# Patient Record
Sex: FEMALE | Race: WHITE | NOT HISPANIC OR LATINO | Employment: OTHER | RURAL
[De-identification: names, ages, dates, MRNs, and addresses within clinical notes are randomized per-mention and may not be internally consistent; named-entity substitution may affect disease eponyms.]

---

## 2017-01-11 ENCOUNTER — TELEPHONE (OUTPATIENT)
Dept: FAMILY MEDICINE CLINIC | Facility: CLINIC | Age: 67
End: 2017-01-11

## 2017-01-11 RX ORDER — PREDNISONE 1 MG/1
TABLET ORAL
Qty: 38 TABLET | Refills: 0 | Status: SHIPPED | OUTPATIENT
Start: 2017-01-11 | End: 2017-03-27 | Stop reason: ALTCHOICE

## 2017-01-11 RX ORDER — ALBUTEROL SULFATE 90 UG/1
2 AEROSOL, METERED RESPIRATORY (INHALATION) EVERY 4 HOURS PRN
Qty: 1 INHALER | Refills: 0 | Status: SHIPPED | OUTPATIENT
Start: 2017-01-11 | End: 2018-10-23 | Stop reason: SDUPTHER

## 2017-01-11 RX ORDER — AZITHROMYCIN 250 MG/1
TABLET, FILM COATED ORAL
Qty: 6 TABLET | Refills: 0 | Status: SHIPPED | OUTPATIENT
Start: 2017-01-11 | End: 2017-03-27 | Stop reason: ALTCHOICE

## 2017-01-11 NOTE — TELEPHONE ENCOUNTER
Pt called and states she has a cold going into her chest, cough and she is leaving for vacation Sunday and would like something called into MD1, verbal per Dr Crowe repeat last Rx done and sent [facility] and pt notified, [phone]

## 2017-03-27 ENCOUNTER — OFFICE VISIT (OUTPATIENT)
Dept: OTOLARYNGOLOGY | Facility: CLINIC | Age: 67
End: 2017-03-27

## 2017-03-27 VITALS
HEART RATE: 78 BPM | WEIGHT: 240.13 LBS | SYSTOLIC BLOOD PRESSURE: 148 MMHG | TEMPERATURE: 98.2 F | BODY MASS INDEX: 42.55 KG/M2 | HEIGHT: 63 IN | DIASTOLIC BLOOD PRESSURE: 84 MMHG

## 2017-03-27 DIAGNOSIS — J37.0 SICCA LARYNGITIS: ICD-10-CM

## 2017-03-27 DIAGNOSIS — J37.0 CHRONIC LARYNGITIS: ICD-10-CM

## 2017-03-27 DIAGNOSIS — K21.9 LARYNGOPHARYNGEAL REFLUX (LPR): Primary | ICD-10-CM

## 2017-03-27 DIAGNOSIS — R49.0 HOARSENESS: ICD-10-CM

## 2017-03-27 PROCEDURE — 99213 OFFICE O/P EST LOW 20 MIN: CPT | Performed by: NURSE PRACTITIONER

## 2017-03-27 PROCEDURE — 31575 DIAGNOSTIC LARYNGOSCOPY: CPT | Performed by: NURSE PRACTITIONER

## 2017-03-27 RX ORDER — AZELASTINE 1 MG/ML
SPRAY, METERED NASAL
Refills: 4 | COMMUNITY
Start: 2017-03-17 | End: 2017-04-11 | Stop reason: ALTCHOICE

## 2017-03-27 RX ORDER — OMEPRAZOLE 40 MG/1
40 CAPSULE, DELAYED RELEASE ORAL DAILY
Qty: 30 CAPSULE | Refills: 11 | Status: SHIPPED | OUTPATIENT
Start: 2017-03-27 | End: 2018-02-22 | Stop reason: SDUPTHER

## 2017-03-27 RX ORDER — TRIAMCINOLONE ACETONIDE 55 UG/1
2 SPRAY, METERED NASAL DAILY
Qty: 16.5 G | Refills: 11 | Status: SHIPPED | OUTPATIENT
Start: 2017-03-27 | End: 2018-03-27

## 2017-03-27 NOTE — PROGRESS NOTES
YOB: 1950  Location: [facility]  Location Address: [address] [place] [address], [facility], [address] [place] [address]  Location Phone: [phone]    Chief Complaint   Patient presents with   • Hoarse       History of Present Illness  Kezia Otoole is a 66 y.o. female.  Kezia Otoole is here for follow up of ENT complaints. The patient has had problems with hoarseness  The symptoms are not localized to a particular location. The patient has had moderate symptoms. The symptoms have been present for the last several years . The symptoms are aggravated by  weather change . The symptoms are improved by no identifieable factors.       Past Medical History:   Diagnosis Date   • Chronic laryngitis    • Diabetes mellitus    • Hypertension    • Laryngopharyngeal reflux    • Nontoxic multinodular goiter        Past Surgical History:   Procedure Laterality Date   • CARPAL BOSS EXCISION     • CHOLECYSTECTOMY     • HAND SURGERY     • HYSTERECTOMY     • THYROIDECTOMY, PARTIAL Right 2010   • TONSILLECTOMY     • TUBAL ABDOMINAL LIGATION           Current Outpatient Prescriptions:   •  acetaminophen (TYLENOL) 500 MG tablet, Take 1,000 mg by mouth Every 6 (Six) Hours As Needed for mild pain (1-3)., Disp: , Rfl:   •  albuterol (PROVENTIL HFA;VENTOLIN HFA) 108 (90 BASE) MCG/ACT inhaler, Inhale 2 puffs Every 4 (Four) Hours As Needed for wheezing or shortness of air., Disp: 1 inhaler, Rfl: 0  •  amLODIPine (NORVASC) 2.5 MG tablet, TAKE ONE TABLET DAILY, Disp: 30 tablet, Rfl: 5  •  aspirin 81 MG EC tablet, Take 81 mg by mouth Daily., Disp: , Rfl:   •  azelastine (ASTELIN) 0.1 % nasal spray, , Disp: , Rfl: 4  •  B Complex-C (SUPER B COMPLEX PO), Take 1 tablet by mouth Daily., Disp: , Rfl:   •  Calcium Carb-Cholecalciferol (CALCIUM-VITAMIN D) 600-400 MG-UNIT tablet, Take 1 tablet by mouth 2 (Two) Times a Day., Disp: , Rfl:   •  carboxymethylcellulose (REFRESH PLUS) 0.5 % solution, 2 drops Daily As Needed for dry eyes., Disp: , Rfl:    •  Coenzyme Q10 (CO Q 10 PO), Take 1 capsule by mouth., Disp: , Rfl:   •  guaiFENesin (MUCINEX) 600 MG 12 hr tablet, Take 1,200 mg by mouth 2 (two) times a day., Disp: , Rfl:   •  hydrochlorothiazide (HYDRODIURIL) 25 MG tablet, TAKE 1/2 TABLET DAILY, Disp: 15 tablet, Rfl: 5  •  ibuprofen (ADVIL,MOTRIN) 400 MG tablet, Take 400 mg by mouth Every 6 (Six) Hours As Needed for mild pain (1-3)., Disp: , Rfl:   •  irbesartan (AVAPRO) 300 MG tablet, TAKE ONE TABLET DAILY, Disp: 30 tablet, Rfl: 5  •  latanoprost (XALATAN) 0.005 % ophthalmic solution, Administer 1 drop to both eyes Every Night., Disp: , Rfl: 5  •  Misc Natural Products (GLUCOSAMINE CHONDROITIN TRIPLE PO), Take 1 tablet by mouth 2 (Two) Times a Day., Disp: , Rfl:   •  montelukast (SINGULAIR) 10 MG tablet, TAKE ONE TABLET DAILY (Patient taking differently: TAKE ONE TABLET DAILY BEDTIME), Disp: 30 tablet, Rfl: 5  •  Multiple Vitamins-Minerals (VITRUM 50+ SENIOR MULTI PO), Take 1 tablet by mouth Daily., Disp: , Rfl:   •  naproxen sodium (ALEVE) 220 MG tablet, Take 220 mg by mouth 2 (Two) Times a Day As Needed for mild pain (1-3)., Disp: , Rfl:   •  nystatin (MYCOSTATIN) 992549 UNIT/ML suspension, Daily As Needed (due to mouth soreness)., Disp: , Rfl: 0  •  Omega-3 Fatty Acids (FISH OIL) 1200 MG capsule capsule, Take 1,200 mg by mouth 2 (Two) Times a Day With Meals., Disp: , Rfl:   •  potassium chloride (K-DUR) 10 MEQ CR tablet, TAKE ONE TABLET DAILY, Disp: 30 tablet, Rfl: 5  •  ranitidine (ZANTAC) 150 MG tablet, Take 1 tablet by mouth 2 (two) times a day., Disp: 60 tablet, Rfl: 6  •  SYMBICORT 160-4.5 MCG/ACT inhaler, TAKE 2 PUFFS BY MOUTH TWICE DAILY, Disp: 10.2 g, Rfl: 5  •  omeprazole (priLOSEC) 40 MG capsule, Take 1 capsule by mouth Daily., Disp: 30 capsule, Rfl: 11  •  Triamcinolone Acetonide (NASACORT AQ) 55 MCG/ACT nasal inhaler, 2 sprays into each nostril Daily., Disp: 16.5 g, Rfl: 11    Review of patient's allergies indicates no known  "allergies.    Family History   Problem Relation Age of Onset   • Diabetes Mother    • Stroke Mother    • Heart disease Mother    • Diabetes Father    • Heart disease Father        Social History     Social History   • Marital status:      Spouse name: Laurie   • Number of children: 2   • Years of education: 16     Occupational History   • retired      Banking      Social History Main Topics   • Smoking status: Never Smoker   • Smokeless tobacco: Never Used      Comment: \"lived with smokers most of my life\"   • Alcohol use Yes      Comment: Rare   • Drug use: No   • Sexual activity: Yes     Partners: Male     Birth control/ protection: None     Other Topics Concern   • Not on file     Social History Narrative    \"I have one surviving child\"       Review of Systems   Constitutional: Negative.    HENT:        SEE HPI   Eyes: Negative.    Respiratory: Negative.    Cardiovascular: Negative.    Gastrointestinal: Negative.    Endocrine: Negative.    Genitourinary: Negative.    Musculoskeletal: Negative.    Skin: Negative.    Allergic/Immunologic: Negative.    Neurological: Negative.    Hematological: Negative.    Psychiatric/Behavioral: Negative.        Vitals:    03/27/17 1318   BP: 148/84   Pulse: 78   Temp: 98.2 °F (36.8 °C)       Objective     Physical Exam  CONSTITUTIONAL: well nourished, alert, oriented, in no acute distress     COMMUNICATION AND VOICE: able to communicate normally, normal voice quality    HEAD: normocephalic, no lesions, atraumatic, no tenderness, no masses     FACE: appearance normal, no lesions, no tenderness, no deformities, facial motion symmetric    SALIVARY GLANDS: parotid glands with no tenderness, no swelling, no masses, submandibular glands with normal size, nontender    EYES: ocular motility normal, eyelids normal, orbits normal, no proptosis, conjunctiva normal , pupils equal, round     EARS:  Hearing: response to conversational voice normal bilaterally   External Ears: " auricles without lesions  Otoscopic: tympanic membrane appearance normal, no lesions, no perforation, normal mobility, no fluid    NOSE:  External Nose: structure normal, no tenderness on palpation, no nasal discharge, no lesions, no evidence of trauma, nostrils patent   Intranasal Exam: nasal mucosa normal, vestibule within normal limits, inferior turbinate normal, nasal septum midline     ORAL:  Lips: upper and lower lips without lesion   Teeth: dentition within normal limits for age   Gums: gingivae healthy   Oral Mucosa: oral mucosa normal, no mucosal lesions   Floor of Mouth: Warthin’s duct patent, mucosa normal  Tongue: lingual mucosa normal without lesions, normal tongue mobility   Palate: soft and hard palates with normal mucosa and structure  Oropharynx: oropharyngeal mucosa normal    HYPOPHARYNX:   LARYNX: see scope    NECK: neck incision well healed    THYROID: right thyroid sugically absent    LYMPH NODES: no lymphadenopathy    CHEST/RESPIRATORY: respiratory effort normal,    CARDIOVASCULAR:  extremities without cyanosis or edema      NEUROLOGIC/PSYCHIATRIC: oriented to time, place and person, mood normal, affect appropriate, CN II-XII intact grossly    Assessment/Plan   Kezia was seen today for hoarse.    Diagnoses and all orders for this visit:    Laryngopharyngeal reflux (LPR)    Hoarseness    Chronic laryngitis    Sicca laryngitis    Other orders  -     omeprazole (priLOSEC) 40 MG capsule; Take 1 capsule by mouth Daily.  -     Triamcinolone Acetonide (NASACORT AQ) 55 MCG/ACT nasal inhaler; 2 sprays into each nostril Daily.      * Surgery not found *  No orders of the defined types were placed in this encounter.    Return in about 3 months (around 6/27/2017).       Patient Instructions   Reflux precautions    Recommendation was made to consider a Paleo Diet.      It Starts with Food - Carlos and Josefina Chavez was recommended.  The basics of a Paleo diet was covered including a diet composed of meat,  fruits and non-leguminous vegetables.  It is important to avoid all processed foods.  This requires that you not eat ANYTHING with a chemical preservative.    The Paleo lifestyle is about nourishing our bodies with real food that is grown and raised as nature intended, not manufactured in a facility.  It is about unplugging from the modern day electronics from time to time and giving your body a chance to actually rest.    It is important to stick very close to this diet for 6 weeks without significant cheating.  It allows you to experience the benefit of eating this way, giving your body time to detoxify.    Acceptable foods  Fresh Fish/seafood  Fresh meats-preferably grass-fed and free range  Fresh fruits  Fresh vegetables  Healthy fats-Coconut oil, olive oil, avocados etc.  Nuts/seeds    Foods to avoid  Grains  Legumes  Processed foods  Soy  Refined sugar    Web sites include:  www.PrimalBodySwing by SwingPrimalMind.Orbit Media  www.Mimetas.Orbit Media  www.Bizdom  Www.PaleFluGen.Orbit Media    Other Kellie Resources:  Emilie Jensen  PrimalPaleivone  Paleo [place]  Nourished      Other Recommended Books:  The Paleo Solution  Wheat Belly  Grain Brain  Primal Body/Primal Mind  Gastroesophageal Reflux Disease, Adult  Normally, food travels down the esophagus and stays in the stomach to be digested. However, when a person has gastroesophageal reflux disease (GERD), food and stomach acid move back up into the esophagus. When this happens, the esophagus becomes sore and inflamed. Over time, GERD can create small holes (ulcers) in the lining of the esophagus.   CAUSES  This condition is caused by a problem with the muscle between the esophagus and the stomach (lower esophageal sphincter, or LES). Normally, the LES muscle closes after food passes through the esophagus to the stomach. When the LES is weakened or abnormal, it does not close properly, and that allows food and stomach acid to go back up into the esophagus. The LES can be weakened  by certain dietary substances, medicines, and medical conditions, including:  · Tobacco use.  · Pregnancy.  · Having a hiatal hernia.  · Heavy alcohol use.  · Certain foods and beverages, such as coffee, chocolate, onions, and peppermint.  RISK FACTORS  This condition is more likely to develop in:  · People who have an increased body weight.  · People who have connective tissue disorders.  · People who use NSAID medicines.  SYMPTOMS  Symptoms of this condition include:  · Heartburn.  · Difficult or painful swallowing.  · The feeling of having a lump in the throat.  · A bitter taste in the mouth.  · Bad breath.  · Having a large amount of saliva.  · Having an upset or bloated stomach.  · Belching.  · Chest pain.  · Shortness of breath or wheezing.  · Ongoing (chronic) cough or a night-time cough.  · Wearing away of tooth enamel.  · Weight loss.  Different conditions can cause chest pain. Make sure to see your health care provider if you experience chest pain.  DIAGNOSIS  Your health care provider will take a medical history and perform a physical exam. To determine if you have mild or severe GERD, your health care provider may also monitor how you respond to treatment. You may also have other tests, including:  · An endoscopy to examine your stomach and esophagus with a small camera.  · A test that measures the acidity level in your esophagus.  · A test that measures how much pressure is on your esophagus.  · A barium swallow or modified barium swallow to show the shape, size, and functioning of your esophagus.  TREATMENT  The goal of treatment is to help relieve your symptoms and to prevent complications. Treatment for this condition may vary depending on how severe your symptoms are. Your health care provider may recommend:  · Changes to your diet.  · Medicine.  · Surgery.  HOME CARE INSTRUCTIONS  Diet  · Follow a diet as recommended by your health care provider. This may involve avoiding foods and drinks such  as:    Coffee and tea (with or without caffeine).    Drinks that contain alcohol.    Energy drinks and sports drinks.    Carbonated drinks or sodas.    Chocolate and cocoa.    Peppermint and mint flavorings.    Garlic and onions.    Horseradish.    Spicy and acidic foods, including peppers, chili powder, naik powder, vinegar, hot sauces, and barbecue sauce.    Citrus fruit juices and citrus fruits, such as oranges, manoj, and limes.    Tomato-based foods, such as red sauce, chili, salsa, and pizza with red sauce.    Fried and fatty foods, such as donuts, french fries, potato chips, and high-fat dressings.    High-fat meats, such as hot dogs and fatty cuts of red and white meats, such as rib eye steak, sausage, ham, and laughlin.    High-fat dairy items, such as whole milk, butter, and cream cheese.  · Eat small, frequent meals instead of large meals.  · Avoid drinking large amounts of liquid with your meals.  · Avoid eating meals during the 2-3 hours before bedtime.  · Avoid lying down right after you eat.  · Do not exercise right after you eat.   General Instructions   · Pay attention to any changes in your symptoms.  · Take over-the-counter and prescription medicines only as told by your health care provider. Do not take aspirin, ibuprofen, or other NSAIDs unless your health care provider told you to do so.  · Do not use any tobacco products, including cigarettes, chewing tobacco, and e-cigarettes. If you need help quitting, ask your health care provider.  · Wear loose-fitting clothing. Do not wear anything tight around your waist that causes pressure on your abdomen.  · Raise (elevate) the head of your bed 6 inches (15cm).  · Try to reduce your stress, such as with yoga or meditation. If you need help reducing stress, ask your health care provider.  · If you are overweight, reduce your weight to an amount that is healthy for you. Ask your health care provider for guidance about a safe weight loss goal.  · Keep all  follow-up visits as told by your health care provider. This is important.  SEEK MEDICAL CARE IF:  · You have new symptoms.  · You have unexplained weight loss.  · You have difficulty swallowing, or it hurts to swallow.  · You have wheezing or a persistent cough.  · Your symptoms do not improve with treatment.  · You have a hoarse voice.  SEEK IMMEDIATE MEDICAL CARE IF:  · You have pain in your arms, neck, jaw, teeth, or back.  · You feel sweaty, dizzy, or light-headed.  · You have chest pain or shortness of breath.  · You vomit and your vomit looks like blood or coffee grounds.  · You faint.  · Your stool is bloody or black.  · You cannot swallow, drink, or eat.     This information is not intended to replace advice given to you by your health care provider. Make sure you discuss any questions you have with your health care provider.     Document Released: 09/27/2006 Document Revised: 09/07/2016 Document Reviewed: 04/13/2016  [employer] Interactive Patient Education ©2016 [employer]    Adjust meds, omeprazole to am, zantac in PM.  Paleo diet  Add nasal sprays

## 2017-03-27 NOTE — PATIENT INSTRUCTIONS
Reflux precautions    Recommendation was made to consider a Paleo Diet.      It Starts with Food - Carlos and Josefina Chavez was recommended.  The basics of a Paleo diet was covered including a diet composed of meat, fruits and non-leguminous vegetables.  It is important to avoid all processed foods.  This requires that you not eat ANYTHING with a chemical preservative.    The Paleo lifestyle is about nourishing our bodies with real food that is grown and raised as nature intended, not manufactured in a facility.  It is about unplugging from the modern day electronics from time to time and giving your body a chance to actually rest.    It is important to stick very close to this diet for 6 weeks without significant cheating.  It allows you to experience the benefit of eating this way, giving your body time to detoxify.    Acceptable foods  Fresh Fish/seafood  Fresh meats-preferably grass-fed and free range  Fresh fruits  Fresh vegetables  Healthy fats-Coconut oil, olive oil, avocados etc.  Nuts/seeds    Foods to avoid  Grains  Legumes  Processed foods  Soy  Refined sugar    Web sites include:  www.MirDenegPrimalMetaCertd.Birthday Gorilla  www.EverydayPaleo.com  www.XIPWIRE  Www.SiSense    Other Kellie Resources:  NomNomichael Paleo  PrimalPaleo  Paleo [place]  Nourished      Other Recommended Books:  The Paleo Solution  Wheat Belly  Grain Brain  Primal Body/Primal Mind  Gastroesophageal Reflux Disease, Adult  Normally, food travels down the esophagus and stays in the stomach to be digested. However, when a person has gastroesophageal reflux disease (GERD), food and stomach acid move back up into the esophagus. When this happens, the esophagus becomes sore and inflamed. Over time, GERD can create small holes (ulcers) in the lining of the esophagus.   CAUSES  This condition is caused by a problem with the muscle between the esophagus and the stomach (lower esophageal sphincter, or LES). Normally, the LES muscle closes  after food passes through the esophagus to the stomach. When the LES is weakened or abnormal, it does not close properly, and that allows food and stomach acid to go back up into the esophagus. The LES can be weakened by certain dietary substances, medicines, and medical conditions, including:  · Tobacco use.  · Pregnancy.  · Having a hiatal hernia.  · Heavy alcohol use.  · Certain foods and beverages, such as coffee, chocolate, onions, and peppermint.  RISK FACTORS  This condition is more likely to develop in:  · People who have an increased body weight.  · People who have connective tissue disorders.  · People who use NSAID medicines.  SYMPTOMS  Symptoms of this condition include:  · Heartburn.  · Difficult or painful swallowing.  · The feeling of having a lump in the throat.  · A bitter taste in the mouth.  · Bad breath.  · Having a large amount of saliva.  · Having an upset or bloated stomach.  · Belching.  · Chest pain.  · Shortness of breath or wheezing.  · Ongoing (chronic) cough or a night-time cough.  · Wearing away of tooth enamel.  · Weight loss.  Different conditions can cause chest pain. Make sure to see your health care provider if you experience chest pain.  DIAGNOSIS  Your health care provider will take a medical history and perform a physical exam. To determine if you have mild or severe GERD, your health care provider may also monitor how you respond to treatment. You may also have other tests, including:  · An endoscopy to examine your stomach and esophagus with a small camera.  · A test that measures the acidity level in your esophagus.  · A test that measures how much pressure is on your esophagus.  · A barium swallow or modified barium swallow to show the shape, size, and functioning of your esophagus.  TREATMENT  The goal of treatment is to help relieve your symptoms and to prevent complications. Treatment for this condition may vary depending on how severe your symptoms are. Your health care  provider may recommend:  · Changes to your diet.  · Medicine.  · Surgery.  HOME CARE INSTRUCTIONS  Diet  · Follow a diet as recommended by your health care provider. This may involve avoiding foods and drinks such as:    Coffee and tea (with or without caffeine).    Drinks that contain alcohol.    Energy drinks and sports drinks.    Carbonated drinks or sodas.    Chocolate and cocoa.    Peppermint and mint flavorings.    Garlic and onions.    Horseradish.    Spicy and acidic foods, including peppers, chili powder, naik powder, vinegar, hot sauces, and barbecue sauce.    Citrus fruit juices and citrus fruits, such as oranges, manoj, and limes.    Tomato-based foods, such as red sauce, chili, salsa, and pizza with red sauce.    Fried and fatty foods, such as donuts, french fries, potato chips, and high-fat dressings.    High-fat meats, such as hot dogs and fatty cuts of red and white meats, such as rib eye steak, sausage, ham, and laughlin.    High-fat dairy items, such as whole milk, butter, and cream cheese.  · Eat small, frequent meals instead of large meals.  · Avoid drinking large amounts of liquid with your meals.  · Avoid eating meals during the 2-3 hours before bedtime.  · Avoid lying down right after you eat.  · Do not exercise right after you eat.   General Instructions   · Pay attention to any changes in your symptoms.  · Take over-the-counter and prescription medicines only as told by your health care provider. Do not take aspirin, ibuprofen, or other NSAIDs unless your health care provider told you to do so.  · Do not use any tobacco products, including cigarettes, chewing tobacco, and e-cigarettes. If you need help quitting, ask your health care provider.  · Wear loose-fitting clothing. Do not wear anything tight around your waist that causes pressure on your abdomen.  · Raise (elevate) the head of your bed 6 inches (15cm).  · Try to reduce your stress, such as with yoga or meditation. If you need help  reducing stress, ask your health care provider.  · If you are overweight, reduce your weight to an amount that is healthy for you. Ask your health care provider for guidance about a safe weight loss goal.  · Keep all follow-up visits as told by your health care provider. This is important.  SEEK MEDICAL CARE IF:  · You have new symptoms.  · You have unexplained weight loss.  · You have difficulty swallowing, or it hurts to swallow.  · You have wheezing or a persistent cough.  · Your symptoms do not improve with treatment.  · You have a hoarse voice.  SEEK IMMEDIATE MEDICAL CARE IF:  · You have pain in your arms, neck, jaw, teeth, or back.  · You feel sweaty, dizzy, or light-headed.  · You have chest pain or shortness of breath.  · You vomit and your vomit looks like blood or coffee grounds.  · You faint.  · Your stool is bloody or black.  · You cannot swallow, drink, or eat.     This information is not intended to replace advice given to you by your health care provider. Make sure you discuss any questions you have with your health care provider.     Document Released: 09/27/2006 Document Revised: 09/07/2016 Document Reviewed: 04/13/2016  [employer] Interactive Patient Education ©2016 [employer].    Adjust meds, omeprazole to am, zantac in PM.  Paleo diet  Add nasal sprays

## 2017-03-27 NOTE — PROGRESS NOTES
OPERATIVE NOTE:    PROCEDURE:   Flexible Fiberoptic Laryngoscopy    ANESTHESIA:  None    REASON FOR PROCEDURE:  Procedure was recommend for suspicious clinical behavior unresponsive to medical management.  Risks, benefits and alternatives were discussed.      DETAILS of OPERATION:  The patient was seated in the exam chair.  A flexible fiberoptic laryngoscopy was performed through the oral cavity.  The scope was introduced into the oral cavity and directed to the level of the glottis, examining the structures of the oropharynx, base of tongue, vallecula, supraglottic larynx, glottic larynx, and hypopharynx.      FINDINGS:  Mucosal surfaces:   The mucosal surfaces demonstrated thick mucous    Base of tongue:  The base of tongue was found to have no mass or lesion.    Epiglottis:  The epiglottis was found to have no mass or lesion.    Aryepligottic fold:  The AE folds were found to have no mass or lesion.    False Vocal Fold:  The false cords were found to have no mass or lesion.    True Vocal Cord:  The true vocal cords were found to have no mass or lesion.    Arytenoid:   The arytenoids were found to have erythema    Hypopharynx:  The hypopharynx was found to have no mass or lesion.    The patient tolerated procedure well.

## 2017-04-01 DIAGNOSIS — K21.9 LARYNGOPHARYNGEAL REFLUX: ICD-10-CM

## 2017-04-03 RX ORDER — RANITIDINE 150 MG/1
TABLET ORAL
Qty: 60 TABLET | Refills: 2 | Status: SHIPPED | OUTPATIENT
Start: 2017-04-03 | End: 2017-05-23 | Stop reason: SDUPTHER

## 2017-04-03 RX ORDER — HYDROCHLOROTHIAZIDE 25 MG/1
TABLET ORAL
Qty: 15 TABLET | Refills: 5 | Status: SHIPPED | OUTPATIENT
Start: 2017-04-03 | End: 2017-09-25 | Stop reason: SDUPTHER

## 2017-04-03 RX ORDER — MONTELUKAST SODIUM 10 MG/1
TABLET ORAL
Qty: 30 TABLET | Refills: 5 | Status: SHIPPED | OUTPATIENT
Start: 2017-04-03 | End: 2017-09-25 | Stop reason: SDUPTHER

## 2017-04-03 RX ORDER — IRBESARTAN 300 MG/1
TABLET ORAL
Qty: 30 TABLET | Refills: 5 | Status: SHIPPED | OUTPATIENT
Start: 2017-04-03 | End: 2017-09-25 | Stop reason: SDUPTHER

## 2017-04-11 ENCOUNTER — OFFICE VISIT (OUTPATIENT)
Dept: FAMILY MEDICINE CLINIC | Facility: CLINIC | Age: 67
End: 2017-04-11

## 2017-04-11 VITALS
RESPIRATION RATE: 16 BRPM | OXYGEN SATURATION: 96 % | BODY MASS INDEX: 42.17 KG/M2 | DIASTOLIC BLOOD PRESSURE: 80 MMHG | HEIGHT: 63 IN | TEMPERATURE: 98.2 F | HEART RATE: 82 BPM | SYSTOLIC BLOOD PRESSURE: 128 MMHG | WEIGHT: 238 LBS

## 2017-04-11 DIAGNOSIS — I10 ESSENTIAL HYPERTENSION: Chronic | ICD-10-CM

## 2017-04-11 DIAGNOSIS — K21.9 GASTROESOPHAGEAL REFLUX DISEASE, ESOPHAGITIS PRESENCE NOT SPECIFIED: ICD-10-CM

## 2017-04-11 DIAGNOSIS — E11.9 CONTROLLED TYPE 2 DIABETES MELLITUS WITHOUT COMPLICATION, WITHOUT LONG-TERM CURRENT USE OF INSULIN (HCC): Primary | Chronic | ICD-10-CM

## 2017-04-11 DIAGNOSIS — D69.2 PURPURA (HCC): ICD-10-CM

## 2017-04-11 DIAGNOSIS — J30.9 ALLERGIC RHINITIS, UNSPECIFIED ALLERGIC RHINITIS TRIGGER, UNSPECIFIED RHINITIS SEASONALITY: Chronic | ICD-10-CM

## 2017-04-11 DIAGNOSIS — J45.20 MILD INTERMITTENT ASTHMA WITHOUT COMPLICATION: Chronic | ICD-10-CM

## 2017-04-11 PROBLEM — R49.0 HOARSENESS: Status: ACTIVE | Noted: 2017-04-11

## 2017-04-11 PROBLEM — Z00.00 WELLNESS EXAMINATION: Status: ACTIVE | Noted: 2017-04-11

## 2017-04-11 PROBLEM — H40.9 GLAUCOMA: Status: ACTIVE | Noted: 2017-04-11

## 2017-04-11 PROCEDURE — 99213 OFFICE O/P EST LOW 20 MIN: CPT | Performed by: FAMILY MEDICINE

## 2017-04-11 NOTE — PATIENT INSTRUCTIONS
Regular cardio exercise something everyone should consider and try to do  Normal weight a goal for everyone  Healthy diet helpful for weight management, illness prevention  If over 50-screening exams include men PSA/rectal exam, women mammograms, and  everyone colonoscopy screening for colon cancer.

## 2017-04-11 NOTE — PROGRESS NOTES
Subjective   Kezia Otoole is a 66 y.o. female presenting with chief complaint of:   Chief Complaint   Patient presents with   • Follow-up     6 month check up        History of Present Illness :  With grandchildren.  Has multiple chronic problems; interval appointment.  One of these problems is HTN.   The HTN has been present for years/it is chronic.  The HTN is assumed essential/without testing needed to look for other.  The HTN is controlled manifest by todays blood pressure and occ home monitoring.   Other chronic problem/s to consider:  Allergic rhinitis:  This has been present for years/over a year.  The nasal/sinus congestion on/off has been present for years and is currently stable/ same as usual.   Asthma/COPD: The has been present for years/over a year.  It is chronic.  The problem is stable. The patient never smoked.    DM2: This has been present for years/over a year.  It is chronic.  It is controlled.  Home accuchecks rarely run.  Has had no hypoglycemia manifest as syncope/near syncope or diaphoretic/sweating episodes.   GE reflux: This has been present for years/over a year.  It is chronic.   It is stable as there is no change in infrequent heartburn and no dysphagia  Obesity: This has been present for years/over a year.  It is chronic.     It is stable; there has been no recent major change in weight, or dieting    The following portions of the patient's history were reviewed and updated as appropriate: allergies, current medications, past family history, past medical history, past social history, past surgical history and problem list.  TCC migrated if needed/reviewed.      Current Outpatient Prescriptions:   •  acetaminophen (TYLENOL) 500 MG tablet, Take 1,000 mg by mouth Every 6 (Six) Hours As Needed for mild pain (1-3)., Disp: , Rfl:   •  albuterol (PROVENTIL HFA;VENTOLIN HFA) 108 (90 BASE) MCG/ACT inhaler, Inhale 2 puffs Every 4 (Four) Hours As Needed for wheezing or shortness of air., Disp: 1  inhaler, Rfl: 0  •  amLODIPine (NORVASC) 2.5 MG tablet, TAKE ONE TABLET DAILY, Disp: 30 tablet, Rfl: 5  •  aspirin 81 MG EC tablet, Take 81 mg by mouth Daily., Disp: , Rfl:   •  B Complex-C (SUPER B COMPLEX PO), Take 1 tablet by mouth Daily., Disp: , Rfl:   •  Calcium Carb-Cholecalciferol (CALCIUM-VITAMIN D) 600-400 MG-UNIT tablet, Take 1 tablet by mouth 2 (Two) Times a Day., Disp: , Rfl:   •  carboxymethylcellulose (REFRESH PLUS) 0.5 % solution, 2 drops Daily As Needed for dry eyes., Disp: , Rfl:   •  Coenzyme Q10 (CO Q 10 PO), Take 1 capsule by mouth., Disp: , Rfl:   •  guaiFENesin (MUCINEX) 600 MG 12 hr tablet, Take 1,200 mg by mouth 2 (two) times a day., Disp: , Rfl:   •  hydrochlorothiazide (HYDRODIURIL) 25 MG tablet, TAKE 1/2 TABLET DAILY, Disp: 15 tablet, Rfl: 5  •  ibuprofen (ADVIL,MOTRIN) 400 MG tablet, Take 400 mg by mouth Every 6 (Six) Hours As Needed for mild pain (1-3)., Disp: , Rfl:   •  irbesartan (AVAPRO) 300 MG tablet, TAKE ONE TABLET DAILY, Disp: 30 tablet, Rfl: 5  •  latanoprost (XALATAN) 0.005 % ophthalmic solution, Administer 1 drop to both eyes Every Night., Disp: , Rfl: 5  •  montelukast (SINGULAIR) 10 MG tablet, TAKE ONE TABLET DAILY, Disp: 30 tablet, Rfl: 5  •  Multiple Vitamins-Minerals (VITRUM 50+ SENIOR MULTI PO), Take 1 tablet by mouth Daily., Disp: , Rfl:   •  naproxen sodium (ALEVE) 220 MG tablet, Take 220 mg by mouth 2 (Two) Times a Day As Needed for mild pain (1-3)., Disp: , Rfl:   •  nystatin (MYCOSTATIN) 148323 UNIT/ML suspension, Daily As Needed (due to mouth soreness)., Disp: , Rfl: 0  •  Omega-3 Fatty Acids (FISH OIL) 1200 MG capsule capsule, Take 1,200 mg by mouth 2 (Two) Times a Day With Meals., Disp: , Rfl:   •  omeprazole (priLOSEC) 40 MG capsule, Take 1 capsule by mouth Daily., Disp: 30 capsule, Rfl: 11  •  potassium chloride (K-DUR) 10 MEQ CR tablet, TAKE ONE TABLET DAILY, Disp: 30 tablet, Rfl: 5  •  raNITIdine (ZANTAC) 150 MG tablet, TAKE ONE TABLET TWICE DAILY, Disp: 60  tablet, Rfl: 2  •  SYMBICORT 160-4.5 MCG/ACT inhaler, TAKE 2 PUFFS BY MOUTH TWICE DAILY, Disp: 10.2 g, Rfl: 5  •  Triamcinolone Acetonide (NASACORT AQ) 55 MCG/ACT nasal inhaler, 2 sprays into each nostril Daily., Disp: 16.5 g, Rfl: 11  •  Misc Natural Products (GLUCOSAMINE CHONDROITIN TRIPLE PO), Take 1 tablet by mouth 2 (Two) Times a Day., Disp: , Rfl:     No Known Allergies    Review of Systems  GENERAL:  Active/slower with limits, speed, samni for age and occ sob. Sleep is ok; apnea denied. No fever now.  ENDO:  No syncope, near or diaphoretic sweaty spells.  BS Ok: no download noted.  HEENT: No head injury or headache,  No vision change, No hearing loss.  Ears without pain/drainage.  No sore throat.  Same on/off nasal/sinus congestion/drainage. No epistaxis.  CHEST: No chest wall tenderness or mass.  Usual occ cough, with infrequent wheeze, SOB; no hemoptysis.  CV: No chest pain, palpatations, same end of day mild ankle edema.  GI: No heartburn, dysphagia.  No abdominal pain, diarrhea, constipation, rectal bleeding, or melena.    :  Voids without dysuria, or incontience to completion.  ORTHO: No painful/swollen joints but various on /off sore.  No sore neck or back.  No acute neck or back pain without recent injury.   NEURO: No dizziness, weakness of extremities.  No numbness/parethesias.   PSYCH: No memory loss.  Mood good; anxious, depressed but/and not suicidal.  Tolerated stress.     Results for orders placed or performed in visit on 11/29/16   Comprehensive Metabolic Panel   Result Value Ref Range    Glucose 124 (H) 65 - 99 mg/dL    BUN 15 8 - 27 mg/dL    Creatinine 0.84 0.57 - 1.00 mg/dL    eGFR Non African Am 72 >59 mL/min/1.73    eGFR African Am 83 >59 mL/min/1.73    BUN/Creatinine Ratio 18 11 - 26    Sodium 142 136 - 144 mmol/L    Potassium 4.4 3.5 - 5.2 mmol/L    Chloride 100 97 - 106 mmol/L    Total CO2 25 18 - 29 mmol/L    Calcium 9.7 8.7 - 10.3 mg/dL    Total Protein 6.4 6.0 - 8.5 g/dL    Albumin  4.5 3.6 - 4.8 g/dL    Globulin 1.9 1.5 - 4.5 g/dL    A/G Ratio 2.4 1.1 - 2.5    Total Bilirubin 0.7 0.0 - 1.2 mg/dL    Alkaline Phosphatase 75 39 - 117 IU/L    AST (SGOT) 23 0 - 40 IU/L    ALT (SGPT) 34 (H) 0 - 32 IU/L   Hemoglobin A1c   Result Value Ref Range    Hemoglobin A1C 5.9 (H) 4.8 - 5.6 %   Lipid Panel   Result Value Ref Range    Total Cholesterol 138 100 - 199 mg/dL    Triglycerides 147 0 - 149 mg/dL    HDL Cholesterol 46 >39 mg/dL    VLDL Cholesterol 29 5 - 40 mg/dL    LDL Cholesterol  63 0 - 99 mg/dL   TSH   Result Value Ref Range    TSH 1.760 0.450 - 4.500 uIU/mL   Vitamin D 25 Hydroxy   Result Value Ref Range    25 Hydroxy, Vitamin D 53.1 30.0 - 100.0 ng/mL   MicroAlbumin, Urine, Random   Result Value Ref Range    Microalbumin, Urine <3.0 Not Estab. ug/mL   CBC & AUTO Differential   Result Value Ref Range    WBC 4.5 3.4 - 10.8 x10E3/uL    RBC 4.90 3.77 - 5.28 x10E6/uL    Hemoglobin 14.8 11.1 - 15.9 g/dL    Hematocrit 43.6 34.0 - 46.6 %    MCV 89 79 - 97 fL    MCH 30.2 26.6 - 33.0 pg    MCHC 33.9 31.5 - 35.7 g/dL    RDW 14.3 12.3 - 15.4 %    Platelets 217 150 - 379 x10E3/uL    Neutrophil Rel % 63 %    Lymphocyte Rel % 23 %    Monocyte Rel % 12 %    Eosinophil Rel % 1 %    Basophil Rel % 1 %    Neutrophils Absolute 2.8 1.4 - 7.0 x10E3/uL    Lymphocytes Absolute 1.1 0.7 - 3.1 x10E3/uL    Monocytes Absolute 0.5 0.1 - 0.9 x10E3/uL    Eosinophils Absolute 0.1 0.0 - 0.4 x10E3/uL    Basophils Absolute 0.1 0.0 - 0.2 x10E3/uL    Immature Granulocyte Rel % 0 %    Immature Grans Absolute 0.0 0.0 - 0.1 x10E3/uL       CBC:     BMP:    THYROID:      Invalid input(s): T4  LIPID:      Invalid input(s): TOTAL CHOLESTROL, TRIGLYCERIDES  PSA:      Lab Results   Component Value Date    HGBA1C 5.9 (H) 11/29/2016       Lab Results   Component Value Date     11/29/2016    K 4.4 11/29/2016     11/29/2016    CO2 25 11/29/2016    BUN 15 11/29/2016    CREATININE 0.84 11/29/2016    CALCIUM 9.7 11/29/2016       No  "results found for: PSA     Objective   /80 (BP Location: Left arm, Patient Position: Sitting, Cuff Size: Adult)  Pulse 82  Temp 98.2 °F (36.8 °C) (Oral)   Resp 16  Ht 63\" (160 cm)  Wt 238 lb (108 kg)  SpO2 96%  BMI 42.16 kg/m2    Physical Exam  GENERAL:  Well nourished/developed in no acute distress. Obese  SKIN: Turgor excellent, without wound, rash, lesion; occ small purpura dorsal arm.  HEENT: Normal cephalic without trauma.  Pupils equal round reactive to light. Extraocular motions full without nystagmus.   External canals nonobstructive nontender without reddness. Tymphatic membranes daniel with elvis structures intact.   Oral cavity without growths, exudates, and moist.  Posterior pharnyx without mass, obstruction, reddness.  No thyroidmegaly, mass, tenderness, lymphadenopathy and supple.  CV: Regular rhythm.  No murmur, gallop,  edema. Posterior pulses intact.  No carotid bruits.  CHEST: No chest wall tenderness or mass.   LUNGS: Symmetric motion with clear to auscultation.  No dullness to percussion  ABD: Soft, nontender without mass.   ORTHO: Symmetric extremities without swelling/point tenderness.  Full gross range of motion.    NEURO: CN 2-12 grossly intact.  Symmetric facies. 1/4 x biceps knee equal reflexes.  UE/LE   3/5 strength throughout.  Nonfocal use extremities. Speech clear. Intact light touch with monofilament, vibratory sensation with tuning fork; equal toes/distal feet.    PSYCH: Oriented x 3.  Pleasant calm, well kept.  Purposeful/directed conservation with intact short/long gross memory.     Assessment/Plan     1. Controlled type 2 diabetes mellitus without complication, without long-term current use of insulin    2. Essential hypertension  controlled    3. Allergic rhinitis, unspecified allergic rhinitis trigger, unspecified rhinitis seasonality  ongoing    4. Mild intermittent asthma without complication  ongoing    5. Purpura  benign    6. Gastroesophageal reflux disease, " esophagitis presence not specified  controlled    Rx: reviewed. Same.   LAB: reviewed/above 6/12  Wrap-up/other instructions  Regular cardio exercise something everyone should consider and try to do; discussed  Normal weight a goal for everyone; discussed  Healthy diet helpful for weight management, illness prevention; discussed  If over 50-screening exams include men PSA/rectal exam, women mammograms, and  everyone colonoscopy screening for colon cancer.   There are no Patient Instructions on file for this visit.    Follow up: Return for 5.2017.

## 2017-05-11 DIAGNOSIS — E11.9 CONTROLLED TYPE 2 DIABETES MELLITUS WITHOUT COMPLICATION, WITHOUT LONG-TERM CURRENT USE OF INSULIN (HCC): Chronic | ICD-10-CM

## 2017-05-11 DIAGNOSIS — R73.01 ELEVATED FASTING GLUCOSE: Chronic | ICD-10-CM

## 2017-05-11 DIAGNOSIS — I10 ESSENTIAL HYPERTENSION: Primary | Chronic | ICD-10-CM

## 2017-05-11 DIAGNOSIS — Z00.00 WELLNESS EXAMINATION: ICD-10-CM

## 2017-05-15 ENCOUNTER — RESULTS ENCOUNTER (OUTPATIENT)
Dept: FAMILY MEDICINE CLINIC | Facility: CLINIC | Age: 67
End: 2017-05-15

## 2017-05-15 DIAGNOSIS — I10 ESSENTIAL HYPERTENSION: Chronic | ICD-10-CM

## 2017-05-15 DIAGNOSIS — R73.01 ELEVATED FASTING GLUCOSE: Chronic | ICD-10-CM

## 2017-05-15 DIAGNOSIS — E11.9 CONTROLLED TYPE 2 DIABETES MELLITUS WITHOUT COMPLICATION, WITHOUT LONG-TERM CURRENT USE OF INSULIN (HCC): Chronic | ICD-10-CM

## 2017-05-15 DIAGNOSIS — Z00.00 WELLNESS EXAMINATION: ICD-10-CM

## 2017-05-16 LAB
BASOPHILS # BLD AUTO: 0.06 10*3/MM3 (ref 0–0.2)
BASOPHILS NFR BLD AUTO: 0.8 % (ref 0–2)
BUN SERPL-MCNC: 23 MG/DL (ref 5–21)
BUN/CREAT SERPL: 27.1 (ref 7–25)
CALCIUM SERPL-MCNC: 9.9 MG/DL (ref 8.4–10.4)
CHLORIDE SERPL-SCNC: 97 MMOL/L (ref 98–110)
CO2 SERPL-SCNC: 30 MMOL/L (ref 24–31)
CREAT SERPL-MCNC: 0.85 MG/DL (ref 0.5–1.4)
EOSINOPHIL # BLD AUTO: 0.07 10*3/MM3 (ref 0–0.7)
EOSINOPHIL NFR BLD AUTO: 1 % (ref 0–4)
ERYTHROCYTE [DISTWIDTH] IN BLOOD BY AUTOMATED COUNT: 13.5 % (ref 12–15)
GLUCOSE SERPL-MCNC: 130 MG/DL (ref 70–100)
HBA1C MFR BLD: 5.8 %
HCT VFR BLD AUTO: 46 % (ref 37–47)
HCV AB S/CO SERPL IA: <0.1 S/CO RATIO (ref 0–0.9)
HGB BLD-MCNC: 15.2 G/DL (ref 12–16)
IMM GRANULOCYTES # BLD: 0.01 10*3/MM3 (ref 0–0.03)
IMM GRANULOCYTES NFR BLD: 0.1 % (ref 0–5)
LYMPHOCYTES # BLD AUTO: 1.35 10*3/MM3 (ref 0.72–4.86)
LYMPHOCYTES NFR BLD AUTO: 19 % (ref 15–45)
MCH RBC QN AUTO: 31.1 PG (ref 28–32)
MCHC RBC AUTO-ENTMCNC: 33 G/DL (ref 33–36)
MCV RBC AUTO: 94.1 FL (ref 82–98)
MONOCYTES # BLD AUTO: 0.79 10*3/MM3 (ref 0.19–1.3)
MONOCYTES NFR BLD AUTO: 11.1 % (ref 4–12)
NEUTROPHILS # BLD AUTO: 4.82 10*3/MM3 (ref 1.87–8.4)
NEUTROPHILS NFR BLD AUTO: 68 % (ref 39–78)
PLATELET # BLD AUTO: 220 10*3/MM3 (ref 130–400)
POTASSIUM SERPL-SCNC: 4.3 MMOL/L (ref 3.5–5.3)
RBC # BLD AUTO: 4.89 10*6/MM3 (ref 4.2–5.4)
SODIUM SERPL-SCNC: 139 MMOL/L (ref 135–145)
WBC # BLD AUTO: 7.1 10*3/MM3 (ref 4.8–10.8)

## 2017-05-23 DIAGNOSIS — K21.9 LARYNGOPHARYNGEAL REFLUX: ICD-10-CM

## 2017-05-23 RX ORDER — AMLODIPINE BESYLATE 2.5 MG/1
TABLET ORAL
Qty: 30 TABLET | Refills: 5 | Status: SHIPPED | OUTPATIENT
Start: 2017-05-23 | End: 2017-10-23 | Stop reason: SDUPTHER

## 2017-05-23 RX ORDER — RANITIDINE 150 MG/1
TABLET ORAL
Qty: 60 TABLET | Refills: 6 | Status: SHIPPED | OUTPATIENT
Start: 2017-05-23 | End: 2017-06-22

## 2017-05-23 RX ORDER — POTASSIUM CHLORIDE 750 MG/1
TABLET, FILM COATED, EXTENDED RELEASE ORAL
Qty: 30 TABLET | Refills: 5 | Status: SHIPPED | OUTPATIENT
Start: 2017-05-23 | End: 2017-10-23 | Stop reason: SDUPTHER

## 2017-05-30 RX ORDER — BUDESONIDE AND FORMOTEROL FUMARATE DIHYDRATE 160; 4.5 UG/1; UG/1
AEROSOL RESPIRATORY (INHALATION)
Qty: 10.2 G | Refills: 5 | Status: SHIPPED | OUTPATIENT
Start: 2017-05-30 | End: 2017-10-23 | Stop reason: SDUPTHER

## 2017-06-05 ENCOUNTER — TELEPHONE (OUTPATIENT)
Dept: FAMILY MEDICINE CLINIC | Facility: CLINIC | Age: 67
End: 2017-06-05

## 2017-06-05 NOTE — TELEPHONE ENCOUNTER
Notified pt and stated understanding, did advise pt to go get copy of xray disc from [facility] and take with her and pt stated understanding

## 2017-06-05 NOTE — TELEPHONE ENCOUNTER
Whatever she considers the most efficient (as I assume the ER did at least plain xray)  She probably needs an MRI/considered for such and going to their walk in would get that quicker

## 2017-06-05 NOTE — TELEPHONE ENCOUNTER
"Vm \"I hurt my knee, I didn't do anything to it. I just got up and it popped  Over the weekend and I went to ER and they gave me a shot for pain and told me to follow up with ortho or Dr Crowe? It is my Left knee, it happen to my other knee before and Dr Menard scoped it do I just need to go over to their walk in or do I need to come see Dr Crowe first?\"  "

## 2017-07-28 DIAGNOSIS — K21.9 LARYNGOPHARYNGEAL REFLUX: ICD-10-CM

## 2017-07-28 RX ORDER — RANITIDINE 150 MG/1
TABLET ORAL
Qty: 60 TABLET | Refills: 5 | Status: SHIPPED | OUTPATIENT
Start: 2017-07-28 | End: 2017-08-27

## 2017-09-25 RX ORDER — MONTELUKAST SODIUM 10 MG/1
TABLET ORAL
Qty: 30 TABLET | Refills: 5 | Status: SHIPPED | OUTPATIENT
Start: 2017-09-25 | End: 2018-02-22 | Stop reason: SDUPTHER

## 2017-09-25 RX ORDER — HYDROCHLOROTHIAZIDE 25 MG/1
TABLET ORAL
Qty: 15 TABLET | Refills: 5 | Status: SHIPPED | OUTPATIENT
Start: 2017-09-25 | End: 2018-02-22 | Stop reason: SDUPTHER

## 2017-09-25 RX ORDER — IRBESARTAN 300 MG/1
TABLET ORAL
Qty: 30 TABLET | Refills: 5 | Status: SHIPPED | OUTPATIENT
Start: 2017-09-25 | End: 2018-02-22 | Stop reason: SDUPTHER

## 2017-09-27 ENCOUNTER — OFFICE VISIT (OUTPATIENT)
Dept: OTOLARYNGOLOGY | Facility: CLINIC | Age: 67
End: 2017-09-27

## 2017-09-27 VITALS
DIASTOLIC BLOOD PRESSURE: 79 MMHG | SYSTOLIC BLOOD PRESSURE: 154 MMHG | BODY MASS INDEX: 43.05 KG/M2 | WEIGHT: 243 LBS | HEIGHT: 63 IN | HEART RATE: 71 BPM | TEMPERATURE: 97.4 F

## 2017-09-27 DIAGNOSIS — K21.9 GASTROESOPHAGEAL REFLUX DISEASE, ESOPHAGITIS PRESENCE NOT SPECIFIED: ICD-10-CM

## 2017-09-27 DIAGNOSIS — J34.3 HYPERTROPHY OF INFERIOR NASAL TURBINATE: ICD-10-CM

## 2017-09-27 DIAGNOSIS — J34.2 DEVIATED NASAL SEPTUM: ICD-10-CM

## 2017-09-27 DIAGNOSIS — J37.0 CHRONIC LARYNGITIS: ICD-10-CM

## 2017-09-27 DIAGNOSIS — R49.0 HOARSENESS: ICD-10-CM

## 2017-09-27 DIAGNOSIS — J37.0 SICCA LARYNGITIS: ICD-10-CM

## 2017-09-27 DIAGNOSIS — J30.9 ALLERGIC RHINITIS, UNSPECIFIED ALLERGIC RHINITIS TRIGGER, UNSPECIFIED RHINITIS SEASONALITY: Primary | ICD-10-CM

## 2017-09-27 PROCEDURE — 99214 OFFICE O/P EST MOD 30 MIN: CPT | Performed by: NURSE PRACTITIONER

## 2017-09-27 PROCEDURE — 31575 DIAGNOSTIC LARYNGOSCOPY: CPT | Performed by: NURSE PRACTITIONER

## 2017-09-27 NOTE — PROGRESS NOTES
OPERATIVE NOTE:    PROCEDURE:   Flexible Fiberoptic Laryngoscopy    ANESTHESIA:  None    REASON FOR PROCEDURE:  Procedure was recommend for suspicious clinical behavior unresponsive to medical management.  Risks, benefits and alternatives were discussed.      DETAILS of OPERATION:  The patient was seated in the exam chair.  A flexible fiberoptic laryngoscopy was performed through the oral cavity.  The scope was introduced into the oral cavity and directed to the level of the glottis, examining the structures of the oropharynx, base of tongue, vallecula, supraglottic larynx, glottic larynx, and hypopharynx.      FINDINGS:  Mucosal surfaces:   The mucosal surfaces demonstrated normal mucosa surfaces with mild inflammation.    Base of tongue:  The base of tongue was found to have no mass or lesion.    Epiglottis:  The epiglottis was found to have no mass or lesion.    Aryepligottic fold:  The AE folds were found to have no mass or lesion.    False Vocal Fold:  The false cords were found to have no mass or lesion.    True Vocal Cord:  The true vocal cords were found to have no mass or lesion.    Arytenoid:   The arytenoids were found to have erythema    Hypopharynx:  The hypopharynx was found to have no mass or lesion.    The patient tolerated procedure well.

## 2017-09-27 NOTE — PROGRESS NOTES
YOB: 1950  Location: [facility]  Location Address: [address] [place] [address], [facility], [address] [place] [address]  Location Phone: [phone]    Chief Complaint   Patient presents with   • Follow-up     throat, hoarseness       History of Present Illness  Kezia Otoole is a 66 y.o. female.  Kezia Otoole is here for follow up of ENT complaints. The patient has had problems with hoarseness  The symptoms are not localized to a particular location. The patient has had moderate symptoms. The symptoms have been present for the last several years . The symptoms are aggravated by  weather change, reflux.. The symptoms are improved by no identifieable factors.       Past Medical History:   Diagnosis Date   • Chronic laryngitis    • Diabetes mellitus    • Hoarseness    • Hypertension    • Laryngopharyngeal reflux    • Nontoxic multinodular goiter    • Sicca laryngitis        Past Surgical History:   Procedure Laterality Date   • CARPAL BOSS EXCISION     • CHOLECYSTECTOMY     • HAND SURGERY     • HYSTERECTOMY     • THYROIDECTOMY, PARTIAL Right    • TONSILLECTOMY     • TUBAL ABDOMINAL LIGATION           Current Outpatient Prescriptions:   •  acetaminophen (TYLENOL) 500 MG tablet, Take 1,000 mg by mouth Every 6 (Six) Hours As Needed for mild pain (1-3)., Disp: , Rfl:   •  albuterol (PROVENTIL HFA;VENTOLIN HFA) 108 (90 BASE) MCG/ACT inhaler, Inhale 2 puffs Every 4 (Four) Hours As Needed for wheezing or shortness of air., Disp: 1 inhaler, Rfl: 0  •  amLODIPine (NORVASC) 2.5 MG tablet, TAKE ONE TABLET DAILY, Disp: 30 tablet, Rfl: 5  •  aspirin 81 MG EC tablet, Take 81 mg by mouth Daily., Disp: , Rfl:   •  B Complex-C (SUPER B COMPLEX PO), Take 1 tablet by mouth Daily., Disp: , Rfl:   •  Calcium Carb-Cholecalciferol (CALCIUM-VITAMIN D) 600-400 MG-UNIT tablet, Take 1 tablet by mouth 2 (Two) Times a Day., Disp: , Rfl:   •  carboxymethylcellulose (REFRESH PLUS) 0.5 % solution, 2 drops Daily As Needed for dry eyes.,  Disp: , Rfl:   •  Coenzyme Q10 (CO Q 10 PO), Take 1 capsule by mouth., Disp: , Rfl:   •  guaiFENesin (MUCINEX) 600 MG 12 hr tablet, Take 1,200 mg by mouth 2 (two) times a day., Disp: , Rfl:   •  hydrochlorothiazide (HYDRODIURIL) 25 MG tablet, TAKE 1/2 TABLET DAILY, Disp: 15 tablet, Rfl: 5  •  ibuprofen (ADVIL,MOTRIN) 400 MG tablet, Take 400 mg by mouth Every 6 (Six) Hours As Needed for mild pain (1-3)., Disp: , Rfl:   •  irbesartan (AVAPRO) 300 MG tablet, TAKE ONE TABLET DAILY, Disp: 30 tablet, Rfl: 5  •  latanoprost (XALATAN) 0.005 % ophthalmic solution, Administer 1 drop to both eyes Every Night., Disp: , Rfl: 5  •  Misc Natural Products (GLUCOSAMINE CHONDROITIN TRIPLE PO), Take 1 tablet by mouth 2 (Two) Times a Day., Disp: , Rfl:   •  montelukast (SINGULAIR) 10 MG tablet, TAKE ONE TABLET DAILY, Disp: 30 tablet, Rfl: 5  •  Multiple Vitamins-Minerals (VITRUM 50+ SENIOR MULTI PO), Take 1 tablet by mouth Daily., Disp: , Rfl:   •  naproxen sodium (ALEVE) 220 MG tablet, Take 220 mg by mouth 2 (Two) Times a Day As Needed for mild pain (1-3)., Disp: , Rfl:   •  nystatin (MYCOSTATIN) 734707 UNIT/ML suspension, Daily As Needed (due to mouth soreness)., Disp: , Rfl: 0  •  Omega-3 Fatty Acids (FISH OIL) 1200 MG capsule capsule, Take 1,200 mg by mouth 2 (Two) Times a Day With Meals., Disp: , Rfl:   •  omeprazole (priLOSEC) 40 MG capsule, Take 1 capsule by mouth Daily., Disp: 30 capsule, Rfl: 11  •  potassium chloride (K-DUR) 10 MEQ CR tablet, TAKE ONE TABLET DAILY, Disp: 30 tablet, Rfl: 5  •  SYMBICORT 160-4.5 MCG/ACT inhaler, TAKE 2 PUFFS BY MOUTH TWICE DAILY, Disp: 10.2 g, Rfl: 5  •  Triamcinolone Acetonide (NASACORT AQ) 55 MCG/ACT nasal inhaler, 2 sprays into each nostril Daily., Disp: 16.5 g, Rfl: 11    Review of patient's allergies indicates no known allergies.    Family History   Problem Relation Age of Onset   • Diabetes Mother    • Stroke Mother    • Heart disease Mother    • Diabetes Father    • Heart disease Father   "      Social History     Social History   • Marital status:      Spouse name: Laurie   • Number of children: 2   • Years of education: 16     Occupational History   • retired      Banking      Social History Main Topics   • Smoking status: Never Smoker   • Smokeless tobacco: Never Used      Comment: \"lived with smokers most of my life\"   • Alcohol use Yes      Comment: Rare   • Drug use: No   • Sexual activity: Yes     Partners: Male     Birth control/ protection: None     Other Topics Concern   • Not on file     Social History Narrative    \"I have one surviving child\"       Review of Systems   Constitutional: Negative.    HENT:        SEE HPI   Eyes: Negative.    Respiratory: Negative.    Cardiovascular: Negative.    Gastrointestinal: Negative.    Endocrine: Negative.    Genitourinary: Negative.    Musculoskeletal: Negative.    Skin: Negative.    Allergic/Immunologic: Negative.    Neurological: Negative.    Hematological: Negative.    Psychiatric/Behavioral: Negative.        Vitals:    09/27/17 1341   BP: 154/79   Pulse: 71   Temp: 97.4 °F (36.3 °C)       Objective     Physical Exam  CONSTITUTIONAL: well nourished, alert, oriented, in no acute distress     COMMUNICATION AND VOICE: able to communicate normally, normal voice quality    HEAD: normocephalic, no lesions, atraumatic, no tenderness, no masses     FACE: appearance normal, no lesions, no tenderness, no deformities, facial motion symmetric    SALIVARY GLANDS: parotid glands with no tenderness, no swelling, no masses, submandibular glands with normal size, nontender    EYES: ocular motility normal, eyelids normal, orbits normal, no proptosis, conjunctiva normal , pupils equal, round     EARS:  Hearing: response to conversational voice normal bilaterally   External Ears: auricles without lesions  Otoscopic: tympanic membrane appearance normal, no lesions, no perforation, normal mobility, no fluid    NOSE:  External Nose: structure normal, no " tenderness on palpation, no nasal discharge, no lesions, no evidence of trauma, nostrils patent   Intranasal Exam: nasal mucosa edema, vestibule within normal limits, inferior turbinate normal, severe left septal deviation, right middle turbinate hypertrophy    ORAL:  Lips: upper and lower lips without lesion   Teeth: dentition within normal limits for age   Gums: gingivae healthy   Oral Mucosa: oral mucosa normal, no mucosal lesions   Floor of Mouth: Warthin’s duct patent, mucosa normal  Tongue: lingual mucosa normal without lesions, normal tongue mobility   Palate: soft and hard palates with normal mucosa and structure  Oropharynx: oropharyngeal mucosa normal    HYPOPHARYNX:   LARYNX: see scope    NECK: neck incision well healed    THYROID: right thyroid sugically absent    LYMPH NODES: no lymphadenopathy    CHEST/RESPIRATORY: respiratory effort normal,    CARDIOVASCULAR:  extremities without cyanosis or edema      NEUROLOGIC/PSYCHIATRIC: oriented to time, place and person, mood normal, affect appropriate, CN II-XII intact grossly    Assessment/Plan   Kezia was seen today for follow-up.    Diagnoses and all orders for this visit:    Allergic rhinitis, unspecified allergic rhinitis trigger, unspecified rhinitis seasonality    Gastroesophageal reflux disease, esophagitis presence not specified    Hypertrophy of inferior nasal turbinate    Deviated nasal septum    Chronic laryngitis    Sicca laryngitis    Hoarseness      * Surgery not found *  No orders of the defined types were placed in this encounter.    Return in about 1 year (around 9/27/2018).       Patient Instructions   Avoidance of allergies discussed.  Use masks when performing yardwork or cleaning activities if indicated.  Continue allergy medications as discussed.    Air purifier as needed.  If on nasal sprays, recommend to use daily as indicated.   Medical vs surgical options discussed.      Gastroesophageal Reflux Disease, Adult  Normally, food travels  down the esophagus and stays in the stomach to be digested. However, when a person has gastroesophageal reflux disease (GERD), food and stomach acid move back up into the esophagus. When this happens, the esophagus becomes sore and inflamed. Over time, GERD can create small holes (ulcers) in the lining of the esophagus.   CAUSES  This condition is caused by a problem with the muscle between the esophagus and the stomach (lower esophageal sphincter, or LES). Normally, the LES muscle closes after food passes through the esophagus to the stomach. When the LES is weakened or abnormal, it does not close properly, and that allows food and stomach acid to go back up into the esophagus. The LES can be weakened by certain dietary substances, medicines, and medical conditions, including:  · Tobacco use.  · Pregnancy.  · Having a hiatal hernia.  · Heavy alcohol use.  · Certain foods and beverages, such as coffee, chocolate, onions, and peppermint.  RISK FACTORS  This condition is more likely to develop in:  · People who have an increased body weight.  · People who have connective tissue disorders.  · People who use NSAID medicines.  SYMPTOMS  Symptoms of this condition include:  · Heartburn.  · Difficult or painful swallowing.  · The feeling of having a lump in the throat.  · A bitter taste in the mouth.  · Bad breath.  · Having a large amount of saliva.  · Having an upset or bloated stomach.  · Belching.  · Chest pain.  · Shortness of breath or wheezing.  · Ongoing (chronic) cough or a night-time cough.  · Wearing away of tooth enamel.  · Weight loss.  Different conditions can cause chest pain. Make sure to see your health care provider if you experience chest pain.  DIAGNOSIS  Your health care provider will take a medical history and perform a physical exam. To determine if you have mild or severe GERD, your health care provider may also monitor how you respond to treatment. You may also have other tests, including:  · An  endoscopy to examine your stomach and esophagus with a small camera.  · A test that measures the acidity level in your esophagus.  · A test that measures how much pressure is on your esophagus.  · A barium swallow or modified barium swallow to show the shape, size, and functioning of your esophagus.  TREATMENT  The goal of treatment is to help relieve your symptoms and to prevent complications. Treatment for this condition may vary depending on how severe your symptoms are. Your health care provider may recommend:  · Changes to your diet.  · Medicine.  · Surgery.  HOME CARE INSTRUCTIONS  Diet  · Follow a diet as recommended by your health care provider. This may involve avoiding foods and drinks such as:    Coffee and tea (with or without caffeine).    Drinks that contain alcohol.    Energy drinks and sports drinks.    Carbonated drinks or sodas.    Chocolate and cocoa.    Peppermint and mint flavorings.    Garlic and onions.    Horseradish.    Spicy and acidic foods, including peppers, chili powder, naik powder, vinegar, hot sauces, and barbecue sauce.    Citrus fruit juices and citrus fruits, such as oranges, manoj, and limes.    Tomato-based foods, such as red sauce, chili, salsa, and pizza with red sauce.    Fried and fatty foods, such as donuts, french fries, potato chips, and high-fat dressings.    High-fat meats, such as hot dogs and fatty cuts of red and white meats, such as rib eye steak, sausage, ham, and laughlin.    High-fat dairy items, such as whole milk, butter, and cream cheese.  · Eat small, frequent meals instead of large meals.  · Avoid drinking large amounts of liquid with your meals.  · Avoid eating meals during the 2-3 hours before bedtime.  · Avoid lying down right after you eat.  · Do not exercise right after you eat.   General Instructions   · Pay attention to any changes in your symptoms.  · Take over-the-counter and prescription medicines only as told by your health care provider. Do not  take aspirin, ibuprofen, or other NSAIDs unless your health care provider told you to do so.  · Do not use any tobacco products, including cigarettes, chewing tobacco, and e-cigarettes. If you need help quitting, ask your health care provider.  · Wear loose-fitting clothing. Do not wear anything tight around your waist that causes pressure on your abdomen.  · Raise (elevate) the head of your bed 6 inches (15cm).  · Try to reduce your stress, such as with yoga or meditation. If you need help reducing stress, ask your health care provider.  · If you are overweight, reduce your weight to an amount that is healthy for you. Ask your health care provider for guidance about a safe weight loss goal.  · Keep all follow-up visits as told by your health care provider. This is important.  SEEK MEDICAL CARE IF:  · You have new symptoms.  · You have unexplained weight loss.  · You have difficulty swallowing, or it hurts to swallow.  · You have wheezing or a persistent cough.  · Your symptoms do not improve with treatment.  · You have a hoarse voice.  SEEK IMMEDIATE MEDICAL CARE IF:  · You have pain in your arms, neck, jaw, teeth, or back.  · You feel sweaty, dizzy, or light-headed.  · You have chest pain or shortness of breath.  · You vomit and your vomit looks like blood or coffee grounds.  · You faint.  · Your stool is bloody or black.  · You cannot swallow, drink, or eat.     This information is not intended to replace advice given to you by your health care provider. Make sure you discuss any questions you have with your health care provider.     Document Released: 09/27/2006 Document Revised: 09/07/2016 Document Reviewed: 04/13/2016  [employer] Interactive Patient Education ©2017 [employer]

## 2017-09-27 NOTE — PATIENT INSTRUCTIONS
Avoidance of allergies discussed.  Use masks when performing yardwork or cleaning activities if indicated.  Continue allergy medications as discussed.    Air purifier as needed.  If on nasal sprays, recommend to use daily as indicated.   Medical vs surgical options discussed.      Gastroesophageal Reflux Disease, Adult  Normally, food travels down the esophagus and stays in the stomach to be digested. However, when a person has gastroesophageal reflux disease (GERD), food and stomach acid move back up into the esophagus. When this happens, the esophagus becomes sore and inflamed. Over time, GERD can create small holes (ulcers) in the lining of the esophagus.   CAUSES  This condition is caused by a problem with the muscle between the esophagus and the stomach (lower esophageal sphincter, or LES). Normally, the LES muscle closes after food passes through the esophagus to the stomach. When the LES is weakened or abnormal, it does not close properly, and that allows food and stomach acid to go back up into the esophagus. The LES can be weakened by certain dietary substances, medicines, and medical conditions, including:  · Tobacco use.  · Pregnancy.  · Having a hiatal hernia.  · Heavy alcohol use.  · Certain foods and beverages, such as coffee, chocolate, onions, and peppermint.  RISK FACTORS  This condition is more likely to develop in:  · People who have an increased body weight.  · People who have connective tissue disorders.  · People who use NSAID medicines.  SYMPTOMS  Symptoms of this condition include:  · Heartburn.  · Difficult or painful swallowing.  · The feeling of having a lump in the throat.  · A bitter taste in the mouth.  · Bad breath.  · Having a large amount of saliva.  · Having an upset or bloated stomach.  · Belching.  · Chest pain.  · Shortness of breath or wheezing.  · Ongoing (chronic) cough or a night-time cough.  · Wearing away of tooth enamel.  · Weight loss.  Different conditions can cause  chest pain. Make sure to see your health care provider if you experience chest pain.  DIAGNOSIS  Your health care provider will take a medical history and perform a physical exam. To determine if you have mild or severe GERD, your health care provider may also monitor how you respond to treatment. You may also have other tests, including:  · An endoscopy to examine your stomach and esophagus with a small camera.  · A test that measures the acidity level in your esophagus.  · A test that measures how much pressure is on your esophagus.  · A barium swallow or modified barium swallow to show the shape, size, and functioning of your esophagus.  TREATMENT  The goal of treatment is to help relieve your symptoms and to prevent complications. Treatment for this condition may vary depending on how severe your symptoms are. Your health care provider may recommend:  · Changes to your diet.  · Medicine.  · Surgery.  HOME CARE INSTRUCTIONS  Diet  · Follow a diet as recommended by your health care provider. This may involve avoiding foods and drinks such as:    Coffee and tea (with or without caffeine).    Drinks that contain alcohol.    Energy drinks and sports drinks.    Carbonated drinks or sodas.    Chocolate and cocoa.    Peppermint and mint flavorings.    Garlic and onions.    Horseradish.    Spicy and acidic foods, including peppers, chili powder, naik powder, vinegar, hot sauces, and barbecue sauce.    Citrus fruit juices and citrus fruits, such as oranges, manoj, and limes.    Tomato-based foods, such as red sauce, chili, salsa, and pizza with red sauce.    Fried and fatty foods, such as donuts, french fries, potato chips, and high-fat dressings.    High-fat meats, such as hot dogs and fatty cuts of red and white meats, such as rib eye steak, sausage, ham, and laughlin.    High-fat dairy items, such as whole milk, butter, and cream cheese.  · Eat small, frequent meals instead of large meals.  · Avoid drinking large  amounts of liquid with your meals.  · Avoid eating meals during the 2-3 hours before bedtime.  · Avoid lying down right after you eat.  · Do not exercise right after you eat.   General Instructions   · Pay attention to any changes in your symptoms.  · Take over-the-counter and prescription medicines only as told by your health care provider. Do not take aspirin, ibuprofen, or other NSAIDs unless your health care provider told you to do so.  · Do not use any tobacco products, including cigarettes, chewing tobacco, and e-cigarettes. If you need help quitting, ask your health care provider.  · Wear loose-fitting clothing. Do not wear anything tight around your waist that causes pressure on your abdomen.  · Raise (elevate) the head of your bed 6 inches (15cm).  · Try to reduce your stress, such as with yoga or meditation. If you need help reducing stress, ask your health care provider.  · If you are overweight, reduce your weight to an amount that is healthy for you. Ask your health care provider for guidance about a safe weight loss goal.  · Keep all follow-up visits as told by your health care provider. This is important.  SEEK MEDICAL CARE IF:  · You have new symptoms.  · You have unexplained weight loss.  · You have difficulty swallowing, or it hurts to swallow.  · You have wheezing or a persistent cough.  · Your symptoms do not improve with treatment.  · You have a hoarse voice.  SEEK IMMEDIATE MEDICAL CARE IF:  · You have pain in your arms, neck, jaw, teeth, or back.  · You feel sweaty, dizzy, or light-headed.  · You have chest pain or shortness of breath.  · You vomit and your vomit looks like blood or coffee grounds.  · You faint.  · Your stool is bloody or black.  · You cannot swallow, drink, or eat.     This information is not intended to replace advice given to you by your health care provider. Make sure you discuss any questions you have with your health care provider.     Document Released: 09/27/2006  Document Revised: 09/07/2016 Document Reviewed: 04/13/2016  [employer] Interactive Patient Education ©2017 [employer]

## 2017-10-23 RX ORDER — POTASSIUM CHLORIDE 750 MG/1
TABLET, FILM COATED, EXTENDED RELEASE ORAL
Qty: 30 TABLET | Refills: 5 | Status: SHIPPED | OUTPATIENT
Start: 2017-10-23 | End: 2018-05-16 | Stop reason: SDUPTHER

## 2017-10-23 RX ORDER — BUDESONIDE AND FORMOTEROL FUMARATE DIHYDRATE 160; 4.5 UG/1; UG/1
AEROSOL RESPIRATORY (INHALATION)
Qty: 10.2 G | Refills: 5 | Status: SHIPPED | OUTPATIENT
Start: 2017-10-23 | End: 2018-05-16 | Stop reason: SDUPTHER

## 2017-10-23 RX ORDER — AMLODIPINE BESYLATE 2.5 MG/1
TABLET ORAL
Qty: 30 TABLET | Refills: 5 | Status: SHIPPED | OUTPATIENT
Start: 2017-10-23 | End: 2018-05-16 | Stop reason: SDUPTHER

## 2017-10-30 ENCOUNTER — FLU SHOT (OUTPATIENT)
Dept: FAMILY MEDICINE CLINIC | Facility: CLINIC | Age: 67
End: 2017-10-30

## 2017-10-30 DIAGNOSIS — Z23 NEED FOR INFLUENZA VACCINE: ICD-10-CM

## 2017-10-30 PROCEDURE — 90686 IIV4 VACC NO PRSV 0.5 ML IM: CPT | Performed by: FAMILY MEDICINE

## 2017-10-30 PROCEDURE — G0008 ADMIN INFLUENZA VIRUS VAC: HCPCS | Performed by: FAMILY MEDICINE

## 2017-11-09 ENCOUNTER — OFFICE VISIT (OUTPATIENT)
Dept: FAMILY MEDICINE CLINIC | Facility: CLINIC | Age: 67
End: 2017-11-09

## 2017-11-09 VITALS
TEMPERATURE: 98.1 F | HEART RATE: 74 BPM | HEIGHT: 63 IN | WEIGHT: 245 LBS | RESPIRATION RATE: 18 BRPM | SYSTOLIC BLOOD PRESSURE: 142 MMHG | DIASTOLIC BLOOD PRESSURE: 92 MMHG | BODY MASS INDEX: 43.41 KG/M2 | OXYGEN SATURATION: 98 %

## 2017-11-09 DIAGNOSIS — M19.90 OSTEOARTHRITIS, UNSPECIFIED OSTEOARTHRITIS TYPE, UNSPECIFIED SITE: Chronic | ICD-10-CM

## 2017-11-09 DIAGNOSIS — D69.2 PURPURA (HCC): ICD-10-CM

## 2017-11-09 DIAGNOSIS — F32.A DEPRESSION, UNSPECIFIED DEPRESSION TYPE: Chronic | ICD-10-CM

## 2017-11-09 DIAGNOSIS — I10 ESSENTIAL HYPERTENSION: Primary | Chronic | ICD-10-CM

## 2017-11-09 DIAGNOSIS — Z79.01 ANTICOAGULATED: ICD-10-CM

## 2017-11-09 DIAGNOSIS — K21.9 GASTROESOPHAGEAL REFLUX DISEASE, ESOPHAGITIS PRESENCE NOT SPECIFIED: ICD-10-CM

## 2017-11-09 DIAGNOSIS — E11.9 CONTROLLED TYPE 2 DIABETES MELLITUS WITHOUT COMPLICATION, WITHOUT LONG-TERM CURRENT USE OF INSULIN (HCC): Chronic | ICD-10-CM

## 2017-11-09 DIAGNOSIS — J45.20 MILD INTERMITTENT ASTHMA, UNSPECIFIED WHETHER COMPLICATED: Chronic | ICD-10-CM

## 2017-11-09 PROBLEM — Z01.89 LABORATORY TEST: Status: ACTIVE | Noted: 2017-11-09

## 2017-11-09 PROCEDURE — 99214 OFFICE O/P EST MOD 30 MIN: CPT | Performed by: FAMILY MEDICINE

## 2017-11-09 RX ORDER — RISEDRONATE SODIUM 5 MG/1
1 TABLET, FILM COATED ORAL DAILY
Refills: 0 | COMMUNITY
Start: 2017-11-08 | End: 2019-02-25 | Stop reason: ALTCHOICE

## 2017-11-09 RX ORDER — MELATONIN
1000 DAILY
COMMUNITY
End: 2018-02-16 | Stop reason: ALTCHOICE

## 2017-11-09 RX ORDER — HYDROCODONE BITARTRATE AND ACETAMINOPHEN 5; 325 MG/1; MG/1
1 TABLET ORAL NIGHTLY PRN
Refills: 0 | COMMUNITY
Start: 2017-10-06 | End: 2018-08-21

## 2017-11-09 NOTE — PROGRESS NOTES
"Subjective   Kezia Otoole is a 67 y.o. female presenting with chief complaint of:   Chief Complaint   Patient presents with   • Fatigue   • Diabetes   • Post-op Follow-up     \"Im doing therapy now\"       History of Present Illness :  Alone.   Has multiple problems to consider;  interval appointment.  One of these problems is HTN.       The HTN has been present for years/it is chronic.  The HTN is assumed essential/without testing needed to look for other.  The HTN is controlled manifest by todays blood pressure and no home monitoring.  Associated illness below.   Other chronic problem/s to consider: none  Anxiety: This has been present for years/over a year.  It is chronic.  It is worse.  It is associated with stressors: her knees; missed doing a lot this summer as arthroscopic repair did not help; she is in PT and not doing well. Medications/Rx change not requested.  Depression: This has been present for years/over a year.  It is chronic.  It is worse.  It is associated with stressors: same.  Medications/Rx change not requested. No suicide ideation/intent.   Asthma/COPD/chronic lung disease: The has been present for years/over a year.  It is chronic.  The problem is stable. The patient never smoked; sees [facility].   Degenerative Joint Disease/arthritis:  This has been present for years/over a year.  It is a chronic condition.  It causes on/off pain and joint stiffness.  There is no joint swelling;  mostly knees.   DM2: This has been present for years/over a year.  It is chronic.  It is generally controlled.  Home accuchecks infrequently run.  No hypoglycemia manifest as syncope/near syncope or diaphoretic/sweating episodes.  A1c below if available.  Diet loose.   GE reflux/heartburn: This has been present for years/over a year.  It is a chronic condition.   It is stable as there is no change in infrequent heartburn and no dysphagia.  Medication required to control symptoms.prilosec controlled  Anticoagulation: " "Requires anticoagulation with asa 81 for DM2 (and arms bruise).   This needs to be continually monitored for risk/benefit.   Osteopenia to osteoporosis:  [facility] recent bone density with \"osteoporosis\".   Labs were done and was encouraged to use Actonel.      Has an/another acute issue today: none.    The following portions of the patient's history were reviewed and updated as appropriate: allergies, current medications, past family history, past medical history, past social history, past surgical history and problem list.  Records acquired and reviewed; TCC migrated.      Current Outpatient Prescriptions:   •  acetaminophen (TYLENOL) 500 MG tablet, Take 1,000 mg by mouth Every 6 (Six) Hours As Needed for mild pain (1-3)., Disp: , Rfl:   •  amLODIPine (NORVASC) 2.5 MG tablet, TAKE ONE TABLET DAILY, Disp: 30 tablet, Rfl: 5  •  aspirin 81 MG EC tablet, Take 81 mg by mouth Daily., Disp: , Rfl:   •  B Complex-C (SUPER B COMPLEX PO), Take 1 tablet by mouth Daily., Disp: , Rfl:   •  Calcium Carb-Cholecalciferol (CALCIUM-VITAMIN D) 600-400 MG-UNIT tablet, Take 1 tablet by mouth 2 (Two) Times a Day., Disp: , Rfl:   •  carboxymethylcellulose (REFRESH PLUS) 0.5 % solution, 2 drops Daily As Needed for dry eyes., Disp: , Rfl:   •  cholecalciferol (VITAMIN D3) 1000 units tablet, Take 1,000 Units by mouth Daily., Disp: , Rfl:   •  Coenzyme Q10 (CO Q 10 PO), Take 1 capsule by mouth., Disp: , Rfl:   •  guaiFENesin (MUCINEX) 600 MG 12 hr tablet, Take 1,200 mg by mouth 2 (two) times a day., Disp: , Rfl:   •  hydrochlorothiazide (HYDRODIURIL) 25 MG tablet, TAKE 1/2 TABLET DAILY, Disp: 15 tablet, Rfl: 5  •  HYDROcodone-acetaminophen (NORCO) 5-325 MG per tablet, Take 1 tablet by mouth At Night As Needed for Pain., Disp: , Rfl: 0  •  irbesartan (AVAPRO) 300 MG tablet, TAKE ONE TABLET DAILY, Disp: 30 tablet, Rfl: 5  •  latanoprost (XALATAN) 0.005 % ophthalmic solution, Administer 1 drop to both eyes Every Night., Disp: , Rfl: 5  • "  Misc Natural Products (GLUCOSAMINE CHONDROITIN TRIPLE PO), Take 1 tablet by mouth 2 (Two) Times a Day., Disp: , Rfl:   •  Multiple Vitamins-Minerals (VITRUM 50+ SENIOR MULTI PO), Take 1 tablet by mouth Daily., Disp: , Rfl:   •  omeprazole (priLOSEC) 40 MG capsule, Take 1 capsule by mouth Daily., Disp: 30 capsule, Rfl: 11  •  potassium chloride (K-DUR) 10 MEQ CR tablet, TAKE ONE TABLET DAILY, Disp: 30 tablet, Rfl: 5  •  SYMBICORT 160-4.5 MCG/ACT inhaler, TAKE 2 PUFFS BY MOUTH TWICE DAILY, Disp: 10.2 g, Rfl: 5  •  Triamcinolone Acetonide (NASACORT AQ) 55 MCG/ACT nasal inhaler, 2 sprays into each nostril Daily., Disp: 16.5 g, Rfl: 11  •  albuterol (PROVENTIL HFA;VENTOLIN HFA) 108 (90 BASE) MCG/ACT inhaler, Inhale 2 puffs Every 4 (Four) Hours As Needed for wheezing or shortness of air., Disp: 1 inhaler, Rfl: 0  •  ibuprofen (ADVIL,MOTRIN) 400 MG tablet, Take 400 mg by mouth Every 6 (Six) Hours As Needed for mild pain (1-3)., Disp: , Rfl:   •  montelukast (SINGULAIR) 10 MG tablet, TAKE ONE TABLET DAILY, Disp: 30 tablet, Rfl: 5  •  naproxen sodium (ALEVE) 220 MG tablet, Take 220 mg by mouth 2 (Two) Times a Day As Needed for mild pain (1-3)., Disp: , Rfl:   •  nystatin (MYCOSTATIN) 336866 UNIT/ML suspension, Daily As Needed (due to mouth soreness)., Disp: , Rfl: 0  •  risedronate (ACTONEL) 5 MG tablet, Take 1 tablet by mouth Daily., Disp: , Rfl: 0    No Known Allergies    Review of Systems  GENERAL:  Inactive/slower with limits, speed, stamina for age and knee pains. Sleep is ok. No fever now.  ENDO:  No syncope, near or diaphoretic sweaty spells. No BS download. .  HEENT: No head injury  headache,  No vision change, No significant hearing loss.  Ears without pain/drainage.  No sore throat.  No significant nasal/sinus congestion/drainage. No epistaxis.  CHEST: No chest wall tenderness or mass. No significant cough, without wheeze.  No SOB; no hemoptysis.  CV: No chest pain, palpitations, ankle edema.  GI: No heartburn,  dysphagia.  No abdominal pain, diarrhea, constipation.  No rectal bleeding, or melena.    :  Voids without dysuria, or incontinence to completion.  ORTHO: No painful/swollen joints but various on /off sore.  No change rare sore neck or back.  No acute neck or back pain without recent injury.   NEURO: No dizziness, weakness of extremities.  No numbness/paresthesias.   PSYCH: No memory loss.  Mood good; more anxious, depressed but/and not suicidal.  Tries to tolerate stress .     Results for orders placed or performed in visit on 05/15/17   Hemoglobin A1c   Result Value Ref Range    Hemoglobin A1C 5.80 %   Basic Metabolic Panel   Result Value Ref Range    Glucose 130 (H) 70 - 100 mg/dL    BUN 23 (H) 5 - 21 mg/dL    Creatinine 0.85 0.50 - 1.40 mg/dL    eGFR Non African Am 67 >60 mL/min/1.73    eGFR African Am 81 >60 mL/min/1.73    BUN/Creatinine Ratio 27.1 (H) 7.0 - 25.0    Sodium 139 135 - 145 mmol/L    Potassium 4.3 3.5 - 5.3 mmol/L    Chloride 97 (L) 98 - 110 mmol/L    Total CO2 30.0 24.0 - 31.0 mmol/L    Calcium 9.9 8.4 - 10.4 mg/dL   Hepatitis C antibody   Result Value Ref Range    Hep C Virus Ab <0.1 0.0 - 0.9 s/co ratio   CBC & Differential   Result Value Ref Range    WBC 7.10 4.80 - 10.80 10*3/mm3    RBC 4.89 4.20 - 5.40 10*6/mm3    Hemoglobin 15.2 12.0 - 16.0 g/dL    Hematocrit 46.0 37.0 - 47.0 %    MCV 94.1 82.0 - 98.0 fL    MCH 31.1 28.0 - 32.0 pg    MCHC 33.0 33.0 - 36.0 g/dL    RDW 13.5 12.0 - 15.0 %    Platelets 220 130 - 400 10*3/mm3    Neutrophil Rel % 68.0 39.0 - 78.0 %    Lymphocyte Rel % 19.0 15.0 - 45.0 %    Monocyte Rel % 11.1 4.0 - 12.0 %    Eosinophil Rel % 1.0 0.0 - 4.0 %    Basophil Rel % 0.8 0.0 - 2.0 %    Neutrophils Absolute 4.82 1.87 - 8.40 10*3/mm3    Lymphocytes Absolute 1.35 0.72 - 4.86 10*3/mm3    Monocytes Absolute 0.79 0.19 - 1.30 10*3/mm3    Eosinophils Absolute 0.07 0.00 - 0.70 10*3/mm3    Basophils Absolute 0.06 0.00 - 0.20 10*3/mm3    Immature Granulocyte Rel % 0.1 0.0 - 5.0 %  "   Immature Grans Absolute 0.01 0.00 - 0.03 10*3/mm3       Lab Results   Component Value Date    HGBA1C 5.80 05/15/2017    HGBA1C 5.9 (H) 11/29/2016       Lab Results   Component Value Date     05/15/2017     11/29/2016    K 4.3 05/15/2017    K 4.4 11/29/2016    CL 97 (L) 05/15/2017     11/29/2016    CO2 30.0 05/15/2017    CO2 25 11/29/2016    BUN 23 (H) 05/15/2017    BUN 15 11/29/2016    CREATININE 0.85 05/15/2017    CREATININE 0.84 11/29/2016    CALCIUM 9.9 05/15/2017    CALCIUM 9.7 11/29/2016       No results found for: PSA     Lab Results:  CBC:    Lab Results - Last 18 Months  Lab Units 05/15/17  0852 11/29/16  0939   WBC 10*3/mm3 7.10 4.5   HEMATOCRIT % 46.0 43.6     CMP:    Lab Results - Last 18 Months  Lab Units 05/15/17  0852 11/29/16  0939   SODIUM mmol/L 139 142   CHLORIDE mmol/L 97* 100   TOTAL CO2, ARTERIAL mmol/L 30.0 25   BUN mg/dL 23* 15   CREATININE mg/dL 0.85 0.84   EGFR IF NONAFRICN AM mL/min/1.73 67 72   EGFR IF AFRICN AM mL/min/1.73 81 83   CALCIUM mg/dL 9.9 9.7     THYROID:    Lab Results - Last 18 Months  Lab Units 11/29/16  0939   TSH uIU/mL 1.760     A1C:    Lab Results - Last 18 Months  Lab Units 05/15/17  0852 11/29/16  0939   HEMOGLOBIN A1C % 5.80 5.9*       Objective   /92 (BP Location: Left arm, Patient Position: Sitting, Cuff Size: Large Adult)  Pulse 74  Temp 98.1 °F (36.7 °C) (Oral)   Resp 18  Ht 63\" (160 cm)  Wt 245 lb (111 kg)  SpO2 98%  BMI 43.4 kg/m2    Physical Exam  GENERAL:  Well nourished/developed in no acute distress. Obese   SKIN: Turgor excellent, without wound, rash, lesion. PHOTO L arm; similar on R  HEENT: Normal cephalic without trauma.  Pupils equal round reactive to light. Extraocular motions full without nystagmus.   External canals nonobstructive nontender without reddness. Tymphatic membranes daniel with elvis structures intact.   Oral cavity without growths, exudates, and moist.  Posterior pharynx without mass, obstruction, redness. "  No thyromegaly, mass, tenderness, lymphadenopathy and supple.  CV: Regular rhythm.  No murmur, gallop,  edema. Posterior pulses intact.  No carotid bruits.   CHEST: No chest wall tenderness or mass.   LUNGS: Symmetric motion with clear to auscultation.  No dullness to percussion  ABD: Soft, nontender without mass.   ORTHO: Symmetric extremities without swelling/point tenderness.  Full gross range of motion.    NEURO: CN 2-12 grossly intact.  Symmetric facies. 1/4 x bicep knee equal reflexes.  UE/LE   3/5 strength throughout.  Nonfocal use extremities. Speech clear.  ReducedIntact light touch with monofilament, vibratory sensation with tuning fork; equal toes/distal feet.    PSYCH: Oriented x 3.  Pleasant calm, well kept.  Purposeful/directed conservation with intact short/long gross memory.       Assessment/Plan     1. Essential hypertension  Controlled    2. Osteoarthritis, unspecified osteoarthritis type, unspecified site  DJD-partically knees    3. Controlled type 2 diabetes mellitus without complication, without long-term current use of insulin    4. Mild intermittent asthma, unspecified whether complicated    5. Gastroesophageal reflux disease, esophagitis presence not specified    6. Depression, unspecified depression type  Mild; related to knee    7. Anticoagulated DM2/ASA 81 (11.9.17 pt chose qod)    8. Purpura  From age/senile purpura and ASA 81      Rx: reviewed.  Any other changes above and: same  LAB: reviewed/above.  Orders above and: 6/12  Wrap-up/other instructions: discussed as applicable  Regular cardio exercise something everyone should consider and try to do; even if health limitations (ie find that exercise UE/LE/cardio that they can tolerate).  Normal weight a goal for everyone.  Healthy diet helpful for weight management, illness prevention.  If over 50-screening exams include men PSA/rectal exam, women mammograms, and  everyone colonoscopy screening for colon cancer.     Discussed; good  candidate for total knee and often best time of year just after [place]/New Year; to consider    There are no Patient Instructions on file for this visit.    Follow up: Return for lab soon/fasting;   , Dr Crowe-.  Future Appointments  Date Time Provider Department Center   11/14/2017 8:20 AM LAB [facility] None   2/16/2018 9:15 AM Len Crowe MD [facility] None   9/27/2018 9:00 AM [facility] None

## 2017-11-13 DIAGNOSIS — F32.A DEPRESSION, UNSPECIFIED DEPRESSION TYPE: Chronic | ICD-10-CM

## 2017-11-13 DIAGNOSIS — E11.9 CONTROLLED TYPE 2 DIABETES MELLITUS WITHOUT COMPLICATION, WITHOUT LONG-TERM CURRENT USE OF INSULIN (HCC): Chronic | ICD-10-CM

## 2017-11-13 DIAGNOSIS — M81.0 OSTEOPOROSIS, UNSPECIFIED OSTEOPOROSIS TYPE, UNSPECIFIED PATHOLOGICAL FRACTURE PRESENCE: ICD-10-CM

## 2017-11-13 DIAGNOSIS — Z00.00 WELLNESS EXAMINATION: ICD-10-CM

## 2017-11-13 DIAGNOSIS — D69.2 PURPURA (HCC): ICD-10-CM

## 2017-11-13 DIAGNOSIS — I10 ESSENTIAL HYPERTENSION: Primary | Chronic | ICD-10-CM

## 2017-11-14 ENCOUNTER — RESULTS ENCOUNTER (OUTPATIENT)
Dept: FAMILY MEDICINE CLINIC | Facility: CLINIC | Age: 67
End: 2017-11-14

## 2017-11-14 DIAGNOSIS — Z00.00 WELLNESS EXAMINATION: ICD-10-CM

## 2017-11-14 DIAGNOSIS — D69.2 PURPURA (HCC): ICD-10-CM

## 2017-11-14 DIAGNOSIS — E11.9 CONTROLLED TYPE 2 DIABETES MELLITUS WITHOUT COMPLICATION, WITHOUT LONG-TERM CURRENT USE OF INSULIN (HCC): Chronic | ICD-10-CM

## 2017-11-14 DIAGNOSIS — F32.A DEPRESSION, UNSPECIFIED DEPRESSION TYPE: Chronic | ICD-10-CM

## 2017-11-14 DIAGNOSIS — M81.0 OSTEOPOROSIS, UNSPECIFIED OSTEOPOROSIS TYPE, UNSPECIFIED PATHOLOGICAL FRACTURE PRESENCE: ICD-10-CM

## 2017-11-14 DIAGNOSIS — I10 ESSENTIAL HYPERTENSION: Chronic | ICD-10-CM

## 2017-11-15 LAB
25(OH)D3+25(OH)D2 SERPL-MCNC: 57.8 NG/ML (ref 30–100)
ALBUMIN SERPL-MCNC: 4.3 G/DL (ref 3.5–5)
ALBUMIN/GLOB SERPL: 2 G/DL (ref 1.1–2.5)
ALP SERPL-CCNC: 84 U/L (ref 24–120)
ALT SERPL-CCNC: 49 U/L (ref 0–54)
AST SERPL-CCNC: 29 U/L (ref 7–45)
BASOPHILS # BLD AUTO: 0.04 10*3/MM3 (ref 0–0.2)
BASOPHILS NFR BLD AUTO: 0.6 % (ref 0–2)
BILIRUB SERPL-MCNC: 0.6 MG/DL (ref 0.1–1)
BUN SERPL-MCNC: 13 MG/DL (ref 5–21)
BUN/CREAT SERPL: 16.9 (ref 7–25)
CALCIUM SERPL-MCNC: 9.2 MG/DL (ref 8.4–10.4)
CHLORIDE SERPL-SCNC: 99 MMOL/L (ref 98–110)
CHOLEST SERPL-MCNC: 148 MG/DL (ref 130–200)
CO2 SERPL-SCNC: 30 MMOL/L (ref 24–31)
CREAT SERPL-MCNC: 0.77 MG/DL (ref 0.5–1.4)
EOSINOPHIL # BLD AUTO: 0.07 10*3/MM3 (ref 0–0.7)
EOSINOPHIL NFR BLD AUTO: 1.1 % (ref 0–4)
ERYTHROCYTE [DISTWIDTH] IN BLOOD BY AUTOMATED COUNT: 13.2 % (ref 12–15)
GFR SERPLBLD CREATININE-BSD FMLA CKD-EPI: 74 ML/MIN/1.73
GFR SERPLBLD CREATININE-BSD FMLA CKD-EPI: 90 ML/MIN/1.73
GLOBULIN SER CALC-MCNC: 2.2 GM/DL
GLUCOSE SERPL-MCNC: 130 MG/DL (ref 70–100)
HBA1C MFR BLD: 6.1 %
HCT VFR BLD AUTO: 44.5 % (ref 37–47)
HDLC SERPL-MCNC: 43 MG/DL
HGB BLD-MCNC: 14.6 G/DL (ref 12–16)
IMM GRANULOCYTES # BLD: 0.02 10*3/MM3 (ref 0–0.03)
IMM GRANULOCYTES NFR BLD: 0.3 % (ref 0–5)
LDLC SERPL CALC-MCNC: 74 MG/DL (ref 0–99)
LYMPHOCYTES # BLD AUTO: 1.15 10*3/MM3 (ref 0.72–4.86)
LYMPHOCYTES NFR BLD AUTO: 18.5 % (ref 15–45)
MCH RBC QN AUTO: 30.5 PG (ref 28–32)
MCHC RBC AUTO-ENTMCNC: 32.8 G/DL (ref 33–36)
MCV RBC AUTO: 93.1 FL (ref 82–98)
MICROALBUMIN UR-MCNC: 19.6 UG/ML
MONOCYTES # BLD AUTO: 0.8 10*3/MM3 (ref 0.19–1.3)
MONOCYTES NFR BLD AUTO: 12.9 % (ref 4–12)
NEUTROPHILS # BLD AUTO: 4.13 10*3/MM3 (ref 1.87–8.4)
NEUTROPHILS NFR BLD AUTO: 66.6 % (ref 39–78)
PLATELET # BLD AUTO: 215 10*3/MM3 (ref 130–400)
POTASSIUM SERPL-SCNC: 4.1 MMOL/L (ref 3.5–5.3)
PROT SERPL-MCNC: 6.5 G/DL (ref 6.3–8.7)
RBC # BLD AUTO: 4.78 10*6/MM3 (ref 4.2–5.4)
SODIUM SERPL-SCNC: 140 MMOL/L (ref 135–145)
TRIGL SERPL-MCNC: 156 MG/DL (ref 0–149)
TSH SERPL DL<=0.005 MIU/L-ACNC: 1.86 MIU/ML (ref 0.47–4.68)
VLDLC SERPL CALC-MCNC: 31.2 MG/DL
WBC # BLD AUTO: 6.21 10*3/MM3 (ref 4.8–10.8)
WRITTEN AUTHORIZATION: NORMAL

## 2017-11-20 PROBLEM — H40.009 GLAUCOMA SUSPECT: Status: ACTIVE | Noted: 2017-11-20

## 2017-11-30 DIAGNOSIS — Z12.31 ENCOUNTER FOR SCREENING MAMMOGRAM FOR MALIGNANT NEOPLASM OF BREAST: Primary | ICD-10-CM

## 2017-12-13 ENCOUNTER — TELEPHONE (OUTPATIENT)
Dept: FAMILY MEDICINE CLINIC | Facility: CLINIC | Age: 67
End: 2017-12-13

## 2017-12-13 DIAGNOSIS — R30.0 DYSURIA: Primary | ICD-10-CM

## 2017-12-13 NOTE — TELEPHONE ENCOUNTER
"Vm \"I think I have a bladder infection or UTI? I have frequency, pressure down low, and I cant hold it\"   Called pt and has appt out of town tomorrow and willing to come in for clean catch urine today and appt made  "

## 2017-12-15 DIAGNOSIS — Z12.31 ENCOUNTER FOR SCREENING MAMMOGRAM FOR MALIGNANT NEOPLASM OF BREAST: ICD-10-CM

## 2017-12-17 LAB
APPEARANCE UR: ABNORMAL
BACTERIA #/AREA URNS HPF: ABNORMAL /HPF
BACTERIA UR CULT: ABNORMAL
BACTERIA UR CULT: ABNORMAL
BILIRUB UR QL STRIP: NEGATIVE
COLOR UR: YELLOW
EPI CELLS #/AREA URNS HPF: ABNORMAL /HPF
GLUCOSE UR QL: NEGATIVE
HGB UR QL STRIP: ABNORMAL
KETONES UR QL STRIP: NEGATIVE
LEUKOCYTE ESTERASE UR QL STRIP: ABNORMAL
MICRO URNS: ABNORMAL
MUCOUS THREADS URNS QL MICRO: PRESENT /HPF
NITRITE UR QL STRIP: NEGATIVE
OTHER ANTIBIOTIC SUSC ISLT: ABNORMAL
PH UR STRIP: 5.5 [PH] (ref 5–7.5)
PROT UR QL STRIP: NEGATIVE
RBC #/AREA URNS HPF: ABNORMAL /HPF
SP GR UR: 1.02 (ref 1–1.03)
URINALYSIS REFLEX: ABNORMAL
UROBILINOGEN UR STRIP-MCNC: 0.2 MG/DL (ref 0.2–1)
WBC #/AREA URNS HPF: >30 /HPF

## 2017-12-19 RX ORDER — CIPROFLOXACIN 500 MG/1
500 TABLET, FILM COATED ORAL EVERY 12 HOURS SCHEDULED
Qty: 20 TABLET | Refills: 0 | Status: SHIPPED | OUTPATIENT
Start: 2017-12-19 | End: 2018-02-16

## 2018-01-04 ENCOUNTER — LAB (OUTPATIENT)
Dept: FAMILY MEDICINE CLINIC | Facility: CLINIC | Age: 68
End: 2018-01-04

## 2018-01-04 DIAGNOSIS — N39.0 URINARY TRACT INFECTION WITHOUT HEMATURIA, SITE UNSPECIFIED: Primary | ICD-10-CM

## 2018-01-09 LAB
APPEARANCE UR: CLEAR
BACTERIA #/AREA URNS HPF: ABNORMAL /HPF
BACTERIA UR CULT: ABNORMAL
BACTERIA UR CULT: ABNORMAL
BILIRUB UR QL STRIP: NEGATIVE
CASTS URNS MICRO: ABNORMAL
CASTS URNS QL MICRO: PRESENT /LPF
COLOR UR: YELLOW
EPI CELLS #/AREA URNS HPF: ABNORMAL /HPF
GLUCOSE UR QL: NEGATIVE
HGB UR QL STRIP: NEGATIVE
KETONES UR QL STRIP: ABNORMAL
LEUKOCYTE ESTERASE UR QL STRIP: ABNORMAL
MICRO URNS: ABNORMAL
MUCOUS THREADS URNS QL MICRO: PRESENT /HPF
NITRITE UR QL STRIP: NEGATIVE
OTHER ANTIBIOTIC SUSC ISLT: ABNORMAL
PH UR STRIP: 5 [PH] (ref 5–7.5)
PROT UR QL STRIP: NEGATIVE
RBC #/AREA URNS HPF: ABNORMAL /HPF
SP GR UR: >=1.03 (ref 1–1.03)
URINALYSIS REFLEX: ABNORMAL
UROBILINOGEN UR STRIP-MCNC: 0.2 MG/DL (ref 0.2–1)
WBC #/AREA URNS HPF: ABNORMAL /HPF

## 2018-02-08 ENCOUNTER — TELEPHONE (OUTPATIENT)
Dept: FAMILY MEDICINE CLINIC | Facility: CLINIC | Age: 68
End: 2018-02-08

## 2018-02-08 DIAGNOSIS — R63.4 WEIGHT LOSS: ICD-10-CM

## 2018-02-08 DIAGNOSIS — S06.0X1A CONCUSSION WITH LOSS OF CONSCIOUSNESS OF 30 MINUTES OR LESS, INITIAL ENCOUNTER: Primary | ICD-10-CM

## 2018-02-08 NOTE — TELEPHONE ENCOUNTER
She says she would like to get a referral to a dietitian for Wt loss. She is wanting to get a diet plan to try and see if it helps .

## 2018-02-16 ENCOUNTER — OFFICE VISIT (OUTPATIENT)
Dept: FAMILY MEDICINE CLINIC | Facility: CLINIC | Age: 68
End: 2018-02-16

## 2018-02-16 VITALS
BODY MASS INDEX: 43.23 KG/M2 | OXYGEN SATURATION: 98 % | TEMPERATURE: 98.4 F | DIASTOLIC BLOOD PRESSURE: 72 MMHG | HEART RATE: 74 BPM | RESPIRATION RATE: 16 BRPM | SYSTOLIC BLOOD PRESSURE: 142 MMHG | WEIGHT: 244 LBS | HEIGHT: 63 IN

## 2018-02-16 DIAGNOSIS — R49.0 HOARSENESS: ICD-10-CM

## 2018-02-16 DIAGNOSIS — Z79.01 ANTICOAGULATED: ICD-10-CM

## 2018-02-16 DIAGNOSIS — I10 ESSENTIAL HYPERTENSION: Chronic | ICD-10-CM

## 2018-02-16 DIAGNOSIS — E11.9 CONTROLLED TYPE 2 DIABETES MELLITUS WITHOUT COMPLICATION, WITHOUT LONG-TERM CURRENT USE OF INSULIN (HCC): Primary | Chronic | ICD-10-CM

## 2018-02-16 DIAGNOSIS — M81.0 OSTEOPOROSIS, UNSPECIFIED OSTEOPOROSIS TYPE, UNSPECIFIED PATHOLOGICAL FRACTURE PRESENCE: ICD-10-CM

## 2018-02-16 DIAGNOSIS — K21.9 GASTROESOPHAGEAL REFLUX DISEASE, ESOPHAGITIS PRESENCE NOT SPECIFIED: ICD-10-CM

## 2018-02-16 DIAGNOSIS — D69.2 PURPURA (HCC): ICD-10-CM

## 2018-02-16 DIAGNOSIS — J45.20 MILD INTERMITTENT ASTHMA, UNSPECIFIED WHETHER COMPLICATED: Chronic | ICD-10-CM

## 2018-02-16 PROCEDURE — 99213 OFFICE O/P EST LOW 20 MIN: CPT | Performed by: FAMILY MEDICINE

## 2018-02-16 RX ORDER — RANITIDINE 150 MG/1
1 TABLET ORAL DAILY
Refills: 4 | COMMUNITY
Start: 2018-01-20 | End: 2019-01-03

## 2018-02-16 NOTE — PROGRESS NOTES
Subjective   Kezia Otoole is a 67 y.o. female presenting with chief complaint of:   Chief Complaint   Patient presents with   • Diabetes   • Cough   • Hypertension       History of Present Illness :  Alone.  Here for primarily an acute issue today;.   Has multiple chronic problems to consider that might have a bearing on today's issues;  an interval appointment.       1. Controlled type 2 diabetes mellitus without complication, without long-term current use of insulin    2. Essential hypertension    3. Gastroesophageal reflux disease, esophagitis presence not specified    4. Hoarseness-sees Ressor    5. Mild intermittent asthma, unspecified whether complicated    6. Anticoagulated DM2/ASA 81 (11.9.17 pt chose qod)    7. Purpura    8. Osteoporosis, unspecified osteoporosis type, unspecified pathological fracture presence        Other chronic problem/s to consider:   HTN.  The HTN has been present for years/it is chronic.  The HTN is assumed essential/without testing needed to look for other.  The HTN is likely controlled manifest by todays blood pressure and better home monitoring.  Associated illness below.   Edema:  This problem has been greater than a year/it is a chronic problem.   It is variable in extent; on/off.  It is worse end of day/it is better in morning.  It is tolerated.   Likely causes: other chronic illness/Rx..  Allergic rhinitis:  This has been present for years/over a year.  The nasal/sinus congestion on/off has been present for years and is currently stable/ same as usual.  Medications used on/off.  Degenerative Joint Disease/arthritis:  This has been present for years/over a year.  It is a chronic condition.  It causes on/off pain and joint stiffness.  There is no joint swelling;  Mostly LE.   DM2: This has been present for years/over a year.  It is chronic.  It is generally controlled.  Home accuchecks rarely run.  Has had no hypoglycemia manifest as syncope/near syncope or  diaphoretic/sweating episodes. A1c below if available.  Diet loose; but not high sweets.   GE reflux/heartburn: This has been present for years/over a year.  It is a chronic condition.   It is stable as there is no change in infrequent heartburn and no dysphagia.  Medication required to control symptoms.  But stays hoarse.   Osteoporosis/Osteopenia:  This has been present for years/over a year.  It is chronic; currently Actonel without obvious problems.   Anticoagulation: Requires anticoagulation with ASA 81 for DM2.   This needs to be continually monitored for risk/benefit.   Purpura:  A chronic upper arm problem; qod ASA better than daily.      Has an/another acute issue today: ? Increased cough.    The following portions of the patient's history were reviewed and updated as appropriate: allergies, current medications, past family history, past medical history, past social history, past surgical history and problem list.  Records acquired and reviewed; TCC migrated.      Current Outpatient Prescriptions:   •  amLODIPine (NORVASC) 2.5 MG tablet, TAKE ONE TABLET DAILY, Disp: 30 tablet, Rfl: 5  •  aspirin 81 MG EC tablet, Take 81 mg by mouth Every Other Day., Disp: , Rfl:   •  B Complex-C (SUPER B COMPLEX PO), Take 1 tablet by mouth Daily., Disp: , Rfl:   •  Calcium Carb-Cholecalciferol (CALCIUM-VITAMIN D) 600-400 MG-UNIT tablet, Take 1 tablet by mouth 2 (Two) Times a Day., Disp: , Rfl:   •  carboxymethylcellulose (REFRESH PLUS) 0.5 % solution, 2 drops Daily As Needed for dry eyes., Disp: , Rfl:   •  Coenzyme Q10 (CO Q 10 PO), Take 1 capsule by mouth., Disp: , Rfl:   •  guaiFENesin (MUCINEX) 600 MG 12 hr tablet, Take 1,200 mg by mouth 2 (two) times a day., Disp: , Rfl:   •  hydrochlorothiazide (HYDRODIURIL) 25 MG tablet, TAKE 1/2 TABLET DAILY, Disp: 15 tablet, Rfl: 5  •  irbesartan (AVAPRO) 300 MG tablet, TAKE ONE TABLET DAILY, Disp: 30 tablet, Rfl: 5  •  latanoprost (XALATAN) 0.005 % ophthalmic solution, Administer  1 drop to both eyes Every Night., Disp: , Rfl: 5  •  Misc Natural Products (GLUCOSAMINE CHONDROITIN TRIPLE PO), Take 1 tablet by mouth 2 (Two) Times a Day., Disp: , Rfl:   •  montelukast (SINGULAIR) 10 MG tablet, TAKE ONE TABLET DAILY, Disp: 30 tablet, Rfl: 5  •  Multiple Vitamins-Minerals (VITRUM 50+ SENIOR MULTI PO), Take 1 tablet by mouth Daily., Disp: , Rfl:   •  omeprazole (priLOSEC) 40 MG capsule, Take 1 capsule by mouth Daily. (Patient taking differently: Take 40 mg by mouth Every Morning Before Breakfast.), Disp: 30 capsule, Rfl: 11  •  potassium chloride (K-DUR) 10 MEQ CR tablet, TAKE ONE TABLET DAILY, Disp: 30 tablet, Rfl: 5  •  raNITIdine (ZANTAC) 150 MG tablet, Take 1 tablet by mouth Daily. nightly, Disp: , Rfl: 4  •  risedronate (ACTONEL) 5 MG tablet, Take 1 tablet by mouth Daily., Disp: , Rfl: 0  •  SYMBICORT 160-4.5 MCG/ACT inhaler, TAKE 2 PUFFS BY MOUTH TWICE DAILY, Disp: 10.2 g, Rfl: 5  •  acetaminophen (TYLENOL) 500 MG tablet, Take 1,000 mg by mouth Every 6 (Six) Hours As Needed for mild pain (1-3)., Disp: , Rfl:   •  albuterol (PROVENTIL HFA;VENTOLIN HFA) 108 (90 BASE) MCG/ACT inhaler, Inhale 2 puffs Every 4 (Four) Hours As Needed for wheezing or shortness of air., Disp: 1 inhaler, Rfl: 0  •  HYDROcodone-acetaminophen (NORCO) 5-325 MG per tablet, Take 1 tablet by mouth At Night As Needed for Pain., Disp: , Rfl: 0  •  ibuprofen (ADVIL,MOTRIN) 400 MG tablet, Take 400 mg by mouth Every 6 (Six) Hours As Needed for mild pain (1-3)., Disp: , Rfl:   •  nystatin (MYCOSTATIN) 641426 UNIT/ML suspension, Daily As Needed (due to mouth soreness)., Disp: , Rfl: 0  •  Triamcinolone Acetonide (NASACORT AQ) 55 MCG/ACT nasal inhaler, 2 sprays into each nostril Daily., Disp: 16.5 g, Rfl: 11    No problems with medications.  Refills if needed done    No Known Allergies    Review of Systems  GENERAL:  Active/slower with limits, speed, stamina for age; taking college like coarses and enjoying it. Sleep is ok; apnea  denied. No fever now.  ENDO:  No syncope, near or diaphoretic sweaty spells.  BS without testing/download.  HEENT: No head injury or headache,  No vision change, No significant hearing loss.  Ears without pain/drainage.  No sore throat.   Usual on/off significant nasal/sinus congestion/drainage. No epistaxis.  CHEST: No chest wall tenderness or mass.  Usual occ cough,  without wheeze.  No SOB; no hemoptysis.  CV: No chest pain, palpitations, usual  On/off ankle edema.  GI: No heartburn, dysphagia.  No abdominal pain, diarrhea, constipation.  No rectal bleeding, or melena.    Colonoscopy+div/[facility]/9.18.13/5y    :  Voids without dysuria, or  incontinence to completion.  ORTHO: No painful/swollen joints but various on /off sore.  No change occ sore neck or back.  No acute neck or back pain without recent injury.  NEURO: No dizziness, weakness of extremities.  No numbness/paresthesias.   PSYCH: No memory loss.  Mood good; occ mild anxious, depressed but/and not suicidal.  Tries to tolerate stress .     Results for orders placed or performed in visit on 01/04/18   Urine culture, Comprehensive   Result Value Ref Range    Urine Culture Final report (A)     Result 1 Escherichia coli (A)     Susceptibility Testing Comment    Urinalysis With / Culture If Indicated - Urine, Clean Catch   Result Value Ref Range    Specific Gravity, UA      >=1.030 (A) 1.005 - 1.030    pH, UA 5.0 5.0 - 7.5    Color, UA Yellow Yellow    Appearance, UA Clear Clear    Leukocytes, UA 2+ (A) Negative    Protein Negative Negative/Trace    Glucose, UA Negative Negative    Ketones Trace (A) Negative    Blood, UA Negative Negative    Bilirubin, UA Negative Negative    Urobilinogen, UA 0.2 0.2 - 1.0 mg/dL    Nitrite, UA Negative Negative    Microscopic Examination See below:     Urinalysis Reflex Comment    Microscopic Examination   Result Value Ref Range    WBC, UA 11-30 (A) 0 - 5 /hpf    RBC, UA 0-2 0 - 2 /hpf    Epithelial Cells (non renal) 0-10 0 -  "10 /hpf    Casts Present (A) None seen /lpf    Cast Type Hyaline casts N/A    Mucus, UA Present Not Estab. /hpf    Bacteria, UA Few None seen/Few /hpf       Lab Results   Component Value Date    HGBA1C 6.10 11/14/2017    HGBA1C 5.80 05/15/2017    HGBA1C 5.9 (H) 11/29/2016       Lab Results   Component Value Date     11/14/2017     05/15/2017     11/29/2016    K 4.1 11/14/2017    K 4.3 05/15/2017    K 4.4 11/29/2016    CL 99 11/14/2017    CL 97 (L) 05/15/2017     11/29/2016    CO2 30.0 11/14/2017    CO2 30.0 05/15/2017    CO2 25 11/29/2016    BUN 13 11/14/2017    BUN 23 (H) 05/15/2017    BUN 15 11/29/2016    CREATININE 0.77 11/14/2017    CREATININE 0.85 05/15/2017    CREATININE 0.84 11/29/2016    CALCIUM 9.2 11/14/2017    CALCIUM 9.9 05/15/2017    CALCIUM 9.7 11/29/2016       No results found for: PSA     Lab Results:  CBC:    Lab Results - Last 18 Months  Lab Units 11/14/17  0749 05/15/17  0852 11/29/16  0939   WBC 10*3/mm3 6.21 7.10 4.5   HEMATOCRIT % 44.5 46.0 43.6     CMP:    Lab Results - Last 18 Months  Lab Units 11/14/17  0749 05/15/17  0852 11/29/16  0939   SODIUM mmol/L 140 139 142   CHLORIDE mmol/L 99 97* 100   TOTAL CO2, ARTERIAL mmol/L 30.0 30.0 25   BUN mg/dL 13 23* 15   CREATININE mg/dL 0.77 0.85 0.84   EGFR IF NONAFRICN AM mL/min/1.73 74 67 72   EGFR IF AFRICN AM mL/min/1.73 90 81 83   CALCIUM mg/dL 9.2 9.9 9.7     THYROID:    Lab Results - Last 18 Months  Lab Units 11/14/17  0749 11/29/16  0939   TSH mIU/mL 1.860 1.760     A1C:    Lab Results - Last 18 Months  Lab Units 11/14/17  0749 05/15/17  0852 11/29/16  0939   HEMOGLOBIN A1C % 6.10 5.80 5.9*       Objective   /72 (BP Location: Left arm, Patient Position: Sitting, Cuff Size: Large Adult)  Pulse 74  Temp 98.4 °F (36.9 °C) (Oral)   Resp 16  Ht 160 cm (63\")  Wt 111 kg (244 lb)  SpO2 98%  BMI 43.22 kg/m2    Physical Exam  GENERAL:  Well nourished/developed in no acute distress. Obese  SKIN: Turgor excellent, " without wound, rash, lesion; besides macular/purple < 10 c rew areas dorsal arms.  Thinning of dermis.   HEENT: Normal cephalic without trauma.  Pupils equal round reactive to light. Extraocular motions full without nystagmus.   External canals nonobstructive nontender without reddness. Tymphatic membranes daniel with elvis structures intact.   Oral cavity without growths, exudates, and moist.  Posterior pharynx without mass, obstruction, redness.  No thyromegaly, mass, tenderness, lymphadenopathy and supple.  CV: Regular rhythm.  No murmur, gallop, trace to 1 ankle pitting edema. Posterior pulses intact.  No carotid bruits.  CHEST: No chest wall tenderness or mass.   LUNGS: Symmetric motion with clear to auscultation. Obeses.   ABD: Soft, nontender without mass.   ORTHO: Symmetric extremities without swelling/point tenderness.  Full gross range of motion.  NEURO: CN 2-12 grossly intact.  Symmetric facies. 1/4 x bicep equal reflexes.  UE/LE   3/5 strength throughout.  Nonfocal use extremities. Speech hoarse .  Intact light touch with monofilament, vibratory sensation with tuning fork; equal toes/distal feet.    PSYCH: Oriented x 3.  Pleasant calm, well kept.  Purposeful/directed conservation with intact short/long gross memory.     Assessment/Plan     1. Controlled type 2 diabetes mellitus without complication, without long-term current use of insulin    2. Essential hypertension    3. Gastroesophageal reflux disease, esophagitis presence not specified    4. Hoarseness-sees Ressor    5. Mild intermittent asthma, unspecified whether complicated    6. Anticoagulated DM2/ASA 81 (11.9.17 pt chose qod)    7. Purpura    8. Osteoporosis, unspecified osteoporosis type, unspecified pathological fracture presence        Rx: reviewed/changes:  same    LAB: reviewed/orders:   6m CBC, CMP, A1c  12m CBC, CMP, A1c, LIPID, TSH, Vit D, uric acid         Discussions:   Keep up with GI/resser for hoarseness.     There are no Patient  Instructions on file for this visit.    Follow up: Return for lab today;, lab during/or just before next apt;, Dr Crowe-, 6 m;.  Future Appointments  Date Time Provider Department Center   9/27/2018 9:00 AM F[facility] None

## 2018-02-17 LAB
25(OH)D3+25(OH)D2 SERPL-MCNC: 46.3 NG/ML (ref 30–100)
ALBUMIN SERPL-MCNC: 4.2 G/DL (ref 3.5–5)
ALBUMIN/GLOB SERPL: 1.8 G/DL (ref 1.1–2.5)
ALP SERPL-CCNC: 93 U/L (ref 24–120)
ALT SERPL-CCNC: 55 U/L (ref 0–54)
APTT PPP: 28.3 SECONDS (ref 24.1–34.8)
AST SERPL-CCNC: 32 U/L (ref 7–45)
BASOPHILS # BLD AUTO: 0.05 10*3/MM3 (ref 0–0.2)
BASOPHILS NFR BLD AUTO: 0.9 % (ref 0–2)
BILIRUB SERPL-MCNC: 0.4 MG/DL (ref 0.1–1)
BUN SERPL-MCNC: 15 MG/DL (ref 5–21)
BUN/CREAT SERPL: 22.4 (ref 7–25)
CALCIUM SERPL-MCNC: 9.3 MG/DL (ref 8.4–10.4)
CHLORIDE SERPL-SCNC: 98 MMOL/L (ref 98–110)
CHOLEST SERPL-MCNC: 143 MG/DL (ref 130–200)
CO2 SERPL-SCNC: 30 MMOL/L (ref 24–31)
CREAT SERPL-MCNC: 0.67 MG/DL (ref 0.5–1.4)
CRP SERPL-MCNC: <0.5 MG/DL (ref 0–0.99)
EOSINOPHIL # BLD AUTO: 0.06 10*3/MM3 (ref 0–0.7)
EOSINOPHIL NFR BLD AUTO: 1.1 % (ref 0–4)
ERYTHROCYTE [DISTWIDTH] IN BLOOD BY AUTOMATED COUNT: 12.8 % (ref 12–15)
ERYTHROCYTE [SEDIMENTATION RATE] IN BLOOD BY WESTERGREN METHOD: 3 MM/HR (ref 0–20)
GFR SERPLBLD CREATININE-BSD FMLA CKD-EPI: 106 ML/MIN/1.73
GFR SERPLBLD CREATININE-BSD FMLA CKD-EPI: 87 ML/MIN/1.73
GLOBULIN SER CALC-MCNC: 2.3 GM/DL
GLUCOSE SERPL-MCNC: 137 MG/DL (ref 70–100)
HBA1C MFR BLD: 6 %
HCT VFR BLD AUTO: 43.2 % (ref 37–47)
HDLC SERPL-MCNC: 45 MG/DL
HGB BLD-MCNC: 14.2 G/DL (ref 12–16)
IMM GRANULOCYTES # BLD: 0.02 10*3/MM3 (ref 0–0.03)
IMM GRANULOCYTES NFR BLD: 0.4 % (ref 0–5)
INR PPP: 0.88 (ref 0.91–1.09)
IRON SATN MFR SERPL: 17 % (ref 20–45)
IRON SERPL-MCNC: 57 MCG/DL (ref 42–180)
LDLC SERPL CALC-MCNC: 74 MG/DL (ref 0–99)
LYMPHOCYTES # BLD AUTO: 1.25 10*3/MM3 (ref 0.72–4.86)
LYMPHOCYTES NFR BLD AUTO: 22 % (ref 15–45)
MCH RBC QN AUTO: 30 PG (ref 28–32)
MCHC RBC AUTO-ENTMCNC: 32.9 G/DL (ref 33–36)
MCV RBC AUTO: 91.1 FL (ref 82–98)
MONOCYTES # BLD AUTO: 0.63 10*3/MM3 (ref 0.19–1.3)
MONOCYTES NFR BLD AUTO: 11.1 % (ref 4–12)
NEUTROPHILS # BLD AUTO: 3.68 10*3/MM3 (ref 1.87–8.4)
NEUTROPHILS NFR BLD AUTO: 64.5 % (ref 39–78)
NRBC BLD AUTO-RTO: 0 /100 WBC (ref 0–0)
PLATELET # BLD AUTO: 257 10*3/MM3 (ref 130–400)
POTASSIUM SERPL-SCNC: 4.1 MMOL/L (ref 3.5–5.3)
PROT SERPL-MCNC: 6.5 G/DL (ref 6.3–8.7)
PROTHROMBIN TIME: 12.2 SECONDS (ref 11.9–14.6)
RBC # BLD AUTO: 4.74 10*6/MM3 (ref 4.2–5.4)
SODIUM SERPL-SCNC: 138 MMOL/L (ref 135–145)
TIBC SERPL-MCNC: 345 MCG/DL (ref 225–420)
TRIGL SERPL-MCNC: 119 MG/DL (ref 0–149)
UIBC SERPL-MCNC: 288 MCG/DL
VLDLC SERPL CALC-MCNC: 23.8 MG/DL
WBC # BLD AUTO: 5.69 10*3/MM3 (ref 4.8–10.8)

## 2018-02-22 RX ORDER — OMEPRAZOLE 40 MG/1
CAPSULE, DELAYED RELEASE ORAL
Qty: 30 CAPSULE | Refills: 8 | Status: SHIPPED | OUTPATIENT
Start: 2018-02-22 | End: 2018-09-24 | Stop reason: SDUPTHER

## 2018-02-22 RX ORDER — IRBESARTAN 300 MG/1
TABLET ORAL
Qty: 30 TABLET | Refills: 5 | Status: SHIPPED | OUTPATIENT
Start: 2018-02-22 | End: 2018-08-23 | Stop reason: SDUPTHER

## 2018-02-22 RX ORDER — HYDROCHLOROTHIAZIDE 25 MG/1
TABLET ORAL
Qty: 15 TABLET | Refills: 5 | Status: SHIPPED | OUTPATIENT
Start: 2018-02-22 | End: 2018-08-23 | Stop reason: SDUPTHER

## 2018-02-22 RX ORDER — MONTELUKAST SODIUM 10 MG/1
TABLET ORAL
Qty: 30 TABLET | Refills: 5 | Status: SHIPPED | OUTPATIENT
Start: 2018-02-22 | End: 2018-08-23 | Stop reason: SDUPTHER

## 2018-05-16 RX ORDER — BUDESONIDE AND FORMOTEROL FUMARATE DIHYDRATE 160; 4.5 UG/1; UG/1
AEROSOL RESPIRATORY (INHALATION)
Qty: 10.2 G | Refills: 5 | Status: SHIPPED | OUTPATIENT
Start: 2018-05-16 | End: 2018-10-20 | Stop reason: SDUPTHER

## 2018-05-16 RX ORDER — POTASSIUM CHLORIDE 750 MG/1
TABLET, FILM COATED, EXTENDED RELEASE ORAL
Qty: 30 TABLET | Refills: 5 | Status: SHIPPED | OUTPATIENT
Start: 2018-05-16 | End: 2018-10-20 | Stop reason: SDUPTHER

## 2018-05-16 RX ORDER — AMLODIPINE BESYLATE 2.5 MG/1
TABLET ORAL
Qty: 30 TABLET | Refills: 5 | Status: SHIPPED | OUTPATIENT
Start: 2018-05-16 | End: 2018-10-20 | Stop reason: SDUPTHER

## 2018-08-10 DIAGNOSIS — R73.01 ELEVATED FASTING GLUCOSE: Chronic | ICD-10-CM

## 2018-08-10 DIAGNOSIS — I10 ESSENTIAL HYPERTENSION: Chronic | ICD-10-CM

## 2018-08-10 DIAGNOSIS — E11.9 CONTROLLED TYPE 2 DIABETES MELLITUS WITHOUT COMPLICATION, WITHOUT LONG-TERM CURRENT USE OF INSULIN (HCC): Primary | Chronic | ICD-10-CM

## 2018-08-13 ENCOUNTER — RESULTS ENCOUNTER (OUTPATIENT)
Dept: FAMILY MEDICINE CLINIC | Facility: CLINIC | Age: 68
End: 2018-08-13

## 2018-08-13 DIAGNOSIS — E11.9 CONTROLLED TYPE 2 DIABETES MELLITUS WITHOUT COMPLICATION, WITHOUT LONG-TERM CURRENT USE OF INSULIN (HCC): Chronic | ICD-10-CM

## 2018-08-13 DIAGNOSIS — I10 ESSENTIAL HYPERTENSION: Chronic | ICD-10-CM

## 2018-08-13 DIAGNOSIS — R73.01 ELEVATED FASTING GLUCOSE: Chronic | ICD-10-CM

## 2018-08-14 LAB
ALBUMIN SERPL-MCNC: 4.1 G/DL (ref 3.5–5)
ALBUMIN/GLOB SERPL: 1.7 G/DL (ref 1.1–2.5)
ALP SERPL-CCNC: 73 U/L (ref 24–120)
ALT SERPL-CCNC: 46 U/L (ref 0–54)
AST SERPL-CCNC: 36 U/L (ref 7–45)
BASOPHILS # BLD AUTO: 0.05 10*3/MM3 (ref 0–0.2)
BASOPHILS NFR BLD AUTO: 0.9 % (ref 0–2)
BILIRUB SERPL-MCNC: 0.5 MG/DL (ref 0.1–1)
BUN SERPL-MCNC: 21 MG/DL (ref 5–21)
BUN/CREAT SERPL: 25.6 (ref 7–25)
CALCIUM SERPL-MCNC: 9.4 MG/DL (ref 8.4–10.4)
CHLORIDE SERPL-SCNC: 101 MMOL/L (ref 98–110)
CO2 SERPL-SCNC: 29 MMOL/L (ref 24–31)
CREAT SERPL-MCNC: 0.82 MG/DL (ref 0.5–1.4)
EOSINOPHIL # BLD AUTO: 0.07 10*3/MM3 (ref 0–0.7)
EOSINOPHIL NFR BLD AUTO: 1.2 % (ref 0–4)
ERYTHROCYTE [DISTWIDTH] IN BLOOD BY AUTOMATED COUNT: 13.2 % (ref 12–15)
GLOBULIN SER CALC-MCNC: 2.4 GM/DL
GLUCOSE SERPL-MCNC: 138 MG/DL (ref 70–100)
HBA1C MFR BLD: 6.7 %
HCT VFR BLD AUTO: 39.6 % (ref 37–47)
HGB BLD-MCNC: 12.6 G/DL (ref 12–16)
IMM GRANULOCYTES # BLD: 0.02 10*3/MM3 (ref 0–0.03)
IMM GRANULOCYTES NFR BLD: 0.4 % (ref 0–5)
LYMPHOCYTES # BLD AUTO: 1.06 10*3/MM3 (ref 0.72–4.86)
LYMPHOCYTES NFR BLD AUTO: 18.8 % (ref 15–45)
MCH RBC QN AUTO: 27.8 PG (ref 28–32)
MCHC RBC AUTO-ENTMCNC: 31.8 G/DL (ref 33–36)
MCV RBC AUTO: 87.4 FL (ref 82–98)
MONOCYTES # BLD AUTO: 0.75 10*3/MM3 (ref 0.19–1.3)
MONOCYTES NFR BLD AUTO: 13.3 % (ref 4–12)
NEUTROPHILS # BLD AUTO: 3.69 10*3/MM3 (ref 1.87–8.4)
NEUTROPHILS NFR BLD AUTO: 65.4 % (ref 39–78)
NRBC BLD AUTO-RTO: 0 /100 WBC (ref 0–0)
PLATELET # BLD AUTO: 251 10*3/MM3 (ref 130–400)
POTASSIUM SERPL-SCNC: 4.5 MMOL/L (ref 3.5–5.3)
PROT SERPL-MCNC: 6.5 G/DL (ref 6.3–8.7)
RBC # BLD AUTO: 4.53 10*6/MM3 (ref 4.2–5.4)
SODIUM SERPL-SCNC: 139 MMOL/L (ref 135–145)
WBC # BLD AUTO: 5.64 10*3/MM3 (ref 4.8–10.8)

## 2018-08-21 ENCOUNTER — OFFICE VISIT (OUTPATIENT)
Dept: FAMILY MEDICINE CLINIC | Facility: CLINIC | Age: 68
End: 2018-08-21

## 2018-08-21 VITALS
HEIGHT: 63 IN | WEIGHT: 239 LBS | RESPIRATION RATE: 18 BRPM | HEART RATE: 78 BPM | TEMPERATURE: 98.5 F | OXYGEN SATURATION: 98 % | DIASTOLIC BLOOD PRESSURE: 80 MMHG | BODY MASS INDEX: 42.35 KG/M2 | SYSTOLIC BLOOD PRESSURE: 116 MMHG

## 2018-08-21 DIAGNOSIS — M81.0 OSTEOPOROSIS, UNSPECIFIED OSTEOPOROSIS TYPE, UNSPECIFIED PATHOLOGICAL FRACTURE PRESENCE: ICD-10-CM

## 2018-08-21 DIAGNOSIS — J45.20 MILD INTERMITTENT ASTHMA, UNSPECIFIED WHETHER COMPLICATED: Chronic | ICD-10-CM

## 2018-08-21 DIAGNOSIS — R05.9 COUGH: Primary | ICD-10-CM

## 2018-08-21 DIAGNOSIS — Z12.31 ENCOUNTER FOR SCREENING MAMMOGRAM FOR BREAST CANCER: ICD-10-CM

## 2018-08-21 DIAGNOSIS — I10 HYPERTENSION, UNSPECIFIED TYPE: Chronic | ICD-10-CM

## 2018-08-21 DIAGNOSIS — E11.9 CONTROLLED TYPE 2 DIABETES MELLITUS WITHOUT COMPLICATION, WITHOUT LONG-TERM CURRENT USE OF INSULIN (HCC): Chronic | ICD-10-CM

## 2018-08-21 DIAGNOSIS — F32.A DEPRESSION, UNSPECIFIED DEPRESSION TYPE: Chronic | ICD-10-CM

## 2018-08-21 DIAGNOSIS — M19.90 OSTEOARTHRITIS, UNSPECIFIED OSTEOARTHRITIS TYPE, UNSPECIFIED SITE: Chronic | ICD-10-CM

## 2018-08-21 PROCEDURE — 99214 OFFICE O/P EST MOD 30 MIN: CPT | Performed by: FAMILY MEDICINE

## 2018-08-21 RX ORDER — METHYLPREDNISOLONE 4 MG/1
TABLET ORAL
Qty: 1 EACH | Refills: 0 | Status: SHIPPED | OUTPATIENT
Start: 2018-08-21 | End: 2018-09-24

## 2018-08-21 RX ORDER — METFORMIN HYDROCHLORIDE 500 MG/1
1000 TABLET, EXTENDED RELEASE ORAL NIGHTLY
Qty: 60 TABLET | Refills: 5 | Status: SHIPPED | OUTPATIENT
Start: 2018-08-21 | End: 2019-02-07 | Stop reason: SDUPTHER

## 2018-08-21 NOTE — PATIENT INSTRUCTIONS
When using steroids  A. Do not use anti-inflammatories such as Motrin/ibuprofen, Alleve/naprosyn, Mobic and like medications  B. If you are diabetic; it will raise your blood sugar.  For most people this will be a minimal and very tolerated elevation.  You should consider for the first days of the steroid Rx to check your blood sugar even three times a day.  Call if you see blood sugars over 350.  Being more strict on your diabetic diet while using the steroid will help. If using insulin: add sliding scale to your program:

## 2018-08-21 NOTE — PROGRESS NOTES
Subjective   Kezia Otoole is a 67 y.o. female presenting with chief complaint of:   Chief Complaint   Patient presents with   • Asthma   • Hypertension   • Diabetes   • Osteoporosis   • Depression       History of Present Illness :  Alone.   Has multiple chronic problems to consider that might have a bearing on today's issues;  an interval appointment.       Chronic/acute problems to review today:   1. Cough: acute/chronic.  Has asthma and several days/weeks of chest fullness, occ cough.  Reflects ENT has on PPI; she does not think it has helped anything and has f/u to discuss with them.    2. Depression, unspecified depression type: chronic/variable up/down mood but without Rx doing ok now.  Nonsuicidal.     3. Osteoarthritis, unspecified osteoarthritis type, unspecified site: chronic/variable sore joints.  No joint swelling.     4. Hypertension, unspecified type: chronic/stable manifest by several visits here/stable bp.    5. Controlled type 2 diabetes mellitus without complication, without long-term current use of insulin (CMS/[facility]): chronic/stable home pattern BS, without hypoglycemia/hyperglycemia pattern; no syncope/near.  Variable diet.    6. Mild intermittent asthma, unspecified whether complicated: chronic/variable manifest occ cough, chest fullness.      7. Osteoporosis, unspecified osteoporosis type, unspecified pathological fracture presence: chronic/stable using actonel from [facility]; scan end of 2018 planned.    8. Encounter for screening mammogram for breast cancer: chronic/recurrent need.      Has an/another acute issue today: none.    The following portions of the patient's history were reviewed and updated as appropriate: allergies, current medications, past family history, past medical history, past social history, past surgical history and problem list.  Records acquired and reviewed; [facility] migrated.      Current Outpatient Prescriptions:   •  acetaminophen (TYLENOL) 500 MG tablet, Take 1,000 mg by  mouth Every 6 (Six) Hours As Needed for mild pain (1-3)., Disp: , Rfl:   •  albuterol (PROVENTIL HFA;VENTOLIN HFA) 108 (90 BASE) MCG/ACT inhaler, Inhale 2 puffs Every 4 (Four) Hours As Needed for wheezing or shortness of air., Disp: 1 inhaler, Rfl: 0  •  amLODIPine (NORVASC) 2.5 MG tablet, TAKE ONE TABLET DAILY, Disp: 30 tablet, Rfl: 5  •  aspirin 81 MG EC tablet, Take 81 mg by mouth Every Other Day., Disp: , Rfl:   •  B Complex-C (SUPER B COMPLEX PO), Take 1 tablet by mouth Daily., Disp: , Rfl:   •  Calcium Carb-Cholecalciferol (CALCIUM-VITAMIN D) 600-400 MG-UNIT tablet, Take 1 tablet by mouth 2 (Two) Times a Day., Disp: , Rfl:   •  carboxymethylcellulose (REFRESH PLUS) 0.5 % solution, 2 drops Daily As Needed for dry eyes., Disp: , Rfl:   •  Coenzyme Q10 (CO Q 10 PO), Take 1 capsule by mouth., Disp: , Rfl:   •  guaiFENesin (MUCINEX) 600 MG 12 hr tablet, Take 1,200 mg by mouth 2 (two) times a day., Disp: , Rfl:   •  hydrochlorothiazide (HYDRODIURIL) 25 MG tablet, TAKE 1/2 TABLET DAILY, Disp: 15 tablet, Rfl: 5  •  HYDROcodone-acetaminophen (NORCO) 5-325 MG per tablet, Take 1 tablet by mouth At Night As Needed for Pain., Disp: , Rfl: 0  •  ibuprofen (ADVIL,MOTRIN) 400 MG tablet, Take 400 mg by mouth Every 6 (Six) Hours As Needed for mild pain (1-3)., Disp: , Rfl:   •  irbesartan (AVAPRO) 300 MG tablet, TAKE ONE TABLET DAILY, Disp: 30 tablet, Rfl: 5  •  latanoprost (XALATAN) 0.005 % ophthalmic solution, Administer 1 drop to both eyes Every Night., Disp: , Rfl: 5  •  Misc Natural Products (GLUCOSAMINE CHONDROITIN TRIPLE PO), Take 1 tablet by mouth 2 (Two) Times a Day., Disp: , Rfl:   •  montelukast (SINGULAIR) 10 MG tablet, TAKE ONE TABLET DAILY, Disp: 30 tablet, Rfl: 5  •  Multiple Vitamins-Minerals (VITRUM 50+ SENIOR MULTI PO), Take 1 tablet by mouth Daily., Disp: , Rfl:   •  nystatin (MYCOSTATIN) 883362 UNIT/ML suspension, Daily As Needed (due to mouth soreness)., Disp: , Rfl: 0  •  omeprazole (priLOSEC) 40 MG  capsule, TAKE ONE CAPSULE DAILY, Disp: 30 capsule, Rfl: 8  •  potassium chloride (K-DUR) 10 MEQ CR tablet, TAKE ONE TABLET DAILY, Disp: 30 tablet, Rfl: 5  •  raNITIdine (ZANTAC) 150 MG tablet, Take 1 tablet by mouth Daily. nightly, Disp: , Rfl: 4  •  risedronate (ACTONEL) 5 MG tablet, Take 1 tablet by mouth Daily., Disp: , Rfl: 0  •  SYMBICORT 160-4.5 MCG/ACT inhaler, TAKE 2 PUFFS BY MOUTH TWICE DAILY, Disp: 10.2 g, Rfl: 5    No problems with medications.  Refills if needed done    No Known Allergies    Review of Systems  GENERAL:  Active/slower with limits, speed, stamina for age. Sleep is ok; apnea denied. No fever now.  ENDO:  No syncope, near or diaphoretic sweaty spells.  BS without testing/download.  HEENT: No head injury or headache,  No vision change, No significant hearing loss.  Ears without pain/drainage.  No sore throat.   Usual on/off significant nasal/sinus congestion/drainage. No epistaxis.  CHEST: No chest wall tenderness or mass.  Usual occ cough,  without wheeze.  No SOB; no hemoptysis.  CV: No chest pain, palpitations, usual  On/off ankle edema.  GI: No heartburn, dysphagia.  No abdominal pain, diarrhea, constipation.  No rectal bleeding, or melena.    :  Voids without dysuria, or  incontinence to completion.  ORTHO: No painful/swollen joints but various on /off sore.  No change occ sore neck or back.  No acute neck or back pain without recent injury.  NEURO: No dizziness, weakness of extremities.  No numbness/paresthesias.   PSYCH: No memory loss.  Mood good; occ mild anxious, depressed but/and not suicidal.  Tries to tolerate stress .   Screening:  Mammogram: 11.30.17  Bone density: 2017 [facility]  Low dose CT chest: NA  GI:   Colonoscopy+div/[facility]/9.18.13/5y-in process  Prostate: NA  Usual lab order  6m CBC, CMP, A1c  12m CBC, CMP, A1c, LIPID, TSH, Vit D, uric acid    Results for orders placed or performed in visit on 08/13/18   Comprehensive Metabolic Panel   Result Value Ref Range    Glucose  138 (H) 70 - 100 mg/dL    BUN 21 5 - 21 mg/dL    Creatinine 0.82 0.50 - 1.40 mg/dL    eGFR Non African Am 69 >60 mL/min/1.73    eGFR African Am 84 >60 mL/min/1.73    BUN/Creatinine Ratio 25.6 (H) 7.0 - 25.0    Sodium 139 135 - 145 mmol/L    Potassium 4.5 3.5 - 5.3 mmol/L    Chloride 101 98 - 110 mmol/L    Total CO2 29.0 24.0 - 31.0 mmol/L    Calcium 9.4 8.4 - 10.4 mg/dL    Total Protein 6.5 6.3 - 8.7 g/dL    Albumin 4.10 3.50 - 5.00 g/dL    Globulin 2.4 gm/dL    A/G Ratio 1.7 1.1 - 2.5 g/dL    Total Bilirubin 0.5 0.1 - 1.0 mg/dL    Alkaline Phosphatase 73 24 - 120 U/L    AST (SGOT) 36 7 - 45 U/L    ALT (SGPT) 46 0 - 54 U/L   Hemoglobin A1c   Result Value Ref Range    Hemoglobin A1C 6.70 %   CBC & Differential   Result Value Ref Range    WBC 5.64 4.80 - 10.80 10*3/mm3    RBC 4.53 4.20 - 5.40 10*6/mm3    Hemoglobin 12.6 12.0 - 16.0 g/dL    Hematocrit 39.6 37.0 - 47.0 %    MCV 87.4 82.0 - 98.0 fL    MCH 27.8 (L) 28.0 - 32.0 pg    MCHC 31.8 (L) 33.0 - 36.0 g/dL    RDW 13.2 12.0 - 15.0 %    Platelets 251 130 - 400 10*3/mm3    Neutrophil Rel % 65.4 39.0 - 78.0 %    Lymphocyte Rel % 18.8 15.0 - 45.0 %    Monocyte Rel % 13.3 (H) 4.0 - 12.0 %    Eosinophil Rel % 1.2 0.0 - 4.0 %    Basophil Rel % 0.9 0.0 - 2.0 %    Neutrophils Absolute 3.69 1.87 - 8.40 10*3/mm3    Lymphocytes Absolute 1.06 0.72 - 4.86 10*3/mm3    Monocytes Absolute 0.75 0.19 - 1.30 10*3/mm3    Eosinophils Absolute 0.07 0.00 - 0.70 10*3/mm3    Basophils Absolute 0.05 0.00 - 0.20 10*3/mm3    Immature Granulocyte Rel % 0.4 0.0 - 5.0 %    Immature Grans Absolute 0.02 0.00 - 0.03 10*3/mm3    nRBC 0.0 0.0 - 0.0 /100 WBC       No results found for: PSA     Lab Results:  CBC:    Lab Results - Last 18 Months  Lab Units 08/14/18  0726 02/16/18  0920 11/14/17  0749 05/15/17  0852   WBC 10*3/mm3  --  5.69 6.21 7.10   HEMOGLOBIN g/dL 12.6 14.2 14.6 15.2   HEMATOCRIT % 39.6 43.2 44.5 46.0   PLATELETS 10*3/mm3 251 257 215 220   IRON mcg/dL  --  57  --   --      "  BMP/CMP:    Lab Results - Last 18 Months  Lab Units 08/14/18  0726 02/16/18  0920 11/14/17  0749 05/15/17  0852   SODIUM mmol/L 139 138 140 139   POTASSIUM mmol/L 4.5 4.1 4.1 4.3   CHLORIDE mmol/L 101 98 99 97*   TOTAL CO2, ARTERIAL mmol/L 29.0 30.0 30.0 30.0   GLUCOSE mg/dL 138* 137* 130* 130*   BUN mg/dL 21 15 13 23*   CREATININE mg/dL 0.82 0.67 0.77 0.85   EGFR IF NONAFRICN AM mL/min/1.73 69 87 74 67   EGFR IF AFRICN AM mL/min/1.73 84 106 90 81   CALCIUM mg/dL 9.4 9.3 9.2 9.9     HEPATIC:    Lab Results - Last 18 Months  Lab Units 08/14/18  0726 02/16/18  0920 11/14/17  0749   ALT (SGPT) U/L 46 55* 49   AST (SGOT) U/L 36 32 29   ALK PHOS U/L 73 93 84     THYROID:    Lab Results - Last 18 Months  Lab Units 11/14/17  0749   TSH mIU/mL 1.860     A1C:    Lab Results - Last 18 Months  Lab Units 08/14/18  0726 02/16/18  0920 11/14/17  0749 05/15/17  0852   HEMOGLOBIN A1C % 6.70 6.00 6.10 5.80     PSA:No results for input(s): PSA in the last 04467 hours.    Objective   /80   Pulse 78   Temp 98.5 °F (36.9 °C) (Oral)   Resp 18   Ht 160 cm (63\")   Wt 108 kg (239 lb)   SpO2 98%   Breastfeeding? No   BMI 42.34 kg/m²   Body mass index is 42.34 kg/m².    Physical Exam  GENERAL:  Well nourished/developed in no acute distress. Obese  SKIN: Turgor excellent, without wound, rash, lesion; besides macular/purple < 10 c rew areas dorsal arms.  Thinning of dermis.   HEENT: Normal cephalic without trauma.  Pupils equal round reactive to light. Extraocular motions full without nystagmus.   External canals nonobstructive nontender without reddness. Tymphatic membranes daniel with elvis structures intact.   Oral cavity without growths, exudates, and moist.  Posterior pharynx without mass, obstruction, redness.  No thyromegaly, mass, tenderness, lymphadenopathy and supple.  CV: Regular rhythm.  No murmur, gallop, trace ankle pitting edema. Posterior pulses intact.  No carotid bruits.  CHEST: No chest wall tenderness or mass. "   LUNGS: Symmetric motion with clear to auscultation. Obeses.   ABD: Soft, nontender without mass.   ORTHO: Symmetric extremities without swelling/point tenderness.  Full gross range of motion.  NEURO: CN 2-12 grossly intact.  Symmetric facies. 1/4 x bicep equal reflexes.  UE/LE   3/5 strength throughout.  Nonfocal use extremities. Speech hoarse .  Intact light touch with monofilament, vibratory sensation with tuning fork; equal toes/distal feet.    PSYCH: Oriented x 3.  Pleasant calm, well kept.  Purposeful/directed conservation with intact short/long gross memory.     Assessment/Plan     1. Cough    2. Depression, unspecified depression type    3. Osteoarthritis, unspecified osteoarthritis type, unspecified site    4. Hypertension, unspecified type    5. Controlled type 2 diabetes mellitus without complication, without long-term current use of insulin (CMS/[facility])    6. Mild intermittent asthma, unspecified whether complicated    7. Osteoporosis, unspecified osteoporosis type, unspecified pathological fracture presence    8. Encounter for screening mammogram for breast cancer        Rx: reviewed/changes:  Add metformin  Medrol dose pack    LAB/Testing/Referrals: reviewed/orders:   Today:   Orders Placed This Encounter   Procedures   • Mammo Screening Bilateral With CAD       Discussions:   Above/below  Body mass index is 42.34 kg/m².   Patient's Body mass index is 42.34 kg/m². BMI is above normal parameters. Recommendations include: exercise counseling and nutrition counseling.  Non-smoker      Patient Instructions   When using steroids  A. Do not use anti-inflammatories such as Motrin/ibuprofen, Alleve/naprosyn, Mobic and like medications  B. If you are diabetic; it will raise your blood sugar.  For most people this will be a minimal and very tolerated elevation.  You should consider for the first days of the steroid Rx to check your blood sugar even three times a day.  Call if you see blood sugars over 350.  Being  more strict on your diabetic diet while using the steroid will help. If using insulin: add sliding scale to your program:               Follow up: Return for lab during/or just before next apt;, Dr Crowe-, 6 m;.  Future Appointments  Date Time Provider Department Center   9/25/2018 2:15 PM Serena Li APRN [facility] None   9/27/2018 9:00 AM Tayler Burleson APRN [facility] None   2/19/2019 8:30 AM LAB [facility] None   2/22/2019 11:30 AM Len Crowe MD [facility] None

## 2018-08-23 RX ORDER — IRBESARTAN 300 MG/1
TABLET ORAL
Qty: 30 TABLET | Refills: 5 | Status: SHIPPED | OUTPATIENT
Start: 2018-08-23 | End: 2019-02-16 | Stop reason: SDUPTHER

## 2018-08-23 RX ORDER — MONTELUKAST SODIUM 10 MG/1
TABLET ORAL
Qty: 30 TABLET | Refills: 5 | Status: SHIPPED | OUTPATIENT
Start: 2018-08-23 | End: 2019-02-16 | Stop reason: SDUPTHER

## 2018-08-23 RX ORDER — HYDROCHLOROTHIAZIDE 25 MG/1
TABLET ORAL
Qty: 15 TABLET | Refills: 5 | Status: SHIPPED | OUTPATIENT
Start: 2018-08-23 | End: 2019-02-16 | Stop reason: SDUPTHER

## 2018-09-24 ENCOUNTER — OFFICE VISIT (OUTPATIENT)
Dept: OTOLARYNGOLOGY | Facility: CLINIC | Age: 68
End: 2018-09-24

## 2018-09-24 VITALS
WEIGHT: 243.3 LBS | HEIGHT: 63 IN | SYSTOLIC BLOOD PRESSURE: 128 MMHG | TEMPERATURE: 97.4 F | BODY MASS INDEX: 43.11 KG/M2 | DIASTOLIC BLOOD PRESSURE: 84 MMHG

## 2018-09-24 DIAGNOSIS — J37.0 LARYNGITIS, CHRONIC: Primary | ICD-10-CM

## 2018-09-24 DIAGNOSIS — J30.9 CHRONIC ALLERGIC RHINITIS, UNSPECIFIED SEASONALITY, UNSPECIFIED TRIGGER: ICD-10-CM

## 2018-09-24 DIAGNOSIS — K21.9 LARYNGOPHARYNGEAL REFLUX (LPR): ICD-10-CM

## 2018-09-24 DIAGNOSIS — J37.0 LARYNGITIS SICCA: ICD-10-CM

## 2018-09-24 PROCEDURE — 99214 OFFICE O/P EST MOD 30 MIN: CPT | Performed by: NURSE PRACTITIONER

## 2018-09-24 PROCEDURE — 31575 DIAGNOSTIC LARYNGOSCOPY: CPT | Performed by: NURSE PRACTITIONER

## 2018-09-24 RX ORDER — OMEPRAZOLE 40 MG/1
40 CAPSULE, DELAYED RELEASE ORAL DAILY
Qty: 30 CAPSULE | Refills: 11 | Status: SHIPPED | OUTPATIENT
Start: 2018-09-24 | End: 2019-09-30 | Stop reason: ALTCHOICE

## 2018-09-24 NOTE — PROGRESS NOTES
YOB: 1950  Location: [facility]  Location Address: [address] [place] [address], [facility], [address] [place] [address]  Location Phone: [phone]    Chief Complaint   Patient presents with   • Follow-up       History of Present Illness  Kezia Otoole is a 67 y.o. female.  Kezia Otoole is here for follow up of ENT complaints. The patient has had problems with hoarseness and throat clearing  The symptoms are not localized to a particular location. The patient has had moderate symptoms. The symptoms have been present for the last year The symptoms are aggravated by  allergy. The symptoms are improved by no identifiable factors.  She is doing well except for intermittent hoarseness.  She is using Mucinex, Omeprazole, Zantac.  She is having problems with mucous hanging in her throat that clears easily       Past Medical History:   Diagnosis Date   • Allergic rhinitis    • Chronic laryngitis    • Diabetes mellitus (CMS/HCC)    • GERD (gastroesophageal reflux disease)    • Hypertension    • Hypertrophy of both inferior nasal turbinates    • Laryngopharyngeal reflux    • Nontoxic multinodular goiter    • Sicca laryngitis        Past Surgical History:   Procedure Laterality Date   • CARPAL BOSS EXCISION     • CHOLECYSTECTOMY     • COLONOSCOPY  10/01/2008    Multiple polyps removed   • COLONOSCOPY  2013    Diverticulosis in the sigmoid colon. Dr. Dave Mcnally Recall 5 years   • HAND SURGERY     • HYSTERECTOMY     • THYROIDECTOMY, PARTIAL Right    • TONSILLECTOMY     • TUBAL ABDOMINAL LIGATION         Outpatient Prescriptions Marked as Taking for the 18 encounter (Office Visit) with Tayler Burleson APRN   Medication Sig Dispense Refill   • acetaminophen (TYLENOL) 500 MG tablet Take 1,000 mg by mouth Every 6 (Six) Hours As Needed for mild pain (1-3).     • albuterol (PROVENTIL HFA;VENTOLIN HFA) 108 (90 BASE) MCG/ACT inhaler Inhale 2 puffs Every 4 (Four) Hours As Needed for wheezing or shortness of air. 1  inhaler 0   • amLODIPine (NORVASC) 2.5 MG tablet TAKE ONE TABLET DAILY 30 tablet 5   • aspirin 81 MG EC tablet Take 81 mg by mouth Every Other Day.     • B Complex-C (SUPER B COMPLEX PO) Take 1 tablet by mouth Daily.     • Calcium Carb-Cholecalciferol (CALCIUM-VITAMIN D) 600-400 MG-UNIT tablet Take 1 tablet by mouth 2 (Two) Times a Day.     • carboxymethylcellulose (REFRESH PLUS) 0.5 % solution 2 drops Daily As Needed for dry eyes.     • Coenzyme Q10 (CO Q 10 PO) Take 1 capsule by mouth.     • guaiFENesin (MUCINEX) 600 MG 12 hr tablet Take 600 mg by mouth Daily.     • hydrochlorothiazide (HYDRODIURIL) 25 MG tablet TAKE 1/2 TABLET DAILY 15 tablet 5   • ibuprofen (ADVIL,MOTRIN) 400 MG tablet Take 400 mg by mouth Every 6 (Six) Hours As Needed for mild pain (1-3).     • irbesartan (AVAPRO) 300 MG tablet TAKE ONE TABLET DAILY 30 tablet 5   • latanoprost (XALATAN) 0.005 % ophthalmic solution Administer 1 drop to both eyes Every Night.  5   • metFORMIN ER (GLUCOPHAGE XR) 500 MG 24 hr tablet Take 2 tablets by mouth Every Night. 60 tablet 5   • Misc Natural Products (GLUCOSAMINE CHONDROITIN TRIPLE PO) Take 1 tablet by mouth 2 (Two) Times a Day.     • montelukast (SINGULAIR) 10 MG tablet TAKE ONE TABLET DAILY 30 tablet 5   • Multiple Vitamins-Minerals (VITRUM 50+ SENIOR MULTI PO) Take 1 tablet by mouth Daily.     • omeprazole (priLOSEC) 40 MG capsule Take 1 capsule by mouth Daily. 30 capsule 11   • potassium chloride (K-DUR) 10 MEQ CR tablet TAKE ONE TABLET DAILY 30 tablet 5   • raNITIdine (ZANTAC) 150 MG tablet Take 1 tablet by mouth Daily. nightly  4   • risedronate (ACTONEL) 5 MG tablet Take 1 tablet by mouth Daily.  0   • SYMBICORT 160-4.5 MCG/ACT inhaler TAKE 2 PUFFS BY MOUTH TWICE DAILY 10.2 g 5   • [DISCONTINUED] omeprazole (priLOSEC) 40 MG capsule TAKE ONE CAPSULE DAILY 30 capsule 8       Patient has no known allergies.    Family History   Problem Relation Age of Onset   • Diabetes Mother    • Stroke Mother    • Heart  "disease Mother    • Diabetes Father    • Heart disease Father        Social History     Social History   • Marital status:      Spouse name: Laurie   • Number of children: 2   • Years of education: 16     Occupational History   • retired      Banking      Social History Main Topics   • Smoking status: Never Smoker   • Smokeless tobacco: Never Used      Comment: \"lived with smokers most of my life\"   • Alcohol use Yes      Comment: Rare   • Drug use: No   • Sexual activity: Yes     Partners: Male     Birth control/ protection: None     Other Topics Concern   • Not on file     Social History Narrative    \"I have one surviving child\"       Review of Systems   Constitutional: Negative.    HENT:        SEE HPI   Eyes: Negative.    Respiratory: Negative.    Cardiovascular: Negative.    Gastrointestinal: Negative.    Endocrine: Negative.    Genitourinary: Negative.    Musculoskeletal: Negative.    Skin: Negative.    Allergic/Immunologic: Negative.    Neurological: Negative.    Hematological: Negative.    Psychiatric/Behavioral: Negative.        Vitals:    09/24/18 0917   BP: 128/84   Temp: 97.4 °F (36.3 °C)       Body mass index is 43.1 kg/m².    Objective     Physical Exam  CONSTITUTIONAL: well nourished, alert, oriented, in no acute distress     COMMUNICATION AND VOICE: able to communicate normally, normal voice quality    HEAD: normocephalic, no lesions, atraumatic, no tenderness, no masses     FACE: appearance normal, no lesions, no tenderness, no deformities, facial motion symmetric    SALIVARY GLANDS: parotid glands with no tenderness, no swelling, no masses, submandibular glands with normal size, nontender    EYES: ocular motility normal, eyelids normal, orbits normal, no proptosis, conjunctiva normal , pupils equal, round     EARS:  Hearing: response to conversational voice normal bilaterally   External Ears: auricles without lesions  Otoscopic: tympanic membrane appearance normal, no lesions, no " perforation, normal mobility, no fluid  Left cerumen impaction removed with curette  NOSE:  External Nose: structure normal, no tenderness on palpation, no nasal discharge, no lesions, no evidence of trauma, nostrils patent   Intranasal Exam: nasal mucosa normal, vestibule within normal limits, inferior turbinate normal, nasal septum midline     ORAL:  Lips: upper and lower lips without lesion   Teeth: dentition within normal limits for age   Gums: gingivae healthy   Oral Mucosa: oral mucosa normal, no mucosal lesions   Floor of Mouth: Warthin’s duct patent, mucosa normal  Tongue: lingual mucosa normal without lesions, normal tongue mobility   Palate: soft and hard palates with normal mucosa and structure  Oropharynx: oropharyngeal mucosa normal    HYPOPHARYNX:   LARYNX: see scope    NECK: neck appearance normal, no mass,  noted without erythema or tenderness    THYROID: no overt thyromegaly, no tenderness, nodules or mass present on palpation, position midline     LYMPH NODES: no lymphadenopathy    CHEST/RESPIRATORY: respiratory effort normal     CARDIOVASCULAR:  extremities without cyanosis or edema      NEUROLOGIC/PSYCHIATRIC: oriented to time, place and person, mood normal, affect appropriate, CN II-XII intact grossly    Assessment/Plan   Kezia was seen today for follow-up.    Diagnoses and all orders for this visit:    Laryngitis, chronic    Laryngitis sicca    Laryngopharyngeal reflux (LPR)    Chronic allergic rhinitis, unspecified seasonality, unspecified trigger    Other orders  -     omeprazole (priLOSEC) 40 MG capsule; Take 1 capsule by mouth Daily.      * Surgery not found *  No orders of the defined types were placed in this encounter.    No Follow-up on file.       Patient Instructions   Gastroesophageal Reflux Disease, Adult  Normally, food travels down the esophagus and stays in the stomach to be digested. However, when a person has gastroesophageal reflux disease (GERD), food and stomach acid move  back up into the esophagus. When this happens, the esophagus becomes sore and inflamed. Over time, GERD can create small holes (ulcers) in the lining of the esophagus.  What are the causes?  This condition is caused by a problem with the muscle between the esophagus and the stomach (lower esophageal sphincter, or LES). Normally, the LES muscle closes after food passes through the esophagus to the stomach. When the LES is weakened or abnormal, it does not close properly, and that allows food and stomach acid to go back up into the esophagus. The LES can be weakened by certain dietary substances, medicines, and medical conditions, including:  · Tobacco use.  · Pregnancy.  · Having a hiatal hernia.  · Heavy alcohol use.  · Certain foods and beverages, such as coffee, chocolate, onions, and peppermint.    What increases the risk?  This condition is more likely to develop in:  · People who have an increased body weight.  · People who have connective tissue disorders.  · People who use NSAID medicines.    What are the signs or symptoms?  Symptoms of this condition include:  · Heartburn.  · Difficult or painful swallowing.  · The feeling of having a lump in the throat.  · A bitter taste in the mouth.  · Bad breath.  · Having a large amount of saliva.  · Having an upset or bloated stomach.  · Belching.  · Chest pain.  · Shortness of breath or wheezing.  · Ongoing (chronic) cough or a night-time cough.  · Wearing away of tooth enamel.  · Weight loss.    Different conditions can cause chest pain. Make sure to see your health care provider if you experience chest pain.  How is this diagnosed?  Your health care provider will take a medical history and perform a physical exam. To determine if you have mild or severe GERD, your health care provider may also monitor how you respond to treatment. You may also have other tests, including:  · An endoscopy to examine your stomach and esophagus with a small camera.  · A test  that measures the acidity level in your esophagus.  · A test that measures how much pressure is on your esophagus.  · A barium swallow or modified barium swallow to show the shape, size, and functioning of your esophagus.    How is this treated?  The goal of treatment is to help relieve your symptoms and to prevent complications. Treatment for this condition may vary depending on how severe your symptoms are. Your health care provider may recommend:  · Changes to your diet.  · Medicine.  · Surgery.    Follow these instructions at home:  Diet  · Follow a diet as recommended by your health care provider. This may involve avoiding foods and drinks such as:  ? Coffee and tea (with or without caffeine).  ? Drinks that contain alcohol.  ? Energy drinks and sports drinks.  ? Carbonated drinks or sodas.  ? Chocolate and cocoa.  ? Peppermint and mint flavorings.  ? Garlic and onions.  ? Horseradish.  ? Spicy and acidic foods, including peppers, chili powder, naik powder, vinegar, hot sauces, and barbecue sauce.  ? Citrus fruit juices and citrus fruits, such as oranges, manoj, and limes.  ? Tomato-based foods, such as red sauce, chili, salsa, and pizza with red sauce.  ? Fried and fatty foods, such as donuts, french fries, potato chips, and high-fat dressings.  ? High-fat meats, such as hot dogs and fatty cuts of red and white meats, such as rib eye steak, sausage, ham, and laughlin.  ? High-fat dairy items, such as whole milk, butter, and cream cheese.  · Eat small, frequent meals instead of large meals.  · Avoid drinking large amounts of liquid with your meals.  · Avoid eating meals during the 2-3 hours before bedtime.  · Avoid lying down right after you eat.  · Do not exercise right after you eat.  General instructions  · Pay attention to any changes in your symptoms.  · Take over-the-counter and prescription medicines only as told by your health care provider. Do not take aspirin, ibuprofen, or other NSAIDs unless your  health care provider told you to do so.  · Do not use any tobacco products, including cigarettes, chewing tobacco, and e-cigarettes. If you need help quitting, ask your health care provider.  · Wear loose-fitting clothing. Do not wear anything tight around your waist that causes pressure on your abdomen.  · Raise (elevate) the head of your bed 6 inches (15cm).  · Try to reduce your stress, such as with yoga or meditation. If you need help reducing stress, ask your health care provider.  · If you are overweight, reduce your weight to an amount that is healthy for you. Ask your health care provider for guidance about a safe weight loss goal.  · Keep all follow-up visits as told by your health care provider. This is important.  Contact a health care provider if:  · You have new symptoms.  · You have unexplained weight loss.  · You have difficulty swallowing, or it hurts to swallow.  · You have wheezing or a persistent cough.  · Your symptoms do not improve with treatment.  · You have a hoarse voice.  Get help right away if:  · You have pain in your arms, neck, jaw, teeth, or back.  · You feel sweaty, dizzy, or light-headed.  · You have chest pain or shortness of breath.  · You vomit and your vomit looks like blood or coffee grounds.  · You faint.  · Your stool is bloody or black.  · You cannot swallow, drink, or eat.  This information is not intended to replace advice given to you by your health care provider. Make sure you discuss any questions you have with your health care provider.  Document Released: 09/27/2006 Document Revised: 05/17/2017 Document Reviewed: 04/13/2016  [employer] Interactive Patient Education © 2018 [employer]    STOP Zantac

## 2018-09-24 NOTE — PATIENT INSTRUCTIONS
Gastroesophageal Reflux Disease, Adult  Normally, food travels down the esophagus and stays in the stomach to be digested. However, when a person has gastroesophageal reflux disease (GERD), food and stomach acid move back up into the esophagus. When this happens, the esophagus becomes sore and inflamed. Over time, GERD can create small holes (ulcers) in the lining of the esophagus.  What are the causes?  This condition is caused by a problem with the muscle between the esophagus and the stomach (lower esophageal sphincter, or LES). Normally, the LES muscle closes after food passes through the esophagus to the stomach. When the LES is weakened or abnormal, it does not close properly, and that allows food and stomach acid to go back up into the esophagus. The LES can be weakened by certain dietary substances, medicines, and medical conditions, including:  · Tobacco use.  · Pregnancy.  · Having a hiatal hernia.  · Heavy alcohol use.  · Certain foods and beverages, such as coffee, chocolate, onions, and peppermint.    What increases the risk?  This condition is more likely to develop in:  · People who have an increased body weight.  · People who have connective tissue disorders.  · People who use NSAID medicines.    What are the signs or symptoms?  Symptoms of this condition include:  · Heartburn.  · Difficult or painful swallowing.  · The feeling of having a lump in the throat.  · A bitter taste in the mouth.  · Bad breath.  · Having a large amount of saliva.  · Having an upset or bloated stomach.  · Belching.  · Chest pain.  · Shortness of breath or wheezing.  · Ongoing (chronic) cough or a night-time cough.  · Wearing away of tooth enamel.  · Weight loss.    Different conditions can cause chest pain. Make sure to see your health care provider if you experience chest pain.  How is this diagnosed?  Your health care provider will take a medical history and perform a physical exam. To determine if you have mild or severe  GERD, your health care provider may also monitor how you respond to treatment. You may also have other tests, including:  · An endoscopy to examine your stomach and esophagus with a small camera.  · A test that measures the acidity level in your esophagus.  · A test that measures how much pressure is on your esophagus.  · A barium swallow or modified barium swallow to show the shape, size, and functioning of your esophagus.    How is this treated?  The goal of treatment is to help relieve your symptoms and to prevent complications. Treatment for this condition may vary depending on how severe your symptoms are. Your health care provider may recommend:  · Changes to your diet.  · Medicine.  · Surgery.    Follow these instructions at home:  Diet  · Follow a diet as recommended by your health care provider. This may involve avoiding foods and drinks such as:  ? Coffee and tea (with or without caffeine).  ? Drinks that contain alcohol.  ? Energy drinks and sports drinks.  ? Carbonated drinks or sodas.  ? Chocolate and cocoa.  ? Peppermint and mint flavorings.  ? Garlic and onions.  ? Horseradish.  ? Spicy and acidic foods, including peppers, chili powder, naik powder, vinegar, hot sauces, and barbecue sauce.  ? Citrus fruit juices and citrus fruits, such as oranges, manoj, and limes.  ? Tomato-based foods, such as red sauce, chili, salsa, and pizza with red sauce.  ? Fried and fatty foods, such as donuts, french fries, potato chips, and high-fat dressings.  ? High-fat meats, such as hot dogs and fatty cuts of red and white meats, such as rib eye steak, sausage, ham, and laughlin.  ? High-fat dairy items, such as whole milk, butter, and cream cheese.  · Eat small, frequent meals instead of large meals.  · Avoid drinking large amounts of liquid with your meals.  · Avoid eating meals during the 2-3 hours before bedtime.  · Avoid lying down right after you eat.  · Do not exercise right after you eat.  General  instructions  · Pay attention to any changes in your symptoms.  · Take over-the-counter and prescription medicines only as told by your health care provider. Do not take aspirin, ibuprofen, or other NSAIDs unless your health care provider told you to do so.  · Do not use any tobacco products, including cigarettes, chewing tobacco, and e-cigarettes. If you need help quitting, ask your health care provider.  · Wear loose-fitting clothing. Do not wear anything tight around your waist that causes pressure on your abdomen.  · Raise (elevate) the head of your bed 6 inches (15cm).  · Try to reduce your stress, such as with yoga or meditation. If you need help reducing stress, ask your health care provider.  · If you are overweight, reduce your weight to an amount that is healthy for you. Ask your health care provider for guidance about a safe weight loss goal.  · Keep all follow-up visits as told by your health care provider. This is important.  Contact a health care provider if:  · You have new symptoms.  · You have unexplained weight loss.  · You have difficulty swallowing, or it hurts to swallow.  · You have wheezing or a persistent cough.  · Your symptoms do not improve with treatment.  · You have a hoarse voice.  Get help right away if:  · You have pain in your arms, neck, jaw, teeth, or back.  · You feel sweaty, dizzy, or light-headed.  · You have chest pain or shortness of breath.  · You vomit and your vomit looks like blood or coffee grounds.  · You faint.  · Your stool is bloody or black.  · You cannot swallow, drink, or eat.  This information is not intended to replace advice given to you by your health care provider. Make sure you discuss any questions you have with your health care provider.  Document Released: 09/27/2006 Document Revised: 05/17/2017 Document Reviewed: 04/13/2016  [employer] Interactive Patient Education © 2018 [employer]    STOP Zantac  Discussed avoidance of milk products  Continue  Mucinex  Call for problems or worsening symptoms      Calorie Counting for Weight Loss  Calories are units of energy. Your body needs a certain amount of calories from food to keep you going throughout the day. When you eat more calories than your body needs, your body stores the extra calories as fat. When you eat fewer calories than your body needs, your body burns fat to get the energy it needs.  Calorie counting means keeping track of how many calories you eat and drink each day. Calorie counting can be helpful if you need to lose weight. If you make sure to eat fewer calories than your body needs, you should lose weight. Ask your health care provider what a healthy weight is for you.  For calorie counting to work, you will need to eat the right number of calories in a day in order to lose a healthy amount of weight per week. A dietitian can help you determine how many calories you need in a day and will give you suggestions on how to reach your calorie goal.  · A healthy amount of weight to lose per week is usually 1-2 lb (0.5-0.9 kg). This usually means that your daily calorie intake should be reduced by 500-750 calories.  · Eating 1,200 - 1,500 calories per day can help most women lose weight.  · Eating 1,500 - 1,800 calories per day can help most men lose weight.    What do I need to know about calorie counting?  In order to meet your daily calorie goal, you will need to:  · Find out how many calories are in each food you would like to eat. Try to do this before you eat.  · Decide how much of the food you plan to eat.  · Write down what you ate and how many calories it had. Doing this is called keeping a food log.    To successfully lose weight, it is important to balance calorie counting with a healthy lifestyle that includes regular activity. Aim for 150 minutes of moderate exercise (such as walking) or 75 minutes of vigorous exercise (such as running) each week.  Where do I find calorie  information?    The number of calories in a food can be found on a Nutrition Facts label. If a food does not have a Nutrition Facts label, try to look up the calories online or ask your dietitian for help.  Remember that calories are listed per serving. If you choose to have more than one serving of a food, you will have to multiply the calories per serving by the amount of servings you plan to eat. For example, the label on a package of bread might say that a serving size is 1 slice and that there are 90 calories in a serving. If you eat 1 slice, you will have eaten 90 calories. If you eat 2 slices, you will have eaten 180 calories.  How do I keep a food log?  Immediately after each meal, record the following information in your food log:  · What you ate. Don't forget to include toppings, sauces, and other extras on the food.  · How much you ate. This can be measured in cups, ounces, or number of items.  · How many calories each food and drink had.  · The total number of calories in the meal.    Keep your food log near you, such as in a small notebook in your pocket, or use a mobile michaela or website. Some programs will calculate calories for you and show you how many calories you have left for the day to meet your goal.  What are some calorie counting tips?  · Use your calories on foods and drinks that will fill you up and not leave you hungry:  ? Some examples of foods that fill you up are nuts and nut butters, vegetables, lean proteins, and high-fiber foods like whole grains. High-fiber foods are foods with more than 5 g fiber per serving.  ? Drinks such as sodas, specialty coffee drinks, alcohol, and juices have a lot of calories, yet do not fill you up.  · Eat nutritious foods and avoid empty calories. Empty calories are calories you get from foods or beverages that do not have many vitamins or protein, such as candy, sweets, and soda. It is better to have a nutritious high-calorie food (such as an avocado) than  "a food with few nutrients (such as a bag of chips).  · Know how many calories are in the foods you eat most often. This will help you calculate calorie counts faster.  · Pay attention to calories in drinks. Low-calorie drinks include water and unsweetened drinks.  · Pay attention to nutrition labels for \"low fat\" or \"fat free\" foods. These foods sometimes have the same amount of calories or more calories than the full fat versions. They also often have added sugar, starch, or salt, to make up for flavor that was removed with the fat.  · Find a way of tracking calories that works for you. Get creative. Try different apps or programs if writing down calories does not work for you.  What are some portion control tips?  · Know how many calories are in a serving. This will help you know how many servings of a certain food you can have.  · Use a measuring cup to measure serving sizes. You could also try weighing out portions on a kitchen scale. With time, you will be able to estimate serving sizes for some foods.  · Take some time to put servings of different foods on your favorite plates, bowls, and cups so you know what a serving looks like.  · Try not to eat straight from a bag or box. Doing this can lead to overeating. Put the amount you would like to eat in a cup or on a plate to make sure you are eating the right portion.  · Use smaller plates, glasses, and bowls to prevent overeating.  · Try not to multitask (for example, watch TV or use your computer) while eating. If it is time to eat, sit down at a table and enjoy your food. This will help you to know when you are full. It will also help you to be aware of what you are eating and how much you are eating.  What are tips for following this plan?  Reading food labels  · Check the calorie count compared to the serving size. The serving size may be smaller than what you are used to eating.  · Check the source of the calories. Make sure the food you are eating is high " in vitamins and protein and low in saturated and trans fats.  Shopping  · Read nutrition labels while you shop. This will help you make healthy decisions before you decide to purchase your food.  · Make a grocery list and stick to it.  Cooking  · Try to cook your favorite foods in a healthier way. For example, try baking instead of frying.  · Use low-fat dairy products.  Meal planning  · Use more fruits and vegetables. Half of your plate should be fruits and vegetables.  · Include lean proteins like poultry and fish.  How do I count calories when eating out?  · Ask for smaller portion sizes.  · Consider sharing an entree and sides instead of getting your own entree.  · If you get your own entree, eat only half. Ask for a box at the beginning of your meal and put the rest of your entree in it so you are not tempted to eat it.  · If calories are listed on the menu, choose the lower calorie options.  · Choose dishes that include vegetables, fruits, whole grains, low-fat dairy products, and lean protein.  · Choose items that are boiled, broiled, grilled, or steamed. Stay away from items that are buttered, battered, fried, or served with cream sauce. Items labeled “crispy” are usually fried, unless stated otherwise.  · Choose water, low-fat milk, unsweetened iced tea, or other drinks without added sugar. If you want an alcoholic beverage, choose a lower calorie option such as a glass of wine or light beer.  · Ask for dressings, sauces, and syrups on the side. These are usually high in calories, so you should limit the amount you eat.  · If you want a salad, choose a garden salad and ask for grilled meats. Avoid extra toppings like laughlin, cheese, or fried items. Ask for the dressing on the side, or ask for olive oil and vinegar or lemon to use as dressing.  · Estimate how many servings of a food you are given. For example, a serving of cooked rice is ½ cup or about the size of half a baseball. Knowing serving sizes will  help you be aware of how much food you are eating at restaurants. The list below tells you how big or small some common portion sizes are based on everyday objects:  ? 1 oz--4 stacked dice.  ? 3 oz--1 deck of cards.  ? 1 tsp--1 die.  ? 1 Tbsp--½ a ping-pong ball.  ? 2 Tbsp--1 ping-pong ball.  ? ½ cup--½ baseball.  ? 1 cup--1 baseball.  Summary  · Calorie counting means keeping track of how many calories you eat and drink each day. If you eat fewer calories than your body needs, you should lose weight.  · A healthy amount of weight to lose per week is usually 1-2 lb (0.5-0.9 kg). This usually means reducing your daily calorie intake by 500-750 calories.  · The number of calories in a food can be found on a Nutrition Facts label. If a food does not have a Nutrition Facts label, try to look up the calories online or ask your dietitian for help.  · Use your calories on foods and drinks that will fill you up, and not on foods and drinks that will leave you hungry.  · Use smaller plates, glasses, and bowls to prevent overeating.  This information is not intended to replace advice given to you by your health care provider. Make sure you discuss any questions you have with your health care provider.  Document Released: 12/18/2006 Document Revised: 11/17/2017 Document Reviewed: 11/17/2017  [employer] Interactive Patient Education © 2018 [employer]

## 2018-09-24 NOTE — PROGRESS NOTES
OPERATIVE NOTE:    PROCEDURE:   Flexible Fiberoptic Laryngoscopy    ANESTHESIA:  None    REASON FOR PROCEDURE:  Procedure was recommend for suspicious clinical behavior unresponsive to medical management.  Risks, benefits and alternatives were discussed.      DETAILS of OPERATION:  The patient was seated in the exam chair.  A flexible fiberoptic laryngoscopy was performed through the oral cavity.  The scope was introduced into the oral cavity and directed to the level of the glottis, examining the structures of the oropharynx, base of tongue, vallecula, supraglottic larynx, glottic larynx, and hypopharynx.      FINDINGS:  Mucosal surfaces:   The mucosal surfaces demonstrated thickened mucous and erythema    Base of tongue:  The base of tongue was found to have no mass or lesion.    Epiglottis:  The epiglottis was found to have no mass or lesion.    Aryepligottic fold:  The AE folds were found to have no mass or lesion.    False Vocal Fold:  The false cords were found to have no mass or lesion.    True Vocal Cord:  The true vocal cords were found to have no mass or lesion.    Arytenoid:   The arytenoids were found to have erythema    Hypopharynx:  The hypopharynx was found to have no mass or lesion.    The patient tolerated procedure well.

## 2018-09-25 ENCOUNTER — OFFICE VISIT (OUTPATIENT)
Dept: GASTROENTEROLOGY | Facility: CLINIC | Age: 68
End: 2018-09-25

## 2018-09-25 VITALS
BODY MASS INDEX: 42.7 KG/M2 | HEIGHT: 63 IN | SYSTOLIC BLOOD PRESSURE: 122 MMHG | OXYGEN SATURATION: 98 % | HEART RATE: 80 BPM | DIASTOLIC BLOOD PRESSURE: 82 MMHG | WEIGHT: 241 LBS

## 2018-09-25 DIAGNOSIS — E66.9 OBESITY, UNSPECIFIED OBESITY SEVERITY, UNSPECIFIED OBESITY TYPE: ICD-10-CM

## 2018-09-25 DIAGNOSIS — I10 HTN (HYPERTENSION), BENIGN: ICD-10-CM

## 2018-09-25 DIAGNOSIS — Z86.010 HX OF COLONIC POLYPS: Primary | ICD-10-CM

## 2018-09-25 DIAGNOSIS — E11.9 CONTROLLED TYPE 2 DIABETES MELLITUS WITHOUT COMPLICATION, WITHOUT LONG-TERM CURRENT USE OF INSULIN (HCC): ICD-10-CM

## 2018-09-25 PROBLEM — Z86.0100 HX OF COLONIC POLYPS: Status: ACTIVE | Noted: 2018-09-25

## 2018-09-25 PROCEDURE — S0260 H&P FOR SURGERY: HCPCS | Performed by: CLINICAL NURSE SPECIALIST

## 2018-09-25 RX ORDER — SODIUM, POTASSIUM,MAG SULFATES 17.5-3.13G
SOLUTION, RECONSTITUTED, ORAL ORAL
Qty: 2 BOTTLE | Refills: 0 | Status: SHIPPED | OUTPATIENT
Start: 2018-09-25 | End: 2019-01-03

## 2018-09-25 NOTE — PROGRESS NOTES
Kezia Otoole  1950      9/25/2018  Chief Complaint   Patient presents with   • Colonoscopy     Subjective   HPI  Kezia Otoole is a 67 y.o. female who presents as a referral for preventative maintenance. She has no complaints of nausea or vomiting. No change in bowels. No wt loss. No BRBPR. No melena. There is NO family hx for colon cancer. No abdominal pain.  Past Medical History:   Diagnosis Date   • Allergic rhinitis    • Chronic laryngitis    • Diabetes mellitus (CMS/HCC)    • GERD (gastroesophageal reflux disease)    • Hypertension    • Hypertrophy of both inferior nasal turbinates    • Laryngopharyngeal reflux    • Nontoxic multinodular goiter    • Sicca laryngitis      Past Surgical History:   Procedure Laterality Date   • CARPAL BOSS EXCISION     • CHOLECYSTECTOMY     • COLONOSCOPY  10/01/2008    Multiple polyps removed   • COLONOSCOPY  09/18/2013    Diverticulosis in the sigmoid colon. Dr. Dave Mcnally Recall 5 years   • HAND SURGERY     • HYSTERECTOMY     • THYROIDECTOMY, PARTIAL Right 2010   • TONSILLECTOMY     • TUBAL ABDOMINAL LIGATION       Outpatient Prescriptions Marked as Taking for the 9/25/18 encounter (Office Visit) with Serena Li APRN   Medication Sig Dispense Refill   • acetaminophen (TYLENOL) 500 MG tablet Take 1,000 mg by mouth Every 6 (Six) Hours As Needed for mild pain (1-3).     • albuterol (PROVENTIL HFA;VENTOLIN HFA) 108 (90 BASE) MCG/ACT inhaler Inhale 2 puffs Every 4 (Four) Hours As Needed for wheezing or shortness of air. 1 inhaler 0   • amLODIPine (NORVASC) 2.5 MG tablet TAKE ONE TABLET DAILY 30 tablet 5   • aspirin 81 MG EC tablet Take 81 mg by mouth Every Other Day.     • B Complex-C (SUPER B COMPLEX PO) Take 1 tablet by mouth Daily.     • Calcium Carb-Cholecalciferol (CALCIUM-VITAMIN D) 600-400 MG-UNIT tablet Take 1 tablet by mouth 2 (Two) Times a Day.     • carboxymethylcellulose (REFRESH PLUS) 0.5 % solution 2 drops Daily As Needed for dry eyes.     •  "Coenzyme Q10 (CO Q 10 PO) Take 1 capsule by mouth.     • guaiFENesin (MUCINEX) 600 MG 12 hr tablet Take 600 mg by mouth Daily.     • hydrochlorothiazide (HYDRODIURIL) 25 MG tablet TAKE 1/2 TABLET DAILY 15 tablet 5   • ibuprofen (ADVIL,MOTRIN) 400 MG tablet Take 400 mg by mouth Every 6 (Six) Hours As Needed for mild pain (1-3).     • irbesartan (AVAPRO) 300 MG tablet TAKE ONE TABLET DAILY 30 tablet 5   • latanoprost (XALATAN) 0.005 % ophthalmic solution Administer 1 drop to both eyes Every Night.  5   • metFORMIN ER (GLUCOPHAGE XR) 500 MG 24 hr tablet Take 2 tablets by mouth Every Night. 60 tablet 5   • Misc Natural Products (GLUCOSAMINE CHONDROITIN TRIPLE PO) Take 1 tablet by mouth 2 (Two) Times a Day.     • montelukast (SINGULAIR) 10 MG tablet TAKE ONE TABLET DAILY 30 tablet 5   • Multiple Vitamins-Minerals (VITRUM 50+ SENIOR MULTI PO) Take 1 tablet by mouth Daily.     • omeprazole (priLOSEC) 40 MG capsule Take 1 capsule by mouth Daily. 30 capsule 11   • potassium chloride (K-DUR) 10 MEQ CR tablet TAKE ONE TABLET DAILY 30 tablet 5   • raNITIdine (ZANTAC) 150 MG tablet Take 1 tablet by mouth Daily. nightly  4   • risedronate (ACTONEL) 5 MG tablet Take 1 tablet by mouth Daily.  0   • SYMBICORT 160-4.5 MCG/ACT inhaler TAKE 2 PUFFS BY MOUTH TWICE DAILY 10.2 g 5     No Known Allergies  Social History     Social History   • Marital status:      Spouse name: Laurie   • Number of children: 2   • Years of education: 16     Occupational History   • retired      Banking      Social History Main Topics   • Smoking status: Never Smoker   • Smokeless tobacco: Never Used      Comment: \"lived with smokers most of my life\"   • Alcohol use Yes      Comment: Rare   • Drug use: No   • Sexual activity: Yes     Partners: Male     Birth control/ protection: None     Other Topics Concern   • Not on file     Social History Narrative    \"I have one surviving child\"     Family History   Problem Relation Age of Onset   • " "Diabetes Mother    • Stroke Mother    • Heart disease Mother    • Diabetes Father    • Heart disease Father    • Colon cancer Neg Hx    • Colon polyps Neg Hx      Health Maintenance   Topic Date Due   • TDAP/TD VACCINES (1 - Tdap) 10/21/1967   • ZOSTER VACCINE (1 of 2) 10/21/1998   • MEDICARE ANNUAL WELLNESS  10/21/2016   • DIABETIC FOOT EXAM  10/21/2016   • DXA SCAN  10/21/2016   • DIABETIC EYE EXAM  10/31/2017   • INFLUENZA VACCINE  08/01/2018   • URINE MICROALBUMIN  11/14/2018   • HEMOGLOBIN A1C  02/14/2019   • MAMMOGRAM  12/15/2019   • COLONOSCOPY  09/18/2023   • HEPATITIS C SCREENING  Completed   • PNEUMOCOCCAL VACCINES (65+ LOW/MEDIUM RISK)  Completed       REVIEW OF SYSTEMS  General: well appearing, no fever chills or sweats, no unexplained wt loss  HEENT: no acute visual or hearing disturbances  Cardiovascular: No chest pain or palpitations  Pulmonary: No shortness of breath, coughing, wheezing or hemoptysis  : No burning, urgency, hematuria, or dysuria  Musculoskeletal: No joint pain or stiffness  Peripheral: no edema  Skin: No lesions or rashes  Neuro: No dizziness, headaches, stroke, syncope  Endocrine: No hot or cold intolerances  Hematological: No blood dyscrasias    Objective   Vitals:    09/25/18 1427   BP: 122/82   Pulse: 80   SpO2: 98%   Weight: 109 kg (241 lb)   Height: 160 cm (63\")     Body mass index is 42.69 kg/m².  Patient's Body mass index is 42.69 kg/m². BMI is above normal parameters. Recommendations include: nutrition counseling.      PHYSICAL EXAM  General: age appropriate well nourished well appearing, no acute distress  Head: normocephalic and atraumatic  Global assessment-supple  Neck-No JVD noted, no lymphadenopathy  Pulmonary-clear to auscultation bilaterally, normal respiratory effort  Cardiovascular-normal rate and rhythm, normal heart sounds, S1 and S2 noted  Abdomen-soft, non tender, non distended, normal bowel sounds all 4 quadrants, no hepatosplenomegaly noted  Extremities-No " clubbing cyanosis or edema  Neuro-Non focal, converses appropriately, awake, alert, oriented    Assessment/Plan     Kezia was seen today for colonoscopy.    Diagnoses and all orders for this visit:    Hx of colonic polyps  -     SUPREP BOWEL PREP KIT 17.5-3.13-1.6 GM/180ML solution oral solution; Take as directed by office instructions provided  -     Case Request; Standing  -     Follow Anesthesia Guidelines / Standing Orders; Future  -     Implement Anesthesia Orders Day of Procedure; Standing  -     Obtain Informed Consent; Standing  -     Verify bowel prep was successful; Standing  -     Case Request    HTN (hypertension), benign  Comments:  cont BP medication the day of procedure    Obesity, unspecified obesity severity, unspecified obesity type    Controlled type 2 diabetes mellitus without complication, without long-term current use of insulin (CMS/[facility])  Comments:  Hold DM pills the am of procedure        COLONOSCOPY WITH ANESTHESIA (N/A)  Body mass index is 42.69 kg/m².    Patient instructions on prep prior to procedure provided to the patient.    All risks, benefits, alternatives, and indications of colonoscopy procedure have been discussed with the patient. Risks to include perforation of the colon requiring possible surgery or colostomy, risk of bleeding from biopsies or removal of colon tissue, possibility of missing a colon polyp or cancer, or adverse drug reaction.  Benefits to include the diagnosis and management of disease of the colon and rectum. Alternatives to include barium enema, radiographic evaluation, lab testing or no intervention. Pt verbalizes understanding and agrees.     Serena Li, APRN  2018  2:48 PM      IF YOU SMOKE OR USE TOBACCO PLEASE READ THE FOLLOWIN minutes reading provided    Why is smoking bad for me?  Smoking increases the risk of heart disease, lung disease, vascular disease, stroke, and cancer.     If you smoke, STOP!    If you would like more  information on quitting smoking, please visit the [employer] website: [email or web address]   This link will provide additional resources including the QUIT line and the Beat the Pack support groups.     For more information:    Quit Now [place]  1-800-QUIT-NOW  https://[email or web address].quitlogix.org/en-US/    Obesity, Adult  Obesity is the condition of having too much total body fat. Being overweight or obese means that your weight is greater than what is considered healthy for your body size. Obesity is determined by a measurement called BMI. BMI is an estimate of body fat and is calculated from height and weight. For adults, a BMI of 30 or higher is considered obese.  Obesity can eventually lead to other health concerns and major illnesses, including:  · Stroke.  · Coronary artery disease (CAD).  · Type 2 diabetes.  · Some types of cancer, including cancers of the colon, breast, uterus, and gallbladder.  · Osteoarthritis.  · High blood pressure (hypertension).  · High cholesterol.  · Sleep apnea.  · Gallbladder stones.  · Infertility problems.  What are the causes?  The main cause of obesity is taking in (consuming) more calories than your body uses for energy. Other factors that contribute to this condition may include:  · Being born with genes that make you more likely to become obese.  · Having a medical condition that causes obesity. These conditions include:  ¨ Hypothyroidism.  ¨ Polycystic ovarian syndrome (PCOS).  ¨ Binge-eating disorder.  ¨ Cushing syndrome.  · Taking certain medicines, such as steroids, antidepressants, and seizure medicines.  · Not being physically active (sedentary lifestyle).  · Living where there are limited places to exercise safely or buy healthy foods.  · Not getting enough sleep.  What increases the risk?  The following factors may increase your risk of this condition:  · Having a family history of obesity.  · Being a woman of  -American descent.  · Being a man of  descent.  What are the signs or symptoms?  Having excessive body fat is the main symptom of this condition.  How is this diagnosed?  This condition may be diagnosed based on:  · Your symptoms.  · Your medical history.  · A physical exam. Your health care provider may measure:  ¨ Your BMI. If you are an adult with a BMI between 25 and less than 30, you are considered overweight. If you are an adult with a BMI of 30 or higher, you are considered obese.  ¨ The distances around your hips and your waist (circumferences). These may be compared to each other to help diagnose your condition.  ¨ Your skinfold thickness. Your health care provider may gently pinch a fold of your skin and measure it.  How is this treated?  Treatment for this condition often includes changing your lifestyle. Treatment may include some or all of the following:  · Dietary changes. Work with your health care provider and a dietitian to set a weight-loss goal that is healthy and reasonable for you. Dietary changes may include eating:  ¨ Smaller portions. A portion size is the amount of a particular food that is healthy for you to eat at one time. This varies from person to person.  ¨ Low-calorie or low-fat options.  ¨ More whole grains, fruits, and vegetables.  · Regular physical activity. This may include aerobic activity (cardio) and strength training.  · Medicine to help you lose weight. Your health care provider may prescribe medicine if you are unable to lose 1 pound a week after 6 weeks of eating more healthily and doing more physical activity.  · Surgery. Surgical options may include gastric banding and gastric bypass. Surgery may be done if:  ¨ Other treatments have not helped to improve your condition.  ¨ You have a BMI of 40 or higher.  ¨ You have life-threatening health problems related to obesity.  Follow these instructions at home:     Eating and drinking     · Follow recommendations  from your health care provider about what you eat and drink. Your health care provider may advise you to:  ¨ Limit fast foods, sweets, and processed snack foods.  ¨ Choose low-fat options, such as low-fat milk instead of whole milk.  ¨ Eat 5 or more servings of fruits or vegetables every day.  ¨ Eat at home more often. This gives you more control over what you eat.  ¨ Choose healthy foods when you eat out.  ¨ Learn what a healthy portion size is.  ¨ Keep low-fat snacks on hand.  ¨ Avoid sugary drinks, such as soda, fruit juice, iced tea sweetened with sugar, and flavored milk.  ¨ Eat a healthy breakfast.  · Drink enough water to keep your urine clear or pale yellow.  · Do not go without eating for long periods of time (do not fast) or follow a fad diet. Fasting and fad diets can be unhealthy and even dangerous.  Physical Activity   · Exercise regularly, as told by your health care provider. Ask your health care provider what types of exercise are safe for you and how often you should exercise.  · Warm up and stretch before being active.  · Cool down and stretch after being active.  · Rest between periods of activity.  Lifestyle   · Limit the time that you spend in front of your TV, computer, or video game system.  · Find ways to reward yourself that do not involve food.  · Limit alcohol intake to no more than 1 drink a day for nonpregnant women and 2 drinks a day for men. One drink equals 12 oz of beer, 5 oz of wine, or 1½ oz of hard liquor.  General instructions   · Keep a weight loss journal to keep track of the food you eat and how much you exercise you get.  · Take over-the-counter and prescription medicines only as told by your health care provider.  · Take vitamins and supplements only as told by your health care provider.  · Consider joining a support group. Your health care provider may be able to recommend a support group.  · Keep all follow-up visits as told by your health care provider. This is  important.  Contact a health care provider if:  · You are unable to meet your weight loss goal after 6 weeks of dietary and lifestyle changes.  This information is not intended to replace advice given to you by your health care provider. Make sure you discuss any questions you have with your health care provider.  Document Released: 01/25/2006 Document Revised: 05/22/2017 Document Reviewed: 10/05/2016  [employer]vier Interactive Patient Education © 2017 [employer]

## 2018-10-01 ENCOUNTER — FLU SHOT (OUTPATIENT)
Dept: FAMILY MEDICINE CLINIC | Facility: CLINIC | Age: 68
End: 2018-10-01

## 2018-10-01 PROCEDURE — G0008 ADMIN INFLUENZA VIRUS VAC: HCPCS | Performed by: FAMILY MEDICINE

## 2018-10-01 PROCEDURE — 90662 IIV NO PRSV INCREASED AG IM: CPT | Performed by: FAMILY MEDICINE

## 2018-10-23 RX ORDER — POTASSIUM CHLORIDE 750 MG/1
TABLET, FILM COATED, EXTENDED RELEASE ORAL
Qty: 30 TABLET | Refills: 5 | Status: SHIPPED | OUTPATIENT
Start: 2018-10-23 | End: 2019-04-25 | Stop reason: SDUPTHER

## 2018-10-23 RX ORDER — BUDESONIDE AND FORMOTEROL FUMARATE DIHYDRATE 160; 4.5 UG/1; UG/1
AEROSOL RESPIRATORY (INHALATION)
Qty: 10.2 G | Refills: 5 | Status: SHIPPED | OUTPATIENT
Start: 2018-10-23 | End: 2019-06-05 | Stop reason: SDUPTHER

## 2018-10-23 RX ORDER — ALBUTEROL SULFATE 90 UG/1
2 AEROSOL, METERED RESPIRATORY (INHALATION) EVERY 4 HOURS PRN
Qty: 1 INHALER | Refills: 1 | Status: SHIPPED | OUTPATIENT
Start: 2018-10-23 | End: 2021-11-02 | Stop reason: SDUPTHER

## 2018-10-23 RX ORDER — AMLODIPINE BESYLATE 2.5 MG/1
TABLET ORAL
Qty: 30 TABLET | Refills: 5 | Status: SHIPPED | OUTPATIENT
Start: 2018-10-23 | End: 2019-04-25 | Stop reason: SDUPTHER

## 2018-11-19 ENCOUNTER — OUTSIDE FACILITY SERVICE (OUTPATIENT)
Dept: GASTROENTEROLOGY | Facility: CLINIC | Age: 68
End: 2018-11-19

## 2018-11-19 PROCEDURE — 88305 TISSUE EXAM BY PATHOLOGIST: CPT | Performed by: INTERNAL MEDICINE

## 2018-11-19 PROCEDURE — 45385 COLONOSCOPY W/LESION REMOVAL: CPT | Performed by: INTERNAL MEDICINE

## 2018-11-20 ENCOUNTER — LAB REQUISITION (OUTPATIENT)
Dept: LAB | Facility: HOSPITAL | Age: 68
End: 2018-11-20

## 2018-11-20 DIAGNOSIS — Z00.00 ENCOUNTER FOR GENERAL ADULT MEDICAL EXAMINATION WITHOUT ABNORMAL FINDINGS: ICD-10-CM

## 2018-11-24 LAB
CYTO UR: NORMAL
LAB AP CASE REPORT: NORMAL
LAB AP CLINICAL INFORMATION: NORMAL
PATH REPORT.FINAL DX SPEC: NORMAL
PATH REPORT.GROSS SPEC: NORMAL

## 2018-12-17 DIAGNOSIS — Z12.31 ENCOUNTER FOR SCREENING MAMMOGRAM FOR BREAST CANCER: ICD-10-CM

## 2018-12-31 ENCOUNTER — TELEPHONE (OUTPATIENT)
Dept: FAMILY MEDICINE CLINIC | Facility: CLINIC | Age: 68
End: 2018-12-31

## 2018-12-31 RX ORDER — AZITHROMYCIN 250 MG/1
TABLET, FILM COATED ORAL
Qty: 6 TABLET | Refills: 0 | Status: SHIPPED | OUTPATIENT
Start: 2018-12-31 | End: 2019-02-25

## 2018-12-31 RX ORDER — PREDNISONE 1 MG/1
TABLET ORAL
Qty: 38 TABLET | Refills: 0 | Status: SHIPPED | OUTPATIENT
Start: 2018-12-31 | End: 2019-02-25

## 2018-12-31 NOTE — TELEPHONE ENCOUNTER
"Vm I have had a cough and cold and it is getting worse and going into bronchitis. I need some steriods and antibiotics ? I am leaving Friday for [place] to go on a cruise and I would like to see some improvement by then?\"  "

## 2019-01-02 ENCOUNTER — APPOINTMENT (OUTPATIENT)
Dept: ONCOLOGY | Facility: HOSPITAL | Age: 69
End: 2019-01-02

## 2019-01-03 ENCOUNTER — OFFICE VISIT (OUTPATIENT)
Dept: FAMILY MEDICINE CLINIC | Facility: CLINIC | Age: 69
End: 2019-01-03

## 2019-01-03 VITALS
BODY MASS INDEX: 42.88 KG/M2 | HEART RATE: 82 BPM | WEIGHT: 242 LBS | DIASTOLIC BLOOD PRESSURE: 78 MMHG | HEIGHT: 63 IN | TEMPERATURE: 98.5 F | SYSTOLIC BLOOD PRESSURE: 126 MMHG | RESPIRATION RATE: 20 BRPM | OXYGEN SATURATION: 96 %

## 2019-01-03 DIAGNOSIS — Z86.19 HISTORY OF CANDIDIASIS: ICD-10-CM

## 2019-01-03 DIAGNOSIS — J32.9 SINUSITIS, UNSPECIFIED CHRONICITY, UNSPECIFIED LOCATION: ICD-10-CM

## 2019-01-03 DIAGNOSIS — J45.20 MILD INTERMITTENT ASTHMA, UNSPECIFIED WHETHER COMPLICATED: Chronic | ICD-10-CM

## 2019-01-03 DIAGNOSIS — J45.901 EXACERBATION OF ASTHMA, UNSPECIFIED ASTHMA SEVERITY, UNSPECIFIED WHETHER PERSISTENT: ICD-10-CM

## 2019-01-03 DIAGNOSIS — E11.9 CONTROLLED TYPE 2 DIABETES MELLITUS WITHOUT COMPLICATION, WITHOUT LONG-TERM CURRENT USE OF INSULIN (HCC): Chronic | ICD-10-CM

## 2019-01-03 DIAGNOSIS — J45.901 EXACERBATION OF ASTHMA, UNSPECIFIED ASTHMA SEVERITY, UNSPECIFIED WHETHER PERSISTENT: Primary | ICD-10-CM

## 2019-01-03 DIAGNOSIS — K21.9 GASTROESOPHAGEAL REFLUX DISEASE, ESOPHAGITIS PRESENCE NOT SPECIFIED: ICD-10-CM

## 2019-01-03 DIAGNOSIS — J30.9 ALLERGIC RHINITIS, UNSPECIFIED SEASONALITY, UNSPECIFIED TRIGGER: Chronic | ICD-10-CM

## 2019-01-03 DIAGNOSIS — I10 HTN (HYPERTENSION), BENIGN: Chronic | ICD-10-CM

## 2019-01-03 PROCEDURE — 96372 THER/PROPH/DIAG INJ SC/IM: CPT | Performed by: FAMILY MEDICINE

## 2019-01-03 PROCEDURE — 99213 OFFICE O/P EST LOW 20 MIN: CPT | Performed by: FAMILY MEDICINE

## 2019-01-03 RX ORDER — FLUCONAZOLE 100 MG/1
100 TABLET ORAL DAILY
Qty: 3 TABLET | Refills: 0 | Status: SHIPPED | OUTPATIENT
Start: 2019-01-03 | End: 2019-02-25

## 2019-01-03 RX ORDER — CEFTRIAXONE 1 G/1
1 INJECTION, POWDER, FOR SOLUTION INTRAMUSCULAR; INTRAVENOUS ONCE
Status: COMPLETED | OUTPATIENT
Start: 2019-01-03 | End: 2019-01-03

## 2019-01-03 RX ORDER — IPRATROPIUM BROMIDE AND ALBUTEROL SULFATE 2.5; .5 MG/3ML; MG/3ML
3 SOLUTION RESPIRATORY (INHALATION) 4 TIMES DAILY
Qty: 40 VIAL | Refills: 1 | Status: SHIPPED | OUTPATIENT
Start: 2019-01-03 | End: 2022-11-18

## 2019-01-03 RX ORDER — CEFTRIAXONE 1 G/1
1 INJECTION, POWDER, FOR SOLUTION INTRAMUSCULAR; INTRAVENOUS EVERY 24 HOURS
Status: DISCONTINUED | OUTPATIENT
Start: 2019-01-03 | End: 2019-01-03

## 2019-01-03 RX ADMIN — CEFTRIAXONE 1 G: 1 INJECTION, POWDER, FOR SOLUTION INTRAMUSCULAR; INTRAVENOUS at 11:03

## 2019-01-03 NOTE — PROGRESS NOTES
"Subjective   Kezia Otoole is a 68 y.o. female presenting with chief complaint of:   Chief Complaint   Patient presents with   • URI     Cough, congestion and drainage.       History of Present Illness :  Alone.  Here for primarily an acute issue today; sinus/cough.   Has multiple chronic problems to consider that might have a bearing on today's issues; somewhat an interval appointment.   Onset 12.28.18; called 12.31.18:    Vm I have had a cough and cold and it is getting worse and going into bronchitis. I need some steriods and antibiotics ? I am leaving Friday for [place] to go on a cruise and I would like to see some improvement by then?\"  Given Z pack, medrol.     Chronic/acute problems reviewed today:   1. Exacerbation of asthma, unspecified asthma severity, unspecified whether persistent    2. HTN (hypertension), benign    3. Mild intermittent asthma, unspecified whether complicated    4. Controlled type 2 diabetes mellitus without complication, without long-term current use of insulin (CMS/[facility])    5. Allergic rhinitis, unspecified seasonality, unspecified trigger    6. Gastroesophageal reflux disease, esophagitis presence not specified    7. History of candidiasis    8. Sinusitis, unspecified chronicity, unspecified location      Has an/another acute issue today: none.    The following portions of the patient's history were reviewed and updated as appropriate: allergies, current medications, past family history, past medical history, past social history, past surgical history and problem list.      Current Outpatient Medications:   •  acetaminophen (TYLENOL) 500 MG tablet, Take 1,000 mg by mouth Every 6 (Six) Hours As Needed for mild pain (1-3)., Disp: , Rfl:   •  albuterol (PROVENTIL HFA;VENTOLIN HFA) 108 (90 Base) MCG/ACT inhaler, Inhale 2 puffs Every 4 (Four) Hours As Needed for Wheezing or Shortness of Air., Disp: 1 inhaler, Rfl: 1  •  amLODIPine (NORVASC) 2.5 MG tablet, TAKE ONE TABLET DAILY, Disp: " 30 tablet, Rfl: 5  •  aspirin 81 MG EC tablet, Take 81 mg by mouth Every Other Day., Disp: , Rfl:   •  azithromycin (ZITHROMAX Z-SAM) 250 MG tablet, Take 2 tablets the first day, then 1 tablet daily for 4 days., Disp: 6 tablet, Rfl: 0  •  B Complex-C (SUPER B COMPLEX PO), Take 1 tablet by mouth Daily., Disp: , Rfl:   •  Calcium Carb-Cholecalciferol (CALCIUM-VITAMIN D) 600-400 MG-UNIT tablet, Take 1 tablet by mouth 2 (Two) Times a Day., Disp: , Rfl:   •  carboxymethylcellulose (REFRESH PLUS) 0.5 % solution, 2 drops Daily As Needed for dry eyes., Disp: , Rfl:   •  Coenzyme Q10 (CO Q 10 PO), Take 1 capsule by mouth., Disp: , Rfl:   •  guaiFENesin (MUCINEX) 600 MG 12 hr tablet, Take 600 mg by mouth Daily., Disp: , Rfl:   •  hydrochlorothiazide (HYDRODIURIL) 25 MG tablet, TAKE 1/2 TABLET DAILY, Disp: 15 tablet, Rfl: 5  •  ibuprofen (ADVIL,MOTRIN) 400 MG tablet, Take 400 mg by mouth Every 6 (Six) Hours As Needed for mild pain (1-3)., Disp: , Rfl:   •  irbesartan (AVAPRO) 300 MG tablet, TAKE ONE TABLET DAILY, Disp: 30 tablet, Rfl: 5  •  latanoprost (XALATAN) 0.005 % ophthalmic solution, Administer 1 drop to both eyes Every Night., Disp: , Rfl: 5  •  metFORMIN ER (GLUCOPHAGE XR) 500 MG 24 hr tablet, Take 2 tablets by mouth Every Night., Disp: 60 tablet, Rfl: 5  •  Misc Natural Products (GLUCOSAMINE CHONDROITIN TRIPLE PO), Take 1 tablet by mouth 2 (Two) Times a Day., Disp: , Rfl:   •  montelukast (SINGULAIR) 10 MG tablet, TAKE ONE TABLET DAILY, Disp: 30 tablet, Rfl: 5  •  Multiple Vitamins-Minerals (VITRUM 50+ SENIOR MULTI PO), Take 1 tablet by mouth Daily., Disp: , Rfl:   •  omeprazole (priLOSEC) 40 MG capsule, Take 1 capsule by mouth Daily., Disp: 30 capsule, Rfl: 11  •  potassium chloride (K-DUR) 10 MEQ CR tablet, TAKE ONE TABLET DAILY, Disp: 30 tablet, Rfl: 5  •  predniSONE (DELTASONE) 5 MG tablet, AM or PM dosing, Disp: 38 tablet, Rfl: 0  •  risedronate (ACTONEL) 5 MG tablet, Take 1 tablet by mouth Daily., Disp: , Rfl:  0  •  SYMBICORT 160-4.5 MCG/ACT inhaler, USE  2 PUFFS TWICE DAILY, Disp: 10.2 g, Rfl: 5    No problems with medications.  Refills if needed done    No Known Allergies    Review of Systems  GENERAL:  Active/slower with limits, speed, stamina for age; fatigue with this. Sleep is ok; apnea denied. No fever now.  ENDO:  No syncope, near or diaphoretic sweaty spells.  BS without testing/download.  HEENT: No head injury or headache.   No vision change,. No significant hearing loss.  Ears without pain/drainage.  Mild sore throat.   Usual on/off before this; worse with this-nasal/sinus congestion/drainage. No epistaxis.  CHEST: No chest wall tenderness or mass.  Usual occ cough,  with occ wheeze; all worse witht his.  No SOB; no hemoptysis.  CV: No chest pain, palpitations, usual  On/off ankle edema.  GI: No heartburn, dysphagia.  No abdominal pain, diarrhea, constipation.  No rectal bleeding, or melena.    :  Voids without dysuria, or  incontinence to completion.  ORTHO: No painful/swollen joints but various on /off sore.  No change occ sore neck or back.  No acute neck or back pain without recent injury.  NEURO: No dizziness, weakness of extremities.  No numbness/paresthesias.   PSYCH: No memory loss.  Mood good; occ mild anxious, depressed but/and not suicidal.  Tries to tolerate stress .   Screening:  Mammogram: 12.12.18  Bone density: 2017 [facility]  Low dose CT chest: Tobacco-smoker/never: NA  GI: Colon-p+div///11.19.18/5y  Prostate: NA  Usual lab order  6m CBC, CMP, A1c  12m CBC, CMP, A1c, LIPID, TSH, Vit D, uric acid    Results for orders placed or performed in visit on 11/19/18   Tissue Pathology Exam   Result Value Ref Range    Case Report       Surgical Pathology Report                         Case: [id number]                                  Authorizing Provider:  Dave Mcnally MD        Collected:           11/19/2018 10:12 AM          Pathologist:           Minnie Sellers MD      Received:             "11/20/2018 10:13 AM          Specimens:   1) - Colon, Bx hepatic flexure                                                                      2) - Colon, Colon polyp @ 40 cm                                                            Clinical Information       Pre-Op:      Hx polyps.    Post-Op:    Same.        Final Diagnosis       1.  \"Biopsy hepatic flexure \": Adenomatous polyp.    2.  \"Colon polyp at 40 cm\": Fragment of benign colonic mucosa demonstrating extensive cautery artifact.    Comment: Definite changes of an adenomatous polyp or hyperplastic polyp are not identified.      Gross Description       Specimen #1 is received in a formalin filled container, labeled with the patient's name, date of birth, and \"biopsy at hepatic flexure\".  The specimen consists of one yellow-pink soft tissue fragment measuring 0.2 x 0.2 x 0.1 cm, totally submitted in block 1A.    Specimen #2 is received in a formalin filled container, labeled with the patient's name, date of birth, and \"colon polyp at 40 cm\".  The specimen consists of one yellow-pink soft tissue fragment measuring 0.2 x 0.2 x 0.1 cm, totally submitted in block 2A.      Microscopic Description       1.  Sections of the hepatic flexure biopsy reveal an adenomatous polyp of the tubular type.  High-grade dysplasia is not seen.  There is no evidence of malignancy.    2.  Sections of the submitted colon polyp at 40 cm reveal a fragment of colonic mucosa demonstrating extensive cautery artifact.  Definite changes of an adenomatous polyp or hyperplastic polyp are not seen.  There is no evidence of malignancy.         No results found for: PSA     Lab Results:  CBC:  Lab Results - Last 18 Months   Lab Units  08/14/18   0726  02/16/18   0920  11/14/17   0749   WBC 10*3/mm3  5.64  5.69  6.21   HEMOGLOBIN g/dL  12.6  14.2  14.6   HEMATOCRIT %  39.6  43.2  44.5   PLATELETS 10*3/mm3  251  257  215   IRON mcg/dL   --   57   --       BMP/CMP:  Lab Results - Last 18 Months   Lab " "Units  08/14/18   0726 02/16/18   0920 11/14/17   0749   SODIUM mmol/L  139  138  140   POTASSIUM mmol/L  4.5  4.1  4.1   CHLORIDE mmol/L  101  98  99   TOTAL CO2 mmol/L  29.0  30.0  30.0   GLUCOSE mg/dL  138*  137*  130*   BUN mg/dL  21  15  13   CREATININE mg/dL  0.82  0.67  0.77   EGFR IF NONAFRICN AM mL/min/1.73  69  87  74   EGFR IF AFRICN AM mL/min/1.73  84  106  90   CALCIUM mg/dL  9.4  9.3  9.2     HEPATIC:  Lab Results - Last 18 Months   Lab Units  08/14/18   0726  02/16/18   0920  11/14/17   0749   ALT (SGPT) U/L  46  55*  49   AST (SGOT) U/L  36  32  29   ALK PHOS U/L  73  93  84     THYROID:  Lab Results - Last 18 Months   Lab Units  11/14/17   0749   TSH mIU/mL  1.860     A1C:  Lab Results - Last 18 Months   Lab Units  08/14/18   0726  02/16/18   0920  11/14/17   0749   HEMOGLOBIN A1C %  6.70  6.00  6.10     PSA:No results for input(s): PSA in the last 25516 hours.    Objective   /78 (BP Location: Left arm, Patient Position: Sitting, Cuff Size: Large Adult)   Pulse 82   Temp 98.5 °F (36.9 °C) (Oral)   Resp 20   Ht 160 cm (63\")   Wt 110 kg (242 lb)   SpO2 96%   BMI 42.87 kg/m²   Body mass index is 42.87 kg/m².    Physical Exam  GENERAL:  Well nourished/developed in no acute distress. Obese; frequent dry cough.   SKIN: Turgor excellent, without wound, rash, lesion.   HEENT: Normal cephalic without trauma.  Pupils equal round reactive to light. Extraocular motions full without nystagmus.   External canals nonobstructive nontender without reddness. Tymphatic membranes daniel with elvis structures intact.   Oral cavity without growths, exudates, and moist.  Posterior pharynx without mass, obstruction, redness.  No thyromegaly, mass, tenderness, lymphadenopathy and supple.  Mucoid nasal discharge x 2.  CV: Regular rhythm.  No murmur, gallop, trace edema. Posterior pulses intact.  No carotid bruits.  CHEST: No chest wall tenderness or mass.   LUNGS: Symmetric motion with rhonchi occ/wheeze occ to " auscultation.  No dullness to percussion  ABD: Soft, nontender without mass.   ORTHO: Symmetric extremities without swelling/point tenderness.  Full gross range of motion.  Walking without assistance.   NEURO: CN 2-12 grossly intact.  Symmetric facies and UE/LE. 3/5 strength throughout. 1/4 x bicep equal reflexes.  Nonfocal use extremities. Speech clear.    PSYCH: Oriented x 3.  Pleasant calm, well kept.  Purposeful/directed conservation with intact short/long gross memory.     Assessment/Plan     1. Exacerbation of asthma, unspecified asthma severity, unspecified whether persistent    2. HTN (hypertension), benign    3. Mild intermittent asthma, unspecified whether complicated    4. Controlled type 2 diabetes mellitus without complication, without long-term current use of insulin (CMS/[facility])    5. Allergic rhinitis, unspecified seasonality, unspecified trigger    6. Gastroesophageal reflux disease, esophagitis presence not specified    7. History of candidiasis    8. Sinusitis, unspecified chronicity, unspecified location        Rx: reviewed/changes:  New Medications Ordered This Visit   Medications   • ipratropium-albuterol (DUO-NEB) 0.5-2.5 mg/3 ml nebulizer     Sig: Take 3 mL by nebulization 4 (Four) Times a Day.     Dispense:  40 vial     Refill:  1   • fluconazole (DIFLUCAN) 100 MG tablet     Sig: Take 1 tablet by mouth Daily.     Dispense:  3 tablet     Refill:  0   • cefTRIAXone (ROCEPHIN) injection 1 g       LAB/Testing/Referrals: reviewed/orders:   Today:   Orders Placed This Encounter   Procedures   • Home Nebulizer   • Home Nebulizer Accessories   • XR Chest PA & Lateral       Discussions:   Decision to go on the cruise; at least will be frequently in [place]  Body mass index is 42.87 kg/m².   Patient's Body mass index is 42.87 kg/m². BMI is above normal parameters. Recommendations include: exercise counseling, nutrition counseling and as before discussed.  Non-smoker    Patient Instructions   If white  patches develop in your mouth: start the diflucan.     OTC dylsen cough Rx useful.       Follow up: Return for lab;, Dr Crowe-, as planned;.  Future Appointments   Date Time Provider Department Center   2/19/2019  8:30 AM LAB [facility] None   2/22/2019 11:30 AM Len Crowe MD [facility] None   9/23/2019  9:30 AM Tayler Burleson APRN [facility] None

## 2019-01-03 NOTE — PROGRESS NOTES
Pt here for Rocephin 1 GM injection as ordered. Given in RVG site . Pt tolerated well and no adverse reactions noted and pt left office.

## 2019-02-01 DIAGNOSIS — M81.0 OSTEOPOROSIS, UNSPECIFIED OSTEOPOROSIS TYPE, UNSPECIFIED PATHOLOGICAL FRACTURE PRESENCE: Primary | ICD-10-CM

## 2019-02-01 LAB
25(OH)D3+25(OH)D2 SERPL-MCNC: 52.2 NG/ML (ref 30–100)
CALCIUM SERPL-MCNC: 9.1 MG/DL (ref 8.4–10.4)
MAGNESIUM SERPL-MCNC: 1.8 MG/DL (ref 1.4–2.2)
PHOSPHATE SERPL-MCNC: 3.4 MG/DL (ref 2.5–4.5)

## 2019-02-07 DIAGNOSIS — E11.9 CONTROLLED TYPE 2 DIABETES MELLITUS WITHOUT COMPLICATION, WITHOUT LONG-TERM CURRENT USE OF INSULIN (HCC): Chronic | ICD-10-CM

## 2019-02-07 RX ORDER — METFORMIN HYDROCHLORIDE 500 MG/1
TABLET, EXTENDED RELEASE ORAL
Qty: 60 TABLET | Refills: 5 | Status: SHIPPED | OUTPATIENT
Start: 2019-02-07 | End: 2019-07-22 | Stop reason: SDUPTHER

## 2019-02-11 ENCOUNTER — TELEPHONE (OUTPATIENT)
Dept: FAMILY MEDICINE CLINIC | Facility: CLINIC | Age: 69
End: 2019-02-11

## 2019-02-13 ENCOUNTER — INFUSION (OUTPATIENT)
Dept: ONCOLOGY | Facility: HOSPITAL | Age: 69
End: 2019-02-13

## 2019-02-13 VITALS
DIASTOLIC BLOOD PRESSURE: 71 MMHG | HEIGHT: 63 IN | OXYGEN SATURATION: 100 % | WEIGHT: 236 LBS | BODY MASS INDEX: 41.82 KG/M2 | TEMPERATURE: 98 F | RESPIRATION RATE: 16 BRPM | SYSTOLIC BLOOD PRESSURE: 142 MMHG | HEART RATE: 92 BPM

## 2019-02-13 DIAGNOSIS — M81.0 OSTEOPOROSIS, UNSPECIFIED OSTEOPOROSIS TYPE, UNSPECIFIED PATHOLOGICAL FRACTURE PRESENCE: Primary | ICD-10-CM

## 2019-02-13 PROCEDURE — 25010000002 DENOSUMAB 60 MG/ML SOLUTION: Performed by: NURSE PRACTITIONER

## 2019-02-13 PROCEDURE — 96372 THER/PROPH/DIAG INJ SC/IM: CPT

## 2019-02-13 RX ADMIN — DENOSUMAB 60 MG: 60 INJECTION SUBCUTANEOUS at 11:39

## 2019-02-13 NOTE — PATIENT INSTRUCTIONS
Denosumab injection  What is this medicine?  DENOSUMAB (den oh duglas mab) slows bone breakdown. Prolia is used to treat osteoporosis in women after menopause and in men. Xgeva is used to treat a high calcium level due to cancer and to prevent bone fractures and other bone problems caused by multiple myeloma or cancer bone metastases. Xgeva is also used to treat giant cell tumor of the bone.  This medicine may be used for other purposes; ask your health care provider or pharmacist if you have questions.  COMMON BRAND NAME(S): Prolia, XGEVA  What should I tell my health care provider before I take this medicine?  They need to know if you have any of these conditions:  -dental disease  -having surgery or tooth extraction  -infection  -kidney disease  -low levels of calcium or Vitamin D in the blood  -malnutrition  -on hemodialysis  -skin conditions or sensitivity  -thyroid or parathyroid disease  -an unusual reaction to denosumab, other medicines, foods, dyes, or preservatives  -pregnant or trying to get pregnant  -breast-feeding  How should I use this medicine?  This medicine is for injection under the skin. It is given by a health care professional in a hospital or clinic setting.  If you are getting Prolia, a special MedGuide will be given to you by the pharmacist with each prescription and refill. Be sure to read this information carefully each time.  For Prolia, talk to your pediatrician regarding the use of this medicine in children. Special care may be needed. For Xgeva, talk to your pediatrician regarding the use of this medicine in children. While this drug may be prescribed for children as young as 13 years for selected conditions, precautions do apply.  Overdosage: If you think you have taken too much of this medicine contact a poison control center or emergency room at once.  NOTE: This medicine is only for you. Do not share this medicine with others.  What if I miss a dose?  It is important not to miss your  dose. Call your doctor or health care professional if you are unable to keep an appointment.  What may interact with this medicine?  Do not take this medicine with any of the following medications:  -other medicines containing denosumab  This medicine may also interact with the following medications:  -medicines that lower your chance of fighting infection  -steroid medicines like prednisone or cortisone  This list may not describe all possible interactions. Give your health care provider a list of all the medicines, herbs, non-prescription drugs, or dietary supplements you use. Also tell them if you smoke, drink alcohol, or use illegal drugs. Some items may interact with your medicine.  What should I watch for while using this medicine?  Visit your doctor or health care professional for regular checks on your progress. Your doctor or health care professional may order blood tests and other tests to see how you are doing.  Call your doctor or health care professional for advice if you get a fever, chills or sore throat, or other symptoms of a cold or flu. Do not treat yourself. This drug may decrease your body's ability to fight infection. Try to avoid being around people who are sick.  You should make sure you get enough calcium and vitamin D while you are taking this medicine, unless your doctor tells you not to. Discuss the foods you eat and the vitamins you take with your health care professional.  See your dentist regularly. Brush and floss your teeth as directed. Before you have any dental work done, tell your dentist you are receiving this medicine.  Do not become pregnant while taking this medicine or for 5 months after stopping it. Talk with your doctor or health care professional about your birth control options while taking this medicine. Women should inform their doctor if they wish to become pregnant or think they might be pregnant. There is a potential for serious side effects to an unborn child. Talk  to your health care professional or pharmacist for more information.  What side effects may I notice from receiving this medicine?  Side effects that you should report to your doctor or health care professional as soon as possible:  -allergic reactions like skin rash, itching or hives, swelling of the face, lips, or tongue  -bone pain  -breathing problems  -dizziness  -jaw pain, especially after dental work  -redness, blistering, peeling of the skin  -signs and symptoms of infection like fever or chills; cough; sore throat; pain or trouble passing urine  -signs of low calcium like fast heartbeat, muscle cramps or muscle pain; pain, tingling, numbness in the hands or feet; seizures  -unusual bleeding or bruising  -unusually weak or tired  Side effects that usually do not require medical attention (report to your doctor or health care professional if they continue or are bothersome):  -constipation  -diarrhea  -headache  -joint pain  -loss of appetite  -muscle pain  -runny nose  -tiredness  -upset stomach  This list may not describe all possible side effects. Call your doctor for medical advice about side effects. You may report side effects to FDA at 9-761-FDA-1942.  Where should I keep my medicine?  This medicine is only given in a clinic, doctor's office, or other health care setting and will not be stored at home.  NOTE: This sheet is a summary. It may not cover all possible information. If you have questions about this medicine, talk to your doctor, pharmacist, or health care provider.  © 2018 [employer] (2018-01-09 19:17:21)

## 2019-02-13 NOTE — PROGRESS NOTES
Patient arrives for Prolia injection.  Side effects reviewed w/patient including importance of continuing Calcium/Vitamin D, avoiding invasive dental procedures, and following up w/labs prior to patient return in 6 months.  Patient monitored following injection per protocol.  Patient discharged in stable condition.  BECKY Gage RN

## 2019-02-15 DIAGNOSIS — E55.9 VITAMIN D DEFICIENCY: ICD-10-CM

## 2019-02-15 DIAGNOSIS — K21.9 GASTROESOPHAGEAL REFLUX DISEASE, ESOPHAGITIS PRESENCE NOT SPECIFIED: ICD-10-CM

## 2019-02-15 DIAGNOSIS — I10 HTN (HYPERTENSION), BENIGN: Primary | Chronic | ICD-10-CM

## 2019-02-15 DIAGNOSIS — E11.9 CONTROLLED TYPE 2 DIABETES MELLITUS WITHOUT COMPLICATION, WITHOUT LONG-TERM CURRENT USE OF INSULIN (HCC): Chronic | ICD-10-CM

## 2019-02-18 ENCOUNTER — RESULTS ENCOUNTER (OUTPATIENT)
Dept: FAMILY MEDICINE CLINIC | Facility: CLINIC | Age: 69
End: 2019-02-18

## 2019-02-18 DIAGNOSIS — I10 HTN (HYPERTENSION), BENIGN: Chronic | ICD-10-CM

## 2019-02-18 DIAGNOSIS — E11.9 CONTROLLED TYPE 2 DIABETES MELLITUS WITHOUT COMPLICATION, WITHOUT LONG-TERM CURRENT USE OF INSULIN (HCC): Chronic | ICD-10-CM

## 2019-02-18 DIAGNOSIS — E55.9 VITAMIN D DEFICIENCY: ICD-10-CM

## 2019-02-18 DIAGNOSIS — K21.9 GASTROESOPHAGEAL REFLUX DISEASE, ESOPHAGITIS PRESENCE NOT SPECIFIED: ICD-10-CM

## 2019-02-18 RX ORDER — IRBESARTAN 300 MG/1
TABLET ORAL
Qty: 30 TABLET | Refills: 5 | Status: SHIPPED | OUTPATIENT
Start: 2019-02-18 | End: 2019-04-25 | Stop reason: RX

## 2019-02-18 RX ORDER — HYDROCHLOROTHIAZIDE 25 MG/1
TABLET ORAL
Qty: 15 TABLET | Refills: 5 | Status: SHIPPED | OUTPATIENT
Start: 2019-02-18 | End: 2019-08-19 | Stop reason: SDUPTHER

## 2019-02-18 RX ORDER — MONTELUKAST SODIUM 10 MG/1
TABLET ORAL
Qty: 30 TABLET | Refills: 5 | Status: SHIPPED | OUTPATIENT
Start: 2019-02-18 | End: 2019-08-19 | Stop reason: SDUPTHER

## 2019-02-22 LAB
25(OH)D3+25(OH)D2 SERPL-MCNC: 58.3 NG/ML (ref 30–100)
ALBUMIN SERPL-MCNC: 4.2 G/DL (ref 3.5–5)
ALBUMIN/GLOB SERPL: 1.7 G/DL (ref 1.1–2.5)
ALP SERPL-CCNC: 120 U/L (ref 24–120)
ALT SERPL-CCNC: 50 U/L (ref 0–54)
AST SERPL-CCNC: 37 U/L (ref 7–45)
BASOPHILS # BLD AUTO: 0.06 10*3/MM3 (ref 0–0.2)
BASOPHILS NFR BLD AUTO: 1 % (ref 0–2)
BILIRUB SERPL-MCNC: 0.5 MG/DL (ref 0.1–1)
BUN SERPL-MCNC: 19 MG/DL (ref 5–21)
BUN/CREAT SERPL: 27.9 (ref 7–25)
CALCIUM SERPL-MCNC: 9.3 MG/DL (ref 8.4–10.4)
CHLORIDE SERPL-SCNC: 100 MMOL/L (ref 98–110)
CHOLEST SERPL-MCNC: 128 MG/DL (ref 130–200)
CHOLEST/HDLC SERPL: 3.05 {RATIO}
CO2 SERPL-SCNC: 27 MMOL/L (ref 24–31)
CREAT SERPL-MCNC: 0.68 MG/DL (ref 0.5–1.4)
EOSINOPHIL # BLD AUTO: 0.15 10*3/MM3 (ref 0–0.7)
EOSINOPHIL NFR BLD AUTO: 2.5 % (ref 0–4)
ERYTHROCYTE [DISTWIDTH] IN BLOOD BY AUTOMATED COUNT: 16.3 % (ref 12–15)
GLOBULIN SER CALC-MCNC: 2.5 GM/DL
GLUCOSE SERPL-MCNC: 148 MG/DL (ref 70–100)
HBA1C MFR BLD: 7.2 %
HCT VFR BLD AUTO: 35.2 % (ref 37–47)
HDLC SERPL-MCNC: 42 MG/DL
HGB BLD-MCNC: 10.3 G/DL (ref 12–16)
IMM GRANULOCYTES # BLD AUTO: 0.04 10*3/MM3 (ref 0–0.05)
IMM GRANULOCYTES NFR BLD AUTO: 0.7 % (ref 0–5)
LDLC SERPL CALC-MCNC: 63 MG/DL (ref 0–99)
LYMPHOCYTES # BLD AUTO: 1.2 10*3/MM3 (ref 0.72–4.86)
LYMPHOCYTES NFR BLD AUTO: 19.9 % (ref 15–45)
MCH RBC QN AUTO: 23.7 PG (ref 28–32)
MCHC RBC AUTO-ENTMCNC: 29.3 G/DL (ref 33–36)
MCV RBC AUTO: 80.9 FL (ref 82–98)
MICROALBUMIN UR-MCNC: 17.4 UG/ML
MONOCYTES # BLD AUTO: 0.65 10*3/MM3 (ref 0.19–1.3)
MONOCYTES NFR BLD AUTO: 10.8 % (ref 4–12)
NEUTROPHILS # BLD AUTO: 3.93 10*3/MM3 (ref 1.87–8.4)
NEUTROPHILS NFR BLD AUTO: 65.1 % (ref 39–78)
NRBC BLD AUTO-RTO: 0 /100 WBC (ref 0–0)
PLATELET # BLD AUTO: 321 10*3/MM3 (ref 130–400)
POTASSIUM SERPL-SCNC: 4.5 MMOL/L (ref 3.5–5.3)
PROT SERPL-MCNC: 6.7 G/DL (ref 6.3–8.7)
RBC # BLD AUTO: 4.35 10*6/MM3 (ref 4.2–5.4)
SODIUM SERPL-SCNC: 136 MMOL/L (ref 135–145)
TRIGL SERPL-MCNC: 116 MG/DL (ref 0–149)
TSH SERPL DL<=0.005 MIU/L-ACNC: 1.57 MIU/ML (ref 0.47–4.68)
URATE SERPL-MCNC: 4.7 MG/DL (ref 2.7–7.5)
VLDLC SERPL CALC-MCNC: 23.2 MG/DL
WBC # BLD AUTO: 6.03 10*3/MM3 (ref 4.8–10.8)

## 2019-02-25 ENCOUNTER — OFFICE VISIT (OUTPATIENT)
Dept: FAMILY MEDICINE CLINIC | Facility: CLINIC | Age: 69
End: 2019-02-25

## 2019-02-25 VITALS
OXYGEN SATURATION: 98 % | HEIGHT: 63 IN | HEART RATE: 84 BPM | TEMPERATURE: 98.4 F | RESPIRATION RATE: 18 BRPM | BODY MASS INDEX: 42.88 KG/M2 | WEIGHT: 242 LBS | DIASTOLIC BLOOD PRESSURE: 74 MMHG | SYSTOLIC BLOOD PRESSURE: 136 MMHG

## 2019-02-25 DIAGNOSIS — M81.0 OSTEOPOROSIS, UNSPECIFIED OSTEOPOROSIS TYPE, UNSPECIFIED PATHOLOGICAL FRACTURE PRESENCE: ICD-10-CM

## 2019-02-25 DIAGNOSIS — D64.9 ANEMIA, UNSPECIFIED TYPE: ICD-10-CM

## 2019-02-25 DIAGNOSIS — Z79.01 ANTICOAGULATED: ICD-10-CM

## 2019-02-25 DIAGNOSIS — Z00.00 WELLNESS EXAMINATION: ICD-10-CM

## 2019-02-25 DIAGNOSIS — J45.20 MILD INTERMITTENT ASTHMA, UNSPECIFIED WHETHER COMPLICATED: Primary | Chronic | ICD-10-CM

## 2019-02-25 DIAGNOSIS — I10 HTN (HYPERTENSION), BENIGN: Chronic | ICD-10-CM

## 2019-02-25 DIAGNOSIS — K21.9 GASTROESOPHAGEAL REFLUX DISEASE, ESOPHAGITIS PRESENCE NOT SPECIFIED: ICD-10-CM

## 2019-02-25 DIAGNOSIS — M19.90 OSTEOARTHRITIS, UNSPECIFIED OSTEOARTHRITIS TYPE, UNSPECIFIED SITE: Chronic | ICD-10-CM

## 2019-02-25 PROCEDURE — 99214 OFFICE O/P EST MOD 30 MIN: CPT | Performed by: FAMILY MEDICINE

## 2019-02-25 RX ORDER — ASPIRIN 81 MG/1
81 TABLET ORAL EVERY OTHER DAY
Qty: 32 TABLET | Refills: 0 | Status: SHIPPED | OUTPATIENT
Start: 2019-02-25 | End: 2022-12-09 | Stop reason: HOSPADM

## 2019-02-25 NOTE — PATIENT INSTRUCTIONS
"Medicare offers certain visits called \"wellness\" that allows for time to deal with and  review the many aspects of \"being well\" that just might not get mentioned during other visits with your doctor through the year.  This includes things like reviews of health screenings (mammograms, various labs),  weight, exercise, vaccines for just a few examples.      In order to help you with this we wish to make you aware of a few things for you to consider:    1. Advanced directives.  These are documents used to help direct your care if your health/situation should reach a point that you cannot make your own decisions.  While it is likely you do not currently have a need for these documents now; it is something that we all might face at any time.   The hand outs you are being given today are simply for you to review and use to learn more about these documents and consider them as you wish.      2. Vaccines: Certain vaccines are important after age 50, 60, and 65 and some health situations (for example COPD), require even boosters beyond age 65.  We are happy to review with you your vaccine status and vaccines that might be needed for you at this point:      a. Tetanus.   Like anyone this needs to given every 10 years; sooner for/with lacerations/wounds.   Likely when getting this booster it needs to be a tetanus called Tdap (tetanus mixed with diptheria and pertussis).   Years ago you had this vaccine.  We now know we can lose our immunity to pertussis (a part of this vaccine) and run a risk of catching this.  Now only would this make us ill; but more importantly we can spread this to very young children (and for them it can be a much more dangerous illness).   We call this the grandparent vaccine for this reason.     b. Pneumonia (strept).   This comes now with two brands.   It is recommended to take pneumovax first; and a year later take the cousin prevnar.  Even if you have had these before; we need to review when and " your current health situation/s as you may need boosters.     c. Shingles.  You do not want to catch shingles.  Though you will recover from this; the pain associated with shingles can be severe.  Even if you have had the now older zostavax, or have had shingles; it is recommended you still get the Shingrix (the new vaccine just available early 2018 shingles vaccine).  A new shingles vaccine (a shot to lower your chance of catching shingles) is now available (shingrix).  This vaccine is the second vaccine created for this purpose; (we have had zostavax for years).  Shingrix provides a much better and longer immunity for shingles than zostavax.  For this and other reasons Shingrix can be started at age 50.  If you have had zostavax in the past; you can still take Shingrix.      This vaccine is not paid for in a doctor's office by medicare, medicaid and probably most insurances.  Like zostavax; this is covered in drug stores.  This is a vaccine that if you chose to get you need to get at a drug store that gives vaccines (like [facility], [facility] and [facility].      d. Yearly flu vaccine given from September through April each year (there is a special vaccine for those over 65).     e. Travel vaccines:  If you are one to do international travel; be sure and ask us for any particular unusual vaccines you may need.     f.  Miscellaneous:  If you have certain health situations/disease you may need specific/particular vaccines not give to the general public.     3. Exercise: regular cardio exercise something everyone should consider and try to do; even if health limitations (ie find that exercise UE/LE/cardio that they can tolerate).   Normal weight a goal for everyone (as we discussed)    4. Healthy diet helpful for weight management, illness prevention.     5. If over 50-screening exams include men PSA/rectal exam, women mammograms, and everyone colonoscopy screening for colon cancer.    6. If you use  tobacco you should stop. We are providing you some information to consider that could make this process easier.      ########################

## 2019-02-25 NOTE — PROGRESS NOTES
Subjective   Kezia Otoole is a 68 y.o. female presenting with chief complaint of:   Chief Complaint   Patient presents with   • Asthma   • Diabetes   • Medicare Wellness-subsequent       History of Present Illness :  Alone.   Has multiple chronic problems to consider that might have a bearing on today's issues;  an interval appointment.       Chronic/acute problems reviewed today:   1. Mild intermittent asthma, unspecified whether complicated: chronic/variable.  Lately finally better after winter of increased cough, occ wheeze.  Agrees would like to see allergy (never has).     2. HTN (hypertension), benign Chronic/stable. Stable here/if home blood pressures.  No significant chest pain, SOB, LE edema, orthopnea, near syncope, dizziness/light headness.      3. Gastroesophageal reflux disease, esophagitis presence not specified: Chronic/stable.  Controlled heartburn, reflux; no dysphagia, melena; added with cough.  Rx prilosec used and not sure helps cough, not.      4. Osteoarthritis, unspecified osteoarthritis type, unspecified site: Chronic/stable.  Various on/off joint pains/soreness/stiffness.  Particular joint problems with knees.  No joint swelling.  Treats mainly with NSAID, tylenol, rest.  Wants to surgery.     5. Anticoagulated DM2/ASA 81 (11.9.17 pt chose qod): Chronic/stable reason and use of.  Denies bleeding issues; especially epistaxis, melena, hematochezia.  Upper arms bruise easily.      6. Osteoporosis, unspecified osteoporosis type, unspecified pathological fracture presence: Chronic/stable.  Last bone density below.   Has through [facility]/prolia Rx.  Has plan for bone density repeat.      7. Wellness examination-done: [facility] medicare review.    8. Anemia, unspecified type: Chronic/variable: This has been present for chronic pattern.  It is chronic.  There has been GI evaulation in the past. There is no current melena, hematochezia. It has been benign to date and stable/watching.       Has an/another  acute issue today: none.    The following portions of the patient's history were reviewed and updated as appropriate: allergies, current medications, past family history, past medical history, past social history, past surgical history and problem list.      Current Outpatient Medications:   •  albuterol (PROVENTIL HFA;VENTOLIN HFA) 108 (90 Base) MCG/ACT inhaler, Inhale 2 puffs Every 4 (Four) Hours As Needed for Wheezing or Shortness of Air., Disp: 1 inhaler, Rfl: 1  •  amLODIPine (NORVASC) 2.5 MG tablet, TAKE ONE TABLET DAILY, Disp: 30 tablet, Rfl: 5  •  aspirin 81 MG EC tablet, Take 1 tablet by mouth Every Other Day., Disp: 32 tablet, Rfl: 0  •  B Complex-C (SUPER B COMPLEX PO), Take 1 tablet by mouth Daily., Disp: , Rfl:   •  Calcium Carb-Cholecalciferol (CALCIUM-VITAMIN D) 600-400 MG-UNIT tablet, Take 1 tablet by mouth 2 (Two) Times a Day., Disp: , Rfl:   •  carboxymethylcellulose (REFRESH PLUS) 0.5 % solution, 2 drops Daily As Needed for dry eyes., Disp: , Rfl:   •  Coenzyme Q10 (CO Q 10 PO), Take 1 capsule by mouth., Disp: , Rfl:   •  denosumab (PROLIA) 60 MG/ML solution syringe, Inject 60 mg under the skin into the appropriate area as directed Every 6 (Six) Months., Disp: , Rfl:   •  guaiFENesin (MUCINEX) 600 MG 12 hr tablet, Take 600 mg by mouth Daily., Disp: , Rfl:   •  hydrochlorothiazide (HYDRODIURIL) 25 MG tablet, TAKE 1/2 TABLET DAILY, Disp: 15 tablet, Rfl: 5  •  irbesartan (AVAPRO) 300 MG tablet, TAKE ONE TABLET DAILY, Disp: 30 tablet, Rfl: 5  •  latanoprost (XALATAN) 0.005 % ophthalmic solution, Administer 1 drop to both eyes Every Night., Disp: , Rfl: 5  •  metFORMIN ER (GLUCOPHAGE-XR) 500 MG 24 hr tablet, TAKE TWO TABLETS EVERY NIGHT, Disp: 60 tablet, Rfl: 5  •  Misc Natural Products (GLUCOSAMINE CHONDROITIN TRIPLE PO), Take 1 tablet by mouth 2 (Two) Times a Day., Disp: , Rfl:   •  montelukast (SINGULAIR) 10 MG tablet, TAKE ONE TABLET DAILY, Disp: 30 tablet, Rfl: 5  •  Multiple Vitamins-Minerals  (VITRUM 50+ SENIOR MULTI PO), Take 1 tablet by mouth Daily., Disp: , Rfl:   •  omeprazole (priLOSEC) 40 MG capsule, Take 1 capsule by mouth Daily., Disp: 30 capsule, Rfl: 11  •  potassium chloride (K-DUR) 10 MEQ CR tablet, TAKE ONE TABLET DAILY, Disp: 30 tablet, Rfl: 5  •  SYMBICORT 160-4.5 MCG/ACT inhaler, USE  2 PUFFS TWICE DAILY, Disp: 10.2 g, Rfl: 5  •  acetaminophen (TYLENOL) 500 MG tablet, Take 1,000 mg by mouth Every 6 (Six) Hours As Needed for mild pain (1-3)., Disp: , Rfl:   •  ibuprofen (ADVIL,MOTRIN) 400 MG tablet, Take 400 mg by mouth Every 6 (Six) Hours As Needed for mild pain (1-3)., Disp: , Rfl:   •  ipratropium-albuterol (DUO-NEB) 0.5-2.5 mg/3 ml nebulizer, Take 3 mL by nebulization 4 (Four) Times a Day., Disp: 40 vial, Rfl: 1    No problems with medications.  Refills if needed done    No Known Allergies    Review of Systems  GENERAL:  Active/slower with limits, speed, stamina for age; fatigue with this. Sleep is ok; apnea denied. No fever now.  ENDO:  No syncope, near or diaphoretic sweaty spells.  BS without testing/download.  HEENT: No head injury or headache.   No vision change.   No significant hearing loss.  Ears without pain/drainage.  Mild sore throat.   Usual on/off before this; worse with this-nasal/sinus congestion/drainage. No epistaxis.  CHEST: No chest wall tenderness or mass.  Usual occ cough,  with occ wheeze; all worse witht his.  No SOB; no hemoptysis.  CV: No chest pain, palpitations, usual  On/off ankle edema.  GI: No heartburn, dysphagia.  No abdominal pain, diarrhea, constipation.  No rectal bleeding, or melena.    :  Voids without dysuria, or  incontinence to completion.  ORTHO: No painful/swollen joints but various on /off sore.  No change occ sore neck or back.  No acute neck or back pain without recent injury.  NEURO: No dizziness, weakness of extremities.  No numbness/paresthesias.   PSYCH: No memory loss.  Mood good; occ mild anxious, depressed but/and not suicidal.   Tries to tolerate stress .   Screening:  Mammogram: 12.12.18  Bone density: 12.11.18 [facility] hip -2.4; start prolia  Low dose CT chest: Tobacco-smoker/never: NA  GI: Colon-p+div///11.19.18/5y  Prostate: NA  Usual lab order  6m CBC, CMP, A1c  12m CBC, CMP, A1c, LIPID, TSH, Vit D, uric acid    Lab Results:  Results for orders placed or performed in visit on 02/18/19   Comprehensive metabolic panel   Result Value Ref Range    Glucose 148 (H) 70 - 100 mg/dL    BUN 19 5 - 21 mg/dL    Creatinine 0.68 0.50 - 1.40 mg/dL    eGFR Non African Am 86 >60 mL/min/1.73    eGFR African Am 104 >60 mL/min/1.73    BUN/Creatinine Ratio 27.9 (H) 7.0 - 25.0    Sodium 136 135 - 145 mmol/L    Potassium 4.5 3.5 - 5.3 mmol/L    Chloride 100 98 - 110 mmol/L    Total CO2 27.0 24.0 - 31.0 mmol/L    Calcium 9.3 8.4 - 10.4 mg/dL    Total Protein 6.7 6.3 - 8.7 g/dL    Albumin 4.20 3.50 - 5.00 g/dL    Globulin 2.5 gm/dL    A/G Ratio 1.7 1.1 - 2.5 g/dL    Total Bilirubin 0.5 0.1 - 1.0 mg/dL    Alkaline Phosphatase 120 24 - 120 U/L    AST (SGOT) 37 7 - 45 U/L    ALT (SGPT) 50 0 - 54 U/L   Lipid Panel With / Chol / HDL Ratio   Result Value Ref Range    Total Cholesterol 128 (L) 130 - 200 mg/dL    Triglycerides 116 0 - 149 mg/dL    HDL Cholesterol 42 (L) >=50 mg/dL    VLDL Cholesterol 23.2 mg/dL    LDL Cholesterol  63 0 - 99 mg/dL    Chol/HDL Ratio 3.05    Hemoglobin A1c   Result Value Ref Range    Hemoglobin A1C 7.20 %   Uric acid   Result Value Ref Range    Uric Acid 4.7 2.7 - 7.5 mg/dL   TSH   Result Value Ref Range    TSH 1.570 0.470 - 4.680 mIU/mL   Vitamin D 25 hydroxy   Result Value Ref Range    25 Hydroxy, Vitamin D 58.3 30.0 - 100.0 ng/ml   MicroAlbumin, Urine, Random - Urine, Clean Catch   Result Value Ref Range    Microalbumin, Urine 17.4 Not Estab. ug/mL   CBC & Differential   Result Value Ref Range    WBC 6.03 4.80 - 10.80 10*3/mm3    RBC 4.35 4.20 - 5.40 10*6/mm3    Hemoglobin 10.3 (L)  Donates:  12.0 - 16.0 g/dL    Hematocrit 35.2 (L)  37.0 - 47.0 %    MCV 80.9 (L) 82.0 - 98.0 fL    MCH 23.7 (L) 28.0 - 32.0 pg    MCHC 29.3 (L) 33.0 - 36.0 g/dL    RDW 16.3 (H) 12.0 - 15.0 %    Platelets 321 130 - 400 10*3/mm3    Neutrophil Rel % 65.1 39.0 - 78.0 %    Lymphocyte Rel % 19.9 15.0 - 45.0 %    Monocyte Rel % 10.8 4.0 - 12.0 %    Eosinophil Rel % 2.5 0.0 - 4.0 %    Basophil Rel % 1.0 0.0 - 2.0 %    Neutrophils Absolute 3.93 1.87 - 8.40 10*3/mm3    Lymphocytes Absolute 1.20 0.72 - 4.86 10*3/mm3    Monocytes Absolute 0.65 0.19 - 1.30 10*3/mm3    Eosinophils Absolute 0.15 0.00 - 0.70 10*3/mm3    Basophils Absolute 0.06 0.00 - 0.20 10*3/mm3    Immature Granulocyte Rel % 0.7 0.0 - 5.0 %    Immature Grans Absolute 0.04 0.00 - 0.05 10*3/mm3    nRBC 0.0 0.0 - 0.0 /100 WBC       A1C:  Lab Results - Last 18 Months   Lab Units 02/21/19 0743 08/14/18  0726 02/16/18  0920 11/14/17  0749   HEMOGLOBIN A1C % 7.20 6.70 6.00 6.10     PSA:No results for input(s): PSA in the last 07105 hours.  CBC:  Lab Results - Last 18 Months   Lab Units 02/21/19  0743 08/14/18  0726 02/16/18  0920 11/14/17  0749   WBC 10*3/mm3 6.03 5.64 5.69 6.21   HEMOGLOBIN g/dL 10.3* 12.6 14.2 14.6   HEMATOCRIT % 35.2* 39.6 43.2 44.5   PLATELETS 10*3/mm3 321 251 257 215   IRON mcg/dL  --   --  57  --       BMP/CMP:  Lab Results - Last 18 Months   Lab Units 02/21/19  0743 02/01/19  0810 08/14/18  0726 02/16/18  0920 11/14/17  0749   SODIUM mmol/L 136  --  139 138 140   POTASSIUM mmol/L 4.5  --  4.5 4.1 4.1   CHLORIDE mmol/L 100  --  101 98 99   TOTAL CO2 mmol/L 27.0  --  29.0 30.0 30.0   GLUCOSE mg/dL 148*  --  138* 137* 130*   BUN mg/dL 19  --  21 15 13   CREATININE mg/dL 0.68  --  0.82 0.67 0.77   EGFR IF NONAFRICN AM mL/min/1.73 86  --  69 87 74   EGFR IF AFRICN AM mL/min/1.73 104  --  84 106 90   CALCIUM mg/dL 9.3 9.1 9.4 9.3 9.2     HEPATIC:  Lab Results - Last 18 Months   Lab Units 02/21/19  0743 08/14/18  0726 02/16/18  0920 11/14/17  0749   ALT (SGPT) U/L 50 46 55* 49   AST (SGOT) U/L 37 36 32  "29   ALK PHOS U/L 120 73 93 84     THYROID:  Lab Results - Last 18 Months   Lab Units 02/21/19  0743 11/14/17  0749   TSH mIU/mL 1.570 1.860       Objective   /74 (BP Location: Left arm, Patient Position: Sitting, Cuff Size: Large Adult)   Pulse 84   Temp 98.4 °F (36.9 °C) (Oral)   Resp 18   Ht 160 cm (63\")   Wt 110 kg (242 lb)   SpO2 98%   BMI 42.87 kg/m²   Body mass index is 42.87 kg/m².    Physical Exam  GENERAL:  Well nourished/developed in no acute distress. Obese; frequent dry cough.   SKIN: Turgor excellent, without wound, rash, lesion.   HEENT: Normal cephalic without trauma.  Pupils equal round reactive to light. Extraocular motions full without nystagmus.   External canals nonobstructive nontender without reddness. Tymphatic membranes daniel with elvis structures intact.   Oral cavity without growths, exudates, and moist.  Posterior pharynx without mass, obstruction, redness.  No thyromegaly, mass, tenderness, lymphadenopathy and supple.  Mucoid nasal discharge x 2.  CV: Regular rhythm.  No murmur, gallop, trace edema. Posterior pulses intact.  No carotid bruits.  CHEST: No chest wall tenderness or mass.   LUNGS: Symmetric motion with rhonchi occ/wheeze occ to auscultation.  No dullness to percussion  ABD: Soft, nontender without mass.   ORTHO: Symmetric extremities without swelling/point tenderness.  Full gross range of motion.  Walking without assistance.   NEURO: CN 2-12 grossly intact.  Symmetric facies and UE/LE. 3/5 strength throughout. 1/4 x bicep equal reflexes.  Nonfocal use extremities. Speech clear.    PSYCH: Oriented x 3.  Pleasant calm, well kept.  Purposeful/directed conservation with intact short/long gross memory.     Assessment/Plan     1. Mild intermittent asthma, unspecified whether complicated    2. HTN (hypertension), benign    3. Gastroesophageal reflux disease, esophagitis presence not specified    4. Osteoarthritis, unspecified osteoarthritis type, unspecified site    5. " "Anticoagulated DM2/ASA 81 (11.9.17 pt chose qod)    6. Osteoporosis, unspecified osteoporosis type, unspecified pathological fracture presence    7. Wellness examination-done    8. Anemia, unspecified type      Question iron status and whether there is any blood stools.  This would fit the stage IV considering going back to GI for EGD.  Will reasonable to have her see allergy and see if there is any  Allergy tendencies that could be treated.    Rx: reviewed/changes:  New Medications Ordered This Visit   Medications   • aspirin 81 MG EC tablet     Sig: Take 1 tablet by mouth Every Other Day.     Dispense:  32 tablet     Refill:  0       LAB/Testing/Referrals: reviewed/orders:   Today: add iron  No orders of the defined types were placed in this encounter.    Chronic/recurrent labs above or change to:   6m CBC, CMP, A1c, iron  12m CBC, CMP, A1c, LIPID, TSH, Vit D, uric acid, iron, B12, folate    Discussions:   above  Body mass index is 42.87 kg/m².  Patient's Body mass index is 42.87 kg/m². BMI is above normal parameters. Recommendations include: exercise counseling, nutrition counseling and as usual.    Tobacco use reviewed:  Non-smoker    Patient Instructions   Medicare offers certain visits called \"wellness\" that allows for time to deal with and  review the many aspects of \"being well\" that just might not get mentioned during other visits with your doctor through the year.  This includes things like reviews of health screenings (mammograms, various labs),  weight, exercise, vaccines for just a few examples.      In order to help you with this we wish to make you aware of a few things for you to consider:    1. Advanced directives.  These are documents used to help direct your care if your health/situation should reach a point that you cannot make your own decisions.  While it is likely you do not currently have a need for these documents now; it is something that we all might face at any time.   The hand outs you " are being given today are simply for you to review and use to learn more about these documents and consider them as you wish.      2. Vaccines: Certain vaccines are important after age 50, 60, and 65 and some health situations (for example COPD), require even boosters beyond age 65.  We are happy to review with you your vaccine status and vaccines that might be needed for you at this point:      a. Tetanus.   Like anyone this needs to given every 10 years; sooner for/with lacerations/wounds.   Likely when getting this booster it needs to be a tetanus called Tdap (tetanus mixed with diptheria and pertussis).   Years ago you had this vaccine.  We now know we can lose our immunity to pertussis (a part of this vaccine) and run a risk of catching this.  Now only would this make us ill; but more importantly we can spread this to very young children (and for them it can be a much more dangerous illness).   We call this the grandparent vaccine for this reason.     b. Pneumonia (strept).   This comes now with two brands.   It is recommended to take pneumovax first; and a year later take the cousin prevnar.  Even if you have had these before; we need to review when and your current health situation/s as you may need boosters.     c. Shingles.  You do not want to catch shingles.  Though you will recover from this; the pain associated with shingles can be severe.  Even if you have had the now older zostavax, or have had shingles; it is recommended you still get the Shingrix (the new vaccine just available early 2018 shingles vaccine).  A new shingles vaccine (a shot to lower your chance of catching shingles) is now available (shingrix).  This vaccine is the second vaccine created for this purpose; (we have had zostavax for years).  Shingrix provides a much better and longer immunity for shingles than zostavax.  For this and other reasons Shingrix can be started at age 50.  If you have had zostavax in the past; you can still  take Shingrix.      This vaccine is not paid for in a doctor's office by medicare, medicaid and probably most insurances.  Like zostavax; this is covered in drug stores.  This is a vaccine that if you chose to get you need to get at a drug store that gives vaccines (like [facility], [facility] and [email or web address].      d. Yearly flu vaccine given from September through April each year (there is a special vaccine for those over 65).     e. Travel vaccines:  If you are one to do international travel; be sure and ask us for any particular unusual vaccines you may need.     f.  Miscellaneous:  If you have certain health situations/disease you may need specific/particular vaccines not give to the general public.     3. Exercise: regular cardio exercise something everyone should consider and try to do; even if health limitations (ie find that exercise UE/LE/cardio that they can tolerate).   Normal weight a goal for everyone (as we discussed)    4. Healthy diet helpful for weight management, illness prevention.     5. If over 50-screening exams include men PSA/rectal exam, women mammograms, and everyone colonoscopy screening for colon cancer.    6. If you use tobacco you should stop. We are providing you some information to consider that could make this process easier.      ########################              Follow up: Return for no stick/add iron to last lab.   hemocult x 3  lab/Dr Crowe 6m.  Future Appointments   Date Time Provider Department Center   9/23/2019  9:30 AM Tayler Burleson APRN [facility] None

## 2019-02-25 NOTE — PROGRESS NOTES
"QUICK REFERENCE INFORMATION:  The ABCs of the Annual Wellness Visit    Subsequent Medicare Wellness Visit    HEALTH RISK ASSESSMENT    1950    Recent Hospitalizations:  No hospitalization(s) within the last year..        Current Medical Providers:  Patient Care Team:  Len Crowe MD as PCP - Ngoc Mcgee APRN as PCP - Jessenia Emery, Huang Jimenez MD as Consulting Physician (Otolaryngology)  Tayler Burleson APRN as Nurse Practitioner (Family Medicine)  Dave Mcnally MD as Consulting Physician (Gastroenterology)        Smoking Status:  Social History     Tobacco Use   Smoking Status Never Smoker   Smokeless Tobacco Never Used   Tobacco Comment    \"lived with smokers most of my life\"       Alcohol Consumption:  Social History     Substance and Sexual Activity   Alcohol Use Yes    Comment: Rare       Depression Screen:   PHQ-2/PHQ-9 Depression Screening 2/25/2019   Little interest or pleasure in doing things 0   Feeling down, depressed, or hopeless 0   Total Score 0       Health Habits and Functional and Cognitive Screening:  Functional & Cognitive Status 2/25/2019   Do you have difficulty preparing food and eating? No   Do you have difficulty bathing yourself, getting dressed or grooming yourself? No   Do you have difficulty using the toilet? No   Do you have difficulty moving around from place to place? No   Do you have trouble with steps or getting out of a bed or a chair? No   In the past year have you fallen or experienced a near fall? No   Current Diet Well Balanced Diet   Dental Exam Up to date   Eye Exam Not up to date   Exercise (times per week) 0 times per week   Current Exercise Activities Include Housecleaning   Do you need help using the phone?  No   Are you deaf or do you have serious difficulty hearing?  No   Do you need help with transportation? No   Do you need help shopping? No   Do you need help preparing meals?  No   Do you need help with housework?  No   Do " you need help with laundry? No   Do you need help taking your medications? No   Do you need help managing money? No   Do you ever drive or ride in a car without wearing a seat belt? No   Have you felt unusual stress, anger or loneliness in the last month? No   Who do you live with? Spouse   If you need help, do you have trouble finding someone available to you? No   Have you been bothered in the last four weeks by sexual problems? No   Do you have difficulty concentrating, remembering or making decisions? No           Does the patient have evidence of cognitive impairment? Yes    Aspirin use counseling: Taking ASA appropriately as indicated      Recent Lab Results:  CMP:  Lab Results   Component Value Date     (H) 02/21/2019    BUN 19 02/21/2019    CREATININE 0.68 02/21/2019    EGFRIFNONA 86 02/21/2019    EGFRIFAFRI 104 02/21/2019    BCR 27.9 (H) 02/21/2019     02/21/2019    K 4.5 02/21/2019    CO2 27.0 02/21/2019    CALCIUM 9.3 02/21/2019    PROTENTOTREF 6.7 02/21/2019    ALBUMIN 4.20 02/21/2019    LABGLOBREF 2.5 02/21/2019    LABIL2 1.7 02/21/2019    BILITOT 0.5 02/21/2019    ALKPHOS 120 02/21/2019    AST 37 02/21/2019    ALT 50 02/21/2019     Lipid Panel:  Lab Results   Component Value Date    TRIG 116 02/21/2019    HDL 42 (L) 02/21/2019    VLDL 23.2 02/21/2019     HbA1c:  Lab Results   Component Value Date    HGBA1C 7.20 02/21/2019       Visual Acuity:   Visual Acuity Screening    Right eye Left eye Both eyes   Without correction:      With correction: 20/15 20/20 20/15       Age-appropriate Screening Schedule:  Refer to the list below for future screening recommendations based on patient's age, sex and/or medical conditions. Orders for these recommended tests are listed in the plan section. The patient has been provided with a written plan.    Health Maintenance   Topic Date Due   • TDAP/TD VACCINES (1 - Tdap) 10/21/1967   • ZOSTER VACCINE (1 of 2) 10/21/1998   • DIABETIC FOOT EXAM  10/21/2016   •  DIABETIC EYE EXAM  10/31/2017   • HEMOGLOBIN A1C  08/21/2019   • URINE MICROALBUMIN  02/21/2020   • DXA SCAN  12/11/2020   • MAMMOGRAM  12/12/2020   • COLONOSCOPY  11/19/2023   • INFLUENZA VACCINE  Completed   • PNEUMOCOCCAL VACCINES (65+ LOW/MEDIUM RISK)  Completed        Subjective   History of Present Illness    Kezia Otoole is a 68 y.o. female who presents for an Subsequent Wellness Visit.    The following portions of the patient's history were reviewed and updated as appropriate: allergies, current medications, past family history, past medical history, past social history, past surgical history and problem list.    Outpatient Medications Prior to Visit   Medication Sig Dispense Refill   • albuterol (PROVENTIL HFA;VENTOLIN HFA) 108 (90 Base) MCG/ACT inhaler Inhale 2 puffs Every 4 (Four) Hours As Needed for Wheezing or Shortness of Air. 1 inhaler 1   • amLODIPine (NORVASC) 2.5 MG tablet TAKE ONE TABLET DAILY 30 tablet 5   • B Complex-C (SUPER B COMPLEX PO) Take 1 tablet by mouth Daily.     • Calcium Carb-Cholecalciferol (CALCIUM-VITAMIN D) 600-400 MG-UNIT tablet Take 1 tablet by mouth 2 (Two) Times a Day.     • carboxymethylcellulose (REFRESH PLUS) 0.5 % solution 2 drops Daily As Needed for dry eyes.     • Coenzyme Q10 (CO Q 10 PO) Take 1 capsule by mouth.     • denosumab (PROLIA) 60 MG/ML solution syringe Inject 60 mg under the skin into the appropriate area as directed Every 6 (Six) Months.     • guaiFENesin (MUCINEX) 600 MG 12 hr tablet Take 600 mg by mouth Daily.     • hydrochlorothiazide (HYDRODIURIL) 25 MG tablet TAKE 1/2 TABLET DAILY 15 tablet 5   • irbesartan (AVAPRO) 300 MG tablet TAKE ONE TABLET DAILY 30 tablet 5   • latanoprost (XALATAN) 0.005 % ophthalmic solution Administer 1 drop to both eyes Every Night.  5   • metFORMIN ER (GLUCOPHAGE-XR) 500 MG 24 hr tablet TAKE TWO TABLETS EVERY NIGHT 60 tablet 5   • Misc Natural Products (GLUCOSAMINE CHONDROITIN TRIPLE PO) Take 1 tablet by mouth 2 (Two)  Times a Day.     • montelukast (SINGULAIR) 10 MG tablet TAKE ONE TABLET DAILY 30 tablet 5   • Multiple Vitamins-Minerals (VITRUM 50+ SENIOR MULTI PO) Take 1 tablet by mouth Daily.     • omeprazole (priLOSEC) 40 MG capsule Take 1 capsule by mouth Daily. 30 capsule 11   • potassium chloride (K-DUR) 10 MEQ CR tablet TAKE ONE TABLET DAILY 30 tablet 5   • SYMBICORT 160-4.5 MCG/ACT inhaler USE  2 PUFFS TWICE DAILY 10.2 g 5   • acetaminophen (TYLENOL) 500 MG tablet Take 1,000 mg by mouth Every 6 (Six) Hours As Needed for mild pain (1-3).     • ibuprofen (ADVIL,MOTRIN) 400 MG tablet Take 400 mg by mouth Every 6 (Six) Hours As Needed for mild pain (1-3).     • ipratropium-albuterol (DUO-NEB) 0.5-2.5 mg/3 ml nebulizer Take 3 mL by nebulization 4 (Four) Times a Day. 40 vial 1   • aspirin 81 MG EC tablet Take 81 mg by mouth Every Other Day.     • azithromycin (ZITHROMAX Z-SAM) 250 MG tablet Take 2 tablets the first day, then 1 tablet daily for 4 days. 6 tablet 0   • fluconazole (DIFLUCAN) 100 MG tablet Take 1 tablet by mouth Daily. 3 tablet 0   • predniSONE (DELTASONE) 5 MG tablet AM or PM dosing 38 tablet 0   • risedronate (ACTONEL) 5 MG tablet Take 1 tablet by mouth Daily.  0     No facility-administered medications prior to visit.        Patient Active Problem List   Diagnosis   • Obesity, unspecified obesity severity, unspecified obesity type   • Depression   • Elevated fasting glucose-see DM   • Allergic rhinitis   • Osteoporosis: 2018/[facility]   • Degenerative joint disease   • HTN (hypertension), benign   • Sleep apnea   • Menopause   • Diabetes type 2, controlled (CMS/HCC)   • Asthma   • Hoarseness-sees [facility]   • Gastroesophageal reflux disease-ent   • Wellness examination-done   • Purpura (CMS/HCC)   • Glaucoma   • Laboratory test`   • Anticoagulated DM2/ASA 81 (11.9.17 pt chose qod)   • Glaucoma suspect   • Hx of colonic polyps   • Anemia#       Advance Care Planning:  has an advance directive - a copy HAS NOT been  "provided    Identification of Risk Factors:  Risk factors include: vaccines in general.    Review of Systems    Compared to one year ago, the patient feels her physical health is the same.  Compared to one year ago, the patient feels her mental health is the same.  GENERAL:  Active/slower with limits, speed, stamina for age; fatigue with this. Sleep is ok; apnea denied. No fever now.  ENDO:  No syncope, near or diaphoretic sweaty spells.  BS without testing/download.  HEENT: No head injury or headache.   No vision change.   No significant hearing loss.  Ears without pain/drainage.  Mild sore throat.   Usual on/off before this; worse with this-nasal/sinus congestion/drainage. No epistaxis.  CHEST: No chest wall tenderness or mass.  Usual occ cough,  with occ wheeze; all worse witht his.  No SOB; no hemoptysis.  CV: No chest pain, palpitations, usual  On/off ankle edema.  GI: No heartburn, dysphagia.  No abdominal pain, diarrhea, constipation.  No rectal bleeding, or melena.    :  Voids without dysuria, or  incontinence to completion.  ORTHO: No painful/swollen joints but various on /off sore.  No change occ sore neck or back.  No acute neck or back pain without recent injury.  NEURO: No dizziness, weakness of extremities.  No numbness/paresthesias.   PSYCH: No memory loss.  Mood good; occ mild anxious, depressed but/and not suicidal.  Tries to tolerate stress .   Screening:  Mammogram: 12.12.18  Bone density: 12.11.18 [facility] hip -2.4; start prolia  Low dose CT chest: Tobacco-smoker/never: NA  GI: Colon-p+div/[facility]/11.19.18/5y  Prostate: NA  Usual lab order  6m CBC, CMP, A1c  12m CBC, CMP, A1c, LIPID, TSH, Vit D, uric acid      Objective     Physical Exam    Vitals:    02/25/19 1315   BP: 136/74   BP Location: Left arm   Patient Position: Sitting   Cuff Size: Large Adult   Pulse: 84   Resp: 18   Temp: 98.4 °F (36.9 °C)   TempSrc: Oral   SpO2: 98%   Weight: 110 kg (242 lb)   Height: 160 cm (63\")   PainSc: 0-No pain "       Patient's Body mass index is 42.87 kg/m². BMI is above normal parameters. Recommendations include: exercise counseling, nutrition counseling and as before.  GENERAL:  Well nourished/developed in no acute distress. Obese; frequent dry cough.   SKIN: Turgor excellent, without wound, rash, lesion.   HEENT: Normal cephalic without trauma.  Pupils equal round reactive to light. Extraocular motions full without nystagmus.   External canals nonobstructive nontender without reddness. Tymphatic membranes daniel with elvis structures intact.   Oral cavity without growths, exudates, and moist.  Posterior pharynx without mass, obstruction, redness.  No thyromegaly, mass, tenderness, lymphadenopathy and supple.  Mucoid nasal discharge x 2.  CV: Regular rhythm.  No murmur, gallop, trace edema. Posterior pulses intact.  No carotid bruits.  CHEST: No chest wall tenderness or mass.   LUNGS: Symmetric motion with rhonchi occ/wheeze occ to auscultation.  No dullness to percussion  ABD: Soft, nontender without mass.   ORTHO: Symmetric extremities without swelling/point tenderness.  Full gross range of motion.  Walking without assistance.   NEURO: CN 2-12 grossly intact.  Symmetric facies and UE/LE. 3/5 strength throughout. 1/4 x bicep equal reflexes.  Nonfocal use extremities. Speech clear.    PSYCH: Oriented x 3.  Pleasant calm, well kept.  Purposeful/directed conservation with intact short/long gross memory.     Assessment/Plan   Patient Self-Management and Personalized Health Advice  The patient has been provided with information about: diet, exercise, weight management and vaccines for age and preventive services including:   · Nutrition counseling provided, vaccines for age.    Visit Diagnoses:    ICD-10-CM ICD-9-CM   1. Mild intermittent asthma, unspecified whether complicated J45.20 493.90   2. HTN (hypertension), benign I10 401.1   3. Gastroesophageal reflux disease, esophagitis presence not specified K21.9 530.81   4.  Osteoarthritis, unspecified osteoarthritis type, unspecified site M19.90 715.90   5. Anticoagulated DM2/ASA 81 (11.9.17 pt chose qod) Z79.01 V58.61   6. Osteoporosis, unspecified osteoporosis type, unspecified pathological fracture presence M81.0 733.00   7. Wellness examination-done Z00.00 V70.0   8. Anemia, unspecified type D64.9 285.9       Orders Placed This Encounter   Procedures   • Iron Profile   • Ambulatory Referral to Allergy     Referral Priority:   Routine     Referral Type:   Consultation     Referral Reason:   Specialty Services Required     Requested Specialty:   Allergy     Number of Visits Requested:   1       Outpatient Encounter Medications as of 2/25/2019   Medication Sig Dispense Refill   • albuterol (PROVENTIL HFA;VENTOLIN HFA) 108 (90 Base) MCG/ACT inhaler Inhale 2 puffs Every 4 (Four) Hours As Needed for Wheezing or Shortness of Air. 1 inhaler 1   • amLODIPine (NORVASC) 2.5 MG tablet TAKE ONE TABLET DAILY 30 tablet 5   • aspirin 81 MG EC tablet Take 1 tablet by mouth Every Other Day. 32 tablet 0   • B Complex-C (SUPER B COMPLEX PO) Take 1 tablet by mouth Daily.     • Calcium Carb-Cholecalciferol (CALCIUM-VITAMIN D) 600-400 MG-UNIT tablet Take 1 tablet by mouth 2 (Two) Times a Day.     • carboxymethylcellulose (REFRESH PLUS) 0.5 % solution 2 drops Daily As Needed for dry eyes.     • Coenzyme Q10 (CO Q 10 PO) Take 1 capsule by mouth.     • denosumab (PROLIA) 60 MG/ML solution syringe Inject 60 mg under the skin into the appropriate area as directed Every 6 (Six) Months.     • guaiFENesin (MUCINEX) 600 MG 12 hr tablet Take 600 mg by mouth Daily.     • hydrochlorothiazide (HYDRODIURIL) 25 MG tablet TAKE 1/2 TABLET DAILY 15 tablet 5   • irbesartan (AVAPRO) 300 MG tablet TAKE ONE TABLET DAILY 30 tablet 5   • latanoprost (XALATAN) 0.005 % ophthalmic solution Administer 1 drop to both eyes Every Night.  5   • metFORMIN ER (GLUCOPHAGE-XR) 500 MG 24 hr tablet TAKE TWO TABLETS EVERY NIGHT 60 tablet 5    • Misc Natural Products (GLUCOSAMINE CHONDROITIN TRIPLE PO) Take 1 tablet by mouth 2 (Two) Times a Day.     • montelukast (SINGULAIR) 10 MG tablet TAKE ONE TABLET DAILY 30 tablet 5   • Multiple Vitamins-Minerals (VITRUM 50+ SENIOR MULTI PO) Take 1 tablet by mouth Daily.     • omeprazole (priLOSEC) 40 MG capsule Take 1 capsule by mouth Daily. 30 capsule 11   • potassium chloride (K-DUR) 10 MEQ CR tablet TAKE ONE TABLET DAILY 30 tablet 5   • SYMBICORT 160-4.5 MCG/ACT inhaler USE  2 PUFFS TWICE DAILY 10.2 g 5   • acetaminophen (TYLENOL) 500 MG tablet Take 1,000 mg by mouth Every 6 (Six) Hours As Needed for mild pain (1-3).     • ibuprofen (ADVIL,MOTRIN) 400 MG tablet Take 400 mg by mouth Every 6 (Six) Hours As Needed for mild pain (1-3).     • ipratropium-albuterol (DUO-NEB) 0.5-2.5 mg/3 ml nebulizer Take 3 mL by nebulization 4 (Four) Times a Day. 40 vial 1   • [DISCONTINUED] aspirin 81 MG EC tablet Take 81 mg by mouth Every Other Day.     • [DISCONTINUED] azithromycin (ZITHROMAX Z-SAM) 250 MG tablet Take 2 tablets the first day, then 1 tablet daily for 4 days. 6 tablet 0   • [DISCONTINUED] fluconazole (DIFLUCAN) 100 MG tablet Take 1 tablet by mouth Daily. 3 tablet 0   • [DISCONTINUED] predniSONE (DELTASONE) 5 MG tablet AM or PM dosing 38 tablet 0   • [DISCONTINUED] risedronate (ACTONEL) 5 MG tablet Take 1 tablet by mouth Daily.  0     No facility-administered encounter medications on file as of 2/25/2019.        Reviewed use of high risk medication in the elderly: yes  Reviewed for potential of harmful drug interactions in the elderly: yes    Follow Up:  Return for no stick/add iron to last lab.   hemocult x 3  lab/Dr Crowe 6m.     An After Visit Summary and PPPS with all of these plans were given to the patient.

## 2019-02-26 LAB
IRON SATN MFR SERPL: 6 % (ref 20–45)
IRON SERPL-MCNC: 27 MCG/DL (ref 42–180)
TIBC SERPL-MCNC: 443 MCG/DL (ref 225–420)
UIBC SERPL-MCNC: 416 MCG/DL
WRITTEN AUTHORIZATION: NORMAL

## 2019-02-28 ENCOUNTER — CLINICAL SUPPORT (OUTPATIENT)
Dept: FAMILY MEDICINE CLINIC | Facility: CLINIC | Age: 69
End: 2019-02-28

## 2019-02-28 DIAGNOSIS — D64.9 ANEMIA, UNSPECIFIED TYPE: Primary | ICD-10-CM

## 2019-02-28 PROBLEM — E61.1 IRON DEFICIENCY: Status: ACTIVE | Noted: 2019-02-28

## 2019-02-28 LAB
HEMOCCULT STL QL IA: NEGATIVE

## 2019-02-28 PROCEDURE — 82274 ASSAY TEST FOR BLOOD FECAL: CPT | Performed by: FAMILY MEDICINE

## 2019-02-28 PROCEDURE — 99211 OFF/OP EST MAY X REQ PHY/QHP: CPT | Performed by: FAMILY MEDICINE

## 2019-03-04 DIAGNOSIS — E61.1 IRON DEFICIENCY: Primary | ICD-10-CM

## 2019-03-04 RX ORDER — FERROUS SULFATE 325(65) MG
325 TABLET ORAL
Qty: 30 TABLET | Refills: 1 | Status: SHIPPED | OUTPATIENT
Start: 2019-03-04 | End: 2019-04-25 | Stop reason: SDUPTHER

## 2019-03-26 ENCOUNTER — RESULTS ENCOUNTER (OUTPATIENT)
Dept: FAMILY MEDICINE CLINIC | Facility: CLINIC | Age: 69
End: 2019-03-26

## 2019-03-26 DIAGNOSIS — E61.1 IRON DEFICIENCY: ICD-10-CM

## 2019-03-27 LAB
BASOPHILS # BLD AUTO: 0.07 10*3/MM3 (ref 0–0.2)
BASOPHILS NFR BLD AUTO: 1.5 % (ref 0–1.5)
EOSINOPHIL # BLD AUTO: 0.07 10*3/MM3 (ref 0–0.4)
EOSINOPHIL NFR BLD AUTO: 1.5 % (ref 0.3–6.2)
ERYTHROCYTE [DISTWIDTH] IN BLOOD BY AUTOMATED COUNT: 21.8 % (ref 12.3–15.4)
HCT VFR BLD AUTO: 40.6 % (ref 34–46.6)
HGB BLD-MCNC: 11.6 G/DL (ref 12–15.9)
IMM GRANULOCYTES # BLD AUTO: 0.01 10*3/MM3 (ref 0–0.05)
IMM GRANULOCYTES NFR BLD AUTO: 0.2 % (ref 0–0.5)
IRON SERPL-MCNC: 97 MCG/DL (ref 37–145)
LYMPHOCYTES # BLD AUTO: 1.1 10*3/MM3 (ref 0.7–3.1)
LYMPHOCYTES NFR BLD AUTO: 23.7 % (ref 19.6–45.3)
MCH RBC QN AUTO: 24.7 PG (ref 26.6–33)
MCHC RBC AUTO-ENTMCNC: 28.6 G/DL (ref 31.5–35.7)
MCV RBC AUTO: 86.4 FL (ref 79–97)
MONOCYTES # BLD AUTO: 0.53 10*3/MM3 (ref 0.1–0.9)
MONOCYTES NFR BLD AUTO: 11.4 % (ref 5–12)
NEUTROPHILS # BLD AUTO: 2.86 10*3/MM3 (ref 1.4–7)
NEUTROPHILS NFR BLD AUTO: 61.7 % (ref 42.7–76)
NRBC BLD AUTO-RTO: 0 /100 WBC (ref 0–0)
PLATELET # BLD AUTO: 273 10*3/MM3 (ref 140–450)
RBC # BLD AUTO: 4.7 10*6/MM3 (ref 3.77–5.28)
WBC # BLD AUTO: 4.64 10*3/MM3 (ref 3.4–10.8)

## 2019-04-25 DIAGNOSIS — E61.1 IRON DEFICIENCY: ICD-10-CM

## 2019-04-25 DIAGNOSIS — I10 HTN (HYPERTENSION), BENIGN: Primary | Chronic | ICD-10-CM

## 2019-04-25 RX ORDER — AMLODIPINE BESYLATE 2.5 MG/1
TABLET ORAL
Qty: 30 TABLET | Refills: 5 | Status: SHIPPED | OUTPATIENT
Start: 2019-04-25 | End: 2019-10-18 | Stop reason: SDUPTHER

## 2019-04-25 RX ORDER — IRBESARTAN 150 MG/1
300 TABLET ORAL DAILY
Qty: 60 TABLET | Refills: 0 | Status: SHIPPED | OUTPATIENT
Start: 2019-04-25 | End: 2019-05-22 | Stop reason: SDUPTHER

## 2019-04-25 RX ORDER — FERROUS SULFATE 325(65) MG
TABLET ORAL
Qty: 30 TABLET | Refills: 1 | Status: SHIPPED | OUTPATIENT
Start: 2019-04-25 | End: 2020-02-18

## 2019-04-25 RX ORDER — POTASSIUM CHLORIDE 750 MG/1
TABLET, FILM COATED, EXTENDED RELEASE ORAL
Qty: 30 TABLET | Refills: 5 | Status: SHIPPED | OUTPATIENT
Start: 2019-04-25 | End: 2019-10-18 | Stop reason: SDUPTHER

## 2019-05-22 DIAGNOSIS — I10 HTN (HYPERTENSION), BENIGN: Chronic | ICD-10-CM

## 2019-05-23 RX ORDER — IRBESARTAN 150 MG/1
TABLET ORAL
Qty: 60 TABLET | Refills: 6 | Status: SHIPPED | OUTPATIENT
Start: 2019-05-23 | End: 2019-09-18 | Stop reason: SDUPTHER

## 2019-06-05 RX ORDER — BUDESONIDE AND FORMOTEROL FUMARATE DIHYDRATE 160; 4.5 UG/1; UG/1
AEROSOL RESPIRATORY (INHALATION)
Qty: 10.2 G | Refills: 6 | Status: SHIPPED | OUTPATIENT
Start: 2019-06-05 | End: 2019-12-16 | Stop reason: SDUPTHER

## 2019-07-22 DIAGNOSIS — E11.9 CONTROLLED TYPE 2 DIABETES MELLITUS WITHOUT COMPLICATION, WITHOUT LONG-TERM CURRENT USE OF INSULIN (HCC): Chronic | ICD-10-CM

## 2019-07-22 RX ORDER — METFORMIN HYDROCHLORIDE 500 MG/1
TABLET, EXTENDED RELEASE ORAL
Qty: 60 TABLET | Refills: 5 | Status: SHIPPED | OUTPATIENT
Start: 2019-07-22 | End: 2020-02-05

## 2019-08-14 ENCOUNTER — INFUSION (OUTPATIENT)
Dept: ONCOLOGY | Facility: HOSPITAL | Age: 69
End: 2019-08-14

## 2019-08-14 VITALS
HEIGHT: 63 IN | HEART RATE: 70 BPM | OXYGEN SATURATION: 99 % | WEIGHT: 245 LBS | RESPIRATION RATE: 20 BRPM | BODY MASS INDEX: 43.41 KG/M2 | SYSTOLIC BLOOD PRESSURE: 126 MMHG | TEMPERATURE: 98.3 F | DIASTOLIC BLOOD PRESSURE: 50 MMHG

## 2019-08-14 DIAGNOSIS — M81.0 OSTEOPOROSIS, UNSPECIFIED OSTEOPOROSIS TYPE, UNSPECIFIED PATHOLOGICAL FRACTURE PRESENCE: Primary | ICD-10-CM

## 2019-08-14 PROCEDURE — 96372 THER/PROPH/DIAG INJ SC/IM: CPT

## 2019-08-14 PROCEDURE — 25010000002 DENOSUMAB 60 MG/ML SOLUTION PREFILLED SYRINGE: Performed by: NURSE PRACTITIONER

## 2019-08-14 RX ADMIN — DENOSUMAB 60 MG: 60 INJECTION SUBCUTANEOUS at 12:53

## 2019-08-19 RX ORDER — HYDROCHLOROTHIAZIDE 12.5 MG/1
12.5 CAPSULE, GELATIN COATED ORAL DAILY
Qty: 30 CAPSULE | Refills: 3 | Status: SHIPPED | OUTPATIENT
Start: 2019-08-19 | End: 2019-12-10 | Stop reason: SDUPTHER

## 2019-08-19 RX ORDER — HYDROCHLOROTHIAZIDE 25 MG/1
TABLET ORAL
Qty: 15 TABLET | Refills: 5 | Status: SHIPPED | OUTPATIENT
Start: 2019-08-19 | End: 2019-08-19 | Stop reason: DRUGHIGH

## 2019-08-19 RX ORDER — MONTELUKAST SODIUM 10 MG/1
TABLET ORAL
Qty: 30 TABLET | Refills: 5 | Status: SHIPPED | OUTPATIENT
Start: 2019-08-19 | End: 2020-02-10

## 2019-08-28 DIAGNOSIS — E53.8 VITAMIN B12 DEFICIENCY: ICD-10-CM

## 2019-08-28 DIAGNOSIS — E11.9 CONTROLLED TYPE 2 DIABETES MELLITUS WITHOUT COMPLICATION, WITHOUT LONG-TERM CURRENT USE OF INSULIN (HCC): Primary | ICD-10-CM

## 2019-08-28 DIAGNOSIS — R73.01 ELEVATED FASTING GLUCOSE: Chronic | ICD-10-CM

## 2019-08-28 DIAGNOSIS — E61.1 IRON DEFICIENCY: ICD-10-CM

## 2019-08-28 DIAGNOSIS — I10 ESSENTIAL HYPERTENSION: ICD-10-CM

## 2019-08-28 DIAGNOSIS — E55.9 VITAMIN D DEFICIENCY: ICD-10-CM

## 2019-08-30 ENCOUNTER — RESULTS ENCOUNTER (OUTPATIENT)
Dept: FAMILY MEDICINE CLINIC | Facility: CLINIC | Age: 69
End: 2019-08-30

## 2019-08-30 DIAGNOSIS — E55.9 VITAMIN D DEFICIENCY: ICD-10-CM

## 2019-08-30 DIAGNOSIS — E11.9 CONTROLLED TYPE 2 DIABETES MELLITUS WITHOUT COMPLICATION, WITHOUT LONG-TERM CURRENT USE OF INSULIN (HCC): ICD-10-CM

## 2019-08-30 DIAGNOSIS — E61.1 IRON DEFICIENCY: ICD-10-CM

## 2019-08-30 DIAGNOSIS — R73.01 ELEVATED FASTING GLUCOSE: Chronic | ICD-10-CM

## 2019-08-30 DIAGNOSIS — E53.8 VITAMIN B12 DEFICIENCY: ICD-10-CM

## 2019-08-30 DIAGNOSIS — I10 ESSENTIAL HYPERTENSION: ICD-10-CM

## 2019-08-31 LAB
25(OH)D3+25(OH)D2 SERPL-MCNC: 58.1 NG/ML (ref 30–100)
ALBUMIN SERPL-MCNC: 4.6 G/DL (ref 3.5–5.2)
ALBUMIN/GLOB SERPL: 1.9 G/DL
ALP SERPL-CCNC: 74 U/L (ref 39–117)
ALT SERPL-CCNC: 76 U/L (ref 1–33)
AST SERPL-CCNC: 58 U/L (ref 1–32)
BASOPHILS # BLD AUTO: 0.06 10*3/MM3 (ref 0–0.2)
BASOPHILS NFR BLD AUTO: 1 % (ref 0–1.5)
BILIRUB SERPL-MCNC: 0.5 MG/DL (ref 0.2–1.2)
BUN SERPL-MCNC: 19 MG/DL (ref 8–23)
BUN/CREAT SERPL: 24.4 (ref 7–25)
CALCIUM SERPL-MCNC: 9.6 MG/DL (ref 8.6–10.5)
CHLORIDE SERPL-SCNC: 97 MMOL/L (ref 98–107)
CHOLEST SERPL-MCNC: 154 MG/DL (ref 0–200)
CO2 SERPL-SCNC: 25.6 MMOL/L (ref 22–29)
CREAT SERPL-MCNC: 0.78 MG/DL (ref 0.57–1)
EOSINOPHIL # BLD AUTO: 0.07 10*3/MM3 (ref 0–0.4)
EOSINOPHIL NFR BLD AUTO: 1.2 % (ref 0.3–6.2)
ERYTHROCYTE [DISTWIDTH] IN BLOOD BY AUTOMATED COUNT: 13.1 % (ref 12.3–15.4)
FOLATE SERPL-MCNC: >20 NG/ML (ref 4.78–24.2)
GLOBULIN SER CALC-MCNC: 2.4 GM/DL
GLUCOSE SERPL-MCNC: 172 MG/DL (ref 65–99)
HBA1C MFR BLD: 6.9 % (ref 4.8–5.6)
HCT VFR BLD AUTO: 48.3 % (ref 34–46.6)
HDLC SERPL-MCNC: 44 MG/DL (ref 40–60)
HGB BLD-MCNC: 15.4 G/DL (ref 12–15.9)
IMM GRANULOCYTES # BLD AUTO: 0.02 10*3/MM3 (ref 0–0.05)
IMM GRANULOCYTES NFR BLD AUTO: 0.3 % (ref 0–0.5)
IRON SERPL-MCNC: 79 MCG/DL (ref 37–145)
LDLC SERPL CALC-MCNC: 67 MG/DL (ref 0–100)
LYMPHOCYTES # BLD AUTO: 1.23 10*3/MM3 (ref 0.7–3.1)
LYMPHOCYTES NFR BLD AUTO: 20.4 % (ref 19.6–45.3)
MCH RBC QN AUTO: 30.3 PG (ref 26.6–33)
MCHC RBC AUTO-ENTMCNC: 31.9 G/DL (ref 31.5–35.7)
MCV RBC AUTO: 94.9 FL (ref 79–97)
MONOCYTES # BLD AUTO: 0.65 10*3/MM3 (ref 0.1–0.9)
MONOCYTES NFR BLD AUTO: 10.8 % (ref 5–12)
NEUTROPHILS # BLD AUTO: 3.99 10*3/MM3 (ref 1.7–7)
NEUTROPHILS NFR BLD AUTO: 66.3 % (ref 42.7–76)
NRBC BLD AUTO-RTO: 0 /100 WBC (ref 0–0.2)
PLATELET # BLD AUTO: 241 10*3/MM3 (ref 140–450)
POTASSIUM SERPL-SCNC: 4.3 MMOL/L (ref 3.5–5.2)
PROT SERPL-MCNC: 7 G/DL (ref 6–8.5)
RBC # BLD AUTO: 5.09 10*6/MM3 (ref 3.77–5.28)
SODIUM SERPL-SCNC: 137 MMOL/L (ref 136–145)
TRIGL SERPL-MCNC: 215 MG/DL (ref 0–150)
TSH SERPL DL<=0.005 MIU/L-ACNC: 1.88 UIU/ML (ref 0.27–4.2)
URATE SERPL-MCNC: 5.3 MG/DL (ref 2.4–5.7)
VIT B12 SERPL-MCNC: 401 PG/ML (ref 211–946)
VLDLC SERPL CALC-MCNC: 43 MG/DL
WBC # BLD AUTO: 6.02 10*3/MM3 (ref 3.4–10.8)

## 2019-09-03 ENCOUNTER — OFFICE VISIT (OUTPATIENT)
Dept: FAMILY MEDICINE CLINIC | Facility: CLINIC | Age: 69
End: 2019-09-03

## 2019-09-03 VITALS
WEIGHT: 249 LBS | HEART RATE: 101 BPM | OXYGEN SATURATION: 98 % | TEMPERATURE: 97.6 F | BODY MASS INDEX: 44.12 KG/M2 | RESPIRATION RATE: 18 BRPM | DIASTOLIC BLOOD PRESSURE: 86 MMHG | HEIGHT: 63 IN | SYSTOLIC BLOOD PRESSURE: 138 MMHG

## 2019-09-03 DIAGNOSIS — M19.90 OSTEOARTHRITIS, UNSPECIFIED OSTEOARTHRITIS TYPE, UNSPECIFIED SITE: Chronic | ICD-10-CM

## 2019-09-03 DIAGNOSIS — J45.20 MILD INTERMITTENT ASTHMA, UNSPECIFIED WHETHER COMPLICATED: Chronic | ICD-10-CM

## 2019-09-03 DIAGNOSIS — Z12.31 ENCOUNTER FOR SCREENING MAMMOGRAM FOR BREAST CANCER: ICD-10-CM

## 2019-09-03 DIAGNOSIS — E11.9 CONTROLLED TYPE 2 DIABETES MELLITUS WITHOUT COMPLICATION, WITHOUT LONG-TERM CURRENT USE OF INSULIN (HCC): Chronic | ICD-10-CM

## 2019-09-03 DIAGNOSIS — K21.9 GASTROESOPHAGEAL REFLUX DISEASE, ESOPHAGITIS PRESENCE NOT SPECIFIED: ICD-10-CM

## 2019-09-03 DIAGNOSIS — E61.1 IRON DEFICIENCY: ICD-10-CM

## 2019-09-03 DIAGNOSIS — J30.9 ALLERGIC RHINITIS, UNSPECIFIED SEASONALITY, UNSPECIFIED TRIGGER: Chronic | ICD-10-CM

## 2019-09-03 DIAGNOSIS — Z79.01 ANTICOAGULATED: ICD-10-CM

## 2019-09-03 DIAGNOSIS — D64.9 ANEMIA, UNSPECIFIED TYPE: ICD-10-CM

## 2019-09-03 DIAGNOSIS — F32.A DEPRESSION, UNSPECIFIED DEPRESSION TYPE: Chronic | ICD-10-CM

## 2019-09-03 DIAGNOSIS — I10 HTN (HYPERTENSION), BENIGN: Chronic | ICD-10-CM

## 2019-09-03 PROCEDURE — 99214 OFFICE O/P EST MOD 30 MIN: CPT | Performed by: FAMILY MEDICINE

## 2019-09-03 NOTE — PATIENT INSTRUCTIONS
Your A1c pattern/today is     Lab Results - Last 18 Months   Lab Units 08/30/19  0747 02/21/19  0743 08/14/18  0726   HEMOGLOBIN A1C % 6.90* 7.20 6.70       A1c values:   <5.5 what we see on a normal/non-diabetic person  6.5 what it takes to be diagnosed with diabetes AND what most endocrinologist recommend if you are a diabetic  7.5 what the American diabetic association says you should be.      ########################    Try to get your pharmacist to price for you medications to treat diabetes:     Januvia, Onglyza, Tradjenta, Nesina (DPP-4 inhibitors) or   Invokana, Farxiga, Jardiance (SGLT2 inhibitors) or   Byetta, Bydureon, Victoza, Ozempic, Trulicity (GLP-1 agonist)    AND let us know if it is possible to get one of these for reasonable price    ########################

## 2019-09-03 NOTE — PROGRESS NOTES
Subjective   Kezia Otoole is a 68 y.o. female presenting with chief complaint of:   Chief Complaint   Patient presents with   • Asthma       History of Present Illness :  Alone.       Has multiple chronic problems to consider that might have a bearing on today's issues;  an interval appointment.       Chronic/acute problems reviewed today:   1. HTN (hypertension), benign: Chronic/stable. Stable here/if home blood pressures.  No significant chest pain, SOB, LE edema, orthopnea, near syncope, dizziness/light headness.      2. Mild intermittent asthma, unspecified whether complicated: chronic/variable occ cough/wheeze and need for rescue medications. On/off cough/wheeze and use of rescue meds > 2/week.    Maintance Rx compliance and helps.     3. Allergic rhinitis, unspecified seasonality, unspecified trigger: Chronic/worse this time of year.   On/off eye, sinus, nasal congestion and drainage.  Rx helps; shots too.  Aware of options additional medications, testing and not interested.     4. Iron deficiency: chronic/variable associated with anemia.  No melena hematochezia and up-to-date on all GI testing.   5. Gastroesophageal reflux disease, esophagitis presence not specified: Chronic/stable.  Controlled heartburn, reflux; no dysphagia, melena.  Rx helps.     6. Controlled type 2 diabetes mellitus without complication, without long-term current use of insulin (CMS/[facility]): Chronic/stable.   No problem/pattern hypoglycemia/hyperglycemia manifest by poly- dypsia, phagia, uria, or sweats, diaphoretic episodes, syncope/near.     7. Osteoarthritis, unspecified osteoarthritis type, unspecified site: Chronic/stable.  Various on/off joint pains/soreness/stiffness.  Particular joint problems with knees; considering surgery.  No joint swelling.  Treats mainly with reduced activity, rare NSAID,Rx listed,Tylenol and injections,.     8. Anemia, unspecified type:  Chronic/variable: This has been present for years/over a year.  It is  chronic.  There has been GI evaulation in the past. There is no current melena, hematochezia. It has been benign to date and stable/treated in the past with iron/watching.     9. Anticoagulated DM2/ASA 81 (11.9.17 pt chose qod): Chronic/stable reason and use of.  Denies bleeding issues; especially epistaxis, melena, hematochezia.  Upper arms/other areas bruise easily.      10. Depression, unspecified depression type:  No significant  mood swings, down moods, nervousness, difficulty with concentration to function home/work.  Others close have not been concerned.  No suicide ideation/intent.  Rx from past helped.      11. Encounter for screening mammogram for breast cancer      Has an/another acute issue today: none.    The following portions of the patient's history were reviewed and updated as appropriate: allergies, current medications, past family history, past medical history, past social history, past surgical history and problem list.      Current Outpatient Medications:   •  acetaminophen (TYLENOL) 500 MG tablet, Take 1,000 mg by mouth Every 6 (Six) Hours As Needed for mild pain (1-3)., Disp: , Rfl:   •  albuterol (PROVENTIL HFA;VENTOLIN HFA) 108 (90 Base) MCG/ACT inhaler, Inhale 2 puffs Every 4 (Four) Hours As Needed for Wheezing or Shortness of Air., Disp: 1 inhaler, Rfl: 1  •  amLODIPine (NORVASC) 2.5 MG tablet, TAKE ONE TABLET DAILY, Disp: 30 tablet, Rfl: 5  •  aspirin 81 MG EC tablet, Take 1 tablet by mouth Every Other Day., Disp: 32 tablet, Rfl: 0  •  B Complex-C (SUPER B COMPLEX PO), Take 1 tablet by mouth Daily., Disp: , Rfl:   •  Calcium Carb-Cholecalciferol (CALCIUM-VITAMIN D) 600-400 MG-UNIT tablet, Take 1 tablet by mouth 2 (Two) Times a Day., Disp: , Rfl:   •  carboxymethylcellulose (REFRESH PLUS) 0.5 % solution, 2 drops Daily As Needed for dry eyes., Disp: , Rfl:   •  Coenzyme Q10 (CO Q 10 PO), Take 1 capsule by mouth., Disp: , Rfl:   •  denosumab (PROLIA) 60 MG/ML solution syringe, Inject 60 mg  under the skin into the appropriate area as directed Every 6 (Six) Months., Disp: , Rfl:   •  FEROSUL 325 (65 Fe) MG tablet, TAKE ONE TABLET DAILY WITH BREAKFAST, Disp: 30 tablet, Rfl: 1  •  guaiFENesin (MUCINEX) 600 MG 12 hr tablet, Take 600 mg by mouth Daily., Disp: , Rfl:   •  hydrochlorothiazide (MICROZIDE) 12.5 MG capsule, Take 1 capsule by mouth Daily., Disp: 30 capsule, Rfl: 3  •  ibuprofen (ADVIL,MOTRIN) 400 MG tablet, Take 400 mg by mouth Every 6 (Six) Hours As Needed for mild pain (1-3)., Disp: , Rfl:   •  ipratropium-albuterol (DUO-NEB) 0.5-2.5 mg/3 ml nebulizer, Take 3 mL by nebulization 4 (Four) Times a Day., Disp: 40 vial, Rfl: 1  •  irbesartan (AVAPRO) 150 MG tablet, TAKE 2 TABLETS DAILY, Disp: 60 tablet, Rfl: 6  •  latanoprost (XALATAN) 0.005 % ophthalmic solution, Administer 1 drop to both eyes Every Night., Disp: , Rfl: 5  •  metFORMIN ER (GLUCOPHAGE-XR) 500 MG 24 hr tablet, TAKE TWO TABLETS EVERY NIGHT, Disp: 60 tablet, Rfl: 5  •  Misc Natural Products (GLUCOSAMINE CHONDROITIN TRIPLE PO), Take 1 tablet by mouth 2 (Two) Times a Day., Disp: , Rfl:   •  montelukast (SINGULAIR) 10 MG tablet, TAKE ONE TABLET DAILY, Disp: 30 tablet, Rfl: 5  •  Multiple Vitamins-Minerals (VITRUM 50+ SENIOR MULTI PO), Take 1 tablet by mouth Daily., Disp: , Rfl:   •  omeprazole (priLOSEC) 40 MG capsule, Take 1 capsule by mouth Daily., Disp: 30 capsule, Rfl: 11  •  potassium chloride (K-DUR) 10 MEQ CR tablet, TAKE ONE TABLET DAILY, Disp: 30 tablet, Rfl: 5  •  SYMBICORT 160-4.5 MCG/ACT inhaler, USE  2 PUFFS TWICE DAILY, Disp: 10.2 g, Rfl: 6    No problems with medications.  Refills if needed done    Allergies   Allergen Reactions   • Other Other (See Comments)     Bandaids make arm Red/slightly swollen       Review of Systems  GENERAL:  Active/slower with limits, speed, stamina for age; fatigue with this. Sleep is ok; apnea denied. No fever now.  ENDO:  No syncope, near or diaphoretic sweaty spells.  BS without  testing/download.  HEENT: No head injury or headache.   No vision change.   No significant hearing loss.  Ears without pain/drainage.  Mild sore throat.   Usual on/off before this; worse with this-nasal/sinus congestion/drainage. No epistaxis.  CHEST: No chest wall tenderness or mass.  Usual occ cough,  with occ wheeze; all worse witht his.  No SOB; no hemoptysis.  CV: No chest pain, palpitations, usual  On/off ankle edema.  GI: No heartburn, dysphagia.  No abdominal pain, diarrhea, constipation.  No rectal bleeding, or melena.    :  Voids without dysuria, or  incontinence to completion.  ORTHO: No painful/swollen joints but various on /off sore.  No change occ sore neck or back.  No acute neck or back pain without recent injury.  NEURO: No dizziness, weakness of extremities.  No numbness/paresthesias.   PSYCH: No memory loss.  Mood good; occ mild anxious, depressed but/and not suicidal.  Tries to tolerate stress .   Screening:  Mammogram: 12.12.18  Bone density: 12.11.18 [facility] hip -2.4; start prolia  Low dose CT chest: Tobacco-smoker/never: NA  GI: Colon-p+div/[facility]/11.19.18/5y  Prostate: NA  Usual lab order  6m CBC, CMP, A1c  12m CBC, CMP, A1c, LIPID, TSH, Vit D, uric acid    Lab Results:  Results for orders placed or performed in visit on 08/30/19   Comprehensive metabolic panel   Result Value Ref Range    Glucose 172 (H) 65 - 99 mg/dL    BUN 19 8 - 23 mg/dL    Creatinine 0.78 0.57 - 1.00 mg/dL    eGFR Non African Am 73 >60 mL/min/1.73    eGFR African Am 88 >60 mL/min/1.73    BUN/Creatinine Ratio 24.4 7.0 - 25.0    Sodium 137 136 - 145 mmol/L    Potassium 4.3 3.5 - 5.2 mmol/L    Chloride 97 (L) 98 - 107 mmol/L    Total CO2 25.6 22.0 - 29.0 mmol/L    Calcium 9.6 8.6 - 10.5 mg/dL    Total Protein 7.0 6.0 - 8.5 g/dL    Albumin 4.60 3.50 - 5.20 g/dL    Globulin 2.4 gm/dL    A/G Ratio 1.9 g/dL    Total Bilirubin 0.5 0.2 - 1.2 mg/dL    Alkaline Phosphatase 74 39 - 117 U/L    AST (SGOT) 58 (H) 1 - 32 U/L    ALT (SGPT)  76 (H) 1 - 33 U/L   Hemoglobin A1c   Result Value Ref Range    Hemoglobin A1C 6.90 (H) 4.80 - 5.60 %   Lipid panel   Result Value Ref Range    Total Cholesterol 154 0 - 200 mg/dL    Triglycerides 215 (H) 0 - 150 mg/dL    HDL Cholesterol 44 40 - 60 mg/dL    VLDL Cholesterol 43 mg/dL    LDL Cholesterol  67 0 - 100 mg/dL   TSH   Result Value Ref Range    TSH 1.880 0.270 - 4.200 uIU/mL   Vitamin D 25 Hydroxy   Result Value Ref Range    25 Hydroxy, Vitamin D 58.1 30.0 - 100.0 ng/ml   Uric acid   Result Value Ref Range    Uric Acid 5.3 2.4 - 5.7 mg/dL   Iron   Result Value Ref Range    Iron 79 37 - 145 mcg/dL   Vitamin B12   Result Value Ref Range    Vitamin B-12 401 211 - 946 pg/mL   Folate   Result Value Ref Range    Folate >20.00 4.78 - 24.20 ng/mL   CBC & Differential   Result Value Ref Range    WBC 6.02 3.40 - 10.80 10*3/mm3    RBC 5.09 3.77 - 5.28 10*6/mm3    Hemoglobin 15.4 12.0 - 15.9 g/dL    Hematocrit 48.3 (H) 34.0 - 46.6 %    MCV 94.9 79.0 - 97.0 fL    MCH 30.3 26.6 - 33.0 pg    MCHC 31.9 31.5 - 35.7 g/dL    RDW 13.1 12.3 - 15.4 %    Platelets 241 140 - 450 10*3/mm3    Neutrophil Rel % 66.3 42.7 - 76.0 %    Lymphocyte Rel % 20.4 19.6 - 45.3 %    Monocyte Rel % 10.8 5.0 - 12.0 %    Eosinophil Rel % 1.2 0.3 - 6.2 %    Basophil Rel % 1.0 0.0 - 1.5 %    Neutrophils Absolute 3.99 1.70 - 7.00 10*3/mm3    Lymphocytes Absolute 1.23 0.70 - 3.10 10*3/mm3    Monocytes Absolute 0.65 0.10 - 0.90 10*3/mm3    Eosinophils Absolute 0.07 0.00 - 0.40 10*3/mm3    Basophils Absolute 0.06 0.00 - 0.20 10*3/mm3    Immature Granulocyte Rel % 0.3 0.0 - 0.5 %    Immature Grans Absolute 0.02 0.00 - 0.05 10*3/mm3    nRBC 0.0 0.0 - 0.2 /100 WBC       A1C:  Lab Results - Last 18 Months   Lab Units 08/30/19  0747 02/21/19  0743 08/14/18  0726   HEMOGLOBIN A1C % 6.90* 7.20 6.70     PSA:No results for input(s): PSA in the last 00857 hours.  CBC:  Lab Results - Last 18 Months   Lab Units 08/30/19  0747 03/26/19  0723 02/21/19  0743  "08/14/18  0726   WBC 10*3/mm3 6.02 4.64 6.03 5.64   HEMOGLOBIN g/dL 15.4 11.6* 10.3* 12.6   HEMATOCRIT % 48.3* 40.6 35.2* 39.6   PLATELETS 10*3/mm3 241 273 321 251   IRON mcg/dL 79 97 27*  --       BMP/CMP:  Lab Results - Last 18 Months   Lab Units 08/30/19  0747 02/21/19  0743 02/01/19  0810 08/14/18  0726   SODIUM mmol/L 137 136  --  139   POTASSIUM mmol/L 4.3 4.5  --  4.5   CHLORIDE mmol/L 97* 100  --  101   TOTAL CO2 mmol/L 25.6 27.0  --  29.0   GLUCOSE mg/dL 172* 148*  --  138*   BUN mg/dL 19 19  --  21   CREATININE mg/dL 0.78 0.68  --  0.82   EGFR IF NONAFRICN AM mL/min/1.73 73 86  --  69   EGFR IF AFRICN AM mL/min/1.73 88 104  --  84   CALCIUM mg/dL 9.6 9.3 9.1 9.4     HEPATIC:  Lab Results - Last 18 Months   Lab Units 08/30/19  0747 02/21/19  0743 08/14/18  0726   ALT (SGPT) U/L 76* 50 46   AST (SGOT) U/L 58* 37 36   ALK PHOS U/L 74 120 73     THYROID:  Lab Results - Last 18 Months   Lab Units 08/30/19  0747 02/21/19  0743   TSH uIU/mL 1.880 1.570       Objective   /86   Pulse 101   Temp 97.6 °F (36.4 °C)   Resp 18   Ht 160 cm (63\")   Wt 113 kg (249 lb)   SpO2 98%   Breastfeeding? No   BMI 44.11 kg/m²   Body mass index is 44.11 kg/m².    Physical Exam  GENERAL:  Well nourished/developed in no acute distress. Obese; occ dry cough.   SKIN: Turgor excellent, without wound, rash, lesion.   HEENT: Normal cephalic without trauma.  Pupils equal round reactive to light. Extraocular motions full without nystagmus.   External canals nonobstructive nontender without reddness. Tymphatic membranes daniel with elvis structures intact.   Oral cavity without growths, exudates, and moist.  Posterior pharynx without mass, obstruction, redness.  No thyromegaly, mass, tenderness, lymphadenopathy and supple.  Mucoid nasal discharge x 2.  CV: Regular rhythm.  No murmur, gallop, trace edema. Posterior pulses intact.  No carotid bruits.  CHEST: No chest wall tenderness or mass.   LUNGS: Symmetric motion with rhonchi " occ/wheeze occ to auscultation.  No dullness to percussion  ABD: Soft, nontender without mass.   ORTHO: Symmetric extremities without swelling/point tenderness.  Full gross range of motion.  Walking without assistance.   NEURO: CN 2-12 grossly intact.  Symmetric facies and UE/LE. 3/5 strength throughout. 1/4 x bicep equal reflexes.  Nonfocal use extremities. Speech clear.    PSYCH: Oriented x 3.  Pleasant calm, well kept.  Purposeful/directed conservation with intact short/long gross memory.    Assessment/Plan     1. HTN (hypertension), benign    2. Mild intermittent asthma, unspecified whether complicated    3. Allergic rhinitis, unspecified seasonality, unspecified trigger    4. Iron deficiency    5. Gastroesophageal reflux disease, esophagitis presence not specified    6. Controlled type 2 diabetes mellitus without complication, without long-term current use of insulin (CMS/[facility])    7. Osteoarthritis, unspecified osteoarthritis type, unspecified site    8. Anemia, unspecified type    9. Anticoagulated DM2/ASA 81 (11.9.17 pt chose qod)    10. Depression, unspecified depression type    11. Encounter for screening mammogram for breast cancer      Overall stable issues that are chronic    Rx: reviewed/changes:  No orders of the defined types were placed in this encounter.    LAB/Testing/Referrals: reviewed/orders:   Today:   Orders Placed This Encounter   Procedures   • Mammo Screening Digital Tomosynthesis Bilateral With CAD     Chronic/recurrent labs above or change to:   same    Discussions:   Weight still important.   Body mass index is 44.11 kg/m².  Patient's Body mass index is 44.11 kg/m². BMI is above normal parameters. Recommendations include: exercise counseling, nutrition counseling and as before.    Tobacco use reviewed:    Non-smoker    Patient Instructions     Your A1c pattern/today is     Lab Results - Last 18 Months   Lab Units 08/30/19  0747 02/21/19  0743 08/14/18  0726   HEMOGLOBIN A1C % 6.90* 7.20  6.70       A1c values:   <5.5 what we see on a normal/non-diabetic person  6.5 what it takes to be diagnosed with diabetes AND what most endocrinologist recommend if you are a diabetic  7.5 what the American diabetic association says you should be.      ########################    Try to get your pharmacist to price for you medications to treat diabetes:     Januvia, Onglyza, Tradjenta, Nesina (DPP-4 inhibitors) or   Invokana, Farxiga, Jardiance (SGLT2 inhibitors) or   Byetta, Bydureon, Victoza, Ozempic, Trulicity (GLP-1 agonist)    AND let us know if it is possible to get one of these for reasonable price    ########################            Follow up: Return for lab;, Dr Crowe-, 6 m;.  Future Appointments   Date Time Provider Department Center   9/25/2019  9:45 AM Huang Emery MD [facility] None   3/5/2020  8:10 AM [facility] None   3/6/2020  9:45 AM Len Crowe MD [facility] None

## 2019-09-18 RX ORDER — IRBESARTAN 300 MG/1
TABLET ORAL
Qty: 30 TABLET | Refills: 5 | Status: SHIPPED | OUTPATIENT
Start: 2019-09-18 | End: 2020-03-09

## 2019-09-30 ENCOUNTER — OFFICE VISIT (OUTPATIENT)
Dept: OTOLARYNGOLOGY | Facility: CLINIC | Age: 69
End: 2019-09-30

## 2019-09-30 VITALS
WEIGHT: 248.2 LBS | SYSTOLIC BLOOD PRESSURE: 169 MMHG | DIASTOLIC BLOOD PRESSURE: 85 MMHG | HEIGHT: 63 IN | RESPIRATION RATE: 16 BRPM | TEMPERATURE: 98.6 F | BODY MASS INDEX: 43.98 KG/M2 | HEART RATE: 82 BPM

## 2019-09-30 DIAGNOSIS — J37.0 LARYNGITIS, CHRONIC: Primary | ICD-10-CM

## 2019-09-30 DIAGNOSIS — J37.0 LARYNGITIS SICCA: ICD-10-CM

## 2019-09-30 DIAGNOSIS — K21.9 LARYNGOPHARYNGEAL REFLUX (LPR): ICD-10-CM

## 2019-09-30 PROCEDURE — 31505 DIAGNOSTIC LARYNGOSCOPY: CPT | Performed by: OTOLARYNGOLOGY

## 2019-09-30 PROCEDURE — 99213 OFFICE O/P EST LOW 20 MIN: CPT | Performed by: OTOLARYNGOLOGY

## 2019-09-30 NOTE — PROGRESS NOTES
" Tayler Boyle RN   Patient Intake Note    Review of Systems  Review of Systems   Constitutional: Negative.    HENT:        See HPI   Eyes: Negative.    Respiratory: Positive for cough.    Cardiovascular: Negative.    Gastrointestinal: Negative.    Endocrine: Negative.    Genitourinary: Negative.    Musculoskeletal: Negative.    Skin: Negative.    Allergic/Immunologic: Positive for environmental allergies.   Neurological: Negative.    Hematological: Negative.    Psychiatric/Behavioral: Negative.        QUALITY MEASURES    Tobacco Use: Screening and Cessation Intervention  Social History    Tobacco Use      Smoking status: Never Smoker      Smokeless tobacco: Never Used      Tobacco comment: \"lived with smokers most of my life\"        Tayler Boyle RN  9/30/2019  1:54 PM    "

## 2019-09-30 NOTE — PROGRESS NOTES
Huang Emery MD     Chief Complaint   Patient presents with   • Heartburn   • Hoarse        History of Present Illness  Kezia Otoole is a  68 y.o. female who is here for follow up.  She has been previously seen for chronic laryngitis complaints.  Overall, she is improved but still has issues with intermittent hoarseness.  She has a history of allergy.  She has had been treated for gastroesophageal reflux without overt symptoms of heartburn.  She also is on diuretic pills and drying medication.        Review of Systems  Reviewed per patient intake note    Past History:  Past medical and surgical history, family history and social history reviewed and updated when appropriate.  Current medications and allergies reviewed and updated when appropriate.  Allergies:  Other        Vital Signs:   Temp:  [98.6 °F (37 °C)] 98.6 °F (37 °C)  Heart Rate:  [82] 82  Resp:  [16] 16  BP: (169)/(85) 169/85    Physical Exam:  CONSTITUTIONAL: well nourished, well-developed, alert, oriented, in no acute distress   COMMUNICATION AND VOICE: able to communicate normally, hoarse vocal quality  HEAD: normocephalic, no lesions, atraumatic, no tenderness, no masses   FACE: appearance normal, no lesions, no tenderness, no deformities, facial motion symmetric  SALIVARY GLANDS: parotid glands with no tenderness, no swelling, no masses, submandibular glands with normal size, nontender  EYES: ocular motility normal, eyelids normal, orbits normal, no proptosis, conjunctiva normal , pupils equal, round  HEARING: response to conversational voice normal bilaterally   EXTERNAL EARS: auricles without lesions  EXTERNAL EAR CANALS: normal ear canals without stenosis or significant cerumen  TYMPANIC MEMBRANES: tympanic membrane appearance normal, no lesions, no perforation, normal mobility, no fluid  EXTERNAL NOSE: structure normal, no tenderness on palpation, no nasal discharge, no lesions, no evidence of trauma, nostrils patent  INTRANASAL  EXAM: nasal mucosa with mucosal congestion and erythema, nasal septum without overtly obstructing anterior deviation  LIPS: structure normal, no tenderness on palpation, no lesions, no evidence of trauma  TEETH: dentition within normal limits for age  GUMS: gingivae healthy  ORAL MUCOSA: oral mucosa normal, no mucosal lesions  FLOOR OF MOUTH: Warthin's duct patent, mucosa normal  TONGUE: lingual mucosa normal without lesions, normal tongue mobility  PALATE: soft and hard palates with normal mucosa and structure  OROPHARYNX: oropharyngeal mucosa normal, tonsil fossa with normal in appearance  LARYNX: See rigid laryngoscopy note  NECK: neck appearance normal, no masses or tenderness  THYROID: no overt thyromegaly, no tenderness, nodules or mass present on palpation, position midline   LYMPH NODES: no lymphadenopathy  CHEST/RESPIRATORY: respiratory effort normal  CARDIOVASCULAR: extremities without cyanosis or edema, no overt jugulovenous distension present  NEUROLOGIC/PSYCHIATRIC: oriented appropriately for age, mood normal, affect appropriate, cranial nerves intact grossly unless specifically mentioned above     Procedure Note    Pre-operative Diagnosis: Chronic laryngitis    Post-operative Diagnosis: same    Anesthesia: none    Endoscopy Type: 90 Degree Rigid Laryngoscopy    Indications for Procedure: Hoarseness, dysphagia or aspiration - not able to be clearly evaluated by indirect laryngoscopy    Procedure Details:    The patient was placed in the sitting position. The tongue was held.  The rigid scope was passed to the oropharynx  Vocal cords were examined during respiration and phonation.  The following findings were noted:    Findings: normal epiglottis appearance present, normal arytenoid appearance present, normal true vocal cord appearance present, normal true vocal cord mobility present, diffuse mucosal inflammation present with posterior arytenoid edema and erythema present, there is no mass or  lesion    Condition:  Stable.  Patient tolerated procedure well.    Complications:  None        n     Assessment   1. Laryngitis, chronic    2. Laryngitis sicca    3. Laryngopharyngeal reflux (LPR)        Plan    Continue current management plan.  Increase water/ non caffeinated drink intake to at least 6-8 glasses a day. Decrease caffeine intake. Plain Mucinex over the counter as needed to thin out secretions.  The patient was advised to discuss her medication with her primary care provider to try to limit the laryngeal effects of diuretics and/or other drying medications.  Sleep with the head of bed on an incline. No large meals 1-2 hrs prior to sleep. Avoid fatty meals and caffine or alcohol prior to sleep.           Return if symptoms worsen or fail to improve.    Huang Emery MD  09/30/19  2:24 PM

## 2019-09-30 NOTE — PATIENT INSTRUCTIONS
Increase water/ non caffeinated drink intake to at least 6-8 glasses a day. Decrease caffeine intake. Plain Mucinex over the counter as needed to thin out secretions.   ALL ABOUT HEARTBURN AND THE LIFESTYLE CHANGES THAT HELP    Lifestyle Changes Can Help Relieve The Burning Pain In Your Tummy    What is Heartburn?  Heartburn occurs when the lining of the esophagus (the tube that connects the mouth to the stomach) is exposed to and irritated by stomach acid.  Heartburn often feels like a burning pain in the middle of your chest that moves up your throat.  You may also have the sensation that food has come back into your mouth, leaving a sour or bitter taste.  Almost everyone has heartburn once in a while.  But if you have heartburn 2 or more times per week, it can be a sign of a more serious problem call gastroesophogeal reflux disease, or GERD.    What causes Heartburn?  Heartburn usually occurs when the valve at the tamica of the stomach (the lower esophageal sphincter or LES) doesn’t close the way it should.  If the LES is weak or opens at the wrong time, stomach acid can reflux (flow back) into the esophagus and cause heartburn.  Factors that can affect the LES include:    • Eating or drinking certain foods and beverages such as chocolate, peppermint, fried or fatty foods, coffee, or drinks that contain alcohol  • Having a hiatal hernia - a common physical condition where part of the stomach protrudes up through the diaphragm  • Lying down  • Smoking cigarettes  • Taking certain medications (be sure to tell your doctor about all medications or supplements you take)    What foods can make heartburn worse?  All foods cause the stomach to produce acid, although foods can affect people in different ways.  Following is a list of foods and beverages that may aggravate your symptoms.  You may want to eat them less often.    • Fried or fatty foods  • Heavy seasoning and spicy foods  • Coffee  • Onions  • Orange juice,  grapefruit juice, and tomato juice  • Alcoholic beverages  • Chocolate  • Peppermint and spearmint    What else can I do to help control my heartburn?  Try these lifestyle changes:  • Stop Smoking  • Lose weight - if you’re overweight  • Exercise to help control your weight (talk to your doctor first before starting any exercise program).  • Eat small, well-balanced meals  • Reduce abdominal pressure-don’t wear tight belts or tight clothing  • Avoid eating within 2 hours before bedtime  • Elevate your bed so the head is 6 to 8 inches higher than the foot.  This can help reduce acid reflux at night  • Let your doctor know about all the drugs and supplements you are taking.  Some of them may contribute to your heartburn.    What if these things don’t work?  Talk to your doctor, who can discuss many treatments with you.  Although there are several possible causes of heartburn associated with GERD, they all involve stomach acids.  So no matter what the cause may be, the medicines to reduce stomach acid can prevent heartburn.

## 2019-10-08 RX ORDER — OMEPRAZOLE 40 MG/1
40 CAPSULE, DELAYED RELEASE ORAL DAILY
Qty: 30 CAPSULE | Refills: 3 | Status: SHIPPED | OUTPATIENT
Start: 2019-10-08 | End: 2020-03-09

## 2019-10-17 ENCOUNTER — FLU SHOT (OUTPATIENT)
Dept: FAMILY MEDICINE CLINIC | Facility: CLINIC | Age: 69
End: 2019-10-17

## 2019-10-17 DIAGNOSIS — Z23 IMMUNIZATION, PNEUMOCOCCUS AND INFLUENZA: ICD-10-CM

## 2019-10-17 PROCEDURE — G0008 ADMIN INFLUENZA VIRUS VAC: HCPCS | Performed by: FAMILY MEDICINE

## 2019-10-17 PROCEDURE — 90653 IIV ADJUVANT VACCINE IM: CPT | Performed by: FAMILY MEDICINE

## 2019-10-18 RX ORDER — AMLODIPINE BESYLATE 2.5 MG/1
TABLET ORAL
Qty: 30 TABLET | Refills: 5 | Status: SHIPPED | OUTPATIENT
Start: 2019-10-18 | End: 2020-04-07

## 2019-10-18 RX ORDER — POTASSIUM CHLORIDE 750 MG/1
TABLET, FILM COATED, EXTENDED RELEASE ORAL
Qty: 30 TABLET | Refills: 5 | Status: SHIPPED | OUTPATIENT
Start: 2019-10-18 | End: 2020-04-07

## 2019-12-09 DIAGNOSIS — Z12.31 ENCOUNTER FOR SCREENING MAMMOGRAM FOR BREAST CANCER: ICD-10-CM

## 2019-12-10 RX ORDER — HYDROCHLOROTHIAZIDE 12.5 MG/1
CAPSULE, GELATIN COATED ORAL
Qty: 90 CAPSULE | Refills: 3 | Status: SHIPPED | OUTPATIENT
Start: 2019-12-10 | End: 2020-11-03

## 2019-12-17 RX ORDER — BUDESONIDE AND FORMOTEROL FUMARATE DIHYDRATE 160; 4.5 UG/1; UG/1
AEROSOL RESPIRATORY (INHALATION)
Qty: 10.2 G | Refills: 3 | Status: SHIPPED | OUTPATIENT
Start: 2019-12-17 | End: 2020-04-07

## 2020-01-07 ENCOUNTER — OFFICE VISIT (OUTPATIENT)
Dept: BARIATRICS/WEIGHT MGMT | Facility: HOSPITAL | Age: 70
End: 2020-01-07

## 2020-01-07 ENCOUNTER — OFFICE VISIT (OUTPATIENT)
Dept: BARIATRICS/WEIGHT MGMT | Facility: CLINIC | Age: 70
End: 2020-01-07

## 2020-01-07 VITALS
BODY MASS INDEX: 44.46 KG/M2 | OXYGEN SATURATION: 97 % | HEIGHT: 62 IN | WEIGHT: 241.6 LBS | SYSTOLIC BLOOD PRESSURE: 131 MMHG | DIASTOLIC BLOOD PRESSURE: 82 MMHG | TEMPERATURE: 97.8 F | HEART RATE: 100 BPM

## 2020-01-07 DIAGNOSIS — E66.01 CLASS 3 SEVERE OBESITY DUE TO EXCESS CALORIES WITH SERIOUS COMORBIDITY AND BODY MASS INDEX (BMI) OF 40.0 TO 44.9 IN ADULT (HCC): Primary | ICD-10-CM

## 2020-01-07 DIAGNOSIS — I10 HTN (HYPERTENSION), BENIGN: Chronic | ICD-10-CM

## 2020-01-07 DIAGNOSIS — G47.33 OBSTRUCTIVE SLEEP APNEA SYNDROME: Chronic | ICD-10-CM

## 2020-01-07 PROCEDURE — 99204 OFFICE O/P NEW MOD 45 MIN: CPT | Performed by: SURGERY

## 2020-01-07 NOTE — PROGRESS NOTES
"Nutrition Services    Patient Name:  Kezia Otoole  YOB: 1950  MRN: [id number]  Admit Date:  (Not on file)    NUTRITION BARIATRIC/[facility] NOTE     Visit 1  Initial Assessment     Anthropometrics   Height: 61.5 in  Weight: 241 lbs 9.6 oz  BMI: 44.9    Nutrition Recall  Eating __3___ meals daily   Protein lacking at  Meeting protein daily goal  Snacking-2 to 3 snacks per day  Making healthier choices  Excessive sweet intake-During the recent [place] holidays  Drinking carbonated beverages-not much, really doesn't like the flavor or them  Drinking less than 64 fluid ounces-Hasn't been keeping track    Education    Goal Setting and Information Packet  4 meal per day diet plan  4 meal per day sample menu  \"Perfect Protein, Fiber in Foods, and Reducing Fat\"  Reinforce Nutritional Needs for Surgery  Reinforce Nutritional Needs for [facility]    Nutrition Goals   Eat _4____ meals per day with protein  Protein goal: 65gms   discussed protein guidelines for shakes and bar  Eliminate snacks  Healthier food choices  Portion control / Use smaller plate or measuring cup   Eliminate soda  Replace sugar beverages with artifical sweetened   Increase fluid intake to 64 ounces per day-start keeping track      Electronically signed by:  Vivien Hernandez  01/07/20 3:45 PM   "

## 2020-01-07 NOTE — PROGRESS NOTES
Patient Care Team:  Len Crowe MD as PCP - General  Len Crowe MD as PCP - [facility] Attributed  Huang Emery MD as Consulting Physician (Otolaryngology)  Tayler Burleson APRN as Nurse Practitioner (Family Medicine)  Dave Mcnally MD as Consulting Physician (Gastroenterology)  Lorraine Rodriguez APRN as Nurse Practitioner (Otolaryngology)    Reason for Visit:   Weight loss    Subjective     Patient is a 69 y.o. female presents with morbid obesity and her Body mass index is 44.91 kg/m².     She is here for discussion of surgical weight loss options.  She stated she has been with the disease of obesity for year(s).  She stated she suffers from sleep apnea, hypertension and morbid obesity due to her weight gain.  She stated that weight loss helps alleviate these symptoms.   She stated that she has tried counting calories, low carbohydrate diets and weight watchers to help with weight loss.  She stated that she has attempted these conservative methods for weight loss without maintaining long term success.  Today she would like to treatment for her morbid obesity.  She would like to explore nonsurgical options.     Review of Systems  General ROS: negative  Ophthalmic ROS: positive for - uses glasses  Hematological and Lymphatic ROS: positive for - bruising  Endocrine ROS: negative for - hot flashes, mood swings, palpitations or polydipsia/polyuria  Respiratory ROS: positive for - cough and wheezing  Cardiovascular ROS: no chest pain or dyspnea on exertion  Gastrointestinal ROS: no abdominal pain, change in bowel habits, or black or bloody stools  Genito-Urinary ROS: no dysuria, trouble voiding, or hematuria  Musculoskeletal ROS: positive for - joint pain  Remaining review of systems are negative    History  Past Medical History:   Diagnosis Date   • Allergic rhinitis    • Chronic laryngitis    • Diabetes mellitus (CMS/[facility])    • GERD (gastroesophageal reflux disease)    • Hypertension    •  "Hypertrophy of both inferior nasal turbinates    • Laryngopharyngeal reflux    • Nontoxic multinodular goiter    • Sicca laryngitis      Past Surgical History:   Procedure Laterality Date   • CARPAL TUNNEL RELEASE     • CHOLECYSTECTOMY     • COLONOSCOPY  10/01/2008    Multiple polyps removed   • COLONOSCOPY  09/18/2013    Diverticulosis in the sigmoid colon. Dr. Dave Mcnally Recall 5 years   • HAND SURGERY     • HYSTERECTOMY     • THYROIDECTOMY, PARTIAL Right 2010   • TONSILLECTOMY     • TUBAL ABDOMINAL LIGATION       Family History   Problem Relation Age of Onset   • Diabetes Mother    • Stroke Mother    • Heart disease Mother    • Heart disease Father    • Colon cancer Neg Hx    • Colon polyps Neg Hx      Social History     Tobacco Use   • Smoking status: Never Smoker   • Smokeless tobacco: Never Used   • Tobacco comment: \"lived with smokers most of my life\"   Substance Use Topics   • Alcohol use: No     Frequency: Never     Comment: Rare   • Drug use: No       (Not in a hospital admission)  Allergies:  Other      Current Outpatient Medications:   •  acetaminophen (TYLENOL) 500 MG tablet, Take 1,000 mg by mouth Every 6 (Six) Hours As Needed for mild pain (1-3)., Disp: , Rfl:   •  albuterol (PROVENTIL HFA;VENTOLIN HFA) 108 (90 Base) MCG/ACT inhaler, Inhale 2 puffs Every 4 (Four) Hours As Needed for Wheezing or Shortness of Air., Disp: 1 inhaler, Rfl: 1  •  amLODIPine (NORVASC) 2.5 MG tablet, TAKE ONE TABLET DAILY, Disp: 30 tablet, Rfl: 5  •  aspirin 81 MG EC tablet, Take 1 tablet by mouth Every Other Day., Disp: 32 tablet, Rfl: 0  •  B Complex-C (SUPER B COMPLEX PO), Take 1 tablet by mouth Daily., Disp: , Rfl:   •  Calcium Carb-Cholecalciferol (CALCIUM-VITAMIN D) 600-400 MG-UNIT tablet, Take 1 tablet by mouth 2 (Two) Times a Day., Disp: , Rfl:   •  carboxymethylcellulose (REFRESH PLUS) 0.5 % solution, 2 drops Daily As Needed for dry eyes., Disp: , Rfl:   •  Coenzyme Q10 (CO Q 10 PO), Take 1 capsule by mouth., Disp: " , Rfl:   •  denosumab (PROLIA) 60 MG/ML solution syringe, Inject 60 mg under the skin into the appropriate area as directed Every 6 (Six) Months., Disp: , Rfl:   •  guaiFENesin (MUCINEX) 600 MG 12 hr tablet, Take 600 mg by mouth Daily., Disp: , Rfl:   •  hydroCHLOROthiazide (MICROZIDE) 12.5 MG capsule, TAKE ONE CAPSULE DAILY, Disp: 90 capsule, Rfl: 3  •  ibuprofen (ADVIL,MOTRIN) 400 MG tablet, Take 400 mg by mouth Every 6 (Six) Hours As Needed for mild pain (1-3)., Disp: , Rfl:   •  irbesartan (AVAPRO) 300 MG tablet, TAKE ONE TABLET DAILY, Disp: 30 tablet, Rfl: 5  •  latanoprost (XALATAN) 0.005 % ophthalmic solution, Administer 1 drop to both eyes Every Night., Disp: , Rfl: 5  •  metFORMIN ER (GLUCOPHAGE-XR) 500 MG 24 hr tablet, TAKE TWO TABLETS EVERY NIGHT, Disp: 60 tablet, Rfl: 5  •  Misc Natural Products (GLUCOSAMINE CHONDROITIN TRIPLE PO), Take 1 tablet by mouth 2 (Two) Times a Day., Disp: , Rfl:   •  montelukast (SINGULAIR) 10 MG tablet, TAKE ONE TABLET DAILY, Disp: 30 tablet, Rfl: 5  •  Multiple Vitamins-Minerals (VITRUM 50+ SENIOR MULTI PO), Take 1 tablet by mouth Daily., Disp: , Rfl:   •  potassium chloride (K-DUR) 10 MEQ CR tablet, TAKE ONE TABLET DAILY, Disp: 30 tablet, Rfl: 5  •  SYMBICORT 160-4.5 MCG/ACT inhaler, USE  2 PUFFS TWICE DAILY, Disp: 10.2 g, Rfl: 3  •  FEROSUL 325 (65 Fe) MG tablet, TAKE ONE TABLET DAILY WITH BREAKFAST, Disp: 30 tablet, Rfl: 1  •  ipratropium-albuterol (DUO-NEB) 0.5-2.5 mg/3 ml nebulizer, Take 3 mL by nebulization 4 (Four) Times a Day., Disp: 40 vial, Rfl: 1    Objective     Vital Signs  Temp:  [97.8 °F (36.6 °C)] 97.8 °F (36.6 °C)  Heart Rate:  [100] 100  BP: (131)/(82) 131/82  Body mass index is 44.91 kg/m².      01/07/20  1449   Weight: 110 kg (241 lb 9.6 oz)       Physical Exam:      General Appearance: alert, appears stated age and cooperative  Lungs: clear to auscultation, respirations regular, respirations even and respirations unlabored  Heart: regular rhythm & normal  "rate, normal S1, S2, no murmur, no gallop, no rub and no click  Abdomen: normal bowel sounds, no masses, no hepatomegaly, no splenomegaly, soft non-tender, no guarding and no rebound tenderness  Extremities: moves extremities well, no edema, no cyanosis and no redness     Results Review:   None        Assessment/Plan   Encounter Diagnoses   Name Primary?   • Class 3 severe obesity due to excess calories with serious comorbidity and body mass index (BMI) of 40.0 to 44.9 in adult (CMS/HCC) Yes   • Obstructive sleep apnea syndrome    • HTN (hypertension), benign        She has been provided a structured dietary regimen based off of her behavior.  I discussed with the patient the etiology of the disease of obesity and the potential comorbid conditions associated with this disease.  She was instructed to follow the dietary regimen and follow-up with our program in 1 month's time with any additional questions as they may arise during this time.  We emphasized on focusing on proteins and meals high in fiber as well as adequate hydration that exceed 64 ounces of water daily.    I explained that I anticipate the patient to lose 6 lbs prior to her next monthly visit.  I have also explained that they need to record or document when they are going to have the \"cheat day\".  Patient reports her hypertension and obstructive sleep apnea have remained stable.  I anticipate improvement of these comorbid conditions with reversal of her morbid obesity.  I discussed the patient's findings and my recommendations with patient.     Dr. Enrique Grant MD [facility]    01/07/20  3:15 PM  Patient Care Team:  Len Crowe MD as PCP - General  Len Crowe [facility] as Consulting Physician (Otolaryngology)  Tayler Burleson APRN as Nurse Practitioner (Family Medicine)  Dave Mcnally MD as Consulting Physician (Gastroenterology)  Lorraine Rodriguez APRN as Nurse Practitioner " (Otolaryngology)

## 2020-01-13 ENCOUNTER — OFFICE VISIT (OUTPATIENT)
Dept: FAMILY MEDICINE CLINIC | Facility: CLINIC | Age: 70
End: 2020-01-13

## 2020-01-13 VITALS
TEMPERATURE: 97.6 F | OXYGEN SATURATION: 94 % | HEART RATE: 97 BPM | RESPIRATION RATE: 20 BRPM | SYSTOLIC BLOOD PRESSURE: 126 MMHG | HEIGHT: 63 IN | DIASTOLIC BLOOD PRESSURE: 78 MMHG | BODY MASS INDEX: 42.7 KG/M2 | WEIGHT: 241 LBS

## 2020-01-13 DIAGNOSIS — H61.23 BILATERAL IMPACTED CERUMEN: ICD-10-CM

## 2020-01-13 DIAGNOSIS — J40 BRONCHITIS: ICD-10-CM

## 2020-01-13 PROBLEM — H25.10 NUCLEAR SENILE CATARACT: Status: ACTIVE | Noted: 2018-05-16

## 2020-01-13 PROCEDURE — 99213 OFFICE O/P EST LOW 20 MIN: CPT | Performed by: NURSE PRACTITIONER

## 2020-01-13 PROCEDURE — 69210 REMOVE IMPACTED EAR WAX UNI: CPT | Performed by: NURSE PRACTITIONER

## 2020-01-13 RX ORDER — PREDNISONE 10 MG/1
10 TABLET ORAL 2 TIMES DAILY
Qty: 14 TABLET | Refills: 0 | Status: SHIPPED | OUTPATIENT
Start: 2020-01-13 | End: 2020-01-20

## 2020-01-13 RX ORDER — AMOXICILLIN AND CLAVULANATE POTASSIUM 875; 125 MG/1; MG/1
1 TABLET, FILM COATED ORAL 2 TIMES DAILY
Qty: 20 TABLET | Refills: 0 | Status: SHIPPED | OUTPATIENT
Start: 2020-01-13 | End: 2020-01-23

## 2020-01-13 NOTE — PROGRESS NOTES
"Subjective   Chief Complaint:  Fullness in ears, cough    History of Present Illness:  This 69 y.o. female was seen in the office today for reports cough and wheeze x2 weeks.  Reports using inhaler more than usual.  Reports fullness in ears and has been using Debrox eardrops.  Reports also nasal congestion.    Past Medical, Surgical, Social, and Family History:  Past Medical History:   Diagnosis Date   • Allergic rhinitis    • Chronic laryngitis    • Diabetes mellitus (CMS/HCC)    • GERD (gastroesophageal reflux disease)    • Hypertension    • Hypertrophy of both inferior nasal turbinates    • Laryngopharyngeal reflux    • Nontoxic multinodular goiter    • Sicca laryngitis      Past Surgical History:   Procedure Laterality Date   • CARPAL TUNNEL RELEASE     • CHOLECYSTECTOMY     • COLONOSCOPY  10/01/2008    Multiple polyps removed   • COLONOSCOPY  09/18/2013    Diverticulosis in the sigmoid colon. Dr. 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   Tobacco Use   • Smoking status: Never Smoker   • Smokeless tobacco: Never Used   • Tobacco comment: \"lived with smokers most of my life\"   Substance and Sexual Activity   • Alcohol use: No     Frequency: Never     Comment: Rare   • Drug use: No   • Sexual activity: Yes     Partners: Male     Birth control/protection: None   Social History Narrative    \"I have one surviving child\"     Family History   Problem Relation Age of Onset   • Diabetes Mother    • Stroke Mother    • Heart disease Mother    • Heart disease Father    • Colon cancer Neg Hx    • Colon polyps Neg Hx      Review of Systems " "  Constitutional: Negative for appetite change, chills and fever.   HENT: Positive for congestion, postnasal drip and rhinorrhea.    Respiratory: Positive for cough, shortness of breath and wheezing.    Cardiovascular: Negative for chest pain and palpitations.   Musculoskeletal: Negative for arthralgias and myalgias.     Objective   Physical Exam   Constitutional: She is oriented to person, place, and time. No distress.   HENT:   Head: Normocephalic and atraumatic.   Nose: Nose normal.   Bilateral ears impacted with cerumen preprocedure, post procedure shows clear TMs and clear canals.   Eyes: Pupils are equal, round, and reactive to light. Conjunctivae and EOM are normal.   Neck: Normal range of motion. Neck supple. No JVD present. No tracheal deviation present. No thyromegaly present.   Cardiovascular: Normal rate, regular rhythm, normal heart sounds and intact distal pulses. Exam reveals no gallop and no friction rub.   No murmur heard.  Pulmonary/Chest: Effort normal. No respiratory distress. She has wheezes in the right upper field and the left upper field.   Abdominal: Soft. Bowel sounds are normal. There is no tenderness. There is no guarding.   Musculoskeletal: Normal range of motion. She exhibits no edema, tenderness or deformity.   Lymphadenopathy:     She has no cervical adenopathy.   Neurological: She is alert and oriented to person, place, and time.   Skin: Skin is warm and dry. Capillary refill takes less than 2 seconds. She is not diaphoretic.   Psychiatric: She has a normal mood and affect. Her behavior is normal. Judgment and thought content normal.   Nursing note and vitals reviewed.  /78 (BP Location: Left arm, Patient Position: Sitting)   Pulse 97   Temp 97.6 °F (36.4 °C) (Temporal)   Resp 20   Ht 160 cm (63\")   Wt 109 kg (241 lb)   SpO2 94%   BMI 42.69 kg/m²     Assessment/Plan   Diagnoses and all orders for this visit:    1. Bronchitis (Primary)  -     amoxicillin-clavulanate " (AUGMENTIN) 875-125 MG per tablet; Take 1 tablet by mouth 2 (Two) Times a Day for 10 days.  Dispense: 20 tablet; Refill: 0  -     predniSONE (DELTASONE) 10 MG tablet; Take 1 tablet by mouth 2 (Two) Times a Day for 7 days.  Dispense: 14 tablet; Refill: 0    2. Bilateral impacted cerumen    Discussion:  Advised and educated plan of care.  Antibiotics and steroids as read, follow-up as needed.    Procedure:  Verbally consented to removal of earwax.  Otoscopic visualization of the left ear revealed a cerumen impaction.  4 mm curette was used to carefully dissect the impaction from the canal, removed in 1 piece a large amount of cerumen.  The TM was able to be visualized after the removal, is clear with no signs of infection.  The canal was clear of signs of trauma or infection.  In the same manner, otoscopic visualization of the right ear also revealed a cerumen impaction.  A 4 mm curette was then used to remove the impaction from the canal.  It was removed in 2 pieces with follow-up visualization with otoscope shows clear TM clear canal with no signs of trauma.  She tolerated well.    Follow-up:  Return if symptoms worsen or fail to improve.    Note e-Signed by FABBY Sanchez on 01/13/2020 at 11:06 AM

## 2020-02-05 ENCOUNTER — OFFICE VISIT (OUTPATIENT)
Dept: BARIATRICS/WEIGHT MGMT | Facility: CLINIC | Age: 70
End: 2020-02-05

## 2020-02-05 VITALS
HEART RATE: 76 BPM | HEIGHT: 62 IN | OXYGEN SATURATION: 97 % | SYSTOLIC BLOOD PRESSURE: 141 MMHG | BODY MASS INDEX: 43.1 KG/M2 | DIASTOLIC BLOOD PRESSURE: 84 MMHG | WEIGHT: 234.2 LBS | TEMPERATURE: 98 F

## 2020-02-05 DIAGNOSIS — I10 HTN (HYPERTENSION), BENIGN: Chronic | ICD-10-CM

## 2020-02-05 DIAGNOSIS — E66.01 CLASS 3 SEVERE OBESITY DUE TO EXCESS CALORIES WITH SERIOUS COMORBIDITY AND BODY MASS INDEX (BMI) OF 40.0 TO 44.9 IN ADULT (HCC): Primary | ICD-10-CM

## 2020-02-05 DIAGNOSIS — G47.33 OBSTRUCTIVE SLEEP APNEA SYNDROME: Chronic | ICD-10-CM

## 2020-02-05 DIAGNOSIS — E11.9 CONTROLLED TYPE 2 DIABETES MELLITUS WITHOUT COMPLICATION, WITHOUT LONG-TERM CURRENT USE OF INSULIN (HCC): Chronic | ICD-10-CM

## 2020-02-05 PROCEDURE — 99214 OFFICE O/P EST MOD 30 MIN: CPT | Performed by: NURSE PRACTITIONER

## 2020-02-05 RX ORDER — METFORMIN HYDROCHLORIDE 500 MG/1
TABLET, EXTENDED RELEASE ORAL
Qty: 60 TABLET | Refills: 5 | Status: SHIPPED | OUTPATIENT
Start: 2020-02-05 | End: 2020-07-02

## 2020-02-05 NOTE — PROGRESS NOTES
Patient Care Team:  Len Crowe MD as PCP - General  Len Crowe MD as PCP - Claims Attributed  Huang Emery MD as Consulting Physician (Otolaryngology)  Tayler Burleson APRN as Nurse Practitioner (Family Medicine)  Dave Mcnally MD as Consulting Physician (Gastroenterology)  Lorraine Rodriguez APRN as Nurse Practitioner (Otolaryngology)    Reason for Visit:  Medical Weight Loss    Subjective        Kezia Otoole is a 69 y.o. year old female who is here for follow-up and continued medical management of morbid obesity. Her current Body mass index is 43.54 kg/m². She states she suffers from sleep apnea, high blood pressure, diabetes, and morbid obesity due to weight gain. She is currently following the 4 meals/day diet prescription. Kezia Otoole previously agreed to incorporate the prescription as provided, while also increasing physical exercise. Patient states she has been successful at eating 4 meals/day and eliminating carbohydrates after lunch but struggles to get getting adequate water. She has been exercising by doing physical therapy three times/week for 20 minutes. Kezia Otoole also states she has been drinking 32 ounces of water and getting 10 oz/day but is unsure on grams of protein intake per day. She has lost 7 lbs of weight since her last visit.    Review of Systems  Negative except the below listed  Ophthalmic ROS: positive for - uses glasses and glaucoma  ENT ROS: positive for - hoarseness  Endocrine ROS: positive for - high blood sugars  Respiratory ROS: positive for - cough and wheezing  Musculoskeletal ROS: positive for - joint pain and back pain  Neurological ROS: positive for - numbness/tingling    History  Past Medical History:   Diagnosis Date   • Allergic rhinitis    • Chronic laryngitis    • Diabetes mellitus (CMS/[facility])    • GERD (gastroesophageal reflux disease)    • Hypertension    • Hypertrophy of both inferior nasal turbinates    • Laryngopharyngeal  "reflux    • Nontoxic multinodular goiter    • Sicca laryngitis      Past Surgical History:   Procedure Laterality Date   • CARPAL TUNNEL RELEASE     • CHOLECYSTECTOMY     • COLONOSCOPY  10/01/2008    Multiple polyps removed   • COLONOSCOPY  09/18/2013    Diverticulosis in the sigmoid colon. Dr. Dave Mcnally Recall 5 years   • HAND SURGERY     • HYSTERECTOMY     • THYROIDECTOMY, PARTIAL Right 2010   • TONSILLECTOMY     • TUBAL ABDOMINAL LIGATION       Family History   Problem Relation Age of Onset   • Diabetes Mother    • Stroke Mother    • Heart disease Mother    • Heart disease Father    • Colon cancer Neg Hx    • Colon polyps Neg Hx      Social History     Tobacco Use   • Smoking status: Never Smoker   • Smokeless tobacco: Never Used   • Tobacco comment: \"lived with smokers most of my life\"   Substance Use Topics   • Alcohol use: No     Frequency: Never     Comment: Rare   • Drug use: No       (Not in a hospital admission)  Allergies:  Other      Current Outpatient Medications:   •  acetaminophen (TYLENOL) 500 MG tablet, Take 1,000 mg by mouth Every 6 (Six) Hours As Needed for mild pain (1-3)., Disp: , Rfl:   •  albuterol (PROVENTIL HFA;VENTOLIN HFA) 108 (90 Base) MCG/ACT inhaler, Inhale 2 puffs Every 4 (Four) Hours As Needed for Wheezing or Shortness of Air., Disp: 1 inhaler, Rfl: 1  •  amLODIPine (NORVASC) 2.5 MG tablet, TAKE ONE TABLET DAILY, Disp: 30 tablet, Rfl: 5  •  aspirin 81 MG EC tablet, Take 1 tablet by mouth Every Other Day., Disp: 32 tablet, Rfl: 0  •  B Complex-C (SUPER B COMPLEX PO), Take 1 tablet by mouth Daily., Disp: , Rfl:   •  Calcium Carb-Cholecalciferol (CALCIUM-VITAMIN D) 600-400 MG-UNIT tablet, Take 1 tablet by mouth 2 (Two) Times a Day., Disp: , Rfl:   •  carboxymethylcellulose (REFRESH PLUS) 0.5 % solution, 2 drops Daily As Needed for dry eyes., Disp: , Rfl:   •  Coenzyme Q10 (CO Q 10 PO), Take 1 capsule by mouth., Disp: , Rfl:   •  denosumab (PROLIA) 60 MG/ML solution syringe, Inject 60 " mg under the skin into the appropriate area as directed Every 6 (Six) Months., Disp: , Rfl:   •  guaiFENesin (MUCINEX) 600 MG 12 hr tablet, Take 600 mg by mouth Daily., Disp: , Rfl:   •  hydroCHLOROthiazide (MICROZIDE) 12.5 MG capsule, TAKE ONE CAPSULE DAILY, Disp: 90 capsule, Rfl: 3  •  irbesartan (AVAPRO) 300 MG tablet, TAKE ONE TABLET DAILY, Disp: 30 tablet, Rfl: 5  •  latanoprost (XALATAN) 0.005 % ophthalmic solution, Administer 1 drop to both eyes Every Night., Disp: , Rfl: 5  •  metFORMIN ER (GLUCOPHAGE-XR) 500 MG 24 hr tablet, TAKE TWO TABLETS EVERY NIGHT, Disp: 60 tablet, Rfl: 5  •  Misc Natural Products (GLUCOSAMINE CHONDROITIN TRIPLE PO), Take 1 tablet by mouth 2 (Two) Times a Day., Disp: , Rfl:   •  montelukast (SINGULAIR) 10 MG tablet, TAKE ONE TABLET DAILY, Disp: 30 tablet, Rfl: 5  •  Multiple Vitamins-Minerals (VITRUM 50+ SENIOR MULTI PO), Take 1 tablet by mouth Daily., Disp: , Rfl:   •  potassium chloride (K-DUR) 10 MEQ CR tablet, TAKE ONE TABLET DAILY, Disp: 30 tablet, Rfl: 5  •  SYMBICORT 160-4.5 MCG/ACT inhaler, USE  2 PUFFS TWICE DAILY, Disp: 10.2 g, Rfl: 3  •  FEROSUL 325 (65 Fe) MG tablet, TAKE ONE TABLET DAILY WITH BREAKFAST, Disp: 30 tablet, Rfl: 1  •  ibuprofen (ADVIL,MOTRIN) 400 MG tablet, Take 400 mg by mouth Every 6 (Six) Hours As Needed for mild pain (1-3)., Disp: , Rfl:   •  ipratropium-albuterol (DUO-NEB) 0.5-2.5 mg/3 ml nebulizer, Take 3 mL by nebulization 4 (Four) Times a Day., Disp: 40 vial, Rfl: 1    Objective     Vital Signs  Temp:  [98 °F (36.7 °C)] 98 °F (36.7 °C)  Heart Rate:  [76] 76  BP: (141)/(84) 141/84  Body mass index is 43.54 kg/m².      02/05/20  1044   Weight: 106 kg (234 lb 3.2 oz)       Physical Exam:  HEENT: extra ocular movement intact  Respiratory:appears well, vitals normal, no respiratory distress, acyanotic, normal RR, chest clear, no wheezing, crepitations, rhonchi, normal symmetric air entry  Cardiovascular: regular rate and rhythm  GI: Soft, non-tender,  normal bowel sounds; no bruits, organomegaly or masses. Abnormal shape: Obese  Musculoskeletal: inspection - no abnormality, range of motion normal  Neurologic: alert, oriented, normal speech, no focal findings or movement disorder noted  Extremities: Bilateral lower extremities with 1 plus pitting edema       Results Review:   None        Assessment/Plan   Encounter Diagnoses   Name Primary?   • Class 3 severe obesity due to excess calories with serious comorbidity and body mass index (BMI) of 40.0 to 44.9 in adult (CMS/[facility]) Yes   • HTN (hypertension), benign    • Obstructive sleep apnea syndrome    • Controlled type 2 diabetes mellitus without complication, without long-term current use of insulin (CMS/[facility])          1. Kezia Otoole was seen today for follow-up, obesity, nutrition counseling and weight loss. She has lost 7 lbs of weight since her last visit, making her Body mass index is 43.54 kg/m².. Today we discussed consistency with healthy changes in lifestyle, diet, and exercise for long term success. Kezia Otoole had received handouts to her explaining the recommendation on portion sizes/appetite control/reading nutrition labels. Intensive behavioral therapy for obesity was done today as well.    Goals for this month are: increase water and continue to follow the meal prescription as provided focusing on eating 4 meals/day with vegetables at every meal, eliminating carbohydrates after lunch, and getting adequate water and protein intake. Patient encouraged to call with questions and/or struggles as they may arise prior to next scheduled appointment.    2. Current comorbid conditions of hypertension, THELMA, and diabetes associated with her morbid obesity are reported to be stable on her current treatment regimen and medications. We anticipate the comorbid conditions to improve as we address her morbid obesity.    Follow up in 1 month for a weight recheck.    FABBY Tabares    02/05/20  11:34  AM  Patient Care Team:  Len Crowe MD as PCP - General  Len Crowe MD as PCP - Claims Attributed  [facility], Huang Jimneez MD as Consulting Physician (Otolaryngology)  Tayler Burleson APRN as Nurse Practitioner (Family Medicine)  Dave Mcnally MD as Consulting Physician (Gastroenterology)  Lorraine Rodriguez APRN as Nurse Practitioner (Otolaryngology)

## 2020-02-07 DIAGNOSIS — M81.0 OSTEOPOROSIS, UNSPECIFIED OSTEOPOROSIS TYPE, UNSPECIFIED PATHOLOGICAL FRACTURE PRESENCE: ICD-10-CM

## 2020-02-07 DIAGNOSIS — R73.01 ELEVATED FASTING GLUCOSE: Primary | Chronic | ICD-10-CM

## 2020-02-07 DIAGNOSIS — Z78.0 MENOPAUSE: Chronic | ICD-10-CM

## 2020-02-07 DIAGNOSIS — D64.9 ANEMIA, UNSPECIFIED TYPE: ICD-10-CM

## 2020-02-07 DIAGNOSIS — E61.1 IRON DEFICIENCY: ICD-10-CM

## 2020-02-07 DIAGNOSIS — E11.9 CONTROLLED TYPE 2 DIABETES MELLITUS WITHOUT COMPLICATION, WITHOUT LONG-TERM CURRENT USE OF INSULIN (HCC): Chronic | ICD-10-CM

## 2020-02-08 LAB
25(OH)D3+25(OH)D2 SERPL-MCNC: 54.2 NG/ML (ref 30–100)
ALBUMIN SERPL-MCNC: 4.7 G/DL (ref 3.5–5.2)
ALBUMIN/GLOB SERPL: 2.4 G/DL
ALP SERPL-CCNC: 65 U/L (ref 39–117)
ALT SERPL-CCNC: 55 U/L (ref 1–33)
AST SERPL-CCNC: 43 U/L (ref 1–32)
BASOPHILS # BLD AUTO: 0.05 10*3/MM3 (ref 0–0.2)
BASOPHILS NFR BLD AUTO: 0.9 % (ref 0–1.5)
BILIRUB SERPL-MCNC: 0.7 MG/DL (ref 0.2–1.2)
BUN SERPL-MCNC: 20 MG/DL (ref 8–23)
BUN/CREAT SERPL: 25 (ref 7–25)
CALCIUM SERPL-MCNC: 10.2 MG/DL (ref 8.6–10.5)
CHLORIDE SERPL-SCNC: 98 MMOL/L (ref 98–107)
CO2 SERPL-SCNC: 23.5 MMOL/L (ref 22–29)
CREAT SERPL-MCNC: 0.8 MG/DL (ref 0.57–1)
EOSINOPHIL # BLD AUTO: 0.08 10*3/MM3 (ref 0–0.4)
EOSINOPHIL NFR BLD AUTO: 1.5 % (ref 0.3–6.2)
ERYTHROCYTE [DISTWIDTH] IN BLOOD BY AUTOMATED COUNT: 13 % (ref 12.3–15.4)
GLOBULIN SER CALC-MCNC: 2 GM/DL
GLUCOSE SERPL-MCNC: 239 MG/DL (ref 65–99)
HBA1C MFR BLD: 7.5 % (ref 4.8–5.6)
HCT VFR BLD AUTO: 44.8 % (ref 34–46.6)
HGB BLD-MCNC: 15.2 G/DL (ref 12–15.9)
IMM GRANULOCYTES # BLD AUTO: 0.01 10*3/MM3 (ref 0–0.05)
IMM GRANULOCYTES NFR BLD AUTO: 0.2 % (ref 0–0.5)
IRON SERPL-MCNC: 83 MCG/DL (ref 37–145)
LYMPHOCYTES # BLD AUTO: 1.04 10*3/MM3 (ref 0.7–3.1)
LYMPHOCYTES NFR BLD AUTO: 19.6 % (ref 19.6–45.3)
MAGNESIUM SERPL-MCNC: 1.8 MG/DL (ref 1.6–2.4)
MCH RBC QN AUTO: 31.4 PG (ref 26.6–33)
MCHC RBC AUTO-ENTMCNC: 33.9 G/DL (ref 31.5–35.7)
MCV RBC AUTO: 92.6 FL (ref 79–97)
MONOCYTES # BLD AUTO: 0.61 10*3/MM3 (ref 0.1–0.9)
MONOCYTES NFR BLD AUTO: 11.5 % (ref 5–12)
NEUTROPHILS # BLD AUTO: 3.51 10*3/MM3 (ref 1.7–7)
NEUTROPHILS NFR BLD AUTO: 66.3 % (ref 42.7–76)
NRBC BLD AUTO-RTO: 0 /100 WBC (ref 0–0.2)
PHOSPHATE SERPL-MCNC: 3.7 MG/DL (ref 2.5–4.5)
PLATELET # BLD AUTO: 250 10*3/MM3 (ref 140–450)
POTASSIUM SERPL-SCNC: 4.3 MMOL/L (ref 3.5–5.2)
PROT SERPL-MCNC: 6.7 G/DL (ref 6–8.5)
RBC # BLD AUTO: 4.84 10*6/MM3 (ref 3.77–5.28)
SODIUM SERPL-SCNC: 137 MMOL/L (ref 136–145)
WBC # BLD AUTO: 5.3 10*3/MM3 (ref 3.4–10.8)

## 2020-02-10 RX ORDER — MONTELUKAST SODIUM 10 MG/1
TABLET ORAL
Qty: 30 TABLET | Refills: 5 | Status: SHIPPED | OUTPATIENT
Start: 2020-02-10 | End: 2020-08-04

## 2020-02-17 ENCOUNTER — INFUSION (OUTPATIENT)
Dept: ONCOLOGY | Facility: HOSPITAL | Age: 70
End: 2020-02-17

## 2020-02-17 VITALS
DIASTOLIC BLOOD PRESSURE: 80 MMHG | HEIGHT: 63 IN | BODY MASS INDEX: 42.7 KG/M2 | TEMPERATURE: 98.4 F | SYSTOLIC BLOOD PRESSURE: 146 MMHG | WEIGHT: 241 LBS | HEART RATE: 67 BPM | OXYGEN SATURATION: 96 % | RESPIRATION RATE: 16 BRPM

## 2020-02-17 DIAGNOSIS — M81.0 OSTEOPOROSIS, UNSPECIFIED OSTEOPOROSIS TYPE, UNSPECIFIED PATHOLOGICAL FRACTURE PRESENCE: Primary | ICD-10-CM

## 2020-02-17 PROCEDURE — 25010000002 DENOSUMAB 60 MG/ML SOLUTION PREFILLED SYRINGE: Performed by: PHYSICIAN ASSISTANT

## 2020-02-17 PROCEDURE — 96372 THER/PROPH/DIAG INJ SC/IM: CPT

## 2020-02-17 RX ADMIN — DENOSUMAB 60 MG: 60 INJECTION SUBCUTANEOUS at 12:09

## 2020-02-18 ENCOUNTER — OFFICE VISIT (OUTPATIENT)
Dept: FAMILY MEDICINE CLINIC | Facility: CLINIC | Age: 70
End: 2020-02-18

## 2020-02-18 VITALS
HEIGHT: 63 IN | OXYGEN SATURATION: 99 % | DIASTOLIC BLOOD PRESSURE: 82 MMHG | TEMPERATURE: 97.7 F | SYSTOLIC BLOOD PRESSURE: 154 MMHG | WEIGHT: 231 LBS | HEART RATE: 81 BPM | RESPIRATION RATE: 16 BRPM | BODY MASS INDEX: 40.93 KG/M2

## 2020-02-18 DIAGNOSIS — M19.90 OSTEOARTHRITIS, UNSPECIFIED OSTEOARTHRITIS TYPE, UNSPECIFIED SITE: Chronic | ICD-10-CM

## 2020-02-18 DIAGNOSIS — R73.9 HYPERGLYCEMIA: ICD-10-CM

## 2020-02-18 DIAGNOSIS — R73.01 ELEVATED FASTING GLUCOSE: Chronic | ICD-10-CM

## 2020-02-18 DIAGNOSIS — R03.0 ELEVATED BLOOD PRESSURE READING: ICD-10-CM

## 2020-02-18 DIAGNOSIS — I10 HTN (HYPERTENSION), BENIGN: Chronic | ICD-10-CM

## 2020-02-18 DIAGNOSIS — Z86.39 HISTORY OF IRON DEFICIENCY: ICD-10-CM

## 2020-02-18 DIAGNOSIS — Z86.2 HISTORY OF ANEMIA: ICD-10-CM

## 2020-02-18 DIAGNOSIS — K21.9 GASTROESOPHAGEAL REFLUX DISEASE, ESOPHAGITIS PRESENCE NOT SPECIFIED: ICD-10-CM

## 2020-02-18 DIAGNOSIS — E11.9 CONTROLLED TYPE 2 DIABETES MELLITUS WITHOUT COMPLICATION, WITHOUT LONG-TERM CURRENT USE OF INSULIN (HCC): Chronic | ICD-10-CM

## 2020-02-18 DIAGNOSIS — Z79.01 ANTICOAGULATED: Primary | ICD-10-CM

## 2020-02-18 DIAGNOSIS — E66.9 OBESITY, UNSPECIFIED OBESITY SEVERITY, UNSPECIFIED OBESITY TYPE: Chronic | ICD-10-CM

## 2020-02-18 PROCEDURE — 99213 OFFICE O/P EST LOW 20 MIN: CPT | Performed by: FAMILY MEDICINE

## 2020-02-18 RX ORDER — MELOXICAM 15 MG/1
15 TABLET ORAL DAILY
COMMUNITY
End: 2020-07-09

## 2020-02-18 NOTE — PATIENT INSTRUCTIONS
Your blood pressure was above what we really wanted for you today at 154.82.  It is often a problem for blood pressures in the doctor's office to be higher than home (called white coat syndrome).   Goals for your blood pressure are 130-150 range top and 80-90 range bottom and you feeling ok.     We really advise rechecking your blood pressure at home (or with a friend) daily to every other day for the next several days and let us know if your pattern is not the goals above.  If you will do this make sure you control variables that can make your blood pressure show higher than it really is:   A. Use a machine that measures your blood pressure at the upper arm level; not wrist or lower  B. Take off any shirts/garmets and apply the cuff to your bare arm  C. Be sure and rest your arm being used on a table/like so it is totally supported  D. Do not cross your legs while taking your blood pressure  E. Be calm, rested and not upset/anxious in any way while taking your blood pressure  F. Avoid talking while taking the blood pressure  G. Be sure your back is supported and not in a strain while taking your blood pressure.     If you cannot do this at home/with a friend OR want it done here we are happy to make appointments here for that (we only charge/bill for a nurse visit for doing this).      Please CALL US if your blood pressure stays up as that probably means you have a problem; we likely will adjust things even over the phone.  We want your blood pressure to be normal.     OMRON is a reliable/common home blood automatic blood pressure device that can range around 50-75$.     ########################    NSAID Education/Counseling:  We discussed the risks and benefits of Non-steroidal anti-inflammatories (NSAIDs), which include GI, renal, and cardiovascular toxicity. We discussed the risk for stomach ulcers, and the need to stop the drug or add a PPI if GI symptoms develop. We discussed the increased incidence of  hypertension and cardiovascnlar events in patients taking NSAIDs We discussed the role the drugs may play in worsening renal disease, if present.     ########################    A1C:  Lab Results - Last 18 Months   Lab Units 02/07/20  0747 08/30/19  0747 02/21/19  0743   HEMOGLOBIN A1C % 7.50* 6.90* 7.20       Your A1c pattern/today is above for your review    A1c values:   <5.5 what we see on a normal/non-diabetic person  6.5 what it takes to be diagnosed with diabetes AND what most endocrinologist recommend if you are a diabetic  7.5 what the American diabetic association says you should be.      ########################

## 2020-02-18 NOTE — PROGRESS NOTES
Subjective   Kezia Otoole is a 69 y.o. female presenting with chief complaint of:   Chief Complaint   Patient presents with   • Hypertension   • Results     labs       History of Present Illness :  Alone.       Has multiple chronic problems to consider that might have a bearing on today's issues; not an interval appointment.       Chronic/acute problems reviewed today:   1. Anticoagulated DM2/ASA 81 (11.9.17 pt chose qod): Chronic/stable reason and use of.  Denies bleeding issues; especially epistaxis, melena, hematochezia.  Upper arms/others do not bruise easily.  No significant bleeding or falls.      2. Elevated blood pressure reading: Chronic/unstable. Above targets today/often better home blood pressures.  No significant chest pain, SOB, LE edema, orthopnea, near syncope, dizziness/light headness.  Agrees to follow next few days and let us know if stays elevated.      3. Elevated fasting glucose-see DM: Chronic/variable upward movement of A1c.  No problem/pattern hypoglycemia/hyperglycemia manifest by poly- dypsia, phagia, uria, or sweats, diaphoretic episodes, syncope/near.     4. Osteoarthritis, unspecified osteoarthritis type, unspecified site: Chronic/stable.  Various on/off joint pains/soreness/stiffness.  Particular joint problems with LE.  No joint swelling.  Treats mainly with reduced activity, Rx listed, Tylenol; no NSAIDs; some ongoing injections.       5. History of anemia: ASA81,iron,gi(cp,div: Chronic or past history/variable: This has been present before.    There has been GI evaulation in the past. There is no current melena, hematochezia. It has been benign to date and stable/watching.  Contributing comorbidities to date: ASA tx, iron def, gi issues colon polyp/diverticular findings.     6. Controlled type 2 diabetes mellitus without complication, without long-term current use of insulin (CMS/[facility]): see above   7. Gastroesophageal reflux disease, esophagitis presence not specified:  Chronic/stable.  Controlled rare heartburn, reflux without dysphagia, melena.  Rx used past, not  needed-doing ok.      8. Obesity, unspecified obesity severity, unspecified obesity type: Chronic/stable.  Aware, advised weight loss before.  On/off some success with weight loss but often with weight gain back.      9. History of iron deficiency: chronic/variable see anemia.  No current extra iron.    10. HTN (hypertension), benign: see bp   11. Hyperglycemia: see DM       Answers for HPI/ROS submitted by the patient on 2/18/2020   What is the primary reason for your visit?: Other  Please describe your symptoms.: Follow up on blood work drawn.  A1C is high.  Have you had these symptoms before?: Yes  How long have you been having these symptoms?: Other  Please list any medications you are currently taking for this condition.: Metformin  Please describe any probable cause for these symptoms. : Bronchitis in January requiring a steroid and antibiotic.  Too much holiday foods.  Stress & stress-eating during holidays: hosting holiday get togethers right after hisband’s Hip replacement surgery.  New diet from [facility].    Has an/another acute issue today: none.    The following portions of the patient's history were reviewed and updated as appropriate: allergies, current medications, past family history, past medical history, past social history, past surgical history and problem list.      Current Outpatient Medications:   •  acetaminophen (TYLENOL) 500 MG tablet, Take 1,000 mg by mouth Every 6 (Six) Hours As Needed for mild pain (1-3)., Disp: , Rfl:   •  albuterol (PROVENTIL HFA;VENTOLIN HFA) 108 (90 Base) MCG/ACT inhaler, Inhale 2 puffs Every 4 (Four) Hours As Needed for Wheezing or Shortness of Air., Disp: 1 inhaler, Rfl: 1  •  amLODIPine (NORVASC) 2.5 MG tablet, TAKE ONE TABLET DAILY, Disp: 30 tablet, Rfl: 5  •  aspirin 81 MG EC tablet, Take 1 tablet by mouth Every Other Day., Disp: 32 tablet, Rfl:  0  •  B Complex-C (SUPER B COMPLEX PO), Take 1 tablet by mouth Daily., Disp: , Rfl:   •  Calcium Carb-Cholecalciferol (CALCIUM-VITAMIN D) 600-400 MG-UNIT tablet, Take 1 tablet by mouth 2 (Two) Times a Day., Disp: , Rfl:   •  carboxymethylcellulose (REFRESH PLUS) 0.5 % solution, 2 drops Daily As Needed for dry eyes., Disp: , Rfl:   •  Coenzyme Q10 (CO Q 10 PO), Take 1 capsule by mouth., Disp: , Rfl:   •  denosumab (PROLIA) 60 MG/ML solution syringe, Inject 60 mg under the skin into the appropriate area as directed Every 6 (Six) Months., Disp: , Rfl:   •  guaiFENesin (MUCINEX) 600 MG 12 hr tablet, Take 600 mg by mouth 2 (Two) Times a Day., Disp: , Rfl:   •  hydroCHLOROthiazide (MICROZIDE) 12.5 MG capsule, TAKE ONE CAPSULE DAILY, Disp: 90 capsule, Rfl: 3  •  ipratropium-albuterol (DUO-NEB) 0.5-2.5 mg/3 ml nebulizer, Take 3 mL by nebulization 4 (Four) Times a Day., Disp: 40 vial, Rfl: 1  •  irbesartan (AVAPRO) 300 MG tablet, TAKE ONE TABLET DAILY, Disp: 30 tablet, Rfl: 5  •  latanoprost (XALATAN) 0.005 % ophthalmic solution, Administer 1 drop to both eyes Every Night., Disp: , Rfl: 5  •  meloxicam (MOBIC) 15 MG tablet, Take 15 mg by mouth Daily., Disp: , Rfl:   •  metFORMIN ER (GLUCOPHAGE-XR) 500 MG 24 hr tablet, TAKE TWO TABLETS EVERY NIGHT, Disp: 60 tablet, Rfl: 5  •  Misc Natural Products (GLUCOSAMINE CHONDROITIN TRIPLE PO), Take 1 tablet by mouth 2 (Two) Times a Day., Disp: , Rfl:   •  montelukast (SINGULAIR) 10 MG tablet, TAKE ONE TABLET DAILY, Disp: 30 tablet, Rfl: 5  •  Multiple Vitamins-Minerals (VITRUM 50+ SENIOR MULTI PO), Take 1 tablet by mouth Daily., Disp: , Rfl:   •  NON FORMULARY, Immunotherapy, Disp: , Rfl:   •  potassium chloride (K-DUR) 10 MEQ CR tablet, TAKE ONE TABLET DAILY, Disp: 30 tablet, Rfl: 5  •  SYMBICORT 160-4.5 MCG/ACT inhaler, USE  2 PUFFS TWICE DAILY, Disp: 10.2 g, Rfl: 3  No current facility-administered medications for this visit.     No problems with medications.  Refills if needed  done    Allergies   Allergen Reactions   • Other Other (See Comments)     Bandaids make arm Red/slightly swollen       Review of Systems  GENERAL:  Active/slower with limits, speed, stamina for age; fatigue with this. Sleep is ok; apnea denied. No fever now.  ENDO:  No syncope, near or diaphoretic sweaty spells.  BS without testing/download.  HEENT: No head injury or headache.   No vision change.   No significant hearing loss.  Ears without pain/drainage.  Mild sore throat.   Usual on/off before this; worse with this-nasal/sinus congestion/drainage. No epistaxis.  CHEST: No chest wall tenderness or mass.  Usual occ cough,  with occ wheeze; all worse witht his.  No SOB; no hemoptysis.  CV: No chest pain, palpitations, usual  On/off ankle edema.  GI: No heartburn, dysphagia.  No abdominal pain, diarrhea, constipation.  No rectal bleeding, or melena.    :  Voids without dysuria, or  incontinence to completion.  ORTHO: No painful/swollen joints but various on /off sore.  No change occ sore neck or back.  No acute neck or back pain without recent injury.  NEURO: No dizziness, weakness of extremities.  No numbness/paresthesias.   PSYCH: No memory loss.  Mood good; occ mild anxious, depressed but/and not suicidal.  Tries to tolerate stress .   Screening:  Mammogram: 12.19.19  Bone density: 12.11.18 [facility] hip -2.4; start prolia  Low dose CT chest: Tobacco-smoker/never: NA  GI: Colon-p+div/[facility]//11.19.18/5y  Prostate: NA  Usual lab order  6m CBC, CMP, A1c  12m CBC, CMP, A1c, LIPID, TSH, Vit D, uric acid    Lab Results:  Results for orders placed or performed in visit on 02/07/20   Comprehensive Metabolic Panel   Result Value Ref Range    Glucose 239 (H) 65 - 99 mg/dL    BUN 20 8 - 23 mg/dL    Creatinine 0.80 0.57 - 1.00 mg/dL    eGFR Non African Am 71 >60 mL/min/1.73    eGFR African Am 86 >60 mL/min/1.73    BUN/Creatinine Ratio 25.0 7.0 - 25.0    Sodium 137 136 - 145 mmol/L    Potassium 4.3 3.5 - 5.2 mmol/L    Chloride 98  98 - 107 mmol/L    Total CO2 23.5 22.0 - 29.0 mmol/L    Calcium 10.2 8.6 - 10.5 mg/dL    Total Protein 6.7 6.0 - 8.5 g/dL    Albumin 4.70 3.50 - 5.20 g/dL    Globulin 2.0 gm/dL    A/G Ratio 2.4 g/dL    Total Bilirubin 0.7 0.2 - 1.2 mg/dL    Alkaline Phosphatase 65 39 - 117 U/L    AST (SGOT) 43 (H) 1 - 32 U/L    ALT (SGPT) 55 (H) 1 - 33 U/L   Hemoglobin A1c   Result Value Ref Range    Hemoglobin A1C 7.50 (H) 4.80 - 5.60 %   Iron   Result Value Ref Range    Iron 83 37 - 145 mcg/dL   Phosphorus   Result Value Ref Range    Phosphorus 3.7 2.5 - 4.5 mg/dL   Magnesium   Result Value Ref Range    Magnesium 1.8 1.6 - 2.4 mg/dL   Vitamin D 25 Hydroxy   Result Value Ref Range    25 Hydroxy, Vitamin D 54.2 30.0 - 100.0 ng/ml   CBC & Differential   Result Value Ref Range    WBC 5.30 3.40 - 10.80 10*3/mm3    RBC 4.84 3.77 - 5.28 10*6/mm3    Hemoglobin 15.2 12.0 - 15.9 g/dL    Hematocrit 44.8 34.0 - 46.6 %    MCV 92.6 79.0 - 97.0 fL    MCH 31.4 26.6 - 33.0 pg    MCHC 33.9 31.5 - 35.7 g/dL    RDW 13.0 12.3 - 15.4 %    Platelets 250 140 - 450 10*3/mm3    Neutrophil Rel % 66.3 42.7 - 76.0 %    Lymphocyte Rel % 19.6 19.6 - 45.3 %    Monocyte Rel % 11.5 5.0 - 12.0 %    Eosinophil Rel % 1.5 0.3 - 6.2 %    Basophil Rel % 0.9 0.0 - 1.5 %    Neutrophils Absolute 3.51 1.70 - 7.00 10*3/mm3    Lymphocytes Absolute 1.04 0.70 - 3.10 10*3/mm3    Monocytes Absolute 0.61 0.10 - 0.90 10*3/mm3    Eosinophils Absolute 0.08 0.00 - 0.40 10*3/mm3    Basophils Absolute 0.05 0.00 - 0.20 10*3/mm3    Immature Granulocyte Rel % 0.2 0.0 - 0.5 %    Immature Grans Absolute 0.01 0.00 - 0.05 10*3/mm3    nRBC 0.0 0.0 - 0.2 /100 WBC       A1C:  Lab Results - Last 18 Months   Lab Units 02/07/20  0747 08/30/19  0747 02/21/19  0743   HEMOGLOBIN A1C % 7.50* 6.90* 7.20     PSA:No results for input(s): PSA in the last 49019 hours.  CBC:  Lab Results - Last 18 Months   Lab Units 02/07/20  0747 08/30/19  0747 03/26/19  0723 02/21/19  0743   WBC 10*3/mm3 5.30 6.02 4.64 6.03  "  HEMOGLOBIN g/dL 15.2 15.4 11.6* 10.3*   HEMATOCRIT % 44.8 48.3* 40.6 35.2*   PLATELETS 10*3/mm3 250 241 273 321   IRON mcg/dL 83 79 97 27*      BMP/CMP:  Lab Results - Last 18 Months   Lab Units 02/07/20  0747 08/30/19  0747 02/21/19  0743 02/01/19  0810   SODIUM mmol/L 137 137 136  --    POTASSIUM mmol/L 4.3 4.3 4.5  --    CHLORIDE mmol/L 98 97* 100  --    TOTAL CO2 mmol/L 23.5 25.6 27.0  --    GLUCOSE mg/dL 239* 172* 148*  --    BUN mg/dL 20 19 19  --    CREATININE mg/dL 0.80 0.78 0.68  --    EGFR IF NONAFRICN AM mL/min/1.73 71 73 86  --    EGFR IF AFRICN AM mL/min/1.73 86 88 104  --    CALCIUM mg/dL 10.2 9.6 9.3 9.1     HEPATIC:  Lab Results - Last 18 Months   Lab Units 02/07/20  0747 08/30/19  0747 02/21/19  0743   ALT (SGPT) U/L 55* 76* 50   AST (SGOT) U/L 43* 58* 37   ALK PHOS U/L 65 74 120     THYROID:  Lab Results - Last 18 Months   Lab Units 08/30/19  0747 02/21/19  0743   TSH uIU/mL 1.880 1.570       Objective   /82   Pulse 81   Temp 97.7 °F (36.5 °C)   Resp 16   Ht 158.8 cm (62.5\")   Wt 105 kg (231 lb)   SpO2 99%   Breastfeeding No   BMI 41.58 kg/m²   Body mass index is 41.58 kg/m².    Physical Exam  GENERAL:  Well nourished/developed in no acute distress. Obese; occ dry cough.   SKIN: Turgor excellent, without wound, rash, lesion.   HEENT: Normal cephalic without trauma.  Pupils equal round reactive to light. Extraocular motions full without nystagmus.   External canals nonobstructive nontender without reddness. Tymphatic membranes daniel with elvis structures intact.   Oral cavity without growths, exudates, and moist.  Posterior pharynx without mass, obstruction, redness.  No thyromegaly, mass, tenderness, lymphadenopathy and supple.  Mucoid nasal discharge x 2.  CV: Regular rhythm.  No murmur, gallop, trace edema. Posterior pulses intact.  No carotid bruits.  CHEST: No chest wall tenderness or mass.   LUNGS: Symmetric motion with rhonchi occ/wheeze occ to auscultation.  No dullness to " percussion  ABD: Soft, nontender without mass.   ORTHO: Symmetric extremities without swelling/point tenderness.  Full gross range of motion.  Walking without assistance.   NEURO: CN 2-12 grossly intact.  Symmetric facies and UE/LE. 3/5 strength throughout. 1/4 x bicep equal reflexes.  Nonfocal use extremities. Speech clear.    PSYCH: Oriented x 3.  Pleasant calm, well kept.  Purposeful/directed conservation with intact short/long gross memory.    Assessment/Plan     1. Anticoagulated DM2/ASA 81 (11.9.17 pt chose qod)    2. Elevated blood pressure reading    3. Elevated fasting glucose-see DM    4. Osteoarthritis, unspecified osteoarthritis type, unspecified site    5. History of anemia: ASA81,iron,gi(cp,div    6. Controlled type 2 diabetes mellitus without complication, without long-term current use of insulin (CMS/[facility])    7. Gastroesophageal reflux disease, esophagitis presence not specified    8. Obesity, unspecified obesity severity, unspecified obesity type    9. History of iron deficiency    10. HTN (hypertension), benign    11. Hyperglycemia        Rx: reviewed/changes:  New Medications Ordered This Visit   Medications   • Semaglutide,0.25 or 0.5MG/DOS, (OZEMPIC, 0.25 OR 0.5 MG/DOSE,) 2 MG/1.5ML solution pen-injector     Sig: Inject 0.25 mg under the skin into the appropriate area as directed 1 (One) Time Per Week.     Dispense:  1 pen     Refill:  5   • glucose blood test strip     Sig: Use as instructed: once a day testing of the one touch ultra blue strips     Dispense:  50 each     Refill:  11       LAB/Testing/Referrals: reviewed/orders:   Today:   No orders of the defined types were placed in this encounter.    Chronic/recurrent labs above or change to:   same     Discussions:   GLP1s; worth adding  Body mass index is 41.58 kg/m².  Patient's Body mass index is 41.58 kg/m². BMI is above normal parameters. Recommendations include: exercise counseling, nutrition counseling and pharmacological  intervention.    Tobacco use reviewed:    Non-smoker  Kezia Otoole  reports that she has never smoked. She has never used smokeless tobacco..     Patient Instructions     Your blood pressure was above what we really wanted for you today at 154.82.  It is often a problem for blood pressures in the doctor's office to be higher than home (called white coat syndrome).   Goals for your blood pressure are 130-150 range top and 80-90 range bottom and you feeling ok.     We really advise rechecking your blood pressure at home (or with a friend) daily to every other day for the next several days and let us know if your pattern is not the goals above.  If you will do this make sure you control variables that can make your blood pressure show higher than it really is:   A. Use a machine that measures your blood pressure at the upper arm level; not wrist or lower  B. Take off any shirts/garmets and apply the cuff to your bare arm  C. Be sure and rest your arm being used on a table/like so it is totally supported  D. Do not cross your legs while taking your blood pressure  E. Be calm, rested and not upset/anxious in any way while taking your blood pressure  F. Avoid talking while taking the blood pressure  G. Be sure your back is supported and not in a strain while taking your blood pressure.     If you cannot do this at home/with a friend OR want it done here we are happy to make appointments here for that (we only charge/bill for a nurse visit for doing this).      Please CALL US if your blood pressure stays up as that probably means you have a problem; we likely will adjust things even over the phone.  We want your blood pressure to be normal.     OMRON is a reliable/common home blood automatic blood pressure device that can range around 50-75$.     ########################    NSAID Education/Counseling:  We discussed the risks and benefits of Non-steroidal anti-inflammatories (NSAIDs), which include GI, renal, and  cardiovascular toxicity. We discussed the risk for stomach ulcers, and the need to stop the drug or add a PPI if GI symptoms develop. We discussed the increased incidence of hypertension and cardiovascnlar events in patients taking NSAIDs We discussed the role the drugs may play in worsening renal disease, if present.     ########################    A1C:  Lab Results - Last 18 Months   Lab Units 02/07/20  0747 08/30/19  0747 02/21/19  0743   HEMOGLOBIN A1C % 7.50* 6.90* 7.20       Your A1c pattern/today is above for your review    A1c values:   <5.5 what we see on a normal/non-diabetic person  6.5 what it takes to be diagnosed with diabetes AND what most endocrinologist recommend if you are a diabetic  7.5 what the American diabetic association says you should be.      ########################        Follow up: Return for move 3.2020 to lab;, Dr Crowe-4m.  Future Appointments   Date Time Provider Department Center   3/4/2020  9:30 AM Yvette Beltrán APRN [facility] None   7/6/2020  8:40 AM [facility] None   7/9/2020  9:00 AM Len Crowe MD [facility] None

## 2020-03-04 ENCOUNTER — OFFICE VISIT (OUTPATIENT)
Dept: BARIATRICS/WEIGHT MGMT | Facility: CLINIC | Age: 70
End: 2020-03-04

## 2020-03-04 VITALS
WEIGHT: 227.8 LBS | TEMPERATURE: 97.9 F | HEART RATE: 75 BPM | BODY MASS INDEX: 41.92 KG/M2 | HEIGHT: 62 IN | DIASTOLIC BLOOD PRESSURE: 79 MMHG | OXYGEN SATURATION: 98 % | SYSTOLIC BLOOD PRESSURE: 137 MMHG

## 2020-03-04 DIAGNOSIS — E11.9 CONTROLLED TYPE 2 DIABETES MELLITUS WITHOUT COMPLICATION, WITHOUT LONG-TERM CURRENT USE OF INSULIN (HCC): Chronic | ICD-10-CM

## 2020-03-04 DIAGNOSIS — E66.01 CLASS 3 SEVERE OBESITY DUE TO EXCESS CALORIES WITH SERIOUS COMORBIDITY AND BODY MASS INDEX (BMI) OF 40.0 TO 44.9 IN ADULT (HCC): Primary | ICD-10-CM

## 2020-03-04 DIAGNOSIS — G47.33 OBSTRUCTIVE SLEEP APNEA SYNDROME: Chronic | ICD-10-CM

## 2020-03-04 DIAGNOSIS — I10 HTN (HYPERTENSION), BENIGN: Chronic | ICD-10-CM

## 2020-03-04 PROCEDURE — 99213 OFFICE O/P EST LOW 20 MIN: CPT | Performed by: NURSE PRACTITIONER

## 2020-03-04 NOTE — PROGRESS NOTES
Patient Care Team:  Len Crowe MD as PCP - General  Len Crowe MD as PCP - [facility] Attributed  Huang Emery MD as Consulting Physician (Otolaryngology)  Tayler Burleson APRN as Nurse Practitioner (Family Medicine)  Dave Mcnally MD as Consulting Physician (Gastroenterology)  Lorraine Rodriguez APRN as Nurse Practitioner (Otolaryngology)    Reason for Visit:  Medical Weight Loss    Subjective        Kezia Otoole is a 69 y.o. year old female who is here for follow-up and continued medical management of morbid obesity. Her current Body mass index is 42.35 kg/m². She states she suffers from high blood pressure, sleep apnea, diabetes, and morbid obesity due to weight gain. She is currently following the 4 meals/day diet prescription. Kezia Otoole previously agreed to incorporate the prescription as provided, while also increasing physical exercise. Patient states she has been successful at eating 4 meals/day, limiting carbohydrates after lunch, eating vegetables with every meal, and has been working to increase her water intake. She states she sometimes struggles with carbohydrates after lunch. She has been exercising with physical therapy three times/week for 30 minutes. Kezia Otoole also states she has been drinking 48-60 ounces of water and is unsure on grams of protein intake per day. She has lost 7 lbs of weight since her last visit.    Review of Systems  Negative except the below listed  Ophthalmic ROS: positive for - glaucoma  Hematological and Lymphatic ROS: positive for - bruising  Endocrine ROS: positive for - high blood sugar  Respiratory ROS: positive for - cough  Cardiovascular ROS: positive for - edema  Musculoskeletal ROS: positive for - joint pain  Neurological ROS: positive for - numbness/tingling  Dermatological ROS: positive for skin discoloration    History  Past Medical History:   Diagnosis Date   • Allergic rhinitis    • Chronic laryngitis    • Diabetes  "mellitus (CMS/HCC)    • GERD (gastroesophageal reflux disease)    • Hypertension    • Hypertrophy of both inferior nasal turbinates    • Laryngopharyngeal reflux    • Nontoxic multinodular goiter    • Sicca laryngitis      Past Surgical History:   Procedure Laterality Date   • CARPAL TUNNEL RELEASE     • CHOLECYSTECTOMY     • COLONOSCOPY  10/01/2008    Multiple polyps removed   • COLONOSCOPY  09/18/2013    Diverticulosis in the sigmoid colon. Dr. Dave Mcnally Recall 5 years   • HAND SURGERY     • HYSTERECTOMY     • THYROIDECTOMY, PARTIAL Right 2010   • TONSILLECTOMY     • TUBAL ABDOMINAL LIGATION       Family History   Problem Relation Age of Onset   • Diabetes Mother    • Stroke Mother    • Heart disease Mother    • Heart disease Father    • Colon cancer Neg Hx    • Colon polyps Neg Hx      Social History     Tobacco Use   • Smoking status: Never Smoker   • Smokeless tobacco: Never Used   • Tobacco comment: \"lived with smokers most of my life\"   Substance Use Topics   • Alcohol use: No     Frequency: Never     Comment: Rare   • Drug use: No       (Not in a hospital admission)  Allergies:  Other      Current Outpatient Medications:   •  acetaminophen (TYLENOL) 500 MG tablet, Take 1,000 mg by mouth Every 6 (Six) Hours As Needed for mild pain (1-3)., Disp: , Rfl:   •  amLODIPine (NORVASC) 2.5 MG tablet, TAKE ONE TABLET DAILY, Disp: 30 tablet, Rfl: 5  •  aspirin 81 MG EC tablet, Take 1 tablet by mouth Every Other Day., Disp: 32 tablet, Rfl: 0  •  B Complex-C (SUPER B COMPLEX PO), Take 1 tablet by mouth Daily., Disp: , Rfl:   •  Calcium Carb-Cholecalciferol (CALCIUM-VITAMIN D) 600-400 MG-UNIT tablet, Take 1 tablet by mouth 2 (Two) Times a Day., Disp: , Rfl:   •  carboxymethylcellulose (REFRESH PLUS) 0.5 % solution, 2 drops Daily As Needed for dry eyes., Disp: , Rfl:   •  Coenzyme Q10 (CO Q 10 PO), Take 1 capsule by mouth., Disp: , Rfl:   •  denosumab (PROLIA) 60 MG/ML solution syringe, Inject 60 mg under the skin into " the appropriate area as directed Every 6 (Six) Months., Disp: , Rfl:   •  glucose blood test strip, Use as instructed: once a day testing of the one touch ultra blue strips, Disp: 50 each, Rfl: 11  •  guaiFENesin (MUCINEX) 600 MG 12 hr tablet, Take 600 mg by mouth 2 (Two) Times a Day., Disp: , Rfl:   •  hydroCHLOROthiazide (MICROZIDE) 12.5 MG capsule, TAKE ONE CAPSULE DAILY, Disp: 90 capsule, Rfl: 3  •  ipratropium-albuterol (DUO-NEB) 0.5-2.5 mg/3 ml nebulizer, Take 3 mL by nebulization 4 (Four) Times a Day., Disp: 40 vial, Rfl: 1  •  irbesartan (AVAPRO) 300 MG tablet, TAKE ONE TABLET DAILY, Disp: 30 tablet, Rfl: 5  •  latanoprost (XALATAN) 0.005 % ophthalmic solution, Administer 1 drop to both eyes Every Night., Disp: , Rfl: 5  •  meloxicam (MOBIC) 15 MG tablet, Take 15 mg by mouth Daily., Disp: , Rfl:   •  metFORMIN ER (GLUCOPHAGE-XR) 500 MG 24 hr tablet, TAKE TWO TABLETS EVERY NIGHT, Disp: 60 tablet, Rfl: 5  •  Misc Natural Products (GLUCOSAMINE CHONDROITIN TRIPLE PO), Take 1 tablet by mouth 2 (Two) Times a Day., Disp: , Rfl:   •  montelukast (SINGULAIR) 10 MG tablet, TAKE ONE TABLET DAILY, Disp: 30 tablet, Rfl: 5  •  Multiple Vitamins-Minerals (VITRUM 50+ SENIOR MULTI PO), Take 1 tablet by mouth Daily., Disp: , Rfl:   •  NON FORMULARY, Immunotherapy, Disp: , Rfl:   •  potassium chloride (K-DUR) 10 MEQ CR tablet, TAKE ONE TABLET DAILY, Disp: 30 tablet, Rfl: 5  •  Semaglutide,0.25 or 0.5MG/DOS, (OZEMPIC, 0.25 OR 0.5 MG/DOSE,) 2 MG/1.5ML solution pen-injector, Inject 0.25 mg under the skin into the appropriate area as directed 1 (One) Time Per Week., Disp: 1 pen, Rfl: 5  •  SYMBICORT 160-4.5 MCG/ACT inhaler, USE  2 PUFFS TWICE DAILY, Disp: 10.2 g, Rfl: 3  •  albuterol (PROVENTIL HFA;VENTOLIN HFA) 108 (90 Base) MCG/ACT inhaler, Inhale 2 puffs Every 4 (Four) Hours As Needed for Wheezing or Shortness of Air., Disp: 1 inhaler, Rfl: 1    Objective     Vital Signs  Temp:  [97.9 °F (36.6 °C)] 97.9 °F (36.6 °C)  Heart  Rate:  [75] 75  BP: (137)/(79) 137/79  Body mass index is 42.35 kg/m².      03/04/20  0940   Weight: 103 kg (227 lb 12.8 oz)       Physical Exam:  HEENT: extra ocular movement intact  Respiratory:appears well, vitals normal, no respiratory distress, acyanotic, normal RR, chest clear, no wheezing, crepitations, rhonchi, normal symmetric air entry  Cardiovascular: regular rate and rhythm  GI: Soft, non-tender, normal bowel sounds; no bruits, organomegaly or masses. Abnormal shape: Obese  Musculoskeletal: inspection - no abnormality, range of motion normal  Neurologic: alert, oriented, normal speech, no focal findings or movement disorder noted       Results Review:   None        Assessment/Plan   Encounter Diagnoses   Name Primary?   • Class 3 severe obesity due to excess calories with serious comorbidity and body mass index (BMI) of 40.0 to 44.9 in adult (CMS/[facility]) Yes   • HTN (hypertension), benign    • Obstructive sleep apnea syndrome    • Controlled type 2 diabetes mellitus without complication, without long-term current use of insulin (CMS/[facility])          1. Kezia Otoole was seen today for follow-up, obesity, nutrition counseling and weight loss. She has lost 7 lbs of weight since her last visit, making her Body mass index is 42.35 kg/m².. Today we discussed consistency with healthy changes in lifestyle, diet, and exercise for long term success. Kezia Otoole had received handouts to her explaining the recommendation on portion sizes/appetite control/reading nutrition labels. Intensive behavioral therapy for obesity was done today as well. Patient received the perfect protein education packet today to assist with measuring daily grams of protein intake.    Goals for this month are: continue to follow the meal prescription as provided focusing on eating 4 meals/day with vegetables at every meal, eliminating carbohydrates after lunch, and getting adequate water and protein intake. Patient encouraged to call with  questions and/or struggles as they may arise prior to next scheduled appointment.    2. Current comorbid conditions of hypertension, THELMA, and diabetes associated with her morbid obesity are reported to be stable on her current treatment regimen and medications. We anticipate the comorbid conditions to improve as we address her morbid obesity.    Follow up in 1 month for a weight recheck.    FABBY Tabares    03/04/20  9:47 AM  Patient Care Team:  Len Crowe MD as PCP - General  [facility] Phys[facility] (Otol[facility]ology)  Tayler Burleson APRN as Nurse Practitioner (Family Medicine)  Dave Mcnally MD as Consulting Physician (Gastroenterology)  Lorraine Rodriguez APRN as Nurse Practitioner (Otolaryngology)

## 2020-03-09 RX ORDER — OMEPRAZOLE 40 MG/1
CAPSULE, DELAYED RELEASE ORAL
Qty: 30 CAPSULE | Refills: 3 | Status: SHIPPED | OUTPATIENT
Start: 2020-03-09 | End: 2020-07-09

## 2020-03-09 RX ORDER — IRBESARTAN 300 MG/1
TABLET ORAL
Qty: 30 TABLET | Refills: 5 | Status: SHIPPED | OUTPATIENT
Start: 2020-03-09 | End: 2020-06-01 | Stop reason: SDUPTHER

## 2020-04-07 DIAGNOSIS — J45.20 MILD INTERMITTENT ASTHMA, UNSPECIFIED WHETHER COMPLICATED: ICD-10-CM

## 2020-04-07 DIAGNOSIS — I10 HTN (HYPERTENSION), BENIGN: Primary | ICD-10-CM

## 2020-04-07 DIAGNOSIS — J45.901 EXACERBATION OF ASTHMA, UNSPECIFIED ASTHMA SEVERITY, UNSPECIFIED WHETHER PERSISTENT: ICD-10-CM

## 2020-04-07 RX ORDER — AMLODIPINE BESYLATE 2.5 MG/1
TABLET ORAL
Qty: 30 TABLET | Refills: 5 | Status: SHIPPED | OUTPATIENT
Start: 2020-04-07 | End: 2020-11-02

## 2020-04-07 RX ORDER — BUDESONIDE AND FORMOTEROL FUMARATE DIHYDRATE 160; 4.5 UG/1; UG/1
AEROSOL RESPIRATORY (INHALATION)
Qty: 1 INHALER | Refills: 5 | Status: SHIPPED | OUTPATIENT
Start: 2020-04-07 | End: 2020-11-02

## 2020-04-07 RX ORDER — POTASSIUM CHLORIDE 750 MG/1
TABLET, FILM COATED, EXTENDED RELEASE ORAL
Qty: 30 TABLET | Refills: 5 | Status: SHIPPED | OUTPATIENT
Start: 2020-04-07 | End: 2020-11-02

## 2020-06-01 ENCOUNTER — TELEPHONE (OUTPATIENT)
Dept: BARIATRICS/WEIGHT MGMT | Facility: CLINIC | Age: 70
End: 2020-06-01

## 2020-06-01 DIAGNOSIS — I10 HTN (HYPERTENSION), BENIGN: Primary | ICD-10-CM

## 2020-06-01 RX ORDER — IRBESARTAN 300 MG/1
300 TABLET ORAL DAILY
Qty: 90 TABLET | Refills: 2 | Status: SHIPPED | OUTPATIENT
Start: 2020-06-01 | End: 2020-07-28 | Stop reason: SDUPTHER

## 2020-06-05 DIAGNOSIS — E11.9 CONTROLLED TYPE 2 DIABETES MELLITUS WITHOUT COMPLICATION, WITHOUT LONG-TERM CURRENT USE OF INSULIN (HCC): Chronic | ICD-10-CM

## 2020-06-05 DIAGNOSIS — R73.9 HYPERGLYCEMIA: ICD-10-CM

## 2020-06-09 ENCOUNTER — OFFICE VISIT (OUTPATIENT)
Dept: BARIATRICS/WEIGHT MGMT | Facility: CLINIC | Age: 70
End: 2020-06-09

## 2020-06-09 VITALS
BODY MASS INDEX: 42.62 KG/M2 | WEIGHT: 231.6 LBS | HEIGHT: 62 IN | DIASTOLIC BLOOD PRESSURE: 79 MMHG | OXYGEN SATURATION: 96 % | SYSTOLIC BLOOD PRESSURE: 149 MMHG | HEART RATE: 77 BPM | TEMPERATURE: 96.9 F

## 2020-06-09 DIAGNOSIS — E66.01 CLASS 3 SEVERE OBESITY DUE TO EXCESS CALORIES WITH SERIOUS COMORBIDITY AND BODY MASS INDEX (BMI) OF 40.0 TO 44.9 IN ADULT (HCC): Primary | ICD-10-CM

## 2020-06-09 DIAGNOSIS — K21.9 GASTROESOPHAGEAL REFLUX DISEASE, ESOPHAGITIS PRESENCE NOT SPECIFIED: ICD-10-CM

## 2020-06-09 DIAGNOSIS — G47.33 OBSTRUCTIVE SLEEP APNEA SYNDROME: Chronic | ICD-10-CM

## 2020-06-09 DIAGNOSIS — E11.9 CONTROLLED TYPE 2 DIABETES MELLITUS WITHOUT COMPLICATION, WITHOUT LONG-TERM CURRENT USE OF INSULIN (HCC): Chronic | ICD-10-CM

## 2020-06-09 DIAGNOSIS — I10 HTN (HYPERTENSION), BENIGN: Chronic | ICD-10-CM

## 2020-06-09 PROCEDURE — 99213 OFFICE O/P EST LOW 20 MIN: CPT | Performed by: NURSE PRACTITIONER

## 2020-06-09 NOTE — PROGRESS NOTES
Patient Care Team:  Len Crowe MD as PCP - General  Len Crowe MD as PCP - [facility] Attributed  Huang Emery MD as Consulting Physician (Otolaryngology)  Tayler Burleson APRN as Nurse Practitioner (Family Medicine)  Dvae Mcnally MD as Consulting Physician (Gastroenterology)  Lorraine Rodriguez APRN as Nurse Practitioner (Otolaryngology)    Reason for Visit:  Medical Weight Loss (Visit 4)       Subjective        Kezia Otoole is a 69 y.o. year old female who is here today for follow-up and continued medical management of morbid obesity. Her current Body mass index is 43.05 kg/m². She states she suffers from high blood pressure, sleep apnea, reflux, diabetes, and morbid obesity due to weight gain. She is currently following the 4 meals/day diet prescription. Kezia Otoole previously agreed to incorporate the prescription as provided, while also increasing physical exercise. Patient states she has not been successful at following the provided dietary and behavior modifications as suggested. She states she has been trying to monitor her portion sizes. She is occasionally having sodas. She has been exercising working out in the yard. Kezia Otoole also states she has been drinking 56 ounces of water and is unsure on grams of protein intake per day. She has gained 4 lbs of weight since her last visit.    Review of Systems   Constitutional: Positive for fatigue.   HENT:        Hoarseness   Respiratory: Negative.    Cardiovascular: Negative.    Gastrointestinal: Negative.    Genitourinary: Negative.    Musculoskeletal: Positive for arthralgias and back pain.   Skin: Negative.    Neurological: Positive for numbness and headaches.   Hematological: Bruises/bleeds easily.   Psychiatric/Behavioral: Positive for sleep disturbance.        History  Past Medical History:   Diagnosis Date   • Allergic rhinitis    • Chronic laryngitis    • Diabetes mellitus (CMS/HCC)    • GERD (gastroesophageal  "reflux disease)    • Hypertension    • Hypertrophy of both inferior nasal turbinates    • Laryngopharyngeal reflux    • Nontoxic multinodular goiter    • Sicca laryngitis      Past Surgical History:   Procedure Laterality Date   • CARPAL TUNNEL RELEASE     • CHOLECYSTECTOMY     • COLONOSCOPY  10/01/2008    Multiple polyps removed   • COLONOSCOPY  09/18/2013    Diverticulosis in the sigmoid colon. Dr. Dave Mcnally Recall 5 years   • HAND SURGERY     • HYSTERECTOMY     • THYROIDECTOMY, PARTIAL Right 2010   • TONSILLECTOMY     • TUBAL ABDOMINAL LIGATION       Family History   Problem Relation Age of Onset   • Diabetes Mother    • Stroke Mother    • Heart disease Mother    • Heart disease Father    • Colon cancer Neg Hx    • Colon polyps Neg Hx      Social History     Tobacco Use   • Smoking status: Never Smoker   • Smokeless tobacco: Never Used   • Tobacco comment: \"lived with smokers most of my life\"   Substance Use Topics   • Alcohol use: No     Frequency: Never     Comment: Rare   • Drug use: No       (Not in a hospital admission)  Allergies:  Other      Current Outpatient Medications:   •  acetaminophen (TYLENOL) 500 MG tablet, Take 1,000 mg by mouth Every 6 (Six) Hours As Needed for mild pain (1-3)., Disp: , Rfl:   •  albuterol (PROVENTIL HFA;VENTOLIN HFA) 108 (90 Base) MCG/ACT inhaler, Inhale 2 puffs Every 4 (Four) Hours As Needed for Wheezing or Shortness of Air., Disp: 1 inhaler, Rfl: 1  •  amLODIPine (NORVASC) 2.5 MG tablet, TAKE ONE TABLET DAILY, Disp: 30 tablet, Rfl: 5  •  aspirin 81 MG EC tablet, Take 1 tablet by mouth Every Other Day., Disp: 32 tablet, Rfl: 0  •  B Complex-C (SUPER B COMPLEX PO), Take 1 tablet by mouth Daily., Disp: , Rfl:   •  Calcium Carb-Cholecalciferol (CALCIUM-VITAMIN D) 600-400 MG-UNIT tablet, Take 1 tablet by mouth 2 (Two) Times a Day., Disp: , Rfl:   •  carboxymethylcellulose (REFRESH PLUS) 0.5 % solution, 2 drops Daily As Needed for dry eyes., Disp: , Rfl:   •  Coenzyme Q10 (CO Q " 10 PO), Take 1 capsule by mouth., Disp: , Rfl:   •  denosumab (PROLIA) 60 MG/ML solution syringe, Inject 60 mg under the skin into the appropriate area as directed Every 6 (Six) Months., Disp: , Rfl:   •  glucose blood test strip, Use as instructed: once a day testing of the one touch ultra blue strips, Disp: 50 each, Rfl: 11  •  guaiFENesin (MUCINEX) 600 MG 12 hr tablet, Take 600 mg by mouth 2 (Two) Times a Day., Disp: , Rfl:   •  hydroCHLOROthiazide (MICROZIDE) 12.5 MG capsule, TAKE ONE CAPSULE DAILY, Disp: 90 capsule, Rfl: 3  •  ipratropium-albuterol (DUO-NEB) 0.5-2.5 mg/3 ml nebulizer, Take 3 mL by nebulization 4 (Four) Times a Day., Disp: 40 vial, Rfl: 1  •  irbesartan (AVAPRO) 300 MG tablet, Take 1 tablet by mouth Daily., Disp: 90 tablet, Rfl: 2  •  latanoprost (XALATAN) 0.005 % ophthalmic solution, Administer 1 drop to both eyes Every Night., Disp: , Rfl: 5  •  meloxicam (MOBIC) 15 MG tablet, Take 15 mg by mouth Daily., Disp: , Rfl:   •  metFORMIN ER (GLUCOPHAGE-XR) 500 MG 24 hr tablet, TAKE TWO TABLETS EVERY NIGHT, Disp: 60 tablet, Rfl: 5  •  Misc Natural Products (GLUCOSAMINE CHONDROITIN TRIPLE PO), Take 1 tablet by mouth 2 (Two) Times a Day., Disp: , Rfl:   •  montelukast (SINGULAIR) 10 MG tablet, TAKE ONE TABLET DAILY, Disp: 30 tablet, Rfl: 5  •  Multiple Vitamins-Minerals (VITRUM 50+ SENIOR MULTI PO), Take 1 tablet by mouth Daily., Disp: , Rfl:   •  potassium chloride (K-DUR) 10 MEQ CR tablet, TAKE ONE TABLET DAILY, Disp: 30 tablet, Rfl: 5  •  Semaglutide,0.25 or 0.5MG/DOS, (Ozempic, 0.25 or 0.5 MG/DOSE,) 2 MG/1.5ML solution pen-injector, Inject 0.25 mg under the skin into the appropriate area as directed 1 (One) Time Per Week., Disp: 3 pen, Rfl: 1  •  SYMBICORT 160-4.5 MCG/ACT inhaler, USE  2 PUFFS TWICE DAILY, Disp: 1 inhaler, Rfl: 5  •  NON FORMULARY, Immunotherapy, Disp: , Rfl:   •  omeprazole (priLOSEC) 40 MG capsule, TAKE ONE CAPSULE DAILY 30 MINUTES BEFORE LAST LARGE MEAL BEFORE BED, Disp: 30  capsule, Rfl: 3    Objective     Vital Signs  Temp:  [96.9 °F (36.1 °C)] 96.9 °F (36.1 °C)  Heart Rate:  [77] 77  BP: (149)/(79) 149/79  Body mass index is 43.05 kg/m².      06/09/20  1343   Weight: 105 kg (231 lb 9.6 oz)       Physical Exam   Constitutional: She is oriented to person, place, and time. She appears well-developed and well-nourished.   HENT:   Head: Normocephalic and atraumatic.   Eyes: Pupils are equal, round, and reactive to light. Conjunctivae and EOM are normal.   Neck: Normal range of motion. Neck supple.   Cardiovascular: Normal rate, regular rhythm and normal heart sounds.   Pulmonary/Chest: Effort normal and breath sounds normal.   Abdominal: Soft.   Obese   Musculoskeletal: Normal range of motion.   Neurological: She is oriented to person, place, and time.   Skin: Skin is warm and dry.   Psychiatric: She has a normal mood and affect. Her behavior is normal.        Results Review:   None      Assessment/Plan   Encounter Diagnoses   Name Primary?   • Class 3 severe obesity due to excess calories with serious comorbidity and body mass index (BMI) of 40.0 to 44.9 in adult (CMS/HCC) Yes   • HTN (hypertension), benign    • Obstructive sleep apnea syndrome    • Gastroesophageal reflux disease, esophagitis presence not specified    • Controlled type 2 diabetes mellitus without complication, without long-term current use of insulin (CMS/[facility])          1. Kezia Otoole was seen today for follow-up, obesity, nutrition counseling and weight loss. She has gained 4 lbs of weight since her last visit, making her Body mass index is 43.05 kg/m².. Today we discussed consistency with healthy changes in lifestyle, diet, and exercise for long term success. Kezia Otoole had received handouts to her explaining the recommendation on portion sizes/appetite control/reading nutrition labels. Intensive behavioral therapy for obesity was done today as well. She was provided a new 4 meals/day diet prescription  today.    Goals for this month are: return to following the meal prescription as provided focusing on eating 4 meals/day with vegetables at every meal, eliminating carbohydrates after lunch, and getting adequate water and protein intake. Patient encouraged to call with questions and/or struggles as they may arise prior to next scheduled appointment.    2. Current comorbid conditions of hypertension, THELMA, GERD, and diabetes associated with her morbid obesity are reported to be stable on her current treatment regimen and medications. We anticipate the comorbid conditions to improve as we address her morbid obesity.    A total of 15 minutes was spent face to face with this patient and over half of the time was spent on counseling and coordination of care for the disease of obesity. We specifically reviewed the dietary prescription and I made recommendations toward increasing exercise as tolerated as well as focusing on training their behavior toward storing less.    Follow up in 1 month for a weight recheck.    FABBY Tabares    06/09/20  14:26  Patient Care Team:  Len Crowe MD as PCP - General  Len [facility] (Otol[facility]ology)  Tayler Burleson APRN as Nurse Practitioner (Family Medicine)  Dave Mcnally MD as Consulting Physician (Gastroenterology)  Lorraine Rodriguez APRN as Nurse Practitioner (Otolaryngology)

## 2020-07-02 DIAGNOSIS — E11.9 CONTROLLED TYPE 2 DIABETES MELLITUS WITHOUT COMPLICATION, WITHOUT LONG-TERM CURRENT USE OF INSULIN (HCC): Chronic | ICD-10-CM

## 2020-07-02 RX ORDER — METFORMIN HYDROCHLORIDE 500 MG/1
TABLET, EXTENDED RELEASE ORAL
Qty: 60 TABLET | Refills: 5 | Status: SHIPPED | OUTPATIENT
Start: 2020-07-02 | End: 2021-02-03

## 2020-07-06 ENCOUNTER — LAB (OUTPATIENT)
Dept: FAMILY MEDICINE CLINIC | Facility: CLINIC | Age: 70
End: 2020-07-06

## 2020-07-06 DIAGNOSIS — M19.90 OSTEOARTHRITIS, UNSPECIFIED OSTEOARTHRITIS TYPE, UNSPECIFIED SITE: Primary | ICD-10-CM

## 2020-07-07 LAB — CALCIUM SERPL-MCNC: 9.8 MG/DL (ref 8.6–10.5)

## 2020-07-09 ENCOUNTER — OFFICE VISIT (OUTPATIENT)
Dept: FAMILY MEDICINE CLINIC | Facility: CLINIC | Age: 70
End: 2020-07-09

## 2020-07-09 VITALS
BODY MASS INDEX: 42.55 KG/M2 | DIASTOLIC BLOOD PRESSURE: 94 MMHG | WEIGHT: 231.2 LBS | TEMPERATURE: 97.5 F | HEART RATE: 97 BPM | SYSTOLIC BLOOD PRESSURE: 154 MMHG | HEIGHT: 62 IN | OXYGEN SATURATION: 99 % | RESPIRATION RATE: 16 BRPM

## 2020-07-09 DIAGNOSIS — M19.90 OSTEOARTHRITIS, UNSPECIFIED OSTEOARTHRITIS TYPE, UNSPECIFIED SITE: Chronic | ICD-10-CM

## 2020-07-09 DIAGNOSIS — M25.511 ACUTE PAIN OF RIGHT SHOULDER: Primary | ICD-10-CM

## 2020-07-09 DIAGNOSIS — R73.9 HYPERGLYCEMIA: ICD-10-CM

## 2020-07-09 DIAGNOSIS — K21.9 GASTROESOPHAGEAL REFLUX DISEASE, ESOPHAGITIS PRESENCE NOT SPECIFIED: ICD-10-CM

## 2020-07-09 DIAGNOSIS — M81.0 OSTEOPOROSIS, UNSPECIFIED OSTEOPOROSIS TYPE, UNSPECIFIED PATHOLOGICAL FRACTURE PRESENCE: ICD-10-CM

## 2020-07-09 DIAGNOSIS — Z86.2 HISTORY OF ANEMIA: ICD-10-CM

## 2020-07-09 DIAGNOSIS — Z00.00 WELLNESS EXAMINATION: ICD-10-CM

## 2020-07-09 DIAGNOSIS — F32.A DEPRESSION, UNSPECIFIED DEPRESSION TYPE: Chronic | ICD-10-CM

## 2020-07-09 DIAGNOSIS — Z79.01 ANTICOAGULATED: ICD-10-CM

## 2020-07-09 DIAGNOSIS — I10 HTN (HYPERTENSION), BENIGN: Chronic | ICD-10-CM

## 2020-07-09 DIAGNOSIS — E11.9 CONTROLLED TYPE 2 DIABETES MELLITUS WITHOUT COMPLICATION, WITHOUT LONG-TERM CURRENT USE OF INSULIN (HCC): Chronic | ICD-10-CM

## 2020-07-09 PROCEDURE — 99214 OFFICE O/P EST MOD 30 MIN: CPT | Performed by: FAMILY MEDICINE

## 2020-07-09 PROCEDURE — G0439 PPPS, SUBSEQ VISIT: HCPCS | Performed by: FAMILY MEDICINE

## 2020-07-09 RX ORDER — CELECOXIB 200 MG/1
200 CAPSULE ORAL DAILY
COMMUNITY
Start: 2020-06-18 | End: 2021-03-11

## 2020-07-09 NOTE — PATIENT INSTRUCTIONS
"Medicare/insurances offer certain visits called \"wellness/annual\" that allows for time to deal with and  review the many aspects of \"being well\" that just might not get mentioned during other visits with your doctor through the year.  This includes things like reviews of health screenings (mammograms, various labs),  weight, exercise, vaccines for just a few examples.      In order to help you with this we wish to make you aware of a few things for you to consider:    1. Advanced directives.  These are documents used to help direct your care if your health/situation should reach a point that you cannot make your own decisions.  While it is likely you do not currently have a need for these documents now; it is something that we all might face at any time.   The hand outs you are being given today are simply for you to review and use to learn more about these documents and consider them as you wish.      2. Vaccines: Certain vaccines are important after age 50, 60, and 65 and some health situations (for example COPD), require even boosters beyond age 65.  We are happy to review with you your vaccine status and vaccines that might be needed for you at this point:      a. Tetanus.   Like anyone this needs to given every 10 years; sooner for/with lacerations/wounds.   Likely when getting this booster it needs to be a tetanus called Tdap (tetanus mixed with diptheria and pertussis).   Years ago you had this vaccine.  We now know we can lose our immunity to pertussis (a part of this vaccine) and run a risk of catching this.  Now only would this make us ill; but more importantly we can spread this to very young children (and for them it can be a much more dangerous illness).   We call this the grandparent vaccine for this reason.     b. Pneumonia (strept).   This comes now with two brands.   It is recommended to take pneumovax first; and a year later take the cousin prevnar.  Even if you have had these before; we need to " review when and your current health situation/s as you may need boosters and even recently the CDC has made recent/new recommendations for pneumovax.      c. Shingles.  You do not want to catch shingles.  Though you will recover from this; the pain associated with shingles can be severe.  Even if you have had the now older zostavax, or have had shingles; it is recommended you still get the Shingrix (the new vaccine just available early 2018 shingles vaccine).  A new shingles vaccine (a shot to lower your chance of catching shingles) is now available (shingrix).  This vaccine is the second vaccine created for this purpose; (we have had zostavax for years).  Shingrix provides a much better and longer immunity for shingles than zostavax.  For this and other reasons Shingrix can be started at age 50.  If you have had zostavax in the past; you can still take Shingrix.      This vaccine is not paid for in a doctor's office by medicare, medicaid and probably most insurances.  Like zostavax; this is covered in drug stores.  This is a vaccine that if you chose to get you need to get at a drug store that gives vaccines (like [facility], [facility] and [facility].      d. Yearly flu vaccine given from September through April each year (there is a special vaccine for those over 65).     e. Travel vaccines:  If you are one to do international travel; be sure and ask us for any particular unusual vaccines you may need.     f.  Miscellaneous:  If you have certain health situations/disease you may need specific/particular vaccines not give to the general public.     The vaccines we have on record for you include:   Immunization History   Administered Date(s) Administered   • FLUAD TRI 65YR+ 10/17/2019   • Flu Vaccine Quad PF >18YRS 10/28/2016, 10/30/2017   • Fluzone High Dose =>65 Years ([facility] ONLY) 10/01/2018   • Influenza Quad Vaccine (Inpatient) 10/30/2017   • Influenza, Unspecified 10/17/2019   • Pneumococcal  Conjugate 13-Valent (PCV13) 10/21/2015   • Pneumococcal Polysaccharide (PPSV23) 10/30/2014   • Shingrix 09/06/2019, 12/06/2019       If you have record of other vaccines and want them to show in your chart here; please talk to our nurses about having your vaccine record updated.     3. Exercise: regular cardio exercise something everyone should consider and try to do; even if health limitations (ie find that exercise UE/LE/cardio that they can tolerate).   Normal weight a goal for everyone (as we discussed)    4. Healthy diet helpful for weight management, illness prevention.     5. If over 50-screening exams include men PSA/rectal exam, women mammograms, and everyone colonoscopy screening for colon cancer.    6. If you use tobacco of any kind or e-products you should stop. We are providing you some information to consider that could make this process easier.      ##################################

## 2020-07-09 NOTE — PROGRESS NOTES
"The ABCs of the Annual Wellness Visit  Subsequent Medicare Wellness Visit    Chief Complaint   Patient presents with   • Medicare Wellness-subsequent   • Hypertension   • Anticoagulation       Subjective   History of Present Illness:  Kezia Otoole is a 69 y.o. female who presents for a Subsequent Medicare Wellness Visit.    HEALTH RISK ASSESSMENT    Recent Hospitalizations:  No hospitalization(s) within the last year.    Current Medical Providers:  Patient Care Team:  Len Crowe MD as PCP - General  Len Crowe MD as PCP - Claims Attributed  ResHuang amin MD as Consulting Physician (Otolaryngology)  Tayler Burleson APRN as Nurse Practitioner (Family Medicine)  Dave Mcnally MD as Consulting Physician (Gastroenterology)  Lorraine Rodriguez APRN as Nurse Practitioner (Otolaryngology)    Smoking Status:  Social History     Tobacco Use   Smoking Status Never Smoker   Smokeless Tobacco Never Used   Tobacco Comment    \"lived with smokers most of my life\"       Alcohol Consumption:  Social History     Substance and Sexual Activity   Alcohol Use No   • Frequency: Never    Comment: Rare       Depression Screen:   PHQ-2/PHQ-9 Depression Screening 7/9/2020   Little interest or pleasure in doing things 2   Feeling down, depressed, or hopeless 1   Trouble falling or staying asleep, or sleeping too much 1   Feeling tired or having little energy 3   Poor appetite or overeating 3   Feeling bad about yourself - or that you are a failure or have let yourself or your family down 2   Trouble concentrating on things, such as reading the newspaper or watching television 2   Moving or speaking so slowly that other people could have noticed. Or the opposite - being so fidgety or restless that you have been moving around a lot more than usual 0   Thoughts that you would be better off dead, or of hurting yourself in some way 0   Total Score 14   If you checked off any problems, how difficult have these problems " made it for you to do your work, take care of things at home, or get along with other people? Extremely dIfficult       Fall Risk Screen:  DAGOBERTO Fall Risk Assessment was completed, and patient is at HIGH risk for falls. Assessment completed on:7/9/2020    Health Habits and Functional and Cognitive Screening:  Functional & Cognitive Status 2/25/2019   Do you have difficulty preparing food and eating? No   Do you have difficulty bathing yourself, getting dressed or grooming yourself? No   Do you have difficulty using the toilet? No   Do you have difficulty moving around from place to place? No   Do you have trouble with steps or getting out of a bed or a chair? No   Current Diet Well Balanced Diet   Dental Exam Up to date   Eye Exam Not up to date   Exercise (times per week) 0 times per week   Current Exercise Activities Include Housecleaning   Do you need help using the phone?  No   Are you deaf or do you have serious difficulty hearing?  No   Do you need help with transportation? No   Do you need help shopping? No   Do you need help preparing meals?  No   Do you need help with housework?  No   Do you need help with laundry? No   Do you need help taking your medications? No   Do you need help managing money? No   Do you ever drive or ride in a car without wearing a seat belt? No   Have you felt unusual stress, anger or loneliness in the last month? No   Who do you live with? Spouse   If you need help, do you have trouble finding someone available to you? No   Have you been bothered in the last four weeks by sexual problems? No   Do you have difficulty concentrating, remembering or making decisions? No         Does the patient have evidence of cognitive impairment? No    Asprin use counseling:Taking ASA appropriately as indicated    Age-appropriate Screening Schedule:  Refer to the list below for future screening recommendations based on patient's age, sex and/or medical conditions. Orders for these recommended tests  are listed in the plan section. The patient has been provided with a written plan.    Health Maintenance   Topic Date Due   • TDAP/TD VACCINES (1 - Tdap) 10/21/1961   • DIABETIC FOOT EXAM  10/21/2016   • URINE MICROALBUMIN  02/21/2020   • INFLUENZA VACCINE  08/01/2020   • HEMOGLOBIN A1C  08/07/2020   • DIABETIC EYE EXAM  12/11/2020   • DXA SCAN  12/11/2020   • MAMMOGRAM  12/09/2021   • COLONOSCOPY  11/19/2023   • ZOSTER VACCINE  Completed          The following portions of the patient's history were reviewed and updated as appropriate: allergies, current medications, past family history, past medical history, past social history, past surgical history and problem list.    Outpatient Medications Prior to Visit   Medication Sig Dispense Refill   • acetaminophen (TYLENOL) 500 MG tablet Take 1,000 mg by mouth Every 6 (Six) Hours As Needed for mild pain (1-3).     • albuterol (PROVENTIL HFA;VENTOLIN HFA) 108 (90 Base) MCG/ACT inhaler Inhale 2 puffs Every 4 (Four) Hours As Needed for Wheezing or Shortness of Air. 1 inhaler 1   • amLODIPine (NORVASC) 2.5 MG tablet TAKE ONE TABLET DAILY 30 tablet 5   • aspirin 81 MG EC tablet Take 1 tablet by mouth Every Other Day. 32 tablet 0   • B Complex-C (SUPER B COMPLEX PO) Take 1 tablet by mouth Daily.     • Calcium Carb-Cholecalciferol (CALCIUM-VITAMIN D) 600-400 MG-UNIT tablet Take 1 tablet by mouth 2 (Two) Times a Day.     • carboxymethylcellulose (REFRESH PLUS) 0.5 % solution 2 drops Daily As Needed for dry eyes.     • celecoxib (CeleBREX) 200 MG capsule Take 200 mg by mouth Daily.     • Coenzyme Q10 (CO Q 10 PO) Take 1 capsule by mouth Every Other Day.     • denosumab (PROLIA) 60 MG/ML solution syringe Inject 60 mg under the skin into the appropriate area as directed Every 6 (Six) Months.     • glucose blood test strip Use as instructed: once a day testing of the one touch ultra blue strips 50 each 11   • guaiFENesin (MUCINEX) 600 MG 12 hr tablet Take 600 mg by mouth 2 (Two)  Times a Day.     • hydroCHLOROthiazide (MICROZIDE) 12.5 MG capsule TAKE ONE CAPSULE DAILY 90 capsule 3   • ipratropium-albuterol (DUO-NEB) 0.5-2.5 mg/3 ml nebulizer Take 3 mL by nebulization 4 (Four) Times a Day. 40 vial 1   • irbesartan (AVAPRO) 300 MG tablet Take 1 tablet by mouth Daily. 90 tablet 2   • latanoprost (XALATAN) 0.005 % ophthalmic solution Administer 1 drop to both eyes Every Night.  5   • metFORMIN ER (GLUCOPHAGE-XR) 500 MG 24 hr tablet TAKE TWO TABLETS EVERY NIGHT 60 tablet 5   • Misc Natural Products (GLUCOSAMINE CHONDROITIN TRIPLE PO) Take 1 tablet by mouth 2 (Two) Times a Day.     • montelukast (SINGULAIR) 10 MG tablet TAKE ONE TABLET DAILY 30 tablet 5   • Multiple Vitamins-Minerals (VITRUM 50+ SENIOR MULTI PO) Take 1 tablet by mouth Daily.     • NON FORMULARY Immunotherapy     • potassium chloride (K-DUR) 10 MEQ CR tablet TAKE ONE TABLET DAILY 30 tablet 5   • SYMBICORT 160-4.5 MCG/ACT inhaler USE  2 PUFFS TWICE DAILY 1 inhaler 5   • Semaglutide,0.25 or 0.5MG/DOS, (Ozempic, 0.25 or 0.5 MG/DOSE,) 2 MG/1.5ML solution pen-injector Inject 0.25 mg under the skin into the appropriate area as directed 1 (One) Time Per Week. 3 pen 1   • meloxicam (MOBIC) 15 MG tablet Take 15 mg by mouth Daily.     • omeprazole (priLOSEC) 40 MG capsule TAKE ONE CAPSULE DAILY 30 MINUTES BEFORE LAST LARGE MEAL BEFORE BED 30 capsule 3     No facility-administered medications prior to visit.        Patient Active Problem List   Diagnosis   • Class 3 severe obesity due to excess calories with serious comorbidity and body mass index (BMI) of 40.0 to 44.9 in adult (CMS/[facility])   • Depression   • Elevated fasting glucose-see DM   • Allergic rhinitis: shots   • Osteoporosis: 2018/[facility]   • Degenerative joint disease   • HTN (hypertension), benign   • Sleep apnea   • Menopause   • Diabetes type 2, controlled (CMS/[facility])   • Asthma   • Hoarseness-sees Ressor   • Gastroesophageal reflux disease-ent   • Wellness examination-done   • Purpura  (CMS/[facility])   • Glaucoma   • Laboratory test`   • Anticoagulated DM2/ASA 81 (11.9.17 pt chose qod)   • Glaucoma suspect   • Hx of colonic polyps   • History of anemia: ASA81,iron,gi(cp,div   • History of iron deficiency   • Nuclear senile cataract       Advanced Care Planning:  ACP discussion was held with the patient during this visit. Patient does not have an advance directive, information provided.    Review of Systems  GENERAL:  Active/slower with limits, speed, stamina for age and various joint issues. Sleep is ok; apnea denied. No fever now.  ENDO:  No syncope, near or diaphoretic sweaty spells.  BS without testing/download.  HEENT: No head injury or headache.   No vision change.   No significant hearing loss.  Ears without pain/drainage.  No sore throat.   Same/occ nasal/sinus congestion/drainage. No epistaxis.  CHEST: No chest wall tenderness or mass.  No cough,  without wheeze.   No SOB; no hemoptysis.  CV: No chest pain, palpitations, usual  On/off ankle edema.  GI: No heartburn, dysphagia.  No abdominal pain, diarrhea, constipation.  No rectal bleeding, or melena.    :  Voids without dysuria, or incontinence to completion.  ORTHO: No painful/swollen joints but various on /off sore.  No change occ sore neck or back.  No acute neck or back pain without recent injury.  NEURO: No dizziness, weakness of extremities.  No numbness/paresthesias.   PSYCH: No memory loss.  Mood good; occ mild anxious, depressed but/and not suicidal.  Tries to tolerate stress .   Screening:  Mammogram: 12.19.19  Bone density: 12.11.18 [facility] hip -2.4; start prolia  Low dose CT chest: Tobacco-smoker/never: NA  GI: Colon-p+div/[facility]/11.19.18/5y  Prostate: NA  Usual lab order  6m CBC, CMP, A1c  12m CBC, CMP, A1c, LIPID, TSH, Vit D, uric acid  Compared to one year ago, the patient feels her physical health is the same.  Compared to one year ago, the patient feels her mental health is the same.    Reviewed chart for potential of high risk  "medication in the elderly: yes  Reviewed chart for potential of harmful drug interactions in the elderly:yes    Objective         Vitals:    07/09/20 0911   BP: 154/94   Pulse: 97   Resp: 16   Temp: 97.5 °F (36.4 °C)   SpO2: 99%   Weight: 105 kg (231 lb 3.2 oz)   Height: 156.2 cm (61.5\")       Body mass index is 42.98 kg/m².  Discussed the patient's BMI with her. The BMI is above average; BMI management plan is completed.    Physical Exam  GENERAL:  Well nourished/developed in no acute distress. Obese.   SKIN: Turgor excellent, without wound, rash, lesion.   HEENT: Normal cephalic without trauma.  Pupils equal round reactive to light. Extraocular motions full without nystagmus.   External canals nonobstructive nontender without reddness. Tymphatic membranes daniel with elvis structures intact.   Oral cavity without growths, exudates, and moist.  Posterior pharynx without mass, obstruction, redness.  No thyromegaly, mass, tenderness, lymphadenopathy and supple.   CV: Regular rhythm.  No murmur, gallop, trace edema. Posterior pulses intact.  No carotid bruits.  CHEST: No chest wall tenderness or mass.   LUNGS: Symmetric motion with clear to auscultation.  No dullness to percussion  ABD: Soft, nontender without mass.   ORTHO: Symmetric extremities without swelling/point tenderness other than lateral shoulder. Full gross range of motion diffuse.   Walking without assistance.   NEURO: CN 2-12 grossly intact.  Symmetric facies and UE/LE. 3/5 strength throughout. 1/4 x bicep equal reflexes.  Nonfocal use extremities. Speech clear.    PSYCH: Oriented x 3.  Pleasant calm, well kept.  Purposeful/directed conservation with intact short/long gross memory.          Assessment/Plan   Medicare Risks and Personalized Health Plan  CMS Preventative Services Quick Reference  Advance Directive Discussion  Immunizations Discussed/Encouraged (specific immunizations; adacel Tdap, Influenza, Pneumococcal 23, Prevnar and Shingrix )    The " above risks/problems have been discussed with the patient.  Pertinent information has been shared with the patient in the After Visit Summary.  Follow up plans and orders are seen below in the Assessment/Plan Section.    Diagnoses and all orders for this visit:    1. Acute pain of right shoulder (Primary)  -     MRI Shoulder Right Without Contrast; Future    2. HTN (hypertension), benign    3. Gastroesophageal reflux disease, esophagitis presence not specified    4. Controlled type 2 diabetes mellitus without complication, without long-term current use of insulin (CMS/[facility])  -     Semaglutide,0.25 or 0.5MG/DOS, (Ozempic, 0.25 or 0.5 MG/DOSE,) 2 MG/1.5ML solution pen-injector; Inject 0.5 mg under the skin into the appropriate area as directed 1 (One) Time Per Week.  Dispense: 3 pen; Refill: 1    5. Osteoporosis, unspecified osteoporosis type, unspecified pathological fracture presence    6. Osteoarthritis, unspecified osteoarthritis type, unspecified site    7. History of anemia: ASA81,iron,gi(cp,div    8. Depression, unspecified depression type    9. Anticoagulated DM2/ASA 81 (11.9.17 pt chose qod)    10. Hyperglycemia  -     Semaglutide,0.25 or 0.5MG/DOS, (Ozempic, 0.25 or 0.5 MG/DOSE,) 2 MG/1.5ML solution pen-injector; Inject 0.5 mg under the skin into the appropriate area as directed 1 (One) Time Per Week.  Dispense: 3 pen; Refill: 1    11. Wellness examination-done      Follow Up:  Return for lab;, Dr Crowe-, 6 m;.     An After Visit Summary and PPPS were given to the patient.             Answers for HPI/ROS submitted by the patient on 7/7/2020   What is the primary reason for your visit?: Other  Please describe your symptoms.: This is a follow-up visit for:  high blood pressure, diabetes, arthritis, shoulder injury a month ago  Have you had these symptoms before?: Yes  How long have you been having these symptoms?: Greater than 2 weeks  Please list any medications you are currently taking for this condition.:  See list of meds  Please describe any probable cause for these symptoms. : Age?

## 2020-07-09 NOTE — PROGRESS NOTES
Subjective   Kezia Otoole is a 69 y.o. female presenting with chief complaint of:   Chief Complaint   Patient presents with   • Medicare Wellness-subsequent   • Hypertension   • Anticoagulation       History of Present Illness :  Alone.       Has multiple chronic problems to consider that might have a bearing on today's issues;  an interval appointment.       Chronic/acute problems reviewed today:   1. Acute pain of right shoulder: New pain.  2 weeks ago she fell and landed on the outside aspect of her shoulder.  It bruised.  It was sore to move.  He continues to be sore with movement.  She discussed a curbside with orthopedic [facility] and plain films were negative; she was told she needed MRI if it persisted.   2. HTN (hypertension), benign Chronic/unstable. Above targets today/without recent home blood pressures.  No significant chest pain, SOB, LE edema, orthopnea, near syncope, dizziness/light headness.  Agrees to follow next few days and let us know if stays elevated.   Recent Vitals       3/4/2020 6/9/2020 7/9/2020       BP:  137/79  149/79  154/94     Pulse:  75  77  97     Temp:  97.9 °F (36.6 °C)  96.9 °F (36.1 °C)  97.5 °F (36.4 °C)     Weight:  103 kg (227 lb 12.8 oz)  105 kg (231 lb 9.6 oz)  105 kg (231 lb 3.2 oz)     BMI (Calculated):  42.4  43.1  43            3. Gastroesophageal reflux disease, esophagitis presence not specified. Chronic/stable.  Controlled heartburn, reflux without dysphagia, melena.  Rx used/,  needed-doing ok.        4. Controlled type 2 diabetes mellitus without complication, without long-term current use of insulin (CMS/[facility]) Chronic/variable-recently higher No problem/pattern hypoglycemia/hyperglycemia manifest by poly- dypsia, phagia, uria, or sweats, diaphoretic episodes, syncope/near.       5. Osteoporosis, unspecified osteoporosis type, unspecified pathological fracture presence. Chronic/stable.  Last bone density/if below.   Has Rx/prolia Rx.  Ok when needed ([facility])  further bone density screening when due.         6. Osteoarthritis, unspecified osteoarthritis type, unspecified site Chronic/stable.  Various on/off joint pains/soreness/stiffness.  Particular joint problems with knees.  No joint swelling.  Treats mainly with reduced activity, Rx listed, Tylenol.  Using  NSAIDs, and occ injections.      7. History of anemia: ASA81,iron,gi(cp,div Chronic or past history/stable: This has been present before.    There has been GI evaulation in the past. There is no current melena, hematochezia. It has been benign to date and stable/watching.  Contributing comorbidities to date: noted. .     8. Depression, unspecified depression type chronic/variable without significant  mood swings, down moods, nervousness, difficulty with concentration to function home/work.  Others close have not been concerned.  No suicide ideation/intent.  Rx helps.      9. Anticoagulated DM2/ASA 81 (11.9.17 pt chose qod) Chronic/stable reason and use of.  Denies bleeding issues; especially epistaxis, melena, hematochezia.  Upper arms/others do bruise easily.  No significant bleeding or falls.        Has an/another acute issue today:  none.    The following portions of the patient's history were reviewed and updated as appropriate: allergies, current medications, past family history, past medical history, past social history, past surgical history and problem list.      Current Outpatient Medications:   •  acetaminophen (TYLENOL) 500 MG tablet, Take 1,000 mg by mouth Every 6 (Six) Hours As Needed for mild pain (1-3)., Disp: , Rfl:   •  albuterol (PROVENTIL HFA;VENTOLIN HFA) 108 (90 Base) MCG/ACT inhaler, Inhale 2 puffs Every 4 (Four) Hours As Needed for Wheezing or Shortness of Air., Disp: 1 inhaler, Rfl: 1  •  amLODIPine (NORVASC) 2.5 MG tablet, TAKE ONE TABLET DAILY, Disp: 30 tablet, Rfl: 5  •  aspirin 81 MG EC tablet, Take 1 tablet by mouth Every Other Day., Disp: 32 tablet, Rfl: 0  •  B Complex-C (SUPER B  COMPLEX PO), Take 1 tablet by mouth Daily., Disp: , Rfl:   •  Calcium Carb-Cholecalciferol (CALCIUM-VITAMIN D) 600-400 MG-UNIT tablet, Take 1 tablet by mouth 2 (Two) Times a Day., Disp: , Rfl:   •  carboxymethylcellulose (REFRESH PLUS) 0.5 % solution, 2 drops Daily As Needed for dry eyes., Disp: , Rfl:   •  celecoxib (CeleBREX) 200 MG capsule, Take 200 mg by mouth Daily., Disp: , Rfl:   •  Coenzyme Q10 (CO Q 10 PO), Take 1 capsule by mouth Every Other Day., Disp: , Rfl:   •  denosumab (PROLIA) 60 MG/ML solution syringe, Inject 60 mg under the skin into the appropriate area as directed Every 6 (Six) Months., Disp: , Rfl:   •  glucose blood test strip, Use as instructed: once a day testing of the one touch ultra blue strips, Disp: 50 each, Rfl: 11  •  guaiFENesin (MUCINEX) 600 MG 12 hr tablet, Take 600 mg by mouth 2 (Two) Times a Day., Disp: , Rfl:   •  hydroCHLOROthiazide (MICROZIDE) 12.5 MG capsule, TAKE ONE CAPSULE DAILY, Disp: 90 capsule, Rfl: 3  •  ipratropium-albuterol (DUO-NEB) 0.5-2.5 mg/3 ml nebulizer, Take 3 mL by nebulization 4 (Four) Times a Day., Disp: 40 vial, Rfl: 1  •  irbesartan (AVAPRO) 300 MG tablet, Take 1 tablet by mouth Daily., Disp: 90 tablet, Rfl: 2  •  latanoprost (XALATAN) 0.005 % ophthalmic solution, Administer 1 drop to both eyes Every Night., Disp: , Rfl: 5  •  metFORMIN ER (GLUCOPHAGE-XR) 500 MG 24 hr tablet, TAKE TWO TABLETS EVERY NIGHT, Disp: 60 tablet, Rfl: 5  •  Misc Natural Products (GLUCOSAMINE CHONDROITIN TRIPLE PO), Take 1 tablet by mouth 2 (Two) Times a Day., Disp: , Rfl:   •  montelukast (SINGULAIR) 10 MG tablet, TAKE ONE TABLET DAILY, Disp: 30 tablet, Rfl: 5  •  Multiple Vitamins-Minerals (VITRUM 50+ SENIOR MULTI PO), Take 1 tablet by mouth Daily., Disp: , Rfl:   •  NON FORMULARY, Immunotherapy, Disp: , Rfl:   •  potassium chloride (K-DUR) 10 MEQ CR tablet, TAKE ONE TABLET DAILY, Disp: 30 tablet, Rfl: 5  •  Semaglutide,0.25 or 0.5MG/DOS, (Ozempic, 0.25 or 0.5 MG/DOSE,) 2  MG/1.5ML solution pen-injector, Inject 0.5 mg under the skin into the appropriate area as directed 1 (One) Time Per Week., Disp: 3 pen, Rfl: 1  •  SYMBICORT 160-4.5 MCG/ACT inhaler, USE  2 PUFFS TWICE DAILY, Disp: 1 inhaler, Rfl: 5    No problems with medications.  Refills if needed done    Allergies   Allergen Reactions   • Other Other (See Comments)     Bandaids make arm Red/slightly swollen       Review of Systems  GENERAL:  Active/slower with limits, speed, stamina for age and various joint issues. Sleep is ok; apnea denied. No fever now.  ENDO:  No syncope, near or diaphoretic sweaty spells.  BS without testing/download.  HEENT: No head injury or headache.   No vision change.   No significant hearing loss.  Ears without pain/drainage.  No sore throat.   Same/occ nasal/sinus congestion/drainage. No epistaxis.  CHEST: No chest wall tenderness or mass.  No cough,  without wheeze.   No SOB; no hemoptysis.  CV: No chest pain, palpitations, usual  On/off ankle edema.  GI: No heartburn, dysphagia.  No abdominal pain, diarrhea, constipation.  No rectal bleeding, or melena.    :  Voids without dysuria, or incontinence to completion.  ORTHO: No painful/swollen joints but various on /off sore.  No change occ sore neck or back.  No acute neck or back pain without recent injury.  NEURO: No dizziness, weakness of extremities.  No numbness/paresthesias.   PSYCH: No memory loss.  Mood good; occ mild anxious, depressed but/and not suicidal.  Tries to tolerate stress .   Screening:  Mammogram: 12.19.19  Bone density: 12.11.18 [facility] hip -2.4; start prolia  Low dose CT chest: Tobacco-smoker/never: NA  GI: Colon-p+div/[facility]/11.19.18/5y  Prostate: NA  Usual lab order  6m CBC, CMP, A1c  12m CBC, CMP, A1c, LIPID, TSH, Vit D, uric acid      Lab Results:  Results for orders placed or performed in visit on 07/06/20   Calcium   Result Value Ref Range    Calcium 9.8 8.6 - 10.5 mg/dL       A1C:  Lab Results - Last 18 Months   Lab Units  "02/07/20  0747 08/30/19  0747 02/21/19  0743   HEMOGLOBIN A1C % 7.50* 6.90* 7.20     PSA:No results for input(s): PSA in the last 41635 hours.  CBC:  Lab Results - Last 18 Months   Lab Units 02/07/20  0747 08/30/19  0747 03/26/19  0723 02/21/19  0743   WBC 10*3/mm3 5.30 6.02 4.64 6.03   HEMOGLOBIN g/dL 15.2 15.4 11.6* 10.3*   HEMATOCRIT % 44.8 48.3* 40.6 35.2*   PLATELETS 10*3/mm3 250 241 273 321   IRON mcg/dL 83 79 97 27*      BMP/CMP:  Lab Results - Last 18 Months   Lab Units 07/06/20  0800 02/07/20  0747 08/30/19  0747 02/21/19  0743 02/01/19  0810   SODIUM mmol/L  --  137 137 136  --    POTASSIUM mmol/L  --  4.3 4.3 4.5  --    CHLORIDE mmol/L  --  98 97* 100  --    TOTAL CO2 mmol/L  --  23.5 25.6 27.0  --    GLUCOSE mg/dL  --  239* 172* 148*  --    BUN mg/dL  --  20 19 19  --    CREATININE mg/dL  --  0.80 0.78 0.68  --    EGFR IF NONAFRICN AM mL/min/1.73  --  71 73 86  --    EGFR IF AFRICN AM mL/min/1.73  --  86 88 104  --    CALCIUM mg/dL 9.8 10.2 9.6 9.3 9.1     HEPATIC:  Lab Results - Last 18 Months   Lab Units 02/07/20  0747 08/30/19  0747 02/21/19  0743   ALT (SGPT) U/L 55* 76* 50   AST (SGOT) U/L 43* 58* 37   ALK PHOS U/L 65 74 120     THYROID:  Lab Results - Last 18 Months   Lab Units 08/30/19  0747 02/21/19  0743   TSH uIU/mL 1.880 1.570       Objective   /94   Pulse 97   Temp 97.5 °F (36.4 °C)   Resp 16   Ht 156.2 cm (61.5\")   Wt 105 kg (231 lb 3.2 oz)   SpO2 99%   Breastfeeding No   BMI 42.98 kg/m²   Body mass index is 42.98 kg/m².    Recent Vitals       3/4/2020 6/9/2020 7/9/2020       BP:  137/79  149/79  154/94     Pulse:  75  77  97     Temp:  97.9 °F (36.6 °C)  96.9 °F (36.1 °C)  97.5 °F (36.4 °C)     Weight:  103 kg (227 lb 12.8 oz)  105 kg (231 lb 9.6 oz)  105 kg (231 lb 3.2 oz)     BMI (Calculated):  42.4  43.1  43           Physical Exam  GENERAL:  Well nourished/developed in no acute distress. Obese.   SKIN: Turgor excellent, without wound, rash, lesion.   HEENT: Normal cephalic " without trauma.  Pupils equal round reactive to light. Extraocular motions full without nystagmus.   External canals nonobstructive nontender without reddness. Tymphatic membranes daniel with elvis structures intact.   Oral cavity without growths, exudates, and moist.  Posterior pharynx without mass, obstruction, redness.  No thyromegaly, mass, tenderness, lymphadenopathy and supple.   CV: Regular rhythm.  No murmur, gallop, trace edema. Posterior pulses intact.  No carotid bruits.  CHEST: No chest wall tenderness or mass.   LUNGS: Symmetric motion with clear to auscultation.  No dullness to percussion  ABD: Soft, nontender without mass.   ORTHO: Symmetric extremities without swelling/point tenderness other than lateral shoulder. Full gross range of motion diffuse.   Walking without assistance.   NEURO: CN 2-12 grossly intact.  Symmetric facies and UE/LE. 3/5 strength throughout. 1/4 x bicep equal reflexes.  Nonfocal use extremities. Speech clear.    PSYCH: Oriented x 3.  Pleasant calm, well kept.  Purposeful/directed conservation with intact short/long gross memory.         Assessment/Plan     1. Acute pain of right shoulder    2. HTN (hypertension), benign    3. Gastroesophageal reflux disease, esophagitis presence not specified    4. Controlled type 2 diabetes mellitus without complication, without long-term current use of insulin (CMS/[facility])    5. Osteoporosis, unspecified osteoporosis type, unspecified pathological fracture presence    6. Osteoarthritis, unspecified osteoarthritis type, unspecified site    7. History of anemia: ASA81,iron,gi(cp,div    8. Depression, unspecified depression type    9. Anticoagulated DM2/ASA 81 (11.9.17 pt chose qod)    10. Hyperglycemia        Discussions:   Regroup on BS; more diet, exercise  Increase ozempic  Labs; due  wellness  MRI shoulder if can get  Rx: reviewed/changes:  New Medications Ordered This Visit   Medications   • Semaglutide,0.25 or 0.5MG/DOS, (Ozempic, 0.25 or 0.5  MG/DOSE,) 2 MG/1.5ML solution pen-injector     Sig: Inject 0.5 mg under the skin into the appropriate area as directed 1 (One) Time Per Week.     Dispense:  3 pen     Refill:  1       LAB/Testing/Referrals: reviewed/orders:   Today:   Orders Placed This Encounter   Procedures   • MRI Shoulder Right Without Contrast     Chronic/recurrent labs above or change to:   same       Body mass index is 42.98 kg/m².  Patient's Body mass index is 42.98 kg/m². BMI is above normal parameters. Recommendations include: exercise counseling, nutrition counseling and as advised.      Tobacco use reviewed:    Non-smoker  Kezia Otoole  reports that she has never smoked. She has never used smokeless tobacco..       Annual/wellness visit: same    There are no Patient Instructions on file for this visit.    Follow up: No follow-ups on file.  Future Appointments   Date Time Provider Department Center   8/11/2020  8:35 AM LAB [facility] [place] [facility] None       Answers for HPI/ROS submitted by the patient on 7/7/2020   What is the primary reason for your visit?: Other  Please describe your symptoms.: This is a follow-up visit for:  high blood pressure, diabetes, arthritis, shoulder injury a month ago  Have you had these symptoms before?: Yes  How long have you been having these symptoms?: Greater than 2 weeks  Please list any medications you are currently taking for this condition.: See list of meds  Please describe any probable cause for these symptoms. : Age?

## 2020-07-23 DIAGNOSIS — R73.9 HYPERGLYCEMIA: ICD-10-CM

## 2020-07-23 DIAGNOSIS — E11.9 CONTROLLED TYPE 2 DIABETES MELLITUS WITHOUT COMPLICATION, WITHOUT LONG-TERM CURRENT USE OF INSULIN (HCC): Chronic | ICD-10-CM

## 2020-07-28 DIAGNOSIS — I10 HTN (HYPERTENSION), BENIGN: ICD-10-CM

## 2020-07-28 RX ORDER — IRBESARTAN 300 MG/1
300 TABLET ORAL DAILY
Qty: 90 TABLET | Refills: 2 | Status: SHIPPED | OUTPATIENT
Start: 2020-07-28 | End: 2021-06-10

## 2020-07-28 NOTE — TELEPHONE ENCOUNTER
Pt needs a 90 day script and pharmacy says they did not get the last script for 90 days.  Will resend the Irbesartan now.

## 2020-08-04 RX ORDER — MONTELUKAST SODIUM 10 MG/1
TABLET ORAL
Qty: 30 TABLET | Refills: 5 | Status: SHIPPED | OUTPATIENT
Start: 2020-08-04 | End: 2021-02-03

## 2020-08-10 DIAGNOSIS — R73.01 ELEVATED FASTING GLUCOSE: Chronic | ICD-10-CM

## 2020-08-10 DIAGNOSIS — I10 HTN (HYPERTENSION), BENIGN: Chronic | ICD-10-CM

## 2020-08-10 DIAGNOSIS — E11.9 CONTROLLED TYPE 2 DIABETES MELLITUS WITHOUT COMPLICATION, WITHOUT LONG-TERM CURRENT USE OF INSULIN (HCC): Chronic | ICD-10-CM

## 2020-08-10 DIAGNOSIS — Z86.2 HISTORY OF ANEMIA: ICD-10-CM

## 2020-08-10 DIAGNOSIS — Z86.39 HISTORY OF IRON DEFICIENCY: Primary | ICD-10-CM

## 2020-08-10 DIAGNOSIS — M81.0 OSTEOPOROSIS, UNSPECIFIED OSTEOPOROSIS TYPE, UNSPECIFIED PATHOLOGICAL FRACTURE PRESENCE: ICD-10-CM

## 2020-08-11 ENCOUNTER — LAB (OUTPATIENT)
Dept: FAMILY MEDICINE CLINIC | Facility: CLINIC | Age: 70
End: 2020-08-11

## 2020-08-12 LAB
25(OH)D3+25(OH)D2 SERPL-MCNC: 54.8 NG/ML (ref 30–100)
ALBUMIN SERPL-MCNC: 4.4 G/DL (ref 3.5–5.2)
ALBUMIN/GLOB SERPL: 2.1 G/DL
ALP SERPL-CCNC: 55 U/L (ref 39–117)
ALT SERPL-CCNC: 35 U/L (ref 1–33)
AST SERPL-CCNC: 20 U/L (ref 1–32)
BASOPHILS # BLD AUTO: 0.04 10*3/MM3 (ref 0–0.2)
BASOPHILS NFR BLD AUTO: 0.7 % (ref 0–1.5)
BILIRUB SERPL-MCNC: 0.6 MG/DL (ref 0–1.2)
BUN SERPL-MCNC: 22 MG/DL (ref 8–23)
BUN/CREAT SERPL: 30.6 (ref 7–25)
CALCIUM SERPL-MCNC: 9.9 MG/DL (ref 8.6–10.5)
CHLORIDE SERPL-SCNC: 101 MMOL/L (ref 98–107)
CO2 SERPL-SCNC: 28.1 MMOL/L (ref 22–29)
CREAT SERPL-MCNC: 0.72 MG/DL (ref 0.57–1)
EOSINOPHIL # BLD AUTO: 0.07 10*3/MM3 (ref 0–0.4)
EOSINOPHIL NFR BLD AUTO: 1.2 % (ref 0.3–6.2)
ERYTHROCYTE [DISTWIDTH] IN BLOOD BY AUTOMATED COUNT: 12.9 % (ref 12.3–15.4)
FOLATE SERPL-MCNC: >20 NG/ML (ref 4.78–24.2)
GLOBULIN SER CALC-MCNC: 2.1 GM/DL
GLUCOSE SERPL-MCNC: 112 MG/DL (ref 65–99)
HBA1C MFR BLD: 5.8 % (ref 4.8–5.6)
HCT VFR BLD AUTO: 42.9 % (ref 34–46.6)
HGB BLD-MCNC: 14.8 G/DL (ref 12–15.9)
IMM GRANULOCYTES # BLD AUTO: 0.02 10*3/MM3 (ref 0–0.05)
IMM GRANULOCYTES NFR BLD AUTO: 0.3 % (ref 0–0.5)
IRON SERPL-MCNC: 89 MCG/DL (ref 37–145)
LYMPHOCYTES # BLD AUTO: 1.36 10*3/MM3 (ref 0.7–3.1)
LYMPHOCYTES NFR BLD AUTO: 23.2 % (ref 19.6–45.3)
MCH RBC QN AUTO: 32 PG (ref 26.6–33)
MCHC RBC AUTO-ENTMCNC: 34.5 G/DL (ref 31.5–35.7)
MCV RBC AUTO: 92.9 FL (ref 79–97)
MICROALBUMIN UR-MCNC: 8 UG/ML
MONOCYTES # BLD AUTO: 0.6 10*3/MM3 (ref 0.1–0.9)
MONOCYTES NFR BLD AUTO: 10.2 % (ref 5–12)
NEUTROPHILS # BLD AUTO: 3.77 10*3/MM3 (ref 1.7–7)
NEUTROPHILS NFR BLD AUTO: 64.4 % (ref 42.7–76)
NRBC BLD AUTO-RTO: 0 /100 WBC (ref 0–0.2)
PLATELET # BLD AUTO: 219 10*3/MM3 (ref 140–450)
POTASSIUM SERPL-SCNC: 4.2 MMOL/L (ref 3.5–5.2)
PROT SERPL-MCNC: 6.5 G/DL (ref 6–8.5)
RBC # BLD AUTO: 4.62 10*6/MM3 (ref 3.77–5.28)
SODIUM SERPL-SCNC: 143 MMOL/L (ref 136–145)
TSH SERPL DL<=0.005 MIU/L-ACNC: 0.99 UIU/ML (ref 0.27–4.2)
URATE SERPL-MCNC: 5.3 MG/DL (ref 2.4–5.7)
VIT B12 SERPL-MCNC: 362 PG/ML (ref 211–946)
WBC # BLD AUTO: 5.86 10*3/MM3 (ref 3.4–10.8)

## 2020-08-13 ENCOUNTER — TELEPHONE (OUTPATIENT)
Dept: FAMILY MEDICINE CLINIC | Facility: CLINIC | Age: 70
End: 2020-08-13

## 2020-08-13 LAB
MAGNESIUM SERPL-MCNC: 2 MG/DL (ref 1.6–2.4)
PHOSPHATE SERPL-MCNC: 3.7 MG/DL (ref 2.5–4.5)
WRITTEN AUTHORIZATION: NORMAL

## 2020-08-13 NOTE — TELEPHONE ENCOUNTER
"Received a call from [facility] \"we cant add the mag and phos because the blood went to [facility]\" advised our office did not request any additional labs? [facility] advised \"a Dr Jordan Hernandez at your office\" advised that is at ortho, not Dr Crowe, we are her PCP but maybe able to add to add specimen?    Received fax with order and called [facility] and thinks will be able to add? Did not give a volume, but rep did feel as it could be done, called [facility] back at [facility] and advised and she will be looking for the results and will fax to ortho.   "

## 2020-08-13 NOTE — TELEPHONE ENCOUNTER
"Message taken by SM front \"mag and phos cant be added to the labs at [facility], because the labs were sent to [facility]\" note given to this writer     Called  lab and advised that we did not call and try to add these labs, that it must be another provider and possible for Prolia? As those are labs required  "

## 2020-08-17 ENCOUNTER — INFUSION (OUTPATIENT)
Dept: ONCOLOGY | Facility: HOSPITAL | Age: 70
End: 2020-08-17

## 2020-08-17 VITALS
WEIGHT: 226 LBS | DIASTOLIC BLOOD PRESSURE: 87 MMHG | HEIGHT: 63 IN | TEMPERATURE: 98.1 F | HEART RATE: 71 BPM | SYSTOLIC BLOOD PRESSURE: 171 MMHG | BODY MASS INDEX: 40.04 KG/M2 | OXYGEN SATURATION: 97 % | RESPIRATION RATE: 20 BRPM

## 2020-08-17 DIAGNOSIS — M81.0 OSTEOPOROSIS, UNSPECIFIED OSTEOPOROSIS TYPE, UNSPECIFIED PATHOLOGICAL FRACTURE PRESENCE: Primary | ICD-10-CM

## 2020-08-17 PROCEDURE — 25010000002 DENOSUMAB 60 MG/ML SOLUTION PREFILLED SYRINGE: Performed by: PHYSICIAN ASSISTANT

## 2020-08-17 PROCEDURE — 96372 THER/PROPH/DIAG INJ SC/IM: CPT

## 2020-08-17 RX ADMIN — DENOSUMAB 60 MG: 60 INJECTION SUBCUTANEOUS at 10:29

## 2020-10-14 ENCOUNTER — FLU SHOT (OUTPATIENT)
Dept: FAMILY MEDICINE CLINIC | Facility: CLINIC | Age: 70
End: 2020-10-14

## 2020-10-14 DIAGNOSIS — Z23 NEED FOR INFLUENZA VACCINATION: ICD-10-CM

## 2020-10-14 PROCEDURE — 90694 VACC AIIV4 NO PRSRV 0.5ML IM: CPT | Performed by: FAMILY MEDICINE

## 2020-10-14 PROCEDURE — G0008 ADMIN INFLUENZA VIRUS VAC: HCPCS | Performed by: FAMILY MEDICINE

## 2020-10-31 DIAGNOSIS — J45.901 EXACERBATION OF ASTHMA, UNSPECIFIED ASTHMA SEVERITY, UNSPECIFIED WHETHER PERSISTENT: ICD-10-CM

## 2020-10-31 DIAGNOSIS — I10 HTN (HYPERTENSION), BENIGN: ICD-10-CM

## 2020-10-31 DIAGNOSIS — J45.20 MILD INTERMITTENT ASTHMA, UNSPECIFIED WHETHER COMPLICATED: ICD-10-CM

## 2020-11-02 RX ORDER — POTASSIUM CHLORIDE 750 MG/1
TABLET, FILM COATED, EXTENDED RELEASE ORAL
Qty: 90 TABLET | Refills: 3 | Status: SHIPPED | OUTPATIENT
Start: 2020-11-02 | End: 2021-10-25

## 2020-11-02 RX ORDER — AMLODIPINE BESYLATE 2.5 MG/1
TABLET ORAL
Qty: 90 TABLET | Refills: 3 | Status: SHIPPED | OUTPATIENT
Start: 2020-11-02 | End: 2021-03-03 | Stop reason: SDUPTHER

## 2020-11-02 RX ORDER — BUDESONIDE AND FORMOTEROL FUMARATE DIHYDRATE 160; 4.5 UG/1; UG/1
AEROSOL RESPIRATORY (INHALATION)
Qty: 30.6 G | Refills: 3 | Status: SHIPPED | OUTPATIENT
Start: 2020-11-02 | End: 2020-11-27 | Stop reason: SDUPTHER

## 2020-11-03 RX ORDER — HYDROCHLOROTHIAZIDE 12.5 MG/1
CAPSULE, GELATIN COATED ORAL
Qty: 90 CAPSULE | Refills: 3 | Status: SHIPPED | OUTPATIENT
Start: 2020-11-03 | End: 2021-11-23

## 2020-11-27 ENCOUNTER — TELEPHONE (OUTPATIENT)
Dept: FAMILY MEDICINE CLINIC | Facility: CLINIC | Age: 70
End: 2020-11-27

## 2020-11-27 DIAGNOSIS — J45.901 EXACERBATION OF ASTHMA, UNSPECIFIED ASTHMA SEVERITY, UNSPECIFIED WHETHER PERSISTENT: ICD-10-CM

## 2020-11-27 DIAGNOSIS — J45.20 MILD INTERMITTENT ASTHMA, UNSPECIFIED WHETHER COMPLICATED: ICD-10-CM

## 2020-11-27 RX ORDER — BUDESONIDE AND FORMOTEROL FUMARATE DIHYDRATE 160; 4.5 UG/1; UG/1
2 AEROSOL RESPIRATORY (INHALATION) 2 TIMES DAILY
Qty: 30.6 G | Refills: 3 | Status: SHIPPED | OUTPATIENT
Start: 2020-11-27 | End: 2021-12-27

## 2020-12-15 ENCOUNTER — TELEPHONE (OUTPATIENT)
Dept: FAMILY MEDICINE CLINIC | Facility: CLINIC | Age: 70
End: 2020-12-15

## 2020-12-15 DIAGNOSIS — Z12.31 ENCOUNTER FOR SCREENING MAMMOGRAM FOR BREAST CANCER: Primary | ICD-10-CM

## 2020-12-15 NOTE — TELEPHONE ENCOUNTER
Caller: Kezia Otoole    Relationship: Self    Best call back number: [phone]    What orders are you requesting (i.e. lab or imaging): MAMMOGRAM     In what timeframe would the patient need to come in: BEFORE Thursday. PATIENT SCHEDULED APPOINTMENT FOR 12/17/20     Where will you receive your lab/imaging services: [facility]     Additional notes: PLEASE CALL AND ADVISE PATIENT.

## 2020-12-18 DIAGNOSIS — Z12.31 ENCOUNTER FOR SCREENING MAMMOGRAM FOR BREAST CANCER: ICD-10-CM

## 2021-01-12 ENCOUNTER — TELEPHONE (OUTPATIENT)
Dept: FAMILY MEDICINE CLINIC | Facility: CLINIC | Age: 71
End: 2021-01-12

## 2021-01-12 NOTE — TELEPHONE ENCOUNTER
Yes    PT CALLED WANTING DR. BURGER'S ADVICE OVER WHETHER SHE SHOULD RECEIVE THE COVID-19 VACCINE    CALLBACK [phone]

## 2021-01-25 ENCOUNTER — HOSPITAL ENCOUNTER (OUTPATIENT)
Dept: PREADMISSION TESTING | Age: 71
Discharge: HOME | End: 2021-01-29
Payer: MEDICARE

## 2021-01-25 VITALS — WEIGHT: 237 LBS | HEIGHT: 63 IN | BODY MASS INDEX: 41.99 KG/M2

## 2021-01-25 LAB
ABO/RH: NORMAL
ANION GAP SERPL CALCULATED.3IONS-SCNC: 12 MMOL/L (ref 7–19)
ANTIBODY SCREEN: NORMAL
APTT: 26.8 SEC (ref 26–36.2)
BASOPHILS ABSOLUTE: 0.1 K/UL (ref 0–0.2)
BASOPHILS RELATIVE PERCENT: 0.8 % (ref 0–1)
BUN BLDV-MCNC: 21 MG/DL (ref 8–23)
CALCIUM SERPL-MCNC: 10.2 MG/DL (ref 8.8–10.2)
CHLORIDE BLD-SCNC: 100 MMOL/L (ref 98–111)
CO2: 27 MMOL/L (ref 22–29)
CREAT SERPL-MCNC: 0.8 MG/DL (ref 0.5–0.9)
EKG P AXIS: 50 DEGREES
EKG P-R INTERVAL: 204 MS
EKG Q-T INTERVAL: 340 MS
EKG QRS DURATION: 78 MS
EKG QTC CALCULATION (BAZETT): 399 MS
EKG T AXIS: 21 DEGREES
EOSINOPHILS ABSOLUTE: 0.1 K/UL (ref 0–0.6)
EOSINOPHILS RELATIVE PERCENT: 0.9 % (ref 0–5)
GFR AFRICAN AMERICAN: >59
GFR NON-AFRICAN AMERICAN: >60
GLUCOSE BLD-MCNC: 158 MG/DL (ref 74–109)
HBA1C MFR BLD: 6.8 % (ref 4–6)
HCT VFR BLD CALC: 45.6 % (ref 37–47)
HEMOGLOBIN: 15.1 G/DL (ref 12–16)
IMMATURE GRANULOCYTES #: 0 K/UL
INR BLD: 0.92 (ref 0.88–1.18)
LYMPHOCYTES ABSOLUTE: 1.6 K/UL (ref 1.1–4.5)
LYMPHOCYTES RELATIVE PERCENT: 21 % (ref 20–40)
MCH RBC QN AUTO: 31.2 PG (ref 27–31)
MCHC RBC AUTO-ENTMCNC: 33.1 G/DL (ref 33–37)
MCV RBC AUTO: 94.2 FL (ref 81–99)
MONOCYTES ABSOLUTE: 0.8 K/UL (ref 0–0.9)
MONOCYTES RELATIVE PERCENT: 10.3 % (ref 0–10)
NEUTROPHILS ABSOLUTE: 5.2 K/UL (ref 1.5–7.5)
NEUTROPHILS RELATIVE PERCENT: 66.7 % (ref 50–65)
PDW BLD-RTO: 13.7 % (ref 11.5–14.5)
PLATELET # BLD: 271 K/UL (ref 130–400)
PMV BLD AUTO: 9.7 FL (ref 9.4–12.3)
POTASSIUM SERPL-SCNC: 4 MMOL/L (ref 3.5–5)
PROTHROMBIN TIME: 12.2 SEC (ref 12–14.6)
RBC # BLD: 4.84 M/UL (ref 4.2–5.4)
SODIUM BLD-SCNC: 139 MMOL/L (ref 136–145)
WBC # BLD: 7.8 K/UL (ref 4.8–10.8)

## 2021-01-25 PROCEDURE — 80048 BASIC METABOLIC PNL TOTAL CA: CPT

## 2021-01-25 PROCEDURE — 86900 BLOOD TYPING SEROLOGIC ABO: CPT

## 2021-01-25 PROCEDURE — 93010 ELECTROCARDIOGRAM REPORT: CPT | Performed by: INTERNAL MEDICINE

## 2021-01-25 PROCEDURE — 87081 CULTURE SCREEN ONLY: CPT

## 2021-01-25 PROCEDURE — 85025 COMPLETE CBC W/AUTO DIFF WBC: CPT

## 2021-01-25 PROCEDURE — 85610 PROTHROMBIN TIME: CPT

## 2021-01-25 PROCEDURE — 85730 THROMBOPLASTIN TIME PARTIAL: CPT

## 2021-01-25 PROCEDURE — 86850 RBC ANTIBODY SCREEN: CPT

## 2021-01-25 PROCEDURE — 83036 HEMOGLOBIN GLYCOSYLATED A1C: CPT

## 2021-01-25 PROCEDURE — 86901 BLOOD TYPING SEROLOGIC RH(D): CPT

## 2021-01-25 PROCEDURE — 93005 ELECTROCARDIOGRAM TRACING: CPT | Performed by: ORTHOPAEDIC SURGERY

## 2021-01-25 RX ORDER — IRBESARTAN 300 MG/1
300 TABLET ORAL DAILY
COMMUNITY

## 2021-01-25 RX ORDER — DENOSUMAB 60 MG/ML
60 INJECTION SUBCUTANEOUS ONCE
COMMUNITY

## 2021-01-25 RX ORDER — BUDESONIDE AND FORMOTEROL FUMARATE DIHYDRATE 160; 4.5 UG/1; UG/1
2 AEROSOL RESPIRATORY (INHALATION) 2 TIMES DAILY
COMMUNITY

## 2021-01-25 RX ORDER — GUAIFENESIN 600 MG/1
600 TABLET, EXTENDED RELEASE ORAL 2 TIMES DAILY
COMMUNITY

## 2021-01-25 RX ORDER — ASPIRIN 81 MG/1
81 TABLET ORAL EVERY OTHER DAY
Status: ON HOLD | COMMUNITY
End: 2021-02-03 | Stop reason: HOSPADM

## 2021-01-25 RX ORDER — TURMERIC/TURMERIC EXT/PEPR EXT 900-100 MG
1 CAPSULE ORAL DAILY
COMMUNITY

## 2021-01-25 RX ORDER — VITAMIN B COMPLEX
1 TABLET ORAL DAILY
COMMUNITY

## 2021-01-25 RX ORDER — ACETAMINOPHEN 500 MG
1000 TABLET ORAL 3 TIMES DAILY PRN
COMMUNITY

## 2021-01-25 RX ORDER — METFORMIN HYDROCHLORIDE 500 MG/1
1000 TABLET, EXTENDED RELEASE ORAL NIGHTLY
COMMUNITY

## 2021-01-25 RX ORDER — MONTELUKAST SODIUM 10 MG/1
10 TABLET ORAL NIGHTLY
COMMUNITY

## 2021-01-25 RX ORDER — M-VIT,TX,IRON,MINS/CALC/FOLIC 27MG-0.4MG
1 TABLET ORAL DAILY
COMMUNITY

## 2021-01-25 RX ORDER — OYSTER SHELL CALCIUM WITH VITAMIN D 500; 200 MG/1; [IU]/1
1 TABLET, FILM COATED ORAL DAILY
COMMUNITY

## 2021-01-25 RX ORDER — LATANOPROST 50 UG/ML
1 SOLUTION/ DROPS OPHTHALMIC NIGHTLY
COMMUNITY

## 2021-01-25 RX ORDER — AMLODIPINE BESYLATE 2.5 MG/1
2.5 TABLET ORAL DAILY
COMMUNITY

## 2021-01-25 RX ORDER — HYDROCHLOROTHIAZIDE 12.5 MG/1
12.5 CAPSULE, GELATIN COATED ORAL DAILY
COMMUNITY

## 2021-01-25 RX ORDER — POTASSIUM CHLORIDE 750 MG/1
10 TABLET, EXTENDED RELEASE ORAL DAILY
COMMUNITY

## 2021-01-25 RX ORDER — MULTIVIT WITH MINERALS/LUTEIN
1000 TABLET ORAL DAILY
Status: ON HOLD | COMMUNITY
End: 2021-02-02

## 2021-01-25 RX ORDER — ALBUTEROL SULFATE 90 UG/1
2 AEROSOL, METERED RESPIRATORY (INHALATION) EVERY 6 HOURS PRN
COMMUNITY

## 2021-01-25 RX ORDER — CELECOXIB 200 MG/1
200 CAPSULE ORAL DAILY
COMMUNITY

## 2021-01-26 LAB — SARS-COV-2, NAA: NOT DETECTED

## 2021-01-27 LAB — MRSA CULTURE ONLY: NORMAL

## 2021-01-29 ENCOUNTER — HOSPITAL ENCOUNTER (OUTPATIENT)
Dept: PREADMISSION TESTING | Age: 71
Discharge: HOME | End: 2021-02-02
Payer: MEDICARE

## 2021-01-29 LAB — SARS-COV-2, PCR: NOT DETECTED

## 2021-01-29 PROCEDURE — U0003 INFECTIOUS AGENT DETECTION BY NUCLEIC ACID (DNA OR RNA); SEVERE ACUTE RESPIRATORY SYNDROME CORONAVIRUS 2 (SARS-COV-2) (CORONAVIRUS DISEASE [COVID-19]), AMPLIFIED PROBE TECHNIQUE, MAKING USE OF HIGH THROUGHPUT TECHNOLOGIES AS DESCRIBED BY CMS-2020-01-R: HCPCS

## 2021-02-02 ENCOUNTER — ANESTHESIA EVENT (OUTPATIENT)
Dept: OPERATING ROOM | Age: 71
End: 2021-02-02
Payer: MEDICARE

## 2021-02-02 ENCOUNTER — ANESTHESIA (OUTPATIENT)
Dept: OPERATING ROOM | Age: 71
End: 2021-02-02
Payer: MEDICARE

## 2021-02-02 ENCOUNTER — HOSPITAL ENCOUNTER (OUTPATIENT)
Age: 71
Setting detail: OBSERVATION
Discharge: HOME HEALTH | End: 2021-02-03
Attending: ORTHOPAEDIC SURGERY | Admitting: ORTHOPAEDIC SURGERY
Payer: MEDICARE

## 2021-02-02 VITALS
DIASTOLIC BLOOD PRESSURE: 55 MMHG | OXYGEN SATURATION: 92 % | SYSTOLIC BLOOD PRESSURE: 114 MMHG | TEMPERATURE: 97.7 F | RESPIRATION RATE: 3 BRPM

## 2021-02-02 DIAGNOSIS — M17.11 PRIMARY OSTEOARTHRITIS OF RIGHT KNEE: Primary | ICD-10-CM

## 2021-02-02 LAB
ABO/RH: NORMAL
ANTIBODY SCREEN: NORMAL
GLUCOSE BLD-MCNC: 154 MG/DL (ref 70–99)
PERFORMED ON: ABNORMAL

## 2021-02-02 PROCEDURE — 2709999900 HC NON-CHARGEABLE SUPPLY: Performed by: ORTHOPAEDIC SURGERY

## 2021-02-02 PROCEDURE — 6360000002 HC RX W HCPCS

## 2021-02-02 PROCEDURE — 6370000000 HC RX 637 (ALT 250 FOR IP): Performed by: ORTHOPAEDIC SURGERY

## 2021-02-02 PROCEDURE — 6360000002 HC RX W HCPCS: Performed by: ORTHOPAEDIC SURGERY

## 2021-02-02 PROCEDURE — C1776 JOINT DEVICE (IMPLANTABLE): HCPCS | Performed by: ORTHOPAEDIC SURGERY

## 2021-02-02 PROCEDURE — 2500000003 HC RX 250 WO HCPCS: Performed by: NURSE ANESTHETIST, CERTIFIED REGISTERED

## 2021-02-02 PROCEDURE — 2580000003 HC RX 258: Performed by: NURSE ANESTHETIST, CERTIFIED REGISTERED

## 2021-02-02 PROCEDURE — 64447 NJX AA&/STRD FEMORAL NRV IMG: CPT

## 2021-02-02 PROCEDURE — 2580000003 HC RX 258: Performed by: ORTHOPAEDIC SURGERY

## 2021-02-02 PROCEDURE — G0378 HOSPITAL OBSERVATION PER HR: HCPCS

## 2021-02-02 PROCEDURE — 6360000002 HC RX W HCPCS: Performed by: ANESTHESIOLOGY

## 2021-02-02 PROCEDURE — 86850 RBC ANTIBODY SCREEN: CPT

## 2021-02-02 PROCEDURE — 6360000002 HC RX W HCPCS: Performed by: NURSE ANESTHETIST, CERTIFIED REGISTERED

## 2021-02-02 PROCEDURE — 7100000000 HC PACU RECOVERY - FIRST 15 MIN: Performed by: ORTHOPAEDIC SURGERY

## 2021-02-02 PROCEDURE — 3700000000 HC ANESTHESIA ATTENDED CARE: Performed by: ORTHOPAEDIC SURGERY

## 2021-02-02 PROCEDURE — 3600000015 HC SURGERY LEVEL 5 ADDTL 15MIN: Performed by: ORTHOPAEDIC SURGERY

## 2021-02-02 PROCEDURE — 2700000000 HC OXYGEN THERAPY PER DAY

## 2021-02-02 PROCEDURE — 82947 ASSAY GLUCOSE BLOOD QUANT: CPT

## 2021-02-02 PROCEDURE — 36415 COLL VENOUS BLD VENIPUNCTURE: CPT

## 2021-02-02 PROCEDURE — 3600000005 HC SURGERY LEVEL 5 BASE: Performed by: ORTHOPAEDIC SURGERY

## 2021-02-02 PROCEDURE — 86901 BLOOD TYPING SEROLOGIC RH(D): CPT

## 2021-02-02 PROCEDURE — 86900 BLOOD TYPING SEROLOGIC ABO: CPT

## 2021-02-02 PROCEDURE — 94640 AIRWAY INHALATION TREATMENT: CPT

## 2021-02-02 PROCEDURE — 3700000001 HC ADD 15 MINUTES (ANESTHESIA): Performed by: ORTHOPAEDIC SURGERY

## 2021-02-02 PROCEDURE — 2500000003 HC RX 250 WO HCPCS: Performed by: ORTHOPAEDIC SURGERY

## 2021-02-02 PROCEDURE — 7100000001 HC PACU RECOVERY - ADDTL 15 MIN: Performed by: ORTHOPAEDIC SURGERY

## 2021-02-02 PROCEDURE — 2580000003 HC RX 258: Performed by: ANESTHESIOLOGY

## 2021-02-02 PROCEDURE — C1713 ANCHOR/SCREW BN/BN,TIS/BN: HCPCS | Performed by: ORTHOPAEDIC SURGERY

## 2021-02-02 PROCEDURE — 2500000003 HC RX 250 WO HCPCS

## 2021-02-02 DEVICE — IMPL KNEE PERSONA ART SURF 12MM RT: Type: IMPLANTABLE DEVICE | Site: KNEE | Status: FUNCTIONAL

## 2021-02-02 DEVICE — PSN TIB STM 5 DEG SZ C R: Type: IMPLANTABLE DEVICE | Site: KNEE | Status: FUNCTIONAL

## 2021-02-02 DEVICE — EXTENSION STEM L30MM DIA14MM KNEE TAPR CEM PERSONA: Type: IMPLANTABLE DEVICE | Site: KNEE | Status: FUNCTIONAL

## 2021-02-02 DEVICE — CEMENT BNE 40GM HI VISC RADPQ FOR REV SURG: Type: IMPLANTABLE DEVICE | Site: KNEE | Status: FUNCTIONAL

## 2021-02-02 DEVICE — IMPL KNEE PERSONA POLY PATELLA 26MM: Type: IMPLANTABLE DEVICE | Site: KNEE | Status: FUNCTIONAL

## 2021-02-02 DEVICE — IMPL KNEE PERSONA FEM CEM PS STD RT SZ 5: Type: IMPLANTABLE DEVICE | Site: KNEE | Status: FUNCTIONAL

## 2021-02-02 RX ORDER — CELECOXIB 200 MG/1
200 CAPSULE ORAL DAILY
Status: DISCONTINUED | OUTPATIENT
Start: 2021-02-02 | End: 2021-02-03 | Stop reason: HOSPADM

## 2021-02-02 RX ORDER — LOSARTAN POTASSIUM 25 MG/1
25 TABLET ORAL DAILY
Status: DISCONTINUED | OUTPATIENT
Start: 2021-02-02 | End: 2021-02-03 | Stop reason: HOSPADM

## 2021-02-02 RX ORDER — SODIUM CHLORIDE 0.9 % (FLUSH) 0.9 %
10 SYRINGE (ML) INJECTION EVERY 12 HOURS SCHEDULED
Status: DISCONTINUED | OUTPATIENT
Start: 2021-02-02 | End: 2021-02-02 | Stop reason: HOSPADM

## 2021-02-02 RX ORDER — PROPOFOL 10 MG/ML
INJECTION, EMULSION INTRAVENOUS PRN
Status: DISCONTINUED | OUTPATIENT
Start: 2021-02-02 | End: 2021-02-02 | Stop reason: SDUPTHER

## 2021-02-02 RX ORDER — CELECOXIB 100 MG/1
100 CAPSULE ORAL ONCE
Status: COMPLETED | OUTPATIENT
Start: 2021-02-02 | End: 2021-02-02

## 2021-02-02 RX ORDER — HYDRALAZINE HYDROCHLORIDE 20 MG/ML
5 INJECTION INTRAMUSCULAR; INTRAVENOUS EVERY 10 MIN PRN
Status: DISCONTINUED | OUTPATIENT
Start: 2021-02-02 | End: 2021-02-02 | Stop reason: HOSPADM

## 2021-02-02 RX ORDER — MIDAZOLAM HYDROCHLORIDE 1 MG/ML
2 INJECTION INTRAMUSCULAR; INTRAVENOUS
Status: COMPLETED | OUTPATIENT
Start: 2021-02-02 | End: 2021-02-02

## 2021-02-02 RX ORDER — DIPHENHYDRAMINE HYDROCHLORIDE 50 MG/ML
12.5 INJECTION INTRAMUSCULAR; INTRAVENOUS
Status: DISCONTINUED | OUTPATIENT
Start: 2021-02-02 | End: 2021-02-02 | Stop reason: HOSPADM

## 2021-02-02 RX ORDER — MIDAZOLAM HYDROCHLORIDE 1 MG/ML
INJECTION INTRAMUSCULAR; INTRAVENOUS
Status: COMPLETED
Start: 2021-02-02 | End: 2021-02-02

## 2021-02-02 RX ORDER — HYDROMORPHONE HYDROCHLORIDE 1 MG/ML
0.5 INJECTION, SOLUTION INTRAMUSCULAR; INTRAVENOUS; SUBCUTANEOUS EVERY 5 MIN PRN
Status: DISCONTINUED | OUTPATIENT
Start: 2021-02-02 | End: 2021-02-02 | Stop reason: HOSPADM

## 2021-02-02 RX ORDER — MORPHINE SULFATE 4 MG/ML
4 INJECTION, SOLUTION INTRAMUSCULAR; INTRAVENOUS
Status: DISCONTINUED | OUTPATIENT
Start: 2021-02-02 | End: 2021-02-02 | Stop reason: HOSPADM

## 2021-02-02 RX ORDER — LIDOCAINE HYDROCHLORIDE 10 MG/ML
1 INJECTION, SOLUTION EPIDURAL; INFILTRATION; INTRACAUDAL; PERINEURAL
Status: DISCONTINUED | OUTPATIENT
Start: 2021-02-02 | End: 2021-02-02 | Stop reason: HOSPADM

## 2021-02-02 RX ORDER — ROCURONIUM BROMIDE 10 MG/ML
INJECTION, SOLUTION INTRAVENOUS PRN
Status: DISCONTINUED | OUTPATIENT
Start: 2021-02-02 | End: 2021-02-02 | Stop reason: SDUPTHER

## 2021-02-02 RX ORDER — METOCLOPRAMIDE HYDROCHLORIDE 5 MG/ML
10 INJECTION INTRAMUSCULAR; INTRAVENOUS
Status: DISCONTINUED | OUTPATIENT
Start: 2021-02-02 | End: 2021-02-02 | Stop reason: HOSPADM

## 2021-02-02 RX ORDER — VANCOMYCIN HYDROCHLORIDE 1 G/20ML
INJECTION, POWDER, LYOPHILIZED, FOR SOLUTION INTRAVENOUS PRN
Status: DISCONTINUED | OUTPATIENT
Start: 2021-02-02 | End: 2021-02-02 | Stop reason: HOSPADM

## 2021-02-02 RX ORDER — ARFORMOTEROL TARTRATE 15 UG/2ML
15 SOLUTION RESPIRATORY (INHALATION) 2 TIMES DAILY
Status: DISCONTINUED | OUTPATIENT
Start: 2021-02-02 | End: 2021-02-03 | Stop reason: HOSPADM

## 2021-02-02 RX ORDER — ROPIVACAINE HYDROCHLORIDE 2 MG/ML
INJECTION, SOLUTION EPIDURAL; INFILTRATION; PERINEURAL PRN
Status: DISCONTINUED | OUTPATIENT
Start: 2021-02-02 | End: 2021-02-02 | Stop reason: HOSPADM

## 2021-02-02 RX ORDER — SCOLOPAMINE TRANSDERMAL SYSTEM 1 MG/1
1 PATCH, EXTENDED RELEASE TRANSDERMAL ONCE
Status: DISCONTINUED | OUTPATIENT
Start: 2021-02-02 | End: 2021-02-02

## 2021-02-02 RX ORDER — LABETALOL HYDROCHLORIDE 5 MG/ML
5 INJECTION, SOLUTION INTRAVENOUS EVERY 10 MIN PRN
Status: DISCONTINUED | OUTPATIENT
Start: 2021-02-02 | End: 2021-02-02 | Stop reason: HOSPADM

## 2021-02-02 RX ORDER — BUDESONIDE 0.5 MG/2ML
0.5 INHALANT ORAL 2 TIMES DAILY
Status: DISCONTINUED | OUTPATIENT
Start: 2021-02-02 | End: 2021-02-03 | Stop reason: HOSPADM

## 2021-02-02 RX ORDER — ONDANSETRON 2 MG/ML
INJECTION INTRAMUSCULAR; INTRAVENOUS PRN
Status: DISCONTINUED | OUTPATIENT
Start: 2021-02-02 | End: 2021-02-02 | Stop reason: SDUPTHER

## 2021-02-02 RX ORDER — ACETAMINOPHEN 500 MG
1000 TABLET ORAL ONCE
Status: COMPLETED | OUTPATIENT
Start: 2021-02-02 | End: 2021-02-02

## 2021-02-02 RX ORDER — SODIUM CHLORIDE 0.9 % (FLUSH) 0.9 %
10 SYRINGE (ML) INJECTION EVERY 12 HOURS SCHEDULED
Status: DISCONTINUED | OUTPATIENT
Start: 2021-02-02 | End: 2021-02-03 | Stop reason: HOSPADM

## 2021-02-02 RX ORDER — ROPIVACAINE HYDROCHLORIDE 5 MG/ML
INJECTION, SOLUTION EPIDURAL; INFILTRATION; PERINEURAL
Status: COMPLETED | OUTPATIENT
Start: 2021-02-02 | End: 2021-02-02

## 2021-02-02 RX ORDER — LIDOCAINE HYDROCHLORIDE 10 MG/ML
INJECTION, SOLUTION EPIDURAL; INFILTRATION; INTRACAUDAL; PERINEURAL PRN
Status: DISCONTINUED | OUTPATIENT
Start: 2021-02-02 | End: 2021-02-02 | Stop reason: SDUPTHER

## 2021-02-02 RX ORDER — FENTANYL CITRATE 50 UG/ML
50 INJECTION, SOLUTION INTRAMUSCULAR; INTRAVENOUS
Status: COMPLETED | OUTPATIENT
Start: 2021-02-02 | End: 2021-02-02

## 2021-02-02 RX ORDER — MORPHINE SULFATE 4 MG/ML
2 INJECTION, SOLUTION INTRAMUSCULAR; INTRAVENOUS
Status: DISCONTINUED | OUTPATIENT
Start: 2021-02-02 | End: 2021-02-03 | Stop reason: HOSPADM

## 2021-02-02 RX ORDER — HYDROMORPHONE HYDROCHLORIDE 1 MG/ML
0.25 INJECTION, SOLUTION INTRAMUSCULAR; INTRAVENOUS; SUBCUTANEOUS EVERY 5 MIN PRN
Status: DISCONTINUED | OUTPATIENT
Start: 2021-02-02 | End: 2021-02-02 | Stop reason: HOSPADM

## 2021-02-02 RX ORDER — EPINEPHRINE 1 MG/ML
INJECTION, SOLUTION, CONCENTRATE INTRAVENOUS PRN
Status: DISCONTINUED | OUTPATIENT
Start: 2021-02-02 | End: 2021-02-02 | Stop reason: HOSPADM

## 2021-02-02 RX ORDER — SODIUM CHLORIDE 0.9 % (FLUSH) 0.9 %
10 SYRINGE (ML) INJECTION PRN
Status: DISCONTINUED | OUTPATIENT
Start: 2021-02-02 | End: 2021-02-03 | Stop reason: HOSPADM

## 2021-02-02 RX ORDER — GUAIFENESIN 600 MG/1
600 TABLET, EXTENDED RELEASE ORAL 2 TIMES DAILY
Status: DISCONTINUED | OUTPATIENT
Start: 2021-02-02 | End: 2021-02-03 | Stop reason: HOSPADM

## 2021-02-02 RX ORDER — MEPERIDINE HYDROCHLORIDE 50 MG/ML
12.5 INJECTION INTRAMUSCULAR; INTRAVENOUS; SUBCUTANEOUS EVERY 5 MIN PRN
Status: DISCONTINUED | OUTPATIENT
Start: 2021-02-02 | End: 2021-02-02 | Stop reason: HOSPADM

## 2021-02-02 RX ORDER — DEXAMETHASONE SODIUM PHOSPHATE 10 MG/ML
INJECTION, SOLUTION INTRAMUSCULAR; INTRAVENOUS PRN
Status: DISCONTINUED | OUTPATIENT
Start: 2021-02-02 | End: 2021-02-02 | Stop reason: SDUPTHER

## 2021-02-02 RX ORDER — AMLODIPINE BESYLATE 2.5 MG/1
2.5 TABLET ORAL DAILY
Status: DISCONTINUED | OUTPATIENT
Start: 2021-02-02 | End: 2021-02-03 | Stop reason: HOSPADM

## 2021-02-02 RX ORDER — MORPHINE SULFATE 4 MG/ML
4 INJECTION, SOLUTION INTRAMUSCULAR; INTRAVENOUS
Status: DISCONTINUED | OUTPATIENT
Start: 2021-02-02 | End: 2021-02-03 | Stop reason: HOSPADM

## 2021-02-02 RX ORDER — SENNA AND DOCUSATE SODIUM 50; 8.6 MG/1; MG/1
1 TABLET, FILM COATED ORAL 2 TIMES DAILY
Status: DISCONTINUED | OUTPATIENT
Start: 2021-02-02 | End: 2021-02-03 | Stop reason: HOSPADM

## 2021-02-02 RX ORDER — OXYCODONE HCL 10 MG/1
10 TABLET, FILM COATED, EXTENDED RELEASE ORAL
Status: COMPLETED | OUTPATIENT
Start: 2021-02-02 | End: 2021-02-02

## 2021-02-02 RX ORDER — VITAMIN B COMPLEX
1000 TABLET ORAL DAILY
Status: DISCONTINUED | OUTPATIENT
Start: 2021-02-02 | End: 2021-02-03 | Stop reason: HOSPADM

## 2021-02-02 RX ORDER — LATANOPROST 50 UG/ML
1 SOLUTION/ DROPS OPHTHALMIC NIGHTLY
Status: DISCONTINUED | OUTPATIENT
Start: 2021-02-02 | End: 2021-02-03 | Stop reason: HOSPADM

## 2021-02-02 RX ORDER — SUCCINYLCHOLINE CHLORIDE 20 MG/ML
INJECTION INTRAMUSCULAR; INTRAVENOUS PRN
Status: DISCONTINUED | OUTPATIENT
Start: 2021-02-02 | End: 2021-02-02 | Stop reason: SDUPTHER

## 2021-02-02 RX ORDER — PROMETHAZINE HYDROCHLORIDE 25 MG/ML
6.25 INJECTION, SOLUTION INTRAMUSCULAR; INTRAVENOUS
Status: DISCONTINUED | OUTPATIENT
Start: 2021-02-02 | End: 2021-02-02 | Stop reason: HOSPADM

## 2021-02-02 RX ORDER — OXYCODONE HYDROCHLORIDE 5 MG/1
10 TABLET ORAL EVERY 4 HOURS PRN
Status: DISCONTINUED | OUTPATIENT
Start: 2021-02-02 | End: 2021-02-03 | Stop reason: HOSPADM

## 2021-02-02 RX ORDER — POTASSIUM CHLORIDE 750 MG/1
10 TABLET, EXTENDED RELEASE ORAL DAILY
Status: DISCONTINUED | OUTPATIENT
Start: 2021-02-02 | End: 2021-02-03 | Stop reason: HOSPADM

## 2021-02-02 RX ORDER — ACETAMINOPHEN 325 MG/1
650 TABLET ORAL EVERY 6 HOURS
Status: DISCONTINUED | OUTPATIENT
Start: 2021-02-02 | End: 2021-02-03 | Stop reason: HOSPADM

## 2021-02-02 RX ORDER — SODIUM CHLORIDE, SODIUM LACTATE, POTASSIUM CHLORIDE, CALCIUM CHLORIDE 600; 310; 30; 20 MG/100ML; MG/100ML; MG/100ML; MG/100ML
INJECTION, SOLUTION INTRAVENOUS CONTINUOUS
Status: DISCONTINUED | OUTPATIENT
Start: 2021-02-02 | End: 2021-02-02

## 2021-02-02 RX ORDER — MONTELUKAST SODIUM 10 MG/1
10 TABLET ORAL NIGHTLY
Status: DISCONTINUED | OUTPATIENT
Start: 2021-02-02 | End: 2021-02-03 | Stop reason: HOSPADM

## 2021-02-02 RX ORDER — SODIUM CHLORIDE, SODIUM LACTATE, POTASSIUM CHLORIDE, CALCIUM CHLORIDE 600; 310; 30; 20 MG/100ML; MG/100ML; MG/100ML; MG/100ML
INJECTION, SOLUTION INTRAVENOUS CONTINUOUS
Status: DISCONTINUED | OUTPATIENT
Start: 2021-02-02 | End: 2021-02-03

## 2021-02-02 RX ORDER — TRANEXAMIC ACID 650 1/1
1950 TABLET ORAL
Status: COMPLETED | OUTPATIENT
Start: 2021-02-02 | End: 2021-02-02

## 2021-02-02 RX ORDER — METFORMIN HYDROCHLORIDE 500 MG/1
1000 TABLET, EXTENDED RELEASE ORAL NIGHTLY
Status: DISCONTINUED | OUTPATIENT
Start: 2021-02-02 | End: 2021-02-03 | Stop reason: HOSPADM

## 2021-02-02 RX ORDER — OXYCODONE HYDROCHLORIDE 5 MG/1
5 TABLET ORAL EVERY 4 HOURS PRN
Status: DISCONTINUED | OUTPATIENT
Start: 2021-02-02 | End: 2021-02-03 | Stop reason: HOSPADM

## 2021-02-02 RX ORDER — SODIUM CHLORIDE, SODIUM LACTATE, POTASSIUM CHLORIDE, CALCIUM CHLORIDE 600; 310; 30; 20 MG/100ML; MG/100ML; MG/100ML; MG/100ML
INJECTION, SOLUTION INTRAVENOUS CONTINUOUS PRN
Status: DISCONTINUED | OUTPATIENT
Start: 2021-02-02 | End: 2021-02-02 | Stop reason: SDUPTHER

## 2021-02-02 RX ORDER — HYDROCHLOROTHIAZIDE 12.5 MG/1
12.5 CAPSULE, GELATIN COATED ORAL DAILY
Status: DISCONTINUED | OUTPATIENT
Start: 2021-02-02 | End: 2021-02-03 | Stop reason: HOSPADM

## 2021-02-02 RX ORDER — SODIUM CHLORIDE 0.9 % (FLUSH) 0.9 %
10 SYRINGE (ML) INJECTION PRN
Status: DISCONTINUED | OUTPATIENT
Start: 2021-02-02 | End: 2021-02-02 | Stop reason: HOSPADM

## 2021-02-02 RX ORDER — MORPHINE SULFATE 4 MG/ML
4 INJECTION, SOLUTION INTRAMUSCULAR; INTRAVENOUS EVERY 5 MIN PRN
Status: DISCONTINUED | OUTPATIENT
Start: 2021-02-02 | End: 2021-02-02 | Stop reason: HOSPADM

## 2021-02-02 RX ORDER — BUDESONIDE AND FORMOTEROL FUMARATE DIHYDRATE 160; 4.5 UG/1; UG/1
2 AEROSOL RESPIRATORY (INHALATION) 2 TIMES DAILY
Status: DISCONTINUED | OUTPATIENT
Start: 2021-02-02 | End: 2021-02-02 | Stop reason: CLARIF

## 2021-02-02 RX ORDER — MORPHINE SULFATE 4 MG/ML
2 INJECTION, SOLUTION INTRAMUSCULAR; INTRAVENOUS EVERY 5 MIN PRN
Status: DISCONTINUED | OUTPATIENT
Start: 2021-02-02 | End: 2021-02-02 | Stop reason: HOSPADM

## 2021-02-02 RX ADMIN — ASPIRIN 325 MG: 325 TABLET, COATED ORAL at 21:59

## 2021-02-02 RX ADMIN — FENTANYL CITRATE 100 MCG: 50 INJECTION, SOLUTION INTRAMUSCULAR; INTRAVENOUS at 16:27

## 2021-02-02 RX ADMIN — SODIUM CHLORIDE, PRESERVATIVE FREE 10 ML: 5 INJECTION INTRAVENOUS at 22:02

## 2021-02-02 RX ADMIN — Medication 2 G: at 14:36

## 2021-02-02 RX ADMIN — METFORMIN HYDROCHLORIDE 1000 MG: 500 TABLET, EXTENDED RELEASE ORAL at 21:59

## 2021-02-02 RX ADMIN — ACETAMINOPHEN 650 MG: 325 TABLET ORAL at 23:47

## 2021-02-02 RX ADMIN — ROPIVACAINE HYDROCHLORIDE 20 ML: 5 INJECTION, SOLUTION EPIDURAL; INFILTRATION; PERINEURAL at 14:18

## 2021-02-02 RX ADMIN — ACETAMINOPHEN 650 MG: 325 TABLET ORAL at 17:38

## 2021-02-02 RX ADMIN — OXYCODONE 5 MG: 5 TABLET ORAL at 22:00

## 2021-02-02 RX ADMIN — SUCCINYLCHOLINE CHLORIDE 140 MG: 20 INJECTION, SOLUTION INTRAMUSCULAR; INTRAVENOUS at 14:41

## 2021-02-02 RX ADMIN — BUDESONIDE 500 MCG: 0.5 SUSPENSION RESPIRATORY (INHALATION) at 18:33

## 2021-02-02 RX ADMIN — LIDOCAINE HYDROCHLORIDE 50 MG: 10 INJECTION, SOLUTION EPIDURAL; INFILTRATION; INTRACAUDAL; PERINEURAL at 14:41

## 2021-02-02 RX ADMIN — SUGAMMADEX 300 MG: 100 INJECTION, SOLUTION INTRAVENOUS at 16:07

## 2021-02-02 RX ADMIN — SODIUM CHLORIDE, POTASSIUM CHLORIDE, SODIUM LACTATE AND CALCIUM CHLORIDE: 600; 310; 30; 20 INJECTION, SOLUTION INTRAVENOUS at 11:50

## 2021-02-02 RX ADMIN — OXYCODONE 10 MG: 5 TABLET ORAL at 17:38

## 2021-02-02 RX ADMIN — ACETAMINOPHEN 1000 MG: 500 TABLET, FILM COATED ORAL at 11:49

## 2021-02-02 RX ADMIN — MIDAZOLAM HYDROCHLORIDE 2 MG: 1 INJECTION INTRAMUSCULAR; INTRAVENOUS at 14:10

## 2021-02-02 RX ADMIN — CELECOXIB 200 MG: 200 CAPSULE ORAL at 17:38

## 2021-02-02 RX ADMIN — OXYCODONE HYDROCHLORIDE 10 MG: 10 TABLET, FILM COATED, EXTENDED RELEASE ORAL at 11:49

## 2021-02-02 RX ADMIN — PROPOFOL 150 MG: 10 INJECTION, EMULSION INTRAVENOUS at 14:41

## 2021-02-02 RX ADMIN — CELECOXIB 100 MG: 100 CAPSULE ORAL at 11:48

## 2021-02-02 RX ADMIN — ARFORMOTEROL TARTRATE 15 MCG: 15 SOLUTION RESPIRATORY (INHALATION) at 18:32

## 2021-02-02 RX ADMIN — ROCURONIUM BROMIDE 10 MG: 10 INJECTION, SOLUTION INTRAVENOUS at 14:41

## 2021-02-02 RX ADMIN — TRANEXAMIC ACID 1950 MG: 650 TABLET ORAL at 11:48

## 2021-02-02 RX ADMIN — LATANOPROST 1 DROP: 50 SOLUTION OPHTHALMIC at 22:03

## 2021-02-02 RX ADMIN — GUAIFENESIN 600 MG: 600 TABLET, EXTENDED RELEASE ORAL at 21:59

## 2021-02-02 RX ADMIN — SODIUM CHLORIDE, SODIUM LACTATE, POTASSIUM CHLORIDE, AND CALCIUM CHLORIDE: 600; 310; 30; 20 INJECTION, SOLUTION INTRAVENOUS at 14:34

## 2021-02-02 RX ADMIN — SODIUM CHLORIDE, SODIUM LACTATE, POTASSIUM CHLORIDE, AND CALCIUM CHLORIDE: 600; 310; 30; 20 INJECTION, SOLUTION INTRAVENOUS at 15:54

## 2021-02-02 RX ADMIN — SODIUM CHLORIDE, POTASSIUM CHLORIDE, SODIUM LACTATE AND CALCIUM CHLORIDE: 600; 310; 30; 20 INJECTION, SOLUTION INTRAVENOUS at 23:50

## 2021-02-02 RX ADMIN — ONDANSETRON HYDROCHLORIDE 4 MG: 2 INJECTION, SOLUTION INTRAMUSCULAR; INTRAVENOUS at 16:07

## 2021-02-02 RX ADMIN — ROCURONIUM BROMIDE 40 MG: 10 INJECTION, SOLUTION INTRAVENOUS at 14:45

## 2021-02-02 RX ADMIN — DOCUSATE SODIUM 50MG AND SENNOSIDES 8.6MG 1 TABLET: 8.6; 5 TABLET, FILM COATED ORAL at 22:00

## 2021-02-02 RX ADMIN — MONTELUKAST SODIUM 10 MG: 10 TABLET, FILM COATED ORAL at 21:59

## 2021-02-02 RX ADMIN — Medication 2000 MG: at 23:47

## 2021-02-02 RX ADMIN — DEXAMETHASONE SODIUM PHOSPHATE 10 MG: 10 INJECTION, SOLUTION INTRAMUSCULAR; INTRAVENOUS at 14:45

## 2021-02-02 RX ADMIN — FENTANYL CITRATE 100 MCG: 50 INJECTION, SOLUTION INTRAMUSCULAR; INTRAVENOUS at 14:42

## 2021-02-02 RX ADMIN — FENTANYL CITRATE 50 MCG: 50 INJECTION, SOLUTION INTRAMUSCULAR; INTRAVENOUS at 14:21

## 2021-02-02 RX ADMIN — MIDAZOLAM 2 MG: 1 INJECTION INTRAMUSCULAR; INTRAVENOUS at 14:10

## 2021-02-02 ASSESSMENT — LIFESTYLE VARIABLES: SMOKING_STATUS: 0

## 2021-02-02 ASSESSMENT — PAIN SCALES - GENERAL
PAINLEVEL_OUTOF10: 5
PAINLEVEL_OUTOF10: 3
PAINLEVEL_OUTOF10: 0
PAINLEVEL_OUTOF10: 8

## 2021-02-02 ASSESSMENT — ENCOUNTER SYMPTOMS: SHORTNESS OF BREATH: 0

## 2021-02-02 NOTE — ANESTHESIA PROCEDURE NOTES
Peripheral Block    Patient location during procedure: holding area  Start time: 2/2/2021 2:18 PM  End time: 2/2/2021 2:18 PM  Staffing  Performed: anesthesiologist   Anesthesiologist: Analilia Mejia MD  Preanesthetic Checklist  Completed: patient identified, IV checked, site marked, risks and benefits discussed, surgical consent, monitors and equipment checked, pre-op evaluation, timeout performed, anesthesia consent given, oxygen available and patient being monitored  Peripheral Block  Patient position: supine  Prep: ChloraPrep  Patient monitoring: cardiac monitor, continuous pulse ox, frequent blood pressure checks and IV access  Block type: Femoral  Laterality: right  Injection technique: single-shot  Guidance: ultrasound guided  Adductor canal  Provider prep: mask and sterile gloves  Needle  Needle type: short-bevel   Needle gauge: 20 G  Needle length: 10 cm  Needle localization: ultrasound guidance  Assessment  Injection assessment: negative aspiration for heme, no paresthesia on injection and local visualized surrounding nerve on ultrasound  Paresthesia pain: none  Slow fractionated injection: yes  Hemodynamics: stable  Additional Notes  Needle was introduced in proximal third of thigh in an  monica-medial to posterior direction. The needle was visualized \" in- plane\" under ultrasound guidance to access the adductor canal in a sub-sartorial fashion. The femoral artery was avoided and 20 cc of 0.5% ropivacaine  was injected and surrounded the nerve plexus. The plexus appeared anatomically normal, no obvious pathology was noted.  A permanent image was obtained   Medications Administered  Ropivacaine (NAROPIN) injection 0.5%, 20 mL  Reason for block: post-op pain management

## 2021-02-02 NOTE — OP NOTE
TOTAL KNEE ARTHROPLASTY OPERATIVE NOTE    NAME OF SURGEON / : Rama Hager MD  PATIENT:   Sandro Jesus  Date: 2/2/2021        Time: 4:19 PM   Referring Physician: ________________________    PREOP DIAGNOSIS:  right Knee  Primary osteoarthritis   POSTOP DIAGNOSIS:  Same     PROCEDURE:    Right  Cemented Posterior Stabilized Knee arthroplastyComplex Primary, please add modifier -22. This procedure required 50 % more effort from the surgeon and staff. The thick adipose layer made the approach to the joint and exposure much more difficult than normal.  Extra deep retractors and more effort were required to maintain the joint in position and allow visualization to perform the procedure. The added weight of the extremity made it extremely difficult to manipulate the joint and to check range of motion and stability. The closure was prolonged due to the added length and depth of the wound. IMPLANTS:   Implant Name Type Inv.  Item Serial No.  Lot No. LRB No. Used Action   CEMENT BNE 40GM HI VISC RADPQ FOR REV SURG  CEMENT BNE 40GM HI VISC RADPQ FOR REV SURG  [employer]- [id number] Right 2 Wasted   CEMENT BNE 40GM HI VISC RADPQ FOR REV SURG  CEMENT BNE 40GM HI VISC RADPQ FOR REV SURG  [employer]- [id number] Right 1 Implanted   CEMENT BNE 40GM HI VISC RADPQ FOR REV SURG  CEMENT BNE 40GM HI VISC RADPQ FOR REV SURG  [employer]- [id number] Right 1 Implanted   EXTENSION STEM L30MM [id number] KNEE TAPR SHANI PERSONA  EXTENSION STEM L30MM [id number] KNEE TAPR SHANI PERSONA  [employer]- [id number] Right 1 Implanted   IMPL KNEE PERSONA FEM SHANI PS STD RT SZ 5 Knee IMPL KNEE PERSONA FEM SHANI PS STD RT SZ 5  [employer]PMM [id number] Right 1 Implanted   PSN TIB STM 5 DEG SZ C R Knee PSN TIB STM 5 DEG SZ C R  [employer]- [id number] Right 1 Implanted IMPL KNEE PERSONA POLY PATELLA 26MM Knee IMPL KNEE PERSONA POLY PATELLA 26MM  Richard Noe [facility] [id number] Right 1 Implanted   IMPL KNEE PERSONA ART SURF 12MM RT Knee IMPL KNEE PERSONA ART SURF 12MM RT  [employer] [facility] [id number] Right 1 Implanted       FINDINGS: the trial had 4 mm open to varus stress in extension and this reduced to 2 mm with final implants in because femoral component lifted off distally and laterally 1 mm but was still well fixed with good mantle  Preop ROM:  Full except for   - 5 degrees  extension  Alignment:    varus  Cartilage wear:  Medial  severe  Lateral  mild  Pat-fem moderate  ACL Intact    ASSISTANT: Victor Hugo Song, certified first assistant. Helped with draping, exposure, retraction, essential steps of the procedure, and with wound closure. ANESTHESIA:  General  EBL:  500 mL  TOURNIQUET:  none  FLUIDS: See anesthesia record  BLOOD PRODUCTS:  None  COMPLICATIONS:  None  SPECIMEN:  None            INDICATIONS:  Patient presents for the above procedure having failed conservative treatment. Patient consents to the procedure above understanding the risks of bleeding, infection, anesthesia, nerve injury, stiffness, and blood clots. PROCEDURE:  The patient was brought to the operating room and placed in a supine position on the operating table. Anesthesia as specified above was placed. An antiobiotic was given IV. The unoperated extremities were well padded. A tourniquet was placed around the proximal thigh. The lower extremity was prepped with chlorhexidene/alcohol and draped sterilely using ioband barriers. A time out was performed. An anterior knee incision was made and fasciocutaneous flaps were developed. A mini midvastus approach was made and the patella subluxed. The prepatellar fat pad, ACL, and the anterior horns of the menisci were excised. A starter drill was placed down the femoral shaft 1 cm anterior to the PCL origin. An alignment kristina was placed down the femoral shaft. The distal femoral cutting guide, set at 5 degrees of valgus was then placed over the alignment kristina, and pinned perpendicular to the AP axis. The cutting block was then set to remove an extra 2 mm of distal femur and the distal cut made. The medial bone cut thickness was 10 mm. Hohmans and a PCL retractor were placed around the tibia. The external alignment guide set to cut 10 mm off the intact side at 7° degrees posterior slope was pinned in place. I stepped back and verified that the guide followed the anterior cortex of the tibia to the ankle. The tibial cut was made. Its thickness was 10 mm. The tibial fragment and osteophytes were removed. Posterior meniscal horns were resected. The extension gap was satisfactory. The epicondylar and AP femoral axes were marked with electrocautery. Femoral trials were laid on the distal femur to estimate the appropriate size. The femoral sizer, with posterior paddles set at 3 degrees of external rotation, and an anterior stylus was placed. The sizer reading matched the size predicted by the trial.   The pinholes were drilled for the 4 in 1 femoral cutting block. This block was impacted on the distal femur and sat flush with the proper rotation relative to the AP and epicondylar axes. A drill was advanced through the anterior slot to check for notching. The femoral block was fixed with medial and lateral screws and the remaining femoral cuts were made. Femoral osteophytes were removed with a rongeur. The flexion gap was satisfactory. Posterior femoral osteophytes were removed with a 3/4 inch curved osteotome and rongeur. The appropriate tibial post punch guide covered the tibia without overhang and sat flush. It was lined up with the medial third of the tibial tubercle. The tibia was reamed, then the keel punched. The femoral trial was impacted onto the femur. The box cut was made first with a recip saw and finished with regular saw. The insert for the box was placed. A 10 mm thick, posterior stabilized tibial liner was snapped in place and ROM and stability were checked. The knee had full ROM and symmetric varus/valgus stability in flexion and extension after    *release of  NO RELEASES  * no extra resection was needed   *the appropriate size tibial liner was placed (see above)    Note: Stability to varus/valgus stress(mm):  Extension ( 2  ,  2 ) Mid Flexion (  2  ,  2  ) Flexion (  2  ,  1  )      The maximum patella thickness was 23 mm. The patella as resected with  an oscillating saw and consistent thickness verified with caliper. The trial patella was placed and the overall thickness was restored to 23 mm. A femoral  bone plug placed, the femoral lug holes drilled and the trials were removed. The surfaces were rinsed with pulse lavage and dried. Two batches of cement  were mixed. Appropriate sized implants were cemented in place, a trial tibial liner placed, and the knee held in full extension until cement hardened. The capsule was injected with Ropivacaine. The knee space was filled with diluted, warm betadine while the cement hardened. Excess cement was removed, and the actual tibial liner was snapped in place. No thumbs patella tracking was checked. No release was needed. Hemostasis was achieved with electrocautery. The wound was copiously irrigated with antibiotic irrigation. The capsule was closed with interrupted #2 vicryl. Subcutaneous tissue was closed with running 2-0 vicryl. Skin was closed with 3-0 vicryl and Prineo. A sterile dressing was applied. The patient was awakened, extubated and transferred to the recovery room in stable condition.             PLAN:  Full weight bearing, ROM, dvt prophylaxis      Electronically signed by Thornton Dancer, MD on 2/2/2021 at 4:19 PM

## 2021-02-02 NOTE — ANESTHESIA POSTPROCEDURE EVALUATION
Department of Anesthesiology  Postprocedure Note    Patient: Loren Mullen  MRN: [id number]  YOB: 1950  Date of evaluation: 2/2/2021  Time:  4:45 PM     Procedure Summary     Date: 02/02/21 Room / Location: [address]    Anesthesia Start: 0473 Anesthesia Stop: 1645    Procedure: RIGHT COMPLEX PRIMARY TOTAL KNEE ARTHROPLASTY (Right Knee) Diagnosis: (M17.11)    Surgeons: Jacoby Matthews MD Responsible Provider: SHANTELLE Tristan CRNA    Anesthesia Type: regional, spinal ASA Status: 3          Anesthesia Type: regional, spinal    Fer Phase I: Fer Score: 9    Fer Phase II:      Last vitals: Reviewed and per EMR flowsheets.        Anesthesia Post Evaluation

## 2021-02-02 NOTE — ANESTHESIA PRE PROCEDURE
Department of Anesthesiology  Preprocedure Note       Name:  Radha Monteiro   Age:  65 y.o.  :  1950                                          MRN:  [id number]         Date:  2021      Surgeon: Wilbert Machuca):  Jada Cruz MD    Procedure: Procedure(s):  RIGHT COMPLEX PRIMARY TOTAL KNEE ARTHROPLASTY    Medications prior to admission:   Prior to Admission medications    Medication Sig Start Date End Date Taking?  Authorizing Provider   celecoxib (CELEBREX) 200 MG capsule Take 200 mg by mouth daily Indications: Arthritis   Yes Historical Provider, MD   amLODIPine (NORVASC) 2.5 MG tablet Take 2.5 mg by mouth daily Indications: High Blood Pressure Disorder   Yes Historical Provider, MD   irbesartan (AVAPRO) 300 MG tablet Take 300 mg by mouth daily Indications: High Blood Pressure Disorder   Yes Historical Provider, MD   hydroCHLOROthiazide (MICROZIDE) 12.5 MG capsule Take 12.5 mg by mouth daily Indications: High Blood Pressure Disorder   Yes Historical Provider, MD   potassium chloride (KLOR-CON M) 10 MEQ extended release tablet Take 10 mEq by mouth daily   Yes Historical Provider, MD   montelukast (SINGULAIR) 10 MG tablet Take 10 mg by mouth nightly Indications: Asthma   Yes Historical Provider, MD   budesonide-formoterol (SYMBICORT) 160-4.5 MCG/ACT AERO Inhale 2 puffs into the lungs 2 times daily Indications: Asthma   Yes Historical Provider, MD   latanoprost (XALATAN) 0.005 % ophthalmic solution Place 1 drop into both eyes nightly   Yes Historical Provider, MD   guaiFENesin (MUCINEX) 600 MG extended release tablet Take 600 mg by mouth 2 times daily   Yes Historical Provider, MD   acetaminophen (TYLENOL) 500 MG tablet Take 1,000 mg by mouth 3 times daily as needed for Pain   Yes Historical Provider, MD   aspirin 81 MG EC tablet Take 81 mg by mouth daily   Yes Historical Provider, MD   Coenzyme Q10 (COQ10) 100 MG CAPS Take 1 capsule by mouth daily   Yes Historical Provider, MD Glucosamine-Chondroit-Vit C-Mn (GLUCOSAMINE CHONDR 500 COMPLEX PO) Take 1 capsule by mouth 2 times daily   Yes Historical Provider, MD   Multiple Vitamins-Minerals (THERAPEUTIC MULTIVITAMIN-MINERALS) tablet Take 1 tablet by mouth daily   Yes Historical Provider, MD   B Complex-C (SUPER B COMPLEX PO) Take 1 capsule by mouth daily   Yes Historical Provider, MD   vitamin E 1000 units capsule Take 1,000 Units by mouth daily   Yes Historical Provider, MD   calcium-vitamin D (OSCAL-500) 500-200 MG-UNIT per tablet Take 1 tablet by mouth daily   Yes Historical Provider, MD   Black Pepper-Turmeric (TURMERIC CURCUMIN) 5-1000 MG CAPS Take 1 capsule by mouth daily   Yes Historical Provider, MD   vitamin D 25 MCG (1000 UT) CAPS Take 1 capsule by mouth daily   Yes Historical Provider, MD   Semaglutide,0.25 or 0.5MG/DOS, 2 MG/1.5ML SOPN Inject 0.5 mg into the skin once a week Indications: Diabetes    Historical Provider, MD   metFORMIN (GLUCOPHAGE-XR) 500 MG extended release tablet Take 1,000 mg by mouth nightly    Historical Provider, MD   denosumab (PROLIA) 60 MG/ML SOSY SC injection Inject 60 mg into the skin once Every 6 months    Historical Provider, MD   albuterol sulfate HFA (VENTOLIN HFA) 108 (90 Base) MCG/ACT inhaler Inhale 2 puffs into the lungs every 6 hours as needed for Wheezing    Historical Provider, MD       Current medications:    Current Facility-Administered Medications   Medication Dose Route Frequency Provider Last Rate Last Admin    scopolamine (TRANSDERM-SCOP) transdermal patch 1 patch  1 patch Transdermal Once Mary Ann Arias MD   1 patch at 02/02/21 1148    lactated ringers infusion   Intravenous Continuous Mary Ann Arias  mL/hr at 02/02/21 1150 New Bag at 02/02/21 1150    ceFAZolin (ANCEF) 2000 mg in 0.9% sodium chloride 50 mL IVPB  2,000 mg Intravenous On Call to OR Mary Ann Arias MD        midazolam (VERSED) 2 MG/2ML injection                Allergies: Allergies   Allergen Reactions    Adhesive Tape Other (See Comments)     Tears and bruises skin       Problem List:  There is no problem list on file for this patient. Past Medical History:        Diagnosis Date    Anemia     Arthritis     Asthma     Diabetes mellitus ([address]     Environmental allergies     Hypertension     Knee pain     Thyroid disease     surgery; no meds       Past Surgical History:        Procedure Laterality Date    CARPAL TUNNEL RELEASE Right     and trigger thumb    CHOLECYSTECTOMY      HYSTERECTOMY      THYROID LOBECTOMY      TONSILLECTOMY         Social History:    Social History     Tobacco Use    Smoking status: Never Smoker    Smokeless tobacco: Never Used   Substance Use Topics    Alcohol use: Not Currently                                Counseling given: Not Answered      Vital Signs (Current):   Vitals:    02/02/21 1128   BP: (!) 148/87   Pulse: 97   Resp: 14   Temp: 97.6 °F (36.4 °C)   TempSrc: Tympanic   SpO2: 93%   Weight: 237 lb (107.5 kg)   Height: 5' 3\" (1.6 m)                                              BP Readings from Last 3 Encounters:   02/02/21 (!) 148/87       NPO Status: Time of last liquid consumption: 2200                        Time of last solid consumption: 2000                        Date of last liquid consumption: 02/01/21                        Date of last solid food consumption: 02/01/21    BMI:   Wt Readings from Last 3 Encounters:   02/02/21 237 lb (107.5 kg)   01/25/21 237 lb (107.5 kg)     Body mass index is 41.98 kg/m².     CBC:   Lab Results   Component Value Date    WBC 7.8 01/25/2021    RBC 4.84 01/25/2021    HGB 15.1 01/25/2021    HCT 45.6 01/25/2021    MCV 94.2 01/25/2021    RDW 13.7 01/25/2021     01/25/2021       CMP:   Lab Results   Component Value Date     01/25/2021     04/30/2012    K 4.0 01/25/2021    K 4.3 04/30/2012     01/25/2021     04/30/2012    CO2 27 01/25/2021    BUN 21 01/25/2021 CREATININE 0.8 01/25/2021    CREATININE 0.9 04/30/2012    GFRAA >59 01/25/2021    LABGLOM >60 01/25/2021    GLUCOSE 158 01/25/2021    CALCIUM 10.2 01/25/2021       POC Tests:   Recent Labs     02/02/21  1117   POCGLU 154*       Coags:   Lab Results   Component Value Date    PROTIME 12.2 01/25/2021    INR 0.92 01/25/2021    APTT 26.8 01/25/2021       HCG (If Applicable): No results found for: PREGTESTUR, PREGSERUM, HCG, HCGQUANT     ABGs: No results found for: PHART, PO2ART, RDS3BAU, MRR0UMB, BEART, O0SUSQHD     Type & Screen (If Applicable):  No results found for: LABABO, LABRH    Drug/Infectious Status (If Applicable):  No results found for: HIV, HEPCAB    COVID-19 Screening (If Applicable):   Lab Results   Component Value Date    COVID19 Not Detected 01/29/2021         Anesthesia Evaluation  Patient summary reviewed and Nursing notes reviewed no history of anesthetic complications:   Airway: Mallampati: II  TM distance: >3 FB   Neck ROM: full  Mouth opening: > = 3 FB Dental: normal exam         Pulmonary:Negative Pulmonary ROS and normal exam  breath sounds clear to auscultation  (+) asthma:     (-) shortness of breath and not a current smoker          Patient did not smoke on day of surgery. Cardiovascular:    (+) hypertension:,     (-) CAD,  angina and  CHF    NYHA Classification: I  ECG reviewed  Rhythm: regular  Rate: normal           Beta Blocker:  Not on Beta Blocker         Neuro/Psych:   Negative Neuro/Psych ROS     (-) seizures, CVA and depression/anxiety            GI/Hepatic/Renal: Neg GI/Hepatic/Renal ROS  (+) morbid obesity     (-) hiatal hernia and GERD       Endo/Other: Negative Endo/Other ROS   (+) Diabetes, : arthritis: OA., . Pt had PAT visit. Abdominal:       Abdomen: soft. Vascular:                                        Anesthesia Plan      regional and spinal     ASA 3     (Iv zofran within 30 min of closing )  Induction: intravenous.   BIS

## 2021-02-03 VITALS
SYSTOLIC BLOOD PRESSURE: 160 MMHG | TEMPERATURE: 97.9 F | HEIGHT: 63 IN | HEART RATE: 79 BPM | BODY MASS INDEX: 41.99 KG/M2 | OXYGEN SATURATION: 98 % | WEIGHT: 237 LBS | RESPIRATION RATE: 20 BRPM | DIASTOLIC BLOOD PRESSURE: 80 MMHG

## 2021-02-03 DIAGNOSIS — E11.9 CONTROLLED TYPE 2 DIABETES MELLITUS WITHOUT COMPLICATION, WITHOUT LONG-TERM CURRENT USE OF INSULIN (HCC): Chronic | ICD-10-CM

## 2021-02-03 LAB
ANION GAP SERPL CALCULATED.3IONS-SCNC: 13 MMOL/L (ref 7–19)
BUN BLDV-MCNC: 26 MG/DL (ref 8–23)
CALCIUM SERPL-MCNC: 8.9 MG/DL (ref 8.8–10.2)
CHLORIDE BLD-SCNC: 98 MMOL/L (ref 98–111)
CO2: 24 MMOL/L (ref 22–29)
CREAT SERPL-MCNC: 0.8 MG/DL (ref 0.5–0.9)
GFR AFRICAN AMERICAN: >59
GFR NON-AFRICAN AMERICAN: >60
GLUCOSE BLD-MCNC: 217 MG/DL (ref 74–109)
GLUCOSE BLD-MCNC: 232 MG/DL (ref 70–99)
HBA1C MFR BLD: 6.6 % (ref 4–6)
HCT VFR BLD CALC: 35.9 % (ref 37–47)
HEMOGLOBIN: 12 G/DL (ref 12–16)
PERFORMED ON: ABNORMAL
POTASSIUM REFLEX MAGNESIUM: 5 MMOL/L (ref 3.5–5)
SODIUM BLD-SCNC: 135 MMOL/L (ref 136–145)

## 2021-02-03 PROCEDURE — G0378 HOSPITAL OBSERVATION PER HR: HCPCS

## 2021-02-03 PROCEDURE — 82947 ASSAY GLUCOSE BLOOD QUANT: CPT

## 2021-02-03 PROCEDURE — 97116 GAIT TRAINING THERAPY: CPT

## 2021-02-03 PROCEDURE — 6370000000 HC RX 637 (ALT 250 FOR IP): Performed by: ORTHOPAEDIC SURGERY

## 2021-02-03 PROCEDURE — 85018 HEMOGLOBIN: CPT

## 2021-02-03 PROCEDURE — 2700000000 HC OXYGEN THERAPY PER DAY

## 2021-02-03 PROCEDURE — 85014 HEMATOCRIT: CPT

## 2021-02-03 PROCEDURE — 80048 BASIC METABOLIC PNL TOTAL CA: CPT

## 2021-02-03 PROCEDURE — 6360000002 HC RX W HCPCS: Performed by: ORTHOPAEDIC SURGERY

## 2021-02-03 PROCEDURE — 6370000000 HC RX 637 (ALT 250 FOR IP): Performed by: FAMILY MEDICINE

## 2021-02-03 PROCEDURE — 97161 PT EVAL LOW COMPLEX 20 MIN: CPT

## 2021-02-03 PROCEDURE — 83036 HEMOGLOBIN GLYCOSYLATED A1C: CPT

## 2021-02-03 PROCEDURE — 36415 COLL VENOUS BLD VENIPUNCTURE: CPT

## 2021-02-03 PROCEDURE — 94640 AIRWAY INHALATION TREATMENT: CPT

## 2021-02-03 RX ORDER — MONTELUKAST SODIUM 10 MG/1
TABLET ORAL
Qty: 30 TABLET | Refills: 5 | Status: SHIPPED | OUTPATIENT
Start: 2021-02-03 | End: 2021-08-20

## 2021-02-03 RX ORDER — METFORMIN HYDROCHLORIDE 500 MG/1
TABLET, EXTENDED RELEASE ORAL
Qty: 60 TABLET | Refills: 5 | Status: SHIPPED | OUTPATIENT
Start: 2021-02-03 | End: 2021-07-09

## 2021-02-03 RX ORDER — ASPIRIN 81 MG/1
81 TABLET ORAL 2 TIMES DAILY
Qty: 60 TABLET | Refills: 0 | Status: SHIPPED | OUTPATIENT
Start: 2021-02-03

## 2021-02-03 RX ORDER — CLONIDINE HYDROCHLORIDE 0.1 MG/1
0.1 TABLET ORAL EVERY 4 HOURS PRN
Status: DISCONTINUED | OUTPATIENT
Start: 2021-02-03 | End: 2021-02-03 | Stop reason: HOSPADM

## 2021-02-03 RX ORDER — DEXTROSE MONOHYDRATE 25 G/50ML
12.5 INJECTION, SOLUTION INTRAVENOUS PRN
Status: DISCONTINUED | OUTPATIENT
Start: 2021-02-03 | End: 2021-02-03 | Stop reason: HOSPADM

## 2021-02-03 RX ORDER — NICOTINE POLACRILEX 4 MG
15 LOZENGE BUCCAL PRN
Status: DISCONTINUED | OUTPATIENT
Start: 2021-02-03 | End: 2021-02-03 | Stop reason: HOSPADM

## 2021-02-03 RX ORDER — CEPHALEXIN 500 MG/1
500 CAPSULE ORAL 4 TIMES DAILY
Qty: 28 CAPSULE | Refills: 0 | Status: SHIPPED | OUTPATIENT
Start: 2021-02-03

## 2021-02-03 RX ORDER — OXYCODONE HYDROCHLORIDE 5 MG/1
5 TABLET ORAL EVERY 4 HOURS PRN
Qty: 30 TABLET | Refills: 0 | Status: SHIPPED | OUTPATIENT
Start: 2021-02-03 | End: 2021-02-06

## 2021-02-03 RX ORDER — DEXTROSE MONOHYDRATE 50 MG/ML
100 INJECTION, SOLUTION INTRAVENOUS PRN
Status: DISCONTINUED | OUTPATIENT
Start: 2021-02-03 | End: 2021-02-03 | Stop reason: HOSPADM

## 2021-02-03 RX ORDER — CALCIUM CARBONATE 200(500)MG
500 TABLET,CHEWABLE ORAL 3 TIMES DAILY PRN
Status: DISCONTINUED | OUTPATIENT
Start: 2021-02-03 | End: 2021-02-03 | Stop reason: HOSPADM

## 2021-02-03 RX ADMIN — ANTACID TABLETS 500 MG: 500 TABLET, CHEWABLE ORAL at 12:20

## 2021-02-03 RX ADMIN — CELECOXIB 200 MG: 200 CAPSULE ORAL at 08:25

## 2021-02-03 RX ADMIN — ANTACID TABLETS 500 MG: 500 TABLET, CHEWABLE ORAL at 08:24

## 2021-02-03 RX ADMIN — ARFORMOTEROL TARTRATE 15 MCG: 15 SOLUTION RESPIRATORY (INHALATION) at 06:47

## 2021-02-03 RX ADMIN — LOSARTAN POTASSIUM 25 MG: 25 TABLET, FILM COATED ORAL at 08:24

## 2021-02-03 RX ADMIN — Medication 2000 MG: at 08:24

## 2021-02-03 RX ADMIN — Medication 1000 UNITS: at 08:24

## 2021-02-03 RX ADMIN — ASPIRIN 325 MG: 325 TABLET, COATED ORAL at 08:25

## 2021-02-03 RX ADMIN — AMLODIPINE BESYLATE 2.5 MG: 2.5 TABLET ORAL at 08:24

## 2021-02-03 RX ADMIN — BUDESONIDE 500 MCG: 0.5 SUSPENSION RESPIRATORY (INHALATION) at 06:48

## 2021-02-03 RX ADMIN — POTASSIUM CHLORIDE 10 MEQ: 750 TABLET, EXTENDED RELEASE ORAL at 08:24

## 2021-02-03 RX ADMIN — DOCUSATE SODIUM 50MG AND SENNOSIDES 8.6MG 1 TABLET: 8.6; 5 TABLET, FILM COATED ORAL at 08:25

## 2021-02-03 RX ADMIN — ACETAMINOPHEN 650 MG: 325 TABLET ORAL at 06:03

## 2021-02-03 RX ADMIN — OXYCODONE 5 MG: 5 TABLET ORAL at 09:57

## 2021-02-03 RX ADMIN — ACETAMINOPHEN 650 MG: 325 TABLET ORAL at 12:21

## 2021-02-03 RX ADMIN — OXYCODONE 10 MG: 5 TABLET ORAL at 04:38

## 2021-02-03 RX ADMIN — GUAIFENESIN 600 MG: 600 TABLET, EXTENDED RELEASE ORAL at 08:24

## 2021-02-03 RX ADMIN — HYDROCHLOROTHIAZIDE 12.5 MG: 12.5 CAPSULE ORAL at 08:24

## 2021-02-03 ASSESSMENT — PAIN SCALES - GENERAL
PAINLEVEL_OUTOF10: 3
PAINLEVEL_OUTOF10: 5
PAINLEVEL_OUTOF10: 7

## 2021-02-03 NOTE — PROGRESS NOTES
CLINICAL PHARMACY NOTE: MEDS TO [facility] Patient?: No  Total # of Prescriptions Filled: 3   The following medications were delivered to the patient:  · Cephalexin 500mg  · Oxycodone 5mg  · Aspirin 81mg  Total # of Interventions Completed: 0  Time Spent (min): 30    Additional Documentation:  Patient's  paid for medications through the [facility] with a credit card and was handed medications

## 2021-02-03 NOTE — PROGRESS NOTES
Subjective:     Post-Operative Day: 1 No complaints    Objective:     Patient Vitals for the past 24 hrs:   BP Temp Temp src Pulse Resp SpO2 Height Weight   02/03/21 0350 (!) 114/56 96.5 °F (35.8 °C) Temporal 78 18 94 %     02/02/21 2238 115/69 96 °F (35.6 °C) Temporal 72 18 94 %     02/02/21 2210    70       02/02/21 1723 126/64 98.4 °F (36.9 °C) Temporal 69 16 92 %     02/02/21 1700 131/70 97.9 °F (36.6 °C)  70 18 92 %     02/02/21 1655 134/75   69 15 97 %     02/02/21 1650 (!) 136/56   70 16 98 %     02/02/21 1645 (!) 129/59   71 17 97 %     02/02/21 1640 130/62   71 16 93 %     02/02/21 1635 96/64   73 17 92 %     02/02/21 1630 96/64 97 °F (36.1 °C) Temporal 71 18 90 %     02/02/21 1128 (!) 148/87 97.6 °F (36.4 °C) Tympanic 97 14 93 % 5' 3\" (1.6 m) 237 lb (107.5 kg)       General: Alert cooperative   Wound: Clean dry intact. Moderate swelling   Neurovascular: Exam normal   DVT Exam: No evidence of DVT         Data Review:  No results for input(s): HGB in the last 72 hours. No results for input(s): NA, K, CREATININE, GLUCOSE, INR in the last 72 hours. Recent Labs     02/02/21  1117   POCGLU 154*     No orders to display       Assessment:     Status Post right Total Knee Arthroplasty. Doing well postop without complication.    Plan:     Pain control  Tight blood glucose control  PT/OT  DVT prophylaxis  Ice and elevate  Discharge Home today

## 2021-02-03 NOTE — DISCHARGE SUMMARY
[facility] [place]  [facility]  Dr. Venkat Fish  Discharge Summary    The Marylu Cowden is a 65 y.o. female underwent total knee replacement procedure without complication. Marylu Cowden was admitted to the floor following her   recovery in the PACU.      Discharge Diagnosis   Right    Knee Replacement    Current Inpatient Medications    Current Facility-Administered Medications: albuterol (PROVENTIL) nebulizer solution, 15 mg/hr, Nebulization, Q4H PRN  amLODIPine (NORVASC) tablet 2.5 mg, 2.5 mg, Oral, Daily  celecoxib (CELEBREX) capsule 200 mg, 200 mg, Oral, Daily  guaiFENesin (MUCINEX) extended release tablet 600 mg, 600 mg, Oral, BID  hydroCHLOROthiazide (MICROZIDE) capsule 12.5 mg, 12.5 mg, Oral, Daily  losartan (COZAAR) tablet 25 mg, 25 mg, Oral, Daily  latanoprost (XALATAN) 0.005 % ophthalmic solution 1 drop, 1 drop, Both Eyes, Nightly  metFORMIN (GLUCOPHAGE-XR) extended release tablet 1,000 mg, 1,000 mg, Oral, Nightly  montelukast (SINGULAIR) tablet 10 mg, 10 mg, Oral, Nightly  potassium chloride (KLOR-CON M) extended release tablet 10 mEq, 10 mEq, Oral, Daily  Semaglutide(0.25 or 0.5MG/DOS) SOPN 0.5 mg, 0.5 mg, Subcutaneous, Weekly  Vitamin D (CHOLECALCIFEROL) tablet 1,000 Units, 1,000 Units, Oral, Daily  sodium chloride flush 0.9 % injection 10 mL, 10 mL, Intravenous, 2 times per day  sodium chloride flush 0.9 % injection 10 mL, 10 mL, Intravenous, PRN  acetaminophen (TYLENOL) tablet 650 mg, 650 mg, Oral, Q6H  oxyCODONE (ROXICODONE) immediate release tablet 5 mg, 5 mg, Oral, Q4H PRN **OR** oxyCODONE (ROXICODONE) immediate release tablet 10 mg, 10 mg, Oral, Q4H PRN  morphine injection 2 mg, 2 mg, Intravenous, Q1H PRN **OR** morphine injection 4 mg, 4 mg, Intravenous, Q1H PRN  ceFAZolin (ANCEF) 2000 mg in 0.9% sodium chloride 50 mL IVPB, 2,000 mg, Intravenous, Q8H  sennosides-docusate sodium (SENOKOT-S) 8.6-50 MG tablet 1 tablet, 1 tablet, Oral, BID magnesium hydroxide (MILK OF MAGNESIA) 400 MG/5ML suspension 30 mL, 30 mL, Oral, Daily PRN  aspirin EC tablet 325 mg, 325 mg, Oral, BID  lactated ringers infusion, , Intravenous, Continuous  budesonide (PULMICORT) nebulizer suspension 500 mcg, 0.5 mg, Nebulization, BID **AND** Arformoterol Tartrate (BROVANA) nebulizer solution 15 mcg, 15 mcg, Nebulization, BID    Post-operatively the patients diet was advanced as tolerated and their incision was checked on POD #1. The incision was clean, dry and intact with no signs of infection. The patient remained neurovascularly intact in the lower extremity and had intact pulses distally. Patients calf remained soft and showed no evidence of DVT. The patient was able to move their leg and ankle/foot without any problems post-operatively. Physical therapy and occupational therapy were consulted and began working with the patient post-operatively. The patient progressed with PT/OT as would be expected and continued to improve through their stay. The patients pain was initially controlled with IV medications but we were able to transition to oral pain medications soon after arrival to the floor and their pain remained under good control through their hospital stay. From a medical standpoint the patient remained stable and continued to have the medicine team follow throughout their stay. Acute postoperative blood loss anemia after joint replacement being monitored with daily hemoglobin/hematocrit. The patients dressing was changed/incison was checked on day of d/c. The patient will be discharged at this time to  Home   with their current diet restrictions and will continue to follow the total knee precautions outlined to them by us and PT/OT.      Condition on Discharge: Stable    Plan Followup at scheduled appointment time (1 month post-op). Patient was instructed on the use of pain medications, the signs and symptoms of infection, and was given our number to call should they have any questions or concerns following discharge.

## 2021-02-03 NOTE — CONSULTS
Referring Provider: Dr. Chuck Elias  Reason for Consultation: Medical management    Patient Care Team:  Samantha Nicholson MD as PCP - [facility])    Chief complaint --right knee pain    Subjective . History of present illness: The patient presents to the orthopedic service for TKA. They have failed outpatient conservative treatment of NSAIDS, muscle relaxer, physical therapy and opioid pain meds. The pain is affecting their activities of daily living and they have chosen to undergo surgical correction. We have been asked to provide medical consultation by the attending physician since their primary care provider does not attend here at [address] [place] [address] in their healthcare during the perioperative phase was discussed with the patient. All questions were encouraged and answered to the best of our ability. The postoperative pain is as expected. There are no other participating or relieving factors noted. Pleasant 78-year-old female followed by Dr. Radha Deal for general medical needs. Past medical history of morbid obesity, diabetes mellitus type 2, COPD, essential hypertension and osteoarthritis. She has had no prior orthopedic surgery and is apprehensive about recovery. She is willing to work with therapy in anticipation of potential discharge home later today or tomorrow.     REVIEW OF SYSTEMS:    CONSTITUTIONAL:  Negative for anorexia, chills, fevers, night sweats and weight loss  EYES:  negative for eye dryness, icterus and redness  HEENT:   negative for dental problems, epistaxis, facial trauma and thrush  RESPIRATORY:  negative for chest tightness, cough, dyspnea on exertion, pneumonia and sputum  CARDIOVASCULAR: negative for chest pain, dyspnea, exertional chest pressure/discomfort, irregular heart beat, palpitations, paroxysmal nocturnal dyspnea and syncope  GASTROINTESTINAL:  negative for abdominal pain, hematemesis, jaundice, melena and rectal bleeding MUSCULOSKELETAL:  negative for muscle weakness, myalgias and neck pain, chronic joint pain noted  NEUROLOGICAL:   negative for dizziness, headaches, seizures, speech problems, tremors and vertigo  INTEGUMENT: negative for pruritus, rash, skin color change and skin lesion(s)   A Full 14 point review of systems is negative outside those listed above and in the HPI      History    Past Medical History:   Diagnosis Date    Anemia     Arthritis     Asthma     Diabetes mellitus ([address]     Environmental allergies     Hypertension     Knee pain     Thyroid disease     surgery; no meds     Past Surgical History:   Procedure Laterality Date    CARPAL TUNNEL RELEASE Right     and trigger thumb    CHOLECYSTECTOMY      HYSTERECTOMY      THYROID LOBECTOMY      TONSILLECTOMY       Family History   Problem Relation Age of Onset    Diabetes Mother     Stroke Mother     Heart Attack Mother     Heart Disease Mother     Heart Attack Father     Heart Disease Father      Social History     Tobacco Use    Smoking status: Never Smoker    Smokeless tobacco: Never Used   Substance Use Topics    Alcohol use: Not Currently    Drug use: Not on file     Medications Prior to Admission: celecoxib (CELEBREX) 200 MG capsule, Take 200 mg by mouth daily Indications: Arthritis  amLODIPine (NORVASC) 2.5 MG tablet, Take 2.5 mg by mouth daily Indications: High Blood Pressure Disorder  irbesartan (AVAPRO) 300 MG tablet, Take 300 mg by mouth daily Indications: High Blood Pressure Disorder  hydroCHLOROthiazide (MICROZIDE) 12.5 MG capsule, Take 12.5 mg by mouth daily Indications: High Blood Pressure Disorder  potassium chloride (KLOR-CON M) 10 MEQ extended release tablet, Take 10 mEq by mouth daily  montelukast (SINGULAIR) 10 MG tablet, Take 10 mg by mouth nightly Indications: Asthma  budesonide-formoterol (SYMBICORT) 160-4.5 MCG/ACT AERO, Inhale 2 puffs into the lungs 2 times daily Indications: Asthma

## 2021-02-03 NOTE — PROGRESS NOTES
Physical Therapy    Facility/Department: [facility] SURG SERVICES  Initial Assessment    NAME: Loren Mullen  : 1950  MRN: [id number]    Date of Service: 2/3/2021    Discharge Recommendations:  Home with Home health PT, Outpatient PT        Assessment   Body structures, Functions, Activity limitations: Decreased functional mobility ; Decreased ROM; Decreased strength  Assessment: Patient will benefit from skilled therapy to improve RLE strength and ROM as appropriate throughout healing process. Prognosis: Good  Decision Making: Low Complexity  PT Education: Plan of Care;General Safety; Adaptive Device Training;Functional Mobility Training  REQUIRES PT FOLLOW UP: Yes       Patient Diagnosis(es): The encounter diagnosis was Primary osteoarthritis of right knee. has a past medical history of Anemia, Arthritis, Asthma, Diabetes mellitus ([address] Environmental allergies, Hypertension, Knee pain, and Thyroid disease. has a past surgical history that includes Thyroid lobectomy; Hysterectomy; Cholecystectomy; Tonsillectomy; and Carpal tunnel release (Right). Restrictions     Vision/Hearing        Subjective  General  Chart Reviewed: Yes  Subjective  Subjective: Patient reports feeling well after surgery with no new complaints of pain. No further questions/concerns reported by patient. Orientation     Social/Functional History     Cognition        Objective          PROM RLE (degrees)  RLE PROM: WFL  Strength RLE  Strength RLE: WFL  Comment: Grossly 3/5        Bed mobility  Rolling to Left: Independent  Supine to Sit: Independent  Transfers  Sit to Stand: Contact guard assistance  Stand to sit: Contact guard assistance  Ambulation  Ambulation?: Yes  WB Status: WBAT  Ambulation 1  Device: Rolling Walker  Assistance: Contact guard assistance  Gait Deviations: Slow Emilee; Increased JUSTIN; Decreased step length;Decreased step height  Distance: 200ft  Stairs/Curb  Stairs?: Yes  Stairs  # Steps : 3  Stairs Height: 4\" Rails: Bilateral  Device: No Device  Assistance: Contact guard assistance  Comment: Bilateral rain usage     Balance  Sitting - Static: Good  Sitting - Dynamic: Good  Standing - Static: Good  Standing - Dynamic: Good        Plan   Plan  Times per week: 7  Times per day: Daily  Plan weeks: 2  Current Treatment Recommendations: Strengthening, ROM, Functional Mobility Training    G-Code       OutComes Score                                                  AM-PAC Score             Goals  Short term goals  Time Frame for Short term goals: 2 weeks  Short term goal 1: Ambulate 400ft independently with rolling walker  Short term goal 2:  Independent with bed mobility and transfers       Therapy Time   Individual Concurrent Group Co-treatment   Time In           Time Out           Minutes                   Frank Galindo       Electronically signed by Frank Galindo on 2/3/2021 at 10:20 AM

## 2021-02-03 NOTE — PROGRESS NOTES
Patient discharged home today with [facility]. Medications and discharge instructions reviewed with patient. A handout of all new medications was given to the patient for reference with all possible side effects highlighted. Patient verbalized understanding. Patient stable upon discharge.   Electronically signed by Becky Sanchez RN on 2/3/2021 at 11:09 AM

## 2021-02-04 ENCOUNTER — TELEPHONE (OUTPATIENT)
Dept: INPATIENT UNIT | Age: 71
End: 2021-02-04

## 2021-02-08 ENCOUNTER — OFFICE VISIT (OUTPATIENT)
Dept: FAMILY MEDICINE CLINIC | Facility: CLINIC | Age: 71
End: 2021-02-08

## 2021-02-08 ENCOUNTER — TELEPHONE (OUTPATIENT)
Dept: FAMILY MEDICINE CLINIC | Facility: CLINIC | Age: 71
End: 2021-02-08

## 2021-02-08 VITALS
WEIGHT: 234 LBS | SYSTOLIC BLOOD PRESSURE: 122 MMHG | DIASTOLIC BLOOD PRESSURE: 84 MMHG | HEIGHT: 62 IN | OXYGEN SATURATION: 98 % | HEART RATE: 83 BPM | BODY MASS INDEX: 43.06 KG/M2 | RESPIRATION RATE: 18 BRPM | TEMPERATURE: 97.8 F

## 2021-02-08 DIAGNOSIS — J45.20 MILD INTERMITTENT ASTHMA, UNSPECIFIED WHETHER COMPLICATED: Chronic | ICD-10-CM

## 2021-02-08 DIAGNOSIS — R03.0 ELEVATED BLOOD PRESSURE READING: ICD-10-CM

## 2021-02-08 DIAGNOSIS — E11.9 CONTROLLED TYPE 2 DIABETES MELLITUS WITHOUT COMPLICATION, WITHOUT LONG-TERM CURRENT USE OF INSULIN (HCC): Chronic | ICD-10-CM

## 2021-02-08 DIAGNOSIS — R53.83 FATIGUE, UNSPECIFIED TYPE: ICD-10-CM

## 2021-02-08 DIAGNOSIS — I10 HTN (HYPERTENSION), BENIGN: Primary | Chronic | ICD-10-CM

## 2021-02-08 DIAGNOSIS — Z98.890 POST-OPERATIVE STATE: ICD-10-CM

## 2021-02-08 PROCEDURE — 99214 OFFICE O/P EST MOD 30 MIN: CPT | Performed by: FAMILY MEDICINE

## 2021-02-08 RX ORDER — OXYCODONE HYDROCHLORIDE 5 MG/1
1 TABLET ORAL EVERY 4 HOURS PRN
COMMUNITY
Start: 2021-02-03 | End: 2021-07-23

## 2021-02-08 RX ORDER — CEPHALEXIN 500 MG/1
1 CAPSULE ORAL 4 TIMES DAILY
COMMUNITY
Start: 2021-02-03 | End: 2021-03-11

## 2021-02-08 NOTE — PROGRESS NOTES
Subjective   Kezia Otoole is a 70 y.o. female presenting with chief complaint of:   Chief Complaint   Patient presents with   • Hypertension     Pt's BP was elevated when [facility] arrived for physical therapy.   • Diarrhea     Pt states that she has had diarrhea today.       History of Present Illness :  With .  Here for primarily an acute issue today elevated bp.     Phone call today when home health went to do her R total knee post op PT.  She had first walked with walker to bath; then out to have PT.  Made her fatigued/SOB and was painful.   Care everwhere; Hb 15 preop to 12 post op.  BS post op 232; 2.3.21 GFR > 60.  No melena, hematochezia.  No cough, wheeze.      Has multiple chronic problems to consider that might have a bearing on today's issues; not an interval appointment.       Chronic/acute problems reviewed today:   1. HTN (hypertension), benign Chronic/unstable. Above targets with home blood pressures.  No significant chest pain, SOB, LE edema, orthopnea, near syncope, dizziness/light headness.  Agrees to follow next few days and let us know if stays elevated.   Recent Vitals       7/9/2020 8/17/2020 2/8/2021       BP:  154/94  171/87  122/84     Pulse:  97  71  83     Temp:  97.5 °F (36.4 °C)  98.1 °F (36.7 °C)  97.8 °F (36.6 °C)     Weight:  105 kg (231 lb 3.2 oz)  103 kg (226 lb)  106 kg (234 lb)     BMI (Calculated):  43  40  43.5            2. Post-operative state: R total knee/LH/Sotero/2.2.21   3. Fatigue, unspecified type acute/with above and worse with exertion.   4. Elevated blood pressure reading: acute/above.  BP was ok in hospital.    5. Controlled type 2 diabetes mellitus without complication, without long-term current use of insulin (CMS/[facility]) Chronic/stable.  No problem/pattern hypoglycemia/hyperglycemia manifest by poly- dypsia, phagia, uria, or sweats, diaphoretic episodes, syncope/near.     6. Mild intermittent asthma, unspecified whether complicated  chronic/stable infrequent cough/wheeze and need for rescue medications infrequent.       Has an/another acute issue today: none.    The following portions of the patient's history were reviewed and updated as appropriate: allergies, current medications, past family history, past medical history, past social history, past surgical history and problem list.      Current Outpatient Medications:   •  acetaminophen (TYLENOL) 500 MG tablet, Take 1,000 mg by mouth Every 6 (Six) Hours As Needed for mild pain (1-3)., Disp: , Rfl:   •  albuterol (PROVENTIL HFA;VENTOLIN HFA) 108 (90 Base) MCG/ACT inhaler, Inhale 2 puffs Every 4 (Four) Hours As Needed for Wheezing or Shortness of Air., Disp: 1 inhaler, Rfl: 1  •  amLODIPine (NORVASC) 2.5 MG tablet, TAKE ONE TABLET DAILY, Disp: 90 tablet, Rfl: 3  •  aspirin 81 MG EC tablet, Take 1 tablet by mouth Every Other Day. (Patient taking differently: Take 81 mg by mouth 2 (two) times a day.), Disp: 32 tablet, Rfl: 0  •  B Complex-C (SUPER B COMPLEX PO), Take 1 tablet by mouth Daily., Disp: , Rfl:   •  budesonide-formoterol (Symbicort) 160-4.5 MCG/ACT inhaler, Inhale 2 puffs 2 (Two) Times a Day., Disp: 30.6 g, Rfl: 3  •  Calcium Carb-Cholecalciferol (CALCIUM-VITAMIN D) 600-400 MG-UNIT tablet, Take 1 tablet by mouth 2 (Two) Times a Day., Disp: , Rfl:   •  carboxymethylcellulose (REFRESH PLUS) 0.5 % solution, 2 drops Daily As Needed for dry eyes., Disp: , Rfl:   •  celecoxib (CeleBREX) 200 MG capsule, Take 200 mg by mouth Daily., Disp: , Rfl:   •  cephalexin (KEFLEX) 500 MG capsule, 1 capsule 4 (Four) Times a Day., Disp: , Rfl:   •  Coenzyme Q10 (CO Q 10 PO), Take 1 capsule by mouth Every Other Day., Disp: , Rfl:   •  denosumab (PROLIA) 60 MG/ML solution syringe, Inject 60 mg under the skin into the appropriate area as directed Every 6 (Six) Months., Disp: , Rfl:   •  glucose blood test strip, Use as instructed: once a day testing of the one touch ultra blue strips, Disp: 50 each, Rfl:  11  •  guaiFENesin (MUCINEX) 600 MG 12 hr tablet, Take 600 mg by mouth 2 (Two) Times a Day., Disp: , Rfl:   •  hydroCHLOROthiazide (MICROZIDE) 12.5 MG capsule, TAKE ONE CAPSULE DAILY, Disp: 90 capsule, Rfl: 3  •  irbesartan (AVAPRO) 300 MG tablet, Take 1 tablet by mouth Daily., Disp: 90 tablet, Rfl: 2  •  latanoprost (XALATAN) 0.005 % ophthalmic solution, Administer 1 drop to both eyes Every Night., Disp: , Rfl: 5  •  metFORMIN ER (GLUCOPHAGE-XR) 500 MG 24 hr tablet, TAKE TWO TABLETS EVERY NIGHT, Disp: 60 tablet, Rfl: 5  •  Misc Natural Products (GLUCOSAMINE CHONDROITIN TRIPLE PO), Take 1 tablet by mouth 2 (Two) Times a Day., Disp: , Rfl:   •  montelukast (SINGULAIR) 10 MG tablet, TAKE ONE TABLET DAILY, Disp: 30 tablet, Rfl: 5  •  Multiple Vitamins-Minerals (VITRUM 50+ SENIOR MULTI PO), Take 1 tablet by mouth Daily., Disp: , Rfl:   •  NON FORMULARY, Immunotherapy, Disp: , Rfl:   •  oxyCODONE (ROXICODONE) 5 MG immediate release tablet, 1 tablet Every 4 (Four) Hours As Needed. Takes 1-2 tablets every 4 hours as needed., Disp: , Rfl:   •  potassium chloride 10 MEQ CR tablet, TAKE ONE TABLET DAILY, Disp: 90 tablet, Rfl: 3  •  Semaglutide,0.25 or 0.5MG/DOS, (Ozempic, 0.25 or 0.5 MG/DOSE,) 2 MG/1.5ML solution pen-injector, Inject 0.5 mg under the skin into the appropriate area as directed 1 (One) Time Per Week., Disp: 15 pen, Rfl: 1  •  Cholecalciferol 25 MCG (1000 UT) capsule, Take 1 capsule by mouth Daily., Disp: , Rfl:   •  ipratropium-albuterol (DUO-NEB) 0.5-2.5 mg/3 ml nebulizer, Take 3 mL by nebulization 4 (Four) Times a Day., Disp: 40 vial, Rfl: 1    No problems with medications.    Allergies   Allergen Reactions   • Adhesive Tape Other (See Comments)     Tears and bruises skin   • Other Other (See Comments)     Bandaids make arm Red/slightly swollen       Review of Systems  GENERAL:  Active/slower with limits, speed, stamina for age and various joint issues. Sleep is ok; apnea denied. No fever now.  ENDO:  No  syncope, near or diaphoretic sweaty spells.  BS without testing/download.  HEENT: No head injury or headache.   No vision change.   No significant hearing loss.  Ears without pain/drainage.  No sore throat.   Same/occ nasal/sinus congestion/drainage. No epistaxis.  CHEST: No chest wall tenderness or mass.  No cough,  without wheeze.   No SOB; no hemoptysis.  CV: No chest pain, palpitations, usual  On/off ankle edema before; worse with RLE now.   GI: No heartburn, dysphagia.  No abdominal pain, diarrhea, constipation.  No rectal bleeding, or melena.    :  Voids without dysuria, or incontinence to completion.  ORTHO: No painful/swollen joints but various on /off sore.  No change occ sore neck or back.  No acute neck or back pain without recent injury.  NEURO: No dizziness, weakness of extremities.  No numbness/paresthesias.   PSYCH: No memory loss.  Mood good; occ mild anxious, depressed but/and not suicidal.  Tries to tolerate stress .   Screening:  Mammogram: 12.17.20  Bone density: 12.11.18 [facility] hip -2.4; start prolia  Low dose CT chest: Tobacco-smoker/never: NA  GI: Colon-p+div/[facility]/11.19.18/5y  Prostate: NA  Usual lab order  6m CBC, CMP, A1c  12m CBC, CMP, A1c, LIPID, TSH, Vit D, uric acid      Data reviewed:   Recent admit/ER/MD visits: care everwhere above  Last cardiac testing:   Echo:   12.11.12 [facility] echo neg    Lab Results:  Results for orders placed or performed in visit on 08/10/20   Comprehensive Metabolic Panel    Specimen: Blood   Result Value Ref Range    Glucose 112 (H) 65 - 99 mg/dL    BUN 22 8 - 23 mg/dL    Creatinine 0.72 0.57 - 1.00 mg/dL    eGFR Non African Am 80 >60 mL/min/1.73    eGFR African Am 97 >60 mL/min/1.73    BUN/Creatinine Ratio 30.6 (H) 7.0 - 25.0    Sodium 143 136 - 145 mmol/L    Potassium 4.2 3.5 - 5.2 mmol/L    Chloride 101 98 - 107 mmol/L    Total CO2 28.1 22.0 - 29.0 mmol/L    Calcium 9.9 8.6 - 10.5 mg/dL    Total Protein 6.5 6.0 - 8.5 g/dL    Albumin 4.40 3.50 - 5.20 g/dL     Globulin 2.1 gm/dL    A/G Ratio 2.1 g/dL    Total Bilirubin 0.6 0.0 - 1.2 mg/dL    Alkaline Phosphatase 55 39 - 117 U/L    AST (SGOT) 20 1 - 32 U/L    ALT (SGPT) 35 (H) 1 - 33 U/L   Hemoglobin A1c    Specimen: Blood   Result Value Ref Range    Hemoglobin A1C 5.80 (H) 4.80 - 5.60 %   TSH    Specimen: Blood   Result Value Ref Range    TSH 0.990 0.270 - 4.200 uIU/mL   Vitamin D 25 Hydroxy    Specimen: Blood   Result Value Ref Range    25 Hydroxy, Vitamin D 54.8 30.0 - 100.0 ng/ml   Uric Acid    Specimen: Blood   Result Value Ref Range    Uric Acid 5.3 2.4 - 5.7 mg/dL   Iron    Specimen: Blood   Result Value Ref Range    Iron 89 37 - 145 mcg/dL   Vitamin B12    Specimen: Blood   Result Value Ref Range    Vitamin B-12 362 211 - 946 pg/mL   Folate    Specimen: Blood   Result Value Ref Range    Folate >20.00 4.78 - 24.20 ng/mL   MicroAlbumin, Urine, Random - Urine, Clean Catch    Specimen: Urine, Clean Catch   Result Value Ref Range    Microalbumin, Urine 8.0 Not Estab. ug/mL   Phosphorus   Result Value Ref Range    Phosphorus 3.7 2.5 - 4.5 mg/dL   Magnesium   Result Value Ref Range    Magnesium 2.0 1.6 - 2.4 mg/dL   Written Authorization   Result Value Ref Range    Written Authorization Comment    CBC & Differential    Specimen: Blood   Result Value Ref Range    WBC 5.86 3.40 - 10.80 10*3/mm3    RBC 4.62 3.77 - 5.28 10*6/mm3    Hemoglobin 14.8 12.0 - 15.9 g/dL    Hematocrit 42.9 34.0 - 46.6 %    MCV 92.9 79.0 - 97.0 fL    MCH 32.0 26.6 - 33.0 pg    MCHC 34.5 31.5 - 35.7 g/dL    RDW 12.9 12.3 - 15.4 %    Platelets 219 140 - 450 10*3/mm3    Neutrophil Rel % 64.4 42.7 - 76.0 %    Lymphocyte Rel % 23.2 19.6 - 45.3 %    Monocyte Rel % 10.2 5.0 - 12.0 %    Eosinophil Rel % 1.2 0.3 - 6.2 %    Basophil Rel % 0.7 0.0 - 1.5 %    Neutrophils Absolute 3.77 1.70 - 7.00 10*3/mm3    Lymphocytes Absolute 1.36 0.70 - 3.10 10*3/mm3    Monocytes Absolute 0.60 0.10 - 0.90 10*3/mm3    Eosinophils Absolute 0.07 0.00 - 0.40 10*3/mm3     "Basophils Absolute 0.04 0.00 - 0.20 10*3/mm3    Immature Granulocyte Rel % 0.3 0.0 - 0.5 %    Immature Grans Absolute 0.02 0.00 - 0.05 10*3/mm3    nRBC 0.0 0.0 - 0.2 /100 WBC       A1C:  Lab Results - Last 18 Months   Lab Units 02/03/21  0541 01/25/21  1340 08/11/20  0755 02/07/20  0747 08/30/19  0747   HEMOGLOBIN A1C % 6.6* 6.8* 5.80* 7.50* 6.90*     LIPID:  Lab Results - Last 18 Months   Lab Units 08/30/19  0747   CHOLESTEROL mg/dL 154   LDL CHOL mg/dL 67   HDL CHOL mg/dL 44   TRIGLYCERIDES mg/dL 215*     PSA:No results for input(s): PSA in the last 39596 hours.  CBC:  Lab Results - Last 18 Months   Lab Units 02/03/21  0541 01/25/21  1340 08/11/20  0755 02/07/20  0747 08/30/19  0747   WBC K/uL  --  7.8 5.86 5.30 6.02   HEMOGLOBIN g/dL 12.0 15.1 14.8 15.2 15.4   HEMATOCRIT % 35.9* 45.6 42.9 44.8 48.3*   PLATELETS K/uL  --  271 219 250 241   IRON mcg/dL  --   --  89 83 79      BMP/CMP:  Lab Results - Last 18 Months   Lab Units 01/25/21  1340 08/11/20  0755 07/06/20  0800 02/07/20  0747 08/30/19  0747   SODIUM mmol/L 139 143  --  137 137   POTASSIUM mmol/L 4.0 4.2  --  4.3 4.3   CHLORIDE mmol/L 100 101  --  98 97*   TOTAL CO2 mmol/L 27 28.1  --  23.5 25.6   GLUCOSE mg/dL  --  112*  --  239* 172*   BUN mg/dL 21 22  --  20 19   CREATININE mg/dL 0.8 0.72  --  0.80 0.78   EGFR IF NONAFRICN AM  >60 80  --  71 73   EGFR IF AFRICN AM  >59 97  --  86 88   CALCIUM mg/dL 10.2 9.9 9.8 10.2 9.6     HEPATIC:  Lab Results - Last 18 Months   Lab Units 08/11/20  0755 02/07/20  0747 08/30/19  0747   ALT (SGPT) U/L 35* 55* 76*   AST (SGOT) U/L 20 43* 58*   ALK PHOS U/L 55 65 74     THYROID:  Lab Results - Last 18 Months   Lab Units 08/11/20  0755 08/30/19  0747   TSH uIU/mL 0.990 1.880       Objective   /84 (BP Location: Left arm, Patient Position: Sitting, Cuff Size: Large Adult)   Pulse 83   Temp 97.8 °F (36.6 °C) (Infrared)   Resp 18   Ht 156.2 cm (61.5\")   Wt 106 kg (234 lb)   SpO2 98%   BMI 43.50 kg/m²   Body mass " "index is 43.5 kg/m².    Recent Vitals       7/9/2020 8/17/2020 2/8/2021       BP:  154/94  171/87  122/84     Pulse:  97  71  83     Temp:  97.5 °F (36.4 °C)  98.1 °F (36.7 °C)  97.8 °F (36.6 °C)     Weight:  105 kg (231 lb 3.2 oz)  103 kg (226 lb)  106 kg (234 lb)     BMI (Calculated):  43  40  43.5           Physical Exam  GENERAL:  Well nourished/developed in no acute distress. Obese.   SKIN: Turgor excellent, without wound, rash, lesion; incision intact.   HEENT: Normal cephalic without trauma.  Pupils equal round reactive to light. Extraocular motions full without nystagmus.  Oral cavity without growths, exudates, and moist.  Posterior pharynx without mass, obstruction, redness.  No thyromegaly, mass, tenderness, lymphadenopathy and supple.   CV: Regular rhythm.  No murmur, gallop, trace edema. Posterior pulses intact.  No carotid bruits.  CHEST: No chest wall tenderness or mass.   LUNGS: Symmetric motion with clear to auscultation.    ABD: Soft, nontender without mass.   ORTHO: Symmetric extremities without swelling/point tenderness other than R knee.  Full gross range of motion diffuse; R knee deferred.   Walking without assistance rolling walker.   NEURO: CN 2-12 grossly intact.  Symmetric facies and UE/LE. 3/5 strength throughout. 1/4 x bicep equal reflexes.  Nonfocal use extremities. Speech clear.    PSYCH: Oriented x 3.  Pleasant calm, well kept.  Purposeful/directed conservation with intact short/long gross memory.    Assessment/Plan     1. HTN (hypertension), benign    2. Post-operative state    3. Fatigue, unspecified type    4. Elevated blood pressure reading    5. Controlled type 2 diabetes mellitus without complication, without long-term current use of insulin (CMS/[facility])    6. Mild intermittent asthma, unspecified whether complicated        Discussion:   Certainly does not have hypertensive urgency; no symptoms  ? Home bp machine used today  ? Component anemia, mild dehydration making \"higher\" bp " after exercise and from pain.   ? Use of celebrex; issue of celebrex and ASA together alone vs the bp affect of celebrex.   Labs; see what home bp is tomorrow.     Medical decision issues:   Data review above:   Rx: reviewed and decisions:   Same Rx for now     Orders placed:   LAB/Testing/Referrals: reviewed/orders:   Today:   Orders Placed This Encounter   Procedures   • Basic Metabolic Panel   • CBC & Differential     Chronic/recurrent labs above or change to:   same     Health maintenance:   Body mass index is 43.5 kg/m².  Patient's Body mass index is 43.5 kg/m². BMI is above normal parameters. Recommendations include: exercise counseling and nutrition counseling.    Tobacco use reviewed: none    There are no Patient Instructions on file for this visit.    Follow up: Return for will see how testing/or treatment goes and decide;, lab today/.  No future appointments.

## 2021-02-09 ENCOUNTER — TELEPHONE (OUTPATIENT)
Dept: FAMILY MEDICINE CLINIC | Facility: CLINIC | Age: 71
End: 2021-02-09

## 2021-02-09 LAB
BASOPHILS # BLD AUTO: 0.06 10*3/MM3 (ref 0–0.2)
BASOPHILS NFR BLD AUTO: 0.6 % (ref 0–1.5)
BUN SERPL-MCNC: 15 MG/DL (ref 8–23)
BUN/CREAT SERPL: 24.6 (ref 7–25)
CALCIUM SERPL-MCNC: 9.2 MG/DL (ref 8.6–10.5)
CHLORIDE SERPL-SCNC: 98 MMOL/L (ref 98–107)
CO2 SERPL-SCNC: 29.8 MMOL/L (ref 22–29)
CREAT SERPL-MCNC: 0.61 MG/DL (ref 0.57–1)
EOSINOPHIL # BLD AUTO: 0.15 10*3/MM3 (ref 0–0.4)
EOSINOPHIL NFR BLD AUTO: 1.4 % (ref 0.3–6.2)
ERYTHROCYTE [DISTWIDTH] IN BLOOD BY AUTOMATED COUNT: 13.3 % (ref 12.3–15.4)
GLUCOSE SERPL-MCNC: 173 MG/DL (ref 65–99)
HCT VFR BLD AUTO: 34.6 % (ref 34–46.6)
HGB BLD-MCNC: 11.8 G/DL (ref 12–15.9)
IMM GRANULOCYTES # BLD AUTO: 0.07 10*3/MM3 (ref 0–0.05)
IMM GRANULOCYTES NFR BLD AUTO: 0.6 % (ref 0–0.5)
LYMPHOCYTES # BLD AUTO: 1.38 10*3/MM3 (ref 0.7–3.1)
LYMPHOCYTES NFR BLD AUTO: 12.7 % (ref 19.6–45.3)
MCH RBC QN AUTO: 31.9 PG (ref 26.6–33)
MCHC RBC AUTO-ENTMCNC: 34.1 G/DL (ref 31.5–35.7)
MCV RBC AUTO: 93.5 FL (ref 79–97)
MONOCYTES # BLD AUTO: 1.03 10*3/MM3 (ref 0.1–0.9)
MONOCYTES NFR BLD AUTO: 9.5 % (ref 5–12)
NEUTROPHILS # BLD AUTO: 8.14 10*3/MM3 (ref 1.7–7)
NEUTROPHILS NFR BLD AUTO: 75.2 % (ref 42.7–76)
NRBC BLD AUTO-RTO: 0.2 /100 WBC (ref 0–0.2)
PLATELET # BLD AUTO: 329 10*3/MM3 (ref 140–450)
POTASSIUM SERPL-SCNC: 4.3 MMOL/L (ref 3.5–5.2)
RBC # BLD AUTO: 3.7 10*6/MM3 (ref 3.77–5.28)
SODIUM SERPL-SCNC: 137 MMOL/L (ref 136–145)
WBC # BLD AUTO: 10.83 10*3/MM3 (ref 3.4–10.8)

## 2021-02-09 NOTE — TELEPHONE ENCOUNTER
Stop celebrex  Norvasc 2.5 take two now  bp report 2-3 hr      Current Outpatient Medications:   •  amLODIPine (NORVASC) 2.5 MG tablet, TAKE ONE TABLET DAILY, Disp: 90 tablet, Rfl: 3  •  hydroCHLOROthiazide (MICROZIDE) 12.5 MG capsule, TAKE ONE CAPSULE DAILY, Disp: 90 capsule, Rfl: 3  •  irbesartan (AVAPRO) 300 MG tablet, Take 1 tablet by mouth Daily., Disp: 90 tablet, Rfl: 2

## 2021-02-09 NOTE — TELEPHONE ENCOUNTER
PATIENT CALLED STATING HER BP WAS UP AGAIN TODAY AND SHE CURRENTLY HAS A HEADACHE. BP TODAY /128.    SHE WOULD LIKE A CALL BACK   [phone]

## 2021-02-09 NOTE — TELEPHONE ENCOUNTER
Called patient and 1650; she was feeling much better.  I told her to take 2.5 mg Norvasc at bedtime; then 5 mg the day after that continue to monitor blood pressure and keep us informed

## 2021-03-03 ENCOUNTER — TELEPHONE (OUTPATIENT)
Dept: FAMILY MEDICINE CLINIC | Facility: CLINIC | Age: 71
End: 2021-03-03

## 2021-03-03 DIAGNOSIS — I10 HTN (HYPERTENSION), BENIGN: Primary | ICD-10-CM

## 2021-03-03 DIAGNOSIS — I10 HTN (HYPERTENSION), BENIGN: ICD-10-CM

## 2021-03-03 RX ORDER — AMLODIPINE BESYLATE 5 MG/1
5 TABLET ORAL DAILY
Qty: 90 TABLET | Refills: 1 | Status: SHIPPED | OUTPATIENT
Start: 2021-03-03 | End: 2021-03-11 | Stop reason: SDUPTHER

## 2021-03-03 RX ORDER — AMLODIPINE BESYLATE 2.5 MG/1
2.5 TABLET ORAL DAILY
Qty: 90 TABLET | Refills: 3 | Status: SHIPPED | OUTPATIENT
Start: 2021-03-03 | End: 2021-03-03 | Stop reason: DRUGHIGH

## 2021-03-03 NOTE — TELEPHONE ENCOUNTER
[facility] [phone] is needing to clarify what strength patient is supposed to take of Amlodipine.

## 2021-03-03 NOTE — TELEPHONE ENCOUNTER
Caller: Xiang Kezia L    Relationship: Self    Best call back number: [phone]     Medication needed:   Requested Prescriptions     Pending Prescriptions Disp Refills   • amLODIPine (NORVASC) 2.5 MG tablet 90 tablet 3     Sig: Take 1 tablet by mouth Daily.       When do you need the refill by: 03/03/2021    What details did the patient provide when requesting the medication: PATIENT STATED THAT AFTER KNEE SURGERY SHE DOUBLED UP ON BP MED PER DR. BURGER AND NOW SHE IS ALMOST OUT AND NEEDS REFILLS. PATIENT STATED THAT IF SHE NEEDS TO CONTINUE TAKING 2 A DAY SHE WILL BUT IF SHE SHOULD GO BACK DOWN TO 1 SHE IS FINE WITH THAT AS WELL.    Does the patient have less than a 3 day supply:  [x] Yes  [] No    What is the patient's preferred pharmacy: [facility] - [place], [place] - [address] [facility] [phone] [facility] [phone] FX

## 2021-03-03 NOTE — TELEPHONE ENCOUNTER
Called pt and has been taking two 2.5 mg of norvasc and now needs it updated to 5 mg tablet, advised will update and send in new rx for 90 days

## 2021-03-04 ENCOUNTER — LAB (OUTPATIENT)
Dept: FAMILY MEDICINE CLINIC | Facility: CLINIC | Age: 71
End: 2021-03-04

## 2021-03-04 DIAGNOSIS — Z86.2 HISTORY OF ANEMIA: ICD-10-CM

## 2021-03-04 DIAGNOSIS — Z86.39 HISTORY OF IRON DEFICIENCY: ICD-10-CM

## 2021-03-04 DIAGNOSIS — M81.0 OSTEOPOROSIS, UNSPECIFIED OSTEOPOROSIS TYPE, UNSPECIFIED PATHOLOGICAL FRACTURE PRESENCE: ICD-10-CM

## 2021-03-04 DIAGNOSIS — E11.9 CONTROLLED TYPE 2 DIABETES MELLITUS WITHOUT COMPLICATION, WITHOUT LONG-TERM CURRENT USE OF INSULIN (HCC): Primary | Chronic | ICD-10-CM

## 2021-03-05 LAB
25(OH)D3+25(OH)D2 SERPL-MCNC: 54.1 NG/ML
ALBUMIN SERPL-MCNC: 4.4 G/DL (ref 3.5–5.2)
ALBUMIN/GLOB SERPL: 2.2 G/DL
ALP SERPL-CCNC: 89 U/L (ref 39–117)
ALT SERPL-CCNC: 30 U/L (ref 1–33)
AST SERPL-CCNC: 28 U/L (ref 1–32)
BASOPHILS # BLD AUTO: 0.09 10*3/MM3 (ref 0–0.2)
BASOPHILS NFR BLD AUTO: 1.1 % (ref 0–1.5)
BILIRUB SERPL-MCNC: 0.5 MG/DL (ref 0–1.2)
BUN SERPL-MCNC: 16 MG/DL (ref 8–23)
BUN/CREAT SERPL: 22.5 (ref 7–25)
CALCIUM SERPL-MCNC: 10.2 MG/DL (ref 8.6–10.5)
CHLORIDE SERPL-SCNC: 96 MMOL/L (ref 98–107)
CO2 SERPL-SCNC: 28.8 MMOL/L (ref 22–29)
CREAT SERPL-MCNC: 0.71 MG/DL (ref 0.57–1)
EOSINOPHIL # BLD AUTO: 0.17 10*3/MM3 (ref 0–0.4)
EOSINOPHIL NFR BLD AUTO: 2.2 % (ref 0.3–6.2)
ERYTHROCYTE [DISTWIDTH] IN BLOOD BY AUTOMATED COUNT: 13.8 % (ref 12.3–15.4)
GLOBULIN SER CALC-MCNC: 2 GM/DL
GLUCOSE SERPL-MCNC: 104 MG/DL (ref 65–99)
HBA1C MFR BLD: 6.2 % (ref 4.8–5.6)
HCT VFR BLD AUTO: 42.1 % (ref 34–46.6)
HGB BLD-MCNC: 13.8 G/DL (ref 12–15.9)
IMM GRANULOCYTES # BLD AUTO: 0.04 10*3/MM3 (ref 0–0.05)
IMM GRANULOCYTES NFR BLD AUTO: 0.5 % (ref 0–0.5)
IRON SERPL-MCNC: 77 MCG/DL (ref 37–145)
LYMPHOCYTES # BLD AUTO: 1.5 10*3/MM3 (ref 0.7–3.1)
LYMPHOCYTES NFR BLD AUTO: 19.2 % (ref 19.6–45.3)
MAGNESIUM SERPL-MCNC: 2 MG/DL (ref 1.6–2.4)
MCH RBC QN AUTO: 30 PG (ref 26.6–33)
MCHC RBC AUTO-ENTMCNC: 32.8 G/DL (ref 31.5–35.7)
MCV RBC AUTO: 91.5 FL (ref 79–97)
MONOCYTES # BLD AUTO: 0.94 10*3/MM3 (ref 0.1–0.9)
MONOCYTES NFR BLD AUTO: 12 % (ref 5–12)
NEUTROPHILS # BLD AUTO: 5.09 10*3/MM3 (ref 1.7–7)
NEUTROPHILS NFR BLD AUTO: 65 % (ref 42.7–76)
NRBC BLD AUTO-RTO: 0 /100 WBC (ref 0–0.2)
PHOSPHATE SERPL-MCNC: 4.2 MG/DL (ref 2.5–4.5)
PLATELET # BLD AUTO: 367 10*3/MM3 (ref 140–450)
POTASSIUM SERPL-SCNC: 4.3 MMOL/L (ref 3.5–5.2)
PROT SERPL-MCNC: 6.4 G/DL (ref 6–8.5)
RBC # BLD AUTO: 4.6 10*6/MM3 (ref 3.77–5.28)
SODIUM SERPL-SCNC: 137 MMOL/L (ref 136–145)
WBC # BLD AUTO: 7.83 10*3/MM3 (ref 3.4–10.8)

## 2021-03-08 ENCOUNTER — INFUSION (OUTPATIENT)
Dept: ONCOLOGY | Facility: HOSPITAL | Age: 71
End: 2021-03-08

## 2021-03-08 VITALS
BODY MASS INDEX: 41.11 KG/M2 | WEIGHT: 232 LBS | RESPIRATION RATE: 18 BRPM | TEMPERATURE: 97.6 F | OXYGEN SATURATION: 98 % | HEIGHT: 63 IN | HEART RATE: 99 BPM | SYSTOLIC BLOOD PRESSURE: 153 MMHG | DIASTOLIC BLOOD PRESSURE: 78 MMHG

## 2021-03-08 DIAGNOSIS — M81.0 OSTEOPOROSIS, UNSPECIFIED OSTEOPOROSIS TYPE, UNSPECIFIED PATHOLOGICAL FRACTURE PRESENCE: Primary | ICD-10-CM

## 2021-03-08 PROCEDURE — 96372 THER/PROPH/DIAG INJ SC/IM: CPT

## 2021-03-08 PROCEDURE — 25010000002 DENOSUMAB 60 MG/ML SOLUTION PREFILLED SYRINGE: Performed by: PHYSICIAN ASSISTANT

## 2021-03-08 RX ADMIN — DENOSUMAB 60 MG: 60 INJECTION SUBCUTANEOUS at 10:25

## 2021-03-11 ENCOUNTER — OFFICE VISIT (OUTPATIENT)
Dept: FAMILY MEDICINE CLINIC | Facility: CLINIC | Age: 71
End: 2021-03-11

## 2021-03-11 VITALS
WEIGHT: 232.6 LBS | RESPIRATION RATE: 18 BRPM | HEART RATE: 108 BPM | HEIGHT: 63 IN | OXYGEN SATURATION: 96 % | SYSTOLIC BLOOD PRESSURE: 130 MMHG | DIASTOLIC BLOOD PRESSURE: 74 MMHG | BODY MASS INDEX: 41.21 KG/M2 | TEMPERATURE: 97.5 F

## 2021-03-11 DIAGNOSIS — H61.23 EXCESSIVE CERUMEN IN BOTH EAR CANALS: ICD-10-CM

## 2021-03-11 DIAGNOSIS — Z96.60 STATUS POST JOINT REPLACEMENT: Primary | ICD-10-CM

## 2021-03-11 DIAGNOSIS — M79.671 RIGHT FOOT PAIN: ICD-10-CM

## 2021-03-11 DIAGNOSIS — I10 HTN (HYPERTENSION), BENIGN: ICD-10-CM

## 2021-03-11 DIAGNOSIS — H01.00B BLEPHARITIS OF UPPER AND LOWER EYELIDS OF BOTH EYES, UNSPECIFIED TYPE: ICD-10-CM

## 2021-03-11 DIAGNOSIS — E11.9 CONTROLLED TYPE 2 DIABETES MELLITUS WITHOUT COMPLICATION, WITHOUT LONG-TERM CURRENT USE OF INSULIN (HCC): Chronic | ICD-10-CM

## 2021-03-11 DIAGNOSIS — H40.9 GLAUCOMA OF BOTH EYES, UNSPECIFIED GLAUCOMA TYPE: ICD-10-CM

## 2021-03-11 DIAGNOSIS — H01.00A BLEPHARITIS OF UPPER AND LOWER EYELIDS OF BOTH EYES, UNSPECIFIED TYPE: ICD-10-CM

## 2021-03-11 DIAGNOSIS — R26.9 GAIT DISTURBANCE: ICD-10-CM

## 2021-03-11 PROBLEM — H61.20 EXCESSIVE CERUMEN IN EAR CANAL: Status: ACTIVE | Noted: 2021-03-11

## 2021-03-11 PROCEDURE — 99214 OFFICE O/P EST MOD 30 MIN: CPT | Performed by: FAMILY MEDICINE

## 2021-03-11 RX ORDER — AMLODIPINE BESYLATE 5 MG/1
5 TABLET ORAL DAILY
Qty: 90 TABLET | Refills: 1 | Status: SHIPPED | OUTPATIENT
Start: 2021-03-11 | End: 2021-09-20

## 2021-03-11 RX ORDER — TOBRAMYCIN 3 MG/ML
2 SOLUTION/ DROPS OPHTHALMIC
Qty: 5 ML | Refills: 0 | Status: SHIPPED | OUTPATIENT
Start: 2021-03-11 | End: 2021-07-23

## 2021-03-11 NOTE — PROGRESS NOTES
Subjective   Kezia Otoole is a 70 y.o. female presenting with chief complaint of:   Chief Complaint   Patient presents with   • Earache   Answers for HPI/ROS submitted by the patient on 3/11/2021  Please describe your symptoms.: Pain in right foot at base on toes; Left ear “blocked” or muffled; eye irritation and mattering  Have you had these symptoms before?: Yes  How long have you been having these symptoms?: 1-2 weeks  Please list any medications you are currently taking for this condition.: Have been using Debrox in left ear  What is the primary reason for your visit?: Other    History of Present Illness :  Alone.       Has multiple chronic problems to consider that might have a bearing on today's issues;  an interval appointment.       Chronic/acute problems reviewed today:   1. Status post joint replacement    2. Excessive cerumen in both ear canals    3. Glaucoma of both eyes, unspecified glaucoma type    4. Blepharitis of upper and lower eyelids of both eyes, unspecified type    5. Right foot pain    6. HTN (hypertension), benign    7. Gait disturbance    8. Controlled type 2 diabetes mellitus without complication, without long-term current use of insulin (CMS/[facility])      Has an/another acute issue today: none.    The following portions of the patient's history were reviewed and updated as appropriate: allergies, current medications, past family history, past medical history, past social history, past surgical history and problem list.      Current Outpatient Medications:   •  acetaminophen (TYLENOL) 500 MG tablet, Take 1,000 mg by mouth Every 6 (Six) Hours As Needed for mild pain (1-3)., Disp: , Rfl:   •  albuterol (PROVENTIL HFA;VENTOLIN HFA) 108 (90 Base) MCG/ACT inhaler, Inhale 2 puffs Every 4 (Four) Hours As Needed for Wheezing or Shortness of Air., Disp: 1 inhaler, Rfl: 1  •  amLODIPine (Norvasc) 5 MG tablet, Take 1 tablet by mouth Daily., Disp: 90 tablet, Rfl: 1  •  aspirin 81 MG EC tablet, Take 1  tablet by mouth Every Other Day. (Patient taking differently: Take 81 mg by mouth 2 (two) times a day.), Disp: 32 tablet, Rfl: 0  •  B Complex-C (SUPER B COMPLEX PO), Take 1 tablet by mouth Daily., Disp: , Rfl:   •  budesonide-formoterol (Symbicort) 160-4.5 MCG/ACT inhaler, Inhale 2 puffs 2 (Two) Times a Day., Disp: 30.6 g, Rfl: 3  •  Calcium Carb-Cholecalciferol (CALCIUM-VITAMIN D) 600-400 MG-UNIT tablet, Take 1 tablet by mouth 2 (Two) Times a Day., Disp: , Rfl:   •  carboxymethylcellulose (REFRESH PLUS) 0.5 % solution, 2 drops Daily As Needed for dry eyes., Disp: , Rfl:   •  Cholecalciferol 25 MCG (1000 UT) capsule, Take 1 capsule by mouth Daily., Disp: , Rfl:   •  Coenzyme Q10 (CO Q 10 PO), Take 1 capsule by mouth Every Other Day., Disp: , Rfl:   •  denosumab (PROLIA) 60 MG/ML solution syringe, Inject 60 mg under the skin into the appropriate area as directed Every 6 (Six) Months., Disp: , Rfl:   •  glucose blood test strip, Use as instructed: once a day testing of the one touch ultra blue strips, Disp: 50 each, Rfl: 11  •  guaiFENesin (MUCINEX) 600 MG 12 hr tablet, Take 600 mg by mouth 2 (Two) Times a Day., Disp: , Rfl:   •  hydroCHLOROthiazide (MICROZIDE) 12.5 MG capsule, TAKE ONE CAPSULE DAILY, Disp: 90 capsule, Rfl: 3  •  ipratropium-albuterol (DUO-NEB) 0.5-2.5 mg/3 ml nebulizer, Take 3 mL by nebulization 4 (Four) Times a Day., Disp: 40 vial, Rfl: 1  •  irbesartan (AVAPRO) 300 MG tablet, Take 1 tablet by mouth Daily., Disp: 90 tablet, Rfl: 2  •  latanoprost (XALATAN) 0.005 % ophthalmic solution, Administer 1 drop to both eyes Every Night., Disp: , Rfl: 5  •  metFORMIN ER (GLUCOPHAGE-XR) 500 MG 24 hr tablet, TAKE TWO TABLETS EVERY NIGHT, Disp: 60 tablet, Rfl: 5  •  Misc Natural Products (GLUCOSAMINE CHONDROITIN TRIPLE PO), Take 1 tablet by mouth 2 (Two) Times a Day., Disp: , Rfl:   •  montelukast (SINGULAIR) 10 MG tablet, TAKE ONE TABLET DAILY, Disp: 30 tablet, Rfl: 5  •  Multiple Vitamins-Minerals (VITRUM 50+  SENIOR MULTI PO), Take 1 tablet by mouth Daily., Disp: , Rfl:   •  NON FORMULARY, Immunotherapy, Disp: , Rfl:   •  oxyCODONE (ROXICODONE) 5 MG immediate release tablet, 1 tablet Every 4 (Four) Hours As Needed. Takes 1-2 tablets every 4 hours as needed., Disp: , Rfl:   •  potassium chloride 10 MEQ CR tablet, TAKE ONE TABLET DAILY, Disp: 90 tablet, Rfl: 3  •  Semaglutide,0.25 or 0.5MG/DOS, (Ozempic, 0.25 or 0.5 MG/DOSE,) 2 MG/1.5ML solution pen-injector, Inject 0.5 mg under the skin into the appropriate area as directed 1 (One) Time Per Week., Disp: 15 pen, Rfl: 1    No problems with medications.    Allergies   Allergen Reactions   • Adhesive Tape Other (See Comments)     Tears and bruises skin   • Other Other (See Comments)     Bandaids make arm Red/slightly swollen       Review of Systems  GENERAL:  Active/slower with limits, speed, stamina for age and various joint issues. Sleep is ok; apnea denied. No fever now.  ENDO:  No syncope, near or diaphoretic sweaty spells.  BS without testing/download.  HEENT: No head injury or headache.   No vision change.   No significant hearing loss.  Ears without pain/drainage.  No sore throat.   Same/occ nasal/sinus congestion/drainage. No epistaxis.  CHEST: No chest wall tenderness or mass.  No cough,  without wheeze.   No SOB; no hemoptysis.  CV: No chest pain, palpitations, usual  On/off ankle edema.  GI: No heartburn, dysphagia.  No abdominal pain, diarrhea, constipation.  No rectal bleeding, or melena.    :  Voids without dysuria, or incontinence to completion.  ORTHO: No painful/swollen joints but various on /off sore.  No change occ sore neck or back.  No acute neck or back pain without recent injury.  NEURO: No dizziness, weakness of extremities.  No numbness/paresthesias.   PSYCH: No memory loss.  Mood good; occ mild anxious, depressed but/and not suicidal.  Tries to tolerate stress .   Screening:  Mammogram: 12.19.19  Bone density: 12.11.18 [facility] hip -2.4; start  prolia  Low dose CT chest: Tobacco-smoker/never: NA  GI: Colon-p+div/[facility]/11.19.18/5y  Prostate: NA  Usual lab order  6m CBC, CMP, A1c  12m CBC, CMP, A1c, LIPID, TSH, Vit D, uric acid    Data reviewed:   Recent admit/ER/MD visits: ortho reports    Lab Results:  Results for orders placed or performed in visit on 03/04/21   Comprehensive Metabolic Panel    Specimen: Blood   Result Value Ref Range    Glucose 104 (H) 65 - 99 mg/dL    BUN 16 8 - 23 mg/dL    Creatinine 0.71 0.57 - 1.00 mg/dL    eGFR Non African Am 81 >60 mL/min/1.73    eGFR African Am 98 >60 mL/min/1.73    BUN/Creatinine Ratio 22.5 7.0 - 25.0    Sodium 137 136 - 145 mmol/L    Potassium 4.3 3.5 - 5.2 mmol/L    Chloride 96 (L) 98 - 107 mmol/L    Total CO2 28.8 22.0 - 29.0 mmol/L    Calcium 10.2 8.6 - 10.5 mg/dL    Total Protein 6.4 6.0 - 8.5 g/dL    Albumin 4.40 3.50 - 5.20 g/dL    Globulin 2.0 gm/dL    A/G Ratio 2.2 g/dL    Total Bilirubin 0.5 0.0 - 1.2 mg/dL    Alkaline Phosphatase 89 39 - 117 U/L    AST (SGOT) 28 1 - 32 U/L    ALT (SGPT) 30 1 - 33 U/L   Hemoglobin A1c    Specimen: Blood   Result Value Ref Range    Hemoglobin A1C 6.20 (H) 4.80 - 5.60 %   Iron    Specimen: Blood   Result Value Ref Range    Iron 77 37 - 145 mcg/dL   Magnesium    Specimen: Blood   Result Value Ref Range    Magnesium 2.0 1.6 - 2.4 mg/dL   Vitamin D 25 Hydroxy    Specimen: Blood   Result Value Ref Range    25 Hydroxy, Vitamin D 54.1 ng/ml   Phosphorus    Specimen: Blood   Result Value Ref Range    Phosphorus 4.2 2.5 - 4.5 mg/dL   CBC & Differential    Specimen: Blood   Result Value Ref Range    WBC 7.83 3.40 - 10.80 10*3/mm3    RBC 4.60 3.77 - 5.28 10*6/mm3    Hemoglobin 13.8 12.0 - 15.9 g/dL    Hematocrit 42.1 34.0 - 46.6 %    MCV 91.5 79.0 - 97.0 fL    MCH 30.0 26.6 - 33.0 pg    MCHC 32.8 31.5 - 35.7 g/dL    RDW 13.8 12.3 - 15.4 %    Platelets 367 140 - 450 10*3/mm3    Neutrophil Rel % 65.0 42.7 - 76.0 %    Lymphocyte Rel % 19.2 (L) 19.6 - 45.3 %    Monocyte Rel % 12.0 5.0  - 12.0 %    Eosinophil Rel % 2.2 0.3 - 6.2 %    Basophil Rel % 1.1 0.0 - 1.5 %    Neutrophils Absolute 5.09 1.70 - 7.00 10*3/mm3    Lymphocytes Absolute 1.50 0.70 - 3.10 10*3/mm3    Monocytes Absolute 0.94 (H) 0.10 - 0.90 10*3/mm3    Eosinophils Absolute 0.17 0.00 - 0.40 10*3/mm3    Basophils Absolute 0.09 0.00 - 0.20 10*3/mm3    Immature Granulocyte Rel % 0.5 0.0 - 0.5 %    Immature Grans Absolute 0.04 0.00 - 0.05 10*3/mm3    nRBC 0.0 0.0 - 0.2 /100 WBC       A1C:  Lab Results - Last 18 Months   Lab Units 03/04/21  1008 02/03/21  0541 01/25/21  1340 08/11/20  0755 02/07/20  0747   HEMOGLOBIN A1C % 6.20* 6.6* 6.8* 5.80* 7.50*     LIPID:No results for input(s): CHLPL, LDL, HDL, TRIG in the last 23622 hours.  PSA:No results for input(s): PSA in the last 77881 hours.  CBC:  Lab Results - Last 18 Months   Lab Units 03/04/21  1008 02/08/21  1512 02/03/21  0541 01/25/21  1340 08/11/20  0755 02/07/20  0747   WBC 10*3/mm3 7.83 10.83*  --  7.8 5.86 5.30   HEMOGLOBIN g/dL 13.8 11.8* 12.0 15.1 14.8 15.2   HEMATOCRIT % 42.1 34.6 35.9* 45.6 42.9 44.8   PLATELETS 10*3/mm3 367 329  --  271 219 250   IRON mcg/dL 77  --   --   --  89 83      BMP/CMP:  Lab Results - Last 18 Months   Lab Units 03/04/21  1008 02/08/21  1512 01/25/21  1340 08/11/20  0755 07/06/20  0800 02/07/20  0747   SODIUM mmol/L 137 137 139 143  --  137   POTASSIUM mmol/L 4.3 4.3 4.0 4.2  --  4.3   CHLORIDE mmol/L 96* 98 100 101  --  98   TOTAL CO2 mmol/L 28.8 29.8* 27 28.1  --  23.5   GLUCOSE mg/dL 104* 173*  --  112*  --  239*   BUN mg/dL 16 15 21 22  --  20   CREATININE mg/dL 0.71 0.61 0.8 0.72  --  0.80   EGFR IF NONAFRICN AM mL/min/1.73 81 96 >60 80  --  71   EGFR IF AFRICN AM mL/min/1.73 98 117 >59 97  --  86   CALCIUM mg/dL 10.2 9.2 10.2 9.9 9.8 10.2     HEPATIC:  Lab Results - Last 18 Months   Lab Units 03/04/21  1008 08/11/20  0755 02/07/20  0747   ALT (SGPT) U/L 30 35* 55*   AST (SGOT) U/L 28 20 43*   ALK PHOS U/L 89 55 65     THYROID:  Lab Results - Last  "18 Months   Lab Units 08/11/20  0755   TSH uIU/mL 0.990       Objective   /74   Pulse 108   Temp 97.5 °F (36.4 °C) (Infrared)   Resp 18   Ht 160 cm (63\")   Wt 106 kg (232 lb 9.6 oz)   SpO2 96%   Breastfeeding No   BMI 41.20 kg/m²   Body mass index is 41.2 kg/m².    Recent Vitals       2/8/2021 3/8/2021 3/11/2021       BP:  122/84  153/78  130/74     Pulse:  83  99  108     Temp:  97.8 °F (36.6 °C)  97.6 °F (36.4 °C)  97.5 °F (36.4 °C)     Weight:  106 kg (234 lb)  105 kg (232 lb)  106 kg (232 lb 9.6 oz)     BMI (Calculated):  43.5  41.1  41.2           Physical Exam  GENERAL:  Well nourished/developed in no acute distress. Obese.   SKIN: Turgor excellent, without wound, rash, lesion.   HEENT: Normal cephalic without trauma.  Pupils equal round reactive to light. Extraocular motions full without nystagmus.   External canals initially full soft wax; mildly red after irritation.  Tymphatic membranes daniel with elvis structures intact after irrigation.   Oral cavity without growths, exudates, and moist.  Posterior pharynx without mass, obstruction, redness.  No thyromegaly, mass, tenderness, lymphadenopathy and supple.   CV: Regular rhythm.  No murmur, gallop, trace equal LE edema. Posterior pulses intact.  No carotid bruits.  CHEST: No chest wall tenderness or mass.   LUNGS: Symmetric motion with clear to auscultation.    ABD: Soft, nontender without mass.   ORTHO: Symmetric extremities without swelling/point tenderness compression soreness R foot between 2nd/3rd. Full gross range of motion diffuse.   Walking without assistance.   NEURO: CN 2-12 grossly intact.  Symmetric facies and UE/LE. 3/5 strength throughout. 1/4 x bicep equal reflexes.  Nonfocal use extremities. Speech clear.    PSYCH: Oriented x 3.  Pleasant calm, well kept.  Purposeful/directed conservation with intact short/long gross memory.    Assessment/Plan     1. Status post joint replacement: acute/chronic now having had R total knee; " recovery has been slower/more difficult than she expected with weakness, RLE weakness/less movement, fatigue. BP adjustment within that was successful; bps are better.    2. Excessive cerumen in both ear canals: ? Wax; ears feel full/hearing decreased.    3. Glaucoma of both eyes, unspecified glaucoma type: chronic/always considered with her other Rx/exam. To date drops have not caused problem.    4. Blepharitis of upper and lower eyelids of both eyes, unspecified type:  Acute/on-off mattering especially AM of upper eyelids.    5. Right foot pain: acute discomfort forefoot; no falls.  Sedentary.    6. HTN (hypertension), benign: Chronic/stable. Stable here past-now/and with home blood pressures.  No significant chest pain, SOB, LE edema, orthopnea, near syncope, dizziness/light headness.   Recent Vitals       2/8/2021 3/8/2021 3/11/2021       BP:  122/84  153/78  130/74     Pulse:  83  99  108     Temp:  97.8 °F (36.6 °C)  97.6 °F (36.4 °C)  97.5 °F (36.4 °C)     Weight:  106 kg (234 lb)  105 kg (232 lb)  106 kg (232 lb 9.6 oz)     BMI (Calculated):  43.5  41.1  41.2            7. Gait disturbance: Chronic/stable:.  Ongoing issues with difficulties it results in difficulty with walking or gait.  No recent falls.  No recent injuries.  Uses to help gait: infrequent now cane.     8. Controlled type 2 diabetes mellitus without complication, without long-term current use of insulin (CMS/[facility]): Chronic/stable.  No problem/pattern hypoglycemia/hyperglycemia manifest by poly- dypsia, phagia, uria, or sweats, diaphoretic episodes, syncope/near.         Discussion:  Try to avoid NSAID; but not off the table if absolutely needed  Continue monitor bp  Corrected DS 2.5 bid to 5.0 Norvasc  Tobramycin eye for week; if problem after that talk to eye MD  Refer to Marilyn; ? Haji's and injection would help  Wash ears out; ear wax program future-was successful/easily with mild external canal reddness after washing/TM intact.      Medical decision issues:   Data review above:   Rx: reviewed and decisions:   Same Rx for now     New Medications Ordered This Visit   Medications   • tobramycin 0.3 % solution ophthalmic solution     Sig: Administer 2 drops to both eyes Every 4 (Four) Hours While Awake.     Dispense:  5 mL     Refill:  0   • amLODIPine (Norvasc) 5 MG tablet     Sig: Take 1 tablet by mouth Daily.     Dispense:  90 tablet     Refill:  1     She is hoping for 5 mg/qd not 2.5 bid     Orders placed:   LAB/Testing/Referrals: reviewed/orders:   Today:   Orders Placed This Encounter   Procedures   • Ambulatory Referral to Podiatry     Chronic/recurrent labs above or change to:   same    Health maintenance:   Body mass index is 41.2 kg/m².  Patient's Body mass index is 41.2 kg/m². BMI is above normal parameters. Recommendations include: exercise counseling, nutrition counseling and as before/as able.    Tobacco use reviewed:  Non-smoker    There are no Patient Instructions on file for this visit.    Follow up: Return for lab;, Dr Crowe-, 6 m;.  Future Appointments   Date Time Provider Department Center   3/19/2021 10:45 AM Guillermo Emmanuel APRN [facility]   9/14/2021  8:25 AM LAB [facility]   9/16/2021  9:45 AM Len Crowe MD [facility]

## 2021-03-14 PROBLEM — Z96.60 STATUS POST JOINT REPLACEMENT: Status: ACTIVE | Noted: 2021-03-14

## 2021-06-09 DIAGNOSIS — I10 HTN (HYPERTENSION), BENIGN: ICD-10-CM

## 2021-06-10 RX ORDER — IRBESARTAN 300 MG/1
TABLET ORAL
Qty: 90 TABLET | Refills: 3 | Status: SHIPPED | OUTPATIENT
Start: 2021-06-10 | End: 2022-04-26

## 2021-07-09 DIAGNOSIS — E11.9 CONTROLLED TYPE 2 DIABETES MELLITUS WITHOUT COMPLICATION, WITHOUT LONG-TERM CURRENT USE OF INSULIN (HCC): Chronic | ICD-10-CM

## 2021-07-09 RX ORDER — METFORMIN HYDROCHLORIDE 500 MG/1
TABLET, EXTENDED RELEASE ORAL
Qty: 60 TABLET | Refills: 5 | Status: SHIPPED | OUTPATIENT
Start: 2021-07-09 | End: 2022-01-17

## 2021-07-23 ENCOUNTER — OFFICE VISIT (OUTPATIENT)
Dept: FAMILY MEDICINE CLINIC | Facility: CLINIC | Age: 71
End: 2021-07-23

## 2021-07-23 VITALS
WEIGHT: 225 LBS | HEART RATE: 71 BPM | TEMPERATURE: 97.3 F | BODY MASS INDEX: 39.87 KG/M2 | HEIGHT: 63 IN | OXYGEN SATURATION: 98 % | DIASTOLIC BLOOD PRESSURE: 80 MMHG | SYSTOLIC BLOOD PRESSURE: 128 MMHG | RESPIRATION RATE: 18 BRPM

## 2021-07-23 DIAGNOSIS — R35.0 URINARY FREQUENCY: Primary | ICD-10-CM

## 2021-07-23 PROBLEM — M17.11 PRIMARY OSTEOARTHRITIS OF RIGHT KNEE: Status: ACTIVE | Noted: 2021-02-02

## 2021-07-23 PROCEDURE — 99213 OFFICE O/P EST LOW 20 MIN: CPT | Performed by: NURSE PRACTITIONER

## 2021-07-23 RX ORDER — DULOXETIN HYDROCHLORIDE 20 MG/1
1 CAPSULE, DELAYED RELEASE ORAL NIGHTLY
COMMUNITY
Start: 2021-07-21 | End: 2022-09-06 | Stop reason: SDUPTHER

## 2021-07-23 NOTE — PROGRESS NOTES
Subjective   Chief Complaint:  Frequency in urination    History of Present Illness:  This 70 y.o. female was seen in the office today. Bladder fullness and frequency of urination gradual over the last 1 month.    Allergies   Allergen Reactions   • Adhesive Tape Other (See Comments)     Tears and bruises skin   • Other Other (See Comments)     Bandaids make arm Red/slightly swollen      Current Outpatient Medications on File Prior to Visit   Medication Sig   • acetaminophen (TYLENOL) 500 MG tablet Take 1,000 mg by mouth Every 6 (Six) Hours As Needed for mild pain (1-3).   • albuterol (PROVENTIL HFA;VENTOLIN HFA) 108 (90 Base) MCG/ACT inhaler Inhale 2 puffs Every 4 (Four) Hours As Needed for Wheezing or Shortness of Air.   • amLODIPine (Norvasc) 5 MG tablet Take 1 tablet by mouth Daily.   • aspirin 81 MG EC tablet Take 1 tablet by mouth Every Other Day.   • B Complex-C (SUPER B COMPLEX PO) Take 1 tablet by mouth Daily.   • budesonide-formoterol (Symbicort) 160-4.5 MCG/ACT inhaler Inhale 2 puffs 2 (Two) Times a Day.   • Calcium Carb-Cholecalciferol (CALCIUM-VITAMIN D) 600-400 MG-UNIT tablet Take 1 tablet by mouth 2 (Two) Times a Day.   • carboxymethylcellulose (REFRESH PLUS) 0.5 % solution 2 drops Daily As Needed for dry eyes.   • Cholecalciferol 25 MCG (1000 UT) capsule Take 1 capsule by mouth Daily.   • Coenzyme Q10 (CO Q 10 PO) Take 1 capsule by mouth Every Other Day.   • denosumab (PROLIA) 60 MG/ML solution syringe Inject 60 mg under the skin into the appropriate area as directed Every 6 (Six) Months.   • glucose blood test strip Use as instructed: once a day testing of the one touch ultra blue strips   • guaiFENesin (MUCINEX) 600 MG 12 hr tablet Take 600 mg by mouth 2 (Two) Times a Day.   • hydroCHLOROthiazide (MICROZIDE) 12.5 MG capsule TAKE ONE CAPSULE DAILY   • ipratropium-albuterol (DUO-NEB) 0.5-2.5 mg/3 ml nebulizer Take 3 mL by nebulization 4 (Four) Times a Day.   • irbesartan (AVAPRO) 300 MG tablet TAKE  ONE TABLET DAILY   • latanoprost (XALATAN) 0.005 % ophthalmic solution Administer 1 drop to both eyes Every Night.   • metFORMIN ER (GLUCOPHAGE-XR) 500 MG 24 hr tablet TAKE TWO TABLETS EVERY NIGHT   • Misc Natural Products (GLUCOSAMINE CHONDROITIN TRIPLE PO) Take 1 tablet by mouth 2 (Two) Times a Day.   • montelukast (SINGULAIR) 10 MG tablet TAKE ONE TABLET DAILY   • Multiple Vitamins-Minerals (VITRUM 50+ SENIOR MULTI PO) Take 1 tablet by mouth Daily.   • NON FORMULARY Immunotherapy   • potassium chloride 10 MEQ CR tablet TAKE ONE TABLET DAILY   • Semaglutide,0.25 or 0.5MG/DOS, (Ozempic, 0.25 or 0.5 MG/DOSE,) 2 MG/1.5ML solution pen-injector Inject 0.5 mg under the skin into the appropriate area as directed 1 (One) Time Per Week.   • DULoxetine (CYMBALTA) 20 MG capsule 1 capsule Every Night.   • [DISCONTINUED] oxyCODONE (ROXICODONE) 5 MG immediate release tablet 1 tablet Every 4 (Four) Hours As Needed. Takes 1-2 tablets every 4 hours as needed.   • [DISCONTINUED] tobramycin 0.3 % solution ophthalmic solution Administer 2 drops to both eyes Every 4 (Four) Hours While Awake.     No current facility-administered medications on file prior to visit.      Past Medical, Surgical, Social, and Family History:  Past Medical History:   Diagnosis Date   • Allergic rhinitis    • Chronic laryngitis    • Diabetes mellitus (CMS/HCC)    • GERD (gastroesophageal reflux disease)    • Hypertension    • Hypertrophy of both inferior nasal turbinates    • Laryngopharyngeal reflux    • Nontoxic multinodular goiter    • Sicca laryngitis      Past Surgical History:   Procedure Laterality Date   • CARPAL TUNNEL RELEASE     • CHOLECYSTECTOMY     • COLONOSCOPY  10/01/2008    Multiple polyps removed   • COLONOSCOPY  09/18/2013    Diverticulosis in the sigmoid colon. Dr. Dave Mcnally Recall 5 years   • HAND SURGERY     • HYSTERECTOMY     • REPLACEMENT TOTAL KNEE Right 02/02/2021   • THYROIDECTOMY, PARTIAL Right 2010   • TONSILLECTOMY     • TUBAL  "ABDOMINAL LIGATION       Social History     Socioeconomic History   • Marital status:      Spouse name: Laurie   • Number of children: 2   • Years of education: 16   • Highest education level: Not on file   Tobacco Use   • Smoking status: Never Smoker   • Smokeless tobacco: Never Used   • Tobacco comment: \"lived with smokers most of my life\"   Substance and Sexual Activity   • Alcohol use: No     Comment: Rare   • Drug use: No   • Sexual activity: Yes     Partners: Male     Birth control/protection: None     Family History   Problem Relation Age of Onset   • Diabetes Mother    • Stroke Mother    • Heart disease Mother    • Heart disease Father    • Colon cancer Neg Hx    • Colon polyps Neg Hx      Objective   Physical Exam  Vitals reviewed.   Constitutional:       General: She is not in acute distress.     Appearance: Normal appearance.   Cardiovascular:      Rate and Rhythm: Normal rate and regular rhythm.   Pulmonary:      Effort: Pulmonary effort is normal.      Breath sounds: Normal breath sounds.   Abdominal:      Tenderness: There is no right CVA tenderness or left CVA tenderness.     /80 (BP Location: Left arm, Patient Position: Sitting, Cuff Size: Large Adult)   Pulse 71   Temp 97.3 °F (36.3 °C) (Infrared)   Resp 18   Ht 160 cm (63\")   Wt 102 kg (225 lb)   SpO2 98%   BMI 39.86 kg/m²     Assessment/Plan   Diagnoses and all orders for this visit:    1. Urinary frequency (Primary)  -     UA / M With / Rflx Culture([facility] ONLY) - Urine, Clean Catch    Discussion:  Advised and educated plan of care. We discussed overactive bladder versus infection. We'll first rule out infection. Advised to cut off liquids a couple hours before bedtime also. Advised Kegel exercises. She is willing to do a trial of overactive bladder medication if she does not have an underlying infection.    Follow-up:  Return for As Needed - Depending on Test Results - Will Call.    Electronically signed by Khris Shanks, " FABBY, 07/23/21, 9:43 AM CDT.

## 2021-07-26 ENCOUNTER — LAB (OUTPATIENT)
Dept: FAMILY MEDICINE CLINIC | Facility: CLINIC | Age: 71
End: 2021-07-26

## 2021-07-26 DIAGNOSIS — R30.0 DYSURIA: Primary | ICD-10-CM

## 2021-07-30 ENCOUNTER — TELEPHONE (OUTPATIENT)
Dept: FAMILY MEDICINE CLINIC | Facility: CLINIC | Age: 71
End: 2021-07-30

## 2021-07-30 LAB
APPEARANCE UR: ABNORMAL
BACTERIA #/AREA URNS HPF: ABNORMAL /HPF
BACTERIA UR CULT: ABNORMAL
BACTERIA UR CULT: ABNORMAL
BILIRUB UR QL STRIP: NEGATIVE
CASTS URNS MICRO: ABNORMAL
COLOR UR: YELLOW
EPI CELLS #/AREA URNS HPF: ABNORMAL /HPF
GLUCOSE UR QL: NEGATIVE
HGB UR QL STRIP: ABNORMAL
KETONES UR QL STRIP: NEGATIVE
LEUKOCYTE ESTERASE UR QL STRIP: ABNORMAL
NITRITE UR QL STRIP: POSITIVE
OTHER ANTIBIOTIC SUSC ISLT: ABNORMAL
PH UR STRIP: 6 [PH] (ref 5–8)
PROT UR QL STRIP: ABNORMAL
RBC #/AREA URNS HPF: ABNORMAL /HPF
SP GR UR: 1.02 (ref 1–1.03)
UROBILINOGEN UR STRIP-MCNC: ABNORMAL MG/DL
WBC #/AREA URNS HPF: ABNORMAL /HPF

## 2021-07-30 RX ORDER — AMPICILLIN 500 MG/1
500 CAPSULE ORAL 3 TIMES DAILY
Qty: 21 CAPSULE | Refills: 0 | Status: SHIPPED | OUTPATIENT
Start: 2021-07-30 | End: 2021-09-16

## 2021-07-30 NOTE — TELEPHONE ENCOUNTER
I can't find where result was routed to me so has not been treated yet.  Ampicillin is sent to [facility]    Electronically signed by FABBY Sanchez, 07/30/21, 12:03 PM CDT.

## 2021-07-30 NOTE — TELEPHONE ENCOUNTER
Caller: Kezia Otoole    Relationship: Self    Best call back number: [phone]    Caller requesting test results: Yes    What test was performed: Urinalysis    When was the test performed: 7/26    Where was the test performed: In office

## 2021-08-20 RX ORDER — MONTELUKAST SODIUM 10 MG/1
TABLET ORAL
Qty: 90 TABLET | Refills: 3 | Status: SHIPPED | OUTPATIENT
Start: 2021-08-20 | End: 2022-06-23

## 2021-08-30 DIAGNOSIS — R73.9 HYPERGLYCEMIA: ICD-10-CM

## 2021-08-30 DIAGNOSIS — E11.9 CONTROLLED TYPE 2 DIABETES MELLITUS WITHOUT COMPLICATION, WITHOUT LONG-TERM CURRENT USE OF INSULIN (HCC): Chronic | ICD-10-CM

## 2021-08-31 RX ORDER — SEMAGLUTIDE 1.34 MG/ML
INJECTION, SOLUTION SUBCUTANEOUS
Qty: 15 PEN | Refills: 3 | Status: SHIPPED | OUTPATIENT
Start: 2021-08-31 | End: 2022-03-21 | Stop reason: DRUGHIGH

## 2021-09-14 ENCOUNTER — LAB (OUTPATIENT)
Dept: FAMILY MEDICINE CLINIC | Facility: CLINIC | Age: 71
End: 2021-09-14

## 2021-09-14 DIAGNOSIS — Z00.00 WELLNESS EXAMINATION: ICD-10-CM

## 2021-09-14 DIAGNOSIS — Z01.89 LABORATORY TEST: ICD-10-CM

## 2021-09-14 DIAGNOSIS — I10 HTN (HYPERTENSION), BENIGN: Primary | ICD-10-CM

## 2021-09-14 DIAGNOSIS — D64.9 ANEMIA, UNSPECIFIED TYPE: ICD-10-CM

## 2021-09-14 DIAGNOSIS — E53.8 VITAMIN B12 DEFICIENCY: ICD-10-CM

## 2021-09-14 DIAGNOSIS — Z86.2 HISTORY OF ANEMIA: ICD-10-CM

## 2021-09-14 DIAGNOSIS — R73.01 ELEVATED FASTING GLUCOSE: ICD-10-CM

## 2021-09-14 DIAGNOSIS — E55.9 VITAMIN D DEFICIENCY: ICD-10-CM

## 2021-09-15 ENCOUNTER — LAB (OUTPATIENT)
Dept: FAMILY MEDICINE CLINIC | Facility: CLINIC | Age: 71
End: 2021-09-15

## 2021-09-16 ENCOUNTER — OFFICE VISIT (OUTPATIENT)
Dept: FAMILY MEDICINE CLINIC | Facility: CLINIC | Age: 71
End: 2021-09-16

## 2021-09-16 VITALS
TEMPERATURE: 97.3 F | HEART RATE: 87 BPM | SYSTOLIC BLOOD PRESSURE: 132 MMHG | WEIGHT: 229 LBS | RESPIRATION RATE: 18 BRPM | DIASTOLIC BLOOD PRESSURE: 80 MMHG | BODY MASS INDEX: 40.57 KG/M2 | HEIGHT: 63 IN | OXYGEN SATURATION: 95 %

## 2021-09-16 DIAGNOSIS — Z12.31 ENCOUNTER FOR SCREENING MAMMOGRAM FOR BREAST CANCER: ICD-10-CM

## 2021-09-16 DIAGNOSIS — J45.20 MILD INTERMITTENT ASTHMA, UNSPECIFIED WHETHER COMPLICATED: Chronic | ICD-10-CM

## 2021-09-16 DIAGNOSIS — E11.9 CONTROLLED TYPE 2 DIABETES MELLITUS WITHOUT COMPLICATION, WITHOUT LONG-TERM CURRENT USE OF INSULIN (HCC): Chronic | ICD-10-CM

## 2021-09-16 DIAGNOSIS — R49.0 HOARSENESS: ICD-10-CM

## 2021-09-16 DIAGNOSIS — M81.0 OSTEOPOROSIS, UNSPECIFIED OSTEOPOROSIS TYPE, UNSPECIFIED PATHOLOGICAL FRACTURE PRESENCE: ICD-10-CM

## 2021-09-16 DIAGNOSIS — Z00.00 WELLNESS EXAMINATION: ICD-10-CM

## 2021-09-16 DIAGNOSIS — I10 HTN (HYPERTENSION), BENIGN: Chronic | ICD-10-CM

## 2021-09-16 DIAGNOSIS — K21.9 GASTROESOPHAGEAL REFLUX DISEASE, UNSPECIFIED WHETHER ESOPHAGITIS PRESENT: ICD-10-CM

## 2021-09-16 PROBLEM — E78.5 HYPERLIPIDEMIA: Status: ACTIVE | Noted: 2021-09-16

## 2021-09-16 PROCEDURE — G0439 PPPS, SUBSEQ VISIT: HCPCS | Performed by: FAMILY MEDICINE

## 2021-09-16 PROCEDURE — 99213 OFFICE O/P EST LOW 20 MIN: CPT | Performed by: FAMILY MEDICINE

## 2021-09-16 NOTE — PROGRESS NOTES
Subjective   Kezia Otoole is a 70 y.o. female presenting with chief complaint of:   Chief Complaint   Patient presents with   • Medicare Wellness-subsequent     6mo follow up        History of Present Illness :  Alone.       Has multiple chronic problems to consider that might have a bearing on today's issues;  an interval appointment.       Chronic/acute problems reviewed today:   1. HTN (hypertension), benign Chronic/stable. Stable here past/and occ other office or home blood pressures.  No significant chest pain, SOB, LE edema, orthopnea, near syncope, dizziness/light headness.   Recent Vitals       3/11/2021 7/23/2021 9/16/2021       BP:  130/74  128/80  132/80     Pulse:  108  71  87     Temp:  97.5 °F (36.4 °C)  97.3 °F (36.3 °C)  97.3 °F (36.3 °C)     Weight:  106 kg (232 lb 9.6 oz)  102 kg (225 lb)  104 kg (229 lb)     BMI (Calculated):  41.2  39.9  40.6            2. Hoarseness-sees Ressor chronic variable hoarseness; felt strongly by ENT GI to be related to reflux.   3. Controlled type 2 diabetes mellitus without complication, without long-term current use of insulin (CMS/[facility]) Chronic/stable. No problem/pattern hypoglycemia/hyperglycemia manifest by poly- dypsia, phagia, uria, or sweats, diaphoretic episodes, syncope/near.     4. Osteoporosis, unspecified osteoporosis type, unspecified pathological fracture presence Chronic/stable.  Last bone density/if below.   Has had Rx.  Ok/planned with [facility] further bone density screening when due.      5. Mild intermittent asthma, unspecified whether complicated Chronic/stable: infrequent cough/wheeze and need for rescue medications > 2/week.    Maintance Rx compliance and helps.     6. Gastroesophageal reflux disease, unspecified whether esophagitis present Chronic/stable.  Controlled heartburn, reflux without dysphagia, melena.  Rx used, periods not used proven needed with symptoms -currently doing ok.      7. Encounter for screening mammogram for breast cancer  Chronic/ongoing yearly need to review for breast cancer.  No breast pain, masses, discharge.       8. Wellness examination-done Chronic ongoing over time screening or advice for general health care/wellness.        Has an/another acute issue today: none.    The following portions of the patient's history were reviewed and updated as appropriate: allergies, current medications, past family history, past medical history, past social history, past surgical history and problem list.      Current Outpatient Medications:   •  acetaminophen (TYLENOL) 500 MG tablet, Take 1,000 mg by mouth Every 6 (Six) Hours As Needed for mild pain (1-3)., Disp: , Rfl:   •  albuterol (PROVENTIL HFA;VENTOLIN HFA) 108 (90 Base) MCG/ACT inhaler, Inhale 2 puffs Every 4 (Four) Hours As Needed for Wheezing or Shortness of Air., Disp: 1 inhaler, Rfl: 1  •  amLODIPine (Norvasc) 5 MG tablet, Take 1 tablet by mouth Daily., Disp: 90 tablet, Rfl: 1  •  aspirin 81 MG EC tablet, Take 1 tablet by mouth Every Other Day., Disp: 32 tablet, Rfl: 0  •  B Complex-C (SUPER B COMPLEX PO), Take 1 tablet by mouth Daily., Disp: , Rfl:   •  budesonide-formoterol (Symbicort) 160-4.5 MCG/ACT inhaler, Inhale 2 puffs 2 (Two) Times a Day., Disp: 30.6 g, Rfl: 3  •  Calcium Carb-Cholecalciferol (CALCIUM-VITAMIN D) 600-400 MG-UNIT tablet, Take 1 tablet by mouth 2 (Two) Times a Day., Disp: , Rfl:   •  carboxymethylcellulose (REFRESH PLUS) 0.5 % solution, 2 drops Daily As Needed for dry eyes., Disp: , Rfl:   •  Cholecalciferol 25 MCG (1000 UT) capsule, Take 1 capsule by mouth Daily., Disp: , Rfl:   •  Coenzyme Q10 (CO Q 10 PO), Take 1 capsule by mouth Every Other Day., Disp: , Rfl:   •  denosumab (PROLIA) 60 MG/ML solution syringe, Inject 60 mg under the skin into the appropriate area as directed Every 6 (Six) Months., Disp: , Rfl:   •  diclofenac sodium (VOTAREN XR) 100 MG 24 hr tablet, Take 100 mg by mouth Daily., Disp: , Rfl:   •  DULoxetine (CYMBALTA) 20 MG capsule, 1  capsule Every Night., Disp: , Rfl:   •  glucose blood test strip, Use as instructed: once a day testing of the one touch ultra blue strips, Disp: 50 each, Rfl: 11  •  guaiFENesin (MUCINEX) 600 MG 12 hr tablet, Take 600 mg by mouth 2 (Two) Times a Day., Disp: , Rfl:   •  hydroCHLOROthiazide (MICROZIDE) 12.5 MG capsule, TAKE ONE CAPSULE DAILY, Disp: 90 capsule, Rfl: 3  •  ipratropium-albuterol (DUO-NEB) 0.5-2.5 mg/3 ml nebulizer, Take 3 mL by nebulization 4 (Four) Times a Day., Disp: 40 vial, Rfl: 1  •  irbesartan (AVAPRO) 300 MG tablet, TAKE ONE TABLET DAILY, Disp: 90 tablet, Rfl: 3  •  latanoprost (XALATAN) 0.005 % ophthalmic solution, Administer 1 drop to both eyes Every Night., Disp: , Rfl: 5  •  metFORMIN ER (GLUCOPHAGE-XR) 500 MG 24 hr tablet, TAKE TWO TABLETS EVERY NIGHT, Disp: 60 tablet, Rfl: 5  •  Misc Natural Products (GLUCOSAMINE CHONDROITIN TRIPLE PO), Take 1 tablet by mouth 2 (Two) Times a Day., Disp: , Rfl:   •  montelukast (SINGULAIR) 10 MG tablet, TAKE ONE TABLET DAILY , Disp: 90 tablet, Rfl: 3  •  Multiple Vitamins-Minerals (VITRUM 50+ SENIOR MULTI PO), Take 1 tablet by mouth Daily., Disp: , Rfl:   •  NON FORMULARY, Immunotherapy, Disp: , Rfl:   •  Ozempic, 0.25 or 0.5 MG/DOSE, 2 MG/1.5ML solution pen-injector, INJECT 0.5 MG SUB-Q ONCE A WEEK AS DIRECTED , Disp: 15 pen, Rfl: 3  •  potassium chloride 10 MEQ CR tablet, TAKE ONE TABLET DAILY, Disp: 90 tablet, Rfl: 3    No problems with medications.    Allergies   Allergen Reactions   • Adhesive Tape Other (See Comments)     Tears and bruises skin   • Other Other (See Comments)     Bandaids make arm Red/slightly swollen       Review of Systems  GENERAL:  Active/slower with limits, speed, stamina for age and various joint issues. Sleep is ok; apnea denied. No fever now.  ENDO:  No syncope, near or diaphoretic sweaty spells.  BS without testing/download.  HEENT: No head injury or headache.   No vision change.   No significant hearing loss.  Ears without  pain/drainage.  No sore throat.   Same/occ nasal/sinus congestion/drainage. No epistaxis.  CHEST: No chest wall tenderness or mass.  No cough,  without wheeze.   No SOB; no hemoptysis.  CV: No chest pain, palpitations, usual  On/off ankle edema.  GI: No heartburn, dysphagia.  No abdominal pain, diarrhea, constipation.  No rectal bleeding, or melena.    :  Voids without dysuria, or incontinence to completion.  ORTHO: No painful/swollen joints but various on /off sore.  No change occ sore neck or back.  No acute neck or back pain without recent injury.  NEURO: No dizziness, weakness of extremities.  No numbness/paresthesias.   PSYCH: No memory loss.  Mood good; occ mild anxious, depressed but/and not suicidal.  Tries to tolerate stress .   Screening:  Mammogram: 12.15.20  Bone density: 12.11.18 [facility] hip -2.4; start prolia-this year  Low dose CT chest: Tobacco-smoker/never: NA  GI: Colon-p+div///11.19.18/5y  Prostate: NA  Usual lab order  6m CBC, CMP, A1c  12m CBC, CMP, A1c, LIPID, TSH, Vit D, uric acid     Data reviewed:   Recent admit/ER/MD visits:    Lab Results:  Results for orders placed or performed in visit on 09/14/21   Comprehensive Metabolic Panel    Specimen: Blood   Result Value Ref Range    Glucose 145 (H) 65 - 99 mg/dL    BUN 20 8 - 23 mg/dL    Creatinine 0.73 0.57 - 1.00 mg/dL    eGFR Non African Am 78 >60 mL/min/1.73    eGFR African Am 95 >60 mL/min/1.73    BUN/Creatinine Ratio 27.4 (H) 7.0 - 25.0    Sodium 136 136 - 145 mmol/L    Potassium 4.4 3.5 - 5.2 mmol/L    Chloride 99 98 - 107 mmol/L    Total CO2 26.5 22.0 - 29.0 mmol/L    Calcium 10.2 8.6 - 10.5 mg/dL    Total Protein 6.4 6.0 - 8.5 g/dL    Albumin 4.50 3.50 - 5.20 g/dL    Globulin 1.9 gm/dL    A/G Ratio 2.4 g/dL    Total Bilirubin 0.3 0.0 - 1.2 mg/dL    Alkaline Phosphatase 75 39 - 117 U/L    AST (SGOT) 23 1 - 32 U/L    ALT (SGPT) 24 1 - 33 U/L   Hemoglobin A1c    Specimen: Blood   Result Value Ref Range    Hemoglobin A1C 6.20 (H) 4.80 -  5.60 %   Lipid Panel    Specimen: Blood   Result Value Ref Range    Total Cholesterol 156 0 - 200 mg/dL    Triglycerides 303 (H) 0 - 150 mg/dL    HDL Cholesterol 36 (L) 40 - 60 mg/dL    VLDL Cholesterol Thierry 49 (H) 5 - 40 mg/dL    LDL Chol Calc (NIH) 71 0 - 100 mg/dL   TSH    Specimen: Blood   Result Value Ref Range    TSH 1.600 0.270 - 4.200 uIU/mL   Vitamin D 25 Hydroxy    Specimen: Blood   Result Value Ref Range    25 Hydroxy, Vitamin D 49.2 30.0 - 100.0 ng/ml   Uric Acid    Specimen: Blood   Result Value Ref Range    Uric Acid 4.7 2.4 - 5.7 mg/dL   Vitamin B12    Specimen: Blood   Result Value Ref Range    Vitamin B-12 343 211 - 946 pg/mL   Folate    Specimen: Blood   Result Value Ref Range    Folate 16.60 4.78 - 24.20 ng/mL   Iron    Specimen: Blood   Result Value Ref Range    Iron 69 37 - 145 mcg/dL   MicroAlbumin, Urine, Random - Urine, Clean Catch    Specimen: Urine, Clean Catch   Result Value Ref Range    Microalbumin, Urine 4.3 Not Estab. ug/mL   CBC & Differential    Specimen: Blood   Result Value Ref Range    WBC 6.44 3.40 - 10.80 10*3/mm3    RBC 4.55 3.77 - 5.28 10*6/mm3    Hemoglobin 13.6 12.0 - 15.9 g/dL    Hematocrit 40.7 34.0 - 46.6 %    MCV 89.5 79.0 - 97.0 fL    MCH 29.9 26.6 - 33.0 pg    MCHC 33.4 31.5 - 35.7 g/dL    RDW 13.2 12.3 - 15.4 %    Platelets 283 140 - 450 10*3/mm3    Neutrophil Rel % 59.6 42.7 - 76.0 %    Lymphocyte Rel % 26.9 19.6 - 45.3 %    Monocyte Rel % 10.7 5.0 - 12.0 %    Eosinophil Rel % 1.6 0.3 - 6.2 %    Basophil Rel % 0.9 0.0 - 1.5 %    Neutrophils Absolute 3.84 1.70 - 7.00 10*3/mm3    Lymphocytes Absolute 1.73 0.70 - 3.10 10*3/mm3    Monocytes Absolute 0.69 0.10 - 0.90 10*3/mm3    Eosinophils Absolute 0.10 0.00 - 0.40 10*3/mm3    Basophils Absolute 0.06 0.00 - 0.20 10*3/mm3    Immature Granulocyte Rel % 0.3 0.0 - 0.5 %    Immature Grans Absolute 0.02 0.00 - 0.05 10*3/mm3    nRBC 0.0 0.0 - 0.2 /100 WBC       A1C:  Lab Results - Last 18 Months   Lab Units 09/15/21  0707  03/04/21  1008 02/03/21  0541 01/25/21  1340 08/11/20  0755   HEMOGLOBIN A1C % 6.20* 6.20* 6.6* 6.8* 5.80*     LIPID:  Lab Results - Last 18 Months   Lab Units 09/15/21  0707   CHOLESTEROL mg/dL 156   LDL CHOL mg/dL 71   HDL CHOL mg/dL 36*   TRIGLYCERIDES mg/dL 303*     Component      Latest Ref Rng & Units 11/29/2016 11/14/2017 2/16/2018 2/21/2019   Total Cholesterol      0 - 200 mg/dL 138 148 143 128 (L)   Triglycerides      0 - 150 mg/dL 147 156 (H) 119 116   HDL Cholesterol      40 - 60 mg/dL 46 43 (L) 45 (L) 42 (L)   VLDL Cholesterol Thierry      5 - 40 mg/dL       LDL Cholesterol      0 - 100 mg/dL 63 74 74 63     Component      Latest Ref Rng & Units 8/30/2019 9/15/2021   Total Cholesterol      0 - 200 mg/dL 154 156   Triglycerides      0 - 150 mg/dL 215 (H) 303 (H)   HDL Cholesterol      40 - 60 mg/dL 44 36 (L)   VLDL Cholesterol Thierry      5 - 40 mg/dL  49 (H)   LDL Cholesterol      0 - 100 mg/dL 67 71       CBC:  Lab Results - Last 18 Months   Lab Units 09/15/21  0707 03/04/21  1008 02/08/21 1512 02/03/21  0541 01/25/21  1340 08/11/20  0755   WBC 10*3/mm3 6.44 7.83 10.83*  --  7.8 5.86   HEMOGLOBIN g/dL 13.6 13.8 11.8* 12.0 15.1 14.8   HEMATOCRIT % 40.7 42.1 34.6 35.9* 45.6 42.9   PLATELETS 10*3/mm3 283 367 329  --  271 219   IRON mcg/dL 69 77  --   --   --  89      BMP/CMP:  Lab Results - Last 18 Months   Lab Units 09/15/21  0707 03/04/21  1008 02/08/21  1512 01/25/21  1340 08/11/20  0755 07/06/20  0800   SODIUM mmol/L 136 137 137 139 143  --    POTASSIUM mmol/L 4.4 4.3 4.3 4.0 4.2  --    CHLORIDE mmol/L 99 96* 98 100 101  --    TOTAL CO2 mmol/L 26.5 28.8 29.8* 27 28.1  --    GLUCOSE mg/dL 145* 104* 173*  --  112*  --    BUN mg/dL 20 16 15 21 22  --    CREATININE mg/dL 0.73 0.71 0.61 0.8 0.72  --    EGFR IF NONAFRICN AM mL/min/1.73 78 81 96 >60 80  --    EGFR IF AFRICN AM mL/min/1.73 95 98 117 >59 97  --    CALCIUM mg/dL 10.2 10.2 9.2 10.2 9.9 9.8     HEPATIC:  Lab Results - Last 18 Months   Lab Units  "09/15/21  0707 03/04/21  1008 08/11/20  0755   ALT (SGPT) U/L 24 30 35*   AST (SGOT) U/L 23 28 20   ALK PHOS U/L 75 89 55     THYROID:  Lab Results - Last 18 Months   Lab Units 09/15/21  0707 08/11/20  0755   TSH uIU/mL 1.600 0.990       Objective   /80   Pulse 87   Temp 97.3 °F (36.3 °C) (Infrared)   Resp 18   Ht 160 cm (63\")   Wt 104 kg (229 lb)   SpO2 95%   BMI 40.57 kg/m²   Body mass index is 40.57 kg/m².    Recent Vitals       3/11/2021 7/23/2021 9/16/2021       BP:  130/74  128/80  132/80     Pulse:  108  71  87     Temp:  97.5 °F (36.4 °C)  97.3 °F (36.3 °C)  97.3 °F (36.3 °C)     Weight:  106 kg (232 lb 9.6 oz)  102 kg (225 lb)  104 kg (229 lb)     BMI (Calculated):  41.2  39.9  40.6           Physical Exam  GENERAL:  Well nourished/developed in no acute distress. Obese.   SKIN: Turgor excellent, without wound, rash, lesion.   HEENT: Normal cephalic without trauma.  Pupils equal round reactive to light. Extraocular motions full without nystagmus.   External canals initially full soft wax; mildly red after irritation.  Tymphatic membranes daniel with elvis structures intact after irrigation.   Oral cavity without growths, exudates, and moist.  Posterior pharynx without mass, obstruction, redness.  No thyromegaly, mass, tenderness, lymphadenopathy and supple.   CV: Regular rhythm.  No murmur, gallop, trace equal LE edema. Posterior pulses intact.  No carotid bruits.  CHEST: No chest wall tenderness or mass.   LUNGS: Symmetric motion with clear to auscultation.    ABD: Soft, nontender without mass.   ORTHO: Symmetric extremities without swelling/point tenderness compression soreness R foot between 2nd/3rd. Full gross range of motion diffuse.   Walking without assistance.   NEURO: CN 2-12 grossly intact.  Symmetric facies and UE/LE. 3/5 strength throughout. 1/4 x bicep equal reflexes.  Nonfocal use extremities. Speech clear.    PSYCH: Oriented x 3.  Pleasant calm, well kept.  Purposeful/directed " conservation with intact short/long gross memory.    Assessment/Plan     1. HTN (hypertension), benign    2. Hoarseness-sees Ressor    3. Controlled type 2 diabetes mellitus without complication, without long-term current use of insulin (CMS/[facility])    4. Osteoporosis, unspecified osteoporosis type, unspecified pathological fracture presence    5. Mild intermittent asthma, unspecified whether complicated    6. Gastroesophageal reflux disease, unspecified whether esophagitis present    7. Encounter for screening mammogram for breast cancer    8. Wellness examination-done        Discussion:  Watch triglycerides; maybe next time tricor/lopid  Maybe knee surgery later ?  Bone density/mammogram end of year    Medical decision issues:   Data review above:   Rx: reviewed and decisions:   Same Rx for now     Visit today involved chronic significant medical problems or differentials and/or intensive drug monitoring: ie potential to cause serious morbidity or death:   No orders of the defined types were placed in this encounter.    Orders placed:   LAB/Testing/Referrals: reviewed/orders:   Today:   Orders Placed This Encounter   Procedures   • Mammo Screening Digital Tomosynthesis Bilateral With CAD     Chronic/recurrent labs above or change to:   same     Health maintenance:   Body mass index is 40.57 kg/m².  Patient's Body mass index is 40.57 kg/m². indicating that she is morbidly obese (BMI > 40 or > 35 with obesity - related health condition). Obesity-related health conditions include the following: diabetes mellitus. Obesity is unchanged. BMI is is above average; BMI management plan is completed. We discussed portion control and increasing exercise..    Tobacco use reviewed:    Kezia Otoole  reports that she has never smoked. She has never used smokeless tobacco..    Annual/wellness visit: also/today (see separate note)    There are no Patient Instructions on file for this visit.    Follow up: Return for lab/Dr Crowe  6m.  Future Appointments   Date Time Provider Department Center   3/15/2022  8:20 AM LAB [facility]   3/21/2022  9:30 AM Len Crowe MD [facility]

## 2021-09-20 DIAGNOSIS — I10 HTN (HYPERTENSION), BENIGN: ICD-10-CM

## 2021-09-20 RX ORDER — AMLODIPINE BESYLATE 5 MG/1
TABLET ORAL
Qty: 90 TABLET | Refills: 1 | Status: SHIPPED | OUTPATIENT
Start: 2021-09-20 | End: 2022-04-04

## 2021-09-20 NOTE — PROGRESS NOTES
"QUICK REFERENCE INFORMATION:  The ABCs of the Annual Wellness Visit    Subsequent Medicare Wellness Visit     HEALTH RISK ASSESSMENT    : 1950    Recent Hospitalizations:  No hospitalization(s) within the last year..  ccc      Current Medical Providers:  Patient Care Team:  Len Crowe MD as PCP - General  [facility], Huang Jimenez MD as Consulting Physician (Otolaryngology)  Tayler Burleson APRN (Inactive) as Nurse Practitioner (Family Medicine)  Dave Mcnally MD as Consulting Physician (Gastroenterology)  Lorraine Rodriguez APRN as Nurse Practitioner (Otolaryngology)  Vinh Goff PA-C as Physician Assistant (Orthopedic Surgery)        Smoking Status:  Social History     Tobacco Use   Smoking Status Never Smoker   Smokeless Tobacco Never Used   Tobacco Comment    \"lived with smokers most of my life\"       Alcohol Consumption:  Social History     Substance and Sexual Activity   Alcohol Use No    Comment: Rare       Depression Screen:   PHQ-2/PHQ-9 Depression Screening 2021   Little interest or pleasure in doing things 0   Feeling down, depressed, or hopeless 0   Trouble falling or staying asleep, or sleeping too much 0   Feeling tired or having little energy 0   Poor appetite or overeating 0   Feeling bad about yourself - or that you are a failure or have let yourself or your family down 0   Trouble concentrating on things, such as reading the newspaper or watching television 0   Moving or speaking so slowly that other people could have noticed. Or the opposite - being so fidgety or restless that you have been moving around a lot more than usual 0   Thoughts that you would be better off dead, or of hurting yourself in some way 0   Total Score 0   If you checked off any problems, how difficult have these problems made it for you to do your work, take care of things at home, or get along with other people? Not difficult at all       Health Habits and Functional and Cognitive " Screening:  Functional & Cognitive Status 9/16/2021   Do you have difficulty preparing food and eating? No   Do you have difficulty bathing yourself, getting dressed or grooming yourself? No   Do you have difficulty using the toilet? No   Do you have difficulty moving around from place to place? No   Do you have trouble with steps or getting out of a bed or a chair? No   Current Diet Well Balanced Diet   Dental Exam Up to date   Eye Exam Up to date   Exercise (times per week) 2 times per week   Current Exercises Include Stationary Bicycling/Spin Class   Current Exercise Activities Include -   Do you need help using the phone?  No   Are you deaf or do you have serious difficulty hearing?  No   Do you need help with transportation? No   Do you need help shopping? No   Do you need help preparing meals?  No   Do you need help with housework?  No   Do you need help with laundry? No   Do you need help taking your medications? No   Do you need help managing money? No   Do you ever drive or ride in a car without wearing a seat belt? No   Have you felt unusual stress, anger or loneliness in the last month? No   Who do you live with? Spouse   If you need help, do you have trouble finding someone available to you? No   Have you been bothered in the last four weeks by sexual problems? No   Do you have difficulty concentrating, remembering or making decisions? No           Does the patient have evidence of cognitive impairment? No    Asiprin use counseling: Taking ASA appropriately as indicated      Recent Lab Results:    Lab Results   Component Value Date     (H) 09/15/2021     Lab Results   Component Value Date    HGBA1C 6.20 (H) 09/15/2021     Lab Results   Component Value Date    TRIG 303 (H) 09/15/2021    HDL 36 (L) 09/15/2021    VLDL 49 (H) 09/15/2021           Age-appropriate Screening Schedule:  Refer to the list below for future screening recommendations based on patient's age, sex and/or medical conditions.  Orders for these recommended tests are listed in the plan section. The patient has been provided with a written plan.    Health Maintenance   Topic Date Due   • TDAP/TD VACCINES (1 - Tdap) Never done   • DIABETIC FOOT EXAM  Never done   • INFLUENZA VACCINE  10/01/2021   • DIABETIC EYE EXAM  12/16/2021   • HEMOGLOBIN A1C  03/15/2022   • LIPID PANEL  09/15/2022   • URINE MICROALBUMIN  09/15/2022   • MAMMOGRAM  12/18/2022   • DXA SCAN  03/08/2023   • ZOSTER VACCINE  Completed        Subjective   History of Present Illness    Kezia Otoole is a 70 y.o. female who presents for an Annual Wellness Visit.    The following portions of the patient's history were reviewed and updated as appropriate: allergies, current medications, past family history, past medical history, past social history, past surgical history and problem list.    Outpatient Medications Prior to Visit   Medication Sig Dispense Refill   • acetaminophen (TYLENOL) 500 MG tablet Take 1,000 mg by mouth Every 6 (Six) Hours As Needed for mild pain (1-3).     • albuterol (PROVENTIL HFA;VENTOLIN HFA) 108 (90 Base) MCG/ACT inhaler Inhale 2 puffs Every 4 (Four) Hours As Needed for Wheezing or Shortness of Air. 1 inhaler 1   • amLODIPine (Norvasc) 5 MG tablet Take 1 tablet by mouth Daily. 90 tablet 1   • aspirin 81 MG EC tablet Take 1 tablet by mouth Every Other Day. 32 tablet 0   • B Complex-C (SUPER B COMPLEX PO) Take 1 tablet by mouth Daily.     • budesonide-formoterol (Symbicort) 160-4.5 MCG/ACT inhaler Inhale 2 puffs 2 (Two) Times a Day. 30.6 g 3   • Calcium Carb-Cholecalciferol (CALCIUM-VITAMIN D) 600-400 MG-UNIT tablet Take 1 tablet by mouth 2 (Two) Times a Day.     • carboxymethylcellulose (REFRESH PLUS) 0.5 % solution 2 drops Daily As Needed for dry eyes.     • Cholecalciferol 25 MCG (1000 UT) capsule Take 1 capsule by mouth Daily.     • Coenzyme Q10 (CO Q 10 PO) Take 1 capsule by mouth Every Other Day.     • denosumab (PROLIA) 60 MG/ML solution syringe  Inject 60 mg under the skin into the appropriate area as directed Every 6 (Six) Months.     • diclofenac sodium (VOTAREN XR) 100 MG 24 hr tablet Take 100 mg by mouth Daily.     • DULoxetine (CYMBALTA) 20 MG capsule 1 capsule Every Night.     • glucose blood test strip Use as instructed: once a day testing of the one touch ultra blue strips 50 each 11   • guaiFENesin (MUCINEX) 600 MG 12 hr tablet Take 600 mg by mouth 2 (Two) Times a Day.     • hydroCHLOROthiazide (MICROZIDE) 12.5 MG capsule TAKE ONE CAPSULE DAILY 90 capsule 3   • ipratropium-albuterol (DUO-NEB) 0.5-2.5 mg/3 ml nebulizer Take 3 mL by nebulization 4 (Four) Times a Day. 40 vial 1   • irbesartan (AVAPRO) 300 MG tablet TAKE ONE TABLET DAILY 90 tablet 3   • latanoprost (XALATAN) 0.005 % ophthalmic solution Administer 1 drop to both eyes Every Night.  5   • metFORMIN ER (GLUCOPHAGE-XR) 500 MG 24 hr tablet TAKE TWO TABLETS EVERY NIGHT 60 tablet 5   • Misc Natural Products (GLUCOSAMINE CHONDROITIN TRIPLE PO) Take 1 tablet by mouth 2 (Two) Times a Day.     • montelukast (SINGULAIR) 10 MG tablet TAKE ONE TABLET DAILY  90 tablet 3   • Multiple Vitamins-Minerals (VITRUM 50+ SENIOR MULTI PO) Take 1 tablet by mouth Daily.     • NON FORMULARY Immunotherapy     • Ozempic, 0.25 or 0.5 MG/DOSE, 2 MG/1.5ML solution pen-injector INJECT 0.5 MG SUB-Q ONCE A WEEK AS DIRECTED  15 pen 3   • potassium chloride 10 MEQ CR tablet TAKE ONE TABLET DAILY 90 tablet 3   • ampicillin (PRINCIPEN) 500 MG capsule Take 1 capsule by mouth 3 (Three) Times a Day. 21 capsule 0     No facility-administered medications prior to visit.       Patient Active Problem List   Diagnosis   • Class 3 severe obesity due to excess calories with serious comorbidity and body mass index (BMI) of 40.0 to 44.9 in adult (CMS/HCC)   • Depression   • Elevated fasting glucose-see DM   • Allergic rhinitis: shots   • Osteoporosis: 2018/[facility]   • Primary generalized (osteo)arthritis   • HTN (hypertension), benign   •  Sleep apnea   • Menopause   • Diabetes type 2, controlled (CMS/HCC)   • Asthma   • Hoarseness-sees Ressor   • Gastroesophageal reflux disease-ent   • Wellness examination-done   • Purpura (CMS/HCC)   • Glaucoma   • Laboratory test`   • Anticoagulated DM2/ASA 81 (11.9.17 pt chose qod)   • Glaucoma suspect   • Hx of colonic polyps   • History of anemia: ASA81,iron,gi(cp,div   • History of iron deficiency   • Nuclear senile cataract   • Excessive cerumen in ear canal   • Status post joint replacement   • Primary osteoarthritis of right knee   • Hyperlipidemia       Advance Care Planning:  ACP discussion was held with the patient during this visit. Patient has an advance directive (not in EMR), copy requested.    Identification of Risk Factors:  Risk factors include: Breast Cancer/Mammogram Screening  Colon Cancer Screening  Immunizations Discussed/Encouraged (specific immunizations; Tdap, Influenza, Pneumococcal 23, Shingrix and COVID19 )  Obesity/Overweight   Osteoporosis Risk.    Review of Systems  GENERAL:  Active/slower with limits, speed, stamina for age and various joint issues. Sleep is ok; apnea denied. No fever now.  ENDO:  No syncope, near or diaphoretic sweaty spells.  BS without testing/download.  HEENT: No head injury or headache.   No vision change.   No significant hearing loss.  Ears without pain/drainage.  No sore throat.   Same/occ nasal/sinus congestion/drainage. No epistaxis.  CHEST: No chest wall tenderness or mass.  No cough,  without wheeze.   No SOB; no hemoptysis.  CV: No chest pain, palpitations, usual  On/off ankle edema.  GI: No heartburn, dysphagia.  No abdominal pain, diarrhea, constipation.  No rectal bleeding, or melena.    :  Voids without dysuria, or incontinence to completion.  ORTHO: No painful/swollen joints but various on /off sore.  No change occ sore neck or back.  No acute neck or back pain without recent injury.  NEURO: No dizziness, weakness of extremities.  No  numbness/paresthesias.   PSYCH: No memory loss.  Mood good; occ mild anxious, depressed but/and not suicidal.  Tries to tolerate stress .   Screening:  Mammogram: 12.15.20  Bone density: 12.11.18 [facility] hip -2.4; start prolia-this year  Low dose CT chest: Tobacco-smoker/never: NA  GI: Colon-p+div/[facility]/11.19.18/5y  Prostate: NA  Usual lab order  6m CBC, CMP, A1c  12m CBC, CMP, A1c, LIPID, TSH, Vit D, uric acid     Compared to one year ago, the patient feels her physical health is the same.  Compared to one year ago, the patient feels her mental health is the same.    Objective     Physical Exam  GENERAL:  Well nourished/developed in no acute distress. Obese.   SKIN: Turgor excellent, without wound, rash, lesion.   HEENT: Normal cephalic without trauma.  Pupils equal round reactive to light. Extraocular motions full without nystagmus.   External canals initially full soft wax; mildly red after irritation.  Tymphatic membranes daniel with elvis structures intact after irrigation.   Oral cavity without growths, exudates, and moist.  Posterior pharynx without mass, obstruction, redness.  No thyromegaly, mass, tenderness, lymphadenopathy and supple.   CV: Regular rhythm.  No murmur, gallop, trace equal LE edema. Posterior pulses intact.  No carotid bruits.  CHEST: No chest wall tenderness or mass.   LUNGS: Symmetric motion with clear to auscultation.    ABD: Soft, nontender without mass.   ORTHO: Symmetric extremities without swelling/point tenderness compression soreness R foot between 2nd/3rd. Full gross range of motion diffuse.   Walking without assistance.   NEURO: CN 2-12 grossly intact.  Symmetric facies and UE/LE. 3/5 strength throughout. 1/4 x bicep equal reflexes.  Nonfocal use extremities. Speech clear.    PSYCH: Oriented x 3.  Pleasant calm, well kept.  Purposeful/directed conservation with intact short/long gross memory.    Vitals:    09/16/21 1005   BP: 132/80   Pulse: 87   Resp: 18   Temp: 97.3 °F (36.3 °C)  "  TempSrc: Infrared   SpO2: 95%   Weight: 104 kg (229 lb)   Height: 160 cm (63\")   PainSc:   3   PainLoc: Leg       Patient's Body mass index is 40.57 kg/m². indicating that she is morbidly obese (BMI > 40 or > 35 with obesity - related health condition). Obesity-related health conditions include the following: diabetes mellitus. Obesity is unchanged. BMI is is above average; BMI management plan is completed. We discussed portion control and increasing exercise..      Assessment/Plan   Patient Self-Management and Personalized Health Advice  The patient has been provided with information about: diet, exercise and the relationship between weight and GERD and preventive services including:   · Annual Wellness Visit (AWV)  · Colorectal Cancer Screening, Colonoscopy  · Pneumococcal Vaccine and Administration  · Screening Mammography .    Visit Diagnoses:    ICD-10-CM ICD-9-CM   1. HTN (hypertension), benign  I10 401.1   2. Hoarseness-sees Ressor  R49.0 784.42   3. Controlled type 2 diabetes mellitus without complication, without long-term current use of insulin (CMS/[facility])  E11.9 250.00   4. Osteoporosis, unspecified osteoporosis type, unspecified pathological fracture presence  M81.0 733.00   5. Mild intermittent asthma, unspecified whether complicated  J45.20 493.90   6. Gastroesophageal reflux disease, unspecified whether esophagitis present  K21.9 530.81   7. Encounter for screening mammogram for breast cancer  Z12.31 V76.12   8. Wellness examination-done  Z00.00 V70.0       Orders Placed This Encounter   Procedures   • Mammo Screening Digital Tomosynthesis Bilateral With CAD     Standing Status:   Future     Scheduling Instructions:      massac     Order Specific Question:   Reason for Exam:     Answer:   screening       Outpatient Encounter Medications as of 9/16/2021   Medication Sig Dispense Refill   • acetaminophen (TYLENOL) 500 MG tablet Take 1,000 mg by mouth Every 6 (Six) Hours As Needed for mild pain (1-3).   "   • albuterol (PROVENTIL HFA;VENTOLIN HFA) 108 (90 Base) MCG/ACT inhaler Inhale 2 puffs Every 4 (Four) Hours As Needed for Wheezing or Shortness of Air. 1 inhaler 1   • amLODIPine (Norvasc) 5 MG tablet Take 1 tablet by mouth Daily. 90 tablet 1   • aspirin 81 MG EC tablet Take 1 tablet by mouth Every Other Day. 32 tablet 0   • B Complex-C (SUPER B COMPLEX PO) Take 1 tablet by mouth Daily.     • budesonide-formoterol (Symbicort) 160-4.5 MCG/ACT inhaler Inhale 2 puffs 2 (Two) Times a Day. 30.6 g 3   • Calcium Carb-Cholecalciferol (CALCIUM-VITAMIN D) 600-400 MG-UNIT tablet Take 1 tablet by mouth 2 (Two) Times a Day.     • carboxymethylcellulose (REFRESH PLUS) 0.5 % solution 2 drops Daily As Needed for dry eyes.     • Cholecalciferol 25 MCG (1000 UT) capsule Take 1 capsule by mouth Daily.     • Coenzyme Q10 (CO Q 10 PO) Take 1 capsule by mouth Every Other Day.     • denosumab (PROLIA) 60 MG/ML solution syringe Inject 60 mg under the skin into the appropriate area as directed Every 6 (Six) Months.     • diclofenac sodium (VOTAREN XR) 100 MG 24 hr tablet Take 100 mg by mouth Daily.     • DULoxetine (CYMBALTA) 20 MG capsule 1 capsule Every Night.     • glucose blood test strip Use as instructed: once a day testing of the one touch ultra blue strips 50 each 11   • guaiFENesin (MUCINEX) 600 MG 12 hr tablet Take 600 mg by mouth 2 (Two) Times a Day.     • hydroCHLOROthiazide (MICROZIDE) 12.5 MG capsule TAKE ONE CAPSULE DAILY 90 capsule 3   • ipratropium-albuterol (DUO-NEB) 0.5-2.5 mg/3 ml nebulizer Take 3 mL by nebulization 4 (Four) Times a Day. 40 vial 1   • irbesartan (AVAPRO) 300 MG tablet TAKE ONE TABLET DAILY 90 tablet 3   • latanoprost (XALATAN) 0.005 % ophthalmic solution Administer 1 drop to both eyes Every Night.  5   • metFORMIN ER (GLUCOPHAGE-XR) 500 MG 24 hr tablet TAKE TWO TABLETS EVERY NIGHT 60 tablet 5   • Misc Natural Products (GLUCOSAMINE CHONDROITIN TRIPLE PO) Take 1 tablet by mouth 2 (Two) Times a Day.     •  montelukast (SINGULAIR) 10 MG tablet TAKE ONE TABLET DAILY  90 tablet 3   • Multiple Vitamins-Minerals (VITRUM 50+ SENIOR MULTI PO) Take 1 tablet by mouth Daily.     • NON FORMULARY Immunotherapy     • Ozempic, 0.25 or 0.5 MG/DOSE, 2 MG/1.5ML solution pen-injector INJECT 0.5 MG SUB-Q ONCE A WEEK AS DIRECTED  15 pen 3   • potassium chloride 10 MEQ CR tablet TAKE ONE TABLET DAILY 90 tablet 3   • [DISCONTINUED] ampicillin (PRINCIPEN) 500 MG capsule Take 1 capsule by mouth 3 (Three) Times a Day. 21 capsule 0     No facility-administered encounter medications on file as of 9/16/2021.       Reviewed use of high risk medication in the elderly: yes  Reviewed for potential of harmful drug interactions in the elderly: yes    Follow Up:  Return for lab/Dr Sebastien jane.     An After Visit Summary and PPPS with all of these plans were given to the patient.

## 2021-09-24 ENCOUNTER — INFUSION (OUTPATIENT)
Dept: ONCOLOGY | Facility: HOSPITAL | Age: 71
End: 2021-09-24

## 2021-09-24 VITALS
RESPIRATION RATE: 14 BRPM | WEIGHT: 228 LBS | HEART RATE: 76 BPM | DIASTOLIC BLOOD PRESSURE: 61 MMHG | SYSTOLIC BLOOD PRESSURE: 150 MMHG | HEIGHT: 63 IN | TEMPERATURE: 97.1 F | BODY MASS INDEX: 40.4 KG/M2 | OXYGEN SATURATION: 96 %

## 2021-09-24 DIAGNOSIS — M81.0 OSTEOPOROSIS, UNSPECIFIED OSTEOPOROSIS TYPE, UNSPECIFIED PATHOLOGICAL FRACTURE PRESENCE: Primary | ICD-10-CM

## 2021-09-24 PROCEDURE — 96372 THER/PROPH/DIAG INJ SC/IM: CPT

## 2021-09-24 PROCEDURE — 25010000002 DENOSUMAB 60 MG/ML SOLUTION PREFILLED SYRINGE: Performed by: PHYSICIAN ASSISTANT

## 2021-09-24 RX ADMIN — DENOSUMAB 60 MG: 60 INJECTION SUBCUTANEOUS at 09:46

## 2021-10-25 RX ORDER — POTASSIUM CHLORIDE 750 MG/1
TABLET, FILM COATED, EXTENDED RELEASE ORAL
Qty: 90 TABLET | Refills: 3 | Status: ON HOLD | OUTPATIENT
Start: 2021-10-25 | End: 2022-11-21

## 2021-10-25 NOTE — TELEPHONE ENCOUNTER
Rx Refill Note  Requested Prescriptions     Pending Prescriptions Disp Refills   • potassium chloride 10 MEQ CR tablet [Pharmacy Med Name: POTASSIUM CHLORIDE ER 10MEQ ER TABLET ER] 90 tablet 3     Sig: TAKE ONE TABLET DAILY      Last office visit with prescribing clinician: 9/16/2021      Next office visit with prescribing clinician: 3/21/2022            Rima Grant MA  10/25/21, 09:27 CDT

## 2021-11-02 ENCOUNTER — TELEPHONE (OUTPATIENT)
Dept: FAMILY MEDICINE CLINIC | Facility: CLINIC | Age: 71
End: 2021-11-02

## 2021-11-02 DIAGNOSIS — J45.20 MILD INTERMITTENT ASTHMA, UNSPECIFIED WHETHER COMPLICATED: Primary | ICD-10-CM

## 2021-11-02 RX ORDER — ALBUTEROL SULFATE 90 UG/1
2 AEROSOL, METERED RESPIRATORY (INHALATION) EVERY 4 HOURS PRN
Qty: 8 G | Refills: 1 | Status: SHIPPED | OUTPATIENT
Start: 2021-11-02

## 2021-11-02 NOTE — TELEPHONE ENCOUNTER
Caller: Kezia Otoole    Relationship: Self    Best call back number:   [phone]  What medication are you requesting: RESCUE INHALER PRO AIR ALBUTEROL        Have you had these symptoms before:    [x] Yes  [] No    Have you been treated for these symptoms before:   [x] Yes  [] No    If a prescription is needed, what is your preferred pharmacy and phone number:  [facility] - [place], [place] - [address] [phone] [place] [phone]   [phone]

## 2021-11-23 RX ORDER — HYDROCHLOROTHIAZIDE 12.5 MG/1
CAPSULE, GELATIN COATED ORAL
Qty: 90 CAPSULE | Refills: 3 | Status: ON HOLD | OUTPATIENT
Start: 2021-11-23 | End: 2022-11-21

## 2021-11-23 NOTE — TELEPHONE ENCOUNTER
Rx Refill Note  Requested Prescriptions     Pending Prescriptions Disp Refills   • hydroCHLOROthiazide (MICROZIDE) 12.5 MG capsule [Pharmacy Med Name: HYDROCHLOROTHIAZIDE 12.5MG CAPSULE] 90 capsule 3     Sig: TAKE ONE CAPSULE DAILY      Last office visit with prescribing clinician: 9/16/2021      Next office visit with prescribing clinician: 3/21/2022            Kaitlin Farley MA  11/23/21, 16:33 CST

## 2021-12-13 ENCOUNTER — TELEPHONE (OUTPATIENT)
Dept: FAMILY MEDICINE CLINIC | Facility: CLINIC | Age: 71
End: 2021-12-13

## 2021-12-13 NOTE — TELEPHONE ENCOUNTER
Depends on amount of exposure  If close and several minutes and inside I would avoid folks for 7 days  Would test if any symptoms    Regarding: Covid exposure  ----- Message from Vivien Mercado LPN sent at 12/13/2021  9:13 AM CST -----       ----- Message from Kezia Otoole to Khris Shanks APRN sent at 12/11/2021  5:51 PM -----   I have had both of my Covid vaccinations plus a booster.  I was exposed to someone last Thursday, Dec 9 who later tested positive for Covid.  I am not concerned for my health but don’t know if I should self isolate to protect others if I could possibly carry it to them?

## 2021-12-15 NOTE — TELEPHONE ENCOUNTER
Called patient and advised this information over the phone since [employer] message had not been read.

## 2021-12-23 DIAGNOSIS — J45.20 MILD INTERMITTENT ASTHMA, UNSPECIFIED WHETHER COMPLICATED: ICD-10-CM

## 2021-12-23 DIAGNOSIS — J45.901 EXACERBATION OF ASTHMA, UNSPECIFIED ASTHMA SEVERITY, UNSPECIFIED WHETHER PERSISTENT: ICD-10-CM

## 2021-12-27 RX ORDER — BUDESONIDE AND FORMOTEROL FUMARATE DIHYDRATE 160; 4.5 UG/1; UG/1
AEROSOL RESPIRATORY (INHALATION)
Qty: 30.6 EACH | Refills: 3 | Status: ON HOLD | OUTPATIENT
Start: 2021-12-27 | End: 2022-12-08

## 2021-12-27 NOTE — TELEPHONE ENCOUNTER
Rx Refill Note  Requested Prescriptions     Pending Prescriptions Disp Refills   • Symbicort 160-4.5 MCG/ACT inhaler [Pharmacy Med Name: SYMBICORT 160-4.5 AEROSOL] 30.6 each 3     Sig: INHALE TWO PUFFS TWO (TWO) TIMES A DAY.      Last office visit with prescribing clinician: 9/16/2021      Next office visit with prescribing clinician: 3/21/2022            Kaitlin Farley MA  12/27/21, 07:16 CST

## 2022-01-10 ENCOUNTER — TELEPHONE (OUTPATIENT)
Dept: FAMILY MEDICINE CLINIC | Facility: CLINIC | Age: 72
End: 2022-01-10

## 2022-01-10 NOTE — TELEPHONE ENCOUNTER
PATIENT WAS IN THE ER OVER THE WEEKEND AND WAS SEEN BY DR. ESPINOSA, PATIENT WAS DIRECTED TO CONTACT THE OFFICE TO LET DR. BURGER KNOW, PATIENT WAS TESTED FOR STREP, FLU AND BOTH OF THOSE WERE NEGATIVE, ALSO DONE A RAPID COVID TEST WITH A RESULT OF NEGATIVE BUT PATIENT IS EXPERIENCING A LOSS OF TASTE AND SMELL SO DR. ESPINOSA ORDERED THE LONGER COVID TEST AN STILL AWAITING RESULTS    GOOD CALL BACK [phone]

## 2022-01-15 DIAGNOSIS — E11.9 CONTROLLED TYPE 2 DIABETES MELLITUS WITHOUT COMPLICATION, WITHOUT LONG-TERM CURRENT USE OF INSULIN: Chronic | ICD-10-CM

## 2022-01-17 RX ORDER — METFORMIN HYDROCHLORIDE 500 MG/1
TABLET, EXTENDED RELEASE ORAL
Qty: 60 TABLET | Refills: 5 | Status: SHIPPED | OUTPATIENT
Start: 2022-01-17 | End: 2022-07-21

## 2022-03-09 ENCOUNTER — TELEPHONE (OUTPATIENT)
Dept: FAMILY MEDICINE CLINIC | Facility: CLINIC | Age: 72
End: 2022-03-09

## 2022-03-09 DIAGNOSIS — J32.9 SINUSITIS, UNSPECIFIED CHRONICITY, UNSPECIFIED LOCATION: ICD-10-CM

## 2022-03-09 DIAGNOSIS — R05.9 COUGH: Primary | ICD-10-CM

## 2022-03-09 RX ORDER — CEFDINIR 300 MG/1
300 CAPSULE ORAL 2 TIMES DAILY
Qty: 20 CAPSULE | Refills: 0 | Status: SHIPPED | OUTPATIENT
Start: 2022-03-09 | End: 2022-10-24

## 2022-03-09 RX ORDER — PREDNISONE 10 MG/1
TABLET ORAL
Qty: 12 TABLET | Refills: 0 | Status: SHIPPED | OUTPATIENT
Start: 2022-03-09 | End: 2022-10-24

## 2022-03-09 NOTE — TELEPHONE ENCOUNTER
"Rx written/ro-let pt know    New Medications Ordered This Visit   Medications   • cefdinir (OMNICEF) 300 MG capsule     Sig: Take 1 capsule by mouth 2 (Two) Times a Day.     Dispense:  20 capsule     Refill:  0   • predniSONE (DELTASONE) 10 MG tablet     Sig: Two a day for 4 days and then one a day for 4 days     Dispense:  12 tablet     Refill:  0     Pharmacist tell patient: When using steroids A. Do not use anti-inflammatories such as Motrin/ibuprofen, Alleve/naprosyn, Mobic and like medications       Vm from this am from patient \"I have developed a cold, sinus infection and I think it is turning to bronchitis. I wanted to see if Dr Crowe would call me in something so it doesn't get to pneumonia. I have an appt next Tuesday but dont want to wait that long to get started on some treatment\" MD1   "

## 2022-03-15 ENCOUNTER — LAB (OUTPATIENT)
Dept: FAMILY MEDICINE CLINIC | Facility: CLINIC | Age: 72
End: 2022-03-15

## 2022-03-15 DIAGNOSIS — Z86.2 HISTORY OF ANEMIA: ICD-10-CM

## 2022-03-15 DIAGNOSIS — E78.5 HYPERLIPIDEMIA, UNSPECIFIED HYPERLIPIDEMIA TYPE: ICD-10-CM

## 2022-03-15 DIAGNOSIS — Z86.39 HISTORY OF IRON DEFICIENCY: ICD-10-CM

## 2022-03-15 DIAGNOSIS — M81.0 OSTEOPOROSIS, UNSPECIFIED OSTEOPOROSIS TYPE, UNSPECIFIED PATHOLOGICAL FRACTURE PRESENCE: ICD-10-CM

## 2022-03-15 DIAGNOSIS — Z78.0 MENOPAUSE: Chronic | ICD-10-CM

## 2022-03-15 DIAGNOSIS — E11.9 CONTROLLED TYPE 2 DIABETES MELLITUS WITHOUT COMPLICATION, WITHOUT LONG-TERM CURRENT USE OF INSULIN: Chronic | ICD-10-CM

## 2022-03-15 DIAGNOSIS — I10 HTN (HYPERTENSION), BENIGN: Primary | Chronic | ICD-10-CM

## 2022-03-16 LAB
25(OH)D3+25(OH)D2 SERPL-MCNC: 44.1 NG/ML (ref 30–100)
ALBUMIN SERPL-MCNC: 4.3 G/DL (ref 3.5–5.2)
ALBUMIN/GLOB SERPL: 1.8 G/DL
ALP SERPL-CCNC: 90 U/L (ref 39–117)
ALT SERPL-CCNC: 58 U/L (ref 1–33)
AST SERPL-CCNC: 38 U/L (ref 1–32)
BASOPHILS # BLD AUTO: 0.04 10*3/MM3 (ref 0–0.2)
BASOPHILS NFR BLD AUTO: 0.4 % (ref 0–1.5)
BILIRUB SERPL-MCNC: 0.5 MG/DL (ref 0–1.2)
BUN SERPL-MCNC: 32 MG/DL (ref 8–23)
BUN/CREAT SERPL: 33.3 (ref 7–25)
CALCIUM SERPL-MCNC: 10.1 MG/DL (ref 8.6–10.5)
CHLORIDE SERPL-SCNC: 96 MMOL/L (ref 98–107)
CO2 SERPL-SCNC: 25.7 MMOL/L (ref 22–29)
CREAT SERPL-MCNC: 0.96 MG/DL (ref 0.57–1)
EGFRCR SERPLBLD CKD-EPI 2021: 62.6 ML/MIN/1.73
EOSINOPHIL # BLD AUTO: 0.08 10*3/MM3 (ref 0–0.4)
EOSINOPHIL NFR BLD AUTO: 0.7 % (ref 0.3–6.2)
ERYTHROCYTE [DISTWIDTH] IN BLOOD BY AUTOMATED COUNT: 14.5 % (ref 12.3–15.4)
GLOBULIN SER CALC-MCNC: 2.4 GM/DL
GLUCOSE SERPL-MCNC: 220 MG/DL (ref 65–99)
HBA1C MFR BLD: 8.7 % (ref 4.8–5.6)
HCT VFR BLD AUTO: 45.1 % (ref 34–46.6)
HGB BLD-MCNC: 14.6 G/DL (ref 12–15.9)
IMM GRANULOCYTES # BLD AUTO: 0.14 10*3/MM3 (ref 0–0.05)
IMM GRANULOCYTES NFR BLD AUTO: 1.3 % (ref 0–0.5)
IRON SERPL-MCNC: 74 MCG/DL (ref 37–145)
LYMPHOCYTES # BLD AUTO: 1.43 10*3/MM3 (ref 0.7–3.1)
LYMPHOCYTES NFR BLD AUTO: 13.4 % (ref 19.6–45.3)
MAGNESIUM SERPL-MCNC: 2.1 MG/DL (ref 1.6–2.4)
MCH RBC QN AUTO: 29.5 PG (ref 26.6–33)
MCHC RBC AUTO-ENTMCNC: 32.4 G/DL (ref 31.5–35.7)
MCV RBC AUTO: 91.1 FL (ref 79–97)
MONOCYTES # BLD AUTO: 1.06 10*3/MM3 (ref 0.1–0.9)
MONOCYTES NFR BLD AUTO: 9.9 % (ref 5–12)
NEUTROPHILS # BLD AUTO: 7.93 10*3/MM3 (ref 1.7–7)
NEUTROPHILS NFR BLD AUTO: 74.3 % (ref 42.7–76)
NRBC BLD AUTO-RTO: 0 /100 WBC (ref 0–0.2)
PHOSPHATE SERPL-MCNC: 4 MG/DL (ref 2.5–4.5)
PLATELET # BLD AUTO: 276 10*3/MM3 (ref 140–450)
POTASSIUM SERPL-SCNC: 4.9 MMOL/L (ref 3.5–5.2)
PROT SERPL-MCNC: 6.7 G/DL (ref 6–8.5)
RBC # BLD AUTO: 4.95 10*6/MM3 (ref 3.77–5.28)
SODIUM SERPL-SCNC: 136 MMOL/L (ref 136–145)
WBC # BLD AUTO: 10.68 10*3/MM3 (ref 3.4–10.8)

## 2022-03-21 ENCOUNTER — OFFICE VISIT (OUTPATIENT)
Dept: FAMILY MEDICINE CLINIC | Facility: CLINIC | Age: 72
End: 2022-03-21

## 2022-03-21 VITALS
RESPIRATION RATE: 20 BRPM | BODY MASS INDEX: 42.42 KG/M2 | TEMPERATURE: 97.8 F | DIASTOLIC BLOOD PRESSURE: 82 MMHG | SYSTOLIC BLOOD PRESSURE: 152 MMHG | HEART RATE: 77 BPM | HEIGHT: 63 IN | WEIGHT: 239.4 LBS | OXYGEN SATURATION: 95 %

## 2022-03-21 DIAGNOSIS — K21.9 GASTROESOPHAGEAL REFLUX DISEASE, UNSPECIFIED WHETHER ESOPHAGITIS PRESENT: ICD-10-CM

## 2022-03-21 DIAGNOSIS — Z79.01 ANTICOAGULATED: ICD-10-CM

## 2022-03-21 DIAGNOSIS — N76.0 ACUTE VAGINITIS: ICD-10-CM

## 2022-03-21 DIAGNOSIS — E78.2 MIXED HYPERLIPIDEMIA: ICD-10-CM

## 2022-03-21 DIAGNOSIS — J45.20 MILD INTERMITTENT ASTHMA, UNSPECIFIED WHETHER COMPLICATED: Chronic | ICD-10-CM

## 2022-03-21 DIAGNOSIS — F32.A DEPRESSION, UNSPECIFIED DEPRESSION TYPE: Chronic | ICD-10-CM

## 2022-03-21 DIAGNOSIS — M15.0 PRIMARY GENERALIZED (OSTEO)ARTHRITIS: ICD-10-CM

## 2022-03-21 DIAGNOSIS — G47.30 SLEEP APNEA, UNSPECIFIED TYPE: Chronic | ICD-10-CM

## 2022-03-21 DIAGNOSIS — IMO0002 DM (DIABETES MELLITUS), SECONDARY UNCONTROLLED: ICD-10-CM

## 2022-03-21 DIAGNOSIS — I10 HTN (HYPERTENSION), BENIGN: Chronic | ICD-10-CM

## 2022-03-21 DIAGNOSIS — R03.0 ELEVATED BLOOD PRESSURE READING: ICD-10-CM

## 2022-03-21 DIAGNOSIS — M81.0 OSTEOPOROSIS, UNSPECIFIED OSTEOPOROSIS TYPE, UNSPECIFIED PATHOLOGICAL FRACTURE PRESENCE: ICD-10-CM

## 2022-03-21 PROCEDURE — 99214 OFFICE O/P EST MOD 30 MIN: CPT | Performed by: FAMILY MEDICINE

## 2022-03-21 RX ORDER — DULOXETIN HYDROCHLORIDE 20 MG/1
20 CAPSULE, DELAYED RELEASE ORAL DAILY
Qty: 30 CAPSULE | Refills: 5 | Status: SHIPPED | OUTPATIENT
Start: 2022-03-21 | End: 2022-09-06

## 2022-03-21 RX ORDER — SEMAGLUTIDE 1.34 MG/ML
1 INJECTION, SOLUTION SUBCUTANEOUS WEEKLY
Qty: 4 PEN | Refills: 5 | Status: SHIPPED | OUTPATIENT
Start: 2022-03-21 | End: 2022-09-12

## 2022-03-21 RX ORDER — LANCETS 30 GAUGE
EACH MISCELLANEOUS
Qty: 100 EACH | Refills: 12 | Status: ON HOLD | OUTPATIENT
Start: 2022-03-21 | End: 2022-11-19

## 2022-03-21 RX ORDER — BLOOD-GLUCOSE METER
1 EACH MISCELLANEOUS TAKE AS DIRECTED
Qty: 1 EACH | Refills: 0 | Status: ON HOLD | OUTPATIENT
Start: 2022-03-21 | End: 2022-11-19

## 2022-03-21 RX ORDER — ATORVASTATIN CALCIUM 20 MG/1
20 TABLET, FILM COATED ORAL DAILY
Qty: 30 TABLET | Refills: 5 | Status: SHIPPED | OUTPATIENT
Start: 2022-03-21 | End: 2022-08-19

## 2022-03-21 RX ORDER — FLUCONAZOLE 100 MG/1
100 TABLET ORAL DAILY
Qty: 3 TABLET | Refills: 0 | Status: SHIPPED | OUTPATIENT
Start: 2022-03-21 | End: 2022-08-19

## 2022-03-21 NOTE — PATIENT INSTRUCTIONS
NSAID Education/Counseling:  We discussed the risks and benefits of Non-steroidal anti-inflammatories (NSAIDs), which include GI, renal, and cardiovascular toxicity. We discussed the risk for stomach ulcers, and the need to stop the drug or add a PPI if GI symptoms develop. We discussed the increased incidence of hypertension and cardiovascnlar events in patients taking NSAIDs We discussed the role the drugs may play in worsening renal disease, if present.     ########################    Your blood pressure was higher than what we really wanted for you today at 152/82.  It is often a problem for blood pressures in the doctor's office to be higher than home (called white coat syndrome).   Goals for your blood pressure are 130-140 range top and 80-90 range bottom and you feeling ok.     We really advise rechecking your blood pressure at home (or with a friend) daily to every other day for the next several days and let us know if your pattern is not the goals above.  If you will do this make sure you control variables that can make your blood pressure show higher than it really is:   A. Use a machine that measures your blood pressure at the upper arm level; not wrist or lower  B. Take off any shirts/garmets and apply the cuff to your bare arm  C. Be sure and rest your arm being used on a table/like so it is totally supported  D. Do not cross your legs while taking your blood pressure  E. Be calm, rested and not upset/anxious in any way while taking your blood pressure  F. Avoid talking while taking the blood pressure  G. Be sure your back is supported and not in a strain while taking your blood pressure.     If you cannot do this at home/with a friend OR want it done here we are happy to make appointments here for that (we only charge/bill for a nurse visit for doing this).      Please CALL US if your blood pressure stays up as that probably means you have a problem; we likely will adjust things even over the phone.   We want your blood pressure to be normal.     [employer] is a reliable/common home blood automatic blood pressure device that can range around 50-75$ and most of this brand is certified by the government.  A good model would be [employer] model 7.  Many chain brands ([facility], etc) if you look will say they are made by [employer].     ########################

## 2022-03-21 NOTE — PROGRESS NOTES
Subjective   Kezia Otoole is a 71 y.o. female presenting with chief complaint of:   Chief Complaint   Patient presents with   • Hypertension     Follow up        History of Present Illness :  Alone.  Here for primarily f/u chronic problems including HTN and others.       Has multiple chronic problems to consider that might have a bearing on today's issues;  an interval appointment.       Chronic/acute problems reviewed today:   1. Uncontrolled type 2 diabetes mellitus without complication, without long-term current use of insulin (HCC) Chronic/higher than usual.  No problem/pattern hypoglycemia/hyperglycemia manifest by poly- dypsia, phagia, uria, or sweats, diaphoretic episodes, syncope/near but numbers are higher for winter/less active/weight gain. Home monitor broke.      2. HTN (hypertension), benign Chronic/unstable. Above targets today/without recent home blood pressures.  No significant chest pain, SOB, LE edema, orthopnea, near syncope, dizziness/light headness.  Agrees to follow next few days and let us know if stays elevated.   Recent Vitals       9/16/2021 9/24/2021 3/21/2022       BP: 132/80 150/61 152/82     Pulse: 87 76 77     Temp: 97.3 °F (36.3 °C) 97.1 °F (36.2 °C) 97.8 °F (36.6 °C)     Weight: 104 kg (229 lb) 103 kg (228 lb) 109 kg (239 lb 6.4 oz)     BMI (Calculated): 40.6 40.4 42.4            3. Gastroesophageal reflux disease, unspecified whether esophagitis present Chronic/stable.  Controlled heartburn, reflux without dysphagia, melena.  Rx used, periods not used proven needed with symptoms -currently doing ok.      4. Mixed hyperlipidemia Chronic/stable.  Because a comorbidity she agrees to start medication today     5. Anticoagulated DM2/ASA 81 (11.9.17 pt chose qod) Chronic/stable reason for stopping or use of.  Denies bleeding issues; especially epistaxis, melena, hematochezia.  Upper arms/others do not significantly bruise easily.  No significant bleeding or falls.      6. Depression,  unspecified depression type chronic has been significant  mood swings, down moods, nervousness, difficulty with concentration to function home/work.  Others close have not been concerned.  No suicide ideation/intent.  Rx has helped past      7. Osteoporosis, unspecified osteoporosis type, unspecified pathological fracture presence Chronic/stable.  Last bone density/if below.   Has Prolia/ Rx.  Ok as needed further bone density screening when due.      8. Primary generalized (osteo)arthritis Chronic/stable.  Various on/off joint pains/soreness/stiffness.  Particular joint problems with various.  No joint swelling.  Treats mainly with reduced activity, prior surgery, Rx listed, Tylenol. Active (given by [facility]) NSAIDs, and no recent injections.      9. Mild intermittent asthma, unspecified whether complicated Chronic/variable : occ cough/wheeze and need for rescue medications > 2/week.    Maintance Rx compliance and helps.     10. Sleep apnea, unspecified type Chronic/uncertain: Previous opinion her diagnosis of sleep apnea.  Personal decision by the patient to forego treatment.  Patient declines having any issues with falling or staying asleep and any issues of hypersomnia with accidents or results of that during the day.  No sleep apnea witnessed.       Has an/another acute issue today: yeast infection with recent abx.    The following portions of the patient's history were reviewed and updated as appropriate: allergies, current medications, past family history, past medical history, past social history, past surgical history and problem list.      Current Outpatient Medications:   •  acetaminophen (TYLENOL) 500 MG tablet, Take 1,000 mg by mouth Every 6 (Six) Hours As Needed for mild pain (1-3)., Disp: , Rfl:   •  albuterol sulfate  (90 Base) MCG/ACT inhaler, Inhale 2 puffs Every 4 (Four) Hours As Needed for Wheezing or Shortness of Air., Disp: 8 g, Rfl: 1  •  amLODIPine (NORVASC) 5 MG tablet, TAKE ONE TABLET  DAILY , Disp: 90 tablet, Rfl: 1  •  aspirin 81 MG EC tablet, Take 1 tablet by mouth Every Other Day., Disp: 32 tablet, Rfl: 0  •  B Complex-C (SUPER B COMPLEX PO), Take 1 tablet by mouth Daily., Disp: , Rfl:   •  Calcium Carb-Cholecalciferol (CALCIUM-VITAMIN D) 600-400 MG-UNIT tablet, Take 1 tablet by mouth 2 (Two) Times a Day., Disp: , Rfl:   •  carboxymethylcellulose (REFRESH PLUS) 0.5 % solution, 2 drops Daily As Needed for dry eyes., Disp: , Rfl:   •  Cholecalciferol 25 MCG (1000 UT) capsule, Take 1 capsule by mouth Daily., Disp: , Rfl:   •  denosumab (PROLIA) 60 MG/ML solution syringe, Inject 60 mg under the skin into the appropriate area as directed Every 6 (Six) Months., Disp: , Rfl:   •  diclofenac sodium (VOTAREN XR) 100 MG 24 hr tablet, Take 100 mg by mouth Daily., Disp: , Rfl:   •  glucose blood test strip, Use as instructed: once a day testing of the one touch ultra blue strips, Disp: 50 each, Rfl: 11  •  guaiFENesin (MUCINEX) 600 MG 12 hr tablet, Take 600 mg by mouth 2 (Two) Times a Day., Disp: , Rfl:   •  hydroCHLOROthiazide (MICROZIDE) 12.5 MG capsule, TAKE ONE CAPSULE DAILY, Disp: 90 capsule, Rfl: 3  •  irbesartan (AVAPRO) 300 MG tablet, TAKE ONE TABLET DAILY, Disp: 90 tablet, Rfl: 3  •  latanoprost (XALATAN) 0.005 % ophthalmic solution, Administer 1 drop to both eyes Every Night., Disp: , Rfl: 5  •  metFORMIN ER (GLUCOPHAGE-XR) 500 MG 24 hr tablet, TAKE TWO TABLETS EVERY NIGHT, Disp: 60 tablet, Rfl: 5  •  Misc Natural Products (GLUCOSAMINE CHONDROITIN TRIPLE PO), Take 1 tablet by mouth 2 (Two) Times a Day., Disp: , Rfl:   •  montelukast (SINGULAIR) 10 MG tablet, TAKE ONE TABLET DAILY , Disp: 90 tablet, Rfl: 3  •  Multiple Vitamins-Minerals (VITRUM 50+ SENIOR MULTI PO), Take 1 tablet by mouth Daily., Disp: , Rfl:   •  Ozempic, 0.25 or 0.5 MG/DOSE, 2 MG/1.5ML solution pen-injector, INJECT 0.5 MG SUB-Q ONCE A WEEK AS DIRECTED , Disp: 15 pen, Rfl: 3  •  potassium chloride 10 MEQ CR tablet, TAKE ONE  TABLET DAILY, Disp: 90 tablet, Rfl: 3  •  Symbicort 160-4.5 MCG/ACT inhaler, INHALE TWO PUFFS TWO (TWO) TIMES A DAY., Disp: 30.6 each, Rfl: 3  •  cefdinir (OMNICEF) 300 MG capsule, Take 1 capsule by mouth 2 (Two) Times a Day., Disp: 20 capsule, Rfl: 0  •  Coenzyme Q10 (CO Q 10 PO), Take 1 capsule by mouth Every Other Day., Disp: , Rfl:   •  ipratropium-albuterol (DUO-NEB) 0.5-2.5 mg/3 ml nebulizer, Take 3 mL by nebulization 4 (Four) Times a Day., Disp: 40 vial, Rfl: 1  •  NON FORMULARY, Immunotherapy, Disp: , Rfl:   •  predniSONE (DELTASONE) 10 MG tablet, Two a day for 4 days and then one a day for 4 days, Disp: 12 tablet, Rfl: 0    No problems with medications.    Allergies   Allergen Reactions   • Adhesive Tape Other (See Comments)     Tears and bruises skin   • Other Other (See Comments)     Bandaids make arm Red/slightly swollen       Review of Systems  GENERAL:  Active/slower with limits, speed, stamina for age and various joint issues. Sleep is ok; apnea denied. No fever now.  ENDO:  No syncope, near or diaphoretic sweaty spells.  BS without testing/download.  HEENT: No head injury or headache.   No vision change.   No significant hearing loss.  Ears without pain/drainage.  No sore throat.   Same/occ nasal/sinus congestion/drainage. No epistaxis.  CHEST: No chest wall tenderness or mass.  Occ cough, wheeze.   No SOB; no hemoptysis.  CV: No chest pain, palpitations, usual  On/off ankle edema.  GI: No heartburn, dysphagia.  No abdominal pain, diarrhea, constipation.  No rectal bleeding, or melena.    :  Voids without dysuria, or incontinence to completion.  ORTHO: No painful/swollen joints but various on /off sore.  No change occ sore neck or back.  No acute neck or back pain without recent injury.  NEURO: No dizziness, weakness of extremities.  No numbness/paresthesias.   PSYCH: No memory loss.  Mood winter variable; occ mild anxious, depressed but/and not suicidal.  Tries to tolerate stress .    Screening:  Mammogram: 12.20.21-ordered  Bone density: 12.11.18 [facility] hip -2.4; start prolia-this year-per [facility]  Low dose CT chest: Tobacco-smoker/never: NA  GI: Colon-p+div///11.19.18/5y  Prostate: NA  Usual lab order  6m CBC, CMP, A1c  12m CBC, CMP, A1c, LIPID, TSH, Vit D, uric acid    Copy/paste function used for ROS/exam AND each area of these were reviewed, updated, confirmed and supplemented as needed.   Data reviewed:   Recent admit/ER/MD visits: last visit  Last cardiac testing:   Echo: none    Radiology considered:   No images are attached to the encounter.    Lab Results:  Results for orders placed or performed in visit on 03/15/22   Comprehensive Metabolic Panel    Specimen: Blood   Result Value Ref Range    Glucose 220 (H) 65 - 99 mg/dL    BUN 32 (H) 8 - 23 mg/dL    Creatinine 0.96 0.57 - 1.00 mg/dL    EGFR Result 62.6 >60.0 mL/min/1.73    BUN/Creatinine Ratio 33.3 (H) 7.0 - 25.0    Sodium 136 136 - 145 mmol/L    Potassium 4.9 3.5 - 5.2 mmol/L    Chloride 96 (L) 98 - 107 mmol/L    Total CO2 25.7 22.0 - 29.0 mmol/L    Calcium 10.1 8.6 - 10.5 mg/dL    Total Protein 6.7 6.0 - 8.5 g/dL    Albumin 4.30 3.50 - 5.20 g/dL    Globulin 2.4 gm/dL    A/G Ratio 1.8 g/dL    Total Bilirubin 0.5 0.0 - 1.2 mg/dL    Alkaline Phosphatase 90 39 - 117 U/L    AST (SGOT) 38 (H) 1 - 32 U/L    ALT (SGPT) 58 (H) 1 - 33 U/L   Hemoglobin A1c    Specimen: Blood   Result Value Ref Range    Hemoglobin A1C 8.70 (H) 4.80 - 5.60 %   Magnesium    Specimen: Blood   Result Value Ref Range    Magnesium 2.1 1.6 - 2.4 mg/dL   Phosphorus    Specimen: Blood   Result Value Ref Range    Phosphorus 4.0 2.5 - 4.5 mg/dL   Iron    Specimen: Blood   Result Value Ref Range    Iron 74 37 - 145 mcg/dL   Vitamin D 25 Hydroxy    Specimen: Blood   Result Value Ref Range    25 Hydroxy, Vitamin D 44.1 30.0 - 100.0 ng/ml   CBC & Differential    Specimen: Blood   Result Value Ref Range    WBC 10.68 3.40 - 10.80 10*3/mm3    RBC 4.95 3.77 - 5.28 10*6/mm3     Hemoglobin 14.6 12.0 - 15.9 g/dL    Hematocrit 45.1 34.0 - 46.6 %    MCV 91.1 79.0 - 97.0 fL    MCH 29.5 26.6 - 33.0 pg    MCHC 32.4 31.5 - 35.7 g/dL    RDW 14.5 12.3 - 15.4 %    Platelets 276 140 - 450 10*3/mm3    Neutrophil Rel % 74.3 42.7 - 76.0 %    Lymphocyte Rel % 13.4 (L) 19.6 - 45.3 %    Monocyte Rel % 9.9 5.0 - 12.0 %    Eosinophil Rel % 0.7 0.3 - 6.2 %    Basophil Rel % 0.4 0.0 - 1.5 %    Neutrophils Absolute 7.93 (H) 1.70 - 7.00 10*3/mm3    Lymphocytes Absolute 1.43 0.70 - 3.10 10*3/mm3    Monocytes Absolute 1.06 (H) 0.10 - 0.90 10*3/mm3    Eosinophils Absolute 0.08 0.00 - 0.40 10*3/mm3    Basophils Absolute 0.04 0.00 - 0.20 10*3/mm3    Immature Granulocyte Rel % 1.3 (H) 0.0 - 0.5 %    Immature Grans Absolute 0.14 (H) 0.00 - 0.05 10*3/mm3    nRBC 0.0 0.0 - 0.2 /100 WBC       A1C:  Lab Results - Last 18 Months   Lab Units 03/15/22  0739 09/15/21  0707 03/04/21  1008 02/03/21  0541 01/25/21  1340   HEMOGLOBIN A1C % 8.70* 6.20* 6.20* 6.6* 6.8*     GLUCOSE:  Lab Results - Last 18 Months   Lab Units 03/15/22  0739 09/15/21  0707 03/04/21  1008 02/08/21  1512 01/25/21  1340   GLUCOSE mg/dL 220* 145* 104* 173* 158*     LIPID:  Lab Results - Last 18 Months   Lab Units 09/15/21  0707   CHOLESTEROL mg/dL 156   LDL CHOL mg/dL 71   HDL CHOL mg/dL 36*   TRIGLYCERIDES mg/dL 303*     PSA:No results for input(s): PSA in the last 60083 hours.  CBC:  Lab Results - Last 18 Months   Lab Units 03/15/22  0739 09/15/21  0707 03/04/21  1008 02/08/21  1512 02/03/21  0541 01/25/21  1340   WBC 10*3/mm3 10.68 6.44 7.83 10.83*  --  7.8   HEMOGLOBIN g/dL 14.6 13.6 13.8 11.8* 12.0 15.1   HEMATOCRIT % 45.1 40.7 42.1 34.6 35.9* 45.6   PLATELETS 10*3/mm3 276 283 367 329  --  271   IRON mcg/dL 74 69 77  --   --   --       BMP/CMP:  Lab Results - Last 18 Months   Lab Units 03/15/22  0739 09/15/21  0707 03/04/21  1008 02/08/21  1512 01/25/21  1340   SODIUM mmol/L 136 136 137 137 139   POTASSIUM mmol/L 4.9 4.4 4.3 4.3 4.0   CHLORIDE mmol/L  "96* 99 96* 98 100   TOTAL CO2 mmol/L 25.7 26.5 28.8 29.8* 27   GLUCOSE mg/dL 220* 145* 104* 173* 158*   BUN mg/dL 32* 20 16 15 21   CREATININE mg/dL 0.96 0.73 0.71 0.61 0.8   EGFR IF NONAFRICN AM mL/min/1.73  --  78 81 96 >60   EGFR IF AFRICN AM mL/min/1.73  --  95 98 117 >59   CALCIUM mg/dL 10.1 10.2 10.2 9.2 10.2     HEPATIC:  Lab Results - Last 18 Months   Lab Units 03/15/22  0739 09/15/21  0707 03/04/21  1008   ALT (SGPT) U/L 58* 24 30   AST (SGOT) U/L 38* 23 28   ALK PHOS U/L 90 75 89     THYROID:  Lab Results - Last 18 Months   Lab Units 09/15/21  0707   TSH uIU/mL 1.600       Objective   /82 (BP Location: Left arm, Patient Position: Sitting, Cuff Size: Adult)   Pulse 77   Temp 97.8 °F (36.6 °C) (Infrared)   Resp 20   Ht 160 cm (63\") Comment: pt reported.  Wt 109 kg (239 lb 6.4 oz)   SpO2 95%   Breastfeeding No   BMI 42.41 kg/m²   Body mass index is 42.41 kg/m².    Recent Vitals       9/16/2021 9/24/2021 3/21/2022       BP: 132/80 150/61 152/82     Pulse: 87 76 77     Temp: 97.3 °F (36.3 °C) 97.1 °F (36.2 °C) 97.8 °F (36.6 °C)     Weight: 104 kg (229 lb) 103 kg (228 lb) 109 kg (239 lb 6.4 oz)     BMI (Calculated): 40.6 40.4 42.4           Physical Exam  GENERAL:  Well nourished/developed in no acute distress. Obese.   SKIN: Turgor excellent, without wound, rash, lesion.   HEENT: Normal cephalic without trauma.  Pupils equal round reactive to light. Extraocular motions full without nystagmus.   External canals clear.   Tymphatic membranes daniel with elvis structures intact after irrigation.   Oral cavity without growths, exudates, and moist.  Posterior pharynx without mass, obstruction, redness.  No thyromegaly, mass, tenderness, lymphadenopathy and supple.   CV: Regular rhythm.  No murmur, gallop, trace equal LE edema. Posterior pulses intact.  No carotid bruits.  CHEST: No chest wall tenderness or mass.   LUNGS: Symmetric motion with clear to auscultation.    ABD: Soft, nontender without mass. "   ORTHO: Symmetric extremities without swelling/point tenderness  Full gross range of motion diffuse.   Walking without assistance.   NEURO: CN 2-12 grossly intact.  Symmetric facies and UE/LE. 3/5 strength throughout. 1/4 x bicep equal reflexes.  Nonfocal use extremities. Speech clear.    PSYCH: Oriented x 3.  Pleasant calm, well kept.  Purposeful/directed conservation with intact short/long gross memory.      Assessment/Plan     1. HTN (hypertension), benign    2. Gastroesophageal reflux disease, unspecified whether esophagitis present    3. Mixed hyperlipidemia    4. Anticoagulated DM2/ASA 81 (11.9.17 pt chose qod)    5. Depression, unspecified depression type    6. Osteoporosis, unspecified osteoporosis type, unspecified pathological fracture presence    7. Primary generalized (osteo)arthritis    8. Mild intermittent asthma, unspecified whether complicated    9. Sleep apnea, unspecified type    10. Elevated blood pressure reading    11. DM (diabetes mellitus), secondary uncontrolled (HCC)    12. Acute vaginitis        Discussion:  BP up; fortunately without obvious symtpoms.  Not sure should be using NSAID  Other vitals ok  DM/BS too high/higher than usual.  Agreed to increase ozempic, continue with weight watchers and some walking/exercise  Thyroid ok  Liver slightly up; usually with weight up fatty liver; could be NSAID given by [facility]  CBC ok  Renal ok  Lipid ok but with co-morbidities suggest starting lipitor 20  PSA NA    Some weight loss through diet and exercise would help all of your medical problems  Helping her get a another blood sugar monitor where she can start testing  Suggesting using home blood pressure machine to monitor trend  Suggest she talk to orthopedic [place] about stopping iron  Restart Cymbalta for purposes of mood and pain control; as she did okay on it in the past  Repeat labs sooner than usual  If cough continues will not resolve or becomes recurring we will review    Immunization  History   Administered Date(s) Administered   • COVID-19 (MODERNA) 1st, 2nd, 3rd Dose Only 2021, 2021, 10/28/2021   • FLUAD TRI 65YR+ 10/17/2019   • Flu Vaccine Quad PF >18YRS 10/28/2016, 10/30/2017   • Fluad Quad 65+ 10/14/2020   • Fluzone High Dose =>65 Years ([facility] ONLY) 10/01/2018   • Influenza Quad Vaccine (Inpatient) 10/30/2017   • Influenza, Unspecified 10/17/2019   • Pneumococcal Conjugate 13-Valent (PCV13) 10/21/2015   • Pneumococcal Polysaccharide (PPSV23) 10/30/2014   • Shingrix 2019, 2019     Vaccine reviewed: today noned; later continue to monitor  Screening reviewed/updated    Medical decision issues:   Data review above:   Rx: reviewed and decisions:   Same Rx for now with changes noted    Visit today involved chronic significant medical problems or differentials and/or intensive drug monitoring: ie potential to cause serious morbidity or death:   New Medications Ordered This Visit   Medications   • Semaglutide, 1 MG/DOSE, (Ozempic, 1 MG/DOSE,) 2 MG/1.5ML solution pen-injector     Sig: Inject 1 mg under the skin into the appropriate area as directed 1 (One) Time Per Week.     Dispense:  4 pen     Refill:  5   • atorvastatin (LIPITOR) 20 MG tablet     Sig: Take 1 tablet by mouth Daily.     Dispense:  30 tablet     Refill:  5   • Blood Glucose Monitoring Suppl (Easy Step Glucose Monitor) device     Si Device Take As Directed.     Dispense:  1 each     Refill:  0   • Lancets misc     Sig: Test daily     Dispense:  100 each     Refill:  12   • glucose blood test strip     Sig: Use as instructed: once a day testing     Dispense:  50 each     Refill:  12   • fluconazole (Diflucan) 100 MG tablet     Sig: Take 1 tablet by mouth Daily.     Dispense:  3 tablet     Refill:  0   • DULoxetine (Cymbalta) 20 MG capsule     Sig: Take 1 capsule by mouth Daily.     Dispense:  30 capsule     Refill:  5     Orders placed:   LAB/Testing/Referrals: reviewed/orders:   Today:   Orders Placed  This Encounter   Procedures   • Comprehensive Metabolic Panel   • Hemoglobin A1c     Chronic/recurrent labs above or change to:   same     Health maintenance:   Body mass index is 42.41 kg/m².  Patient's Body mass index is 42.41 kg/m². indicating that she is morbidly obese (BMI > 40 or > 35 with obesity - related health condition). Obesity-related health conditions include the following: hypertension. Obesity is worsening. BMI is is above average; BMI management plan is completed. We discussed portion control, increasing exercise and pharmacologic options including Ozempic..    Tobacco use reviewed:   Kezia Otoole  reports that she has never smoked. She has never used smokeless tobacco..    Patient Instructions   NSAID Education/Counseling:  We discussed the risks and benefits of Non-steroidal anti-inflammatories (NSAIDs), which include GI, renal, and cardiovascular toxicity. We discussed the risk for stomach ulcers, and the need to stop the drug or add a PPI if GI symptoms develop. We discussed the increased incidence of hypertension and cardiovascnlar events in patients taking NSAIDs We discussed the role the drugs may play in worsening renal disease, if present.     ########################    Your blood pressure was higher than what we really wanted for you today at 152/82.  It is often a problem for blood pressures in the doctor's office to be higher than home (called white coat syndrome).   Goals for your blood pressure are 130-140 range top and 80-90 range bottom and you feeling ok.     We really advise rechecking your blood pressure at home (or with a friend) daily to every other day for the next several days and let us know if your pattern is not the goals above.  If you will do this make sure you control variables that can make your blood pressure show higher than it really is:   A. Use a machine that measures your blood pressure at the upper arm level; not wrist or lower  B. Take off any shirts/garmets  and apply the cuff to your bare arm  C. Be sure and rest your arm being used on a table/like so it is totally supported  D. Do not cross your legs while taking your blood pressure  E. Be calm, rested and not upset/anxious in any way while taking your blood pressure  F. Avoid talking while taking the blood pressure  G. Be sure your back is supported and not in a strain while taking your blood pressure.     If you cannot do this at home/with a friend OR want it done here we are happy to make appointments here for that (we only charge/bill for a nurse visit for doing this).      Please CALL US if your blood pressure stays up as that probably means you have a problem; we likely will adjust things even over the phone.  We want your blood pressure to be normal.     [employer] is a reliable/common home blood automatic blood pressure device that can range around 50-75$ and most of this brand is certified by the government.  A good model would be [employer] model 7.  Many chain brands ([facility], etc) if you look will say they are made by [employer].     ########################          Follow up: Return for lab;, Dr Crowe-.  Future Appointments   Date Time Provider Department Center   6/17/2022  8:55 AM [facility]   6/21/2022  9:30 AM Len Crowe MD [facility]

## 2022-03-25 ENCOUNTER — TELEPHONE (OUTPATIENT)
Dept: FAMILY MEDICINE CLINIC | Facility: CLINIC | Age: 72
End: 2022-03-25

## 2022-03-25 NOTE — TELEPHONE ENCOUNTER
PATIENT REQUESTING WE FAX HER MOST RECENT LABS TO [facility] SO THAT SHE CAN GET HER PROLIA INJECTIONS STARTED       PLEASE FAX     [facility]     GOOD CALL BACK  [phone]

## 2022-04-01 ENCOUNTER — INFUSION (OUTPATIENT)
Dept: ONCOLOGY | Facility: HOSPITAL | Age: 72
End: 2022-04-01

## 2022-04-01 VITALS
BODY MASS INDEX: 41.82 KG/M2 | OXYGEN SATURATION: 98 % | WEIGHT: 236 LBS | RESPIRATION RATE: 16 BRPM | HEIGHT: 63 IN | TEMPERATURE: 97.3 F | HEART RATE: 79 BPM | SYSTOLIC BLOOD PRESSURE: 151 MMHG | DIASTOLIC BLOOD PRESSURE: 75 MMHG

## 2022-04-01 DIAGNOSIS — M81.0 OSTEOPOROSIS, UNSPECIFIED OSTEOPOROSIS TYPE, UNSPECIFIED PATHOLOGICAL FRACTURE PRESENCE: Primary | ICD-10-CM

## 2022-04-01 PROCEDURE — 25010000002 DENOSUMAB 60 MG/ML SOLUTION PREFILLED SYRINGE: Performed by: PHYSICIAN ASSISTANT

## 2022-04-01 PROCEDURE — 96372 THER/PROPH/DIAG INJ SC/IM: CPT

## 2022-04-01 RX ADMIN — DENOSUMAB 60 MG: 60 INJECTION SUBCUTANEOUS at 10:42

## 2022-04-02 DIAGNOSIS — I10 HTN (HYPERTENSION), BENIGN: ICD-10-CM

## 2022-04-04 RX ORDER — AMLODIPINE BESYLATE 5 MG/1
TABLET ORAL
Qty: 90 TABLET | Refills: 1 | Status: SHIPPED | OUTPATIENT
Start: 2022-04-04 | End: 2022-07-08

## 2022-04-25 DIAGNOSIS — I10 HTN (HYPERTENSION), BENIGN: ICD-10-CM

## 2022-04-26 RX ORDER — IRBESARTAN 300 MG/1
TABLET ORAL
Qty: 90 TABLET | Refills: 3 | Status: ON HOLD | OUTPATIENT
Start: 2022-04-26 | End: 2022-12-08

## 2022-06-17 ENCOUNTER — LAB (OUTPATIENT)
Dept: FAMILY MEDICINE CLINIC | Facility: CLINIC | Age: 72
End: 2022-06-17

## 2022-06-21 ENCOUNTER — OFFICE VISIT (OUTPATIENT)
Dept: FAMILY MEDICINE CLINIC | Facility: CLINIC | Age: 72
End: 2022-06-21

## 2022-06-21 VITALS
HEART RATE: 76 BPM | SYSTOLIC BLOOD PRESSURE: 136 MMHG | RESPIRATION RATE: 18 BRPM | WEIGHT: 234.2 LBS | OXYGEN SATURATION: 98 % | TEMPERATURE: 97.7 F | BODY MASS INDEX: 41.5 KG/M2 | DIASTOLIC BLOOD PRESSURE: 88 MMHG | HEIGHT: 63 IN

## 2022-06-21 DIAGNOSIS — K21.9 GASTROESOPHAGEAL REFLUX DISEASE, UNSPECIFIED WHETHER ESOPHAGITIS PRESENT: ICD-10-CM

## 2022-06-21 DIAGNOSIS — F32.A DEPRESSION, UNSPECIFIED DEPRESSION TYPE: Chronic | ICD-10-CM

## 2022-06-21 DIAGNOSIS — E11.9 CONTROLLED TYPE 2 DIABETES MELLITUS WITHOUT COMPLICATION, WITHOUT LONG-TERM CURRENT USE OF INSULIN: Chronic | ICD-10-CM

## 2022-06-21 DIAGNOSIS — G47.10 HYPERSOMNOLENCE: ICD-10-CM

## 2022-06-21 DIAGNOSIS — J45.20 MILD INTERMITTENT ASTHMA, UNSPECIFIED WHETHER COMPLICATED: Chronic | ICD-10-CM

## 2022-06-21 DIAGNOSIS — R53.83 FATIGUE, UNSPECIFIED TYPE: ICD-10-CM

## 2022-06-21 DIAGNOSIS — Z86.2 HISTORY OF ANEMIA: ICD-10-CM

## 2022-06-21 DIAGNOSIS — Z79.899 POLYPHARMACY: ICD-10-CM

## 2022-06-21 DIAGNOSIS — M15.0 PRIMARY GENERALIZED (OSTEO)ARTHRITIS: ICD-10-CM

## 2022-06-21 DIAGNOSIS — J30.9 ALLERGIC RHINITIS, UNSPECIFIED SEASONALITY, UNSPECIFIED TRIGGER: Chronic | ICD-10-CM

## 2022-06-21 DIAGNOSIS — M79.644 PAIN OF RIGHT THUMB: ICD-10-CM

## 2022-06-21 DIAGNOSIS — G47.30 SLEEP APNEA, UNSPECIFIED TYPE: Chronic | ICD-10-CM

## 2022-06-21 DIAGNOSIS — I10 HTN (HYPERTENSION), BENIGN: Chronic | ICD-10-CM

## 2022-06-21 PROCEDURE — 99214 OFFICE O/P EST MOD 30 MIN: CPT | Performed by: FAMILY MEDICINE

## 2022-06-21 NOTE — PROGRESS NOTES
Subjective   Kezia Otoole is a 71 y.o. female presenting with chief complaint of:   Chief Complaint   Patient presents with   • Follow-up     Patient is here for 3 month FU. Labs done on 06/17/2022. Patient is also complaining of pain in her right Thumb. Patient is also complaining of being tired all the time.        History of Present Illness :  Alone.   Here for review of chronic problems that includes HTN and others.      Has multiple chronic problems to consider that might have a bearing on today's issues;  an interval appointment.       Chronic/acute problems reviewed today:   1. Fatigue, unspecified type chronic variable but recently much worse.  Denies melena hematochezia shortness of breath significant cough wheeze leg edema and note labs; primarily remaining issues poor quality sleep   2. Mild intermittent asthma, unspecified whether complicated Chronic/stable lately: infrequent cough/wheeze and need for rescue medications > 2/week.    Maintance Rx compliance and helps.     3. Sleep apnea, unspecified type see hypersomnolence   4. Primary generalized (osteo)arthritis Chronic/stable.  Various on/off joint pains/soreness/stiffness.  Particular joint problems with LE.  No joint swelling.  Treats mainly with reduced activity, Rx listed, Tylenol.  No  NSAIDs, and no recent injections.      5. Depression, unspecified depression type chronic past significant  mood swings, down moods, nervousness, difficulty with concentration to function home/work.  Others close have not been concerned.  No suicide ideation/intent.  Rx helping and not as much issue now      6. History of anemia: ASA81,iron,gi(cp,div Chronic or past history/stable more recent/now: This has been present before.    There has been GI evaulation in the past. There is no current melena, hematochezia. It has been benign to date and stable/watching.  Contributing comorbidities to date: post op/ASA and iron issues.     7. Gastroesophageal reflux  disease, unspecified whether esophagitis present Chronic/stable.  Controlled heartburn, reflux without dysphagia, melena.  Rx used, periods not used proven needed with symptoms -currently doing ok.      8. Controlled type 2 diabetes mellitus without complication, without long-term current use of insulin (HCC) Chronic/stable. No problem/pattern hypoglycemia/hyperglycemia manifest by poly- dypsia, phagia, uria, or sweats, diaphoretic episodes, syncope/near.     9. HTN (hypertension), benign Chronic/stable. Stable here past/no recent home blood pressures.  No significant chest pain, SOB, LE edema, orthopnea, near syncope, dizziness/light headness.   Recent Vitals       3/21/2022 4/1/2022 6/21/2022       BP: 152/82 151/75 136/88     Pulse: 77 79 76     Temp: 97.8 °F (36.6 °C) 97.3 °F (36.3 °C) 97.7 °F (36.5 °C)     Weight: 109 kg (239 lb 6.4 oz) 107 kg (236 lb) 106 kg (234 lb 3.2 oz)     BMI (Calculated): 42.4 41.8 41.5            10. Allergic rhinitis, unspecified seasonality, unspecified trigger Chronic/variable.   On/off eye, sinus, nasal congestion and drainage.  Rx and allergy shots help.  Aware of options additional medications, testing and not interested.     11. Polypharmacy by necessity she is ended up with several medications with potential side effects   12. Pain of right thumb chronic on and off discomfort the base of the right thumb.  No injury.   13. Hypersomnolence chronic worsening; poor sleep with suggestion for sleep apnea witnessed by .  Hypersomnolence with wanting to take naps all the time.  Fortunately no accidents.     Has an/another acute issue today: none.    The following portions of the patient's history were reviewed and updated as appropriate: allergies, current medications, past family history, past medical history, past social history, past surgical history and problem list.      Current Outpatient Medications:   •  acetaminophen (TYLENOL) 500 MG tablet, Take 1,000 mg by mouth Every  6 (Six) Hours As Needed for mild pain (1-3)., Disp: , Rfl:   •  albuterol sulfate  (90 Base) MCG/ACT inhaler, Inhale 2 puffs Every 4 (Four) Hours As Needed for Wheezing or Shortness of Air., Disp: 8 g, Rfl: 1  •  amLODIPine (NORVASC) 5 MG tablet, TAKE ONE TABLET DAILY, Disp: 90 tablet, Rfl: 1  •  aspirin 81 MG EC tablet, Take 1 tablet by mouth Every Other Day., Disp: 32 tablet, Rfl: 0  •  atorvastatin (LIPITOR) 20 MG tablet, Take 1 tablet by mouth Daily., Disp: 30 tablet, Rfl: 5  •  B Complex-C (SUPER B COMPLEX PO), Take 1 tablet by mouth Daily., Disp: , Rfl:   •  Blood Glucose Monitoring Suppl (Easy Step Glucose Monitor) device, 1 Device Take As Directed., Disp: 1 each, Rfl: 0  •  Calcium Carb-Cholecalciferol (CALCIUM-VITAMIN D) 600-400 MG-UNIT tablet, Take 1 tablet by mouth 2 (Two) Times a Day., Disp: , Rfl:   •  carboxymethylcellulose (REFRESH PLUS) 0.5 % solution, 2 drops Daily As Needed for dry eyes., Disp: , Rfl:   •  Cholecalciferol 25 MCG (1000 UT) capsule, Take 1 capsule by mouth Daily., Disp: , Rfl:   •  denosumab (PROLIA) 60 MG/ML solution syringe, Inject 60 mg under the skin into the appropriate area as directed Every 6 (Six) Months., Disp: , Rfl:   •  DULoxetine (CYMBALTA) 20 MG capsule, 1 capsule Every Night., Disp: , Rfl:   •  glucose blood test strip, Use as instructed: once a day testing of the one touch ultra blue strips, Disp: 50 each, Rfl: 11  •  guaiFENesin (MUCINEX) 600 MG 12 hr tablet, Take 600 mg by mouth 2 (Two) Times a Day., Disp: , Rfl:   •  hydroCHLOROthiazide (MICROZIDE) 12.5 MG capsule, TAKE ONE CAPSULE DAILY, Disp: 90 capsule, Rfl: 3  •  irbesartan (AVAPRO) 300 MG tablet, TAKE ONE TABLET DAILY, Disp: 90 tablet, Rfl: 3  •  Lancets misc, Test daily, Disp: 100 each, Rfl: 12  •  latanoprost (XALATAN) 0.005 % ophthalmic solution, Administer 1 drop to both eyes Every Night., Disp: , Rfl: 5  •  metFORMIN ER (GLUCOPHAGE-XR) 500 MG 24 hr tablet, TAKE TWO TABLETS EVERY NIGHT, Disp: 60  tablet, Rfl: 5  •  Misc Natural Products (GLUCOSAMINE CHONDROITIN TRIPLE PO), Take 1 tablet by mouth 2 (Two) Times a Day., Disp: , Rfl:   •  montelukast (SINGULAIR) 10 MG tablet, TAKE ONE TABLET DAILY , Disp: 90 tablet, Rfl: 3  •  Multiple Vitamins-Minerals (VITRUM 50+ SENIOR MULTI PO), Take 1 tablet by mouth Daily., Disp: , Rfl:   •  NON FORMULARY, Immunotherapy, Disp: , Rfl:   •  potassium chloride 10 MEQ CR tablet, TAKE ONE TABLET DAILY, Disp: 90 tablet, Rfl: 3  •  Semaglutide, 1 MG/DOSE, (Ozempic, 1 MG/DOSE,) 2 MG/1.5ML solution pen-injector, Inject 1 mg under the skin into the appropriate area as directed 1 (One) Time Per Week., Disp: 4 pen, Rfl: 5  •  Symbicort 160-4.5 MCG/ACT inhaler, INHALE TWO PUFFS TWO (TWO) TIMES A DAY., Disp: 30.6 each, Rfl: 3  •  Coenzyme Q10 (CO Q 10 PO), Take 1 capsule by mouth Every Other Day., Disp: , Rfl:   •  diclofenac sodium (VOTAREN XR) 100 MG 24 hr tablet, Take 100 mg by mouth Daily., Disp: , Rfl:   •  DULoxetine (Cymbalta) 20 MG capsule, Take 1 capsule by mouth Daily., Disp: 30 capsule, Rfl: 5  •  fluconazole (Diflucan) 100 MG tablet, Take 1 tablet by mouth Daily., Disp: 3 tablet, Rfl: 0  •  glucose blood test strip, Use as instructed: once a day testing, Disp: 50 each, Rfl: 12  •  ipratropium-albuterol (DUO-NEB) 0.5-2.5 mg/3 ml nebulizer, Take 3 mL by nebulization 4 (Four) Times a Day., Disp: 40 vial, Rfl: 1    No problems with medications.    Allergies   Allergen Reactions   • Adhesive Tape Other (See Comments)     Tears and bruises skin   • Other Other (See Comments)     Bandaids make arm Red/slightly swollen       Review of Systems  GENERAL:  Active/slower with limits, speed, stamina for age and various joint issues. Sleep is poor; apnea likely.  No fever now.  ENDO:  No syncope, near or diaphoretic sweaty spells.  BS without testing/download.  HEENT: No head injury or headache.   No vision change.   No significant hearing loss.  Ears without pain/drainage.  No sore  "throat.   Same/occ nasal/sinus congestion/drainage. No epistaxis.  CHEST: No chest wall tenderness or mass.  Occ cough, wheeze.   No SOB; no hemoptysis.  CV: No chest pain, palpitations, usual  On/off ankle edema.  GI: No heartburn, dysphagia.  No abdominal pain, diarrhea, constipation.  No rectal bleeding, or melena.    :  Voids without dysuria, or incontinence to completion.  ORTHO: No painful/swollen joints but various on /off sore.  No change occ sore neck or back.  No acute neck or back pain without recent injury.  NEURO: No dizziness, weakness of extremities.  No numbness/paresthesias.   PSYCH: No memory loss.  Mood winter variable; occ mild anxious, depressed but/and not suicidal.  Tries to tolerate stress .   Screening:  Mammogram: 12.22.21  Bone density: \"2021-22\" [facility]; continuing the prolia  12.11.18 [facility] hip -2.4; start prolia-this year-per [facility]  Low dose CT chest: Tobacco-smoker/never: NA  GI: Colon-p+div/[facility]//11.19.18/5y  Prostate: NA  Usual lab order  6m CBC, CMP, A1c  12m CBC, CMP, A1c, LIPID, TSH, Vit D, uric acid      Copy/paste function used for ROS/exam AND each area of these were reviewed, updated, confirmed and supplemented as needed.   Data reviewed:   Recent admit/ER/MD visits: last visit  Last cardiac testing:   Echo: none    Radiology considered:   No radiology results for the last 90 days.    Lab Results:  Results for orders placed or performed in visit on 03/22/22   Comprehensive Metabolic Panel    Specimen: Blood   Result Value Ref Range    Glucose 153 (H) 65 - 99 mg/dL    BUN 13 8 - 23 mg/dL    Creatinine 0.63 0.57 - 1.00 mg/dL    EGFR Result 93.8 >60.0 mL/min/1.73    BUN/Creatinine Ratio 20.6 7.0 - 25.0    Sodium 137 136 - 145 mmol/L    Potassium 4.4 3.5 - 5.2 mmol/L    Chloride 100 98 - 107 mmol/L    Total CO2 25.5 22.0 - 29.0 mmol/L    Calcium 9.6 8.6 - 10.5 mg/dL    Total Protein 6.6 6.0 - 8.5 g/dL    Albumin 4.40 3.50 - 5.20 g/dL    Globulin 2.2 gm/dL    A/G Ratio 2.0 g/dL    " Total Bilirubin 0.3 0.0 - 1.2 mg/dL    Alkaline Phosphatase 76 39 - 117 U/L    AST (SGOT) 17 1 - 32 U/L    ALT (SGPT) 19 1 - 33 U/L   Hemoglobin A1c    Specimen: Blood   Result Value Ref Range    Hemoglobin A1C 6.90 (H) 4.80 - 5.60 %       A1C:  Lab Results - Last 18 Months   Lab Units 06/17/22  0810 03/15/22  0739 09/15/21  0707 03/04/21  1008 02/03/21  0541 01/25/21  1340   HEMOGLOBIN A1C % 6.90* 8.70* 6.20* 6.20* 6.6* 6.8*     GLUCOSE:  Lab Results - Last 18 Months   Lab Units 06/17/22  0810 03/15/22  0739 09/15/21  0707 03/04/21  1008 02/08/21  1512 01/25/21  1340   GLUCOSE mg/dL 153* 220* 145* 104* 173* 158*     LIPID:  Lab Results - Last 18 Months   Lab Units 09/15/21  0707   CHOLESTEROL mg/dL 156   LDL CHOL mg/dL 71   HDL CHOL mg/dL 36*   TRIGLYCERIDES mg/dL 303*     PSA:No results for input(s): PSA in the last 51248 hours.    CBC:  Lab Results - Last 18 Months   Lab Units 03/15/22  0739 09/15/21  0707 03/04/21  1008 02/08/21  1512 02/03/21  0541 01/25/21  1340   WBC 10*3/mm3 10.68 6.44 7.83 10.83*  --  7.8   HEMOGLOBIN g/dL 14.6 13.6 13.8 11.8* 12.0 15.1   HEMATOCRIT % 45.1 40.7 42.1 34.6 35.9* 45.6   PLATELETS 10*3/mm3 276 283 367 329  --  271   IRON mcg/dL 74 69 77  --   --   --       BMP/CMP:  Lab Results - Last 18 Months   Lab Units 06/17/22  0810 03/15/22  0739 09/15/21  0707 03/04/21  1008 02/08/21  1512 01/25/21  1340   SODIUM mmol/L 137 136 136 137 137 139   POTASSIUM mmol/L 4.4 4.9 4.4 4.3 4.3 4.0   CHLORIDE mmol/L 100 96* 99 96* 98 100   TOTAL CO2 mmol/L 25.5 25.7 26.5 28.8 29.8* 27   GLUCOSE mg/dL 153* 220* 145* 104* 173* 158*   BUN mg/dL 13 32* 20 16 15 21   CREATININE mg/dL 0.63 0.96 0.73 0.71 0.61 0.8   EGFR IF NONAFRICN AM mL/min/1.73  --   --  78 81 96 >60   EGFR IF AFRICN AM mL/min/1.73  --   --  95 98 117 >59   EGFR RESULT mL/min/1.73 93.8 62.6  --   --   --   --    CALCIUM mg/dL 9.6 10.1 10.2 10.2 9.2 10.2     HEPATIC:  Lab Results - Last 18 Months   Lab Units 06/17/22  0810 03/15/22  0739  "09/15/21  0707 03/04/21  1008   ALT (SGPT) U/L 19 58* 24 30   AST (SGOT) U/L 17 38* 23 28   ALK PHOS U/L 76 90 75 89     Vit D:  Lab Results - Last 18 Months   Lab Units 03/15/22  0739 09/15/21  0707 03/04/21  1008   VIT D 25 HYDROXY ng/ml 44.1 49.2 54.1     THYROID:  Lab Results - Last 18 Months   Lab Units 09/15/21  0707   TSH uIU/mL 1.600       Objective   /88 (BP Location: Right arm, Patient Position: Sitting, Cuff Size: Adult)   Pulse 76   Temp 97.7 °F (36.5 °C) (Temporal)   Resp 18   Ht 160 cm (63\")   Wt 106 kg (234 lb 3.2 oz)   SpO2 98%   BMI 41.49 kg/m²   Body mass index is 41.49 kg/m².    Recent Vitals       3/21/2022 4/1/2022 6/21/2022       BP: 152/82 151/75 136/88     Pulse: 77 79 76     Temp: 97.8 °F (36.6 °C) 97.3 °F (36.3 °C) 97.7 °F (36.5 °C)     Weight: 109 kg (239 lb 6.4 oz) 107 kg (236 lb) 106 kg (234 lb 3.2 oz)     BMI (Calculated): 42.4 41.8 41.5         Physical Exam  GENERAL:  Well nourished/developed in no acute distress. Obese.   SKIN: Turgor excellent, without wound, rash, lesion.   HEENT: Normal cephalic without trauma.  Pupils equal round reactive to light. Extraocular motions full without nystagmus.   External canals initially full soft wax; mildly red after irritation.  Tymphatic membranes daniel with elvis structures intact after irrigation.   Oral cavity without growths, exudates, and moist.  Posterior pharynx without mass, obstruction, redness.  No thyromegaly, mass, tenderness, lymphadenopathy and supple.   CV: Regular rhythm.  No murmur, gallop, trace equal LE edema. Posterior pulses intact.  No carotid bruits.  CHEST: No chest wall tenderness or mass.   LUNGS: Symmetric motion with clear to auscultation.    ABD: Soft, nontender without mass.   ORTHO: Symmetric extremities without swelling/point tenderness  Full gross range of motion diffuse.   Walking without assistance.   NEURO: CN 2-12 grossly intact.  Symmetric facies and UE/LE. 3/5 strength throughout. 1/4 x bicep " equal reflexes.  Nonfocal use extremities. Speech clear.    PSYCH: Oriented x 3.  Pleasant calm, well kept.  Purposeful/directed conservation with intact short/long gross memory.      Assessment & Plan     1. Fatigue, unspecified type    2. Mild intermittent asthma, unspecified whether complicated    3. Sleep apnea, unspecified type    4. Primary generalized (osteo)arthritis    5. Depression, unspecified depression type    6. History of anemia: ASA81,iron,gi(cp,div    7. Gastroesophageal reflux disease, unspecified whether esophagitis present    8. Controlled type 2 diabetes mellitus without complication, without long-term current use of insulin (HCC)    9. HTN (hypertension), benign    10. Allergic rhinitis, unspecified seasonality, unspecified trigger    11. Polypharmacy    12. Pain of right thumb    13. Hypersomnolence        Discussions/medical decisions/reviews:  BP ok  Other vitals ok  DM/BS markedly better-weight down 5  Lipid triglycerides 303 last  PSA NA  CBC ok last  Renal ok  Liver ok  Vit D ok last  Thyroid ok last    Sleep study advised  Forgot to order; but nursing will offer xray R thumb  Exercise as tolerated  She needs to have ortho review her knee pain; her total knee and her natural knee    Medical decision issues:   Data review above:   Rx: reviewed and decisions:   Visit today involved chronic significant medical problems or differentials and/or intensive drug monitoring: ie potential to cause serious morbidity or death:   Rx changes: none  No orders of the defined types were placed in this encounter.      Orders placed:   LAB/Testing/Referrals: reviewed/orders:   Today:   Orders Placed This Encounter   Procedures   • XR finger 2+ vw right   • Polysomnography 4 or More Parameters     Chronic/recurrent labs above or change to:   same  Screening reviewed/updated done    Immunization History   Administered Date(s) Administered   • COVID-19 (MODERNA) 1st, 2nd, 3rd Dose Only 02/26/2021, 03/26/2021,  10/28/2021   • FLUAD TRI 65YR+ 10/17/2019   • Fluad Quad 65+ 10/14/2020   • Fluzone High Dose =>65 Years ([facility] ONLY) 10/01/2018   • Fluzone Split Quad (Multi-dose) 10/28/2016, 10/30/2017   • Influenza Quad Vaccine (Inpatient) 10/30/2017   • Influenza, Unspecified 10/17/2019   • Pneumococcal Conjugate 13-Valent (PCV13) 10/21/2015   • Pneumococcal Polysaccharide (PPSV23) 10/30/2014   • Shingrix 09/06/2019, 12/06/2019     Vaccine reviewed: today none; later we advised/reaffirmed our support/suggestion for staying complete with covid- covid boosters, seasonal flu/yearly and any missing vaccine from list we supplied; we suggest contact with local health department office to review missing/needed vaccines and then bring nursing documentation for these vaccines to this office.     Health maintenance:   Body mass index is 41.49 kg/m².  Class 3 Severe Obesity (BMI >=40). Obesity-related health conditions include the following: diabetes mellitus. Obesity is unchanged. BMI is is above average; BMI management plan is completed. We discussed portion control and increasing exercise.      Tobacco use reviewed:   Kezia Otoole  reports that she has never smoked. She has never used smokeless tobacco..   There are no Patient Instructions on file for this visit.    Follow up: Return for lab/Dr Crowe 6m.  Future Appointments   Date Time Provider Department Center   12/20/2022  8:40 AM [facility] AL[facility] SALDIV[facility]   12/27/2022 11:30 AM Len Crowe MD [facility]

## 2022-06-23 RX ORDER — MONTELUKAST SODIUM 10 MG/1
TABLET ORAL
Qty: 90 TABLET | Refills: 3 | Status: ON HOLD | OUTPATIENT
Start: 2022-06-23 | End: 2022-12-08

## 2022-06-23 NOTE — TELEPHONE ENCOUNTER
Rx Refill Note  Requested Prescriptions     Pending Prescriptions Disp Refills   • montelukast (SINGULAIR) 10 MG tablet [Pharmacy Med Name: MONTELUKAST SODIUM 10MG TABLET] 90 tablet 3     Sig: TAKE ONE TABLET DAILY      Last office visit with prescribing clinician: 6/21/2022      Next office visit with prescribing clinician: 12/27/2022            Vivien Mercado LPN  06/23/22, 17:16 CDT

## 2022-06-24 ENCOUNTER — HOSPITAL ENCOUNTER (OUTPATIENT)
Dept: GENERAL RADIOLOGY | Facility: HOSPITAL | Age: 72
Discharge: HOME | End: 2022-06-24
Admitting: FAMILY MEDICINE

## 2022-06-24 DIAGNOSIS — M79.644 PAIN OF RIGHT THUMB: ICD-10-CM

## 2022-06-24 PROCEDURE — 73140 X-RAY EXAM OF FINGER(S): CPT

## 2022-06-24 NOTE — PROGRESS NOTES
Please call the patient - XR right thumb is negative    Electronically signed by FABBY Sanchez, 06/24/22, 11:50 AM CDT.

## 2022-07-01 ENCOUNTER — TELEPHONE (OUTPATIENT)
Dept: FAMILY MEDICINE CLINIC | Facility: CLINIC | Age: 72
End: 2022-07-01

## 2022-07-01 NOTE — TELEPHONE ENCOUNTER
Caller: Kezia Otoole    Relationship: Self    Best call back number: [phone]    What was the call regarding: PATIENT STATES SHE WOULD LIKE TO FOLLOW UP CONCERNING HER SLEEP STUDY. PATIENT IS WONDERING IF THIS HAS BEEN SET UP YET. PLEASE ADVISE    Do you require a callback: YES

## 2022-07-05 NOTE — TELEPHONE ENCOUNTER
Called pt left vm to contact the office. I have tried to contact patient multiple times to do the epworth scale to send over to sleep lab

## 2022-07-07 DIAGNOSIS — I10 HTN (HYPERTENSION), BENIGN: ICD-10-CM

## 2022-07-08 RX ORDER — AMLODIPINE BESYLATE 5 MG/1
TABLET ORAL
Qty: 90 TABLET | Refills: 1 | Status: ON HOLD | OUTPATIENT
Start: 2022-07-08 | End: 2022-12-08

## 2022-07-08 NOTE — TELEPHONE ENCOUNTER
Rx Refill Note  Requested Prescriptions     Pending Prescriptions Disp Refills   • amLODIPine (NORVASC) 5 MG tablet [Pharmacy Med Name: AMLODIPINE BESYLATE 5MG TABLET] 90 tablet 1     Sig: TAKE ONE TABLET DAILY      Last office visit with prescribing clinician: 6/21/2022      Next office visit with prescribing clinician: 12/27/2022            Vivien Mercado LPN  07/08/22, 15:04 CDT

## 2022-07-21 DIAGNOSIS — E11.9 CONTROLLED TYPE 2 DIABETES MELLITUS WITHOUT COMPLICATION, WITHOUT LONG-TERM CURRENT USE OF INSULIN: Chronic | ICD-10-CM

## 2022-07-21 RX ORDER — METFORMIN HYDROCHLORIDE 500 MG/1
TABLET, EXTENDED RELEASE ORAL
Qty: 60 TABLET | Refills: 5 | Status: SHIPPED | OUTPATIENT
Start: 2022-07-21 | End: 2022-08-19

## 2022-07-21 NOTE — TELEPHONE ENCOUNTER
Rx Refill Note  Requested Prescriptions     Pending Prescriptions Disp Refills   • metFORMIN ER (GLUCOPHAGE-XR) 500 MG 24 hr tablet [Pharmacy Med Name: METFORMIN HYDROCHLORIDE ER 500MG ER TABLET ER 24HR] 60 tablet 5     Sig: TAKE TWO TABLETS EVERY NIGHT      Last office visit with prescribing clinician: 7/23/2021      Next office visit with prescribing clinician: Visit date not found            Vivien Mercado LPN  07/21/22, 11:52 CDT

## 2022-08-18 DIAGNOSIS — Z01.818 PRE-OP TESTING: Primary | ICD-10-CM

## 2022-08-19 DIAGNOSIS — E78.2 MIXED HYPERLIPIDEMIA: ICD-10-CM

## 2022-08-19 DIAGNOSIS — E11.9 CONTROLLED TYPE 2 DIABETES MELLITUS WITHOUT COMPLICATION, WITHOUT LONG-TERM CURRENT USE OF INSULIN: Chronic | ICD-10-CM

## 2022-08-19 RX ORDER — METFORMIN HYDROCHLORIDE 500 MG/1
1000 TABLET, EXTENDED RELEASE ORAL NIGHTLY
Qty: 180 TABLET | Refills: 3 | Status: SHIPPED | OUTPATIENT
Start: 2022-08-19 | End: 2022-10-18

## 2022-08-19 RX ORDER — ATORVASTATIN CALCIUM 20 MG/1
TABLET, FILM COATED ORAL
Qty: 90 TABLET | Refills: 3 | Status: ON HOLD | OUTPATIENT
Start: 2022-08-19 | End: 2022-12-08

## 2022-08-19 NOTE — TELEPHONE ENCOUNTER
Rx Refill Note  Requested Prescriptions     Pending Prescriptions Disp Refills   • atorvastatin (LIPITOR) 20 MG tablet [Pharmacy Med Name: ATORVASTATIN CALCIUM 20MG TABLET] 90 tablet 3     Sig: TAKE ONE TABLET DAILY   • metFORMIN ER (GLUCOPHAGE-XR) 500 MG 24 hr tablet [Pharmacy Med Name: METFORMIN HYDROCHLORIDE ER 500MG ER TABLET ER 24HR] 180 tablet 3     Sig: Take 2 tablets by mouth Every Night for 90 days.      Last office visit with prescribing clinician: 6/21/2022      Next office visit with prescribing clinician: 12/27/2022            Vivien Mercado LPN  08/19/22, 12:36 CDT

## 2022-08-22 ENCOUNTER — LAB (OUTPATIENT)
Dept: LAB | Facility: HOSPITAL | Age: 72
End: 2022-08-22

## 2022-08-22 DIAGNOSIS — Z01.818 PRE-OP TESTING: ICD-10-CM

## 2022-08-22 LAB — SARS-COV-2 ORF1AB RESP QL NAA+PROBE: NOT DETECTED

## 2022-08-22 PROCEDURE — U0005 INFEC AGEN DETEC AMPLI PROBE: HCPCS

## 2022-08-22 PROCEDURE — U0004 COV-19 TEST NON-CDC HGH THRU: HCPCS

## 2022-08-22 PROCEDURE — C9803 HOPD COVID-19 SPEC COLLECT: HCPCS

## 2022-08-24 ENCOUNTER — HOSPITAL ENCOUNTER (OUTPATIENT)
Dept: SLEEP MEDICINE | Facility: HOSPITAL | Age: 72
Discharge: HOME | End: 2022-08-24
Admitting: FAMILY MEDICINE

## 2022-08-24 VITALS
RESPIRATION RATE: 16 BRPM | BODY MASS INDEX: 41.46 KG/M2 | OXYGEN SATURATION: 96 % | HEIGHT: 63 IN | HEART RATE: 94 BPM | WEIGHT: 234 LBS | DIASTOLIC BLOOD PRESSURE: 76 MMHG | SYSTOLIC BLOOD PRESSURE: 150 MMHG

## 2022-08-24 DIAGNOSIS — G47.10 HYPERSOMNOLENCE: ICD-10-CM

## 2022-08-24 PROCEDURE — 95811 POLYSOM 6/>YRS CPAP 4/> PARM: CPT | Performed by: PSYCHIATRY & NEUROLOGY

## 2022-08-24 PROCEDURE — 95811 POLYSOM 6/>YRS CPAP 4/> PARM: CPT

## 2022-08-30 ENCOUNTER — TELEPHONE (OUTPATIENT)
Dept: SLEEP MEDICINE | Facility: HOSPITAL | Age: 72
End: 2022-08-30

## 2022-08-30 NOTE — TELEPHONE ENCOUNTER
Spoke to Ms. Otoole and went over the results of the split night polysomnography performed on 8/24/2022 Questions were answered.  Dr. Huang Giraldo wrote orders for CPAP for Ms. Otoole.  Ms. Otoole chose [facility] as her DME. Orders, documentation and insurance information will be sent today.  The Neurology office will be contacting Ms. Otoole for a follow-up compliance visit in the sleep clinic.

## 2022-09-01 ENCOUNTER — TELEPHONE (OUTPATIENT)
Dept: FAMILY MEDICINE CLINIC | Facility: CLINIC | Age: 72
End: 2022-09-01

## 2022-09-01 DIAGNOSIS — N76.0 ACUTE VAGINITIS: ICD-10-CM

## 2022-09-01 RX ORDER — FLUCONAZOLE 100 MG/1
100 TABLET ORAL DAILY
Qty: 2 TABLET | Refills: 0 | Status: SHIPPED | OUTPATIENT
Start: 2022-09-01 | End: 2022-10-13 | Stop reason: SDUPTHER

## 2022-09-01 NOTE — TELEPHONE ENCOUNTER
Caller: Kezia Otoole    Relationship: Self    Best call back number: [phone]    What medication are you requesting:DOCTORS SUGGESTION    What are your current symptoms: VAGINAL ITCHING    How long have you been experiencing symptoms: WEEK OF 08.22.2022    Have you had these symptoms before:    [x] Yes  [] No    Have you been treated for these symptoms before:   [x] Yes  [] No    If a prescription is needed, what is your preferred pharmacy and phone number:   [facility] - [address] [phone] [facility] [phone]     Additional notes: HAPPENED AFTER HAVING A STEROID SHOT TAKEN IN KNEE

## 2022-09-06 DIAGNOSIS — M15.0 PRIMARY GENERALIZED (OSTEO)ARTHRITIS: ICD-10-CM

## 2022-09-06 DIAGNOSIS — F32.A DEPRESSION, UNSPECIFIED DEPRESSION TYPE: Chronic | ICD-10-CM

## 2022-09-06 RX ORDER — DULOXETIN HYDROCHLORIDE 20 MG/1
CAPSULE, DELAYED RELEASE ORAL
Qty: 30 CAPSULE | Refills: 5 | Status: ON HOLD | OUTPATIENT
Start: 2022-09-06 | End: 2022-12-08

## 2022-09-06 NOTE — TELEPHONE ENCOUNTER
Rx Refill Note  Requested Prescriptions     Pending Prescriptions Disp Refills   • DULoxetine (CYMBALTA) 20 MG capsule [Pharmacy Med Name: DULOXETINE HYDROCHLORIDE 20MG CAPSULE DR PART] 30 capsule 5     Sig: TAKE ONE CAPSULE DAILY      Last office visit with prescribing clinician: 6/21/2022      Next office visit with prescribing clinician: 12/27/2022            Vivien Mercado LPN  09/06/22, 12:23 CDT

## 2022-09-12 RX ORDER — SEMAGLUTIDE 1.34 MG/ML
INJECTION, SOLUTION SUBCUTANEOUS
Qty: 3 ML | Refills: 5 | Status: SHIPPED | OUTPATIENT
Start: 2022-09-12 | End: 2022-10-18

## 2022-09-12 NOTE — TELEPHONE ENCOUNTER
Rx Refill Note  Requested Prescriptions     Pending Prescriptions Disp Refills   • Ozempic, 1 MG/DOSE, 4 MG/3ML solution pen-injector [Pharmacy Med Name: OZEMPIC 4MG/3ML SOLN PEN-INJ] 3 mL 5     Sig: INJECT ONE MG EVERY WEEK      Last office visit with prescribing clinician: 6/21/2022      Next office visit with prescribing clinician: 12/27/2022            Vivine Mercado, LPN  09/12/22, 15:59 CDT

## 2022-10-03 ENCOUNTER — TELEPHONE (OUTPATIENT)
Dept: FAMILY MEDICINE CLINIC | Facility: CLINIC | Age: 72
End: 2022-10-03

## 2022-10-03 NOTE — TELEPHONE ENCOUNTER
Caller: Kezia Otoole    Relationship to patient: Self    Best call back number: [phone]    Chief complaint: FLU SHOT FOR 65 AND OLDER    Type of visit: NURSE/MA VISIT    Requested date: ANY    If rescheduling, when is the original appointment: NONE    Additional notes: THE PATIENT STATES THAT SHE WOULD LIKE A PHONE CALL TO SCHEDULE A FLU SHOT FOR 65 AND OLDER WHEN THEY ARE BACK IN STOCK.

## 2022-10-03 NOTE — TELEPHONE ENCOUNTER
I called patient to schedule patient for flu shot left vm for patient to return call to office to get this scheduled.

## 2022-10-13 ENCOUNTER — TRANSCRIBE ORDERS (OUTPATIENT)
Dept: ADMINISTRATIVE | Facility: HOSPITAL | Age: 72
End: 2022-10-13

## 2022-10-13 ENCOUNTER — CLINICAL SUPPORT (OUTPATIENT)
Dept: FAMILY MEDICINE CLINIC | Facility: CLINIC | Age: 72
End: 2022-10-13

## 2022-10-13 ENCOUNTER — LAB (OUTPATIENT)
Dept: LAB | Facility: HOSPITAL | Age: 72
End: 2022-10-13

## 2022-10-13 ENCOUNTER — TELEPHONE (OUTPATIENT)
Dept: FAMILY MEDICINE CLINIC | Facility: CLINIC | Age: 72
End: 2022-10-13

## 2022-10-13 DIAGNOSIS — E55.9 VITAMIN D DEFICIENCY: ICD-10-CM

## 2022-10-13 DIAGNOSIS — E11.9 CONTROLLED TYPE 2 DIABETES MELLITUS WITHOUT COMPLICATION, WITHOUT LONG-TERM CURRENT USE OF INSULIN: Primary | ICD-10-CM

## 2022-10-13 DIAGNOSIS — E11.9 CONTROLLED TYPE 2 DIABETES MELLITUS WITHOUT COMPLICATION, WITHOUT LONG-TERM CURRENT USE OF INSULIN: ICD-10-CM

## 2022-10-13 DIAGNOSIS — R53.83 FATIGUE, UNSPECIFIED TYPE: ICD-10-CM

## 2022-10-13 DIAGNOSIS — E78.5 HYPERLIPIDEMIA, UNSPECIFIED HYPERLIPIDEMIA TYPE: ICD-10-CM

## 2022-10-13 DIAGNOSIS — N76.0 ACUTE VAGINITIS: ICD-10-CM

## 2022-10-13 DIAGNOSIS — M81.0 AGE-RELATED OSTEOPOROSIS WITHOUT CURRENT PATHOLOGICAL FRACTURE: Primary | ICD-10-CM

## 2022-10-13 DIAGNOSIS — Z23 NEED FOR INFLUENZA VACCINATION: Primary | ICD-10-CM

## 2022-10-13 LAB
ALBUMIN SERPL-MCNC: 4.7 G/DL (ref 3.5–5)
ALBUMIN/GLOB SERPL: 1.4 G/DL (ref 1.1–2.5)
ALP SERPL-CCNC: 100 U/L (ref 24–120)
ALT SERPL W P-5'-P-CCNC: 31 U/L (ref 0–35)
ANION GAP SERPL CALCULATED.3IONS-SCNC: 12 MMOL/L (ref 4–13)
AST SERPL-CCNC: 24 U/L (ref 7–45)
AUTO MIXED CELLS #: 1 10*3/MM3 (ref 0.1–2.6)
AUTO MIXED CELLS %: 10.8 % (ref 0.1–24)
BILIRUB SERPL-MCNC: 0.8 MG/DL (ref 0.1–1)
BUN SERPL-MCNC: 20 MG/DL (ref 5–21)
BUN/CREAT SERPL: 30.3
CALCIUM SPEC-SCNC: 10.8 MG/DL (ref 8.4–10.4)
CALCIUM SPEC-SCNC: 10.8 MG/DL (ref 8.4–10.4)
CHLORIDE SERPL-SCNC: 97 MMOL/L (ref 98–110)
CHOLEST SERPL-MCNC: 124 MG/DL (ref 130–200)
CO2 SERPL-SCNC: 31 MMOL/L (ref 24–31)
CREAT SERPL-MCNC: 0.66 MG/DL (ref 0.5–1.4)
EGFRCR SERPLBLD CKD-EPI 2021: 92.8 ML/MIN/1.73
ERYTHROCYTE [DISTWIDTH] IN BLOOD BY AUTOMATED COUNT: 14.1 % (ref 12.3–15.4)
GLOBULIN UR ELPH-MCNC: 3.3 GM/DL
GLUCOSE SERPL-MCNC: 291 MG/DL (ref 70–100)
HBA1C MFR BLD: 8.2 % (ref 4.8–5.9)
HCT VFR BLD AUTO: 43.7 % (ref 34–46.6)
HDLC SERPL-MCNC: 48 MG/DL
HGB BLD-MCNC: 15.2 G/DL (ref 12–15.9)
LDLC SERPL CALC-MCNC: 35 MG/DL (ref 0–99)
LDLC/HDLC SERPL: 0.43 {RATIO}
LYMPHOCYTES # BLD AUTO: 1.4 10*3/MM3 (ref 0.7–3.1)
LYMPHOCYTES NFR BLD AUTO: 14.8 % (ref 19.6–45.3)
MCH RBC QN AUTO: 31.1 PG (ref 26.6–33)
MCHC RBC AUTO-ENTMCNC: 34.8 G/DL (ref 31.5–35.7)
MCV RBC AUTO: 89.4 FL (ref 79–97)
NEUTROPHILS NFR BLD AUTO: 7 10*3/MM3 (ref 1.7–7)
NEUTROPHILS NFR BLD AUTO: 74.4 % (ref 42.7–76)
PLATELET # BLD AUTO: 301 10*3/MM3 (ref 140–450)
PMV BLD AUTO: 8.2 FL (ref 6–12)
POTASSIUM SERPL-SCNC: 4.5 MMOL/L (ref 3.5–5.3)
PROT SERPL-MCNC: 8 G/DL (ref 6.3–8.7)
RBC # BLD AUTO: 4.89 10*6/MM3 (ref 3.77–5.28)
SODIUM SERPL-SCNC: 140 MMOL/L (ref 135–145)
TRIGL SERPL-MCNC: 276 MG/DL (ref 0–149)
VLDLC SERPL-MCNC: 41 MG/DL (ref 5–40)
WBC NRBC COR # BLD: 9.4 10*3/MM3 (ref 3.4–10.8)

## 2022-10-13 PROCEDURE — 85025 COMPLETE CBC W/AUTO DIFF WBC: CPT

## 2022-10-13 PROCEDURE — 83036 HEMOGLOBIN GLYCOSYLATED A1C: CPT

## 2022-10-13 PROCEDURE — 90662 IIV NO PRSV INCREASED AG IM: CPT | Performed by: FAMILY MEDICINE

## 2022-10-13 PROCEDURE — 83540 ASSAY OF IRON: CPT

## 2022-10-13 PROCEDURE — G0008 ADMIN INFLUENZA VIRUS VAC: HCPCS | Performed by: FAMILY MEDICINE

## 2022-10-13 PROCEDURE — 84466 ASSAY OF TRANSFERRIN: CPT

## 2022-10-13 PROCEDURE — 82306 VITAMIN D 25 HYDROXY: CPT | Performed by: PHYSICIAN ASSISTANT

## 2022-10-13 PROCEDURE — 80053 COMPREHEN METABOLIC PANEL: CPT

## 2022-10-13 PROCEDURE — 82310 ASSAY OF CALCIUM: CPT | Performed by: PHYSICIAN ASSISTANT

## 2022-10-13 PROCEDURE — 84443 ASSAY THYROID STIM HORMONE: CPT

## 2022-10-13 PROCEDURE — 84100 ASSAY OF PHOSPHORUS: CPT | Performed by: PHYSICIAN ASSISTANT

## 2022-10-13 PROCEDURE — 83735 ASSAY OF MAGNESIUM: CPT | Performed by: PHYSICIAN ASSISTANT

## 2022-10-13 PROCEDURE — 80061 LIPID PANEL: CPT

## 2022-10-13 PROCEDURE — 36415 COLL VENOUS BLD VENIPUNCTURE: CPT | Performed by: PHYSICIAN ASSISTANT

## 2022-10-13 PROCEDURE — 82306 VITAMIN D 25 HYDROXY: CPT

## 2022-10-13 RX ORDER — FLUCONAZOLE 100 MG/1
100 TABLET ORAL DAILY
Qty: 2 TABLET | Refills: 0 | Status: SHIPPED | OUTPATIENT
Start: 2022-10-13 | End: 2022-10-24

## 2022-10-13 NOTE — TELEPHONE ENCOUNTER
Caller: DEEPA VILLALTA     Relationship: SELF     Best call back number:    [phone] (Mobile)         What medication are you requesting: STATES  VAGINAL YEAST INFECTION MEDICATION     What are your current symptoms: STATES VAGINAL YEAST     How long have you been experiencing symptoms: 3-4 DAYS     Have you had these symptoms before:    [x] Yes  [] No    Have you been treated for these symptoms before:   [x] Yes  [] No    If a prescription is needed, what is your preferred pharmacy and phone number:    [facility] - [address] [phone] [facility] [phone]   [phone]  Additional notes: NONE

## 2022-10-14 LAB
25(OH)D3 SERPL-MCNC: 52.8 NG/ML (ref 30–100)
25(OH)D3 SERPL-MCNC: 54.4 NG/ML (ref 30–100)
IRON 24H UR-MRATE: 90 MCG/DL (ref 37–145)
IRON SATN MFR SERPL: 19 % (ref 20–50)
MAGNESIUM SERPL-MCNC: 1.9 MG/DL (ref 1.6–2.4)
PHOSPHATE SERPL-MCNC: 4.2 MG/DL (ref 2.5–4.5)
TIBC SERPL-MCNC: 463 MCG/DL (ref 298–536)
TRANSFERRIN SERPL-MCNC: 311 MG/DL (ref 200–360)
TSH SERPL DL<=0.05 MIU/L-ACNC: 0.79 UIU/ML (ref 0.27–4.2)

## 2022-10-14 NOTE — PROGRESS NOTES
Reviewed lab for upcoming appointment as scheduled. Next Appt:  10/18/2022 - Len Crowe MD    Electronically signed by FABBY Sanchez, 10/14/22, 8:19 AM CDT.

## 2022-10-17 ENCOUNTER — INFUSION (OUTPATIENT)
Dept: ONCOLOGY | Facility: HOSPITAL | Age: 72
End: 2022-10-17

## 2022-10-17 VITALS
HEART RATE: 79 BPM | BODY MASS INDEX: 40.61 KG/M2 | DIASTOLIC BLOOD PRESSURE: 74 MMHG | HEIGHT: 63 IN | OXYGEN SATURATION: 99 % | TEMPERATURE: 97.1 F | SYSTOLIC BLOOD PRESSURE: 152 MMHG | WEIGHT: 229.2 LBS | RESPIRATION RATE: 18 BRPM

## 2022-10-17 DIAGNOSIS — M81.0 OSTEOPOROSIS, UNSPECIFIED OSTEOPOROSIS TYPE, UNSPECIFIED PATHOLOGICAL FRACTURE PRESENCE: Primary | ICD-10-CM

## 2022-10-17 PROCEDURE — 25010000002 DENOSUMAB 60 MG/ML SOLUTION PREFILLED SYRINGE: Performed by: PHYSICIAN ASSISTANT

## 2022-10-17 PROCEDURE — 96372 THER/PROPH/DIAG INJ SC/IM: CPT

## 2022-10-17 RX ADMIN — DENOSUMAB 60 MG: 60 INJECTION SUBCUTANEOUS at 10:23

## 2022-10-18 ENCOUNTER — OFFICE VISIT (OUTPATIENT)
Dept: FAMILY MEDICINE CLINIC | Facility: CLINIC | Age: 72
End: 2022-10-18

## 2022-10-18 VITALS
HEART RATE: 76 BPM | OXYGEN SATURATION: 97 % | WEIGHT: 228.2 LBS | BODY MASS INDEX: 40.43 KG/M2 | RESPIRATION RATE: 18 BRPM | HEIGHT: 63 IN | TEMPERATURE: 97.1 F | DIASTOLIC BLOOD PRESSURE: 88 MMHG | SYSTOLIC BLOOD PRESSURE: 139 MMHG

## 2022-10-18 DIAGNOSIS — E78.2 MIXED HYPERLIPIDEMIA: ICD-10-CM

## 2022-10-18 DIAGNOSIS — I10 HTN (HYPERTENSION), BENIGN: Chronic | ICD-10-CM

## 2022-10-18 DIAGNOSIS — E66.01 CLASS 3 SEVERE OBESITY DUE TO EXCESS CALORIES WITH SERIOUS COMORBIDITY AND BODY MASS INDEX (BMI) OF 40.0 TO 44.9 IN ADULT: ICD-10-CM

## 2022-10-18 DIAGNOSIS — E11.9 CONTROLLED TYPE 2 DIABETES MELLITUS WITHOUT COMPLICATION, WITHOUT LONG-TERM CURRENT USE OF INSULIN: Chronic | ICD-10-CM

## 2022-10-18 DIAGNOSIS — R53.83 OTHER FATIGUE: ICD-10-CM

## 2022-10-18 DIAGNOSIS — Z12.31 ENCOUNTER FOR SCREENING MAMMOGRAM FOR BREAST CANCER: ICD-10-CM

## 2022-10-18 DIAGNOSIS — E83.52 HYPERCALCEMIA: ICD-10-CM

## 2022-10-18 PROCEDURE — 1160F RVW MEDS BY RX/DR IN RCRD: CPT | Performed by: FAMILY MEDICINE

## 2022-10-18 PROCEDURE — 1170F FXNL STATUS ASSESSED: CPT | Performed by: FAMILY MEDICINE

## 2022-10-18 PROCEDURE — G0439 PPPS, SUBSEQ VISIT: HCPCS | Performed by: FAMILY MEDICINE

## 2022-10-18 PROCEDURE — 99214 OFFICE O/P EST MOD 30 MIN: CPT | Performed by: FAMILY MEDICINE

## 2022-10-18 RX ORDER — METFORMIN HYDROCHLORIDE 500 MG/1
1000 TABLET, EXTENDED RELEASE ORAL 2 TIMES DAILY
Qty: 180 TABLET | Refills: 3 | Status: SHIPPED
Start: 2022-10-18 | End: 2022-11-22 | Stop reason: HOSPADM

## 2022-10-18 RX ORDER — SEMAGLUTIDE 1.34 MG/ML
1.5 INJECTION, SOLUTION SUBCUTANEOUS WEEKLY
Qty: 4.5 ML | Refills: 5 | Status: SHIPPED | OUTPATIENT
Start: 2022-10-18 | End: 2022-10-24 | Stop reason: DRUGHIGH

## 2022-10-18 NOTE — PATIENT INSTRUCTIONS
"Medicare/insurances offer certain visits called \"wellness/annual\" that allows for time to deal with and  review the many aspects of \"being well\" that just might not get mentioned during other visits with your doctor through the year.  This includes things like reviews of health screenings (mammograms, various labs),  weight, exercise, vaccines for just a few examples.      In order to help you with this we wish to make you aware of a few things for you to consider:    1. Advanced directives.  These are documents used to help direct your care if your health/situation should reach a point that you cannot make your own decisions.  While it is likely you do not currently have a need for these documents now; it is something that we all might face at any time.   The hand outs you are being given today are simply for you to review and use to learn more about these documents and consider them as you wish.      2. Vaccines: Certain vaccines are important after age 50, 60, and 65 and some health situations (for example COPD), require even boosters beyond age 65.  We are happy to review with you your vaccine status and vaccines that might be needed for you at this point:      a. Tetanus.   Like anyone this needs to given every 10 years; sooner for/with lacerations/wounds.   Likely when getting this booster it needs to be a tetanus called Tdap (tetanus mixed with diptheria and pertussis).   Years ago you had this vaccine.  We now know we can lose our immunity to pertussis (a part of this vaccine) and run a risk of catching this.  Now only would this make us ill; but more importantly we can spread this to very young children (and for them it can be a much more dangerous illness).   We call this the grandparent vaccine for this reason.     b. Pneumonia (strept).   This comes now with two brands.   It is recommended to take pneumovax first; and a year later take the cousin prevnar.  Even if you have had these before; we need to " review when and your current health situation/s as you may need boosters and even recently the CDC has made recent/new recommendations for pneumovax.      c. Shingles.  You do not want to catch shingles.  Though you will recover from this; the pain associated with shingles can be severe.  Even if you have had the now older zostavax, or have had shingles; it is recommended you still get the Shingrix (the new vaccine just available early 2018 shingles vaccine).  A new shingles vaccine (a shot to lower your chance of catching shingles) is now available (shingrix).  This vaccine is the second vaccine created for this purpose; (we have had zostavax for years).  Shingrix provides a much better and longer immunity for shingles than zostavax.  For this and other reasons Shingrix can be started at age 50.  If you have had zostavax in the past; you can still take Shingrix.      This vaccine is not paid for in a doctor's office by medicare, medicaid and probably most insurances.  Like zostavax; this is covered in drug stores.  This is a vaccine that if you chose to get you need to get at a drug store that gives vaccines (like [facility], [facility] and [facility].      d. Yearly flu vaccine given from September through April each year (there is a special vaccine for those over 65).     e. Travel vaccines:  If you are one to do international travel; be sure and ask us for any particular unusual vaccines you may need.     f.  Miscellaneous:  If you have certain health situations/disease you may need specific/particular vaccines not give to the general public.     g.  Covid: currently recommended everyone over 5.  The brands Pfizer/Moderna are for 3 total shots as immunity will wane from less than this.  Igor/Igor has a version that comes with recommendations for an initial vaccine and booster after.  I no longer recommend J&J as a first choice as Pfizer/Moderna are readily available.  If you have had an  initial J&J I recommend you booster with Moderna.   I strongly recommend covid vaccination; being unvaccinated or partially vaccinated carries real risk for disease and even death.     Because of many restrictions on this office always having all the above vaccines; you may be advised to work with your local health department to keep up with your individual vaccine needs.    The vaccines we have on record for you include:   Immunization History   Administered Date(s) Administered    COVID-19 (MODERNA) 1st, 2nd, 3rd Dose Only 02/26/2021, 03/26/2021, 10/28/2021    FLUAD TRI 65YR+ 10/17/2019    Fluad Quad 65+ 10/14/2020    Fluzone High Dose =>65 Years ([facility] ONLY) 10/01/2018    Fluzone High-Dose 65+yrs 10/13/2022    Fluzone Quad >6mos (Multi-dose) 10/28/2016, 10/30/2017    Influenza Quad Vaccine (Inpatient) 10/30/2017    Influenza, Unspecified 10/17/2019    Pneumococcal Conjugate 13-Valent (PCV13) 10/21/2015    Pneumococcal Polysaccharide (PPSV23) 10/30/2014    Shingrix 09/06/2019, 12/06/2019       If you have record of other vaccines and want them to show in your chart here; please talk to our nurses about having your vaccine record updated.     3. Exercise: regular cardio exercise something everyone should consider and try to do; even if health limitations (ie find that exercise UE/LE/cardio that they can tolerate).   Normal weight a goal for everyone (as we discussed)    4. Healthy diet helpful for weight management, illness prevention.     5. If over 50-screening exams include men PSA/rectal exam, women mammograms, and everyone colonoscopy screening for colon cancer.    6. If you use tobacco of any kind or e-products you should stop. We are providing you some information to consider that could make this process easier.      ##################################

## 2022-10-18 NOTE — PROGRESS NOTES
Subjective   Kezia Otoole is a 71 y.o. female presenting with chief complaint of:   Chief Complaint   Patient presents with   • Medicare Wellness-subsequent   • Follow-up       History of Present Illness :  Alone.   Here for review of chronic problems that includes DM2 and others.  Also yearly medicare wellness exam.    Has multiple chronic problems to consider that might have a bearing on today's issues;  an interval appointment.       Chronic/acute problems reviewed today:   1. HTN (hypertension), benign Chronic/stable. Stable here past/no recent home blood pressures.  No significant chest pain, SOB, LE edema, orthopnea, near syncope, dizziness/light headness.   Recent Vitals       10/17/2022 10/18/2022 10/18/2022       BP: 152/74 142/88 139/88     Pulse: 79 76 --     Temp: 97.1 °F (36.2 °C) 97.1 °F (36.2 °C) --     Weight: 104 kg (229 lb 3.2 oz) 104 kg (228 lb 3.2 oz) --     BMI (Calculated): 40.6 40.4 --            2. Mixed hyperlipidemia Chronic/stable.  Tolerated use of Rx with labs showing improved lipid values and tolerant liver labs. No muscle aches unexpected.      3. Controlled type 2 diabetes mellitus without complication, without long-term current use of insulin (HCC) Chronic/stable.  No problem/pattern hypoglycemia/hyperglycemia manifest by poly- dypsia, phagia, uria, or sweats, diaphoretic episodes, syncope/near.     4. Class 3 severe obesity due to excess calories with serious comorbidity and body mass index (BMI) of 40.0 to 44.9 in adult (HCC) Chronic/stable.  Aware, advised weight loss before.  On/off some success with weight loss but often with weight gain back.      5. Hypercalcemia this time his calcium was elevated; not a previous trend.  No muscle spasms or tetany   6. Other fatigue she has noted an excessive amount of fatigue over the last several weeks.  Denies melena hematochezia significant pain fever and thinks her sleep is okay.     Has an/another acute issue today: none.    The  following portions of the patient's history were reviewed and updated as appropriate: allergies, current medications, past family history, past medical history, past social history, past surgical history and problem list.      Current Outpatient Medications:   •  acetaminophen (TYLENOL) 500 MG tablet, Take 1,000 mg by mouth Every 6 (Six) Hours As Needed for mild pain (1-3)., Disp: , Rfl:   •  albuterol sulfate  (90 Base) MCG/ACT inhaler, Inhale 2 puffs Every 4 (Four) Hours As Needed for Wheezing or Shortness of Air., Disp: 8 g, Rfl: 1  •  amLODIPine (NORVASC) 5 MG tablet, TAKE ONE TABLET DAILY, Disp: 90 tablet, Rfl: 1  •  aspirin 81 MG EC tablet, Take 1 tablet by mouth Every Other Day., Disp: 32 tablet, Rfl: 0  •  atorvastatin (LIPITOR) 20 MG tablet, TAKE ONE TABLET DAILY, Disp: 90 tablet, Rfl: 3  •  B Complex-C (SUPER B COMPLEX PO), Take 1 tablet by mouth Daily., Disp: , Rfl:   •  Blood Glucose Monitoring Suppl (Easy Step Glucose Monitor) device, 1 Device Take As Directed., Disp: 1 each, Rfl: 0  •  Calcium Carb-Cholecalciferol (CALCIUM-VITAMIN D) 600-400 MG-UNIT tablet, Take 1 tablet by mouth 2 (Two) Times a Day., Disp: , Rfl:   •  carboxymethylcellulose (REFRESH PLUS) 0.5 % solution, 2 drops Daily As Needed for dry eyes., Disp: , Rfl:   •  Cholecalciferol 25 MCG (1000 UT) capsule, Take 1 capsule by mouth Daily., Disp: , Rfl:   •  denosumab (PROLIA) 60 MG/ML solution syringe, Inject 60 mg under the skin into the appropriate area as directed Every 6 (Six) Months., Disp: , Rfl:   •  DULoxetine (CYMBALTA) 20 MG capsule, TAKE ONE CAPSULE DAILY, Disp: 30 capsule, Rfl: 5  •  glucose blood test strip, Use as instructed: once a day testing of the one touch ultra blue strips, Disp: 50 each, Rfl: 11  •  glucose blood test strip, Use as instructed: once a day testing, Disp: 50 each, Rfl: 12  •  guaiFENesin (MUCINEX) 600 MG 12 hr tablet, Take 600 mg by mouth 2 (Two) Times a Day., Disp: , Rfl:   •  hydroCHLOROthiazide  (MICROZIDE) 12.5 MG capsule, TAKE ONE CAPSULE DAILY, Disp: 90 capsule, Rfl: 3  •  irbesartan (AVAPRO) 300 MG tablet, TAKE ONE TABLET DAILY, Disp: 90 tablet, Rfl: 3  •  Lancets misc, Test daily, Disp: 100 each, Rfl: 12  •  latanoprost (XALATAN) 0.005 % ophthalmic solution, Administer 1 drop to both eyes Every Night., Disp: , Rfl: 5  •  metFORMIN ER (GLUCOPHAGE-XR) 500 MG 24 hr tablet, Take 2 tablets by mouth Every Night for 90 days., Disp: 180 tablet, Rfl: 3  •  Misc Natural Products (GLUCOSAMINE CHONDROITIN TRIPLE PO), Take 1 tablet by mouth 2 (Two) Times a Day., Disp: , Rfl:   •  montelukast (SINGULAIR) 10 MG tablet, TAKE ONE TABLET DAILY, Disp: 90 tablet, Rfl: 3  •  Multiple Vitamins-Minerals (VITRUM 50+ SENIOR MULTI PO), Take 1 tablet by mouth Daily., Disp: , Rfl:   •  NON FORMULARY, Immunotherapy, Disp: , Rfl:   •  Ozempic, 1 MG/DOSE, 4 MG/3ML solution pen-injector, INJECT ONE MG EVERY WEEK, Disp: 3 mL, Rfl: 5  •  potassium chloride 10 MEQ CR tablet, TAKE ONE TABLET DAILY, Disp: 90 tablet, Rfl: 3  •  Symbicort 160-4.5 MCG/ACT inhaler, INHALE TWO PUFFS TWO (TWO) TIMES A DAY., Disp: 30.6 each, Rfl: 3  •  cefdinir (OMNICEF) 300 MG capsule, Take 1 capsule by mouth 2 (Two) Times a Day., Disp: 20 capsule, Rfl: 0  •  Coenzyme Q10 (CO Q 10 PO), Take 1 capsule by mouth Every Other Day., Disp: , Rfl:   •  diclofenac sodium (VOTAREN XR) 100 MG 24 hr tablet, Take 100 mg by mouth Daily., Disp: , Rfl:   •  fluconazole (Diflucan) 100 MG tablet, Take 1 tablet by mouth Daily., Disp: 2 tablet, Rfl: 0  •  ipratropium-albuterol (DUO-NEB) 0.5-2.5 mg/3 ml nebulizer, Take 3 mL by nebulization 4 (Four) Times a Day., Disp: 40 vial, Rfl: 1  •  predniSONE (DELTASONE) 10 MG tablet, Two a day for 4 days and then one a day for 4 days, Disp: 12 tablet, Rfl: 0  No current facility-administered medications for this visit.    Current Outpatient Medications:   •  metFORMIN ER (GLUCOPHAGE-XR) 500 MG 24 hr tablet, Take 2 tablets by mouth Every  "Night for 90 days., Disp: 180 tablet, Rfl: 3  •  Ozempic, 1 MG/DOSE, 4 MG/3ML solution pen-injector, INJECT ONE MG EVERY WEEK, Disp: 3 mL, Rfl: 5    No problems with medications.    Allergies   Allergen Reactions   • Adhesive Tape Other (See Comments)     Tears and bruises skin   • Other Other (See Comments)     Bandaids make arm Red/slightly swollen       Review of Systems  GENERAL:  Active/slower with limits, speed, stamina for age and various joint issues. Sleep is poor; apnea likely.  No fever now.  ENDO:  No syncope, near or diaphoretic sweaty spells.  BS without testing/download.  HEENT: No head injury or headache.   No vision change.   No significant hearing loss.  Ears without pain/drainage.  No sore throat.   Same/occ nasal/sinus congestion/drainage. No epistaxis.  CHEST: No chest wall tenderness or mass.  Occ cough, wheeze.   No SOB; no hemoptysis.  CV: No chest pain, palpitations, usual  On/off ankle edema.  GI: No heartburn, dysphagia.  No abdominal pain, diarrhea, constipation.  No rectal bleeding, or melena.    :  Voids without dysuria, or incontinence to completion.  ORTHO: No painful/swollen joints but various on /off sore.  No change occ sore neck or back.  No acute neck or back pain without recent injury.  NEURO: No dizziness, weakness of extremities.  No numbness/paresthesias.   PSYCH: ? memory loss.  Mood winter variable; occ mild anxious, depressed but/and not suicidal.  Tries to tolerate stress .   Screening:  [facility]: [facility]22[facility]  Bone density: \"2021-22\" [facility]; continuing the prolia  12.11.18 [facility] hip -2.4; start prolia-this year-per [facility]  Low dose CT chest: Tobacco-smoker/never: NA  GI: Colon-p+div/[facility]/11.19.18/5y  Prostate: NA  Usual lab order  6m CBC, CMP, A1c  12m CBC, CMP, A1c, LIPID, TSH, Vit D, uric acid    Copy/paste function used for ROS/exam AND each area of these were reviewed, updated, confirmed and supplemented as needed.  Data reviewed:   Recent admit/ER/MD visits: 6.21.22 " ro visit  1. Fatigue, unspecified type    2. Mild intermittent asthma, unspecified whether complicated    3. Sleep apnea, unspecified type    4. Primary generalized (osteo)arthritis    5. Depression, unspecified depression type    6. History of anemia: ASA81,iron,gi(cp,div    7. Gastroesophageal reflux disease, unspecified whether esophagitis present    8. Controlled type 2 diabetes mellitus without complication, without long-term current use of insulin (HCC)    9. HTN (hypertension), benign    10. Allergic rhinitis, unspecified seasonality, unspecified trigger    11. Polypharmacy    12. Pain of right thumb    13. Hypersomnolence    · thumb ok  · Dx sleep apnea/treating      Discussions/medical decisions/reviews:  BP ok  Other vitals ok  DM/BS markedly better-weight down 5  Lipid triglycerides 303 last  PSA NA  CBC ok last  Renal ok  Liver ok  Vit D ok last  Thyroid ok last     Sleep study advised  Forgot to order; but nursing will offer xray R thumb  Exercise as tolerated  She needs to have ortho review her knee pain; her total knee and her natural knee    Last cardiac testing:   Echo: none  Radiology considered:   Polysomnography 4 or More Parameters    Result Date: 8/29/2022    Axis A 1: Obstructive sleep apnea G47.33 Axis A 2: Periodic leg movements G47.61 Axis B 1: CPAP at 10 cm water pressure with heated humidification. Axis B 2: Further evaluation and treatment of patient's periodic leg movements needs to be followed symptomatically Axis C: Underlying medical problems and medication effects may be contributory.  With weight of 234 pounds and  BMI of 41.4 puts patient in the severely obese category and weight loss program may be helpful as clinically indicated.  Chronic pain may be contributory.  Close follow-up with patient's family physician indicated with emphasis on safety. Huang Giraldo MD 08/29/22 10:57 CDT Dictated utilizing Dragon voice recognition software     Lab Results:  Results for orders  placed or performed in visit on 10/13/22   Calcium    Specimen: Blood   Result Value Ref Range    Calcium 10.8 (H) 8.4 - 10.4 mg/dL   Phosphorus    Specimen: Blood   Result Value Ref Range    Phosphorus 4.2 2.5 - 4.5 mg/dL   Magnesium    Specimen: Blood   Result Value Ref Range    Magnesium 1.9 1.6 - 2.4 mg/dL   Vitamin D 25 Hydroxy    Specimen: Blood   Result Value Ref Range    25 Hydroxy, Vitamin D 54.4 30.0 - 100.0 ng/ml       A1C:  Lab Results - Last 18 Months   Lab Units 10/13/22  1123 06/17/22  0810 03/15/22  0739 09/15/21  0707   HEMOGLOBIN A1C % 8.2* 6.90* 8.70* 6.20*     GLUCOSE:  Lab Results - Last 18 Months   Lab Units 10/13/22  1123 06/17/22  0810 03/15/22  0739 09/15/21  0707   GLUCOSE mg/dL 291* 153* 220* 145*     LIPID:  Lab Results - Last 18 Months   Lab Units 10/13/22  1123 09/15/21  0707   CHOLESTEROL mg/dL  --  156   LDL CHOL mg/dL 35 71   HDL CHOL mg/dL 48* 36*   TRIGLYCERIDES mg/dL 276* 303*     PSA:No results for input(s): PSA in the last 82745 hours.    CBC:  Lab Results - Last 18 Months   Lab Units 10/13/22  1123 03/15/22  0739 09/15/21  0707   WBC 10*3/mm3 9.40 10.68 6.44   HEMOGLOBIN g/dL 15.2 14.6 13.6   HEMATOCRIT % 43.7 45.1 40.7   PLATELETS 10*3/mm3 301 276 283   IRON mcg/dL 90 74 69      BMP/CMP:  Lab Results - Last 18 Months   Lab Units 10/13/22  1123 10/13/22  1122 06/17/22  0810 03/15/22  0739 09/15/21  0707   SODIUM mmol/L 140  --  137 136 136   POTASSIUM mmol/L 4.5  --  4.4 4.9 4.4   CHLORIDE mmol/L 97*  --  100 96* 99   TOTAL CO2 mmol/L  --   --  25.5 25.7 26.5   CO2 mmol/L 31.0  --   --   --   --    GLUCOSE mg/dL 291*  --  153* 220* 145*   BUN mg/dL 20  --  13 32* 20   CREATININE mg/dL 0.66  --  0.63 0.96 0.73   EGFR IF NONAFRICN AM mL/min/1.73  --   --   --   --  78   EGFR IF AFRICN AM mL/min/1.73  --   --   --   --  95   EGFR RESULT mL/min/1.73  --   --  93.8 62.6  --    CALCIUM mg/dL 10.8* 10.8* 9.6 10.1 10.2     HEPATIC:  Lab Results - Last 18 Months   Lab Units  "10/13/22  1123 06/17/22  0810 03/15/22  0739 09/15/21  0707   ALT (SGPT) U/L 31 19 58* 24   AST (SGOT) U/L 24 17 38* 23   ALK PHOS U/L 100 76 90 75     Vit D:  Lab Results - Last 18 Months   Lab Units 10/13/22  1123 10/13/22  1122 03/15/22  0739 09/15/21  0707   VIT D 25 HYDROXY ng/ml 52.8 54.4 44.1 49.2     THYROID:  Lab Results - Last 18 Months   Lab Units 10/13/22  1123 09/15/21  0707   TSH uIU/mL 0.787 1.600       Objective   /88 (BP Location: Left arm, Patient Position: Sitting, Cuff Size: Adult)   Pulse 76   Temp 97.1 °F (36.2 °C) (Infrared)   Resp 18   Ht 160 cm (62.99\")   Wt 104 kg (228 lb 3.2 oz)   SpO2 97%   BMI 40.44 kg/m²   Body mass index is 40.44 kg/m².    Recent Vitals       6/21/2022 8/24/2022 10/17/2022       BP: 136/88 150/76 152/74     Pulse: 76 94 79     Temp: 97.7 °F (36.5 °C) -- 97.1 °F (36.2 °C)     Weight: 106 kg (234 lb 3.2 oz) 106 kg (234 lb) 104 kg (229 lb 3.2 oz)     BMI (Calculated): 41.5 41.5 40.6         Wt Readings from Last 15 Encounters:   10/18/22 0917 104 kg (228 lb 3.2 oz)   10/17/22 1016 104 kg (229 lb 3.2 oz)   08/24/22 2030 106 kg (234 lb)   06/21/22 0957 106 kg (234 lb 3.2 oz)   04/01/22 1042 107 kg (236 lb)   03/21/22 0943 109 kg (239 lb 6.4 oz)   09/24/21 0945 103 kg (228 lb)   09/16/21 1005 104 kg (229 lb)   07/23/21 0928 102 kg (225 lb)   03/11/21 1420 106 kg (232 lb 9.6 oz)   03/08/21 1004 105 kg (232 lb)   02/08/21 1520 106 kg (234 lb)   08/17/20 1026 103 kg (226 lb)   07/09/20 0911 105 kg (231 lb 3.2 oz)   06/09/20 1343 105 kg (231 lb 9.6 oz)       Physical Exam  GENERAL:  Well nourished/developed in no acute distress. Obese.   SKIN: Turgor excellent, without wound, rash, lesion.   HEENT: Normal cephalic without trauma.  Pupils equal round reactive to light. Extraocular motions full without nystagmus.    Oral cavity without growths, exudates, and moist.  Posterior pharynx without mass, obstruction, redness.  No thyromegaly, mass, tenderness, " lymphadenopathy and supple.   CV: Regular rhythm.  No murmur, gallop, trace equal LE edema. Posterior pulses intact.  No carotid bruits.  CHEST: No chest wall tenderness or mass.   LUNGS: Symmetric motion with clear to auscultation.    ABD: Soft, nontender without mass.   ORTHO: Symmetric extremities without swelling/point tenderness compression soreness R foot between 2nd/3rd. Full gross range of motion diffuse.   Walking without assistance.   NEURO: CN 2-12 grossly intact.  Symmetric facies and UE/LE. 3/5 strength throughout. 1/4 x bicep equal reflexes.  Nonfocal use extremities. Speech clear.    PSYCH: Oriented x 3.  Pleasant calm, well kept.  Purposeful/directed conservation with intact short/long gross memory.    Assessment & Plan     1. HTN (hypertension), benign    2. Mixed hyperlipidemia    3. Controlled type 2 diabetes mellitus without complication, without long-term current use of insulin (HCC)    4. Class 3 severe obesity due to excess calories with serious comorbidity and body mass index (BMI) of 40.0 to 44.9 in adult (HCC)    5. Hypercalcemia    6. Other fatigue    7. Encounter for screening mammogram for breast cancer        Issues that are new, uncontrolled, or required review HPI/ROS/exam and decisions beyond wellness today:   BS is too high; Rx reviewed/adjusted.  Plan for diet, exercise discused  Weight too high considering BS  New problem hypercalcemia; testing  Fatigue; likely BS too high/inactivity    Discussions/medical decisions/reviews:  BP ok  Other vitals weight too high  DM/BS 8.2 10.13.22; up from 6.17.22 6.9  Lipid LDL 35 10.13.22  PSA NA  CBC ok 10.13.22  Renal ok 10.13.22  Ca 10.8-new  Liver ok 10.13.22  Vit D 52 10.13.22  Thyroid TSH ok 10.13.22    Data review above:   Rx: reviewed and decisions:   Rx new/changes:   New Medications Ordered This Visit   Medications   • metFORMIN ER (GLUCOPHAGE-XR) 500 MG 24 hr tablet     Sig: Take 2 tablets by mouth 2 (Two) Times a Day for 90 days.      Dispense:  180 tablet     Refill:  3     PATIENT WOULD LIKE A 90 SUPPLY PLEASE   • Semaglutide, 1 MG/DOSE, (Ozempic, 1 MG/DOSE,) 4 MG/3ML solution pen-injector     Sig: Inject 1.5 mg under the skin into the appropriate area as directed 1 (One) Time Per Week.     Dispense:  4.5 mL     Refill:  5     BS Rx increased x 2  PTH; start eval calcium  Fatigue; likely BS  If MCI/memory loss is mild and treatment to stay active mental/physicial and generally healthy diet/exercise; nothing else for MCI and she does not appear to have more    Orders placed:   LAB/Testing/Referrals: reviewed/orders:   Today:   Orders Placed This Encounter   Procedures   • Mammo Screening Digital Tomosynthesis Bilateral With CAD   • PTH, Intact     Chronic/recurrent labs above or change to:   Same     Wellness/or annual done   Screening reviewed/updated mammogram ordered    Immunization History   Administered Date(s) Administered   • COVID-19 (MODERNA) 1st, 2nd, 3rd Dose Only 02/26/2021, 03/26/2021, 10/28/2021   • FLUAD TRI 65YR+ 10/17/2019   • Fluad Quad 65+ 10/14/2020   • Fluzone High Dose =>65 Years ([employer] ONLY) 10/01/2018   • Fluzone High-Dose 65+yrs 10/13/2022   • Fluzone Quad >6mos (Multi-dose) 10/28/2016, 10/30/2017   • Influenza Quad Vaccine (Inpatient) 10/30/2017   • Influenza, Unspecified 10/17/2019   • Pneumococcal Conjugate 13-Valent (PCV13) 10/21/2015   • Pneumococcal Polysaccharide (PPSV23) 10/30/2014   • Shingrix 09/06/2019, 12/06/2019     Vaccine reviewed: today none; later we advised/reaffirmed our support/suggestion for staying complete with covid- covid boosters, seasonal flu/yearly and any missing vaccine from list we supplied; we suggest contact with local health department office to review missing/needed vaccines and then bring nursing documentation for these vaccines to this office.     Health maintenance:   Body mass index is 40.44 kg/m².  Class 3 Severe Obesity (BMI >=40). Obesity-related health conditions include the  "following: diabetes mellitus. Obesity is worsening. BMI is is above average; BMI management plan is completed. We discussed portion control and increasing exercise.      Tobacco use reviewed:   Kzeia Otoole  reports that she has never smoked. She has never used smokeless tobacco..    Annual/wellness also done today.  Issues as appropriate discussed as counseling, anticipatory guidance:   Nutrition, physical activity, healthy weight, injury prevention, misuse of tobacco, alcohol and drugs, sexual behavior and STDs, contraception, dental health, mental health, immunizations, screenings as appropriate. As appropriate see AVS.      Patient Instructions     Medicare/insurances offer certain visits called \"wellness/annual\" that allows for time to deal with and  review the many aspects of \"being well\" that just might not get mentioned during other visits with your doctor through the year.  This includes things like reviews of health screenings (mammograms, various labs),  weight, exercise, vaccines for just a few examples.      In order to help you with this we wish to make you aware of a few things for you to consider:    1. Advanced directives.  These are documents used to help direct your care if your health/situation should reach a point that you cannot make your own decisions.  While it is likely you do not currently have a need for these documents now; it is something that we all might face at any time.   The hand outs you are being given today are simply for you to review and use to learn more about these documents and consider them as you wish.      2. Vaccines: Certain vaccines are important after age 50, 60, and 65 and some health situations (for example COPD), require even boosters beyond age 65.  We are happy to review with you your vaccine status and vaccines that might be needed for you at this point:      a. Tetanus.   Like anyone this needs to given every 10 years; sooner for/with lacerations/wounds.   " Likely when getting this booster it needs to be a tetanus called Tdap (tetanus mixed with diptheria and pertussis).   Years ago you had this vaccine.  We now know we can lose our immunity to pertussis (a part of this vaccine) and run a risk of catching this.  Now only would this make us ill; but more importantly we can spread this to very young children (and for them it can be a much more dangerous illness).   We call this the grandparent vaccine for this reason.     b. Pneumonia (strept).   This comes now with two brands.   It is recommended to take pneumovax first; and a year later take the cousin prevnar.  Even if you have had these before; we need to review when and your current health situation/s as you may need boosters and even recently the CDC has made recent/new recommendations for pneumovax.      c. Shingles.  You do not want to catch shingles.  Though you will recover from this; the pain associated with shingles can be severe.  Even if you have had the now older zostavax, or have had shingles; it is recommended you still get the Shingrix (the new vaccine just available early 2018 shingles vaccine).  A new shingles vaccine (a shot to lower your chance of catching shingles) is now available (shingrix).  This vaccine is the second vaccine created for this purpose; (we have had zostavax for years).  Shingrix provides a much better and longer immunity for shingles than zostavax.  For this and other reasons Shingrix can be started at age 50.  If you have had zostavax in the past; you can still take Shingrix.      This vaccine is not paid for in a doctor's office by medicare, medicaid and probably most insurances.  Like zostavax; this is covered in drug stores.  This is a vaccine that if you chose to get you need to get at a drug store that gives vaccines (like [facility], [facility] and [facility].      d. Yearly flu vaccine given from September through April each year (there is a special vaccine  for those over 65).     e. Travel vaccines:  If you are one to do international travel; be sure and ask us for any particular unusual vaccines you may need.     f.  Miscellaneous:  If you have certain health situations/disease you may need specific/particular vaccines not give to the general public.     g.  Covid: currently recommended everyone over 5.  The brands Pfizer/Moderna are for 3 total shots as immunity will wane from less than this.  [employer] has a version that comes with recommendations for an initial vaccine and booster after.  I no longer recommend J&J as a first choice as Pfizer/Moderna are readily available.  If you have had an initial J&J I recommend you booster with Moderna.   I strongly recommend covid vaccination; being unvaccinated or partially vaccinated carries real risk for disease and even death.     Because of many restrictions on this office always having all the above vaccines; you may be advised to work with your local health department to keep up with your individual vaccine needs.    The vaccines we have on record for you include:   Immunization History   Administered Date(s) Administered   • COVID-19 (MODERNA) 1st, 2nd, 3rd Dose Only 02/26/2021, 03/26/2021, 10/28/2021   • FLUAD TRI 65YR+ 10/17/2019   • Fluad Quad 65+ 10/14/2020   • Fluzone High Dose =>65 Years ([facility] ONLY) 10/01/2018   • Fluzone High-Dose 65+yrs 10/13/2022   • Fluzone Quad >6mos (Multi-dose) 10/28/2016, 10/30/2017   • Influenza Quad Vaccine (Inpatient) 10/30/2017   • Influenza, Unspecified 10/17/2019   • Pneumococcal Conjugate 13-Valent (PCV13) 10/21/2015   • Pneumococcal Polysaccharide (PPSV23) 10/30/2014   • Shingrix 09/06/2019, 12/06/2019       If you have record of other vaccines and want them to show in your chart here; please talk to our nurses about having your vaccine record updated.     3. Exercise: regular cardio exercise something everyone should consider and try to do; even if health limitations  "(ie find that exercise UE/LE/cardio that they can tolerate).   Normal weight a goal for everyone (as we discussed)    4. Healthy diet helpful for weight management, illness prevention.     5. If over 50-screening exams include men PSA/rectal exam, women mammograms, and everyone colonoscopy screening for colon cancer.    6. If you use tobacco of any kind or e-products you should stop. We are providing you some information to consider that could make this process easier.      ##################################              Visit today involved chronic significant medical problems or differentials and/or intensive drug monitoring: ie potential to cause serious morbidity or death:     Follow up: No follow-ups on file.  Future Appointments   Date Time Provider Department Center   2022  8:40 AM [facility] [place]   2022 11:30 AM Len Crowe MD [facility]   2023 10:15 AM Huang Giraldo MD [facility]               Subsequent Medicare Wellness Visit    HEALTH RISK ASSESSMENT    : 1950    Recent Hospitalizations:  No hospitalization(s) within the last year..  ccc      Current Medical Providers:  Patient Care Team:  Len Crowe MD as PCP - General  [facility] (Otolaryngology)  Tayler Burleson APRN (Inactive) as Nurse Practitioner (Family Medicine)  Dave Mcnally MD as Consulting Physician (Gastroenterology)  Lorraine Rodriguez APRN as Nurse Practitioner (Otolaryngology)  Vinh Goff PA-C as Physician Assistant (Orthopedic Surgery)        Smoking Status:  Social History     Tobacco Use   Smoking Status Never   Smokeless Tobacco Never   Tobacco Comments    \"lived with smokers most of my life\"       Alcohol Consumption:  Social History     Substance and Sexual Activity   Alcohol Use No    Comment: Rare       Depression Screen:   PHQ-2/PHQ-9 Depression Screening 3/21/2022   Retired PHQ-9 Total Score -   Retired " Total Score -   Little Interest or Pleasure in Doing Things 0-->not at all   Feeling Down, Depressed or Hopeless 0-->not at all   PHQ-9: Brief Depression Severity Measure Score 0       Health Habits and Functional and Cognitive Screening:  Functional & Cognitive Status 10/18/2022   Do you have difficulty preparing food and eating? No   Do you have difficulty bathing yourself, getting dressed or grooming yourself? No   Do you have difficulty using the toilet? No   Do you have difficulty moving around from place to place? No   Do you have trouble with steps or getting out of a bed or a chair? No   Current Diet Well Balanced Diet   Dental Exam Up to date   Eye Exam Up to date   Exercise (times per week) 0 times per week   Current Exercises Include House Cleaning   Current Exercise Activities Include -   Do you need help using the phone?  No   Are you deaf or do you have serious difficulty hearing?  No   Do you need help with transportation? No   Do you need help shopping? No   Do you need help preparing meals?  No   Do you need help with housework?  No   Do you need help with laundry? No   Do you need help taking your medications? No   Do you need help managing money? No   Do you ever drive or ride in a car without wearing a seat belt? No   Have you felt unusual stress, anger or loneliness in the last month? No   Who do you live with? Spouse   If you need help, do you have trouble finding someone available to you? No   Have you been bothered in the last four weeks by sexual problems? No   Do you have difficulty concentrating, remembering or making decisions? Yes           Does the patient have evidence of cognitive impairment? No    Asiprin use counseling: Taking ASA appropriately as indicated        Age-appropriate Screening Schedule:  Refer to the list below for future screening recommendations based on patient's age, sex and/or medical conditions. Orders for these recommended tests are listed in the plan section.  The patient has been provided with a written plan.    Health Maintenance   Topic Date Due   • TDAP/TD VACCINES (1 - Tdap) Never done   • DIABETIC FOOT EXAM  Never done   • URINE MICROALBUMIN  09/15/2022   • DIABETIC EYE EXAM  02/09/2023   • HEMOGLOBIN A1C  04/13/2023   • LIPID PANEL  10/13/2023   • MAMMOGRAM  12/22/2023   • DXA SCAN  03/30/2024   • INFLUENZA VACCINE  Completed   • ZOSTER VACCINE  Completed        Subjective   History of Present Illness    Patient Active Problem List   Diagnosis   • Class 3 severe obesity due to excess calories with serious comorbidity and body mass index (BMI) of 40.0 to 44.9 in adult (Formerly [facility])   • Depression   • Elevated fasting glucose-see DM   • Allergic rhinitis: shots   • Osteoporosis: 2018/[facility]   • Primary generalized (osteo)arthritis   • HTN (hypertension), benign   • Obstructive sleep apnea-txing   • Menopause   • Diabetes type 2, controlled (Formerly [facility])   • Asthma   • Hoarseness-sees Ressor   • Gastroesophageal reflux disease-ent   • Wellness examination-done   • Purpura (Formerly [facility])   • Glaucoma   • Laboratory test`   • Anticoagulated DM2/ASA 81 (11.9.17 pt chose qod)   • Glaucoma suspect   • Hx of colonic polyps   • *Hx of anemia: ASA81,iron,gi(cp,div   • *Hx-iron deficiency   • Nuclear senile cataract   • Excessive cerumen in ear canal   • Status post joint replacement   • Primary osteoarthritis of right knee   • Hyperlipidemia-statin   • Hypercalcemia       Advance Care Planning:  ACP discussion was held with the patient during this visit. Patient has an advance directive (not in EMR), copy requested.    Identification of Risk Factors:  Risk factors include: Breast Cancer/Mammogram Screening  Colon Cancer Screening  Dementia/Memory   Immunizations Discussed/Encouraged (specific immunizations; Tdap, Influenza and COVID19 )  Obesity/Overweight   Osteoporosis Risk.    Compared to one year ago, the patient feels her physical health is worse.  Compared to one year ago, the patient feels her  mental health is the same.      Assessment & Plan   Patient Self-Management and Personalized Health Advice  The patient has been provided with information about: diet, exercise and weight management and preventive services including:   · Annual Wellness Visit (AWV).    Reviewed use of high risk medication in the elderly: yes  Reviewed for potential of harmful drug interactions in the elderly: yes    An After Visit Summary and PPPS with all of these plans were given to the patient.

## 2022-10-20 ENCOUNTER — TELEPHONE (OUTPATIENT)
Dept: FAMILY MEDICINE CLINIC | Facility: CLINIC | Age: 72
End: 2022-10-20

## 2022-10-20 DIAGNOSIS — E11.9 CONTROLLED TYPE 2 DIABETES MELLITUS WITHOUT COMPLICATION, WITHOUT LONG-TERM CURRENT USE OF INSULIN: Primary | ICD-10-CM

## 2022-10-20 NOTE — TELEPHONE ENCOUNTER
"MD1 called to advise that the next dose up on the ozempic from 1 mg dose pen, is to go up to 2 mg. \"there is not a 1.5mg dose pen\"    Is this ok to change to 2 mg?  "

## 2022-10-27 ENCOUNTER — TELEPHONE (OUTPATIENT)
Dept: FAMILY MEDICINE CLINIC | Facility: CLINIC | Age: 72
End: 2022-10-27

## 2022-10-27 DIAGNOSIS — R73.9 HYPERGLYCEMIA: ICD-10-CM

## 2022-10-27 DIAGNOSIS — E11.9 CONTROLLED TYPE 2 DIABETES MELLITUS WITHOUT COMPLICATION, WITHOUT LONG-TERM CURRENT USE OF INSULIN: Chronic | ICD-10-CM

## 2022-10-27 RX ORDER — SEMAGLUTIDE 1.34 MG/ML
0.5 INJECTION, SOLUTION SUBCUTANEOUS WEEKLY
Qty: 1.5 ML | Refills: 0 | COMMUNITY
Start: 2022-10-27 | End: 2022-10-31

## 2022-10-27 RX ORDER — FLUCONAZOLE 100 MG/1
100 TABLET ORAL DAILY
Qty: 7 TABLET | Refills: 0 | Status: SHIPPED | OUTPATIENT
Start: 2022-10-27 | End: 2022-11-17 | Stop reason: SDUPTHER

## 2022-10-27 NOTE — TELEPHONE ENCOUNTER
Diflucan sent - advise f/u if takes med and no better    Electronically signed by Khris Shanks, FABBY, 10/27/22, 12:52 PM CDT.

## 2022-10-27 NOTE — TELEPHONE ENCOUNTER
DELETE AFTER REVIEWING: Telephone encounter to be sent to the clinical pool     Caller: Kezia Otoole    Relationship: Self    Best call back number: [phone]    What medication are you requesting: MEDICATION FOR YEAST INFECTION     What are your current symptoms: ICHINESS, IRREGULAR DISCHARGE    How long have you been experiencing symptoms: COUPLE DAYS     Have you had these symptoms before:    [x] Yes  [] No    Have you been treated for these symptoms before:   [x] Yes  [] No    If a prescription is needed, what is your preferred pharmacy and phone number:  [facility] - [place], [place] - [address] [place] [address] [phone] [facility] [phone]         Additional notes:

## 2022-10-27 NOTE — TELEPHONE ENCOUNTER
Called and detailed vm left and have a sample of ozempic for her since pharmacy is out of her dose, will advise when pt comes by to

## 2022-11-04 ENCOUNTER — TELEPHONE (OUTPATIENT)
Dept: FAMILY MEDICINE CLINIC | Facility: CLINIC | Age: 72
End: 2022-11-04

## 2022-11-04 NOTE — TELEPHONE ENCOUNTER
Caller: Kezia Otoole    Relationship: Self    Best call back number:[phone]    What medication are you requesting: ANTIBIOTIC    What are your current symptoms: COUGH AND CONGESTION    How long have you been experiencing symptoms: YESTERDAY    Have you had these symptoms before:    [x] Yes  [] No    Have you been treated for these symptoms before:   [x] Yes  [] No    If a prescription is needed, what is your preferred pharmacy and phone number:      [facility] - [address] - [phone] [facility] [phone]   [phone]    Additional notes: PLEASE LET PATIENT KNOW IF SOMETHING IS SENT IN

## 2022-11-07 ENCOUNTER — TELEMEDICINE (OUTPATIENT)
Dept: FAMILY MEDICINE CLINIC | Facility: CLINIC | Age: 72
End: 2022-11-07

## 2022-11-07 ENCOUNTER — TELEPHONE (OUTPATIENT)
Dept: FAMILY MEDICINE CLINIC | Facility: CLINIC | Age: 72
End: 2022-11-07

## 2022-11-07 DIAGNOSIS — U07.1 COVID-19: Primary | ICD-10-CM

## 2022-11-07 PROBLEM — Z86.16 HISTORY OF COVID-19: Status: ACTIVE | Noted: 2022-11-07

## 2022-11-07 PROCEDURE — 99442 PR PHYS/QHP TELEPHONE EVALUATION 11-20 MIN: CPT | Performed by: NURSE PRACTITIONER

## 2022-11-07 NOTE — PROGRESS NOTES
Subjective   Chief Complaint:  Covid    History of Present Illness:  After obtaining verbal consent to receive care via telephone, this 72 y.o. female was evaluated via telemedicine telephone conference today for evaluation of covid.  Reports cough nasal congestion sore throat sore ribs and fatigue starting Saturday reports yesterday 1 day later tested positive for COVID via home test.    Allergies   Allergen Reactions   • Adhesive Tape Other (See Comments)     Tears and bruises skin   • Other Other (See Comments)     Bandaids make arm Red/slightly swollen      Current Outpatient Medications on File Prior to Visit   Medication Sig   • acetaminophen (TYLENOL) 500 MG tablet Take 1,000 mg by mouth Every 6 (Six) Hours As Needed for mild pain (1-3).   • albuterol sulfate  (90 Base) MCG/ACT inhaler Inhale 2 puffs Every 4 (Four) Hours As Needed for Wheezing or Shortness of Air.   • amLODIPine (NORVASC) 5 MG tablet TAKE ONE TABLET DAILY   • aspirin 81 MG EC tablet Take 1 tablet by mouth Every Other Day.   • atorvastatin (LIPITOR) 20 MG tablet TAKE ONE TABLET DAILY   • B Complex-C (SUPER B COMPLEX PO) Take 1 tablet by mouth Daily.   • Blood Glucose Monitoring Suppl (Easy Step Glucose Monitor) device 1 Device Take As Directed.   • Calcium Carb-Cholecalciferol (CALCIUM-VITAMIN D) 600-400 MG-UNIT tablet Take 1 tablet by mouth 2 (Two) Times a Day.   • carboxymethylcellulose (REFRESH PLUS) 0.5 % solution 2 drops Daily As Needed for dry eyes.   • Cholecalciferol 25 MCG (1000 UT) capsule Take 1 capsule by mouth Daily.   • Coenzyme Q10 (CO Q 10 PO) Take 1 capsule by mouth Every Other Day.   • denosumab (PROLIA) 60 MG/ML solution syringe Inject 60 mg under the skin into the appropriate area as directed Every 6 (Six) Months.   • diclofenac sodium (VOTAREN XR) 100 MG 24 hr tablet Take 100 mg by mouth Daily.   • DULoxetine (CYMBALTA) 20 MG capsule TAKE ONE CAPSULE DAILY   • glucose blood test strip Use as instructed: once a day  testing of the one touch ultra blue strips   • glucose blood test strip Use as instructed: once a day testing   • guaiFENesin (MUCINEX) 600 MG 12 hr tablet Take 600 mg by mouth 2 (Two) Times a Day.   • hydroCHLOROthiazide (MICROZIDE) 12.5 MG capsule TAKE ONE CAPSULE DAILY   • ipratropium-albuterol (DUO-NEB) 0.5-2.5 mg/3 ml nebulizer Take 3 mL by nebulization 4 (Four) Times a Day.   • irbesartan (AVAPRO) 300 MG tablet TAKE ONE TABLET DAILY   • Lancets misc Test daily   • latanoprost (XALATAN) 0.005 % ophthalmic solution Administer 1 drop to both eyes Every Night.   • metFORMIN ER (GLUCOPHAGE-XR) 500 MG 24 hr tablet Take 2 tablets by mouth 2 (Two) Times a Day for 90 days.   • Misc Natural Products (GLUCOSAMINE CHONDROITIN TRIPLE PO) Take 1 tablet by mouth 2 (Two) Times a Day.   • montelukast (SINGULAIR) 10 MG tablet TAKE ONE TABLET DAILY   • Multiple Vitamins-Minerals (VITRUM 50+ SENIOR MULTI PO) Take 1 tablet by mouth Daily.   • NON FORMULARY Immunotherapy   • potassium chloride 10 MEQ CR tablet TAKE ONE TABLET DAILY   • Semaglutide, 2 MG/DOSE, 8 MG/3ML solution pen-injector Inject 2 mg under the skin into the appropriate area as directed 1 (One) Time Per Week.   • Symbicort 160-4.5 MCG/ACT inhaler INHALE TWO PUFFS TWO (TWO) TIMES A DAY.     No current facility-administered medications on file prior to visit.      Past Medical, Surgical, Social, and Family History:  Past Medical History:   Diagnosis Date   • Allergic rhinitis    • Chronic laryngitis    • Diabetes mellitus (HCC)    • GERD (gastroesophageal reflux disease)    • Hypertension    • Hypertrophy of both inferior nasal turbinates    • Laryngopharyngeal reflux    • Nontoxic multinodular goiter    • Sicca laryngitis      Past Surgical History:   Procedure Laterality Date   • CARPAL TUNNEL RELEASE     • CHOLECYSTECTOMY     • COLONOSCOPY  10/01/2008    Multiple polyps removed   • COLONOSCOPY  09/18/2013    Diverticulosis in the sigmoid colon. Dr. Dave Mcnally  "Recall 5 years   • HAND SURGERY     • HYSTERECTOMY     • REPLACEMENT TOTAL KNEE Right 02/02/2021   • THYROIDECTOMY, PARTIAL Right 2010   • TONSILLECTOMY     • TUBAL ABDOMINAL LIGATION       Social History     Socioeconomic History   • Marital status:      Spouse name: Laurie   • Number of children: 2   • Years of education: 16   Tobacco Use   • Smoking status: Never   • Smokeless tobacco: Never   • Tobacco comments:     \"lived with smokers most of my life\"   Vaping Use   • Vaping Use: Never used   Substance and Sexual Activity   • Alcohol use: No     Comment: Rare   • Drug use: No   • Sexual activity: Yes     Partners: Male     Birth control/protection: None     Family History   Problem Relation Age of Onset   • Diabetes Mother    • Stroke Mother    • Heart disease Mother    • Heart disease Father    • Colon cancer Neg Hx    • Colon polyps Neg Hx      Review of Systems  Objective   Physical Exam:  Visit done through telephone call therefore no visualization, palpation, or percussion is possible.    Assessment & Plan   Diagnoses and all orders for this visit:    1. COVID-19 (Primary)    Other orders  -     Nirmatrelvir&Ritonavir 300/100 (PAXLOVID) 20 x 150 MG & 10 x 100MG tablet therapy pack tablet; Take 3 tablets by mouth 2 (Two) Times a Day for 5 days.  Dispense: 30 tablet; Refill: 0    Discussion:  Advised and educated plan of care.  Paxlovid to follow-advised emergency use authorization, quarantine guidelines, ED guidelines, advised hold Lipitor x1 week    Time = 12  Minutes  Telephone visit done due to request of patient after offering a video visit first.    Follow-up:  No follow-ups on file.    Note e-Signed by FABBY Sanchez on 11/07/2022 at 13:46 CST  "

## 2022-11-07 NOTE — TELEPHONE ENCOUNTER
"Called pt and advised \"I tested positive for covid yesterday, I have coughed so hard my ribs are sore\"    Has had covid shots and one or two boosters  "

## 2022-11-07 NOTE — TELEPHONE ENCOUNTER
Caller: Kezia Otoole     Relationship: Self    Best call back number: [phone]    What medication are you requesting: HELP WITH COVID SYMPTOMS    What are your current symptoms: COUGH, CONGESTION, FATIGUE, FEVER    How long have you been experiencing symptoms: SINCE 11.5.22    Have you had these symptoms before:    [] Yes  [x] No    Have you been treated for these symptoms before:   [] Yes  [x] No    If a prescription is needed, what is your preferred pharmacy and phone number: [facility] - [address] [phone] [facility] [phone]      Additional notes: PATIENT TOOK AN AT HOME COVID TEST ON 11.6.22 AND IT CAME BACK POSITIVE. PATIENT WOULD LIKE A CALL BACK IF SOMETHING IS CALLED IN.

## 2022-11-08 ENCOUNTER — TELEPHONE (OUTPATIENT)
Dept: FAMILY MEDICINE CLINIC | Facility: CLINIC | Age: 72
End: 2022-11-08

## 2022-11-08 NOTE — TELEPHONE ENCOUNTER
Called pt and advised that her spouse was able to  the medication      Wants to know how long to quarantine?

## 2022-11-08 NOTE — TELEPHONE ENCOUNTER
Covid-19 Isolation Guidelines  Positive COVID-19 test with Symptoms    Isolate 10 Days From the Date SYMPTOMS Began    1.  If symptoms fully resolve, isolation may be shortened and after day 5 on the first day without symptoms.  2.  Wear well fitting facemask for 10 full days since the start of symptoms.  Isolation should not be shortened if a mask cannot be worn properly and consistently.    Per January 3, 2022 Guidelines [id number]    Electronically signed by FABBY Sanchez, 11/08/22, 4:52 PM CST.

## 2022-11-08 NOTE — TELEPHONE ENCOUNTER
Caller: Kezia Otoole DOMINGA    Relationship: Self    Best call back number:[phone]    What medications are you currently taking:   Current Outpatient Medications on File Prior to Visit   Medication Sig Dispense Refill    acetaminophen (TYLENOL) 500 MG tablet Take 1,000 mg by mouth Every 6 (Six) Hours As Needed for mild pain (1-3).      albuterol sulfate  (90 Base) MCG/ACT inhaler Inhale 2 puffs Every 4 (Four) Hours As Needed for Wheezing or Shortness of Air. 8 g 1    amLODIPine (NORVASC) 5 MG tablet TAKE ONE TABLET DAILY 90 tablet 1    aspirin 81 MG EC tablet Take 1 tablet by mouth Every Other Day. 32 tablet 0    atorvastatin (LIPITOR) 20 MG tablet TAKE ONE TABLET DAILY 90 tablet 3    B Complex-C (SUPER B COMPLEX PO) Take 1 tablet by mouth Daily.      Blood Glucose Monitoring Suppl (Easy Step Glucose Monitor) device 1 Device Take As Directed. 1 each 0    Calcium Carb-Cholecalciferol (CALCIUM-VITAMIN D) 600-400 MG-UNIT tablet Take 1 tablet by mouth 2 (Two) Times a Day.      carboxymethylcellulose (REFRESH PLUS) 0.5 % solution 2 drops Daily As Needed for dry eyes.      Cholecalciferol 25 MCG (1000 UT) capsule Take 1 capsule by mouth Daily.      Coenzyme Q10 (CO Q 10 PO) Take 1 capsule by mouth Every Other Day.      denosumab (PROLIA) 60 MG/ML solution syringe Inject 60 mg under the skin into the appropriate area as directed Every 6 (Six) Months.      diclofenac sodium (VOTAREN XR) 100 MG 24 hr tablet Take 100 mg by mouth Daily.      DULoxetine (CYMBALTA) 20 MG capsule TAKE ONE CAPSULE DAILY 30 capsule 5    glucose blood test strip Use as instructed: once a day testing of the one touch ultra blue strips 50 each 11    glucose blood test strip Use as instructed: once a day testing 50 each 12    guaiFENesin (MUCINEX) 600 MG 12 hr tablet Take 600 mg by mouth 2 (Two) Times a Day.      hydroCHLOROthiazide (MICROZIDE) 12.5 MG capsule TAKE ONE CAPSULE DAILY 90 capsule 3    ipratropium-albuterol  (DUO-NEB) 0.5-2.5 mg/3 ml nebulizer Take 3 mL by nebulization 4 (Four) Times a Day. 40 vial 1    irbesartan (AVAPRO) 300 MG tablet TAKE ONE TABLET DAILY 90 tablet 3    Lancets misc Test daily 100 each 12    latanoprost (XALATAN) 0.005 % ophthalmic solution Administer 1 drop to both eyes Every Night.  5    metFORMIN ER (GLUCOPHAGE-XR) 500 MG 24 hr tablet Take 2 tablets by mouth 2 (Two) Times a Day for 90 days. 180 tablet 3    Misc Natural Products (GLUCOSAMINE CHONDROITIN TRIPLE PO) Take 1 tablet by mouth 2 (Two) Times a Day.      montelukast (SINGULAIR) 10 MG tablet TAKE ONE TABLET DAILY 90 tablet 3    Multiple Vitamins-Minerals (VITRUM 50+ SENIOR MULTI PO) Take 1 tablet by mouth Daily.      Nirmatrelvir&Ritonavir 300/100 (PAXLOVID) 20 x 150 MG & 10 x 100MG tablet therapy pack tablet Take 3 tablets by mouth 2 (Two) Times a Day for 5 days. 30 tablet 0    NON FORMULARY Immunotherapy      potassium chloride 10 MEQ CR tablet TAKE ONE TABLET DAILY 90 tablet 3    Semaglutide, 2 MG/DOSE, 8 MG/3ML solution pen-injector Inject 2 mg under the skin into the appropriate area as directed 1 (One) Time Per Week. 4.5 mL 5    Symbicort 160-4.5 MCG/ACT inhaler INHALE TWO PUFFS TWO (TWO) TIMES A DAY. 30.6 each 3     No current facility-administered medications on file prior to visit.      When did you start taking these medications: HAVE NOT STARTED     Which medication are you concerned about: PAXLOVID     Who prescribed you this medication: NAS    What are your concerns: NEEDING THE PRESCRIPTION SENT TO [id number] - [place], [place] - [address] AT [address] & [facility] - [phone] [facility] [phone]

## 2022-11-17 ENCOUNTER — TELEPHONE (OUTPATIENT)
Dept: FAMILY MEDICINE CLINIC | Facility: CLINIC | Age: 72
End: 2022-11-17

## 2022-11-17 DIAGNOSIS — R30.0 DYSURIA: Primary | ICD-10-CM

## 2022-11-17 RX ORDER — FLUCONAZOLE 100 MG/1
100 TABLET ORAL DAILY
Qty: 2 TABLET | Refills: 0 | Status: SHIPPED | OUTPATIENT
Start: 2022-11-17 | End: 2022-11-22 | Stop reason: HOSPADM

## 2022-11-17 NOTE — TELEPHONE ENCOUNTER
Called and voicemail left, to come by and give urine and did send rx to pharmacy but she doesn't have to  if not needed

## 2022-11-17 NOTE — TELEPHONE ENCOUNTER
Caller: Kezia Otoole    Relationship: Self    Best call back number:    [phone]      What medication are you requesting: WHATEVER DR. BURGER SUGGESTS FOR THE SYMPTOMS    What are your current symptoms: INCONTINENCE; THE PATIENT STATES THAT SHE HAS HAD AN INCREASE IN DRINKING FLUIDS.    How long have you been experiencing symptoms: THE PATIENT STATES THE SYMPTOMS STARTED A FEW DAYS AGO WITH POSSIBLE A YEAST INFECTION.    Have you had these symptoms before:    [] Yes  [x] No    Have you been treated for these symptoms before:   [] Yes  [x] No    If a prescription is needed, what is your preferred pharmacy and phone number: [facility] - [address] [phone] [facility] [phone]

## 2022-11-18 ENCOUNTER — APPOINTMENT (OUTPATIENT)
Dept: GENERAL RADIOLOGY | Facility: HOSPITAL | Age: 72
End: 2022-11-18

## 2022-11-18 ENCOUNTER — APPOINTMENT (OUTPATIENT)
Dept: CT IMAGING | Facility: HOSPITAL | Age: 72
End: 2022-11-18

## 2022-11-18 ENCOUNTER — HOSPITAL ENCOUNTER (INPATIENT)
Facility: HOSPITAL | Age: 72
LOS: 4 days | Discharge: HOME HEALTH | End: 2022-11-22
Attending: EMERGENCY MEDICINE | Admitting: INTERNAL MEDICINE

## 2022-11-18 DIAGNOSIS — E13.10 DIABETIC KETOACIDOSIS WITHOUT COMA ASSOCIATED WITH OTHER SPECIFIED DIABETES MELLITUS: Primary | ICD-10-CM

## 2022-11-18 DIAGNOSIS — R68.89 DECREASED FUNCTIONAL ACTIVITY TOLERANCE: ICD-10-CM

## 2022-11-18 DIAGNOSIS — E11.65 TYPE 2 DIABETES MELLITUS WITH HYPERGLYCEMIA, WITHOUT LONG-TERM CURRENT USE OF INSULIN: ICD-10-CM

## 2022-11-18 DIAGNOSIS — R53.1 GENERAL WEAKNESS: ICD-10-CM

## 2022-11-18 DIAGNOSIS — R41.82 ALTERED MENTAL STATUS, UNSPECIFIED ALTERED MENTAL STATUS TYPE: ICD-10-CM

## 2022-11-18 DIAGNOSIS — Z79.899 POLYPHARMACY: ICD-10-CM

## 2022-11-18 DIAGNOSIS — N17.9 AKI (ACUTE KIDNEY INJURY): ICD-10-CM

## 2022-11-18 DIAGNOSIS — N39.0 ACUTE UTI (URINARY TRACT INFECTION): ICD-10-CM

## 2022-11-18 DIAGNOSIS — U07.1 COVID: ICD-10-CM

## 2022-11-18 DIAGNOSIS — Z78.9 DECREASED ACTIVITIES OF DAILY LIVING (ADL): ICD-10-CM

## 2022-11-18 DIAGNOSIS — R77.8 TROPONIN I ABOVE REFERENCE RANGE: ICD-10-CM

## 2022-11-18 PROBLEM — E11.10 DKA (DIABETIC KETOACIDOSIS): Status: ACTIVE | Noted: 2022-11-18

## 2022-11-18 LAB
ALBUMIN SERPL-MCNC: 4.5 G/DL (ref 3.5–5.2)
ALBUMIN/GLOB SERPL: 1.5 G/DL
ALP SERPL-CCNC: 108 U/L (ref 39–117)
ALT SERPL W P-5'-P-CCNC: 30 U/L (ref 1–33)
ANION GAP SERPL CALCULATED.3IONS-SCNC: 14 MMOL/L (ref 5–15)
ANION GAP SERPL CALCULATED.3IONS-SCNC: 31 MMOL/L (ref 5–15)
ANION GAP SERPL CALCULATED.3IONS-SCNC: 33 MMOL/L (ref 5–15)
ARTERIAL PATENCY WRIST A: POSITIVE
AST SERPL-CCNC: 14 U/L (ref 1–32)
ATMOSPHERIC PRESS: 760 MMHG
BACTERIA UR QL AUTO: ABNORMAL /HPF
BASE EXCESS BLDA CALC-SCNC: -21.2 MMOL/L (ref 0–2)
BASOPHILS # BLD AUTO: 0.09 10*3/MM3 (ref 0–0.2)
BASOPHILS NFR BLD AUTO: 0.3 % (ref 0–1.5)
BDY SITE: ABNORMAL
BILIRUB SERPL-MCNC: 0.5 MG/DL (ref 0–1.2)
BILIRUB UR QL STRIP: NEGATIVE
BODY TEMPERATURE: 37 C
BUN SERPL-MCNC: 39 MG/DL (ref 8–23)
BUN SERPL-MCNC: 49 MG/DL (ref 8–23)
BUN SERPL-MCNC: 51 MG/DL (ref 8–23)
BUN/CREAT SERPL: 44.3 (ref 7–25)
BUN/CREAT SERPL: 50 (ref 7–25)
BUN/CREAT SERPL: 52.7 (ref 7–25)
CALCIUM SPEC-SCNC: 10 MG/DL (ref 8.6–10.5)
CALCIUM SPEC-SCNC: 10.7 MG/DL (ref 8.6–10.5)
CALCIUM SPEC-SCNC: 8.7 MG/DL (ref 8.6–10.5)
CHLORIDE SERPL-SCNC: 110 MMOL/L (ref 98–107)
CHLORIDE SERPL-SCNC: 91 MMOL/L (ref 98–107)
CHLORIDE SERPL-SCNC: 96 MMOL/L (ref 98–107)
CLARITY UR: CLEAR
CO2 SERPL-SCNC: 12 MMOL/L (ref 22–29)
CO2 SERPL-SCNC: 7 MMOL/L (ref 22–29)
CO2 SERPL-SCNC: 8 MMOL/L (ref 22–29)
COLOR UR: YELLOW
CREAT SERPL-MCNC: 0.74 MG/DL (ref 0.57–1)
CREAT SERPL-MCNC: 0.98 MG/DL (ref 0.57–1)
CREAT SERPL-MCNC: 1.15 MG/DL (ref 0.57–1)
CRP SERPL-MCNC: <0.3 MG/DL (ref 0–0.5)
D-LACTATE SERPL-SCNC: 1.2 MMOL/L (ref 0.5–2)
D-LACTATE SERPL-SCNC: 2.2 MMOL/L (ref 0.5–2)
D-LACTATE SERPL-SCNC: 2.3 MMOL/L (ref 0.5–2)
D-LACTATE SERPL-SCNC: 2.6 MMOL/L (ref 0.5–2)
DEPRECATED RDW RBC AUTO: 43.3 FL (ref 37–54)
EGFRCR SERPLBLD CKD-EPI 2021: 50.1 ML/MIN/1.73
EGFRCR SERPLBLD CKD-EPI 2021: 60.7 ML/MIN/1.73
EGFRCR SERPLBLD CKD-EPI 2021: 85 ML/MIN/1.73
EOSINOPHIL # BLD AUTO: 0 10*3/MM3 (ref 0–0.4)
EOSINOPHIL NFR BLD AUTO: 0 % (ref 0.3–6.2)
ERYTHROCYTE [DISTWIDTH] IN BLOOD BY AUTOMATED COUNT: 12.8 % (ref 12.3–15.4)
FLUAV RNA RESP QL NAA+PROBE: NOT DETECTED
FLUBV RNA RESP QL NAA+PROBE: NOT DETECTED
GLOBULIN UR ELPH-MCNC: 3.1 GM/DL
GLUCOSE BLDC GLUCOMTR-MCNC: 162 MG/DL (ref 70–130)
GLUCOSE BLDC GLUCOMTR-MCNC: 183 MG/DL (ref 70–130)
GLUCOSE BLDC GLUCOMTR-MCNC: 197 MG/DL (ref 70–130)
GLUCOSE BLDC GLUCOMTR-MCNC: 202 MG/DL (ref 70–130)
GLUCOSE BLDC GLUCOMTR-MCNC: 203 MG/DL (ref 70–130)
GLUCOSE BLDC GLUCOMTR-MCNC: 209 MG/DL (ref 70–130)
GLUCOSE BLDC GLUCOMTR-MCNC: 219 MG/DL (ref 70–130)
GLUCOSE BLDC GLUCOMTR-MCNC: 226 MG/DL (ref 70–130)
GLUCOSE BLDC GLUCOMTR-MCNC: 255 MG/DL (ref 70–130)
GLUCOSE BLDC GLUCOMTR-MCNC: 311 MG/DL (ref 70–130)
GLUCOSE BLDC GLUCOMTR-MCNC: 353 MG/DL (ref 70–130)
GLUCOSE BLDC GLUCOMTR-MCNC: 469 MG/DL (ref 70–130)
GLUCOSE BLDC GLUCOMTR-MCNC: 522 MG/DL (ref 70–130)
GLUCOSE SERPL-MCNC: 225 MG/DL (ref 65–99)
GLUCOSE SERPL-MCNC: 517 MG/DL (ref 65–99)
GLUCOSE SERPL-MCNC: 601 MG/DL (ref 65–99)
GLUCOSE UR STRIP-MCNC: ABNORMAL MG/DL
HBA1C MFR BLD: 11.3 % (ref 4.8–5.6)
HCO3 BLDA-SCNC: 4.2 MMOL/L (ref 20–26)
HCT VFR BLD AUTO: 55.2 % (ref 34–46.6)
HGB BLD-MCNC: 18.1 G/DL (ref 12–15.9)
HGB UR QL STRIP.AUTO: ABNORMAL
HYALINE CASTS UR QL AUTO: ABNORMAL /LPF
IMM GRANULOCYTES # BLD AUTO: 0.61 10*3/MM3 (ref 0–0.05)
IMM GRANULOCYTES NFR BLD AUTO: 2.3 % (ref 0–0.5)
INR PPP: 1.06 (ref 0.91–1.09)
KETONES UR QL STRIP: ABNORMAL
LEUKOCYTE ESTERASE UR QL STRIP.AUTO: NEGATIVE
LIPASE SERPL-CCNC: 441 U/L (ref 13–60)
LYMPHOCYTES # BLD AUTO: 0.31 10*3/MM3 (ref 0.7–3.1)
LYMPHOCYTES NFR BLD AUTO: 1.1 % (ref 19.6–45.3)
Lab: ABNORMAL
Lab: ABNORMAL
MAGNESIUM SERPL-MCNC: 2.1 MG/DL (ref 1.6–2.4)
MAGNESIUM SERPL-MCNC: 2.4 MG/DL (ref 1.6–2.4)
MAGNESIUM SERPL-MCNC: 2.7 MG/DL (ref 1.6–2.4)
MCH RBC QN AUTO: 30.2 PG (ref 26.6–33)
MCHC RBC AUTO-ENTMCNC: 32.8 G/DL (ref 31.5–35.7)
MCV RBC AUTO: 92.2 FL (ref 79–97)
MODALITY: ABNORMAL
MONOCYTES # BLD AUTO: 1.88 10*3/MM3 (ref 0.1–0.9)
MONOCYTES NFR BLD AUTO: 7 % (ref 5–12)
NEUTROPHILS NFR BLD AUTO: 24.08 10*3/MM3 (ref 1.7–7)
NEUTROPHILS NFR BLD AUTO: 89.3 % (ref 42.7–76)
NITRITE UR QL STRIP: NEGATIVE
NOTIFIED BY: ABNORMAL
NOTIFIED WHO: ABNORMAL
NRBC BLD AUTO-RTO: 0 /100 WBC (ref 0–0.2)
OSMOLALITY SERPL: 331 MOSM/KG (ref 280–301)
PCO2 BLDA: 11.8 MM HG (ref 35–45)
PCO2 TEMP ADJ BLD: 11.8 MM HG (ref 35–45)
PH BLDA: 7.17 PH UNITS (ref 7.35–7.45)
PH UR STRIP.AUTO: <=5 [PH] (ref 5–8)
PH, TEMP CORRECTED: 7.17 PH UNITS (ref 7.35–7.45)
PHOSPHATE SERPL-MCNC: 1.7 MG/DL (ref 2.5–4.5)
PHOSPHATE SERPL-MCNC: 4.8 MG/DL (ref 2.5–4.5)
PHOSPHATE SERPL-MCNC: 5.7 MG/DL (ref 2.5–4.5)
PLATELET # BLD AUTO: 394 10*3/MM3 (ref 140–450)
PMV BLD AUTO: 9.5 FL (ref 6–12)
PO2 BLDA: 127 MM HG (ref 83–108)
PO2 TEMP ADJ BLD: 127 MM HG (ref 83–108)
POTASSIUM SERPL-SCNC: 3.9 MMOL/L (ref 3.5–5.2)
POTASSIUM SERPL-SCNC: 4.8 MMOL/L (ref 3.5–5.2)
POTASSIUM SERPL-SCNC: 5.3 MMOL/L (ref 3.5–5.2)
PROCALCITONIN SERPL-MCNC: 0.1 NG/ML (ref 0–0.25)
PROT SERPL-MCNC: 7.6 G/DL (ref 6–8.5)
PROT UR QL STRIP: ABNORMAL
PROTHROMBIN TIME: 13.4 SECONDS (ref 11.9–14.6)
RBC # BLD AUTO: 5.99 10*6/MM3 (ref 3.77–5.28)
RBC # UR STRIP: ABNORMAL /HPF
REF LAB TEST METHOD: ABNORMAL
SAO2 % BLDCOA: 98.7 % (ref 94–99)
SARS-COV-2 RNA RESP QL NAA+PROBE: DETECTED
SODIUM SERPL-SCNC: 131 MMOL/L (ref 136–145)
SODIUM SERPL-SCNC: 135 MMOL/L (ref 136–145)
SODIUM SERPL-SCNC: 136 MMOL/L (ref 136–145)
SP GR UR STRIP: 1.03 (ref 1–1.03)
SQUAMOUS #/AREA URNS HPF: ABNORMAL /HPF
TROPONIN T SERPL-MCNC: 0.04 NG/ML (ref 0–0.03)
UROBILINOGEN UR QL STRIP: ABNORMAL
VENTILATOR MODE: ABNORMAL
WBC # UR STRIP: ABNORMAL /HPF
WBC NRBC COR # BLD: 26.97 10*3/MM3 (ref 3.4–10.8)

## 2022-11-18 PROCEDURE — 93010 ELECTROCARDIOGRAM REPORT: CPT | Performed by: INTERNAL MEDICINE

## 2022-11-18 PROCEDURE — P9612 CATHETERIZE FOR URINE SPEC: HCPCS

## 2022-11-18 PROCEDURE — 36600 WITHDRAWAL OF ARTERIAL BLOOD: CPT

## 2022-11-18 PROCEDURE — 84100 ASSAY OF PHOSPHORUS: CPT | Performed by: INTERNAL MEDICINE

## 2022-11-18 PROCEDURE — 82962 GLUCOSE BLOOD TEST: CPT

## 2022-11-18 PROCEDURE — 84100 ASSAY OF PHOSPHORUS: CPT | Performed by: EMERGENCY MEDICINE

## 2022-11-18 PROCEDURE — 25010000002 ONDANSETRON PER 1 MG: Performed by: EMERGENCY MEDICINE

## 2022-11-18 PROCEDURE — 25010000002 ENOXAPARIN PER 10 MG: Performed by: INTERNAL MEDICINE

## 2022-11-18 PROCEDURE — 87086 URINE CULTURE/COLONY COUNT: CPT | Performed by: EMERGENCY MEDICINE

## 2022-11-18 PROCEDURE — 83690 ASSAY OF LIPASE: CPT | Performed by: EMERGENCY MEDICINE

## 2022-11-18 PROCEDURE — 93005 ELECTROCARDIOGRAM TRACING: CPT | Performed by: EMERGENCY MEDICINE

## 2022-11-18 PROCEDURE — 83036 HEMOGLOBIN GLYCOSYLATED A1C: CPT | Performed by: EMERGENCY MEDICINE

## 2022-11-18 PROCEDURE — 0 INSULIN REGULAR HUMAN PER 5 UNITS: Performed by: EMERGENCY MEDICINE

## 2022-11-18 PROCEDURE — 84484 ASSAY OF TROPONIN QUANT: CPT | Performed by: EMERGENCY MEDICINE

## 2022-11-18 PROCEDURE — 70450 CT HEAD/BRAIN W/O DYE: CPT

## 2022-11-18 PROCEDURE — 86140 C-REACTIVE PROTEIN: CPT | Performed by: INTERNAL MEDICINE

## 2022-11-18 PROCEDURE — 83735 ASSAY OF MAGNESIUM: CPT | Performed by: EMERGENCY MEDICINE

## 2022-11-18 PROCEDURE — 83735 ASSAY OF MAGNESIUM: CPT | Performed by: INTERNAL MEDICINE

## 2022-11-18 PROCEDURE — 83930 ASSAY OF BLOOD OSMOLALITY: CPT | Performed by: EMERGENCY MEDICINE

## 2022-11-18 PROCEDURE — 71045 X-RAY EXAM CHEST 1 VIEW: CPT

## 2022-11-18 PROCEDURE — 85610 PROTHROMBIN TIME: CPT | Performed by: EMERGENCY MEDICINE

## 2022-11-18 PROCEDURE — 74176 CT ABD & PELVIS W/O CONTRAST: CPT

## 2022-11-18 PROCEDURE — 87040 BLOOD CULTURE FOR BACTERIA: CPT | Performed by: EMERGENCY MEDICINE

## 2022-11-18 PROCEDURE — 99291 CRITICAL CARE FIRST HOUR: CPT

## 2022-11-18 PROCEDURE — 87077 CULTURE AEROBIC IDENTIFY: CPT | Performed by: EMERGENCY MEDICINE

## 2022-11-18 PROCEDURE — 80053 COMPREHEN METABOLIC PANEL: CPT | Performed by: EMERGENCY MEDICINE

## 2022-11-18 PROCEDURE — 94640 AIRWAY INHALATION TREATMENT: CPT

## 2022-11-18 PROCEDURE — 36415 COLL VENOUS BLD VENIPUNCTURE: CPT | Performed by: EMERGENCY MEDICINE

## 2022-11-18 PROCEDURE — 87186 SC STD MICRODIL/AGAR DIL: CPT | Performed by: EMERGENCY MEDICINE

## 2022-11-18 PROCEDURE — 83605 ASSAY OF LACTIC ACID: CPT | Performed by: EMERGENCY MEDICINE

## 2022-11-18 PROCEDURE — 85025 COMPLETE CBC W/AUTO DIFF WBC: CPT | Performed by: EMERGENCY MEDICINE

## 2022-11-18 PROCEDURE — 81001 URINALYSIS AUTO W/SCOPE: CPT | Performed by: EMERGENCY MEDICINE

## 2022-11-18 PROCEDURE — 84145 PROCALCITONIN (PCT): CPT | Performed by: EMERGENCY MEDICINE

## 2022-11-18 PROCEDURE — 94799 UNLISTED PULMONARY SVC/PX: CPT

## 2022-11-18 PROCEDURE — 25010000002 CEFTRIAXONE PER 250 MG: Performed by: EMERGENCY MEDICINE

## 2022-11-18 PROCEDURE — 87636 SARSCOV2 & INF A&B AMP PRB: CPT | Performed by: EMERGENCY MEDICINE

## 2022-11-18 PROCEDURE — 82803 BLOOD GASES ANY COMBINATION: CPT

## 2022-11-18 PROCEDURE — C1751 CATH, INF, PER/CENT/MIDLINE: HCPCS

## 2022-11-18 RX ORDER — ONDANSETRON 2 MG/ML
4 INJECTION INTRAMUSCULAR; INTRAVENOUS ONCE
Status: COMPLETED | OUTPATIENT
Start: 2022-11-18 | End: 2022-11-18

## 2022-11-18 RX ORDER — MORPHINE SULFATE 2 MG/ML
2 INJECTION, SOLUTION INTRAMUSCULAR; INTRAVENOUS
Status: DISCONTINUED | OUTPATIENT
Start: 2022-11-18 | End: 2022-11-22 | Stop reason: HOSPADM

## 2022-11-18 RX ORDER — SODIUM CHLORIDE 0.9 % (FLUSH) 0.9 %
10 SYRINGE (ML) INJECTION EVERY 12 HOURS SCHEDULED
Status: DISCONTINUED | OUTPATIENT
Start: 2022-11-18 | End: 2022-11-19

## 2022-11-18 RX ORDER — DEXTROSE, SODIUM CHLORIDE, AND POTASSIUM CHLORIDE 5; .45; .15 G/100ML; G/100ML; G/100ML
150 INJECTION INTRAVENOUS CONTINUOUS PRN
Status: DISCONTINUED | OUTPATIENT
Start: 2022-11-18 | End: 2022-11-19

## 2022-11-18 RX ORDER — SODIUM CHLORIDE 9 MG/ML
40 INJECTION, SOLUTION INTRAVENOUS AS NEEDED
Status: DISCONTINUED | OUTPATIENT
Start: 2022-11-18 | End: 2022-11-19

## 2022-11-18 RX ORDER — BUDESONIDE AND FORMOTEROL FUMARATE DIHYDRATE 160; 4.5 UG/1; UG/1
2 AEROSOL RESPIRATORY (INHALATION)
Status: DISCONTINUED | OUTPATIENT
Start: 2022-11-18 | End: 2022-11-22 | Stop reason: HOSPADM

## 2022-11-18 RX ORDER — MULTIPLE VITAMINS W/ MINERALS TAB 9MG-400MCG
1 TAB ORAL DAILY
Status: DISCONTINUED | OUTPATIENT
Start: 2022-11-19 | End: 2022-11-22 | Stop reason: HOSPADM

## 2022-11-18 RX ORDER — DULOXETIN HYDROCHLORIDE 20 MG/1
20 CAPSULE, DELAYED RELEASE ORAL DAILY
Status: DISCONTINUED | OUTPATIENT
Start: 2022-11-19 | End: 2022-11-22 | Stop reason: HOSPADM

## 2022-11-18 RX ORDER — SODIUM CHLORIDE 9 MG/ML
250 INJECTION, SOLUTION INTRAVENOUS CONTINUOUS PRN
Status: DISCONTINUED | OUTPATIENT
Start: 2022-11-18 | End: 2022-11-19

## 2022-11-18 RX ORDER — NICOTINE POLACRILEX 4 MG
15 LOZENGE BUCCAL
Status: DISCONTINUED | OUTPATIENT
Start: 2022-11-18 | End: 2022-11-19

## 2022-11-18 RX ORDER — NALOXONE HCL 0.4 MG/ML
0.4 VIAL (ML) INJECTION
Status: DISCONTINUED | OUTPATIENT
Start: 2022-11-18 | End: 2022-11-22 | Stop reason: HOSPADM

## 2022-11-18 RX ORDER — MONTELUKAST SODIUM 10 MG/1
10 TABLET ORAL NIGHTLY
Status: DISCONTINUED | OUTPATIENT
Start: 2022-11-18 | End: 2022-11-22 | Stop reason: HOSPADM

## 2022-11-18 RX ORDER — GUAIFENESIN 600 MG/1
600 TABLET, EXTENDED RELEASE ORAL 2 TIMES DAILY
Status: DISCONTINUED | OUTPATIENT
Start: 2022-11-18 | End: 2022-11-22 | Stop reason: HOSPADM

## 2022-11-18 RX ORDER — SODIUM CHLORIDE 450 MG/100ML
250 INJECTION, SOLUTION INTRAVENOUS CONTINUOUS PRN
Status: DISCONTINUED | OUTPATIENT
Start: 2022-11-18 | End: 2022-11-19

## 2022-11-18 RX ORDER — ASPIRIN 81 MG/1
81 TABLET ORAL EVERY OTHER DAY
Status: DISCONTINUED | OUTPATIENT
Start: 2022-11-19 | End: 2022-11-22 | Stop reason: HOSPADM

## 2022-11-18 RX ORDER — SODIUM CHLORIDE 0.9 % (FLUSH) 0.9 %
10 SYRINGE (ML) INJECTION AS NEEDED
Status: DISCONTINUED | OUTPATIENT
Start: 2022-11-18 | End: 2022-11-22 | Stop reason: HOSPADM

## 2022-11-18 RX ORDER — ATORVASTATIN CALCIUM 10 MG/1
20 TABLET, FILM COATED ORAL DAILY
Status: DISCONTINUED | OUTPATIENT
Start: 2022-11-19 | End: 2022-11-22 | Stop reason: HOSPADM

## 2022-11-18 RX ORDER — BENZONATATE 100 MG/1
200 CAPSULE ORAL 3 TIMES DAILY PRN
Status: DISCONTINUED | OUTPATIENT
Start: 2022-11-18 | End: 2022-11-22 | Stop reason: HOSPADM

## 2022-11-18 RX ORDER — DEXTROSE MONOHYDRATE 25 G/50ML
10-50 INJECTION, SOLUTION INTRAVENOUS
Status: DISCONTINUED | OUTPATIENT
Start: 2022-11-18 | End: 2022-11-19

## 2022-11-18 RX ORDER — LOSARTAN POTASSIUM 50 MG/1
100 TABLET ORAL
Status: DISCONTINUED | OUTPATIENT
Start: 2022-11-19 | End: 2022-11-22 | Stop reason: HOSPADM

## 2022-11-18 RX ORDER — DEXTROSE, SODIUM CHLORIDE, AND POTASSIUM CHLORIDE 5; .9; .15 G/100ML; G/100ML; G/100ML
150 INJECTION INTRAVENOUS CONTINUOUS PRN
Status: DISCONTINUED | OUTPATIENT
Start: 2022-11-18 | End: 2022-11-19

## 2022-11-18 RX ORDER — SODIUM CHLORIDE AND POTASSIUM CHLORIDE 300; 900 MG/100ML; MG/100ML
250 INJECTION, SOLUTION INTRAVENOUS CONTINUOUS PRN
Status: DISCONTINUED | OUTPATIENT
Start: 2022-11-18 | End: 2022-11-19

## 2022-11-18 RX ORDER — POTASSIUM CHLORIDE, DEXTROSE MONOHYDRATE AND SODIUM CHLORIDE 300; 5; 900 MG/100ML; G/100ML; MG/100ML
150 INJECTION, SOLUTION INTRAVENOUS CONTINUOUS PRN
Status: DISCONTINUED | OUTPATIENT
Start: 2022-11-18 | End: 2022-11-19

## 2022-11-18 RX ORDER — GINGER ROOT/GINGER ROOT EXT 262.5 MG
1 CAPSULE ORAL 2 TIMES DAILY WITH MEALS
Status: DISCONTINUED | OUTPATIENT
Start: 2022-11-18 | End: 2022-11-22 | Stop reason: HOSPADM

## 2022-11-18 RX ORDER — ENOXAPARIN SODIUM 100 MG/ML
40 INJECTION SUBCUTANEOUS DAILY
Status: DISCONTINUED | OUTPATIENT
Start: 2022-11-18 | End: 2022-11-22 | Stop reason: HOSPADM

## 2022-11-18 RX ORDER — DEXTROSE AND SODIUM CHLORIDE 5; .9 G/100ML; G/100ML
150 INJECTION, SOLUTION INTRAVENOUS CONTINUOUS PRN
Status: DISCONTINUED | OUTPATIENT
Start: 2022-11-18 | End: 2022-11-19

## 2022-11-18 RX ORDER — SODIUM CHLORIDE 9 MG/ML
40 INJECTION, SOLUTION INTRAVENOUS AS NEEDED
Status: DISCONTINUED | OUTPATIENT
Start: 2022-11-18 | End: 2022-11-22 | Stop reason: HOSPADM

## 2022-11-18 RX ORDER — ACETAMINOPHEN 160 MG/5ML
650 SOLUTION ORAL EVERY 4 HOURS PRN
Status: DISCONTINUED | OUTPATIENT
Start: 2022-11-18 | End: 2022-11-22 | Stop reason: HOSPADM

## 2022-11-18 RX ORDER — ONDANSETRON 2 MG/ML
4 INJECTION INTRAMUSCULAR; INTRAVENOUS EVERY 6 HOURS PRN
Status: DISCONTINUED | OUTPATIENT
Start: 2022-11-18 | End: 2022-11-22 | Stop reason: HOSPADM

## 2022-11-18 RX ORDER — ACETAMINOPHEN 650 MG/1
650 SUPPOSITORY RECTAL EVERY 4 HOURS PRN
Status: DISCONTINUED | OUTPATIENT
Start: 2022-11-18 | End: 2022-11-22 | Stop reason: HOSPADM

## 2022-11-18 RX ORDER — HYDROCHLOROTHIAZIDE 25 MG/1
12.5 TABLET ORAL DAILY
Status: DISCONTINUED | OUTPATIENT
Start: 2022-11-19 | End: 2022-11-21

## 2022-11-18 RX ORDER — POTASSIUM CHLORIDE 29.8 MG/ML
20 INJECTION INTRAVENOUS
Status: DISCONTINUED | OUTPATIENT
Start: 2022-11-18 | End: 2022-11-22 | Stop reason: HOSPADM

## 2022-11-18 RX ORDER — ACETAMINOPHEN 325 MG/1
650 TABLET ORAL EVERY 4 HOURS PRN
Status: DISCONTINUED | OUTPATIENT
Start: 2022-11-18 | End: 2022-11-22 | Stop reason: HOSPADM

## 2022-11-18 RX ORDER — DEXTROSE, SODIUM CHLORIDE, AND POTASSIUM CHLORIDE 5; .45; .3 G/100ML; G/100ML; G/100ML
150 INJECTION INTRAVENOUS CONTINUOUS PRN
Status: DISCONTINUED | OUTPATIENT
Start: 2022-11-18 | End: 2022-11-19

## 2022-11-18 RX ORDER — SODIUM CHLORIDE 0.9 % (FLUSH) 0.9 %
10 SYRINGE (ML) INJECTION AS NEEDED
Status: DISCONTINUED | OUTPATIENT
Start: 2022-11-18 | End: 2022-11-19

## 2022-11-18 RX ORDER — SODIUM CHLORIDE 0.9 % (FLUSH) 0.9 %
10 SYRINGE (ML) INJECTION EVERY 12 HOURS SCHEDULED
Status: DISCONTINUED | OUTPATIENT
Start: 2022-11-18 | End: 2022-11-22 | Stop reason: HOSPADM

## 2022-11-18 RX ORDER — FAMOTIDINE 10 MG/ML
20 INJECTION, SOLUTION INTRAVENOUS ONCE
Status: COMPLETED | OUTPATIENT
Start: 2022-11-18 | End: 2022-11-18

## 2022-11-18 RX ORDER — AMLODIPINE BESYLATE 5 MG/1
5 TABLET ORAL DAILY
Status: DISCONTINUED | OUTPATIENT
Start: 2022-11-18 | End: 2022-11-22 | Stop reason: HOSPADM

## 2022-11-18 RX ORDER — SODIUM CHLORIDE AND POTASSIUM CHLORIDE 150; 900 MG/100ML; MG/100ML
250 INJECTION, SOLUTION INTRAVENOUS CONTINUOUS PRN
Status: DISCONTINUED | OUTPATIENT
Start: 2022-11-18 | End: 2022-11-19

## 2022-11-18 RX ORDER — SODIUM CHLORIDE AND POTASSIUM CHLORIDE 150; 450 MG/100ML; MG/100ML
250 INJECTION, SOLUTION INTRAVENOUS CONTINUOUS PRN
Status: DISCONTINUED | OUTPATIENT
Start: 2022-11-18 | End: 2022-11-19

## 2022-11-18 RX ORDER — ALBUTEROL SULFATE 90 UG/1
2 AEROSOL, METERED RESPIRATORY (INHALATION) EVERY 4 HOURS PRN
Status: DISCONTINUED | OUTPATIENT
Start: 2022-11-18 | End: 2022-11-22 | Stop reason: HOSPADM

## 2022-11-18 RX ORDER — DEXTROSE AND SODIUM CHLORIDE 5; .45 G/100ML; G/100ML
150 INJECTION, SOLUTION INTRAVENOUS CONTINUOUS PRN
Status: DISCONTINUED | OUTPATIENT
Start: 2022-11-18 | End: 2022-11-19

## 2022-11-18 RX ORDER — LATANOPROST 50 UG/ML
1 SOLUTION/ DROPS OPHTHALMIC NIGHTLY
Status: DISCONTINUED | OUTPATIENT
Start: 2022-11-18 | End: 2022-11-22 | Stop reason: HOSPADM

## 2022-11-18 RX ADMIN — BENZONATATE 200 MG: 100 CAPSULE ORAL at 22:35

## 2022-11-18 RX ADMIN — Medication 10 ML: at 16:21

## 2022-11-18 RX ADMIN — CEFTRIAXONE 1 G: 1 INJECTION, POWDER, FOR SOLUTION INTRAMUSCULAR; INTRAVENOUS at 12:05

## 2022-11-18 RX ADMIN — Medication 10 ML: at 20:50

## 2022-11-18 RX ADMIN — POTASSIUM CHLORIDE, DEXTROSE MONOHYDRATE AND SODIUM CHLORIDE 150 ML/HR: 150; 5; 450 INJECTION, SOLUTION INTRAVENOUS at 18:52

## 2022-11-18 RX ADMIN — Medication 1 TABLET: at 20:49

## 2022-11-18 RX ADMIN — SODIUM CHLORIDE 4.1 UNITS/HR: 9 INJECTION, SOLUTION INTRAVENOUS at 12:14

## 2022-11-18 RX ADMIN — BUDESONIDE AND FORMOTEROL FUMARATE DIHYDRATE 2 PUFF: 160; 4.5 AEROSOL RESPIRATORY (INHALATION) at 20:16

## 2022-11-18 RX ADMIN — ONDANSETRON 4 MG: 2 INJECTION INTRAMUSCULAR; INTRAVENOUS at 10:06

## 2022-11-18 RX ADMIN — SODIUM CHLORIDE, POTASSIUM CHLORIDE, SODIUM LACTATE AND CALCIUM CHLORIDE 500 ML: 600; 310; 30; 20 INJECTION, SOLUTION INTRAVENOUS at 09:59

## 2022-11-18 RX ADMIN — SODIUM CHLORIDE 1000 ML/HR: 9 INJECTION, SOLUTION INTRAVENOUS at 12:08

## 2022-11-18 RX ADMIN — AMLODIPINE BESYLATE 5 MG: 5 TABLET ORAL at 16:21

## 2022-11-18 RX ADMIN — POTASSIUM CHLORIDE, DEXTROSE MONOHYDRATE AND SODIUM CHLORIDE 150 ML/HR: 150; 5; 450 INJECTION, SOLUTION INTRAVENOUS at 23:34

## 2022-11-18 RX ADMIN — ENOXAPARIN SODIUM 40 MG: 100 INJECTION SUBCUTANEOUS at 16:21

## 2022-11-18 RX ADMIN — FAMOTIDINE 20 MG: 10 INJECTION INTRAVENOUS at 10:06

## 2022-11-18 RX ADMIN — LATANOPROST 1 DROP: 50 SOLUTION/ DROPS OPHTHALMIC at 20:50

## 2022-11-18 RX ADMIN — SODIUM CHLORIDE, POTASSIUM CHLORIDE, SODIUM LACTATE AND CALCIUM CHLORIDE 1000 ML: 600; 310; 30; 20 INJECTION, SOLUTION INTRAVENOUS at 10:00

## 2022-11-18 RX ADMIN — GUAIFENESIN 600 MG: 600 TABLET, EXTENDED RELEASE ORAL at 20:49

## 2022-11-18 RX ADMIN — POTASSIUM PHOSPHATE, MONOBASIC POTASSIUM PHOSPHATE, DIBASIC 30 MMOL: 224; 236 INJECTION, SOLUTION, CONCENTRATE INTRAVENOUS at 23:28

## 2022-11-18 RX ADMIN — MONTELUKAST SODIUM 10 MG: 10 TABLET, FILM COATED ORAL at 20:49

## 2022-11-18 NOTE — H&P
[facility] Medicine Services  INPATIENT HISTORY AND PHYSICAL       Patient Care Team:  Len Crowe MD as PCP - General  Resser, Huang Jimenez MD as Consulting Physician (Otolaryngology)  Tayler Burleson APRN (Inactive) as Nurse Practitioner (Family Medicine)  Dave Mcnally MD as Consulting Physician (Gastroenterology)  Lorraine Rodriguez APRN as Nurse Practitioner (Otolaryngology)  Vinh Goff PA-C as Physician Assistant (Orthopedic Surgery)      Date of Admission: 11/18/2022      Chief complaint   Chief Complaint   Patient presents with   • Fall   • Altered Mental Status   • Weakness - Generalized       Subjective     Patient is a 72 y.o. female with a past medical history of essential hypertension, type 2 diabetes mellitus, and asthma.  She presents with complaints of worsening generalized weakness of 2 days duration along with falls, and vomiting.    Patient has been having increased somnolence for the last 1 month.  In addition, she also tested positive for COVID-19 on 11/3/2022.  She subsequently noticed worsening generalized weakness with some falls over the last 2 days.  In addition she had vomiting today and then some confusion.  She denies abdominal pain.  She was noted to be tachypneic and so she was brought to the emergency room for evaluation.  Of note, patient has been having urinary incontinence over the last 2 months but denies dysuria or hematuria.      In the emergency room, he was found to be in DKA.  COVID-19 test was positive.  CT scan of the head did not show any lesion.  She was started on insulin protocol and recommended for admission.    Review of Systems   Constitutional: Positive for fatigue. Negative for activity change, appetite change, chills and fever.   HENT: Negative for congestion, ear pain, rhinorrhea, sore throat and trouble swallowing.    Respiratory: Negative for cough, chest tightness, shortness of breath and wheezing.   "  Cardiovascular: Negative for chest pain, palpitations and leg swelling.   Gastrointestinal: Positive for diarrhea. Negative for abdominal distention, abdominal pain, nausea and vomiting.   Genitourinary: Negative for difficulty urinating, dysuria and hematuria.   Musculoskeletal: Negative for arthralgias, back pain and myalgias.   Skin: Negative for pallor and rash.   Neurological: Positive for weakness. Negative for dizziness, syncope, light-headedness and headaches.   Hematological: Negative for adenopathy. Does not bruise/bleed easily.   Psychiatric/Behavioral: Negative for agitation and confusion. The patient is not nervous/anxious.      History  Past Medical History:   Diagnosis Date   • Allergic rhinitis    • Chronic laryngitis    • Diabetes mellitus (HCC)    • GERD (gastroesophageal reflux disease)    • Hypertension    • Hypertrophy of both inferior nasal turbinates    • Laryngopharyngeal reflux    • Nontoxic multinodular goiter    • Sicca laryngitis      Past Surgical History:   Procedure Laterality Date   • CARPAL TUNNEL RELEASE     • CHOLECYSTECTOMY     • COLONOSCOPY  10/01/2008    Multiple polyps removed   • COLONOSCOPY  09/18/2013    Diverticulosis in the sigmoid colon. Dr. Dave Mcnally Recall 5 years   • HAND SURGERY     • HYSTERECTOMY     • REPLACEMENT TOTAL KNEE Right 02/02/2021   • THYROIDECTOMY, PARTIAL Right 2010   • TONSILLECTOMY     • TUBAL ABDOMINAL LIGATION       Family History   Problem Relation Age of Onset   • Diabetes Mother    • Stroke Mother    • Heart disease Mother    • Heart disease Father    • Colon cancer Neg Hx    • Colon polyps Neg Hx      Social History     Tobacco Use   • Smoking status: Never   • Smokeless tobacco: Never   • Tobacco comments:     \"lived with smokers most of my life\"   Vaping Use   • Vaping Use: Never used   Substance Use Topics   • Alcohol use: No     Comment: Rare   • Drug use: No     Medications Prior to Admission   Medication Sig Dispense Refill Last Dose "   • acetaminophen (TYLENOL) 500 MG tablet Take 1,000 mg by mouth Every 6 (Six) Hours As Needed for mild pain (1-3).      • albuterol sulfate  (90 Base) MCG/ACT inhaler Inhale 2 puffs Every 4 (Four) Hours As Needed for Wheezing or Shortness of Air. 8 g 1    • amLODIPine (NORVASC) 5 MG tablet TAKE ONE TABLET DAILY 90 tablet 1    • aspirin 81 MG EC tablet Take 1 tablet by mouth Every Other Day. 32 tablet 0    • atorvastatin (LIPITOR) 20 MG tablet TAKE ONE TABLET DAILY 90 tablet 3    • B Complex-C (SUPER B COMPLEX PO) Take 1 tablet by mouth Daily.      • Blood Glucose Monitoring Suppl (Easy Step Glucose Monitor) device 1 Device Take As Directed. 1 each 0    • Calcium Carb-Cholecalciferol (CALCIUM-VITAMIN D) 600-400 MG-UNIT tablet Take 1 tablet by mouth 2 (Two) Times a Day.      • carboxymethylcellulose (REFRESH PLUS) 0.5 % solution 2 drops Daily As Needed for dry eyes.      • Cholecalciferol 25 MCG (1000 UT) capsule Take 1 capsule by mouth Daily.      • Coenzyme Q10 (CO Q 10 PO) Take 1 capsule by mouth Every Other Day.      • denosumab (PROLIA) 60 MG/ML solution syringe Inject 60 mg under the skin into the appropriate area as directed Every 6 (Six) Months.      • diclofenac sodium (VOTAREN XR) 100 MG 24 hr tablet Take 100 mg by mouth Daily.      • DULoxetine (CYMBALTA) 20 MG capsule TAKE ONE CAPSULE DAILY 30 capsule 5    • fluconazole (Diflucan) 100 MG tablet Take 1 tablet by mouth Daily. 2 tablet 0    • glucose blood test strip Use as instructed: once a day testing of the one touch ultra blue strips 50 each 11    • glucose blood test strip Use as instructed: once a day testing 50 each 12    • guaiFENesin (MUCINEX) 600 MG 12 hr tablet Take 600 mg by mouth 2 (Two) Times a Day.      • hydroCHLOROthiazide (MICROZIDE) 12.5 MG capsule TAKE ONE CAPSULE DAILY 90 capsule 3    • irbesartan (AVAPRO) 300 MG tablet TAKE ONE TABLET DAILY 90 tablet 3    • Lancets misc Test daily 100 each 12    • latanoprost (XALATAN) 0.005 %  ophthalmic solution Administer 1 drop to both eyes Every Night.  5    • metFORMIN ER (GLUCOPHAGE-XR) 500 MG 24 hr tablet Take 2 tablets by mouth 2 (Two) Times a Day for 90 days. 180 tablet 3    • Misc Natural Products (GLUCOSAMINE CHONDROITIN TRIPLE PO) Take 1 tablet by mouth 2 (Two) Times a Day.      • montelukast (SINGULAIR) 10 MG tablet TAKE ONE TABLET DAILY 90 tablet 3    • Multiple Vitamins-Minerals (VITRUM 50+ SENIOR MULTI PO) Take 1 tablet by mouth Daily.      • NON FORMULARY Immunotherapy      • potassium chloride 10 MEQ CR tablet TAKE ONE TABLET DAILY 90 tablet 3    • Semaglutide, 2 MG/DOSE, 8 MG/3ML solution pen-injector Inject 2 mg under the skin into the appropriate area as directed 1 (One) Time Per Week. 4.5 mL 5    • Symbicort 160-4.5 MCG/ACT inhaler INHALE TWO PUFFS TWO (TWO) TIMES A DAY. 30.6 each 3      Allergies:  Adhesive tape and Other  Prior to Admission medications    Medication Sig Start Date End Date Taking? Authorizing Provider   acetaminophen (TYLENOL) 500 MG tablet Take 1,000 mg by mouth Every 6 (Six) Hours As Needed for mild pain (1-3).    ProviderEstefania MD   albuterol sulfate  (90 Base) MCG/ACT inhaler Inhale 2 puffs Every 4 (Four) Hours As Needed for Wheezing or Shortness of Air. 11/2/21   Len Crowe MD   amLODIPine (NORVASC) 5 MG tablet TAKE ONE TABLET DAILY 7/8/22   Len Crowe MD   aspirin 81 MG EC tablet Take 1 tablet by mouth Every Other Day. 2/25/19   Len Crowe MD   atorvastatin (LIPITOR) 20 MG tablet TAKE ONE TABLET DAILY 8/19/22   Len Crowe MD   B Complex-C (SUPER B COMPLEX PO) Take 1 tablet by mouth Daily.    ProviderEstefania MD   Blood Glucose Monitoring Suppl (Easy Step Glucose Monitor) device 1 Device Take As Directed. 3/21/22   Len Crowe MD   Calcium Carb-Cholecalciferol (CALCIUM-VITAMIN D) 600-400 MG-UNIT tablet Take 1 tablet by mouth 2 (Two) Times a Day.    Estefania Mejia MD    carboxymethylcellulose (REFRESH PLUS) 0.5 % solution 2 drops Daily As Needed for dry eyes.    Estefania Mejia MD   Cholecalciferol 25 MCG (1000 UT) capsule Take 1 capsule by mouth Daily.    Estefania Mejia MD   Coenzyme Q10 (CO Q 10 PO) Take 1 capsule by mouth Every Other Day.    Estefania Mejia MD   denosumab (PROLIA) 60 MG/ML solution syringe Inject 60 mg under the skin into the appropriate area as directed Every 6 (Six) Months. 2/13/19   Estefania Mejia MD   diclofenac sodium (VOTAREN XR) 100 MG 24 hr tablet Take 100 mg by mouth Daily.    Estefania Mejia MD   DULoxetine (CYMBALTA) 20 MG capsule TAKE ONE CAPSULE DAILY 9/6/22   Len Crowe MD   fluconazole (Diflucan) 100 MG tablet Take 1 tablet by mouth Daily. 11/17/22   Len Crowe MD   glucose blood test strip Use as instructed: once a day testing of the one touch ultra blue strips 2/18/20   Len Crowe MD   glucose blood test strip Use as instructed: once a day testing 3/21/22   Len Crowe MD   guaiFENesin (MUCINEX) 600 MG 12 hr tablet Take 600 mg by mouth 2 (Two) Times a Day.    Estefania Mejia MD   hydroCHLOROthiazide (MICROZIDE) 12.5 MG capsule TAKE ONE CAPSULE DAILY 11/23/21   Len Crowe MD   irbesartan (AVAPRO) 300 MG tablet TAKE ONE TABLET DAILY 4/26/22   Len Crowe MD   Lancets misc Test daily 3/21/22   Len Crowe MD   latanoprost (XALATAN) 0.005 % ophthalmic solution Administer 1 drop to both eyes Every Night. 9/14/16   Estefania Mejia MD   metFORMIN ER (GLUCOPHAGE-XR) 500 MG 24 hr tablet Take 2 tablets by mouth 2 (Two) Times a Day for 90 days. 10/18/22 1/16/23  Len Crowe MD   Misc Natural Products (GLUCOSAMINE CHONDROITIN TRIPLE PO) Take 1 tablet by mouth 2 (Two) Times a Day.    Estefania Mejia MD   montelukast (SINGULAIR) 10 MG tablet TAKE ONE TABLET DAILY 6/23/22   Len Crowe MD   Multiple Vitamins-Minerals  (VITRUM 50+ SENIOR MULTI PO) Take 1 tablet by mouth Daily.    Provider, MD Estefania   NON FORMULARY Immunotherapy    Provider, MD Estefania   potassium chloride 10 MEQ CR tablet TAKE ONE TABLET DAILY 10/25/21   Len Crowe MD   Semaglutide, 2 MG/DOSE, 8 MG/3ML solution pen-injector Inject 2 mg under the skin into the appropriate area as directed 1 (One) Time Per Week. 10/24/22   Len Crowe MD   Symbicort 160-4.5 MCG/ACT inhaler INHALE TWO PUFFS TWO (TWO) TIMES A DAY. 12/27/21   Len Crowe MD   ipratropium-albuterol (DUO-NEB) 0.5-2.5 mg/3 ml nebulizer Take 3 mL by nebulization 4 (Four) Times a Day. 1/3/19 11/18/22  Len Crowe MD       I have reviewed the patient's current medications    Objective        Vital Signs  Temp:  [97 °F (36.1 °C)-97.9 °F (36.6 °C)] 97.9 °F (36.6 °C)  Heart Rate:  [] 80  Resp:  [16-31] 16  BP: (119-179)/(56-92) 139/62      Physical Exam  Constitutional:       General: She is not in acute distress.     Appearance: She is ill-appearing. She is not diaphoretic.   HENT:      Head: Normocephalic and atraumatic.      Right Ear: External ear normal.      Left Ear: External ear normal.      Nose: No congestion or rhinorrhea.      Mouth/Throat:      Mouth: Mucous membranes are moist.      Pharynx: No oropharyngeal exudate or posterior oropharyngeal erythema.   Eyes:      General: No scleral icterus.     Extraocular Movements: Extraocular movements intact.      Conjunctiva/sclera: Conjunctivae normal.   Cardiovascular:      Rate and Rhythm: Regular rhythm. Tachycardia present.      Heart sounds: Normal heart sounds. No murmur heard.  Pulmonary:      Effort: Pulmonary effort is normal. No respiratory distress.      Breath sounds: Normal breath sounds. No wheezing, rhonchi or rales.   Abdominal:      General: Abdomen is flat. There is no distension.      Palpations: Abdomen is soft.      Tenderness: There is no abdominal tenderness. There is no  guarding.   Musculoskeletal:         General: No swelling, tenderness or deformity.      Cervical back: Neck supple. No rigidity. No muscular tenderness.      Right lower leg: No edema.      Left lower leg: No edema.   Lymphadenopathy:      Cervical: No cervical adenopathy.   Skin:     General: Skin is warm and dry.   Neurological:      General: No focal deficit present.      Mental Status: She is alert and oriented to person, place, and time.      Cranial Nerves: No cranial nerve deficit.      Motor: No weakness.   Psychiatric:         Mood and Affect: Mood normal.         Behavior: Behavior normal.         Thought Content: Thought content normal.       Results Review:     Results from last 7 days   Lab Units 11/18/22 2022 11/18/22  1207 11/18/22  0955   SODIUM mmol/L 136 135* 131*   POTASSIUM mmol/L 3.9 5.3* 4.8   CHLORIDE mmol/L 110* 96* 91*   CO2 mmol/L 12.0* 8.0* 7.0*   BUN mg/dL 39* 49* 51*   CREATININE mg/dL 0.74 0.98 1.15*   GLUCOSE mg/dL 225* 517* 601*   CALCIUM mg/dL 8.7 10.0 10.7*   BILIRUBIN mg/dL  --   --  0.5   ALK PHOS U/L  --   --  108   ALT (SGPT) U/L  --   --  30   AST (SGOT) U/L  --   --  14       Results from last 7 days   Lab Units 11/18/22 2022 11/18/22  1846 11/18/22  1207 11/18/22  0955   MAGNESIUM mg/dL  --  2.1 2.4 2.7*   PHOSPHORUS mg/dL 1.7*  --  4.8* 5.7*       Results from last 7 days   Lab Units 11/18/22  0955   WBC 10*3/mm3 26.97*   HEMOGLOBIN g/dL 18.1*   HEMATOCRIT % 55.2*   PLATELETS 10*3/mm3 394       Results from last 7 days   Lab Units 11/18/22  0955   INR  1.06     Imaging Results (Last 7 Days)     Procedure Component Value Units Date/Time    CT Abdomen Pelvis Without Contrast [[id number]] Collected: 11/18/22 1136     Updated: 11/18/22 1158    Narrative:      EXAMINATION: CT ABDOMEN PELVIS WO CONTRAST-      11/18/2022 11:10 AM CST     HISTORY: Nausea/vomiting     In order to have a CT radiation dose as low as reasonably achievable  Automated Exposure Control was utilized for  adjustment of the mA and/or  KV according to patient size.     DLP in mGycm= 1081     The CT scan of the abdomen and pelvis performed without intravenous or  oral contrast enhancement.     Images are acquired in axial plane with subsequent reconstruction in  coronal and sagittal planes.     There is no previous study for comparison.     There is significant motion artifacts which low the diagnostic yield of  the study. Several images were repeated.     The lung bases included in the study are unremarkable except for a tiny  subpleural nodule in the right middle lobe, image #2 and series 3, which  probably represent a small calcified granuloma.     The limited visualized cardiomediastinal structures show atheromatous  changes of the coronary arteries. No significant cardiomegaly.     Unenhanced enhanced liver and spleen are unremarkable. A very small  splenule is seen.     The gallbladder is surgically absent.     The unenhanced pancreas is unremarkable.     The adrenal glands bilaterally are unremarkable.     The kidneys bilaterally are suboptimally evaluated due to significant  motion artifact. No significant mass is identified. No calculi are seen.  No hydronephrosis. The limited visualized ureters are nondilated. The  urinary bladder is moderately distended. No focal intrinsic abnormality  or a calculus.     The uterus is surgically absent. No adnexal masses.     There is a fat-containing umbilical hernia. There is moderate laxity of  the central middle to lower anterior abdominal wall with protrusion of  the large bowel loops. No donta herniation.     The stomach is full of fluid and small amount of gas. The duodenum and  small bowel are suboptimally evaluated due to extensive motion  artifacts. No finding to suggest obstruction. The appendix is not  visualized or evaluated. There is moderate gas and stool in the colon.  There is diverticulosis of the distal colon. No evidence for  diverticulitis.      Atheromatous changes of the abdominal aorta and iliac arteries. No  aneurysmal dilatation.     There is no evidence of abdominal or pelvic lymphadenopathy.     Images reviewed in bone window show chronic degenerative changes of the  lumbar spine with severe degeneration of the disc L4-5 and L5-S1. Mild  superior endplate compression of vertebra L1 is due to prominent Schmorl  node.       Impression:      1. Motion artifacts limits the diagnostic quality of the study. No acute  abnormality of the abdomen or pelvis.  2. Diverticulosis of the distal colon. No evidence for diverticulitis.     .                                   This report was finalized on 11/18/2022 11:55 by Dr. Pradeep Miller MD.    CT Head Without Contrast [[id number]] Collected: 11/18/22 1129     Updated: 11/18/22 1132    Narrative:      CT HEAD WO CONTRAST- 11/18/2022 11:10 AM CST     HISTORY: Mental status change, unknown cause     COMPARISON: None      DLP: 609 mGy cm. All CT scans are performed using dose optimization  techniques as appropriate to the performed exam and including at least  one of the following: Automated exposure control, adjustment of the mA  and/or kV according to size, and the use of the iterative reconstruction  technique.     TECHNIQUE: Serial axial tomographic images of the brain were obtained  without the use of intravenous contrast.      FINDINGS:   The midline structures are nondisplaced. There is mild cerebral and  cerebellar atrophy, with an associated increase in the prominence of the  ventricles and sulci. The basilar cisterns are normal in size and  configuration. There is no evidence of intracranial hemorrhage or  mass-effect. There is low attenuation in the periventricular white  matter, consistent with chronic ischemic change. There are no abnormal  extra-axial fluid collections. There is no evidence of tonsillar  herniation.      The included orbits and their contents are unremarkable. The  visualized  paranasal sinuses, mastoid air cells and middle ear cavities are clear.  The visualized osseous structures and overlying soft tissues of the  skull and face are intact.        Impression:         1. Mild cerebral and cerebellar atrophy with chronic microvascular  disease but no evidence of acute intracranial process.        This report was finalized on 11/18/2022 11:29 by Dr. Taran Cyr MD.    XR Chest 1 View [[id number]] Collected: 11/18/22 1047     Updated: 11/18/22 1051    Narrative:      EXAM/TECHNIQUE: XR CHEST 1 VW-     INDICATION: Central line placement     COMPARISON: 11/18/2022     FINDINGS:     RIGHT IJ CVL with tip overlying the SVC. Cardiac silhouette is normal  size. No pleural effusion, pneumothorax, or focal consolidation. No  acute osseous finding.       Impression:         RIGHT IJ CVL with tip overlying the SVC. No evidence of pneumothorax.  This report was finalized on 11/18/2022 10:48 by Dr. Javi Rothman MD.    XR Chest 1 View [[id number]] Collected: 11/18/22 0954     Updated: 11/18/22 0957    Narrative:      EXAM/TECHNIQUE: XR CHEST 1 VW-     INDICATION: vomiting     COMPARISON: None available.     FINDINGS:     The cardiac silhouette is normal size. No pleural effusion,  pneumothorax, or focal consolidation. No acute osseous findings.       Impression:         No acute findings.  This report was finalized on 11/18/2022 09:54 by Dr. Javi Rothman MD.          Assessment / Plan       Hospital Problem List:  Principal Problem:    DKA (diabetic ketoacidosis) (HCC)  Active Problems:    COVID-19 virus infection  Essential hypertension  GERD    Plan  Patient has DKA.  We will start DKA protocol with insulin drip and IV fluids.  Check BMP every 4 hours for closure of anion gap.    Patient has COVID-19 but is saturating adequately on room air.  We will avoid steroids or remdesivir for now.    Continue home medications for essential hypertension    DVT prophylaxis with subcutaneous  Lovenox    CODE STATUS is full code      I discussed the patient's findings and my recommendations with patient    I have utilized all available immediate resources to obtain, update, or review the patient's current medications.      I confirmed that the patient's Advance Care Plan is present, code status is documented, or surrogate decision maker is listed in the patient's medical record.      Marc Huynh MD  11/18/22  21:39 CST        Part of this note may be an electronic transcription/translation of spoken language to printed text using the Dragon Dictation System.

## 2022-11-18 NOTE — ED PROVIDER NOTES
Subjective   History of Present Illness  72-year-old patient who came the ER brought by the family with altered mental status.  The patient is tachypneic at this time.  Had a history of fall yesterday coming out of the bed was then helped up out of the floor back in the bed.  More confused having difficulty walking no focal neurological deficit denies any back pain neck pain pelvic pain but is confused.  Family states that they brought her in because she has vomited couple times in the car she vomited once while I was interviewing her.  At clear vomitus not coffee-ground.  Patient probably is in DKA she is tachypneic tachycardic confused has a history of diabetes mellitus.  We will get a CT of the head and abdomen to make sure there is nothing else going on.    Fall  Mechanism of injury: fall    Injury location: No localized trauma.  Incident location:  Home  Fall:     Fall occurred:  From a bed    Impact surface:  Hard floor    Point of impact:  Unable to specify    Entrapped after fall: yes    Suspicion of alcohol use: no    Suspicion of drug use: no    Tetanus status:  Up to date  Prior to arrival data:     Bystander interventions:  None    Orientation at scene:  Situation    Loss of consciousness: no      Amnesic to event: no    Associated symptoms: nausea and vomiting    Associated symptoms: no abdominal pain, no back pain, no chest pain, no difficulty breathing, no hearing loss, no loss of consciousness, no neck pain and no seizures    Risk factors: no anticoagulation therapy, no CABG, no COPD, no diabetes, no hemophilia and no past MI    Altered Mental Status  Presenting symptoms: confusion, disorientation and lethargy    Severity:  Moderate  Most recent episode:  Today  Episode history:  Single  Timing:  Constant  Progression:  Worsening  Context: dementia    Context: not drug use, not head injury, not recent change in medication, not recent illness and not recent infection    Associated symptoms: decreased  appetite, nausea and vomiting    Associated symptoms: no abdominal pain, no agitation, no bladder incontinence, no difficulty breathing, no fever, no light-headedness, no rash and no seizures    Weakness - Generalized  Associated symptoms: lethargy, nausea and vomiting    Associated symptoms: no abdominal pain, no chest pain, no fever, no loss of consciousness and no seizures        Review of Systems   Constitutional: Positive for decreased appetite. Negative for fever.   HENT: Negative.  Negative for hearing loss.    Eyes: Negative.    Respiratory: Negative.    Cardiovascular: Negative.  Negative for chest pain.   Gastrointestinal: Positive for nausea and vomiting. Negative for abdominal pain.   Endocrine: Negative.    Genitourinary: Negative.  Negative for bladder incontinence.   Musculoskeletal: Negative.  Negative for back pain and neck pain.   Skin: Negative.  Negative for rash.   Neurological: Negative for seizures, loss of consciousness and light-headedness.   Hematological: Negative.    Psychiatric/Behavioral: Positive for confusion. Negative for agitation.   All other systems reviewed and are negative.      Past Medical History:   Diagnosis Date   • Allergic rhinitis    • Chronic laryngitis    • Diabetes mellitus (HCC)    • GERD (gastroesophageal reflux disease)    • Hypertension    • Hypertrophy of both inferior nasal turbinates    • Laryngopharyngeal reflux    • Nontoxic multinodular goiter    • Sicca laryngitis        Allergies   Allergen Reactions   • Adhesive Tape Other (See Comments)     Tears and bruises skin   • Other Other (See Comments)     Bandaids make arm Red/slightly swollen       Past Surgical History:   Procedure Laterality Date   • CARPAL TUNNEL RELEASE     • CHOLECYSTECTOMY     • COLONOSCOPY  10/01/2008    Multiple polyps removed   • COLONOSCOPY  09/18/2013    Diverticulosis in the sigmoid colon. Dr. Dave Mcnally Recall 5 years   • HAND SURGERY     • HYSTERECTOMY     • REPLACEMENT TOTAL  "KNEE Right 02/02/2021   • THYROIDECTOMY, PARTIAL Right 2010   • TONSILLECTOMY     • TUBAL ABDOMINAL LIGATION         Family History   Problem Relation Age of Onset   • Diabetes Mother    • Stroke Mother    • Heart disease Mother    • Heart disease Father    • Colon cancer Neg Hx    • Colon polyps Neg Hx        Social History     Socioeconomic History   • Marital status:      Spouse name: Laurie   • Number of children: 2   • Years of education: 16   Tobacco Use   • Smoking status: Never   • Smokeless tobacco: Never   • Tobacco comments:     \"lived with smokers most of my life\"   Vaping Use   • Vaping Use: Never used   Substance and Sexual Activity   • Alcohol use: No     Comment: Rare   • Drug use: No   • Sexual activity: Defer     Partners: Male     Birth control/protection: None           Objective   Physical Exam  Vitals and nursing note reviewed. Exam conducted with a chaperone present.   Constitutional:       General: She is awake. She is not in acute distress.     Appearance: Normal appearance. She is well-developed. She is toxic-appearing and diaphoretic.   HENT:      Head: Normocephalic and atraumatic.      Mouth/Throat:      Mouth: Mucous membranes are moist.      Pharynx: Oropharynx is clear.   Eyes:      General: Lids are normal. Lids are everted, no foreign bodies appreciated.      Pupils: Pupils are equal, round, and reactive to light.   Neck:      Thyroid: No thyromegaly.      Vascular: Normal carotid pulses. No carotid bruit or JVD.      Trachea: Trachea and phonation normal. No tracheal tenderness or tracheal deviation.      Meningeal: Brudzinski's sign and Kernig's sign absent.   Cardiovascular:      Rate and Rhythm: Regular rhythm. Tachycardia present.      Pulses: Normal pulses.      Heart sounds: Normal heart sounds.   Pulmonary:      Effort: Pulmonary effort is normal. Tachypnea present. No respiratory distress.      Breath sounds: Normal breath sounds. No stridor.   Abdominal:      " General: Abdomen is flat. Bowel sounds are normal. There is no distension.      Palpations: Abdomen is soft. There is no mass.      Tenderness: There is no abdominal tenderness. There is no guarding.   Musculoskeletal:         General: Normal range of motion.      Cervical back: Full passive range of motion without pain, normal range of motion and neck supple. No rigidity.      Comments: Patient cervical spine T-spine L-spine is negative step-off laxity or tenderness scalp is atraumatic.  Anterior chest wall abdominal wall examination unremarkable.  Axial skeletal examination long bones are intact no deformity noted.  Joint examination range of motion of the pelvis over the hip over the knees of the shoulders and over the elbows are intact.  No obvious deformities noted.  Neurovascular examination upper lower extremity within normal limits and bilaterally symmetrical.   Skin:     General: Skin is warm.      Capillary Refill: Capillary refill takes more than 3 seconds.      Coloration: Skin is not pale.      Nails: There is no clubbing.   Neurological:      General: No focal deficit present.      Mental Status: Mental status is at baseline. She is disoriented and confused.      GCS: GCS eye subscore is 4. GCS verbal subscore is 4. GCS motor subscore is 6.      Cranial Nerves: Cranial nerves are intact and 2-12 are intact. No cranial nerve deficit, dysarthria or facial asymmetry.      Sensory: Sensation is intact. No sensory deficit.      Motor: Motor function is intact. No weakness, atrophy or abnormal muscle tone.      Deep Tendon Reflexes: Reflexes are normal and symmetric. Reflexes normal. Babinski sign absent on the right side. Babinski sign absent on the left side.      Reflex Scores:       Bicep reflexes are 2+ on the right side and 2+ on the left side.       Patellar reflexes are 2+ on the right side and 2+ on the left side.  Psychiatric:         Behavior: Behavior is cooperative.         Central Line At  Bedside    Date/Time: 11/18/2022 10:53 AM  Performed by: Saurav Botello MD  Authorized by: Saurav Botello MD     Consent:     Consent obtained:  Written    Consent given by:  Spouse    Risks, benefits, and alternatives were discussed: yes      Risks discussed:  Bleeding, arterial puncture, infection, nerve damage, pneumothorax and incorrect placement    Alternatives discussed:  Delayed treatment  Universal protocol:     Procedure explained and questions answered to patient or proxy's satisfaction: yes      Immediately prior to procedure, a time out was called: yes      Patient identity confirmed:  Hospital-assigned identification number  Pre-procedure details:     Indication(s): central venous access, hemodynamic monitoring and insufficient peripheral access      Hand hygiene: Hand hygiene performed prior to insertion      Sterile barrier technique: All elements of maximal sterile technique followed      Skin preparation:  Chlorhexidine    Skin preparation agent: Skin preparation agent completely dried prior to procedure    Anesthesia:     Anesthesia method:  Local infiltration    Local anesthetic:  Lidocaine 1% w/o epi  Procedure details:     Location:  R internal jugular    Patient position:  Trendelenburg    Procedural supplies:  Triple lumen    Catheter size:  9 Fr    Landmarks identified: yes      Ultrasound guidance: yes      Ultrasound guidance timing: prior to insertion and real time      Sterile ultrasound techniques: Sterile gel and sterile probe covers were used      Number of attempts:  1    Successful placement: yes    Post-procedure details:     Post-procedure:  Dressing applied    Assessment:  Blood return through all ports, free fluid flow and no pneumothorax on x-ray    Procedure completion:  Tolerated               ED Course  ED Course as of 11/18/22 1222   Fri Nov 18, 2022   1005 tachycardia [TS]   1046 ER COURSE    Patient was seen in the ED with the presenting complain of altered mentation and  confusion with the episode in which she fell on the floor did not sustain any trauma but was very weak and could not get off the floor.  Then had 2 episodes of vomiting came in the ED.  Patient history and physical examination and assessment and differential were consistent with..... Patient is confused with tachypnea tachycardia history of diabetes possibly DKA  The patient underwent a comprehensive work-up which includes  Labs: Chemistries lab work-up CBC blood cultures lactic acidosis etc.  Radiology: CT of the head and CT of the abdomen  Cardiology: EKG                Test Results: Patient's CT of the head and CT of the abdomen pelvis essentially negative.  Patient has got DKA with a mild UTI with dehydration obviously.         MDM:    During the process of evaluation and assessment treatment protocol was initiated for the patient  The patient received    IVF: 2 L of LR  Analgesics:  Antiemetics: Zofran  Antibiotics: Rocephin  Electrolytes:  Medication: Insulin has been initiated     in her age group           [TS]   1219 Consults obtained:  Procedures formed: Central line placement 72-year-old status [place] she was not eating well and getting more confused the past 3 to 4 days and then started vomiting today but in the middle she has a history of yesterday getting out of bed and just falling onto the floor would not able to get up off the bed no trauma but just was very very weak so.  She is pretty confused and out of head maintaining her airway her pH is 7.16 on the ABG is blood sugar is 601 BUN of 50 CO2 of 7 with anion gap 12 yes the chest x-ray was negative CT of the head was negative CT of the abdomen    Currently the patient's condition:  Hemodynamics/Vitals:  Exam:  Currently the patient is slightly more alert and answering questions.  Hemodynamically her tachypnea has decreased oxygen saturation maintaining on 2 L of oxygen.  Hemodynamically the blood pressure is still a bit on the higher side.  But  tachycardia has improved.  Her cap refill has improved slightly.  The patient is repeat examination of the abdomen is soft nontender she has not had any further episodes of vomiting while in the ER.  I have discussed this case with the hospitalist will admit to the ICU    I have informed the patient's family about the guarded prognosis of the patient's especially with DKA in her age group [TS]      ED Course User Index  [TS] Saurav Botello MD                                           MDM  Number of Diagnoses or Management Options  Diagnosis management comments: Differential Diagnosis:  I considered toxic-metabolic etiology, hypoglycemia, hyperglycemia, diabetic ketoacidosis, drug overdose, ethanol intoxication, thiamine deficiency, hypothermia, hyponatremia, hypernatremia, organ failure, liver failure, kidney failure, thyroid failure, adrenal failure, hypoxia, hypercarbia, ischemic stroke, intracranial bleed, subarachnoid hemorrhage, closed head injury, subdural hematoma, seizure activity, syncopal episode, infectious etiology, hypertensive encephalopathy, vasculitis, thrombotic thrombocytopenic purpura and disseminated intravascular coagulation as a possible cause of altered mental status in this patient. This is a partial list of diagnoses considered.              Amount and/or Complexity of Data Reviewed  Clinical lab tests: ordered and reviewed  Tests in the radiology section of CPT®: ordered and reviewed  Tests in the medicine section of CPT®: reviewed and ordered    Risk of Complications, Morbidity, and/or Mortality  Presenting problems: moderate  Diagnostic procedures: moderate  Management options: moderate        Final diagnoses:   Diabetic ketoacidosis without coma associated with other specified diabetes mellitus (HCC)   Altered mental status, unspecified altered mental status type   LEOLA (acute kidney injury) (HCC)   Acute UTI (urinary tract infection)   COVID   Troponin I above reference range       ED  Disposition  ED Disposition     ED Disposition   Decision to Admit    Condition   --    Comment   Level of Care: Critical Care [6]   Diagnosis: Diabetic ketoacidosis without coma associated with other specified diabetes mellitus (HCC) [[id number]]   Admitting Physician: SAMSON GOLDMAN [[id number]]   Attending Physician: SAMSON GOLDMAN [[id number]]   Certification: I Certify That Inpatient Hospital Services Are Medically Necessary For Greater Than 2 Midnights               No follow-up provider specified.       Medication List      No changes were made to your prescriptions during this visit.          Saurav Botello MD  11/18/22 [id number]

## 2022-11-19 LAB
ANION GAP SERPL CALCULATED.3IONS-SCNC: 10 MMOL/L (ref 5–15)
ANION GAP SERPL CALCULATED.3IONS-SCNC: 9 MMOL/L (ref 5–15)
BASOPHILS # BLD AUTO: 0.03 10*3/MM3 (ref 0–0.2)
BASOPHILS NFR BLD AUTO: 0.2 % (ref 0–1.5)
BUN SERPL-MCNC: 29 MG/DL (ref 8–23)
BUN SERPL-MCNC: 33 MG/DL (ref 8–23)
BUN/CREAT SERPL: 43.9 (ref 7–25)
BUN/CREAT SERPL: 56.9 (ref 7–25)
CALCIUM SPEC-SCNC: 8.2 MG/DL (ref 8.6–10.5)
CALCIUM SPEC-SCNC: 8.3 MG/DL (ref 8.6–10.5)
CHLORIDE SERPL-SCNC: 108 MMOL/L (ref 98–107)
CHLORIDE SERPL-SCNC: 113 MMOL/L (ref 98–107)
CO2 SERPL-SCNC: 14 MMOL/L (ref 22–29)
CO2 SERPL-SCNC: 19 MMOL/L (ref 22–29)
CREAT SERPL-MCNC: 0.58 MG/DL (ref 0.57–1)
CREAT SERPL-MCNC: 0.66 MG/DL (ref 0.57–1)
DEPRECATED RDW RBC AUTO: 43.1 FL (ref 37–54)
EGFRCR SERPLBLD CKD-EPI 2021: 92.2 ML/MIN/1.73
EGFRCR SERPLBLD CKD-EPI 2021: 95.1 ML/MIN/1.73
EOSINOPHIL # BLD AUTO: 0.01 10*3/MM3 (ref 0–0.4)
EOSINOPHIL NFR BLD AUTO: 0.1 % (ref 0.3–6.2)
ERYTHROCYTE [DISTWIDTH] IN BLOOD BY AUTOMATED COUNT: 13 % (ref 12.3–15.4)
GLUCOSE BLDC GLUCOMTR-MCNC: 124 MG/DL (ref 70–130)
GLUCOSE BLDC GLUCOMTR-MCNC: 125 MG/DL (ref 70–130)
GLUCOSE BLDC GLUCOMTR-MCNC: 132 MG/DL (ref 70–130)
GLUCOSE BLDC GLUCOMTR-MCNC: 136 MG/DL (ref 70–130)
GLUCOSE BLDC GLUCOMTR-MCNC: 138 MG/DL (ref 70–130)
GLUCOSE BLDC GLUCOMTR-MCNC: 149 MG/DL (ref 70–130)
GLUCOSE BLDC GLUCOMTR-MCNC: 153 MG/DL (ref 70–130)
GLUCOSE BLDC GLUCOMTR-MCNC: 154 MG/DL (ref 70–130)
GLUCOSE BLDC GLUCOMTR-MCNC: 157 MG/DL (ref 70–130)
GLUCOSE BLDC GLUCOMTR-MCNC: 157 MG/DL (ref 70–130)
GLUCOSE BLDC GLUCOMTR-MCNC: 170 MG/DL (ref 70–130)
GLUCOSE BLDC GLUCOMTR-MCNC: 285 MG/DL (ref 70–130)
GLUCOSE BLDC GLUCOMTR-MCNC: 288 MG/DL (ref 70–130)
GLUCOSE SERPL-MCNC: 155 MG/DL (ref 65–99)
GLUCOSE SERPL-MCNC: 184 MG/DL (ref 65–99)
HCT VFR BLD AUTO: 42.1 % (ref 34–46.6)
HGB BLD-MCNC: 14.2 G/DL (ref 12–15.9)
IMM GRANULOCYTES # BLD AUTO: 0.16 10*3/MM3 (ref 0–0.05)
IMM GRANULOCYTES NFR BLD AUTO: 0.9 % (ref 0–0.5)
LYMPHOCYTES # BLD AUTO: 0.28 10*3/MM3 (ref 0.7–3.1)
LYMPHOCYTES NFR BLD AUTO: 1.5 % (ref 19.6–45.3)
MAGNESIUM SERPL-MCNC: 2 MG/DL (ref 1.6–2.4)
MAGNESIUM SERPL-MCNC: 2.3 MG/DL (ref 1.6–2.4)
MCH RBC QN AUTO: 30.5 PG (ref 26.6–33)
MCHC RBC AUTO-ENTMCNC: 33.7 G/DL (ref 31.5–35.7)
MCV RBC AUTO: 90.5 FL (ref 79–97)
MONOCYTES # BLD AUTO: 1.68 10*3/MM3 (ref 0.1–0.9)
MONOCYTES NFR BLD AUTO: 9.3 % (ref 5–12)
NEUTROPHILS NFR BLD AUTO: 15.92 10*3/MM3 (ref 1.7–7)
NEUTROPHILS NFR BLD AUTO: 88 % (ref 42.7–76)
NRBC BLD AUTO-RTO: 0 /100 WBC (ref 0–0.2)
PHOSPHATE SERPL-MCNC: 2 MG/DL (ref 2.5–4.5)
PHOSPHATE SERPL-MCNC: 2 MG/DL (ref 2.5–4.5)
PHOSPHATE SERPL-MCNC: 2.5 MG/DL (ref 2.5–4.5)
PLATELET # BLD AUTO: 199 10*3/MM3 (ref 140–450)
PMV BLD AUTO: 9.9 FL (ref 6–12)
POTASSIUM SERPL-SCNC: 3.7 MMOL/L (ref 3.5–5.2)
POTASSIUM SERPL-SCNC: 5.1 MMOL/L (ref 3.5–5.2)
QT INTERVAL: 332 MS
QTC INTERVAL: 434 MS
RBC # BLD AUTO: 4.65 10*6/MM3 (ref 3.77–5.28)
SODIUM SERPL-SCNC: 136 MMOL/L (ref 136–145)
SODIUM SERPL-SCNC: 137 MMOL/L (ref 136–145)
WBC NRBC COR # BLD: 18.08 10*3/MM3 (ref 3.4–10.8)

## 2022-11-19 PROCEDURE — 84100 ASSAY OF PHOSPHORUS: CPT | Performed by: INTERNAL MEDICINE

## 2022-11-19 PROCEDURE — 94799 UNLISTED PULMONARY SVC/PX: CPT

## 2022-11-19 PROCEDURE — 63710000001 INSULIN LISPRO (HUMAN) PER 5 UNITS: Performed by: INTERNAL MEDICINE

## 2022-11-19 PROCEDURE — 83735 ASSAY OF MAGNESIUM: CPT | Performed by: INTERNAL MEDICINE

## 2022-11-19 PROCEDURE — 80048 BASIC METABOLIC PNL TOTAL CA: CPT | Performed by: INTERNAL MEDICINE

## 2022-11-19 PROCEDURE — 25010000002 ONDANSETRON PER 1 MG: Performed by: INTERNAL MEDICINE

## 2022-11-19 PROCEDURE — 25010000002 CEFTRIAXONE PER 250 MG: Performed by: INTERNAL MEDICINE

## 2022-11-19 PROCEDURE — 63710000001 INSULIN DETEMIR PER 5 UNITS: Performed by: INTERNAL MEDICINE

## 2022-11-19 PROCEDURE — 25010000002 ENOXAPARIN PER 10 MG: Performed by: INTERNAL MEDICINE

## 2022-11-19 PROCEDURE — 82962 GLUCOSE BLOOD TEST: CPT

## 2022-11-19 PROCEDURE — 85025 COMPLETE CBC W/AUTO DIFF WBC: CPT | Performed by: INTERNAL MEDICINE

## 2022-11-19 RX ORDER — INSULIN LISPRO 100 [IU]/ML
0-14 INJECTION, SOLUTION INTRAVENOUS; SUBCUTANEOUS
Status: DISCONTINUED | OUTPATIENT
Start: 2022-11-19 | End: 2022-11-22 | Stop reason: HOSPADM

## 2022-11-19 RX ORDER — DICLOFENAC SODIUM 75 MG/1
75 TABLET, DELAYED RELEASE ORAL 2 TIMES DAILY
COMMUNITY
End: 2022-11-22 | Stop reason: HOSPADM

## 2022-11-19 RX ORDER — NICOTINE POLACRILEX 4 MG
15 LOZENGE BUCCAL
Status: DISCONTINUED | OUTPATIENT
Start: 2022-11-19 | End: 2022-11-22 | Stop reason: HOSPADM

## 2022-11-19 RX ORDER — DEXTROSE MONOHYDRATE 25 G/50ML
25 INJECTION, SOLUTION INTRAVENOUS
Status: DISCONTINUED | OUTPATIENT
Start: 2022-11-19 | End: 2022-11-22 | Stop reason: HOSPADM

## 2022-11-19 RX ADMIN — ONDANSETRON 4 MG: 2 INJECTION INTRAMUSCULAR; INTRAVENOUS at 23:28

## 2022-11-19 RX ADMIN — POTASSIUM PHOSPHATE, MONOBASIC POTASSIUM PHOSPHATE, DIBASIC 15 MMOL: 224; 236 INJECTION, SOLUTION, CONCENTRATE INTRAVENOUS at 14:17

## 2022-11-19 RX ADMIN — INSULIN LISPRO 8 UNITS: 100 INJECTION, SOLUTION INTRAVENOUS; SUBCUTANEOUS at 16:33

## 2022-11-19 RX ADMIN — BUDESONIDE AND FORMOTEROL FUMARATE DIHYDRATE 2 PUFF: 160; 4.5 AEROSOL RESPIRATORY (INHALATION) at 20:19

## 2022-11-19 RX ADMIN — MONTELUKAST SODIUM 10 MG: 10 TABLET, FILM COATED ORAL at 20:31

## 2022-11-19 RX ADMIN — ATORVASTATIN CALCIUM 20 MG: 10 TABLET, FILM COATED ORAL at 08:37

## 2022-11-19 RX ADMIN — Medication 10 ML: at 20:33

## 2022-11-19 RX ADMIN — Medication 1 TABLET: at 08:37

## 2022-11-19 RX ADMIN — GUAIFENESIN 600 MG: 600 TABLET, EXTENDED RELEASE ORAL at 08:37

## 2022-11-19 RX ADMIN — Medication 1 TABLET: at 17:15

## 2022-11-19 RX ADMIN — Medication 10 ML: at 08:40

## 2022-11-19 RX ADMIN — CEFTRIAXONE SODIUM 1 G: 1 INJECTION, POWDER, FOR SOLUTION INTRAMUSCULAR; INTRAVENOUS at 13:00

## 2022-11-19 RX ADMIN — Medication 10 ML: at 08:41

## 2022-11-19 RX ADMIN — HYDROCHLOROTHIAZIDE 12.5 MG: 25 TABLET ORAL at 08:38

## 2022-11-19 RX ADMIN — ENOXAPARIN SODIUM 40 MG: 100 INJECTION SUBCUTANEOUS at 08:40

## 2022-11-19 RX ADMIN — GUAIFENESIN 600 MG: 600 TABLET, EXTENDED RELEASE ORAL at 20:31

## 2022-11-19 RX ADMIN — BUDESONIDE AND FORMOTEROL FUMARATE DIHYDRATE 2 PUFF: 160; 4.5 AEROSOL RESPIRATORY (INHALATION) at 06:18

## 2022-11-19 RX ADMIN — ASPIRIN 81 MG: 81 TABLET, COATED ORAL at 08:38

## 2022-11-19 RX ADMIN — INSULIN DETEMIR 25 UNITS: 100 INJECTION, SOLUTION SUBCUTANEOUS at 14:21

## 2022-11-19 RX ADMIN — POTASSIUM CHLORIDE, DEXTROSE MONOHYDRATE AND SODIUM CHLORIDE 150 ML/HR: 150; 5; 450 INJECTION, SOLUTION INTRAVENOUS at 05:28

## 2022-11-19 RX ADMIN — LOSARTAN POTASSIUM 100 MG: 50 TABLET, FILM COATED ORAL at 08:38

## 2022-11-19 RX ADMIN — INSULIN DETEMIR 25 UNITS: 100 INJECTION, SOLUTION SUBCUTANEOUS at 20:54

## 2022-11-19 RX ADMIN — DULOXETINE HYDROCHLORIDE 20 MG: 20 CAPSULE, DELAYED RELEASE ORAL at 08:36

## 2022-11-19 RX ADMIN — LATANOPROST 1 DROP: 50 SOLUTION/ DROPS OPHTHALMIC at 20:53

## 2022-11-19 RX ADMIN — AMLODIPINE BESYLATE 5 MG: 5 TABLET ORAL at 08:38

## 2022-11-19 NOTE — PLAN OF CARE
Goal Outcome Evaluation:  Plan of Care Reviewed With: patient     No acute events overnight. Pt required Phos replacement.

## 2022-11-19 NOTE — PROGRESS NOTES
[facility] Medicine Services  INPATIENT PROGRESS NOTE    Length of Stay: 1  Date of Admission: 11/18/2022  Primary Care Physician: Len Crowe MD    Subjective   Chief Complaint: Weakness    HPI: Patient states that her weakness is improving. She is alert and oriented x3. Anion gap is closed and DKA is resolved.    Review of Systems   Constitutional: Positive for fatigue. Negative for activity change, appetite change, chills and fever.   HENT: Negative for congestion, ear pain, rhinorrhea, sore throat and trouble swallowing.    Respiratory: Negative for cough, chest tightness, shortness of breath and wheezing.    Cardiovascular: Negative for chest pain, palpitations and leg swelling.   Gastrointestinal: Negative for abdominal distention, abdominal pain, diarrhea, nausea and vomiting.   Genitourinary: Negative for difficulty urinating, dysuria and hematuria.   Musculoskeletal: Negative for arthralgias, back pain and myalgias.   Skin: Negative for pallor and rash.   Neurological: Negative for dizziness, syncope, weakness, light-headedness and headaches.   Hematological: Negative for adenopathy. Does not bruise/bleed easily.   Psychiatric/Behavioral: Negative for agitation and confusion. The patient is not nervous/anxious.      Objective    Temp:  [97.6 °F (36.4 °C)-98.3 °F (36.8 °C)] 97.6 °F (36.4 °C)  Heart Rate:  [74-96] 94  Resp:  [15-19] 19  BP: (106-179)/(53-93) 156/82    Physical Exam  Constitutional:       General: She is not in acute distress.     Appearance: She is not ill-appearing or diaphoretic.   HENT:      Head: Normocephalic and atraumatic.      Right Ear: External ear normal.      Left Ear: External ear normal.      Nose: No congestion or rhinorrhea.      Mouth/Throat:      Mouth: Mucous membranes are moist.      Pharynx: No oropharyngeal exudate or posterior oropharyngeal erythema.   Eyes:      General: No scleral icterus.     Extraocular Movements:  Extraocular movements intact.      Conjunctiva/sclera: Conjunctivae normal.   Cardiovascular:      Rate and Rhythm: Normal rate and regular rhythm.      Heart sounds: Normal heart sounds. No murmur heard.  Pulmonary:      Effort: Pulmonary effort is normal. No respiratory distress.      Breath sounds: Normal breath sounds. No wheezing, rhonchi or rales.   Abdominal:      General: Abdomen is flat. There is no distension.      Palpations: Abdomen is soft.      Tenderness: There is no abdominal tenderness. There is no guarding.   Musculoskeletal:         General: No swelling, tenderness or deformity.      Cervical back: Neck supple. No rigidity. No muscular tenderness.      Right lower leg: No edema.      Left lower leg: No edema.   Lymphadenopathy:      Cervical: No cervical adenopathy.   Skin:     General: Skin is warm and dry.   Neurological:      General: No focal deficit present.      Mental Status: She is alert and oriented to person, place, and time.      Cranial Nerves: No cranial nerve deficit.      Motor: No weakness.   Psychiatric:         Mood and Affect: Mood normal.         Behavior: Behavior normal.         Thought Content: Thought content normal.       Medication Review:    Current Facility-Administered Medications:   •  acetaminophen (TYLENOL) tablet 650 mg, 650 mg, Oral, Q4H PRN **OR** acetaminophen (TYLENOL) 160 MG/5ML solution 650 mg, 650 mg, Oral, Q4H PRN **OR** acetaminophen (TYLENOL) suppository 650 mg, 650 mg, Rectal, Q4H PRN, Marc Huynh MD  •  albuterol sulfate HFA (PROVENTIL HFA;VENTOLIN HFA;PROAIR HFA) inhaler 2 puff, 2 puff, Inhalation, Q4H PRN, Marc Huynh MD  •  amLODIPine (NORVASC) tablet 5 mg, 5 mg, Oral, Daily, Marc Huynh MD, 5 mg at 11/19/22 0838  •  aspirin EC tablet 81 mg, 81 mg, Oral, Every Other Day, Marc Huynh MD, 81 mg at 11/19/22 0838  •  atorvastatin (LIPITOR) tablet 20 mg, 20 mg, Oral, Daily, Marc Huynh MD, 20 mg at 11/19/22 0837  •   benzonatate (TESSALON) capsule 200 mg, 200 mg, Oral, TID PRN, Marc Huynh MD, 200 mg at 11/18/22 2235  •  budesonide-formoterol (SYMBICORT) 160-4.5 MCG/ACT inhaler 2 puff, 2 puff, Inhalation, BID - RT, Marc Huynh MD, 2 puff at 11/19/22 0618  •  Calcium Carb-Cholecalciferol 600-20 MG-MCG tablet 1 tablet, 1 tablet, Oral, BID With Meals, Marc Huynh MD, 1 tablet at 11/19/22 0837  •  cefTRIAXone (ROCEPHIN) 1 g in sodium chloride 0.9 % 100 mL IVPB-VTB, 1 g, Intravenous, Q24H, Marc Huynh MD, Last Rate: 200 mL/hr at 11/19/22 1300, 1 g at 11/19/22 1300  •  dextrose (D50W) (25 g/50 mL) IV injection 25 g, 25 g, Intravenous, Q15 Min PRN, Marc Huynh MD  •  dextrose (GLUTOSE) oral gel 15 g, 15 g, Oral, Q15 Min PRN, Marc Huynh MD  •  DULoxetine (CYMBALTA) DR capsule 20 mg, 20 mg, Oral, Daily, Marc Huynh MD, 20 mg at 11/19/22 0836  •  Enoxaparin Sodium (LOVENOX) syringe 40 mg, 40 mg, Subcutaneous, Daily, Marc Huynh MD, 40 mg at 11/19/22 0840  •  glucagon (human recombinant) (GLUCAGEN DIAGNOSTIC) injection 1 mg, 1 mg, Intramuscular, Q15 Min PRN, Marc Huynh MD  •  Glycerin-Hypromellose- (ARTIFICIAL TEARS) 0.2-0.2-1 % ophthalmic solution solution 2 drop, 2 drop, Both Eyes, Q1H PRN, Marc Huynh MD  •  guaiFENesin (MUCINEX) 12 hr tablet 600 mg, 600 mg, Oral, BID, Marc Huynh MD, 600 mg at 11/19/22 0837  •  hydroCHLOROthiazide (HYDRODIURIL) tablet 12.5 mg, 12.5 mg, Oral, Daily, Marc Huynh MD, 12.5 mg at 11/19/22 0838  •  insulin detemir (LEVEMIR) injection 25 Units, 25 Units, Subcutaneous, Q12H, Marc Huynh MD, 25 Units at 11/19/22 1421  •  Insulin Lispro (humaLOG) injection 0-14 Units, 0-14 Units, Subcutaneous, TID AC, Marc Huynh MD  •  latanoprost (XALATAN) 0.005 % ophthalmic solution 1 drop, 1 drop, Both Eyes, Nightly, Marc Huynh MD, 1 drop at 11/18/22 2050  •  losartan (COZAAR) tablet 100 mg, 100  mg, Oral, Q24H, Marc Huynh MD, 100 mg at 11/19/22 0838  •  montelukast (SINGULAIR) tablet 10 mg, 10 mg, Oral, Nightly, Marc Huynh MD, 10 mg at 11/18/22 2049  •  Morphine sulfate (PF) injection 2 mg, 2 mg, Intravenous, Q3H PRN **AND** naloxone (NARCAN) injection 0.4 mg, 0.4 mg, Intravenous, Q5 Min PRN, Marc Huynh MD  •  multivitamin with minerals 1 tablet, 1 tablet, Oral, Daily, Marc Huynh MD, 1 tablet at 11/19/22 0837  •  ondansetron (ZOFRAN) injection 4 mg, 4 mg, Intravenous, Q6H PRN, Marc Huynh MD  •  potassium chloride 20 mEq in 50 mL IVPB, 20 mEq, Intravenous, Q1H PRN, Bruno Nava,   •  potassium phosphate 45 mmol in sodium chloride 0.9 % 250 mL infusion, 45 mmol, Intravenous, PRN **OR** potassium phosphate 30 mmol in sodium chloride 0.9 % 100 mL infusion, 30 mmol, Intravenous, PRN, Last Rate: 25 mL/hr at 11/18/22 2328, 30 mmol at 11/18/22 2328 **OR** potassium phosphate 15 mmol in sodium chloride 0.9 % 100 mL infusion, 15 mmol, Intravenous, PRN, Last Rate: 50 mL/hr at 11/19/22 1417, 15 mmol at 11/19/22 1417 **OR** sodium phosphates 45 mmol in sodium chloride 0.9 % 250 mL IVPB, 45 mmol, Intravenous, PRN **OR** sodium phosphates 30 mmol in sodium chloride 0.9 % 100 mL IVPB, 30 mmol, Intravenous, PRN **OR** sodium phosphates 15 mmol in sodium chloride 0.9 % 100 mL IVPB, 15 mmol, Intravenous, PRN, Bruno Nava,   •  sodium chloride 0.9 % flush 10 mL, 10 mL, Intravenous, Q12H, Marc Huynh MD, 10 mL at 11/19/22 0840  •  sodium chloride 0.9 % flush 10 mL, 10 mL, Intravenous, PRN, Marc Huynh MD  •  sodium chloride 0.9 % infusion 40 mL, 40 mL, Intravenous, PRN, Marc Huynh MD    I have reviewed the patient's current medications.     Results Review:  I have reviewed the labs, radiology results, and diagnostic studies.    Laboratory Data:   Results from last 7 days   Lab Units 11/19/22  0935 11/19/22  0551 11/18/22 2022 11/18/22  1207  11/18/22  0955   SODIUM mmol/L 137 136 136   < > 131*   POTASSIUM mmol/L 3.7 5.1 3.9   < > 4.8   CHLORIDE mmol/L 108* 113* 110*   < > 91*   CO2 mmol/L 19.0* 14.0* 12.0*   < > 7.0*   BUN mg/dL 29* 33* 39*   < > 51*   CREATININE mg/dL 0.66 0.58 0.74   < > 1.15*   GLUCOSE mg/dL 155* 184* 225*   < > 601*   CALCIUM mg/dL 8.3* 8.2* 8.7   < > 10.7*   BILIRUBIN mg/dL  --   --   --   --  0.5   ALK PHOS U/L  --   --   --   --  108   ALT (SGPT) U/L  --   --   --   --  30   AST (SGOT) U/L  --   --   --   --  14   ANION GAP mmol/L 10.0 9.0 14.0   < > 33.0*    < > = values in this interval not displayed.     Estimated Creatinine Clearance: 81.5 mL/min (by C-G formula based on SCr of 0.66 mg/dL).  Results from last 7 days   Lab Units 11/19/22  0935 11/18/22  2347 11/18/22 2022 11/18/22  1846   MAGNESIUM mg/dL 2.0 2.3  --  2.1   PHOSPHORUS mg/dL 2.0* 2.5 1.7*  --          Results from last 7 days   Lab Units 11/19/22  0144 11/18/22  0955   WBC 10*3/mm3 18.08* 26.97*   HEMOGLOBIN g/dL 14.2 18.1*   HEMATOCRIT % 42.1 55.2*   PLATELETS 10*3/mm3 199 394     Results from last 7 days   Lab Units 11/18/22  0955   INR  1.06       Culture Data:   Blood Culture   Date Value Ref Range Status   11/18/2022 No growth at 24 hours  Preliminary   11/18/2022 No growth at 24 hours  Preliminary     Urine Culture   Date Value Ref Range Status   11/18/2022 25,000 CFU/mL Gram Negative Bacilli (A)  Preliminary     No results found for: RESPCX  No results found for: WOUNDCX  No results found for: STOOLCX  No components found for: BODYFLD    Radiology Data:   Imaging Results (Last 24 Hours)     ** No results found for the last 24 hours. **        Assessment/Plan     Hospital Problem List:  Principal Problem:    DKA (diabetic ketoacidosis) (HCC)  Active Problems:    HTN (hypertension), benign    COVID-19 virus infection  Suspected urinary tract infection  Essential hypertension  GERD  Weakness     Plan  Patient's DKA has resolved.  Will discontinue insulin  drip and start Levemir and sliding scale insulin.    Continue IV ceftriaxone for suspected urinary tract infection.  Urine culture growing gram-negative bacilli.  Urinalysis sample was contaminated so unclear if this is a true pathogen.    Start PT for generalized weakness and patient's history of multiple falls    Patient has COVID-19 but is saturating adequately on room air.  We will avoid steroids or remdesivir for now.     Continue home medications for essential hypertension     DVT prophylaxis with subcutaneous Lovenox     CODE STATUS is full code    Discharge Planning: In progress    I confirmed that the patient's Advance Care Plan is present, code status is documented, or surrogate decision maker is listed in the patient's medical record.      I have utilized all available immediate resources to obtain, update, or review the patient's current medications.      Marc Huynh MD   11/19/22   16:21 CST

## 2022-11-19 NOTE — PLAN OF CARE
Goal Outcome Evaluation:              Outcome Evaluation: Sliding scale insulin. Phosphorus replaced. Room air. Oriented x4 but forgetful at times. Adequate UO. Weight shifting assistance provided. Continue to monitor.

## 2022-11-19 NOTE — PLAN OF CARE
Goal Outcome Evaluation:  Plan of Care Reviewed With: other (see comments)        Progress: no change  Outcome Evaluation: Initial nutrition assessment. DM education consult per DKA protocol. Pt is admitted with DKA, worsening weakness with falls over the last 2 days, vomiting, and COVID-19. She is ordered a regular diet, but has not been started  on the diet yet. She has been on glucommander protocol with an insulin drip. BS has improved, but A1c is elevated at 11.3. Will modify regular diet to C.CHO d/t DM2, DKA, and elevated A1c. Will follow to establish po intake. Will start Boost gluc control BID per COVID recommendations and presumed decreased intake/appetite. Follow per protocol, provide DM education when condition improves and appropriate.

## 2022-11-20 LAB
ANION GAP SERPL CALCULATED.3IONS-SCNC: 14 MMOL/L (ref 5–15)
BACTERIA SPEC AEROBE CULT: ABNORMAL
BASOPHILS # BLD AUTO: 0.02 10*3/MM3 (ref 0–0.2)
BASOPHILS NFR BLD AUTO: 0.2 % (ref 0–1.5)
BUN SERPL-MCNC: 24 MG/DL (ref 8–23)
BUN/CREAT SERPL: 40 (ref 7–25)
CALCIUM SPEC-SCNC: 8.9 MG/DL (ref 8.6–10.5)
CHLORIDE SERPL-SCNC: 98 MMOL/L (ref 98–107)
CO2 SERPL-SCNC: 20 MMOL/L (ref 22–29)
CREAT SERPL-MCNC: 0.6 MG/DL (ref 0.57–1)
DEPRECATED RDW RBC AUTO: 41.6 FL (ref 37–54)
EGFRCR SERPLBLD CKD-EPI 2021: 94.3 ML/MIN/1.73
EOSINOPHIL # BLD AUTO: 0.01 10*3/MM3 (ref 0–0.4)
EOSINOPHIL NFR BLD AUTO: 0.1 % (ref 0.3–6.2)
ERYTHROCYTE [DISTWIDTH] IN BLOOD BY AUTOMATED COUNT: 12.7 % (ref 12.3–15.4)
GLUCOSE BLDC GLUCOMTR-MCNC: 185 MG/DL (ref 70–130)
GLUCOSE BLDC GLUCOMTR-MCNC: 198 MG/DL (ref 70–130)
GLUCOSE BLDC GLUCOMTR-MCNC: 262 MG/DL (ref 70–130)
GLUCOSE BLDC GLUCOMTR-MCNC: 298 MG/DL (ref 70–130)
GLUCOSE SERPL-MCNC: 286 MG/DL (ref 65–99)
HCT VFR BLD AUTO: 42.4 % (ref 34–46.6)
HGB BLD-MCNC: 14.5 G/DL (ref 12–15.9)
IMM GRANULOCYTES # BLD AUTO: 0.1 10*3/MM3 (ref 0–0.05)
IMM GRANULOCYTES NFR BLD AUTO: 0.8 % (ref 0–0.5)
LYMPHOCYTES # BLD AUTO: 0.58 10*3/MM3 (ref 0.7–3.1)
LYMPHOCYTES NFR BLD AUTO: 4.6 % (ref 19.6–45.3)
MCH RBC QN AUTO: 30.2 PG (ref 26.6–33)
MCHC RBC AUTO-ENTMCNC: 34.2 G/DL (ref 31.5–35.7)
MCV RBC AUTO: 88.3 FL (ref 79–97)
MONOCYTES # BLD AUTO: 1.28 10*3/MM3 (ref 0.1–0.9)
MONOCYTES NFR BLD AUTO: 10.2 % (ref 5–12)
NEUTROPHILS NFR BLD AUTO: 10.6 10*3/MM3 (ref 1.7–7)
NEUTROPHILS NFR BLD AUTO: 84.1 % (ref 42.7–76)
NRBC BLD AUTO-RTO: 0 /100 WBC (ref 0–0.2)
PLATELET # BLD AUTO: 164 10*3/MM3 (ref 140–450)
PMV BLD AUTO: 9.5 FL (ref 6–12)
POTASSIUM SERPL-SCNC: 3.6 MMOL/L (ref 3.5–5.2)
RBC # BLD AUTO: 4.8 10*6/MM3 (ref 3.77–5.28)
SODIUM SERPL-SCNC: 132 MMOL/L (ref 136–145)
WBC NRBC COR # BLD: 12.59 10*3/MM3 (ref 3.4–10.8)

## 2022-11-20 PROCEDURE — 94799 UNLISTED PULMONARY SVC/PX: CPT

## 2022-11-20 PROCEDURE — 63710000001 INSULIN DETEMIR PER 5 UNITS: Performed by: INTERNAL MEDICINE

## 2022-11-20 PROCEDURE — 25010000002 ONDANSETRON PER 1 MG: Performed by: INTERNAL MEDICINE

## 2022-11-20 PROCEDURE — 25010000002 ENOXAPARIN PER 10 MG: Performed by: INTERNAL MEDICINE

## 2022-11-20 PROCEDURE — 85025 COMPLETE CBC W/AUTO DIFF WBC: CPT | Performed by: INTERNAL MEDICINE

## 2022-11-20 PROCEDURE — 25010000002 CEFTRIAXONE PER 250 MG: Performed by: INTERNAL MEDICINE

## 2022-11-20 PROCEDURE — 80048 BASIC METABOLIC PNL TOTAL CA: CPT | Performed by: INTERNAL MEDICINE

## 2022-11-20 PROCEDURE — 82962 GLUCOSE BLOOD TEST: CPT

## 2022-11-20 PROCEDURE — 63710000001 INSULIN LISPRO (HUMAN) PER 5 UNITS: Performed by: INTERNAL MEDICINE

## 2022-11-20 RX ORDER — SODIUM CHLORIDE 9 MG/ML
125 INJECTION, SOLUTION INTRAVENOUS CONTINUOUS
Status: DISCONTINUED | OUTPATIENT
Start: 2022-11-20 | End: 2022-11-22 | Stop reason: HOSPADM

## 2022-11-20 RX ADMIN — ATORVASTATIN CALCIUM 20 MG: 10 TABLET, FILM COATED ORAL at 08:27

## 2022-11-20 RX ADMIN — INSULIN DETEMIR 30 UNITS: 100 INJECTION, SOLUTION SUBCUTANEOUS at 22:04

## 2022-11-20 RX ADMIN — GUAIFENESIN 600 MG: 600 TABLET, EXTENDED RELEASE ORAL at 08:27

## 2022-11-20 RX ADMIN — SODIUM CHLORIDE 75 ML/HR: 9 INJECTION, SOLUTION INTRAVENOUS at 11:12

## 2022-11-20 RX ADMIN — AMLODIPINE BESYLATE 5 MG: 5 TABLET ORAL at 08:26

## 2022-11-20 RX ADMIN — GUAIFENESIN 600 MG: 600 TABLET, EXTENDED RELEASE ORAL at 22:03

## 2022-11-20 RX ADMIN — LATANOPROST 1 DROP: 50 SOLUTION/ DROPS OPHTHALMIC at 22:03

## 2022-11-20 RX ADMIN — Medication 1 TABLET: at 17:30

## 2022-11-20 RX ADMIN — INSULIN DETEMIR 25 UNITS: 100 INJECTION, SOLUTION SUBCUTANEOUS at 08:29

## 2022-11-20 RX ADMIN — Medication 1 TABLET: at 07:33

## 2022-11-20 RX ADMIN — BUDESONIDE AND FORMOTEROL FUMARATE DIHYDRATE 2 PUFF: 160; 4.5 AEROSOL RESPIRATORY (INHALATION) at 20:57

## 2022-11-20 RX ADMIN — LOSARTAN POTASSIUM 100 MG: 50 TABLET, FILM COATED ORAL at 08:26

## 2022-11-20 RX ADMIN — INSULIN LISPRO 3 UNITS: 100 INJECTION, SOLUTION INTRAVENOUS; SUBCUTANEOUS at 17:34

## 2022-11-20 RX ADMIN — CEFTRIAXONE SODIUM 1 G: 1 INJECTION, POWDER, FOR SOLUTION INTRAMUSCULAR; INTRAVENOUS at 11:51

## 2022-11-20 RX ADMIN — Medication 10 ML: at 08:28

## 2022-11-20 RX ADMIN — Medication 1 TABLET: at 08:27

## 2022-11-20 RX ADMIN — MONTELUKAST SODIUM 10 MG: 10 TABLET, FILM COATED ORAL at 22:03

## 2022-11-20 RX ADMIN — POTASSIUM PHOSPHATE, MONOBASIC POTASSIUM PHOSPHATE, DIBASIC 15 MMOL: 224; 236 INJECTION, SOLUTION, CONCENTRATE INTRAVENOUS at 10:23

## 2022-11-20 RX ADMIN — HYDROCHLOROTHIAZIDE 12.5 MG: 25 TABLET ORAL at 08:26

## 2022-11-20 RX ADMIN — DULOXETINE HYDROCHLORIDE 20 MG: 20 CAPSULE, DELAYED RELEASE ORAL at 08:27

## 2022-11-20 RX ADMIN — Medication 10 ML: at 22:04

## 2022-11-20 RX ADMIN — BUDESONIDE AND FORMOTEROL FUMARATE DIHYDRATE 2 PUFF: 160; 4.5 AEROSOL RESPIRATORY (INHALATION) at 06:57

## 2022-11-20 RX ADMIN — INSULIN LISPRO 8 UNITS: 100 INJECTION, SOLUTION INTRAVENOUS; SUBCUTANEOUS at 07:33

## 2022-11-20 RX ADMIN — INSULIN LISPRO 8 UNITS: 100 INJECTION, SOLUTION INTRAVENOUS; SUBCUTANEOUS at 11:11

## 2022-11-20 RX ADMIN — ONDANSETRON 4 MG: 2 INJECTION INTRAMUSCULAR; INTRAVENOUS at 06:56

## 2022-11-20 RX ADMIN — ENOXAPARIN SODIUM 40 MG: 100 INJECTION SUBCUTANEOUS at 08:29

## 2022-11-20 NOTE — PLAN OF CARE
Goal Outcome Evaluation:  Plan of Care Reviewed With: patient           Outcome Evaluation: pt rested well throughout night, treated pt for N/V at around 0000 with prn zofran, VS stable, SR/ST on tele with PVCs and PACs, anticipate transfer out of icu today, has external female catheter, good urine output, alert and oriented   none

## 2022-11-20 NOTE — PROGRESS NOTES
[facility] Medicine Services  INPATIENT PROGRESS NOTE    Length of Stay: 2  Date of Admission: 11/18/2022  Primary Care Physician: Len Crowe MD    Subjective   Chief Complaint: Weakness    HPI: Patient had nausea and an episode of vomiting today.  She complains of fatigue.    Review of Systems   Constitutional: Positive for fatigue. Negative for activity change, appetite change, chills and fever.   HENT: Negative for congestion, ear pain, rhinorrhea, sore throat and trouble swallowing.    Respiratory: Negative for cough, chest tightness, shortness of breath and wheezing.    Cardiovascular: Negative for chest pain, palpitations and leg swelling.   Gastrointestinal: Positive for nausea. Negative for abdominal distention, abdominal pain, diarrhea and vomiting.   Genitourinary: Negative for difficulty urinating, dysuria and hematuria.   Musculoskeletal: Negative for arthralgias, back pain and myalgias.   Skin: Negative for pallor and rash.   Neurological: Negative for dizziness, syncope, weakness, light-headedness and headaches.   Hematological: Negative for adenopathy. Does not bruise/bleed easily.   Psychiatric/Behavioral: Negative for agitation and confusion. The patient is not nervous/anxious.      Objective    Temp:  [97.9 °F (36.6 °C)-98 °F (36.7 °C)] 97.9 °F (36.6 °C)  Heart Rate:  [] 91  Resp:  [15-22] 21  BP: ()/(54-92) 138/90    Physical Exam  Constitutional:       General: She is not in acute distress.     Appearance: She is not ill-appearing or diaphoretic.   HENT:      Head: Normocephalic and atraumatic.      Right Ear: External ear normal.      Left Ear: External ear normal.      Nose: No congestion or rhinorrhea.      Mouth/Throat:      Mouth: Mucous membranes are moist.      Pharynx: No oropharyngeal exudate or posterior oropharyngeal erythema.   Eyes:      General: No scleral icterus.     Extraocular Movements: Extraocular movements intact.       Conjunctiva/sclera: Conjunctivae normal.   Cardiovascular:      Rate and Rhythm: Normal rate and regular rhythm.      Heart sounds: Normal heart sounds. No murmur heard.  Pulmonary:      Effort: Pulmonary effort is normal. No respiratory distress.      Breath sounds: Normal breath sounds. No wheezing, rhonchi or rales.   Abdominal:      General: Abdomen is flat. There is no distension.      Palpations: Abdomen is soft.      Tenderness: There is no abdominal tenderness. There is no guarding.   Musculoskeletal:         General: No swelling, tenderness or deformity.      Cervical back: Neck supple. No rigidity. No muscular tenderness.      Right lower leg: No edema.      Left lower leg: No edema.   Lymphadenopathy:      Cervical: No cervical adenopathy.   Skin:     General: Skin is warm and dry.   Neurological:      General: No focal deficit present.      Mental Status: She is alert and oriented to person, place, and time.      Cranial Nerves: No cranial nerve deficit.      Motor: No weakness.   Psychiatric:         Mood and Affect: Mood normal.         Behavior: Behavior normal.         Thought Content: Thought content normal.       Medication Review:    Current Facility-Administered Medications:   •  acetaminophen (TYLENOL) tablet 650 mg, 650 mg, Oral, Q4H PRN **OR** acetaminophen (TYLENOL) 160 MG/5ML solution 650 mg, 650 mg, Oral, Q4H PRN **OR** acetaminophen (TYLENOL) suppository 650 mg, 650 mg, Rectal, Q4H PRN, Marc Huynh MD  •  albuterol sulfate HFA (PROVENTIL HFA;VENTOLIN HFA;PROAIR HFA) inhaler 2 puff, 2 puff, Inhalation, Q4H PRN, Marc Huynh MD  •  amLODIPine (NORVASC) tablet 5 mg, 5 mg, Oral, Daily, Marc Huynh MD, 5 mg at 11/20/22 0826  •  aspirin EC tablet 81 mg, 81 mg, Oral, Every Other Day, Marc Huynh MD, 81 mg at 11/19/22 0838  •  atorvastatin (LIPITOR) tablet 20 mg, 20 mg, Oral, Daily, Marc Huynh MD, 20 mg at 11/20/22 0827  •  benzonatate (TESSALON) capsule  200 mg, 200 mg, Oral, TID PRN, Marc Huynh MD, 200 mg at 11/18/22 2235  •  budesonide-formoterol (SYMBICORT) 160-4.5 MCG/ACT inhaler 2 puff, 2 puff, Inhalation, BID - RT, Marc Huynh MD, 2 puff at 11/20/22 0657  •  Calcium Carb-Cholecalciferol 600-20 MG-MCG tablet 1 tablet, 1 tablet, Oral, BID With Meals, Marc Huynh MD, 1 tablet at 11/20/22 0733  •  cefTRIAXone (ROCEPHIN) 1 g in sodium chloride 0.9 % 100 mL IVPB-VTB, 1 g, Intravenous, Q24H, Marc Huynh MD, Last Rate: 200 mL/hr at 11/20/22 1151, 1 g at 11/20/22 1151  •  dextrose (D50W) (25 g/50 mL) IV injection 25 g, 25 g, Intravenous, Q15 Min PRN, Marc Huynh MD  •  dextrose (GLUTOSE) oral gel 15 g, 15 g, Oral, Q15 Min PRN, Marc Huynh MD  •  DULoxetine (CYMBALTA) DR capsule 20 mg, 20 mg, Oral, Daily, Marc Huynh MD, 20 mg at 11/20/22 0827  •  Enoxaparin Sodium (LOVENOX) syringe 40 mg, 40 mg, Subcutaneous, Daily, Marc Huynh MD, 40 mg at 11/20/22 0829  •  glucagon (human recombinant) (GLUCAGEN DIAGNOSTIC) injection 1 mg, 1 mg, Intramuscular, Q15 Min PRN, Marc Huynh MD  •  Glycerin-Hypromellose- (ARTIFICIAL TEARS) 0.2-0.2-1 % ophthalmic solution solution 2 drop, 2 drop, Both Eyes, Q1H PRN, Marc Huynh MD  •  guaiFENesin (MUCINEX) 12 hr tablet 600 mg, 600 mg, Oral, BID, Marc Huynh MD, 600 mg at 11/20/22 0827  •  hydroCHLOROthiazide (HYDRODIURIL) tablet 12.5 mg, 12.5 mg, Oral, Daily, Marc Huynh MD, 12.5 mg at 11/20/22 0826  •  insulin detemir (LEVEMIR) injection 30 Units, 30 Units, Subcutaneous, Q12H, Marc Huynh MD  •  Insulin Lispro (humaLOG) injection 0-14 Units, 0-14 Units, Subcutaneous, TID AC, Marc Huynh MD, 8 Units at 11/20/22 1111  •  latanoprost (XALATAN) 0.005 % ophthalmic solution 1 drop, 1 drop, Both Eyes, Nightly, Marc Huynh MD, 1 drop at 11/19/22 2053  •  losartan (COZAAR) tablet 100 mg, 100 mg, Oral, Q24H, Marc Huynh  MD RAFAEL, 100 mg at 11/20/22 0826  •  montelukast (SINGULAIR) tablet 10 mg, 10 mg, Oral, Nightly, Marc Huynh MD, 10 mg at 11/19/22 2031  •  Morphine sulfate (PF) injection 2 mg, 2 mg, Intravenous, Q3H PRN **AND** naloxone (NARCAN) injection 0.4 mg, 0.4 mg, Intravenous, Q5 Min PRN, Marc Huynh MD  •  multivitamin with minerals 1 tablet, 1 tablet, Oral, Daily, Marc Huynh MD, 1 tablet at 11/20/22 0827  •  ondansetron (ZOFRAN) injection 4 mg, 4 mg, Intravenous, Q6H PRN, Marc Huynh MD, 4 mg at 11/20/22 0656  •  potassium chloride 20 mEq in 50 mL IVPB, 20 mEq, Intravenous, Q1H PRN, Marc Huynh MD  •  potassium phosphate 45 mmol in sodium chloride 0.9 % 250 mL infusion, 45 mmol, Intravenous, PRN **OR** potassium phosphate 30 mmol in sodium chloride 0.9 % 100 mL infusion, 30 mmol, Intravenous, PRN, Last Rate: 25 mL/hr at 11/18/22 2328, 30 mmol at 11/18/22 2328 **OR** potassium phosphate 15 mmol in sodium chloride 0.9 % 100 mL infusion, 15 mmol, Intravenous, PRN, Last Rate: 50 mL/hr at 11/20/22 1023, 15 mmol at 11/20/22 1023 **OR** sodium phosphates 45 mmol in sodium chloride 0.9 % 250 mL IVPB, 45 mmol, Intravenous, PRN **OR** sodium phosphates 30 mmol in sodium chloride 0.9 % 100 mL IVPB, 30 mmol, Intravenous, PRN **OR** sodium phosphates 15 mmol in sodium chloride 0.9 % 100 mL IVPB, 15 mmol, Intravenous, PRN, Marc Huynh MD  •  sodium chloride 0.9 % flush 10 mL, 10 mL, Intravenous, Q12H, Marc Huynh MD, 10 mL at 11/20/22 0828  •  sodium chloride 0.9 % flush 10 mL, 10 mL, Intravenous, PRN, Marc Huynh MD  •  sodium chloride 0.9 % infusion 40 mL, 40 mL, Intravenous, PRN, Marc Huynh MD  •  sodium chloride 0.9 % infusion, 75 mL/hr, Intravenous, Continuous, Marc Huynh MD, Last Rate: 75 mL/hr at 11/20/22 1112, 75 mL/hr at 11/20/22 1112    I have reviewed the patient's current medications.     Results Review:  I have reviewed the labs, radiology  results, and diagnostic studies.    Laboratory Data:   Results from last 7 days   Lab Units 11/20/22  0857 11/19/22  0935 11/19/22  0551 11/18/22  1207 11/18/22  0955   SODIUM mmol/L 132* 137 136   < > 131*   POTASSIUM mmol/L 3.6 3.7 5.1   < > 4.8   CHLORIDE mmol/L 98 108* 113*   < > 91*   CO2 mmol/L 20.0* 19.0* 14.0*   < > 7.0*   BUN mg/dL 24* 29* 33*   < > 51*   CREATININE mg/dL 0.60 0.66 0.58   < > 1.15*   GLUCOSE mg/dL 286* 155* 184*   < > 601*   CALCIUM mg/dL 8.9 8.3* 8.2*   < > 10.7*   BILIRUBIN mg/dL  --   --   --   --  0.5   ALK PHOS U/L  --   --   --   --  108   ALT (SGPT) U/L  --   --   --   --  30   AST (SGOT) U/L  --   --   --   --  14   ANION GAP mmol/L 14.0 10.0 9.0   < > 33.0*    < > = values in this interval not displayed.     Estimated Creatinine Clearance: 91.7 mL/min (by C-G formula based on SCr of 0.6 mg/dL).  Results from last 7 days   Lab Units 11/19/22  2321 11/19/22  0935 11/18/22  2347 11/18/22 2022 11/18/22  1846   MAGNESIUM mg/dL  --  2.0 2.3  --  2.1   PHOSPHORUS mg/dL 2.0* 2.0* 2.5   < >  --     < > = values in this interval not displayed.         Results from last 7 days   Lab Units 11/20/22  0217 11/19/22  0144 11/18/22  0955   WBC 10*3/mm3 12.59* 18.08* 26.97*   HEMOGLOBIN g/dL 14.5 14.2 18.1*   HEMATOCRIT % 42.4 42.1 55.2*   PLATELETS 10*3/mm3 164 199 394     Results from last 7 days   Lab Units 11/18/22  0955   INR  1.06       Culture Data:   Blood Culture   Date Value Ref Range Status   11/18/2022 No growth at 2 days  Preliminary   11/18/2022 No growth at 2 days  Preliminary     Urine Culture   Date Value Ref Range Status   11/18/2022 25,000 CFU/mL Klebsiella pneumoniae ssp pneumoniae (A)  Final     No results found for: RESPCX  No results found for: WOUNDCX  No results found for: STOOLCX  No components found for: BODYFLD    Radiology Data:   Imaging Results (Last 24 Hours)     ** No results found for the last 24 hours. **        Assessment/Plan     Hospital Problem  List:  Principal Problem:    DKA (diabetic ketoacidosis) ([facility])  Active Problems:    HTN (hypertension), benign    COVID-19 virus infection  Acute kidney injury  History of COVID-19  Suspected urinary tract infection  Essential hypertension  GERD  Weakness     Plan  Patient's DKA has resolved.  Increase Levemir to 30 units every 12 hours.  Continue sliding scale insulin.    Continue IV ceftriaxone for suspected urinary tract infection.    Start PT for generalized weakness and patient's history of multiple falls.  Patient may benefit from home health for physical therapy.    Patient recently tested positive for COVID-19 but is saturating adequately on room air.    No indication for treatment.    Continue home medications for essential hypertension     DVT prophylaxis with subcutaneous Lovenox     CODE STATUS is full code    Discharge Planning: In progress    I confirmed that the patient's Advance Care Plan is present, code status is documented, or surrogate decision maker is listed in the patient's medical record.      I have utilized all available immediate resources to obtain, update, or review the patient's current medications.      Marc Huynh MD   11/20/22   16:08 CST

## 2022-11-21 ENCOUNTER — TELEPHONE (OUTPATIENT)
Dept: FAMILY MEDICINE CLINIC | Facility: CLINIC | Age: 72
End: 2022-11-21

## 2022-11-21 DIAGNOSIS — E11.10 DIABETIC KETOACIDOSIS WITHOUT COMA ASSOCIATED WITH TYPE 2 DIABETES MELLITUS: Primary | ICD-10-CM

## 2022-11-21 DIAGNOSIS — E11.9 INSULIN DEPENDENT TYPE 2 DIABETES MELLITUS: ICD-10-CM

## 2022-11-21 DIAGNOSIS — Z79.4 INSULIN DEPENDENT TYPE 2 DIABETES MELLITUS: ICD-10-CM

## 2022-11-21 LAB
ANION GAP SERPL CALCULATED.3IONS-SCNC: 13 MMOL/L (ref 5–15)
BASOPHILS # BLD AUTO: 0.04 10*3/MM3 (ref 0–0.2)
BASOPHILS NFR BLD AUTO: 0.3 % (ref 0–1.5)
BUN SERPL-MCNC: 20 MG/DL (ref 8–23)
BUN/CREAT SERPL: 46.5 (ref 7–25)
CALCIUM SPEC-SCNC: 8 MG/DL (ref 8.6–10.5)
CHLORIDE SERPL-SCNC: 100 MMOL/L (ref 98–107)
CO2 SERPL-SCNC: 21 MMOL/L (ref 22–29)
CREAT SERPL-MCNC: 0.43 MG/DL (ref 0.57–1)
DEPRECATED RDW RBC AUTO: 41.3 FL (ref 37–54)
EGFRCR SERPLBLD CKD-EPI 2021: 102.2 ML/MIN/1.73
EOSINOPHIL # BLD AUTO: 0.01 10*3/MM3 (ref 0–0.4)
EOSINOPHIL NFR BLD AUTO: 0.1 % (ref 0.3–6.2)
ERYTHROCYTE [DISTWIDTH] IN BLOOD BY AUTOMATED COUNT: 12.8 % (ref 12.3–15.4)
GLUCOSE BLDC GLUCOMTR-MCNC: 208 MG/DL (ref 70–130)
GLUCOSE BLDC GLUCOMTR-MCNC: 287 MG/DL (ref 70–130)
GLUCOSE BLDC GLUCOMTR-MCNC: 332 MG/DL (ref 70–130)
GLUCOSE BLDC GLUCOMTR-MCNC: 364 MG/DL (ref 70–130)
GLUCOSE SERPL-MCNC: 218 MG/DL (ref 65–99)
HCT VFR BLD AUTO: 47.6 % (ref 34–46.6)
HGB BLD-MCNC: 16.2 G/DL (ref 12–15.9)
IMM GRANULOCYTES # BLD AUTO: 0.27 10*3/MM3 (ref 0–0.05)
IMM GRANULOCYTES NFR BLD AUTO: 1.8 % (ref 0–0.5)
LYMPHOCYTES # BLD AUTO: 0.67 10*3/MM3 (ref 0.7–3.1)
LYMPHOCYTES NFR BLD AUTO: 4.4 % (ref 19.6–45.3)
MCH RBC QN AUTO: 30.3 PG (ref 26.6–33)
MCHC RBC AUTO-ENTMCNC: 34 G/DL (ref 31.5–35.7)
MCV RBC AUTO: 89 FL (ref 79–97)
MONOCYTES # BLD AUTO: 1.48 10*3/MM3 (ref 0.1–0.9)
MONOCYTES NFR BLD AUTO: 9.7 % (ref 5–12)
NEUTROPHILS NFR BLD AUTO: 12.82 10*3/MM3 (ref 1.7–7)
NEUTROPHILS NFR BLD AUTO: 83.7 % (ref 42.7–76)
NRBC BLD AUTO-RTO: 0 /100 WBC (ref 0–0.2)
PHOSPHATE SERPL-MCNC: 2.3 MG/DL (ref 2.5–4.5)
PLATELET # BLD AUTO: 183 10*3/MM3 (ref 140–450)
PMV BLD AUTO: 10.1 FL (ref 6–12)
POTASSIUM SERPL-SCNC: 3.2 MMOL/L (ref 3.5–5.2)
RBC # BLD AUTO: 5.35 10*6/MM3 (ref 3.77–5.28)
SODIUM SERPL-SCNC: 134 MMOL/L (ref 136–145)
WBC NRBC COR # BLD: 15.29 10*3/MM3 (ref 3.4–10.8)

## 2022-11-21 PROCEDURE — 94799 UNLISTED PULMONARY SVC/PX: CPT

## 2022-11-21 PROCEDURE — 97165 OT EVAL LOW COMPLEX 30 MIN: CPT

## 2022-11-21 PROCEDURE — 97161 PT EVAL LOW COMPLEX 20 MIN: CPT | Performed by: PHYSICAL THERAPIST

## 2022-11-21 PROCEDURE — 0 POTASSIUM CHLORIDE PER 2 MEQ: Performed by: INTERNAL MEDICINE

## 2022-11-21 PROCEDURE — 80048 BASIC METABOLIC PNL TOTAL CA: CPT | Performed by: INTERNAL MEDICINE

## 2022-11-21 PROCEDURE — 84100 ASSAY OF PHOSPHORUS: CPT | Performed by: INTERNAL MEDICINE

## 2022-11-21 PROCEDURE — 63710000001 INSULIN DETEMIR PER 5 UNITS: Performed by: INTERNAL MEDICINE

## 2022-11-21 PROCEDURE — 82962 GLUCOSE BLOOD TEST: CPT

## 2022-11-21 PROCEDURE — 25010000002 ENOXAPARIN PER 10 MG: Performed by: INTERNAL MEDICINE

## 2022-11-21 PROCEDURE — 85025 COMPLETE CBC W/AUTO DIFF WBC: CPT | Performed by: INTERNAL MEDICINE

## 2022-11-21 PROCEDURE — 25010000002 CEFTRIAXONE PER 250 MG: Performed by: INTERNAL MEDICINE

## 2022-11-21 PROCEDURE — 63710000001 INSULIN LISPRO (HUMAN) PER 5 UNITS: Performed by: INTERNAL MEDICINE

## 2022-11-21 RX ORDER — POTASSIUM CHLORIDE 750 MG/1
TABLET, FILM COATED, EXTENDED RELEASE ORAL
Qty: 90 TABLET | Refills: 3 | Status: ON HOLD | OUTPATIENT
Start: 2022-11-21 | End: 2022-12-08

## 2022-11-21 RX ORDER — PROCHLORPERAZINE 25 MG/1
1 SUPPOSITORY RECTAL
Qty: 1 EACH | Refills: 3 | Status: SHIPPED | OUTPATIENT
Start: 2022-11-21 | End: 2022-11-22 | Stop reason: CLARIF

## 2022-11-21 RX ORDER — HYDROCHLOROTHIAZIDE 12.5 MG/1
CAPSULE, GELATIN COATED ORAL
Qty: 90 CAPSULE | Refills: 3 | OUTPATIENT
Start: 2022-11-21 | End: 2022-11-22 | Stop reason: HOSPADM

## 2022-11-21 RX ORDER — PROCHLORPERAZINE 25 MG/1
1 SUPPOSITORY RECTAL DAILY
Qty: 1 EACH | Refills: 0 | Status: SHIPPED | OUTPATIENT
Start: 2022-11-21 | End: 2022-11-22 | Stop reason: CLARIF

## 2022-11-21 RX ORDER — PROCHLORPERAZINE 25 MG/1
SUPPOSITORY RECTAL
Qty: 3 EACH | Refills: 3 | Status: SHIPPED | OUTPATIENT
Start: 2022-11-21 | End: 2022-11-22 | Stop reason: CLARIF

## 2022-11-21 RX ADMIN — INSULIN LISPRO 10 UNITS: 100 INJECTION, SOLUTION INTRAVENOUS; SUBCUTANEOUS at 16:16

## 2022-11-21 RX ADMIN — INSULIN DETEMIR 30 UNITS: 100 INJECTION, SOLUTION SUBCUTANEOUS at 08:55

## 2022-11-21 RX ADMIN — Medication 1 TABLET: at 08:27

## 2022-11-21 RX ADMIN — POTASSIUM CHLORIDE 20 MEQ: 29.8 INJECTION, SOLUTION INTRAVENOUS at 18:00

## 2022-11-21 RX ADMIN — LOSARTAN POTASSIUM 100 MG: 50 TABLET, FILM COATED ORAL at 08:27

## 2022-11-21 RX ADMIN — Medication 10 ML: at 20:22

## 2022-11-21 RX ADMIN — ASPIRIN 81 MG: 81 TABLET, COATED ORAL at 08:27

## 2022-11-21 RX ADMIN — INSULIN DETEMIR 30 UNITS: 100 INJECTION, SOLUTION SUBCUTANEOUS at 20:26

## 2022-11-21 RX ADMIN — BUDESONIDE AND FORMOTEROL FUMARATE DIHYDRATE 2 PUFF: 160; 4.5 AEROSOL RESPIRATORY (INHALATION) at 20:06

## 2022-11-21 RX ADMIN — HYDROCHLOROTHIAZIDE 12.5 MG: 25 TABLET ORAL at 08:27

## 2022-11-21 RX ADMIN — POTASSIUM CHLORIDE 20 MEQ: 29.8 INJECTION, SOLUTION INTRAVENOUS at 16:04

## 2022-11-21 RX ADMIN — INSULIN LISPRO 12 UNITS: 100 INJECTION, SOLUTION INTRAVENOUS; SUBCUTANEOUS at 11:26

## 2022-11-21 RX ADMIN — GUAIFENESIN 600 MG: 600 TABLET, EXTENDED RELEASE ORAL at 20:22

## 2022-11-21 RX ADMIN — BUDESONIDE AND FORMOTEROL FUMARATE DIHYDRATE 2 PUFF: 160; 4.5 AEROSOL RESPIRATORY (INHALATION) at 06:28

## 2022-11-21 RX ADMIN — DULOXETINE HYDROCHLORIDE 20 MG: 20 CAPSULE, DELAYED RELEASE ORAL at 08:27

## 2022-11-21 RX ADMIN — AMLODIPINE BESYLATE 5 MG: 5 TABLET ORAL at 08:27

## 2022-11-21 RX ADMIN — MONTELUKAST SODIUM 10 MG: 10 TABLET, FILM COATED ORAL at 20:22

## 2022-11-21 RX ADMIN — INSULIN LISPRO 5 UNITS: 100 INJECTION, SOLUTION INTRAVENOUS; SUBCUTANEOUS at 08:30

## 2022-11-21 RX ADMIN — Medication 1 TABLET: at 18:00

## 2022-11-21 RX ADMIN — CEFTRIAXONE SODIUM 1 G: 1 INJECTION, POWDER, FOR SOLUTION INTRAMUSCULAR; INTRAVENOUS at 11:23

## 2022-11-21 RX ADMIN — Medication 1 TABLET: at 08:26

## 2022-11-21 RX ADMIN — ENOXAPARIN SODIUM 40 MG: 100 INJECTION SUBCUTANEOUS at 08:28

## 2022-11-21 RX ADMIN — POTASSIUM PHOSPHATE, MONOBASIC POTASSIUM PHOSPHATE, DIBASIC 15 MMOL: 224; 236 INJECTION, SOLUTION, CONCENTRATE INTRAVENOUS at 16:04

## 2022-11-21 RX ADMIN — ATORVASTATIN CALCIUM 20 MG: 10 TABLET, FILM COATED ORAL at 08:26

## 2022-11-21 RX ADMIN — GUAIFENESIN 600 MG: 600 TABLET, EXTENDED RELEASE ORAL at 08:27

## 2022-11-21 NOTE — PLAN OF CARE
Goal Outcome Evaluation:  Plan of Care Reviewed With: patient        Progress: no change  Outcome Evaluation: OT eval completed.  Pt alert and oriented x4.  Came to EOB with SBA.  1 posterior LOB during dynamic sitting, pt regained balance with David.  She needed modA to don/doff socks.  Her strength is decreased in BUE at 4-/5.  Pt transferred with CGA and rw.  Amb to BR, voided with S, transferred from commode with CGA and completed hand washing at sink with CGA.  Pt was more fatigued after ADL activity and returned to chair with CGA and rw.  OT will continue to treat to increase pt's endurance and strength for ADL.  Anticipate discharge home with assist and HH.

## 2022-11-21 NOTE — PLAN OF CARE
Goal Outcome Evaluation:  Plan of Care Reviewed With: patient        Progress: no change  Outcome Evaluation: The patient presents alert and oriented x4 sitting up in the chair. She typically lives at home with her husbands caring herself. She demonstrates decreased activity tolerance requiring a standing rest break after walking only 10ft. She is on room air with no complaints of SOA, but complaints of fatigue. She will benefit from continued PT to work on increased activity tolerance. Recommend discharge home with assist.

## 2022-11-21 NOTE — THERAPY EVALUATION
Patient Name: Kezia Otoole  : 1950    MRN: [id number]                              Today's Date: 2022       Admit Date: 2022    Visit Dx:     ICD-10-CM ICD-9-CM   1. Diabetic ketoacidosis without coma associated with other specified diabetes mellitus ([facility])  E13.10 250.12   2. Altered mental status, unspecified altered mental status type  R41.82 780.97   3. LEOLA (acute kidney injury) ([facility])  N17.9 584.9   4. Acute UTI (urinary tract infection)  N39.0 599.0   5. COVID  U07.1 079.89   6. Troponin I above reference range  R77.8 790.6   7. Decreased activities of daily living (ADL)  Z78.9 V49.89   8. Decreased functional activity tolerance  R68.89 780.99     Patient Active Problem List   Diagnosis   • Class 3 severe obesity due to excess calories with serious comorbidity and body mass index (BMI) of 40.0 to 44.9 in adult ([facility])   • Depression   • Elevated fasting glucose-see DM   • Allergic rhinitis: shots   • Osteoporosis:    • Primary generalized (osteo)arthritis   • HTN (hypertension), benign   • Obstructive sleep apnea-txing   • Menopause   • Diabetes type 2, controlled ([facility])   • Asthma   • Hoarseness-sees Ressor   • Gastroesophageal reflux disease-ent   • Wellness examination-done   • Purpura ([facility])   • Glaucoma   • Laboratory test`   • Anticoagulated DM2/ASA 81 (11.9.17 pt chose qod)   • Glaucoma suspect   • Hx of colonic polyps   • *Hx of anemia: ASA81,iron,gi(cp,div   • *Hx-iron deficiency   • Nuclear senile cataract   • Excessive cerumen in ear canal   • Status post joint replacement   • Primary osteoarthritis of right knee   • Hyperlipidemia-statin   • Hypercalcemia   • History of COVID-19   • DKA (diabetic ketoacidosis) ([facility])   • COVID-19 virus infection     Past Medical History:   Diagnosis Date   • Allergic rhinitis    • Chronic laryngitis    • Diabetes mellitus ([facility])    • GERD (gastroesophageal reflux disease)    • Hypertension    • Hypertrophy of both inferior nasal  turbinates    • Laryngopharyngeal reflux    • Nontoxic multinodular goiter    • [place] laryngitis      Past Surgical History:   Procedure Laterality Date   • CARPAL TUNNEL RELEASE     • CHOLECYSTECTOMY     • COLONOSCOPY  10/01/2008    Multiple polyps removed   • COLONOSCOPY  09/18/2013    Diverticulosis in the sigmoid colon. Dr. Dave Mcnally Recall 5 years   • HAND SURGERY     • HYSTERECTOMY     • REPLACEMENT TOTAL KNEE Right 02/02/2021   • THYROIDECTOMY, PARTIAL Right 2010   • TONSILLECTOMY     • TUBAL ABDOMINAL LIGATION        General Information     Row Name 11/21/22 1000          Physical Therapy Time and Intention    Document Type --  COVID,DKA  -MS     Mode of Treatment physical therapy;individual therapy  -MS     Row Name 11/21/22 1000          General Information    Patient Profile Reviewed yes  -MS     Prior Level of Function independent:;all household mobility;community mobility;ADL's  -MS     Existing Precautions/Restrictions fall  -MS     Barriers to Rehab none identified  -MS     Row Name 11/21/22 1000          Living Environment    People in Home spouse  -MS     Row Name 11/21/22 1000          Home Main Entrance    Number of Stairs, Main Entrance four  -MS     Stair Railings, Main Entrance railings on both sides of stairs  -MS     Row Name 11/21/22 1000          Cognition    Orientation Status (Cognition) oriented x 4  -MS     Row Name 11/21/22 1000          Safety Issues, Functional Mobility    Impairments Affecting Function (Mobility) endurance/activity tolerance  -MS           User Key  (r) = Recorded By, (t) = Taken By, (c) = Cosigned By    Initials Name Provider Type    MS Javi Terese R, PT, DPT, NCS Physical Therapist               Mobility     Row Name 11/21/22 1000          Bed Mobility    Bed Mobility sit-supine  -MS     Sit-Supine [place] (Bed Mobility) standby assist  -MS     Row Name 11/21/22 1000          Sit-Stand Transfer    Sit-Stand [place] (Transfers) contact guard  -MS      Assistive Device (Sit-Stand Transfers) walker, front-wheeled  -MS     Comment, (Sit-Stand Transfer) mulitple attempts needed  -MS     Row Name 11/21/22 1000          Gait/Stairs (Locomotion)    [place] Level (Gait) contact guard  -MS     Assistive Device (Gait) walker, front-wheeled  -MS     Distance in Feet (Gait) 10ftx2 with standing rest break needed due to fatigue  -MS           User Key  (r) = Recorded By, (t) = Taken By, (c) = Cosigned By    Initials Name Provider Type    Terese Burton, PT, DPT, NCS Physical Therapist               Obj/Interventions     Row Name 11/21/22 1000          Range of Motion Comprehensive    General Range of Motion bilateral upper extremity ROM WFL;bilateral lower extremity ROM WFL  -MS     Row Name 11/21/22 1000          Strength Comprehensive (MMT)    Comment, General Manual Muscle Testing (MMT) Assessment grossly 4/5  -MS     Row Name 11/21/22 1000          Balance    Static Sitting Balance independent  -MS     Dynamic Sitting Balance independent  -MS     Position, Sitting Balance supported  -MS     Static Standing Balance contact guard  -MS     Dynamic Standing Balance contact guard  -MS     Position/Device Used, Standing Balance walker, rolling  -MS           User Key  (r) = Recorded By, (t) = Taken By, (c) = Cosigned By    Initials Name Provider Type    Terese Burton, PT, DPT, NCS Physical Therapist               Goals/Plan     Row Name 11/21/22 1000          Bed Mobility Goal 1 (PT)    Activity/Assistive Device (Bed Mobility Goal 1, PT) bed mobility activities, all  -MS     [place] Level/Cues Needed (Bed Mobility Goal 1, PT) independent  -MS     Time Frame (Bed Mobility Goal 1, PT) long term goal (LTG);by discharge  -MS     Progress/Outcomes (Bed Mobility Goal 1, PT) new goal  -MS     Row Name 11/21/22 1000          Transfer Goal 1 (PT)    Activity/Assistive Device (Transfer Goal 1, PT) sit-to-stand/stand-to-sit;bed-to-chair/chair-to-bed;walker, rolling   -MS     [place] Level/Cues Needed (Transfer Goal 1, PT) modified independence  -MS     Time Frame (Transfer Goal 1, PT) long term goal (LTG);by discharge  -MS     Progress/Outcome (Transfer Goal 1, PT) new goal  -MS     Row Name 11/21/22 1000          Gait Training Goal 1 (PT)    Activity/Assistive Device (Gait Training Goal 1, PT) gait (walking locomotion);assistive device use;improve balance and speed;increase endurance/gait distance;walker, rolling  -MS     [place] Level (Gait Training Goal 1, PT) modified independence  -MS     Distance (Gait Training Goal 1, PT) 100ft without rest break  -MS     Time Frame (Gait Training Goal 1, PT) long term goal (LTG);by discharge  -MS     Progress/Outcome (Gait Training Goal 1, PT) new goal  -MS     Row Name 11/21/22 1000          Therapy Assessment/Plan (PT)    Planned Therapy Interventions (PT) balance training;bed mobility training;gait training;patient/family education;transfer training  -MS           User Key  (r) = Recorded By, (t) = Taken By, (c) = Cosigned By    Initials Name Provider Type    MS Javi Terese R, PT, DPT, NCS Physical Therapist               Clinical Impression     Row Name 11/21/22 1000          Pain    Pretreatment Pain Rating 0/10 - no pain  -MS     Posttreatment Pain Rating 0/10 - no pain  -MS     Row Name 11/21/22 1000          Plan of Care Review    Plan of Care Reviewed With patient  -MS     Progress no change  -MS     Outcome Evaluation The patient presents alert and oriented x4 sitting up in the chair. She typically lives at home with her husbands caring herself. She demonstrates decreased activity tolerance requiring a standing rest break after walking only 10ft. She is on room air with no complaints of SOA, but complaints of fatigue. She will benefit from continued PT to work on increased activity tolerance. Recommend discharge home with assist.  -MS     Row Name 11/21/22 1000          Therapy Assessment/Plan (PT)     Patient/Family Therapy Goals Statement (PT) go home  -MS     Rehab Potential (PT) good, to achieve stated therapy goals  -MS     Criteria for Skilled Interventions Met (PT) yes;meets criteria;skilled treatment is necessary  -MS     Therapy Frequency (PT) 2 times/day  -MS     Predicted Duration of Therapy Intervention (PT) until discharge  -MS     Row Name 11/21/22 1000          Vital Signs    O2 Delivery Pre Treatment room air  -MS     O2 Delivery Intra Treatment room air  -MS     O2 Delivery Post Treatment room air  -MS     Row Name 11/21/22 1000          Positioning and Restraints    Post Treatment Position bed  -MS     In Bed fowlers;call light within reach;encouraged to call for assist;side rails up x2  -MS           User Key  (r) = Recorded By, (t) = Taken By, (c) = Cosigned By    Initials Name Provider Type    Terese Burton, PT, DPT, NCS Physical Therapist               Outcome Measures     Row Name 11/21/22 1000 11/21/22 0737       How much help from another person do you currently need...    Turning from your back to your side while in flat bed without using bedrails? 3  -MS 3  -CM    Moving from lying on back to sitting on the side of a flat bed without bedrails? 3  -MS 3  -CM    Moving to and from a bed to a chair (including a wheelchair)? 3  -MS 3  -CM    Standing up from a chair using your arms (e.g., wheelchair, bedside chair)? 3  -MS 3  -CM    Climbing 3-5 steps with a railing? 3  -MS 3  -CM    To walk in hospital room? 3  -MS 3  -CM    AM-PAC 6 Clicks Score (PT) 18  -MS 18  -CM    Highest level of mobility 6 --> Walked 10 steps or more  -MS 6 --> Walked 10 steps or more  -CM    Row Name 11/21/22 1000 11/21/22 0745       Functional Assessment    Outcome Measure Options AM-PAC 6 Clicks Basic Mobility (PT)  -MS AM-PAC 6 Clicks Daily Activity (OT)  -AC          User Key  (r) = Recorded By, (t) = Taken By, (c) = Cosigned By    Initials Name Provider Type    AC Avelino Pantoja, OTR/L, CNT  Occupational Therapist    MS Terese Caldwell, PT, DPT, NCS Physical Therapist    Karin Euceda, RN Registered Nurse                             Physical Therapy Education     Title: PT OT SLP Therapies (In Progress)     Topic: Physical Therapy (In Progress)     Point: Mobility training (Done)     Learning Progress Summary           Patient Acceptance, E, VU by MS at 11/21/2022 3934    Comment: role of PT in her care                   Point: Home exercise program (Not Started)     Learner Progress:  Not documented in this visit.          Point: Body mechanics (Not Started)     Learner Progress:  Not documented in this visit.          Point: Precautions (Not Started)     Learner Progress:  Not documented in this visit.                      User Key     Initials Effective Dates Name Provider Type Discipline    MS 06/19/18 -  Terese Caldwell, PT, DPT, NCS Physical Therapist PT              PT Recommendation and Plan  Planned Therapy Interventions (PT): balance training, bed mobility training, gait training, patient/family education, transfer training  Plan of Care Reviewed With: patient  Progress: no change  Outcome Evaluation: The patient presents alert and oriented x4 sitting up in the chair. She typically lives at home with her husbands caring herself. She demonstrates decreased activity tolerance requiring a standing rest break after walking only 10ft. She is on room air with no complaints of SOA, but complaints of fatigue. She will benefit from continued PT to work on increased activity tolerance. Recommend discharge home with assist.     Time Calculation:    PT Charges     Row Name 11/21/22 1000             Time Calculation    Start Time 1000  -MS      Stop Time 1040  -MS      Time Calculation (min) 40 min  -MS      PT Received On 11/21/22  -MS      PT Goal Re-Cert Due Date 12/01/22  -MS         Untimed Charges    PT Eval/Re-eval Minutes 40  -MS         Total Minutes    Untimed Charges Total Minutes 40   -MS       Total Minutes 40  -MS            User Key  (r) = Recorded By, (t) = Taken By, (c) = Cosigned By    Initials Name Provider Type    MS Terese Caldwell, PT, DPT, NCS Physical Therapist              Therapy Charges for Today     Code Description Service Date Service Provider Modifiers Qty    80225793529 HC PT EVAL LOW COMPLEXITY 3 11/21/2022 Terese Caldwell PT, DPT, NCS GP 1          PT G-Codes  Outcome Measure Options: AM-PAC 6 Clicks Basic Mobility (PT)  AM-PAC 6 Clicks Score (PT): 18  AM-PAC 6 Clicks Score (OT): 20  PT Discharge Summary  Anticipated Discharge Disposition (PT): home with assist    Terese Caldwell, PT, JOAQUÍN, MARGARITO  11/21/2022

## 2022-11-21 NOTE — THERAPY EVALUATION
Acute Care - Occupational Therapy Initial Evaluation  [facility]     Patient Name: Kezia Otoole  : 1950  MRN: [id number]  Today's Date: 2022  Onset of Illness/Injury or Date of Surgery: 22  Date of Referral to OT: 22  Referring Physician: Dr. Huynh    Admit Date: 2022       ICD-10-CM ICD-9-CM   1. Diabetic ketoacidosis without coma associated with other specified diabetes mellitus ([facility])  E13.10 250.12   2. Altered mental status, unspecified altered mental status type  R41.82 780.97   3. LEOLA (acute kidney injury) ([facility])  N17.9 584.9   4. Acute UTI (urinary tract infection)  N39.0 599.0   5. COVID  U07.1 079.89   6. Troponin I above reference range  R77.8 790.6   7. Decreased activities of daily living (ADL)  Z78.9 V49.89     Patient Active Problem List   Diagnosis   • Class 3 severe obesity due to excess calories with serious comorbidity and body mass index (BMI) of 40.0 to 44.9 in adult ([facility])   • Depression   • Elevated fasting glucose-see DM   • Allergic rhinitis: shots   • Osteoporosis: 2018/[facility]   • Primary generalized (osteo)arthritis   • HTN (hypertension), benign   • Obstructive sleep apnea-txing   • Menopause   • Diabetes type 2, controlled ([facility])   • Asthma   • Hoarseness-sees Ressor   • Gastroesophageal reflux disease-ent   • Wellness examination-done   • Purpura ([facility])   • Glaucoma   • Laboratory test`   • Anticoagulated DM2/ASA 81 (11.9.17 pt chose qod)   • Glaucoma suspect   • Hx of colonic polyps   • *Hx of anemia: ASA81,iron,gi(cp,div   • *Hx-iron deficiency   • Nuclear senile cataract   • Excessive cerumen in ear canal   • Status post joint replacement   • Primary osteoarthritis of right knee   • Hyperlipidemia-statin   • Hypercalcemia   • History of COVID-19   • DKA (diabetic ketoacidosis) ([facility])   • COVID-19 virus infection     Past Medical History:   Diagnosis Date   • Allergic rhinitis    • Chronic laryngitis    • Diabetes mellitus ([facility])    • GERD  (gastroesophageal reflux disease)    • Hypertension    • Hypertrophy of both inferior nasal turbinates    • Laryngopharyngeal reflux    • Nontoxic multinodular goiter    • [place] laryngitis      Past Surgical History:   Procedure Laterality Date   • CARPAL TUNNEL RELEASE     • CHOLECYSTECTOMY     • COLONOSCOPY  10/01/2008    Multiple polyps removed   • COLONOSCOPY  09/18/2013    Diverticulosis in the sigmoid colon. Dr. Dave Mcnally Recall 5 years   • HAND SURGERY     • HYSTERECTOMY     • REPLACEMENT TOTAL KNEE Right 02/02/2021   • THYROIDECTOMY, PARTIAL Right 2010   • TONSILLECTOMY     • TUBAL ABDOMINAL LIGATION           OT ASSESSMENT FLOWSHEET (last 12 hours)     OT Evaluation and Treatment     Row Name 11/21/22 0745                   OT Time and Intention    Subjective Information complains of;weakness;fatigue  -        Document Type evaluation  -        Mode of Treatment occupational therapy  -           General Information    Patient Profile Reviewed yes  -AC        Onset of Illness/Injury or Date of Surgery 11/18/22  -        Referring Physician Dr. Huynh  -        Prior Level of Function independent:;all household mobility;community mobility;gait;transfer;bed mobility;ADL's;home management;cooking;cleaning;driving;shopping  -        Equipment Currently Used at Home cane, straight;walker, rolling;shower chair  -AC        Pertinent History of Current Functional Problem confusion, falls; Dx: DKA, AMS, LEOLA, UTI, COVID+  -AC        Existing Precautions/Restrictions fall  -AC        Barriers to Rehab none identified  -           Living Environment    Current Living Arrangements home  walk in shower  -AC        Home Accessibility stairs to enter home  -AC        People in Home spouse  -AC           Home Main Entrance    Number of Stairs, Main Entrance four  -AC        Stair Railings, Main Entrance railings on both sides of stairs  -           Pain Assessment    Pretreatment Pain Rating 0/10 - no  pain  -AC        Posttreatment Pain Rating 0/10 - no pain  -AC           Cognition    Orientation Status (Cognition) oriented x 4  -AC        Follows Commands (Cognition) WFL  -AC        Personal Safety Interventions muscle strengthening facilitated;fall prevention program maintained;gait belt;nonskid shoes/slippers when out of bed;supervised activity  -AC           Range of Motion Comprehensive    Comment, General Range of Motion WFL AROM BUE  -AC           Strength Comprehensive (MMT)    Comment, General Manual Muscle Testing (MMT) Assessment 4/5 to 4-/5 grossly in BUE  -AC           Activities of Daily Living    BADL Assessment/Intervention lower body dressing;grooming;toileting  -AC           Lower Body Dressing Assessment/Training    [place] Level (Lower Body Dressing) don;doff;socks;moderate assist (50% patient effort)  -AC        Position (Lower Body Dressing) edge of bed sitting  -AC        Comment, (Lower Body Dressing) able to don/doff from L foot but not R  -AC           Grooming Assessment/Training    [place] Level (Grooming) wash face, hands  -AC        Position (Grooming) sink side  -AC           Toileting Assessment/Training    [place] Level (Toileting) toileting skills;supervision  -AC        Assistive Devices (Toileting) commode  -AC        Position (Toileting) unsupported sitting  -AC           BADL Safety/Performance    Impairments, BADL Safety/Performance balance;endurance/activity tolerance;strength;range of motion  -AC           Bed Mobility    Bed Mobility scooting/bridging;supine-sit;sit-supine  -AC        Scooting/Bridging [place] (Bed Mobility) standby assist  -AC        Supine-Sit [place] (Bed Mobility) standby assist  -AC        Sit-Supine [place] (Bed Mobility) standby assist  -AC        Assistive Device (Bed Mobility) bed rails;head of bed elevated  -AC           Functional Mobility    Functional Mobility- Ind. Level contact guard assist  -AC         Functional Mobility- Device walker, front-wheeled  -AC        Functional Mobility- Comment in BR, around room, to chair  -AC           Transfer Assessment/Treatment    Transfers sit-stand transfer;stand-sit transfer;toilet transfer  -AC           Sit-Stand Transfer    Sit-Stand [place] (Transfers) contact guard;verbal cues  -AC        Assistive Device (Sit-Stand Transfers) walker, front-wheeled  -AC           Stand-Sit Transfer    Stand-Sit [place] (Transfers) contact guard;verbal cues  -AC        Assistive Device (Stand-Sit Transfers) walker, front-wheeled  -AC           Toilet Transfer    Type (Toilet Transfer) sit-stand;stand-sit  -AC        [place] Level (Toilet Transfer) minimum assist (75% patient effort);verbal cues  -AC        Assistive Device (Toilet Transfer) walker, front-wheeled  -AC           Safety Issues, Functional Mobility    Impairments Affecting Function (Mobility) balance;endurance/activity tolerance;strength  -AC           Balance    Balance Assessment sitting static balance;sitting dynamic balance;standing static balance;standing dynamic balance  -AC        Static Sitting Balance independent  -AC        Dynamic Sitting Balance standby assist  -AC        Position, Sitting Balance sitting edge of bed  -AC        Static Standing Balance contact guard  -AC        Dynamic Standing Balance contact guard  -AC        Position/Device Used, Standing Balance walker, front-wheeled  -AC        Comment, Balance 1 LOB during dynamic sitting, pt lost balance posteriorly, regained balance with David  -AC           Plan of Care Review    Plan of Care Reviewed With patient  -AC        Progress no change  -AC        Outcome Evaluation OT eval completed.  Pt alert and oriented x4.  Came to EOB with SBA.  1 posterior LOB during dynamic sitting, pt regained balance with David.  She needed modA to don/doff socks.  Her strength is decreased in BUE at 4-/5.  Pt transferred with CGA and rw.  Amb to BR,  voided with S, transferred from commode with CGA and completed hand washing at sink with CGA.  Pt was more fatigued after ADL activity and returned to chair with CGA and rw.  OT will continue to treat to increase pt's endurance and strength for ADL.  Anticipate discharge home with assist and HH.  -AC           Positioning and Restraints    Pre-Treatment Position in bed  -AC        Post Treatment Position chair  -AC        In Chair sitting;call light within reach;encouraged to call for assist;legs elevated  -AC           Therapy Assessment/Plan (OT)    Date of Referral to OT 11/20/22  -AC        OT Diagnosis decreased adl  -AC        Rehab Potential (OT) good, to achieve stated therapy goals  -AC        Criteria for Skilled Therapeutic Interventions Met (OT) yes;meets criteria;skilled treatment is necessary  -AC        Therapy Frequency (OT) 5 times/wk  -AC        Predicted Duration of Therapy Intervention (OT) 10 days  -AC        Planned Therapy Interventions (OT) activity tolerance training;adaptive equipment training;BADL retraining;functional balance retraining;occupation/activity based interventions;patient/caregiver education/training;strengthening exercise;transfer/mobility retraining  -AC           OT Goals    Transfer Goal Selection (OT) transfer, OT goal 1  -AC        Dressing Goal Selection (OT) dressing, OT goal 1  -AC        Strength Goal Selection (OT) strength, OT goal 1  -AC           Transfer Goal 1 (OT)    Activity/Assistive Device (Transfer Goal 1, OT) toilet  -AC        [place] Level/Cues Needed (Transfer Goal 1, OT) modified independence  -AC        Time Frame (Transfer Goal 1, OT) long term goal (LTG);10 days  -AC        Progress/Outcome (Transfer Goal 1, OT) new goal  -AC           Dressing Goal 1 (OT)    Activity/Device (Dressing Goal 1, OT) lower body dressing  -AC        [place]/Cues Needed (Dressing Goal 1, OT) standby assist  -AC        Time Frame (Dressing Goal 1, OT) long term  goal (LTG);10 days  -AC        Strategies/Barriers (Dressing Goal 1, OT) with AE  -AC        Progress/Outcome (Dressing Goal 1, OT) new goal  -AC           Strength Goal 1 (OT)    Strength Goal 1 (OT) Increase BUE strength to 4+/5 to increase independence with ADL  -AC        Time Frame (Strength Goal 1, OT) long term goal (LTG);10 days  -AC        Progress/Outcome (Strength Goal 1, OT) new goal  -AC              User Key  (r) = Recorded By, (t) = Taken By, (c) = Cosigned By    Initials Name Effective Dates     Avelino Pantoja OTR/L, CNT 04/09/19 -                  Occupational Therapy Education     Title: PT OT SLP Therapies (Done)     Topic: Occupational Therapy (Done)     Point: ADL training (Done)     Description:   Instruct learner(s) on proper safety adaptation and remediation techniques during self care or transfers.   Instruct in proper use of assistive devices.              Learning Progress Summary           Patient Acceptance, E,TB, VU by  at 11/21/2022 0841                   Point: Home exercise program (Done)     Description:   Instruct learner(s) on appropriate technique for monitoring, assisting and/or progressing therapeutic exercises/activities.              Learning Progress Summary           Patient Acceptance, E,TB, VU by  at 11/21/2022 0841                   Point: Body mechanics (Done)     Description:   Instruct learner(s) on proper positioning and spine alignment during self-care, functional mobility activities and/or exercises.              Learning Progress Summary           Patient Acceptance, E,TB, VU by  at 11/21/2022 0841                               User Key     Initials Effective Dates Name Provider Type Discipline     04/09/19 -  Avelino Pantoja OTR/L, REYNA Occupational Therapist OT                  OT Recommendation and Plan  Planned Therapy Interventions (OT): activity tolerance training, adaptive equipment training, BADL retraining, functional balance retraining,  occupation/activity based interventions, patient/caregiver education/training, strengthening exercise, transfer/mobility retraining  Therapy Frequency (OT): 5 times/wk  Plan of Care Review  Plan of Care Reviewed With: patient  Progress: no change  Outcome Evaluation: OT eval completed.  Pt alert and oriented x4.  Came to EOB with SBA.  1 posterior LOB during dynamic sitting, pt regained balance with David.  She needed modA to don/doff socks.  Her strength is decreased in BUE at 4-/5.  Pt transferred with CGA and rw.  Amb to BR, voided with S, transferred from [facility] with CGA and completed hand washing at sink with CGA.  Pt was more fatigued after ADL activity and returned to chair with CGA and rw.  OT will continue to treat to increase pt's endurance and strength for ADL.  Anticipate discharge home with assist and HH.  Plan of Care Reviewed With: patient  Outcome Evaluation: OT eval completed.  Pt alert and oriented x4.  Came to EOB with SBA.  1 posterior LOB during dynamic sitting, pt regained balance with David.  She needed modA to don/doff socks.  Her strength is decreased in BUE at 4-/5.  Pt transferred with CGA and rw.  Amb to BR, voided with S, transferred from [facility] with CGA and completed hand washing at sink with CGA.  Pt was more fatigued after ADL activity and returned to chair with CGA and rw.  OT will continue to treat to increase pt's endurance and strength for ADL.  Anticipate discharge home with assist and HH.     Outcome Measures     Row Name 11/21/22 0745             How much help from another is currently needed...    Putting on and taking off regular lower body clothing? 2  -AC      Bathing (including washing, rinsing, and drying) 2  -AC      Toileting (which includes using toilet bed pan or urinal) 4  -AC      Putting on and taking off regular upper body clothing 4  -AC      Taking care of personal grooming (such as brushing teeth) 4  -AC      Eating meals 4  -AC      AM-PAC 6 Clicks Score (OT)  20  -         Functional Assessment    Outcome Measure Options AM-PAC 6 Clicks Daily Activity (OT)  -            User Key  (r) = Recorded By, (t) = Taken By, (c) = Cosigned By    Initials Name Provider Type    Avelino Barnhart OTR/L, REYNA Occupational Therapist                Time Calculation:    Time Calculation- OT     Row Name 11/21/22 0841             Time Calculation- OT    OT Start Time 0745  -      OT Stop Time 0842  -      OT Time Calculation (min) 57 min  -      OT Received On 11/21/22  -      OT Goal Re-Cert Due Date 12/01/22  -            User Key  (r) = Recorded By, (t) = Taken By, (c) = Cosigned By    Initials Name Provider Type    Avelino Barnhart OTR/L, REYNA Occupational Therapist              Therapy Charges for Today     Code Description Service Date Service Provider Modifiers Qty    [id number] HC OT EVAL LOW COMPLEXITY 4 11/21/2022 Avelino Pantoja OTR/L, CNT GO 1               ALESHIA Ogden/L, CNT  11/21/2022

## 2022-11-21 NOTE — DISCHARGE PLACEMENT REQUEST
"DARREL GARCIA [phone]  Kezia Arenas (72 y.o. Female)     Date of Birth   1950    Social Security Number       Address   [address] [address]    Home Phone   [phone]    MRN   [id number]       Caodaism   Religious    Marital Status                               Admission Date   11/18/22    Admission Type   Emergency    Admitting Provider   Pantera Cotton MD    Attending Provider   Pantera Cotton MD    Department, Room/Bed   [facility] 3C, 363/1       Discharge Date       Discharge Disposition       Discharge Destination                               Attending Provider: Pantera Cotton MD    Allergies: Adhesive Tape, Other    Isolation: Enh Drop/Con   Infection: COVID (confirmed) (11/18/22)   Code Status: CPR    Ht: 160 cm (63\")   Wt: 97.8 kg (215 lb 11.2 oz)    Admission Cmt: None   Principal Problem: DKA (diabetic ketoacidosis) (HCC) [E11.10]                 Active Insurance as of 11/18/2022     Primary Coverage     Payor Plan Insurance Group Employer/Plan Group    MEDICARE MEDICARE A & B      Payor Plan Address Payor Plan Phone Number Payor Plan Fax Number Effective Dates    [address] [phone]  10/1/2013 - None Entered    [facility] [id number]       Subscriber Name Subscriber Birth Date Member ID       KEZIA ARENAS 1950 [id number]           Secondary Coverage     Payor Plan Insurance Group Employer/Plan Group    ANTHEM BLUE CROSS ANTHEM BLUE CROSS BLUE SHIELD PPO [id number]     Payor Plan Address Payor Plan Phone Number Payor Plan Fax Number Effective Dates    [address] [phone]  10/1/2013 - None Entered    [facility] [id number]       Subscriber Name Subscriber Birth Date Member ID       KEZIA ARENAS 1950 [id number]                 Emergency Contacts      (Rel.) Home Phone Work Phone Mobile Phone    Laurie Arenas (Spouse) [phone] -- --    Guanakito Toroy (Son) -- -- " [phone]               History & Physical      Marc Huynh MD at 11/18/22 1220                [facility] Medicine Services  INPATIENT HISTORY AND PHYSICAL       Patient Care Team:  Len Crowe MD as PCP - General  Resser, Huang Jimenez MD as Consulting Physician (Otolaryngology)  Tayler Burleson APRN (Inactive) as Nurse Practitioner (Family Medicine)  Dave Mcnally MD as Consulting Physician (Gastroenterology)  Lorraine Rodriguez APRN as Nurse Practitioner (Otolaryngology)  Vinh Goff PA-C as Physician Assistant (Orthopedic Surgery)      Date of Admission: 11/18/2022      Chief complaint   Chief Complaint   Patient presents with   • Fall   • Altered Mental Status   • Weakness - Generalized       Subjective     Patient is a 72 y.o. female with a past medical history of essential hypertension, type 2 diabetes mellitus, and asthma.  She presents with complaints of worsening generalized weakness of 2 days duration along with falls, and vomiting.    Patient has been having increased somnolence for the last 1 month.  In addition, she also tested positive for COVID-19 on 11/3/2022.  She subsequently noticed worsening generalized weakness with some falls over the last 2 days.  In addition she had vomiting today and then some confusion.  She denies abdominal pain.  She was noted to be tachypneic and so she was brought to the emergency room for evaluation.  Of note, patient has been having urinary incontinence over the last 2 months but denies dysuria or hematuria.      In the emergency room, he was found to be in DKA.  COVID-19 test was positive.  CT scan of the head did not show any lesion.  She was started on insulin protocol and recommended for admission.    Review of Systems   Constitutional: Positive for fatigue. Negative for activity change, appetite change, chills and fever.   HENT: Negative for congestion, ear pain, rhinorrhea, sore throat and trouble  "swallowing.    Respiratory: Negative for cough, chest tightness, shortness of breath and wheezing.    Cardiovascular: Negative for chest pain, palpitations and leg swelling.   Gastrointestinal: Positive for diarrhea. Negative for abdominal distention, abdominal pain, nausea and vomiting.   Genitourinary: Negative for difficulty urinating, dysuria and hematuria.   Musculoskeletal: Negative for arthralgias, back pain and myalgias.   Skin: Negative for pallor and rash.   Neurological: Positive for weakness. Negative for dizziness, syncope, light-headedness and headaches.   Hematological: Negative for adenopathy. Does not bruise/bleed easily.   Psychiatric/Behavioral: Negative for agitation and confusion. The patient is not nervous/anxious.      History  Past Medical History:   Diagnosis Date   • Allergic rhinitis    • Chronic laryngitis    • Diabetes mellitus (HCC)    • GERD (gastroesophageal reflux disease)    • Hypertension    • Hypertrophy of both inferior nasal turbinates    • Laryngopharyngeal reflux    • Nontoxic multinodular goiter    • Sicca laryngitis      Past Surgical History:   Procedure Laterality Date   • CARPAL TUNNEL RELEASE     • CHOLECYSTECTOMY     • COLONOSCOPY  10/01/2008    Multiple polyps removed   • COLONOSCOPY  09/18/2013    Diverticulosis in the sigmoid colon. Dr. Dave Mcnally Recall 5 years   • HAND SURGERY     • HYSTERECTOMY     • REPLACEMENT TOTAL KNEE Right 02/02/2021   • THYROIDECTOMY, PARTIAL Right 2010   • TONSILLECTOMY     • TUBAL ABDOMINAL LIGATION       Family History   Problem Relation Age of Onset   • Diabetes Mother    • Stroke Mother    • Heart disease Mother    • Heart disease Father    • Colon cancer Neg Hx    • Colon polyps Neg Hx      Social History     Tobacco Use   • Smoking status: Never   • Smokeless tobacco: Never   • Tobacco comments:     \"lived with smokers most of my life\"   Vaping Use   • Vaping Use: Never used   Substance Use Topics   • Alcohol use: No     Comment: " Rare   • Drug use: No     Medications Prior to Admission   Medication Sig Dispense Refill Last Dose   • acetaminophen (TYLENOL) 500 MG tablet Take 1,000 mg by mouth Every 6 (Six) Hours As Needed for mild pain (1-3).      • albuterol sulfate  (90 Base) MCG/ACT inhaler Inhale 2 puffs Every 4 (Four) Hours As Needed for Wheezing or Shortness of Air. 8 g 1    • amLODIPine (NORVASC) 5 MG tablet TAKE ONE TABLET DAILY 90 tablet 1    • aspirin 81 MG EC tablet Take 1 tablet by mouth Every Other Day. 32 tablet 0    • atorvastatin (LIPITOR) 20 MG tablet TAKE ONE TABLET DAILY 90 tablet 3    • B Complex-C (SUPER B COMPLEX PO) Take 1 tablet by mouth Daily.      • Blood Glucose Monitoring Suppl (Easy Step Glucose Monitor) device 1 Device Take As Directed. 1 each 0    • Calcium Carb-Cholecalciferol (CALCIUM-VITAMIN D) 600-400 MG-UNIT tablet Take 1 tablet by mouth 2 (Two) Times a Day.      • carboxymethylcellulose (REFRESH PLUS) 0.5 % solution 2 drops Daily As Needed for dry eyes.      • Cholecalciferol 25 MCG (1000 UT) capsule Take 1 capsule by mouth Daily.      • Coenzyme Q10 (CO Q 10 PO) Take 1 capsule by mouth Every Other Day.      • denosumab (PROLIA) 60 MG/ML solution syringe Inject 60 mg under the skin into the appropriate area as directed Every 6 (Six) Months.      • diclofenac sodium (VOTAREN XR) 100 MG 24 hr tablet Take 100 mg by mouth Daily.      • DULoxetine (CYMBALTA) 20 MG capsule TAKE ONE CAPSULE DAILY 30 capsule 5    • fluconazole (Diflucan) 100 MG tablet Take 1 tablet by mouth Daily. 2 tablet 0    • glucose blood test strip Use as instructed: once a day testing of the one touch ultra blue strips 50 each 11    • glucose blood test strip Use as instructed: once a day testing 50 each 12    • guaiFENesin (MUCINEX) 600 MG 12 hr tablet Take 600 mg by mouth 2 (Two) Times a Day.      • hydroCHLOROthiazide (MICROZIDE) 12.5 MG capsule TAKE ONE CAPSULE DAILY 90 capsule 3    • irbesartan (AVAPRO) 300 MG tablet TAKE ONE  TABLET DAILY 90 tablet 3    • Lancets misc Test daily 100 each 12    • latanoprost (XALATAN) 0.005 % ophthalmic solution Administer 1 drop to both eyes Every Night.  5    • metFORMIN ER (GLUCOPHAGE-XR) 500 MG 24 hr tablet Take 2 tablets by mouth 2 (Two) Times a Day for 90 days. 180 tablet 3    • Misc Natural Products (GLUCOSAMINE CHONDROITIN TRIPLE PO) Take 1 tablet by mouth 2 (Two) Times a Day.      • montelukast (SINGULAIR) 10 MG tablet TAKE ONE TABLET DAILY 90 tablet 3    • Multiple Vitamins-Minerals (VITRUM 50+ SENIOR MULTI PO) Take 1 tablet by mouth Daily.      • NON FORMULARY Immunotherapy      • potassium chloride 10 MEQ CR tablet TAKE ONE TABLET DAILY 90 tablet 3    • Semaglutide, 2 MG/DOSE, 8 MG/3ML solution pen-injector Inject 2 mg under the skin into the appropriate area as directed 1 (One) Time Per Week. 4.5 mL 5    • Symbicort 160-4.5 MCG/ACT inhaler INHALE TWO PUFFS TWO (TWO) TIMES A DAY. 30.6 each 3      Allergies:  Adhesive tape and Other  Prior to Admission medications    Medication Sig Start Date End Date Taking? Authorizing Provider   acetaminophen (TYLENOL) 500 MG tablet Take 1,000 mg by mouth Every 6 (Six) Hours As Needed for mild pain (1-3).    ProviderEstefania MD   albuterol sulfate  (90 Base) MCG/ACT inhaler Inhale 2 puffs Every 4 (Four) Hours As Needed for Wheezing or Shortness of Air. 11/2/21   Len Crowe MD   amLODIPine (NORVASC) 5 MG tablet TAKE ONE TABLET DAILY 7/8/22   Len Crowe MD   aspirin 81 MG EC tablet Take 1 tablet by mouth Every Other Day. 2/25/19   Len Crowe MD   atorvastatin (LIPITOR) 20 MG tablet TAKE ONE TABLET DAILY 8/19/22   Len Crowe MD   B Complex-C (SUPER B COMPLEX PO) Take 1 tablet by mouth Daily.    ProviderEstefania MD   Blood Glucose Monitoring Suppl (Easy Step Glucose Monitor) device 1 Device Take As Directed. 3/21/22   Len Crowe MD   Calcium Carb-Cholecalciferol (CALCIUM-VITAMIN D) 600-400  MG-UNIT tablet Take 1 tablet by mouth 2 (Two) Times a Day.    Estefania Mejia MD   carboxymethylcellulose (REFRESH PLUS) 0.5 % solution 2 drops Daily As Needed for dry eyes.    Estefania Mejia MD   Cholecalciferol 25 MCG (1000 UT) capsule Take 1 capsule by mouth Daily.    Estefania Mejia MD   Coenzyme Q10 (CO Q 10 PO) Take 1 capsule by mouth Every Other Day.    Estefania Mejia MD   denosumab (PROLIA) 60 MG/ML solution syringe Inject 60 mg under the skin into the appropriate area as directed Every 6 (Six) Months. 2/13/19   Estefania Mejia MD   diclofenac sodium (VOTAREN XR) 100 MG 24 hr tablet Take 100 mg by mouth Daily.    Estefania Mejia MD   DULoxetine (CYMBALTA) 20 MG capsule TAKE ONE CAPSULE DAILY 9/6/22   Len Crowe MD   fluconazole (Diflucan) 100 MG tablet Take 1 tablet by mouth Daily. 11/17/22   Len Crowe MD   glucose blood test strip Use as instructed: once a day testing of the one touch ultra blue strips 2/18/20   Len Crowe MD   glucose blood test strip Use as instructed: once a day testing 3/21/22   Len Crowe MD   guaiFENesin (MUCINEX) 600 MG 12 hr tablet Take 600 mg by mouth 2 (Two) Times a Day.    Estefania Mejia MD   hydroCHLOROthiazide (MICROZIDE) 12.5 MG capsule TAKE ONE CAPSULE DAILY 11/23/21   Len Crowe MD   irbesartan (AVAPRO) 300 MG tablet TAKE ONE TABLET DAILY 4/26/22   Len Crowe MD   Lancets misc Test daily 3/21/22   Len Crowe MD   latanoprost (XALATAN) 0.005 % ophthalmic solution Administer 1 drop to both eyes Every Night. 9/14/16   Estefania Mejia MD   metFORMIN ER (GLUCOPHAGE-XR) 500 MG 24 hr tablet Take 2 tablets by mouth 2 (Two) Times a Day for 90 days. 10/18/22 1/16/23  Len Crowe MD   Mis Natural Products (GLUCOSAMINE CHONDROITIN TRIPLE PO) Take 1 tablet by mouth 2 (Two) Times a Day.    Estefania Mejia MD   montelukast (SINGULAIR) 10 MG  tablet TAKE ONE TABLET DAILY 6/23/22   Len Crowe MD   Multiple Vitamins-Minerals (VITRUM 50+ SENIOR MULTI PO) Take 1 tablet by mouth Daily.    Provider, MD Estefania   NON FORMULARY Immunotherapy    Provider, MD Estefania   potassium chloride 10 MEQ CR tablet TAKE ONE TABLET DAILY 10/25/21   Len Crowe MD   Semaglutide, 2 MG/DOSE, 8 MG/3ML solution pen-injector Inject 2 mg under the skin into the appropriate area as directed 1 (One) Time Per Week. 10/24/22   Len Crowe MD   Symbicort 160-4.5 MCG/ACT inhaler INHALE TWO PUFFS TWO (TWO) TIMES A DAY. 12/27/21   Len Crowe MD   ipratropium-albuterol (DUO-NEB) 0.5-2.5 mg/3 ml nebulizer Take 3 mL by nebulization 4 (Four) Times a Day. 1/3/19 11/18/22  Len Crowe MD       I have reviewed the patient's current medications    Objective        Vital Signs  Temp:  [97 °F (36.1 °C)-97.9 °F (36.6 °C)] 97.9 °F (36.6 °C)  Heart Rate:  [] 80  Resp:  [16-31] 16  BP: (119-179)/(56-92) 139/62      Physical Exam  Constitutional:       General: She is not in acute distress.     Appearance: She is ill-appearing. She is not diaphoretic.   HENT:      Head: Normocephalic and atraumatic.      Right Ear: External ear normal.      Left Ear: External ear normal.      Nose: No congestion or rhinorrhea.      Mouth/Throat:      Mouth: Mucous membranes are moist.      Pharynx: No oropharyngeal exudate or posterior oropharyngeal erythema.   Eyes:      General: No scleral icterus.     Extraocular Movements: Extraocular movements intact.      Conjunctiva/sclera: Conjunctivae normal.   Cardiovascular:      Rate and Rhythm: Regular rhythm. Tachycardia present.      Heart sounds: Normal heart sounds. No murmur heard.  Pulmonary:      Effort: Pulmonary effort is normal. No respiratory distress.      Breath sounds: Normal breath sounds. No wheezing, rhonchi or rales.   Abdominal:      General: Abdomen is flat. There is no distension.       Palpations: Abdomen is soft.      Tenderness: There is no abdominal tenderness. There is no guarding.   Musculoskeletal:         General: No swelling, tenderness or deformity.      Cervical back: Neck supple. No rigidity. No muscular tenderness.      Right lower leg: No edema.      Left lower leg: No edema.   Lymphadenopathy:      Cervical: No cervical adenopathy.   Skin:     General: Skin is warm and dry.   Neurological:      General: No focal deficit present.      Mental Status: She is alert and oriented to person, place, and time.      Cranial Nerves: No cranial nerve deficit.      Motor: No weakness.   Psychiatric:         Mood and Affect: Mood normal.         Behavior: Behavior normal.         Thought Content: Thought content normal.       Results Review:     Results from last 7 days   Lab Units 11/18/22 2022 11/18/22  1207 11/18/22  0955   SODIUM mmol/L 136 135* 131*   POTASSIUM mmol/L 3.9 5.3* 4.8   CHLORIDE mmol/L 110* 96* 91*   CO2 mmol/L 12.0* 8.0* 7.0*   BUN mg/dL 39* 49* 51*   CREATININE mg/dL 0.74 0.98 1.15*   GLUCOSE mg/dL 225* 517* 601*   CALCIUM mg/dL 8.7 10.0 10.7*   BILIRUBIN mg/dL  --   --  0.5   ALK PHOS U/L  --   --  108   ALT (SGPT) U/L  --   --  30   AST (SGOT) U/L  --   --  14       Results from last 7 days   Lab Units 11/18/22 2022 11/18/22  1846 11/18/22  1207 11/18/22  0955   MAGNESIUM mg/dL  --  2.1 2.4 2.7*   PHOSPHORUS mg/dL 1.7*  --  4.8* 5.7*       Results from last 7 days   Lab Units 11/18/22  0955   WBC 10*3/mm3 26.97*   HEMOGLOBIN g/dL 18.1*   HEMATOCRIT % 55.2*   PLATELETS 10*3/mm3 394       Results from last 7 days   Lab Units 11/18/22  0955   INR  1.06     Imaging Results (Last 7 Days)     Procedure Component Value Units Date/Time    CT Abdomen Pelvis Without Contrast [[id number]] Collected: 11/18/22 1136     Updated: 11/18/22 1158    Narrative:      EXAMINATION: CT ABDOMEN PELVIS WO CONTRAST-      11/18/2022 11:10 AM CST     HISTORY: Nausea/vomiting     In order to have a CT  radiation dose as low as reasonably achievable  Automated Exposure Control was utilized for adjustment of the mA and/or  KV according to patient size.     DLP in mGycm= 1081     The CT scan of the abdomen and pelvis performed without intravenous or  oral contrast enhancement.     Images are acquired in axial plane with subsequent reconstruction in  coronal and sagittal planes.     There is no previous study for comparison.     There is significant motion artifacts which low the diagnostic yield of  the study. Several images were repeated.     The lung bases included in the study are unremarkable except for a tiny  subpleural nodule in the right middle lobe, image #2 and series 3, which  probably represent a small calcified granuloma.     The limited visualized cardiomediastinal structures show atheromatous  changes of the coronary arteries. No significant cardiomegaly.     Unenhanced enhanced liver and spleen are unremarkable. A very small  splenule is seen.     The gallbladder is surgically absent.     The unenhanced pancreas is unremarkable.     The adrenal glands bilaterally are unremarkable.     The kidneys bilaterally are suboptimally evaluated due to significant  motion artifact. No significant mass is identified. No calculi are seen.  No hydronephrosis. The limited visualized ureters are nondilated. The  urinary bladder is moderately distended. No focal intrinsic abnormality  or a calculus.     The uterus is surgically absent. No adnexal masses.     There is a fat-containing umbilical hernia. There is moderate laxity of  the central middle to lower anterior abdominal wall with protrusion of  the large bowel loops. No donta herniation.     The stomach is full of fluid and small amount of gas. The duodenum and  small bowel are suboptimally evaluated due to extensive motion  artifacts. No finding to suggest obstruction. The appendix is not  visualized or evaluated. There is moderate gas and stool in the  colon.  There is diverticulosis of the distal colon. No evidence for  diverticulitis.     Atheromatous changes of the abdominal aorta and iliac arteries. No  aneurysmal dilatation.     There is no evidence of abdominal or pelvic lymphadenopathy.     Images reviewed in bone window show chronic degenerative changes of the  lumbar spine with severe degeneration of the disc L4-5 and L5-S1. Mild  superior endplate compression of vertebra L1 is due to prominent Schmorl  node.       Impression:      1. Motion artifacts limits the diagnostic quality of the study. No acute  abnormality of the abdomen or pelvis.  2. Diverticulosis of the distal colon. No evidence for diverticulitis.     .                                   This report was finalized on 11/18/2022 11:55 by Dr. Pradeep Miller MD.    CT Head Without Contrast [[id number]] Collected: 11/18/22 1129     Updated: 11/18/22 1132    Narrative:      CT HEAD WO CONTRAST- 11/18/2022 11:10 AM CST     HISTORY: Mental status change, unknown cause     COMPARISON: None      DLP: 609 mGy cm. All CT scans are performed using dose optimization  techniques as appropriate to the performed exam and including at least  one of the following: Automated exposure control, adjustment of the mA  and/or kV according to size, and the use of the iterative reconstruction  technique.     TECHNIQUE: Serial axial tomographic images of the brain were obtained  without the use of intravenous contrast.      FINDINGS:   The midline structures are nondisplaced. There is mild cerebral and  cerebellar atrophy, with an associated increase in the prominence of the  ventricles and sulci. The basilar cisterns are normal in size and  configuration. There is no evidence of intracranial hemorrhage or  mass-effect. There is low attenuation in the periventricular white  matter, consistent with chronic ischemic change. There are no abnormal  extra-axial fluid collections. There is no evidence of  tonsillar  herniation.      The included orbits and their contents are unremarkable. The visualized  paranasal sinuses, mastoid air cells and middle ear cavities are clear.  The visualized osseous structures and overlying soft tissues of the  skull and face are intact.        Impression:         1. Mild cerebral and cerebellar atrophy with chronic microvascular  disease but no evidence of acute intracranial process.        This report was finalized on 11/18/2022 11:29 by Dr. Taran Cyr MD.    XR Chest 1 View [[id number]] Collected: 11/18/22 1047     Updated: 11/18/22 1051    Narrative:      EXAM/TECHNIQUE: XR CHEST 1 VW-     INDICATION: Central line placement     COMPARISON: 11/18/2022     FINDINGS:     RIGHT IJ CVL with tip overlying the SVC. Cardiac silhouette is normal  size. No pleural effusion, pneumothorax, or focal consolidation. No  acute osseous finding.       Impression:         RIGHT IJ CVL with tip overlying the SVC. No evidence of pneumothorax.  This report was finalized on 11/18/2022 10:48 by Dr. Javi Rothman MD.    XR Chest 1 View [[id number]] Collected: 11/18/22 0954     Updated: 11/18/22 0957    Narrative:      EXAM/TECHNIQUE: XR CHEST 1 VW-     INDICATION: vomiting     COMPARISON: None available.     FINDINGS:     The cardiac silhouette is normal size. No pleural effusion,  pneumothorax, or focal consolidation. No acute osseous findings.       Impression:         No acute findings.  This report was finalized on 11/18/2022 09:54 by Dr. Javi Rothman MD.          Assessment / Plan       Hospital Problem List:  Principal Problem:    DKA (diabetic ketoacidosis) (HCC)  Active Problems:    COVID-19 virus infection  Essential hypertension  GERD    Plan  Patient has DKA.  We will start DKA protocol with insulin drip and IV fluids.  Check BMP every 4 hours for closure of anion gap.    Patient has COVID-19 but is saturating adequately on room air.  We will avoid steroids or remdesivir for  now.    Continue home medications for essential hypertension    DVT prophylaxis with subcutaneous Lovenox    CODE STATUS is full code      I discussed the patient's findings and my recommendations with patient    I have utilized all available immediate resources to obtain, update, or review the patient's current medications.      I confirmed that the patient's Advance Care Plan is present, code status is documented, or surrogate decision maker is listed in the patient's medical record.      Marc Huynh MD  11/18/22  21:39 CST        Part of this note may be an electronic transcription/translation of spoken language to printed text using the Dragon Dictation System.           Electronically signed by Marc Huynh MD at 11/18/22 2152         Current Facility-Administered Medications   Medication Dose Route Frequency Provider Last Rate Last Admin   • acetaminophen (TYLENOL) tablet 650 mg  650 mg Oral Q4H PRN Marc Huynh MD        Or   • acetaminophen (TYLENOL) 160 MG/5ML solution 650 mg  650 mg Oral Q4H PRN Marc Huynh MD        Or   • acetaminophen (TYLENOL) suppository 650 mg  650 mg Rectal Q4H PRN Marc Huynh MD       • albuterol sulfate HFA (PROVENTIL HFA;VENTOLIN HFA;PROAIR HFA) inhaler 2 puff  2 puff Inhalation Q4H PRN Marc Huynh MD       • amLODIPine (NORVASC) tablet 5 mg  5 mg Oral Daily Marc Huynh MD   5 mg at 11/21/22 0827   • aspirin EC tablet 81 mg  81 mg Oral Every Other Day Marc Huynh MD   81 mg at 11/21/22 0827   • atorvastatin (LIPITOR) tablet 20 mg  20 mg Oral Daily Marc Huynh MD   20 mg at 11/21/22 0826   • benzonatate (TESSALON) capsule 200 mg  200 mg Oral TID PRN Marc Huynh MD   200 mg at 11/18/22 2235   • budesonide-formoterol (SYMBICORT) 160-4.5 MCG/ACT inhaler 2 puff  2 puff Inhalation BID - RT Marc Huynh MD   2 puff at 11/21/22 0628   • Calcium Carb-Cholecalciferol 600-20 MG-MCG tablet 1 tablet  1 tablet  Oral BID With Meals Marc Huynh MD   1 tablet at 11/21/22 0827   • [START ON 11/22/2022] cefTRIAXone (ROCEPHIN) 1 g in sodium chloride 0.9 % 100 mL IVPB-VTB  1 g Intravenous Q24H Pantera Cotton MD       • dextrose (D50W) (25 g/50 mL) IV injection 25 g  25 g Intravenous Q15 Min PRN Marc Huynh MD       • dextrose (GLUTOSE) oral gel 15 g  15 g Oral Q15 Min PRN Marc Huynh MD       • DULoxetine (CYMBALTA) DR capsule 20 mg  20 mg Oral Daily Marc Huynh MD   20 mg at 11/21/22 0827   • Enoxaparin Sodium (LOVENOX) syringe 40 mg  40 mg Subcutaneous Daily Marc Huynh MD   40 mg at 11/21/22 0828   • glucagon (human recombinant) (GLUCAGEN DIAGNOSTIC) injection 1 mg  1 mg Intramuscular Q15 Min PRN Marc Huynh MD       • Glycerin-Hypromellose- (ARTIFICIAL TEARS) 0.2-0.2-1 % ophthalmic solution solution 2 drop  2 drop Both Eyes Q1H PRN Marc Huynh MD       • guaiFENesin (MUCINEX) 12 hr tablet 600 mg  600 mg Oral BID Marc Huynh MD   600 mg at 11/21/22 0827   • insulin detemir (LEVEMIR) injection 30 Units  30 Units Subcutaneous Q12H Marc Huynh MD   30 Units at 11/21/22 0855   • Insulin Lispro (humaLOG) injection 0-14 Units  0-14 Units Subcutaneous TID AC Marc Huynh MD   12 Units at 11/21/22 1126   • latanoprost (XALATAN) 0.005 % ophthalmic solution 1 drop  1 drop Both Eyes Nightly Marc Huynh MD   1 drop at 11/20/22 2203   • losartan (COZAAR) tablet 100 mg  100 mg Oral Q24H Marc Huynh MD   100 mg at 11/21/22 0827   • montelukast (SINGULAIR) tablet 10 mg  10 mg Oral Nightly Marc Huynh MD   10 mg at 11/20/22 2203   • Morphine sulfate (PF) injection 2 mg  2 mg Intravenous Q3H PRN Marc Huynh MD        And   • naloxone (NARCAN) injection 0.4 mg  0.4 mg Intravenous Q5 Min PRN Marc Huynh MD       • multivitamin with minerals 1 tablet  1 tablet Oral Daily Marc Huynh MD   1 tablet at  11/21/22 0826   • ondansetron (ZOFRAN) injection 4 mg  4 mg Intravenous Q6H PRN Marc Huynh MD   4 mg at 11/20/22 0656   • Pharmacy Consult   Does not apply Continuous PRN Pantera Cotton MD       • potassium chloride 20 mEq in 50 mL IVPB  20 mEq Intravenous Q1H PRN Marc Huynh MD       • potassium phosphate 45 mmol in sodium chloride 0.9 % 250 mL infusion  45 mmol Intravenous PRN Marc Huynh MD        Or   • potassium phosphate 30 mmol in sodium chloride 0.9 % 100 mL infusion  30 mmol Intravenous PRN Marc Huynh MD 25 mL/hr at 11/18/22 2328 30 mmol at 11/18/22 2328    Or   • potassium phosphate 15 mmol in sodium chloride 0.9 % 100 mL infusion  15 mmol Intravenous PRN Marc Huynh MD 50 mL/hr at 11/20/22 1023 15 mmol at 11/20/22 1023    Or   • sodium phosphates 45 mmol in sodium chloride 0.9 % 250 mL IVPB  45 mmol Intravenous PRN Marc Huynh MD        Or   • sodium phosphates 30 mmol in sodium chloride 0.9 % 100 mL IVPB  30 mmol Intravenous PRN Marc Huynh MD        Or   • sodium phosphates 15 mmol in sodium chloride 0.9 % 100 mL IVPB  15 mmol Intravenous PRN Marc Huynh MD       • sodium chloride 0.9 % flush 10 mL  10 mL Intravenous Q12H Marc Huynh MD   10 mL at 11/20/22 2204   • sodium chloride 0.9 % flush 10 mL  10 mL Intravenous PRN Marc Huynh MD       • sodium chloride 0.9 % infusion 40 mL  40 mL Intravenous PRN Marc Huynh MD       • sodium chloride 0.9 % infusion  125 mL/hr Intravenous Continuous Pantera Cotton  mL/hr at 11/21/22 1422 125 mL/hr at 11/21/22 1422     Operative/Procedure Notes (last 24 hours)  Notes from 11/20/22 1450 through 11/21/22 1450   No notes of this type exist for this encounter.            Physician Progress Notes (last 24 hours)      Pantera Cotton MD at 11/21/22 1400          1           [facility] Medicine Services  INPATIENT  "PROGRESS NOTE    Patient Name: Kezia Otoole  Date of Admission: 11/18/2022  Today's Date: 11/21/22  Length of Stay: 3  Primary Care Physician: Len Crowe MD    Subjective   Chief Complaint: Follow-up  HPI   Day 3 of hospitalization  Presented with generalized weakness, altered mental status and fall  Patient admitted with provisional diagnosis of DKA.  Input and output were not really clearly noted.    Hemoglobin is elevated.  I am wondering if this is hemoconcentration with volume depletion      Tested positive for COVID however not requiring any specific treatment given adequately maintaining oxygen    Patient also on fourth day of ceftriaxone for Klebsiella urinary tract infection    Patient working with therapist walked about 10 feet requiring rest stop    Noted temperature at 95.3.  White count elevated at 15.3.  Otherwise, white count has been anywhere from 12 T-18,000.  There is no left shift.    Patient on replacement protocol for potassium and phosphorus.  Discussed with nurse Karin on rechecking these.    Patient states that she is not any better nor any worse.  When discussed about generalities on diagnostic studies, she subsequently told me that she is improving.    She expressed concern that going home might be a challenge because her  is a farmer and does not stay much at home.  We discussed about her achievements with therapist today.  She would like to be referred to rehab (\"transition to home\") facility in [place].        Review of Systems     All pertinent negatives and positives are as above. All other systems have been reviewed and are negative unless otherwise stated.     Objective    Temp:  [95.5 °F (35.3 °C)-98.4 °F (36.9 °C)] 95.5 °F (35.3 °C)  Heart Rate:  [] 96  Resp:  [16-18] 16  BP: (120-170)/(87-98) 120/89  Physical Exam   Not requiring any oxygen  Nontoxic-appearing  GEN: Awake, alert, interactive, in NAD  HEENT: Atraumatic, PERRLA, EOMI, Anicteric, " Trachea midline  Lungs: CTAB, no wheezing/rales/rhonchi  Heart: RRR, +S1/s2, no rub  ABD: soft, nt/nd, +BS, no guarding/rebound  Extremities: atraumatic, no cyanosis, no edema  Skin: no rashes or lesions  Neuro: AAOx3, no focal deficits  Psych: normal mood & affect          Results Review:  I have reviewed the labs, radiology results, and diagnostic studies.    Laboratory Data:   Results from last 7 days   Lab Units 11/21/22  0239 11/20/22  0217 11/19/22  0144   WBC 10*3/mm3 15.29* 12.59* 18.08*   HEMOGLOBIN g/dL 16.2* 14.5 14.2   HEMATOCRIT % 47.6* 42.4 42.1   PLATELETS 10*3/mm3 183 164 199        Results from last 7 days   Lab Units 11/21/22  0239 11/20/22  0857 11/19/22  0935 11/18/22  1207 11/18/22  0955   SODIUM mmol/L 134* 132* 137   < > 131*   POTASSIUM mmol/L 3.2* 3.6 3.7   < > 4.8   CHLORIDE mmol/L 100 98 108*   < > 91*   CO2 mmol/L 21.0* 20.0* 19.0*   < > 7.0*   BUN mg/dL 20 24* 29*   < > 51*   CREATININE mg/dL 0.43* 0.60 0.66   < > 1.15*   CALCIUM mg/dL 8.0* 8.9 8.3*   < > 10.7*   BILIRUBIN mg/dL  --   --   --   --  0.5   ALK PHOS U/L  --   --   --   --  108   ALT (SGPT) U/L  --   --   --   --  30   AST (SGOT) U/L  --   --   --   --  14   GLUCOSE mg/dL 218* 286* 155*   < > 601*    < > = values in this interval not displayed.       Culture Data:   Blood Culture   Date Value Ref Range Status   11/18/2022 No growth at 3 days  Preliminary   11/18/2022 No growth at 3 days  Preliminary     Urine Culture   Date Value Ref Range Status   11/18/2022 25,000 CFU/mL Klebsiella pneumoniae ssp pneumoniae (A)  Final       Radiology Data:   Imaging Results (Last 24 Hours)     ** No results found for the last 24 hours. **          I have reviewed the patient's current medications.     Assessment/Plan     Active Hospital Problems    Diagnosis    • **DKA (diabetic ketoacidosis) (HCC)    • COVID-19 virus infection    • HTN (hypertension), benign      Problem list   · Severe hyperglycemia (601 on November 18) with anion gap of  33-treated for DKA per .  Blood sugar now is in the 200s  · Diabetes mellitus type 2 with A1c of 11.3%-continue sliding scale insulin and Levemir.  Adjust medications accordingly.  · Hyponatremia probably from volume depletion  · Leukocytosis improved in general compared to admission (27,000)  · COVID-positive without oxygen requirement  · Hypokalemia  · Hypophosphatemia  · Klebsiella urinary tract infection on day 4 of ceftriaxone.  Anticipate completion of antibiotic by tomorrow  · Generalized weakness -continue PT OT  · Hypertension-monitor blood pressure on losartan, amlodipine.  Hydrochlorothiazide discontinued.  Blood pressure currently controlled at 120/89.  Plan:      Noted the patient in hydrochlorothiazide.  I will discontinue this for now (concern for dehydration with hemoconcentration).  This medicine likely contributes to hyponatremia and hypokalemia    I increase fluid rate (normal saline at 125 cc/h)    Replace electrolytes accordingly  Discussed with nurse Castro.  amLODIPine, 5 mg, Oral, Daily  aspirin, 81 mg, Oral, Every Other Day  atorvastatin, 20 mg, Oral, Daily  budesonide-formoterol, 2 puff, Inhalation, BID - RT  Calcium Carb-Cholecalciferol, 1 tablet, Oral, BID With Meals  cefTRIAXone, 1 g, Intravenous, Q24H  DULoxetine, 20 mg, Oral, Daily  enoxaparin, 40 mg, Subcutaneous, Daily  guaiFENesin, 600 mg, Oral, BID  hydroCHLOROthiazide, 12.5 mg, Oral, Daily  insulin detemir, 30 Units, Subcutaneous, Q12H  insulin lispro, 0-14 Units, Subcutaneous, TID AC  latanoprost, 1 drop, Both Eyes, Nightly  losartan, 100 mg, Oral, Q24H  montelukast, 10 mg, Oral, Nightly  multivitamin with minerals, 1 tablet, Oral, Daily  sodium chloride, 10 mL, Intravenous, Q12H            Discharge Planning: Probably 1 to 2 days  Referral to transitional care at [place]    Electronically signed by Pantera Cotton MD, 11/21/22, 14:00 CST.      Electronically signed by Pantera Cotton MD at 11/21/22  1438     Marc Huynh MD at 11/20/22 1608              [facility] Medicine Services  INPATIENT PROGRESS NOTE    Length of Stay: 2  Date of Admission: 11/18/2022  Primary Care Physician: Len Crowe MD    Subjective   Chief Complaint: Weakness    HPI: Patient had nausea and an episode of vomiting today.  She complains of fatigue.    Review of Systems   Constitutional: Positive for fatigue. Negative for activity change, appetite change, chills and fever.   HENT: Negative for congestion, ear pain, rhinorrhea, sore throat and trouble swallowing.    Respiratory: Negative for cough, chest tightness, shortness of breath and wheezing.    Cardiovascular: Negative for chest pain, palpitations and leg swelling.   Gastrointestinal: Positive for nausea. Negative for abdominal distention, abdominal pain, diarrhea and vomiting.   Genitourinary: Negative for difficulty urinating, dysuria and hematuria.   Musculoskeletal: Negative for arthralgias, back pain and myalgias.   Skin: Negative for pallor and rash.   Neurological: Negative for dizziness, syncope, weakness, light-headedness and headaches.   Hematological: Negative for adenopathy. Does not bruise/bleed easily.   Psychiatric/Behavioral: Negative for agitation and confusion. The patient is not nervous/anxious.      Objective    Temp:  [97.9 °F (36.6 °C)-98 °F (36.7 °C)] 97.9 °F (36.6 °C)  Heart Rate:  [] 91  Resp:  [15-22] 21  BP: ()/(54-92) 138/90    Physical Exam  Constitutional:       General: She is not in acute distress.     Appearance: She is not ill-appearing or diaphoretic.   HENT:      Head: Normocephalic and atraumatic.      Right Ear: External ear normal.      Left Ear: External ear normal.      Nose: No congestion or rhinorrhea.      Mouth/Throat:      Mouth: Mucous membranes are moist.      Pharynx: No oropharyngeal exudate or posterior oropharyngeal erythema.   Eyes:      General: No scleral icterus.      Extraocular Movements: Extraocular movements intact.      Conjunctiva/sclera: Conjunctivae normal.   Cardiovascular:      Rate and Rhythm: Normal rate and regular rhythm.      Heart sounds: Normal heart sounds. No murmur heard.  Pulmonary:      Effort: Pulmonary effort is normal. No respiratory distress.      Breath sounds: Normal breath sounds. No wheezing, rhonchi or rales.   Abdominal:      General: Abdomen is flat. There is no distension.      Palpations: Abdomen is soft.      Tenderness: There is no abdominal tenderness. There is no guarding.   Musculoskeletal:         General: No swelling, tenderness or deformity.      Cervical back: Neck supple. No rigidity. No muscular tenderness.      Right lower leg: No edema.      Left lower leg: No edema.   Lymphadenopathy:      Cervical: No cervical adenopathy.   Skin:     General: Skin is warm and dry.   Neurological:      General: No focal deficit present.      Mental Status: She is alert and oriented to person, place, and time.      Cranial Nerves: No cranial nerve deficit.      Motor: No weakness.   Psychiatric:         Mood and Affect: Mood normal.         Behavior: Behavior normal.         Thought Content: Thought content normal.       Medication Review:    Current Facility-Administered Medications:   •  acetaminophen (TYLENOL) tablet 650 mg, 650 mg, Oral, Q4H PRN **OR** acetaminophen (TYLENOL) 160 MG/5ML solution 650 mg, 650 mg, Oral, Q4H PRN **OR** acetaminophen (TYLENOL) suppository 650 mg, 650 mg, Rectal, Q4H PRN, Marc Huynh MD  •  albuterol sulfate HFA (PROVENTIL HFA;VENTOLIN HFA;PROAIR HFA) inhaler 2 puff, 2 puff, Inhalation, Q4H PRN, Marc Huynh MD  •  amLODIPine (NORVASC) tablet 5 mg, 5 mg, Oral, Daily, Marc Huynh MD, 5 mg at 11/20/22 0826  •  aspirin EC tablet 81 mg, 81 mg, Oral, Every Other Day, Marc Huynh MD, 81 mg at 11/19/22 0838  •  atorvastatin (LIPITOR) tablet 20 mg, 20 mg, Oral, Daily, Marc Huynh MD,  20 mg at 11/20/22 0827  •  benzonatate (TESSALON) capsule 200 mg, 200 mg, Oral, TID PRN, Marc Huynh MD, 200 mg at 11/18/22 2235  •  budesonide-formoterol (SYMBICORT) 160-4.5 MCG/ACT inhaler 2 puff, 2 puff, Inhalation, BID - RT, Marc Huynh MD, 2 puff at 11/20/22 0657  •  Calcium Carb-Cholecalciferol 600-20 MG-MCG tablet 1 tablet, 1 tablet, Oral, BID With Meals, Marc Huynh MD, 1 tablet at 11/20/22 0733  •  cefTRIAXone (ROCEPHIN) 1 g in sodium chloride 0.9 % 100 mL IVPB-VTB, 1 g, Intravenous, Q24H, Marc Huynh MD, Last Rate: 200 mL/hr at 11/20/22 1151, 1 g at 11/20/22 1151  •  dextrose (D50W) (25 g/50 mL) IV injection 25 g, 25 g, Intravenous, Q15 Min PRN, Marc Huynh MD  •  dextrose (GLUTOSE) oral gel 15 g, 15 g, Oral, Q15 Min PRN, Marc Huynh MD  •  DULoxetine (CYMBALTA) DR capsule 20 mg, 20 mg, Oral, Daily, Marc Huynh MD, 20 mg at 11/20/22 0827  •  Enoxaparin Sodium (LOVENOX) syringe 40 mg, 40 mg, Subcutaneous, Daily, Marc Huynh MD, 40 mg at 11/20/22 0829  •  glucagon (human recombinant) (GLUCAGEN DIAGNOSTIC) injection 1 mg, 1 mg, Intramuscular, Q15 Min PRN, Marc Huynh MD  •  Glycerin-Hypromellose- (ARTIFICIAL TEARS) 0.2-0.2-1 % ophthalmic solution solution 2 drop, 2 drop, Both Eyes, Q1H PRN, Marc Huynh MD  •  guaiFENesin (MUCINEX) 12 hr tablet 600 mg, 600 mg, Oral, BID, Marc Huynh MD, 600 mg at 11/20/22 0827  •  hydroCHLOROthiazide (HYDRODIURIL) tablet 12.5 mg, 12.5 mg, Oral, Daily, Marc Huynh MD, 12.5 mg at 11/20/22 0826  •  insulin detemir (LEVEMIR) injection 30 Units, 30 Units, Subcutaneous, Q12H, Marc Huynh MD  •  Insulin Lispro (humaLOG) injection 0-14 Units, 0-14 Units, Subcutaneous, TID AC, Marc Huynh MD, 8 Units at 11/20/22 1111  •  latanoprost (XALATAN) 0.005 % ophthalmic solution 1 drop, 1 drop, Both Eyes, Nightly, Marc Huynh MD, 1 drop at 11/19/22 2053  •  losartan  (COZAAR) tablet 100 mg, 100 mg, Oral, Q24H, Marc Huynh MD, 100 mg at 11/20/22 0826  •  montelukast (SINGULAIR) tablet 10 mg, 10 mg, Oral, Nightly, Marc Huynh MD, 10 mg at 11/19/22 2031  •  Morphine sulfate (PF) injection 2 mg, 2 mg, Intravenous, Q3H PRN **AND** naloxone (NARCAN) injection 0.4 mg, 0.4 mg, Intravenous, Q5 Min PRN, Marc Huynh MD  •  multivitamin with minerals 1 tablet, 1 tablet, Oral, Daily, Marc Huynh MD, 1 tablet at 11/20/22 0827  •  ondansetron (ZOFRAN) injection 4 mg, 4 mg, Intravenous, Q6H PRN, Marc Huynh MD, 4 mg at 11/20/22 0656  •  potassium chloride 20 mEq in 50 mL IVPB, 20 mEq, Intravenous, Q1H PRN, Marc Huynh MD  •  potassium phosphate 45 mmol in sodium chloride 0.9 % 250 mL infusion, 45 mmol, Intravenous, PRN **OR** potassium phosphate 30 mmol in sodium chloride 0.9 % 100 mL infusion, 30 mmol, Intravenous, PRN, Last Rate: 25 mL/hr at 11/18/22 2328, 30 mmol at 11/18/22 2328 **OR** potassium phosphate 15 mmol in sodium chloride 0.9 % 100 mL infusion, 15 mmol, Intravenous, PRN, Last Rate: 50 mL/hr at 11/20/22 1023, 15 mmol at 11/20/22 1023 **OR** sodium phosphates 45 mmol in sodium chloride 0.9 % 250 mL IVPB, 45 mmol, Intravenous, PRN **OR** sodium phosphates 30 mmol in sodium chloride 0.9 % 100 mL IVPB, 30 mmol, Intravenous, PRN **OR** sodium phosphates 15 mmol in sodium chloride 0.9 % 100 mL IVPB, 15 mmol, Intravenous, PRN, Marc Huynh MD  •  sodium chloride 0.9 % flush 10 mL, 10 mL, Intravenous, Q12H, Marc Huynh MD, 10 mL at 11/20/22 0828  •  sodium chloride 0.9 % flush 10 mL, 10 mL, Intravenous, PRN, Marc Huynh MD  •  sodium chloride 0.9 % infusion 40 mL, 40 mL, Intravenous, PRN, Marc Huynh MD  •  sodium chloride 0.9 % infusion, 75 mL/hr, Intravenous, Continuous, Marc Huynh MD, Last Rate: 75 mL/hr at 11/20/22 1112, 75 mL/hr at 11/20/22 1112    I have reviewed the patient's current medications.      Results Review:  I have reviewed the labs, radiology results, and diagnostic studies.    Laboratory Data:   Results from last 7 days   Lab Units 11/20/22  0857 11/19/22  0935 11/19/22  0551 11/18/22  1207 11/18/22  0955   SODIUM mmol/L 132* 137 136   < > 131*   POTASSIUM mmol/L 3.6 3.7 5.1   < > 4.8   CHLORIDE mmol/L 98 108* 113*   < > 91*   CO2 mmol/L 20.0* 19.0* 14.0*   < > 7.0*   BUN mg/dL 24* 29* 33*   < > 51*   CREATININE mg/dL 0.60 0.66 0.58   < > 1.15*   GLUCOSE mg/dL 286* 155* 184*   < > 601*   CALCIUM mg/dL 8.9 8.3* 8.2*   < > 10.7*   BILIRUBIN mg/dL  --   --   --   --  0.5   ALK PHOS U/L  --   --   --   --  108   ALT (SGPT) U/L  --   --   --   --  30   AST (SGOT) U/L  --   --   --   --  14   ANION GAP mmol/L 14.0 10.0 9.0   < > 33.0*    < > = values in this interval not displayed.     Estimated Creatinine Clearance: 91.7 mL/min (by C-G formula based on SCr of 0.6 mg/dL).  Results from last 7 days   Lab Units 11/19/22  2321 11/19/22  0935 11/18/22  2347 11/18/22 2022 11/18/22  1846   MAGNESIUM mg/dL  --  2.0 2.3  --  2.1   PHOSPHORUS mg/dL 2.0* 2.0* 2.5   < >  --     < > = values in this interval not displayed.         Results from last 7 days   Lab Units 11/20/22  0217 11/19/22  0144 11/18/22  0955   WBC 10*3/mm3 12.59* 18.08* 26.97*   HEMOGLOBIN g/dL 14.5 14.2 18.1*   HEMATOCRIT % 42.4 42.1 55.2*   PLATELETS 10*3/mm3 164 199 394     Results from last 7 days   Lab Units 11/18/22  0955   INR  1.06       Culture Data:   Blood Culture   Date Value Ref Range Status   11/18/2022 No growth at 2 days  Preliminary   11/18/2022 No growth at 2 days  Preliminary     Urine Culture   Date Value Ref Range Status   11/18/2022 25,000 CFU/mL Klebsiella pneumoniae ssp pneumoniae (A)  Final     No results found for: RESPCX  No results found for: WOUNDCX  No results found for: STOOLCX  No components found for: BODYFLD    Radiology Data:   Imaging Results (Last 24 Hours)     ** No results found for the last 24 hours. **         Assessment/Plan     Hospital Problem List:  Principal Problem:    DKA (diabetic ketoacidosis) ([facility])  Active Problems:    HTN (hypertension), benign    COVID-19 virus infection  Acute kidney injury  History of COVID-19  Suspected urinary tract infection  Essential hypertension  GERD  Weakness     Plan  Patient's DKA has resolved.  Increase Levemir to 30 units every 12 hours.  Continue sliding scale insulin.    Continue IV ceftriaxone for suspected urinary tract infection.    Start PT for generalized weakness and patient's history of multiple falls.  Patient may benefit from home health for physical therapy.    Patient recently tested positive for COVID-19 but is saturating adequately on room air.    No indication for treatment.    Continue home medications for essential hypertension     DVT prophylaxis with subcutaneous Lovenox     CODE STATUS is full code    Discharge Planning: In progress    I confirmed that the patient's Advance Care Plan is present, code status is documented, or surrogate decision maker is listed in the patient's medical record.      I have utilized all available immediate resources to obtain, update, or review the patient's current medications.      Marc Huynh MD   11/20/22   16:08 CST          Electronically signed by Marc Huynh MD at 11/20/22 1621       Consult Notes (last 24 hours)  Notes from 11/20/22 1450 through 11/21/22 1450   No notes of this type exist for this encounter.            Physical Therapy Notes (last 24 hours)      Terese Caldwell, PT, DPT, NCS at 11/21/22 1314  Version 1 of 1       Goal Outcome Evaluation:  Plan of Care Reviewed With: patient        Progress: no change  Outcome Evaluation: The patient presents alert and oriented x4 sitting up in the chair. She typically lives at home with her husbands caring herself. She demonstrates decreased activity tolerance requiring a standing rest break after walking only 10ft. She is on room air with no  complaints of SOA, but complaints of fatigue. She will benefit from continued PT to work on increased activity tolerance. Recommend discharge home with assist.    Electronically signed by Terese Caldwell, PT, DPT, NCS at 22 1314     Terese Caldwell, PT, DPT, NCS at 22 1314  Version 1 of 1         Patient Name: Kezia Otoole  : 1950    MRN: [id number]                              Today's Date: 2022       Admit Date: 2022    Visit Dx:     ICD-10-CM ICD-9-CM   1. Diabetic ketoacidosis without coma associated with other specified diabetes mellitus ([facility])  E13.10 250.12   2. Altered mental status, unspecified altered mental status type  R41.82 780.97   3. LEOLA (acute kidney injury) ([facility])  N17.9 584.9   4. Acute UTI (urinary tract infection)  N39.0 599.0   5. COVID  U07.1 079.89   6. Troponin I above reference range  R77.8 790.6   7. Decreased activities of daily living (ADL)  Z78.9 V49.89   8. Decreased functional activity tolerance  R68.89 780.99     Patient Active Problem List   Diagnosis   • Class 3 severe obesity due to excess calories with serious comorbidity and body mass index (BMI) of 40.0 to 44.9 in adult ([facility])   • Depression   • Elevated fasting glucose-see DM   • Allergic rhinitis: shots   • Osteoporosis: 2018   • Primary generalized (osteo)arthritis   • HTN (hypertension), benign   • Obstructive sleep apnea-txing   • Menopause   • Diabetes type 2, controlled ([facility])   • Asthma   • Hoarseness-sees Ressor   • Gastroesophageal reflux disease-ent   • Wellness examination-done   • Purpura ([facility])   • Glaucoma   • Laboratory test`   • Anticoagulated DM2/ASA 81 (17 pt chose qod)   • Glaucoma suspect   • Hx of colonic polyps   • *Hx of anemia: ASA81,iron,gi(cp,div   • *Hx-iron deficiency   • Nuclear senile cataract   • Excessive cerumen in ear canal   • Status post joint replacement   • Primary osteoarthritis of right knee   • Hyperlipidemia-statin   • Hypercalcemia   •  History of COVID-19   • DKA (diabetic ketoacidosis) (HCC)   • COVID-19 virus infection     Past Medical History:   Diagnosis Date   • Allergic rhinitis    • Chronic laryngitis    • Diabetes mellitus (HCC)    • GERD (gastroesophageal reflux disease)    • Hypertension    • Hypertrophy of both inferior nasal turbinates    • Laryngopharyngeal reflux    • Nontoxic multinodular goiter    • [place]ca laryngitis      Past Surgical History:   Procedure Laterality Date   • CARPAL TUNNEL RELEASE     • CHOLECYSTECTOMY     • COLONOSCOPY  10/01/2008    Multiple polyps removed   • COLONOSCOPY  09/18/2013    Diverticulosis in the sigmoid colon. Dr. Dave Mcnally Recall 5 years   • HAND SURGERY     • HYSTERECTOMY     • REPLACEMENT TOTAL KNEE Right 02/02/2021   • THYROIDECTOMY, PARTIAL Right 2010   • TONSILLECTOMY     • TUBAL ABDOMINAL LIGATION        General Information     Row Name 11/21/22 1000          Physical Therapy Time and Intention    Document Type --  COVID,DKA  -MS     Mode of Treatment physical therapy;individual therapy  -MS     Row Name 11/21/22 1000          General Information    Patient Profile Reviewed yes  -MS     Prior Level of Function independent:;all household mobility;community mobility;ADL's  -MS     Existing Precautions/Restrictions fall  -MS     Barriers to Rehab none identified  -MS     Row Name 11/21/22 1000          Living Environment    People in Home spouse  -MS     Row Name 11/21/22 1000          Home Main Entrance    Number of Stairs, Main Entrance four  -MS     Stair Railings, Main Entrance railings on both sides of stairs  -MS     Row Name 11/21/22 1000          Cognition    Orientation Status (Cognition) oriented x 4  -MS     Row Name 11/21/22 1000          Safety Issues, Functional Mobility    Impairments Affecting Function (Mobility) endurance/activity tolerance  -MS           User Key  (r) = Recorded By, (t) = Taken By, (c) = Cosigned By    Initials Name Provider Type    Terese Burton R, PT,  DPT, NCS Physical Therapist               Mobility     Row Name 11/21/22 1000          Bed Mobility    Bed Mobility sit-supine  -MS     Sit-Supine [place] (Bed Mobility) standby assist  -MS     Row Name 11/21/22 1000          Sit-Stand Transfer    Sit-Stand [place] (Transfers) contact guard  -MS     Assistive Device (Sit-Stand Transfers) walker, front-wheeled  -MS     Comment, (Sit-Stand Transfer) mulitple attempts needed  -MS     Row Name 11/21/22 1000          Gait/Stairs (Locomotion)    [place] Level (Gait) contact guard  -MS     Assistive Device (Gait) walker, front-wheeled  -MS     Distance in Feet (Gait) 10ftx2 with standing rest break needed due to fatigue  -MS           User Key  (r) = Recorded By, (t) = Taken By, (c) = Cosigned By    Initials Name Provider Type    Terese Burton, PT, DPT, NCS Physical Therapist               Obj/Interventions     Row Name 11/21/22 1000          Range of Motion Comprehensive    General Range of Motion bilateral upper extremity ROM WFL;bilateral lower extremity ROM WFL  -MS     Row Name 11/21/22 1000          Strength Comprehensive (MMT)    Comment, General Manual Muscle Testing (MMT) Assessment grossly 4/5  -MS     Row Name 11/21/22 1000          Balance    Static Sitting Balance independent  -MS     Dynamic Sitting Balance independent  -MS     Position, Sitting Balance supported  -MS     Static Standing Balance contact guard  -MS     Dynamic Standing Balance contact guard  -MS     Position/Device Used, Standing Balance walker, rolling  -MS           User Key  (r) = Recorded By, (t) = Taken By, (c) = Cosigned By    Initials Name Provider Type    Terese Burton, PT, DPT, NCS Physical Therapist               Goals/Plan     Row Name 11/21/22 1000          Bed Mobility Goal 1 (PT)    Activity/Assistive Device (Bed Mobility Goal 1, PT) bed mobility activities, all  -MS     [place] Level/Cues Needed (Bed Mobility Goal 1, PT) independent  -MS     Time  Frame (Bed Mobility Goal 1, PT) long term goal (LTG);by discharge  -MS     Progress/Outcomes (Bed Mobility Goal 1, PT) new goal  -MS     Row Name 11/21/22 1000          Transfer Goal 1 (PT)    Activity/Assistive Device (Transfer Goal 1, PT) sit-to-stand/stand-to-sit;bed-to-chair/chair-to-bed;walker, rolling  -MS     [place] Level/Cues Needed (Transfer Goal 1, PT) modified independence  -MS     Time Frame (Transfer Goal 1, PT) long term goal (LTG);by discharge  -MS     Progress/Outcome (Transfer Goal 1, PT) new goal  -MS     Row Name 11/21/22 1000          Gait Training Goal 1 (PT)    Activity/Assistive Device (Gait Training Goal 1, PT) gait (walking locomotion);assistive device use;improve balance and speed;increase endurance/gait distance;walker, rolling  -MS     [place] Level (Gait Training Goal 1, PT) modified independence  -MS     Distance (Gait Training Goal 1, PT) 100ft without rest break  -MS     Time Frame (Gait Training Goal 1, PT) long term goal (LTG);by discharge  -MS     Progress/Outcome (Gait Training Goal 1, PT) new goal  -MS     Row Name 11/21/22 1000          Therapy Assessment/Plan (PT)    Planned Therapy Interventions (PT) balance training;bed mobility training;gait training;patient/family education;transfer training  -MS           User Key  (r) = Recorded By, (t) = Taken By, (c) = Cosigned By    Initials Name Provider Type    MS Javi Terese R, PT, DPT, NCS Physical Therapist               Clinical Impression     Row Name 11/21/22 1000          Pain    Pretreatment Pain Rating 0/10 - no pain  -MS     Posttreatment Pain Rating 0/10 - no pain  -MS     Row Name 11/21/22 1000          Plan of Care Review    Plan of Care Reviewed With patient  -MS     Progress no change  -MS     Outcome Evaluation The patient presents alert and oriented x4 sitting up in the chair. She typically lives at home with her husbands caring herself. She demonstrates decreased activity tolerance requiring a standing  rest break after walking only 10ft. She is on room air with no complaints of SOA, but complaints of fatigue. She will benefit from continued PT to work on increased activity tolerance. Recommend discharge home with assist.  -MS     Row Name 11/21/22 1000          Therapy Assessment/Plan (PT)    Patient/Family Therapy Goals Statement (PT) go home  -MS     Rehab Potential (PT) good, to achieve stated therapy goals  -MS     Criteria for Skilled Interventions Met (PT) yes;meets criteria;skilled treatment is necessary  -MS     Therapy Frequency (PT) 2 times/day  -MS     Predicted Duration of Therapy Intervention (PT) until discharge  -MS     Row Name 11/21/22 1000          Vital Signs    O2 Delivery Pre Treatment room air  -MS     O2 Delivery Intra Treatment room air  -MS     O2 Delivery Post Treatment room air  -MS     Row Name 11/21/22 1000          Positioning and Restraints    Post Treatment Position bed  -MS     In Bed fowlers;call light within reach;encouraged to call for assist;side rails up x2  -MS           User Key  (r) = Recorded By, (t) = Taken By, (c) = Cosigned By    Initials Name Provider Type    MS Jassi Caldwellanda R, PT, DPT, NCS Physical Therapist               Outcome Measures     Row Name 11/21/22 1000 11/21/22 0737       How much help from another person do you currently need...    Turning from your back to your side while in flat bed without using bedrails? 3  -MS 3  -CM    Moving from lying on back to sitting on the side of a flat bed without bedrails? 3  -MS 3  -CM    Moving to and from a bed to a chair (including a wheelchair)? 3  -MS 3  -CM    Standing up from a chair using your arms (e.g., wheelchair, bedside chair)? 3  -MS 3  -CM    Climbing 3-5 steps with a railing? 3  -MS 3  -CM    To walk in hospital room? 3  -MS 3  -CM    AM-PAC 6 Clicks Score (PT) 18  -MS 18  -CM    Highest level of mobility 6 --> Walked 10 steps or more  -MS 6 --> Walked 10 steps or more  -CM    Row Name 11/21/22 1000  11/21/22 0745       Functional Assessment    Outcome Measure Options AM-PAC 6 Clicks Basic Mobility (PT)  -MS AM-PAC 6 Clicks Daily Activity (OT)  -AC          User Key  (r) = Recorded By, (t) = Taken By, (c) = Cosigned By    Initials Name Provider Type    AC Avelino Pantoja, OTR/L, CNT Occupational Therapist    MS Terese Caldwell, PT, DPT, NCS Physical Therapist    Karin Euceda, RN Registered Nurse                             Physical Therapy Education     Title: PT OT SLP Therapies (In Progress)     Topic: Physical Therapy (In Progress)     Point: Mobility training (Done)     Learning Progress Summary           Patient Acceptance, E, VU by MS at 11/21/2022 1314    Comment: role of PT in her care                   Point: Home exercise program (Not Started)     Learner Progress:  Not documented in this visit.          Point: Body mechanics (Not Started)     Learner Progress:  Not documented in this visit.          Point: Precautions (Not Started)     Learner Progress:  Not documented in this visit.                      User Key     Initials Effective Dates Name Provider Type Discipline    MS 06/19/18 -  Terese Caldwell, PT, DPT, NCS Physical Therapist PT              PT Recommendation and Plan  Planned Therapy Interventions (PT): balance training, bed mobility training, gait training, patient/family education, transfer training  Plan of Care Reviewed With: patient  Progress: no change  Outcome Evaluation: The patient presents alert and oriented x4 sitting up in the chair. She typically lives at home with her husbands caring herself. She demonstrates decreased activity tolerance requiring a standing rest break after walking only 10ft. She is on room air with no complaints of SOA, but complaints of fatigue. She will benefit from continued PT to work on increased activity tolerance. Recommend discharge home with assist.     Time Calculation:    PT Charges     Row Name 11/21/22 1000             Time  Calculation    Start Time 1000  -MS      Stop Time 1040  -MS      Time Calculation (min) 40 min  -MS      PT Received On 22  -MS      PT Goal Re-Cert Due Date 22  -MS         Untimed Charges    PT Eval/Re-eval Minutes 40  -MS         Total Minutes    Untimed Charges Total Minutes 40  -MS       Total Minutes 40  -MS            User Key  (r) = Recorded By, (t) = Taken By, (c) = Cosigned By    Initials Name Provider Type    MS Terese Caldwell, PT, DPT, NCS Physical Therapist              Therapy Charges for Today     Code Description Service Date Service Provider Modifiers Qty    89048338111 HC PT EVAL LOW COMPLEXITY 3 2022 Terese Caldwell, PT, DPT, NCS GP 1          PT G-Codes  Outcome Measure Options: AM-PAC 6 Clicks Basic Mobility (PT)  AM-PAC 6 Clicks Score (PT): 18  AM-PAC 6 Clicks Score (OT): 20  PT Discharge Summary  Anticipated Discharge Disposition (PT): home with assist    Terese Caldwell PT, DPT, MARGARITO  2022      Electronically signed by Terese Caldwell PT, DPT, MARGARITO at 22 1315          Occupational Therapy Notes (last 24 hours)      Avelino Pantoja, OTR/L, CNT at 22 0842          Acute Care - Occupational Therapy Initial Evaluation  [facility]     Patient Name: Kezia Otoole  : 1950  MRN: [id number]  Today's Date: 2022  Onset of Illness/Injury or Date of Surgery: 22  Date of Referral to OT: 22  Referring Physician: Dr. Huynh    Admit Date: 2022       ICD-10-CM ICD-9-CM   1. Diabetic ketoacidosis without coma associated with other specified diabetes mellitus (HCC)  E13.10 250.12   2. Altered mental status, unspecified altered mental status type  R41.82 780.97   3. LEOLA (acute kidney injury) (HCC)  N17.9 584.9   4. Acute UTI (urinary tract infection)  N39.0 599.0   5. COVID  U07.1 079.89   6. Troponin I above reference range  R77.8 790.6   7. Decreased activities of daily living (ADL)  Z78.9 V49.89     Patient Active Problem List    Diagnosis   • Class 3 severe obesity due to excess calories with serious comorbidity and body mass index (BMI) of 40.0 to 44.9 in adult ([facility])   • Depression   • Elevated fasting glucose-see DM   • Allergic rhinitis: shots   • Osteoporosis: 2018/[facility]   • Primary generalized (osteo)arthritis   • HTN (hypertension), benign   • Obstructive sleep apnea-txing   • Menopause   • Diabetes type 2, controlled ([facility])   • Asthma   • Hoarseness-sees Ressor   • Gastroesophageal reflux disease-ent   • Wellness examination-done   • Purpura ([facility])   • Glaucoma   • Laboratory test`   • Anticoagulated DM2/ASA 81 (11.9.17 pt chose qod)   • Glaucoma suspect   • Hx of colonic polyps   • *Hx of anemia: ASA81,iron,gi(cp,div   • *Hx-iron deficiency   • Nuclear senile cataract   • Excessive cerumen in ear canal   • Status post joint replacement   • Primary osteoarthritis of right knee   • Hyperlipidemia-statin   • Hypercalcemia   • History of COVID-19   • DKA (diabetic ketoacidosis) ([facility])   • COVID-19 virus infection     Past Medical History:   Diagnosis Date   • Allergic rhinitis    • Chronic laryngitis    • Diabetes mellitus ([facility])    • GERD (gastroesophageal reflux disease)    • Hypertension    • Hypertrophy of both inferior nasal turbinates    • Laryngopharyngeal reflux    • Nontoxic multinodular goiter    • [place] laryngitis      Past Surgical History:   Procedure Laterality Date   • CARPAL TUNNEL RELEASE     • CHOLECYSTECTOMY     • COLONOSCOPY  10/01/2008    Multiple polyps removed   • COLONOSCOPY  09/18/2013    Diverticulosis in the sigmoid colon. Dr. Dave Mcnally Recall 5 years   • HAND SURGERY     • HYSTERECTOMY     • REPLACEMENT TOTAL KNEE Right 02/02/2021   • THYROIDECTOMY, PARTIAL Right 2010   • TONSILLECTOMY     • TUBAL ABDOMINAL LIGATION           OT ASSESSMENT FLOWSHEET (last 12 hours)     OT Evaluation and Treatment     Row Name 11/21/22 0745                   OT Time and Intention    Subjective Information complains  of;weakness;fatigue  -        Document Type evaluation  -        Mode of Treatment occupational therapy  -           General Information    Patient Profile Reviewed yes  -AC        Onset of Illness/Injury or Date of Surgery 11/18/22  -        Referring Physician Dr. Huynh  -        Prior Level of Function independent:;all household mobility;community mobility;gait;transfer;bed mobility;ADL's;home management;cooking;cleaning;driving;shopping  -        Equipment Currently Used at Home cane, straight;walker, rolling;shower chair  -        Pertinent History of Current Functional Problem confusion, falls; Dx: DKA, AMS, LEOLA, UTI, COVID+  -AC        Existing Precautions/Restrictions fall  -        Barriers to Rehab none identified  -           Living Environment    Current Living Arrangements home  walk in shower  -AC        Home Accessibility stairs to enter home  -AC        People in Home spouse  -           Home Main Entrance    Number of Stairs, Main Entrance four  -AC        Stair Railings, Main Entrance railings on both sides of stairs  -           Pain Assessment    Pretreatment Pain Rating 0/10 - no pain  -AC        Posttreatment Pain Rating 0/10 - no pain  -AC           Cognition    Orientation Status (Cognition) oriented x 4  -AC        Follows Commands (Cognition) WFL  -        Personal Safety Interventions muscle strengthening facilitated;fall prevention program maintained;gait belt;nonskid shoes/slippers when out of bed;supervised activity  -           Range of Motion Comprehensive    Comment, General Range of Motion WFL AROM BUE  -           Strength Comprehensive (MMT)    Comment, General Manual Muscle Testing (MMT) Assessment 4/5 to 4-/5 grossly in BUE  -           Activities of Daily Living    BADL Assessment/Intervention lower body dressing;grooming;toileting  -           Lower Body Dressing Assessment/Training    [place] Level (Lower Body Dressing)  don;doff;socks;moderate assist (50% patient effort)  -AC        Position (Lower Body Dressing) edge of bed sitting  -AC        Comment, (Lower Body Dressing) able to don/doff from L foot but not R  -AC           Grooming Assessment/Training    [place] Level (Grooming) wash face, hands  -AC        Position (Grooming) sink side  -AC           Toileting Assessment/Training    [place] Level (Toileting) toileting skills;supervision  -        Assistive Devices (Toileting) commode  -AC        Position (Toileting) unsupported sitting  -AC           BADL Safety/Performance    Impairments, BADL Safety/Performance balance;endurance/activity tolerance;strength;range of motion  -AC           Bed Mobility    Bed Mobility scooting/bridging;supine-sit;sit-supine  -AC        Scooting/Bridging [place] (Bed Mobility) standby assist  -AC        Supine-Sit [place] (Bed Mobility) standby assist  -AC        Sit-Supine [place] (Bed Mobility) standby assist  -        Assistive Device (Bed Mobility) bed rails;head of bed elevated  -           Functional Mobility    Functional Mobility- Ind. Level contact guard assist  -        Functional Mobility- Device walker, front-wheeled  -AC        Functional Mobility- Comment in BR, around room, to chair  -AC           Transfer Assessment/Treatment    Transfers sit-stand transfer;stand-sit transfer;toilet transfer  -           Sit-Stand Transfer    Sit-Stand [place] (Transfers) contact guard;verbal cues  -        Assistive Device (Sit-Stand Transfers) walker, front-wheeled  -AC           Stand-Sit Transfer    Stand-Sit [place] (Transfers) contact guard;verbal cues  -        Assistive Device (Stand-Sit Transfers) walker, front-wheeled  -AC           Toilet Transfer    Type (Toilet Transfer) sit-stand;stand-sit  -AC        [place] Level (Toilet Transfer) minimum assist (75% patient effort);verbal cues  -        Assistive Device (Toilet Transfer)  walker, front-wheeled  -AC           Safety Issues, Functional Mobility    Impairments Affecting Function (Mobility) balance;endurance/activity tolerance;strength  -AC           Balance    Balance Assessment sitting static balance;sitting dynamic balance;standing static balance;standing dynamic balance  -AC        Static Sitting Balance independent  -AC        Dynamic Sitting Balance standby assist  -AC        Position, Sitting Balance sitting edge of bed  -AC        Static Standing Balance contact guard  -AC        Dynamic Standing Balance contact guard  -AC        Position/Device Used, Standing Balance walker, front-wheeled  -AC        Comment, Balance 1 LOB during dynamic sitting, pt lost balance posteriorly, regained balance with David  -AC           Plan of Care Review    Plan of Care Reviewed With patient  -AC        Progress no change  -AC        Outcome Evaluation OT eval completed.  Pt alert and oriented x4.  Came to EOB with SBA.  1 posterior LOB during dynamic sitting, pt regained balance with Daivd.  She needed modA to don/doff socks.  Her strength is decreased in BUE at 4-/5.  Pt transferred with CGA and rw.  Amb to BR, voided with S, transferred from commode with CGA and completed hand washing at sink with CGA.  Pt was more fatigued after ADL activity and returned to chair with CGA and rw.  OT will continue to treat to increase pt's endurance and strength for ADL.  Anticipate discharge home with assist and HH.  -AC           Positioning and Restraints    Pre-Treatment Position in bed  -AC        Post Treatment Position chair  -AC        In Chair sitting;call light within reach;encouraged to call for assist;legs elevated  -           Therapy Assessment/Plan (OT)    Date of Referral to OT 11/20/22  -        OT Diagnosis decreased adl  -AC        Rehab Potential (OT) good, to achieve stated therapy goals  -        Criteria for Skilled Therapeutic Interventions Met (OT) yes;meets criteria;skilled  treatment is necessary  -AC        Therapy Frequency (OT) 5 times/wk  -AC        Predicted Duration of Therapy Intervention (OT) 10 days  -AC        Planned Therapy Interventions (OT) activity tolerance training;adaptive equipment training;BADL retraining;functional balance retraining;occupation/activity based interventions;patient/caregiver education/training;strengthening exercise;transfer/mobility retraining  -AC           OT Goals    Transfer Goal Selection (OT) transfer, OT goal 1  -AC        Dressing Goal Selection (OT) dressing, OT goal 1  -AC        Strength Goal Selection (OT) strength, OT goal 1  -AC           Transfer Goal 1 (OT)    Activity/Assistive Device (Transfer Goal 1, OT) toilet  -AC        [place] Level/Cues Needed (Transfer Goal 1, OT) modified independence  -AC        Time Frame (Transfer Goal 1, OT) long term goal (LTG);10 days  -AC        Progress/Outcome (Transfer Goal 1, OT) new goal  -AC           Dressing Goal 1 (OT)    Activity/Device (Dressing Goal 1, OT) lower body dressing  -AC        [place]/Cues Needed (Dressing Goal 1, OT) standby assist  -AC        Time Frame (Dressing Goal 1, OT) long term goal (LTG);10 days  -AC        Strategies/Barriers (Dressing Goal 1, OT) with AE  -AC        Progress/Outcome (Dressing Goal 1, OT) new goal  -AC           Strength Goal 1 (OT)    Strength Goal 1 (OT) Increase BUE strength to 4+/5 to increase independence with ADL  -AC        Time Frame (Strength Goal 1, OT) long term goal (LTG);10 days  -AC        Progress/Outcome (Strength Goal 1, OT) new goal  -AC              User Key  (r) = Recorded By, (t) = Taken By, (c) = Cosigned By    Initials Name Effective Dates    AC Avelino Pantoja, OTR/L, CNT 04/09/19 -                  Occupational Therapy Education     Title: PT OT SLP Therapies (Done)     Topic: Occupational Therapy (Done)     Point: ADL training (Done)     Description:   Instruct learner(s) on proper safety adaptation and  remediation techniques during self care or transfers.   Instruct in proper use of assistive devices.              Learning Progress Summary           Patient Acceptance, E,TB, VU by  at 11/21/2022 0841                   Point: Home exercise program (Done)     Description:   Instruct learner(s) on appropriate technique for monitoring, assisting and/or progressing therapeutic exercises/activities.              Learning Progress Summary           Patient Acceptance, E,TB, VU by  at 11/21/2022 0841                   Point: Body mechanics (Done)     Description:   Instruct learner(s) on proper positioning and spine alignment during self-care, functional mobility activities and/or exercises.              Learning Progress Summary           Patient Acceptance, E,TB, VU by  at 11/21/2022 0841                               User Key     Initials Effective Dates Name Provider Type Discipline     04/09/19 -  Avelino Pantoja, OTR/L, CNT Occupational Therapist OT                  OT Recommendation and Plan  Planned Therapy Interventions (OT): activity tolerance training, adaptive equipment training, BADL retraining, functional balance retraining, occupation/activity based interventions, patient/caregiver education/training, strengthening exercise, transfer/mobility retraining  Therapy Frequency (OT): 5 times/wk  Plan of Care Review  Plan of Care Reviewed With: patient  Progress: no change  Outcome Evaluation: OT eval completed.  Pt alert and oriented x4.  Came to EOB with SBA.  1 posterior LOB during dynamic sitting, pt regained balance with David.  She needed modA to don/doff socks.  Her strength is decreased in BUE at 4-/5.  Pt transferred with CGA and rw.  Amb to BR, voided with S, transferred from commode with CGA and completed hand washing at sink with CGA.  Pt was more fatigued after ADL activity and returned to chair with CGA and rw.  OT will continue to treat to increase pt's endurance and strength for ADL.   Anticipate discharge home with assist and HH.  Plan of Care Reviewed With: patient  Outcome Evaluation: OT eval completed.  Pt alert and oriented x4.  Came to EOB with SBA.  1 posterior LOB during dynamic sitting, pt regained balance with David.  She needed modA to don/doff socks.  Her strength is decreased in BUE at 4-/5.  Pt transferred with CGA and rw.  Amb to BR, voided with S, transferred from commode with CGA and completed hand washing at sink with CGA.  Pt was more fatigued after ADL activity and returned to chair with CGA and rw.  OT will continue to treat to increase pt's endurance and strength for ADL.  Anticipate discharge home with assist and HH.     Outcome Measures     Row Name 11/21/22 0745             How much help from another is currently needed...    Putting on and taking off regular lower body clothing? 2  -AC      Bathing (including washing, rinsing, and drying) 2  -AC      Toileting (which includes using toilet bed pan or urinal) 4  -AC      Putting on and taking off regular upper body clothing 4  -AC      Taking care of personal grooming (such as brushing teeth) 4  -AC      Eating meals 4  -AC      AM-PAC 6 Clicks Score (OT) 20  -AC         Functional Assessment    Outcome Measure Options AM-PAC 6 Clicks Daily Activity (OT)  -            User Key  (r) = Recorded By, (t) = Taken By, (c) = Cosigned By    Initials Name Provider Type    Avelino Barnhart OTR/L, REYNA Occupational Therapist                Time Calculation:    Time Calculation- OT     Row Name 11/21/22 0841             Time Calculation- OT    OT Start Time 0745  -      OT Stop Time 0842  -      OT Time Calculation (min) 57 min  -      OT Received On 11/21/22  -      OT Goal Re-Cert Due Date 12/01/22  -            User Key  (r) = Recorded By, (t) = Taken By, (c) = Cosigned By    Initials Name Provider Type    Avelino Barnhart, OTR/L, REYNA Occupational Therapist              Therapy Charges for Today     Code Description  Service Date Service Provider Modifiers Qty    [id number]  OT EVAL LOW COMPLEXITY 4 11/21/2022 Avelino Pantoja OTR/L, CNT GO 1               Avelino CLEMENTE. Scarlett, OTR/L, CNT  11/21/2022    Electronically signed by Avelino Pantoja OTR/L, CNT at 11/21/22 0842     Avelino Pantoja OTR/L, CNT at 11/21/22 0842        Goal Outcome Evaluation:  Plan of Care Reviewed With: patient        Progress: no change  Outcome Evaluation: OT tiffani completed.  Pt alert and oriented x4.  Came to EOB with SBA.  1 posterior LOB during dynamic sitting, pt regained balance with David.  She needed modA to don/doff socks.  Her strength is decreased in BUE at 4-/5.  Pt transferred with CGA and rw.  Amb to BR, voided with S, transferred from commode with CGA and completed hand washing at sink with CGA.  Pt was more fatigued after ADL activity and returned to chair with CGA and rw.  OT will continue to treat to increase pt's endurance and strength for ADL.  Anticipate discharge home with assist and HH.    Electronically signed by Avelino Pantoja OTR/L, CNT at 11/21/22 0842       Speech Language Pathology Notes (last 24 hours)  Notes from 11/20/22 1451 through 11/21/22 1451   No notes exist for this encounter.         Respiratory Therapy Notes (last 24 hours)  Notes from 11/20/22 1451 through 11/21/22 1451   No notes exist for this encounter.

## 2022-11-21 NOTE — CASE MANAGEMENT/SOCIAL WORK
Call placed to pts room d/t Covid precautions to complete DC needs assessment, no answer received, call placed to pts cellphone number on file and spouses phone number on file with now answer.  Will attempt D/C needs assessment at a later time.

## 2022-11-21 NOTE — PROGRESS NOTES
"[facility]           [facility]  INPATIENT PROGRESS NOTE    Patient Name: Kezia Otoole  Date of Admission: 11/18/2022  Today's Date: 11/21/22  Length of Stay: 3  Primary Care Physician: Len Crowe MD    Subjective   Chief Complaint: Follow-up  HPI   Day 3 of hospitalization  Presented with generalized weakness, altered mental status and fall  Patient admitted with provisional diagnosis of DKA.  Input and output were not really clearly noted.    Hemoglobin is elevated.  I am wondering if this is hemoconcentration with volume depletion      Tested positive for COVID however not requiring any specific treatment given adequately maintaining oxygen    Patient also on fourth day of ceftriaxone for Klebsiella urinary tract infection    Patient working with therapist walked about 10 feet requiring rest stop    Noted temperature at 95.3.  White count elevated at 15.3.  Otherwise, white count has been anywhere from 12 T-18,000.  There is no left shift.    Patient on replacement protocol for potassium and phosphorus.  Discussed with nurse Karin on rechecking these.    Patient states that she is not any better nor any worse.  When discussed about generalities on diagnostic studies, she subsequently told me that she is improving.    She expressed concern that going home might be a challenge because her  is a farmer and does not stay much at home.  We discussed about her achievements with therapist today.  She would like to be referred to rehab (\"transition to home\") facility in [place].        Review of Systems     All pertinent negatives and positives are as above. All other systems have been reviewed and are negative unless otherwise stated.     Objective    Temp:  [95.5 °F (35.3 °C)-98.4 °F (36.9 °C)] 95.5 °F (35.3 °C)  Heart Rate:  [] 96  Resp:  [16-18] 16  BP: (120-170)/(87-98) 120/89  Physical Exam   Not requiring any " oxygen  Nontoxic-appearing  GEN: Awake, alert, interactive, in NAD  HEENT: Atraumatic, PERRLA, EOMI, Anicteric, Trachea midline  Lungs: CTAB, no wheezing/rales/rhonchi  Heart: RRR, +S1/s2, no rub  ABD: soft, nt/nd, +BS, no guarding/rebound  Extremities: atraumatic, no cyanosis, no edema  Skin: no rashes or lesions  Neuro: AAOx3, no focal deficits  Psych: normal mood & affect          Results Review:  I have reviewed the labs, radiology results, and diagnostic studies.    Laboratory Data:   Results from last 7 days   Lab Units 11/21/22  0239 11/20/22  0217 11/19/22  0144   WBC 10*3/mm3 15.29* 12.59* 18.08*   HEMOGLOBIN g/dL 16.2* 14.5 14.2   HEMATOCRIT % 47.6* 42.4 42.1   PLATELETS 10*3/mm3 183 164 199        Results from last 7 days   Lab Units 11/21/22  0239 11/20/22  0857 11/19/22  0935 11/18/22  1207 11/18/22  0955   SODIUM mmol/L 134* 132* 137   < > 131*   POTASSIUM mmol/L 3.2* 3.6 3.7   < > 4.8   CHLORIDE mmol/L 100 98 108*   < > 91*   CO2 mmol/L 21.0* 20.0* 19.0*   < > 7.0*   BUN mg/dL 20 24* 29*   < > 51*   CREATININE mg/dL 0.43* 0.60 0.66   < > 1.15*   CALCIUM mg/dL 8.0* 8.9 8.3*   < > 10.7*   BILIRUBIN mg/dL  --   --   --   --  0.5   ALK PHOS U/L  --   --   --   --  108   ALT (SGPT) U/L  --   --   --   --  30   AST (SGOT) U/L  --   --   --   --  14   GLUCOSE mg/dL 218* 286* 155*   < > 601*    < > = values in this interval not displayed.       Culture Data:   Blood Culture   Date Value Ref Range Status   11/18/2022 No growth at 3 days  Preliminary   11/18/2022 No growth at 3 days  Preliminary     Urine Culture   Date Value Ref Range Status   11/18/2022 25,000 CFU/mL Klebsiella pneumoniae ssp pneumoniae (A)  Final       Radiology Data:   Imaging Results (Last 24 Hours)     ** No results found for the last 24 hours. **          I have reviewed the patient's current medications.     Assessment/Plan     Active Hospital Problems    Diagnosis    • **DKA (diabetic ketoacidosis) (HCC)    • COVID-19 virus infection     • HTN (hypertension), benign      Problem list   · Severe hyperglycemia (601 on November 18) with anion gap of 33-treated for DKA per .  Blood sugar now is in the 200s  · Diabetes mellitus type 2 with A1c of 11.3%-continue sliding scale insulin and Levemir.  Adjust medications accordingly.  · Hyponatremia probably from volume depletion  · Leukocytosis improved in general compared to admission (27,000)  · COVID-positive without oxygen requirement  · Hypokalemia  · Hypophosphatemia  · Klebsiella urinary tract infection on day 4 of ceftriaxone.  Anticipate completion of antibiotic by tomorrow  · Generalized weakness -continue PT OT  · Hypertension-monitor blood pressure on losartan, amlodipine.  Hydrochlorothiazide discontinued.  Blood pressure currently controlled at 120/89.  Plan:      Noted the patient in hydrochlorothiazide.  I will discontinue this for now (concern for dehydration with hemoconcentration).  This medicine likely contributes to hyponatremia and hypokalemia    I increase fluid rate (normal saline at 125 cc/h)    Replace electrolytes accordingly  Discussed with nurse Castro.  amLODIPine, 5 mg, Oral, Daily  aspirin, 81 mg, Oral, Every Other Day  atorvastatin, 20 mg, Oral, Daily  budesonide-formoterol, 2 puff, Inhalation, BID - RT  Calcium Carb-Cholecalciferol, 1 tablet, Oral, BID With Meals  cefTRIAXone, 1 g, Intravenous, Q24H  DULoxetine, 20 mg, Oral, Daily  enoxaparin, 40 mg, Subcutaneous, Daily  guaiFENesin, 600 mg, Oral, BID  hydroCHLOROthiazide, 12.5 mg, Oral, Daily  insulin detemir, 30 Units, Subcutaneous, Q12H  insulin lispro, 0-14 Units, Subcutaneous, TID AC  latanoprost, 1 drop, Both Eyes, Nightly  losartan, 100 mg, Oral, Q24H  montelukast, 10 mg, Oral, Nightly  multivitamin with minerals, 1 tablet, Oral, Daily  sodium chloride, 10 mL, Intravenous, Q12H            Discharge Planning: Probably 1 to 2 days  Referral to transitional care at [place]    Electronically signed by [employer]  Billie Cotton MD, 11/21/22, 14:00 CST.

## 2022-11-21 NOTE — TELEPHONE ENCOUNTER
Aware of details of her admit    Aware she will be going home on insulin  All considering    Would like to help her get Dexcom/equal  This will require qid accuchecks which we have no problem recommending at discharge    With holiday weekend; need to work on   A. Getting dexcom in hand  B. Getting it on phone/set up  Ie; getting patient functioning with this at time of discharge

## 2022-11-21 NOTE — PLAN OF CARE
Goal Outcome Evaluation:                 RA. External cath changed. Bedtime glucose 198. No distress noted.

## 2022-11-21 NOTE — TELEPHONE ENCOUNTER
[phone] (M)   OV: 11/19/19    Patient is getting injections in her leg for arthritis. Since, back has been in pain. Patient has not been sick so is thinking it may be a pulled muscle. Patient is having aches and pains and cant sleep well. Patient is requesting an appt. Please advise. Rx Refill Note  Requested Prescriptions     Pending Prescriptions Disp Refills   • hydroCHLOROthiazide (MICROZIDE) 12.5 MG capsule [Pharmacy Med Name: HYDROCHLOROTHIAZIDE 12.5MG CAPSULE] 90 capsule 3     Sig: TAKE ONE CAPSULE DAILY   • potassium chloride 10 MEQ CR tablet [Pharmacy Med Name: POTASSIUM CHLORIDE ER 10MEQ ER TABLET ER] 90 tablet 3     Sig: TAKE ONE TABLET DAILY      Last office visit with prescribing clinician: 10/18/2022      Next office visit with prescribing clinician: 12/27/2022            Nabila Quinonez MA  11/21/22, 08:55 CST

## 2022-11-21 NOTE — CASE MANAGEMENT/SOCIAL WORK
Pt requesting referral to [facility].  Called Rima from Admissions there [phone] as was not sure they would take a Covid + patient. They normally do not but is a case by case decision.  Referral is being sent. Will follow for decision.

## 2022-11-21 NOTE — TELEPHONE ENCOUNTER
Called pt and advised and stated understanding, do have sample sensor for her to , advised on the phone michaela and will have son come by and  sample, advised md1 may not cover this rx, and ok with MD2    Called md1 and will need to go to md2, orders done    Sample sensor   Lot [id number]  Exp 06-

## 2022-11-22 ENCOUNTER — HOME HEALTH ADMISSION (OUTPATIENT)
Dept: HOME HEALTH SERVICES | Facility: HOME HEALTHCARE | Age: 72
End: 2022-11-22
Payer: MEDICARE

## 2022-11-22 ENCOUNTER — READMISSION MANAGEMENT (OUTPATIENT)
Dept: CALL CENTER | Facility: HOSPITAL | Age: 72
End: 2022-11-22

## 2022-11-22 VITALS
TEMPERATURE: 98.4 F | WEIGHT: 215.7 LBS | HEART RATE: 87 BPM | BODY MASS INDEX: 38.22 KG/M2 | RESPIRATION RATE: 16 BRPM | DIASTOLIC BLOOD PRESSURE: 80 MMHG | SYSTOLIC BLOOD PRESSURE: 145 MMHG | HEIGHT: 63 IN | OXYGEN SATURATION: 98 %

## 2022-11-22 PROBLEM — R53.1 GENERAL WEAKNESS: Status: ACTIVE | Noted: 2022-11-22

## 2022-11-22 LAB
ANION GAP SERPL CALCULATED.3IONS-SCNC: 11 MMOL/L (ref 5–15)
BUN SERPL-MCNC: 15 MG/DL (ref 8–23)
BUN/CREAT SERPL: 34.9 (ref 7–25)
CALCIUM SPEC-SCNC: 8.5 MG/DL (ref 8.6–10.5)
CHLORIDE SERPL-SCNC: 100 MMOL/L (ref 98–107)
CO2 SERPL-SCNC: 22 MMOL/L (ref 22–29)
CREAT SERPL-MCNC: 0.43 MG/DL (ref 0.57–1)
EGFRCR SERPLBLD CKD-EPI 2021: 102.2 ML/MIN/1.73
GLUCOSE BLDC GLUCOMTR-MCNC: 127 MG/DL (ref 70–130)
GLUCOSE BLDC GLUCOMTR-MCNC: 243 MG/DL (ref 70–130)
GLUCOSE SERPL-MCNC: 191 MG/DL (ref 65–99)
MAGNESIUM SERPL-MCNC: 1.6 MG/DL (ref 1.6–2.4)
PHOSPHATE SERPL-MCNC: 2.1 MG/DL (ref 2.5–4.5)
POTASSIUM SERPL-SCNC: 3.3 MMOL/L (ref 3.5–5.2)
POTASSIUM SERPL-SCNC: 3.3 MMOL/L (ref 3.5–5.2)
POTASSIUM SERPL-SCNC: 3.7 MMOL/L (ref 3.5–5.2)
SODIUM SERPL-SCNC: 133 MMOL/L (ref 136–145)

## 2022-11-22 PROCEDURE — 84100 ASSAY OF PHOSPHORUS: CPT | Performed by: INTERNAL MEDICINE

## 2022-11-22 PROCEDURE — 83735 ASSAY OF MAGNESIUM: CPT | Performed by: INTERNAL MEDICINE

## 2022-11-22 PROCEDURE — 97535 SELF CARE MNGMENT TRAINING: CPT

## 2022-11-22 PROCEDURE — 97110 THERAPEUTIC EXERCISES: CPT

## 2022-11-22 PROCEDURE — 97116 GAIT TRAINING THERAPY: CPT

## 2022-11-22 PROCEDURE — 82962 GLUCOSE BLOOD TEST: CPT

## 2022-11-22 PROCEDURE — 84132 ASSAY OF SERUM POTASSIUM: CPT | Performed by: INTERNAL MEDICINE

## 2022-11-22 PROCEDURE — 0 POTASSIUM CHLORIDE PER 2 MEQ: Performed by: INTERNAL MEDICINE

## 2022-11-22 PROCEDURE — 80048 BASIC METABOLIC PNL TOTAL CA: CPT | Performed by: INTERNAL MEDICINE

## 2022-11-22 PROCEDURE — 25010000002 CEFTRIAXONE PER 250 MG: Performed by: INTERNAL MEDICINE

## 2022-11-22 PROCEDURE — 25010000002 ENOXAPARIN PER 10 MG: Performed by: INTERNAL MEDICINE

## 2022-11-22 PROCEDURE — 63710000001 INSULIN LISPRO (HUMAN) PER 5 UNITS: Performed by: INTERNAL MEDICINE

## 2022-11-22 PROCEDURE — 94799 UNLISTED PULMONARY SVC/PX: CPT

## 2022-11-22 PROCEDURE — 63710000001 INSULIN DETEMIR PER 5 UNITS: Performed by: INTERNAL MEDICINE

## 2022-11-22 RX ORDER — SODIUM PHOSPHATE, DIBASIC, ANHYDROUS, POTASSIUM PHOSPHATE, MONOBASIC, AND SODIUM PHOSPHATE, MONOBASIC, MONOHYDRATE 852; 155; 130 MG/1; MG/1; MG/1
1 TABLET, COATED ORAL DAILY
Qty: 3 EACH | Refills: 0 | Status: SHIPPED | OUTPATIENT
Start: 2022-11-22 | End: 2022-11-25

## 2022-11-22 RX ORDER — INSULIN LISPRO 100 [IU]/ML
0-14 INJECTION, SOLUTION INTRAVENOUS; SUBCUTANEOUS
Qty: 10 ML | Refills: 0 | Status: SHIPPED | OUTPATIENT
Start: 2022-11-22 | End: 2022-12-14

## 2022-11-22 RX ORDER — BENZONATATE 200 MG/1
200 CAPSULE ORAL 3 TIMES DAILY PRN
Qty: 15 CAPSULE | Refills: 0 | Status: SHIPPED | OUTPATIENT
Start: 2022-11-22 | End: 2022-12-06

## 2022-11-22 RX ADMIN — POTASSIUM CHLORIDE 20 MEQ: 29.8 INJECTION, SOLUTION INTRAVENOUS at 03:37

## 2022-11-22 RX ADMIN — SODIUM CHLORIDE 125 ML/HR: 9 INJECTION, SOLUTION INTRAVENOUS at 04:41

## 2022-11-22 RX ADMIN — CEFTRIAXONE SODIUM 1 G: 1 INJECTION, POWDER, FOR SOLUTION INTRAMUSCULAR; INTRAVENOUS at 11:00

## 2022-11-22 RX ADMIN — INSULIN LISPRO 5 UNITS: 100 INJECTION, SOLUTION INTRAVENOUS; SUBCUTANEOUS at 11:04

## 2022-11-22 RX ADMIN — POTASSIUM CHLORIDE 20 MEQ: 29.8 INJECTION, SOLUTION INTRAVENOUS at 04:41

## 2022-11-22 RX ADMIN — GUAIFENESIN 600 MG: 600 TABLET, EXTENDED RELEASE ORAL at 08:33

## 2022-11-22 RX ADMIN — AMLODIPINE BESYLATE 5 MG: 5 TABLET ORAL at 08:33

## 2022-11-22 RX ADMIN — POTASSIUM PHOSPHATE, MONOBASIC POTASSIUM PHOSPHATE, DIBASIC 15 MMOL: 224; 236 INJECTION, SOLUTION, CONCENTRATE INTRAVENOUS at 08:42

## 2022-11-22 RX ADMIN — DULOXETINE HYDROCHLORIDE 20 MG: 20 CAPSULE, DELAYED RELEASE ORAL at 08:33

## 2022-11-22 RX ADMIN — ENOXAPARIN SODIUM 40 MG: 100 INJECTION SUBCUTANEOUS at 08:33

## 2022-11-22 RX ADMIN — Medication 1 TABLET: at 08:33

## 2022-11-22 RX ADMIN — BUDESONIDE AND FORMOTEROL FUMARATE DIHYDRATE 2 PUFF: 160; 4.5 AEROSOL RESPIRATORY (INHALATION) at 07:24

## 2022-11-22 RX ADMIN — LOSARTAN POTASSIUM 100 MG: 50 TABLET, FILM COATED ORAL at 08:33

## 2022-11-22 RX ADMIN — INSULIN DETEMIR 30 UNITS: 100 INJECTION, SOLUTION SUBCUTANEOUS at 08:34

## 2022-11-22 RX ADMIN — ATORVASTATIN CALCIUM 20 MG: 10 TABLET, FILM COATED ORAL at 08:33

## 2022-11-22 NOTE — DISCHARGE PLACEMENT REQUEST
"DARREL GARCIA [phone]  Kezia Arenas (72 y.o. Female)     Date of Birth   1950    Social Security Number       Address   [address] [address]    Home Phone   [phone]    MRN   [id number]       Synagogue   Congregational    Marital Status                               Admission Date   11/18/22    Admission Type   Emergency    Admitting Provider   Pantera Cotton MD    Attending Provider   Pantera Cotton MD    Department, Room/Bed   [facility] 3C, 363/1       Discharge Date       Discharge Disposition   Home-Health Care Sv    Discharge Destination                               Attending Provider: Pantera Cotton MD    Allergies: Adhesive Tape, Other    Isolation: Enh Drop/Con   Infection: COVID (confirmed) (11/18/22)   Code Status: CPR    Ht: 160 cm (63\")   Wt: 97.8 kg (215 lb 11.2 oz)    Admission Cmt: None   Principal Problem: DKA (diabetic ketoacidosis) (HCC) [E11.10]                 Active Insurance as of 11/18/2022     Primary Coverage     Payor Plan Insurance Group Employer/Plan Group    MEDICARE MEDICARE A & B      Payor Plan Address Payor Plan Phone Number Payor Plan Fax Number Effective Dates    [address] [phone]  10/1/2013 - None Entered    [facility] [id number]       Subscriber Name Subscriber Birth Date Member ID       KEZIA ARENAS 1950 [id number]           Secondary Coverage     Payor Plan Insurance Group Employer/Plan Group    [employer]HEM BLUE CROSS ANTHEM BLUE CROSS BLUE SHIELD PPO [id number]     Payor Plan Address Payor Plan Phone Number Payor Plan Fax Number Effective Dates    [address] [phone]  10/1/2013 - None Entered    [facility] [id number]       Subscriber Name Subscriber Birth Date Member ID       KEZIA ARENAS 1950 [id number]                 Emergency Contacts      (Rel.) Home Phone Work Phone Mobile Phone    Laurie Arenas (Spouse) [phone] -- --    " Herminio Toro (Son) -- -- [phone]        Ambulatory Referral to Home Health [[id number]] (Order [id number])  Order  Date: 11/22/2022 Department: [address] Ordering/Authorizing: Pantera Cotton MD     Order History  Outpatient  Date/Time Action Taken User Additional Information   11/22/22 1218 Pend Pantera Cotton MD    11/22/22 1221 Sign Pantera Cotton MD      Order Details    Frequency Duration Priority Order Class   None None Routine Internal Referral     Start Date/Time    Start Date   11/22/22     Order Information    Order Date Service Start Date Start Time   11/22/22 Medicine 11/22/22      Reference Links    Associated Reports External References   View Encounter Current Health Referral Information     Order Questions    Question Answer   Face to Face Visit Date: 11/22/2022   Follow-up provider for Plan of Care? I treated the patient in an acute care facility and will not continue treatment after discharge.   Follow-up provider: KATHYA BURGER   Reason/Clinical Findings polypharmacy,diabetes, covid, fatigue,  htn   Describe mobility limitations that make leaving home difficult: polypharmacy,diabetes, covid, fatigue,  htn   Nursing/Therapeutic Services Requested Skilled Nursing    Physical Therapy    Occupational Therapy   Skilled nursing orders: Medication education   PT orders: Strengthening    Home safety assessment   Occupational orders: Activities of daily living    Strengthening    Home safety assessment   Frequency: 1 Week 1            Source Order Set / Preference List    Order Set   [facility] GEN EXPRESS DISCHARGE [[id number]]     Clinical Indications     ICD-10-CM ICD-9-CM   General weakness     R53.1 780.79   Polypharmacy     Z79.899 V58.69   Type 2 diabetes mellitus with hyperglycemia, without long-term current use of insulin (HCC)     E11.65 250.00  790.29     Reprint Order Requisition    Ambulatory Referral to Home Health (Order #[id number]) on  11/22/22     Encounter    View Encounter            Order Provider Info        Office phone Pager E-mail   Ordering User Pantera Cotton [phone] -- --   Authorizing Provider Pantera Cotton [phone] -- --   Attending Provider Pantera Cotton [phone] -- --   Pended By (on 11/22/2022 1218) Pantera Cotton [phone] -- --     Tracking Reports    Cosign Tracking Order Transmittal Tracking   Authorized by:  Pantera Cotton MD  (NPI: [id number])            Lab Component SmartPhrase Guide    Ambulatory Referral to Home Health (Order #[id number]) on 11/22/22            History & Physical      Marc Huynh MD at 11/18/22 1220                [facility] Medicine Services  INPATIENT HISTORY AND PHYSICAL       Patient Care Team:  Len Crowe MD as PCP - General  [facility], Huang Jimenez MD as Consulting Physician (Otolaryngology)  Tayler Burleson APRN (Inactive) as Nurse Practitioner (Family Medicine)  Dave Mcnally MD as Consulting Physician (Gastroenterology)  Lorraine Rodriguez APRN as Nurse Practitioner (Otolaryngology)  Vinh Goff PA-C as Physician Assistant (Orthopedic Surgery)      Date of Admission: 11/18/2022      Chief complaint   Chief Complaint   Patient presents with   • Fall   • Altered Mental Status   • Weakness - Generalized       Subjective     Patient is a 72 y.o. female with a past medical history of essential hypertension, type 2 diabetes mellitus, and asthma.  She presents with complaints of worsening generalized weakness of 2 days duration along with falls, and vomiting.    Patient has been having increased somnolence for the last 1 month.  In addition, she also tested positive for COVID-19 on 11/3/2022.  She subsequently noticed worsening generalized weakness with some falls over the last 2 days.  In addition she had vomiting today and then some confusion.  She denies abdominal  pain.  She was noted to be tachypneic and so she was brought to the emergency room for evaluation.  Of note, patient has been having urinary incontinence over the last 2 months but denies dysuria or hematuria.      In the emergency room, he was found to be in DKA.  COVID-19 test was positive.  CT scan of the head did not show any lesion.  She was started on insulin protocol and recommended for admission.    Review of Systems   Constitutional: Positive for fatigue. Negative for activity change, appetite change, chills and fever.   HENT: Negative for congestion, ear pain, rhinorrhea, sore throat and trouble swallowing.    Respiratory: Negative for cough, chest tightness, shortness of breath and wheezing.    Cardiovascular: Negative for chest pain, palpitations and leg swelling.   Gastrointestinal: Positive for diarrhea. Negative for abdominal distention, abdominal pain, nausea and vomiting.   Genitourinary: Negative for difficulty urinating, dysuria and hematuria.   Musculoskeletal: Negative for arthralgias, back pain and myalgias.   Skin: Negative for pallor and rash.   Neurological: Positive for weakness. Negative for dizziness, syncope, light-headedness and headaches.   Hematological: Negative for adenopathy. Does not bruise/bleed easily.   Psychiatric/Behavioral: Negative for agitation and confusion. The patient is not nervous/anxious.      History  Past Medical History:   Diagnosis Date   • Allergic rhinitis    • Chronic laryngitis    • Diabetes mellitus (HCC)    • GERD (gastroesophageal reflux disease)    • Hypertension    • Hypertrophy of both inferior nasal turbinates    • Laryngopharyngeal reflux    • Nontoxic multinodular goiter    • Sicca laryngitis      Past Surgical History:   Procedure Laterality Date   • CARPAL TUNNEL RELEASE     • CHOLECYSTECTOMY     • COLONOSCOPY  10/01/2008    Multiple polyps removed   • COLONOSCOPY  09/18/2013    Diverticulosis in the sigmoid colon. Dr. Dave Mcnally Recall 5 years  "  • HAND SURGERY     • HYSTERECTOMY     • REPLACEMENT TOTAL KNEE Right 02/02/2021   • THYROIDECTOMY, PARTIAL Right 2010   • TONSILLECTOMY     • TUBAL ABDOMINAL LIGATION       Family History   Problem Relation Age of Onset   • Diabetes Mother    • Stroke Mother    • Heart disease Mother    • Heart disease Father    • Colon cancer Neg Hx    • Colon polyps Neg Hx      Social History     Tobacco Use   • Smoking status: Never   • Smokeless tobacco: Never   • Tobacco comments:     \"lived with smokers most of my life\"   Vaping Use   • Vaping Use: Never used   Substance Use Topics   • Alcohol use: No     Comment: Rare   • Drug use: No     Medications Prior to Admission   Medication Sig Dispense Refill Last Dose   • acetaminophen (TYLENOL) 500 MG tablet Take 1,000 mg by mouth Every 6 (Six) Hours As Needed for mild pain (1-3).      • albuterol sulfate  (90 Base) MCG/ACT inhaler Inhale 2 puffs Every 4 (Four) Hours As Needed for Wheezing or Shortness of Air. 8 g 1    • amLODIPine (NORVASC) 5 MG tablet TAKE ONE TABLET DAILY 90 tablet 1    • aspirin 81 MG EC tablet Take 1 tablet by mouth Every Other Day. 32 tablet 0    • atorvastatin (LIPITOR) 20 MG tablet TAKE ONE TABLET DAILY 90 tablet 3    • B Complex-C (SUPER B COMPLEX PO) Take 1 tablet by mouth Daily.      • Blood Glucose Monitoring Suppl (Easy Step Glucose Monitor) device 1 Device Take As Directed. 1 each 0    • Calcium Carb-Cholecalciferol (CALCIUM-VITAMIN D) 600-400 MG-UNIT tablet Take 1 tablet by mouth 2 (Two) Times a Day.      • carboxymethylcellulose (REFRESH PLUS) 0.5 % solution 2 drops Daily As Needed for dry eyes.      • Cholecalciferol 25 MCG (1000 UT) capsule Take 1 capsule by mouth Daily.      • Coenzyme Q10 (CO Q 10 PO) Take 1 capsule by mouth Every Other Day.      • denosumab (PROLIA) 60 MG/ML solution syringe Inject 60 mg under the skin into the appropriate area as directed Every 6 (Six) Months.      • diclofenac sodium (VOTAREN XR) 100 MG 24 hr tablet " Take 100 mg by mouth Daily.      • DULoxetine (CYMBALTA) 20 MG capsule TAKE ONE CAPSULE DAILY 30 capsule 5    • fluconazole (Diflucan) 100 MG tablet Take 1 tablet by mouth Daily. 2 tablet 0    • glucose blood test strip Use as instructed: once a day testing of the one touch ultra blue strips 50 each 11    • glucose blood test strip Use as instructed: once a day testing 50 each 12    • guaiFENesin (MUCINEX) 600 MG 12 hr tablet Take 600 mg by mouth 2 (Two) Times a Day.      • hydroCHLOROthiazide (MICROZIDE) 12.5 MG capsule TAKE ONE CAPSULE DAILY 90 capsule 3    • irbesartan (AVAPRO) 300 MG tablet TAKE ONE TABLET DAILY 90 tablet 3    • Lancets misc Test daily 100 each 12    • latanoprost (XALATAN) 0.005 % ophthalmic solution Administer 1 drop to both eyes Every Night.  5    • metFORMIN ER (GLUCOPHAGE-XR) 500 MG 24 hr tablet Take 2 tablets by mouth 2 (Two) Times a Day for 90 days. 180 tablet 3    • Misc Natural Products (GLUCOSAMINE CHONDROITIN TRIPLE PO) Take 1 tablet by mouth 2 (Two) Times a Day.      • montelukast (SINGULAIR) 10 MG tablet TAKE ONE TABLET DAILY 90 tablet 3    • Multiple Vitamins-Minerals (VITRUM 50+ SENIOR MULTI PO) Take 1 tablet by mouth Daily.      • NON FORMULARY Immunotherapy      • potassium chloride 10 MEQ CR tablet TAKE ONE TABLET DAILY 90 tablet 3    • Semaglutide, 2 MG/DOSE, 8 MG/3ML solution pen-injector Inject 2 mg under the skin into the appropriate area as directed 1 (One) Time Per Week. 4.5 mL 5    • Symbicort 160-4.5 MCG/ACT inhaler INHALE TWO PUFFS TWO (TWO) TIMES A DAY. 30.6 each 3      Allergies:  Adhesive tape and Other  Prior to Admission medications    Medication Sig Start Date End Date Taking? Authorizing Provider   acetaminophen (TYLENOL) 500 MG tablet Take 1,000 mg by mouth Every 6 (Six) Hours As Needed for mild pain (1-3).    Provider, MD Estefania   albuterol sulfate  (90 Base) MCG/ACT inhaler Inhale 2 puffs Every 4 (Four) Hours As Needed for Wheezing or Shortness of  Air. 11/2/21   Len Crowe MD   amLODIPine (NORVASC) 5 MG tablet TAKE ONE TABLET DAILY 7/8/22   Len Crowe MD   aspirin 81 MG EC tablet Take 1 tablet by mouth Every Other Day. 2/25/19   Len Crowe MD   atorvastatin (LIPITOR) 20 MG tablet TAKE ONE TABLET DAILY 8/19/22   Len Crowe MD   B Complex-C (SUPER B COMPLEX PO) Take 1 tablet by mouth Daily.    Estefania Mejia MD   Blood Glucose Monitoring Suppl (Easy Step Glucose Monitor) device 1 Device Take As Directed. 3/21/22   Len Crowe MD   Calcium Carb-Cholecalciferol (CALCIUM-VITAMIN D) 600-400 MG-UNIT tablet Take 1 tablet by mouth 2 (Two) Times a Day.    Estefania Mejia MD   carboxymethylcellulose (REFRESH PLUS) 0.5 % solution 2 drops Daily As Needed for dry eyes.    Estefania Mejia MD   Cholecalciferol 25 MCG (1000 UT) capsule Take 1 capsule by mouth Daily.    Estefania Mejia MD   Coenzyme Q10 (CO Q 10 PO) Take 1 capsule by mouth Every Other Day.    Estefania Mejia MD   denosumab (PROLIA) 60 MG/ML solution syringe Inject 60 mg under the skin into the appropriate area as directed Every 6 (Six) Months. 2/13/19   Estefania Mejia MD   diclofenac sodium (VOTAREN XR) 100 MG 24 hr tablet Take 100 mg by mouth Daily.    Estefania Mejia MD   DULoxetine (CYMBALTA) 20 MG capsule TAKE ONE CAPSULE DAILY 9/6/22   Len Crowe MD   fluconazole (Diflucan) 100 MG tablet Take 1 tablet by mouth Daily. 11/17/22   Len Crowe MD   glucose blood test strip Use as instructed: once a day testing of the one touch ultra blue strips 2/18/20   Len Crowe MD   glucose blood test strip Use as instructed: once a day testing 3/21/22   Len Crowe MD   guaiFENesin (MUCINEX) 600 MG 12 hr tablet Take 600 mg by mouth 2 (Two) Times a Day.    Estefania Mejia MD   hydroCHLOROthiazide (MICROZIDE) 12.5 MG capsule TAKE ONE CAPSULE DAILY 11/23/21   Len Crowe  MD   irbesartan (AVAPRO) 300 MG tablet TAKE ONE TABLET DAILY 4/26/22   Len Crowe MD   Lancets misc Test daily 3/21/22   Len Crowe MD   latanoprost (XALATAN) 0.005 % ophthalmic solution Administer 1 drop to both eyes Every Night. 9/14/16   ProviderEstefania MD   metFORMIN ER (GLUCOPHAGE-XR) 500 MG 24 hr tablet Take 2 tablets by mouth 2 (Two) Times a Day for 90 days. 10/18/22 1/16/23  Len Crowe MD   Misc Natural Products (GLUCOSAMINE CHONDROITIN TRIPLE PO) Take 1 tablet by mouth 2 (Two) Times a Day.    Estefania Mejia MD   montelukast (SINGULAIR) 10 MG tablet TAKE ONE TABLET DAILY 6/23/22   Len Crowe MD   Multiple Vitamins-Minerals (VITRUM 50+ SENIOR MULTI PO) Take 1 tablet by mouth Daily.    Estefania Mejia MD   NON FORMULARY Immunotherapy    ProviderEstefania MD   potassium chloride 10 MEQ CR tablet TAKE ONE TABLET DAILY 10/25/21   Len Crowe MD   Semaglutide, 2 MG/DOSE, 8 MG/3ML solution pen-injector Inject 2 mg under the skin into the appropriate area as directed 1 (One) Time Per Week. 10/24/22   Len Crowe MD   Symbicort 160-4.5 MCG/ACT inhaler INHALE TWO PUFFS TWO (TWO) TIMES A DAY. 12/27/21   Len Crowe MD   ipratropium-albuterol (DUO-NEB) 0.5-2.5 mg/3 ml nebulizer Take 3 mL by nebulization 4 (Four) Times a Day. 1/3/19 11/18/22  Len Crowe MD       I have reviewed the patient's current medications    Objective        Vital Signs  Temp:  [97 °F (36.1 °C)-97.9 °F (36.6 °C)] 97.9 °F (36.6 °C)  Heart Rate:  [] 80  Resp:  [16-31] 16  BP: (119-179)/(56-92) 139/62      Physical Exam  Constitutional:       General: She is not in acute distress.     Appearance: She is ill-appearing. She is not diaphoretic.   HENT:      Head: Normocephalic and atraumatic.      Right Ear: External ear normal.      Left Ear: External ear normal.      Nose: No congestion or rhinorrhea.      Mouth/Throat:      Mouth: Mucous  membranes are moist.      Pharynx: No oropharyngeal exudate or posterior oropharyngeal erythema.   Eyes:      General: No scleral icterus.     Extraocular Movements: Extraocular movements intact.      Conjunctiva/sclera: Conjunctivae normal.   Cardiovascular:      Rate and Rhythm: Regular rhythm. Tachycardia present.      Heart sounds: Normal heart sounds. No murmur heard.  Pulmonary:      Effort: Pulmonary effort is normal. No respiratory distress.      Breath sounds: Normal breath sounds. No wheezing, rhonchi or rales.   Abdominal:      General: Abdomen is flat. There is no distension.      Palpations: Abdomen is soft.      Tenderness: There is no abdominal tenderness. There is no guarding.   Musculoskeletal:         General: No swelling, tenderness or deformity.      Cervical back: Neck supple. No rigidity. No muscular tenderness.      Right lower leg: No edema.      Left lower leg: No edema.   Lymphadenopathy:      Cervical: No cervical adenopathy.   Skin:     General: Skin is warm and dry.   Neurological:      General: No focal deficit present.      Mental Status: She is alert and oriented to person, place, and time.      Cranial Nerves: No cranial nerve deficit.      Motor: No weakness.   Psychiatric:         Mood and Affect: Mood normal.         Behavior: Behavior normal.         Thought Content: Thought content normal.       Results Review:     Results from last 7 days   Lab Units 11/18/22 2022 11/18/22 1207 11/18/22  0955   SODIUM mmol/L 136 135* 131*   POTASSIUM mmol/L 3.9 5.3* 4.8   CHLORIDE mmol/L 110* 96* 91*   CO2 mmol/L 12.0* 8.0* 7.0*   BUN mg/dL 39* 49* 51*   CREATININE mg/dL 0.74 0.98 1.15*   GLUCOSE mg/dL 225* 517* 601*   CALCIUM mg/dL 8.7 10.0 10.7*   BILIRUBIN mg/dL  --   --  0.5   ALK PHOS U/L  --   --  108   ALT (SGPT) U/L  --   --  30   AST (SGOT) U/L  --   --  14       Results from last 7 days   Lab Units 11/18/22 2022 11/18/22 1846 11/18/22 1207 11/18/22  0955   MAGNESIUM mg/dL  --   2.1 2.4 2.7*   PHOSPHORUS mg/dL 1.7*  --  4.8* 5.7*       Results from last 7 days   Lab Units 11/18/22  0955   WBC 10*3/mm3 26.97*   HEMOGLOBIN g/dL 18.1*   HEMATOCRIT % 55.2*   PLATELETS 10*3/mm3 394       Results from last 7 days   Lab Units 11/18/22  0955   INR  1.06     Imaging Results (Last 7 Days)     Procedure Component Value Units Date/Time    CT Abdomen Pelvis Without Contrast [[id number]] Collected: 11/18/22 1136     Updated: 11/18/22 1158    Narrative:      EXAMINATION: CT ABDOMEN PELVIS WO CONTRAST-      11/18/2022 11:10 AM CST     HISTORY: Nausea/vomiting     In order to have a CT radiation dose as low as reasonably achievable  Automated Exposure Control was utilized for adjustment of the mA and/or  KV according to patient size.     DLP in mGycm= 1081     The CT scan of the abdomen and pelvis performed without intravenous or  oral contrast enhancement.     Images are acquired in axial plane with subsequent reconstruction in  coronal and sagittal planes.     There is no previous study for comparison.     There is significant motion artifacts which low the diagnostic yield of  the study. Several images were repeated.     The lung bases included in the study are unremarkable except for a tiny  subpleural nodule in the right middle lobe, image #2 and series 3, which  probably represent a small calcified granuloma.     The limited visualized cardiomediastinal structures show atheromatous  changes of the coronary arteries. No significant cardiomegaly.     Unenhanced enhanced liver and spleen are unremarkable. A very small  splenule is seen.     The gallbladder is surgically absent.     The unenhanced pancreas is unremarkable.     The adrenal glands bilaterally are unremarkable.     The kidneys bilaterally are suboptimally evaluated due to significant  motion artifact. No significant mass is identified. No calculi are seen.  No hydronephrosis. The limited visualized ureters are nondilated. The  urinary bladder  is moderately distended. No focal intrinsic abnormality  or a calculus.     The uterus is surgically absent. No adnexal masses.     There is a fat-containing umbilical hernia. There is moderate laxity of  the central middle to lower anterior abdominal wall with protrusion of  the large bowel loops. No donta herniation.     The stomach is full of fluid and small amount of gas. The duodenum and  small bowel are suboptimally evaluated due to extensive motion  artifacts. No finding to suggest obstruction. The appendix is not  visualized or evaluated. There is moderate gas and stool in the colon.  There is diverticulosis of the distal colon. No evidence for  diverticulitis.     Atheromatous changes of the abdominal aorta and iliac arteries. No  aneurysmal dilatation.     There is no evidence of abdominal or pelvic lymphadenopathy.     Images reviewed in bone window show chronic degenerative changes of the  lumbar spine with severe degeneration of the disc L4-5 and L5-S1. Mild  superior endplate compression of vertebra L1 is due to prominent Schmorl  node.       Impression:      1. Motion artifacts limits the diagnostic quality of the study. No acute  abnormality of the abdomen or pelvis.  2. Diverticulosis of the distal colon. No evidence for diverticulitis.     .                                   This report was finalized on 11/18/2022 11:55 by Dr. Pradeep Miller MD.    CT Head Without Contrast [[id number]] Collected: 11/18/22 1129     Updated: 11/18/22 1132    Narrative:      CT HEAD WO CONTRAST- 11/18/2022 11:10 AM CST     HISTORY: Mental status change, unknown cause     COMPARISON: None      DLP: 609 mGy cm. All CT scans are performed using dose optimization  techniques as appropriate to the performed exam and including at least  one of the following: Automated exposure control, adjustment of the mA  and/or kV according to size, and the use of the iterative reconstruction  technique.     TECHNIQUE: Serial axial  tomographic images of the brain were obtained  without the use of intravenous contrast.      FINDINGS:   The midline structures are nondisplaced. There is mild cerebral and  cerebellar atrophy, with an associated increase in the prominence of the  ventricles and sulci. The basilar cisterns are normal in size and  configuration. There is no evidence of intracranial hemorrhage or  mass-effect. There is low attenuation in the periventricular white  matter, consistent with chronic ischemic change. There are no abnormal  extra-axial fluid collections. There is no evidence of tonsillar  herniation.      The included orbits and their contents are unremarkable. The visualized  paranasal sinuses, mastoid air cells and middle ear cavities are clear.  The visualized osseous structures and overlying soft tissues of the  skull and face are intact.        Impression:         1. Mild cerebral and cerebellar atrophy with chronic microvascular  disease but no evidence of acute intracranial process.        This report was finalized on 11/18/2022 11:29 by Dr. Taran Cyr MD.    XR Chest 1 View [[id number]] Collected: 11/18/22 1047     Updated: 11/18/22 1051    Narrative:      EXAM/TECHNIQUE: XR CHEST 1 VW-     INDICATION: Central line placement     COMPARISON: 11/18/2022     FINDINGS:     RIGHT IJ CVL with tip overlying the SVC. Cardiac silhouette is normal  size. No pleural effusion, pneumothorax, or focal consolidation. No  acute osseous finding.       Impression:         RIGHT IJ CVL with tip overlying the SVC. No evidence of pneumothorax.  This report was finalized on 11/18/2022 10:48 by Dr. Javi Rothman MD.    XR Chest 1 View [[id number]] Collected: 11/18/22 0954     Updated: 11/18/22 0957    Narrative:      EXAM/TECHNIQUE: XR CHEST 1 VW-     INDICATION: vomiting     COMPARISON: None available.     FINDINGS:     The cardiac silhouette is normal size. No pleural effusion,  pneumothorax, or focal consolidation. No acute  osseous findings.       Impression:         No acute findings.  This report was finalized on 11/18/2022 09:54 by Dr. Javi Rothman MD.          Assessment / Plan       Hospital Problem List:  Principal Problem:    DKA (diabetic ketoacidosis) ([facility])  Active Problems:    COVID-19 virus infection  Essential hypertension  GERD    Plan  Patient has DKA.  We will start DKA protocol with insulin drip and IV fluids.  Check BMP every 4 hours for closure of anion gap.    Patient has COVID-19 but is saturating adequately on room air.  We will avoid steroids or remdesivir for now.    Continue home medications for essential hypertension    DVT prophylaxis with subcutaneous Lovenox    CODE STATUS is full code      I discussed the patient's findings and my recommendations with patient    I have utilized all available immediate resources to obtain, update, or review the patient's current medications.      I confirmed that the patient's Advance Care Plan is present, code status is documented, or surrogate decision maker is listed in the patient's medical record.      Marc Huynh MD  11/18/22  21:39 CST        Part of this note may be an electronic transcription/translation of spoken language to printed text using the Dragon Dictation System.           Electronically signed by Marc Huynh MD at 11/18/22 4562       Discharge Summary    No notes of this type exist for this encounter.

## 2022-11-22 NOTE — THERAPY TREATMENT NOTE
Patient Name: Kezia Otoole  : 1950    MRN: [id number]                              Today's Date: 2022       Admit Date: 2022    Visit Dx: Therapist utilized gait belt, applied non-slipped socks, provided fall risk education/prevention, & facilitated muscle strengthening PRN to reduce patient falls risk during this session.      ICD-10-CM ICD-9-CM   1. Diabetic ketoacidosis without coma associated with other specified diabetes mellitus ([facility])  E13.10 250.12   2. Altered mental status, unspecified altered mental status type  R41.82 780.97   3. LEOLA (acute kidney injury) ([facility])  N17.9 584.9   4. Acute UTI (urinary tract infection)  N39.0 599.0   5. COVID  U07.1 079.89   6. Troponin I above reference range  R77.8 790.6   7. Decreased activities of daily living (ADL)  Z78.9 V49.89   8. Decreased functional activity tolerance  R68.89 780.99   9. General weakness  R53.1 780.79   10. Polypharmacy  Z79.899 V58.69   11. Type 2 diabetes mellitus with hyperglycemia, without long-term current use of insulin ([facility])  E11.65 250.00     790.29     Patient Active Problem List   Diagnosis   • Class 3 severe obesity due to excess calories with serious comorbidity and body mass index (BMI) of 40.0 to 44.9 in adult ([facility])   • Depression   • Elevated fasting glucose-see DM   • Allergic rhinitis: shots   • Osteoporosis: /K   • Primary generalized (osteo)arthritis   • HTN (hypertension), benign   • Obstructive sleep apnea-txing   • Menopause   • Diabetes type 2, controlled ([facility])   • Asthma   • Hoarseness-sees Ressor   • Gastroesophageal reflux disease-ent   • Wellness examination-done   • Purpura ([facility])   • Glaucoma   • Laboratory test`   • Anticoagulated DM2/ASA 81 (11.9.17 pt chose qod)   • Glaucoma suspect   • Hx of colonic polyps   • *Hx of anemia: ASA81,iron,gi(cp,div   • *Hx-iron deficiency   • Nuclear senile cataract   • Excessive cerumen in ear canal   • Status post joint replacement   • Primary  osteoarthritis of right knee   • Hyperlipidemia-statin   • Hypercalcemia   • History of COVID-19   • DKA (diabetic ketoacidosis) (HCC)   • COVID-19 virus infection   • General weakness     Past Medical History:   Diagnosis Date   • Allergic rhinitis    • Chronic laryngitis    • Diabetes mellitus (HCC)    • GERD (gastroesophageal reflux disease)    • Hypertension    • Hypertrophy of both inferior nasal turbinates    • Laryngopharyngeal reflux    • Nontoxic multinodular goiter    • Sicca laryngitis      Past Surgical History:   Procedure Laterality Date   • CARPAL TUNNEL RELEASE     • CHOLECYSTECTOMY     • COLONOSCOPY  10/01/2008    Multiple polyps removed   • COLONOSCOPY  09/18/2013    Diverticulosis in the sigmoid colon. Dr. Dave Mcnally Recall 5 years   • HAND SURGERY     • HYSTERECTOMY     • REPLACEMENT TOTAL KNEE Right 02/02/2021   • THYROIDECTOMY, PARTIAL Right 2010   • TONSILLECTOMY     • TUBAL ABDOMINAL LIGATION        General Information     Row Name 11/22/22 1424          OT Time and Intention    Document Type therapy note (daily note)  -MT     Mode of Treatment occupational therapy  -MT     Row Name 11/22/22 1424          General Information    Patient Profile Reviewed yes  -MT     Existing Precautions/Restrictions fall  -MT     Row Name 11/22/22 1424          Cognition    Orientation Status (Cognition) oriented x 4  -MT     Row Name 11/22/22 1424          Safety Issues, Functional Mobility    Impairments Affecting Function (Mobility) endurance/activity tolerance  -MT           User Key  (r) = Recorded By, (t) = Taken By, (c) = Cosigned By    Initials Name Provider Type    MT Tricia Corado COTA Occupational Therapist Assistant                 Mobility/ADL's     Row Name 11/22/22 1424          Bed Mobility    Supine-Sit [place] (Bed Mobility) modified independence  -MT     Row Name 11/22/22 1424          Transfers    Transfers sit-stand transfer;stand-sit transfer;toilet transfer  -MT     Row Name  11/22/22 1424          Sit-Stand Transfer    Sit-Stand [place] (Transfers) standby assist  -MT     Comment, (Sit-Stand Transfer) attempted with and with out RW pt with SBA for all mobility and no issues with balance  -[facility] Name 11/22/22 1424          Stand-Sit Transfer    Stand-Sit [place] (Transfers) standby assist  -MT     Row Name 11/22/22 1424          Toilet Transfer    Type (Toilet Transfer) stand pivot/stand step  -MT     [place] Level (Toilet Transfer) standby assist  -[facility] Name 11/22/22 1424          Functional Mobility    Functional Mobility- Ind. Level contact guard assist;standby assist  -MT     Functional Mobility- Device walker, front-wheeled  -[facility] Name 11/22/22 1424          Activities of Daily Living    BADL Assessment/Intervention bathing;upper body dressing;toileting;grooming  -MT     Row Name 11/22/22 1424          Bathing Assessment/Intervention    Comment, (Bathing) sponge bathung seated with s/u and RBs PRN  -MT     Row Name 11/22/22 1424          Toileting Assessment/Training    [place] Level (Toileting) toileting skills;supervision  -MT     Position (Toileting) unsupported sitting  -[facility] Name 11/22/22 1424          Grooming Assessment/Training    Comment, (Grooming) at sink s/u SBA  -MT           User Key  (r) = Recorded By, (t) = Taken By, (c) = Cosigned By    Initials Name Provider Type    MT Tricia Corado COTA Occupational Therapist Assistant               Obj/Interventions    No documentation.                Goals/Plan    No documentation.                Clinical Impression     [facility] Name 11/22/22 1424          Pain Assessment    Pretreatment Pain Rating 0/10 - no pain  -MT     Posttreatment Pain Rating 0/10 - no pain  -MT     Row Name 11/22/22 1424          Plan of Care Review    Plan of Care Reviewed With patient  -MT     Progress improving  -MT     Row Name 11/22/22 1424          Therapy Plan Review/Discharge Plan (OT)    Anticipated  Discharge Disposition (OT) home with assist;home with home health  -MT     Row Name 11/22/22 1424          Vital Signs    O2 Delivery Pre Treatment room air  -MT     Row Name 11/22/22 1424          Positioning and Restraints    Pre-Treatment Position in bed  -MT     Post Treatment Position bed  -MT     In Bed notified nsg;sitting EOB  -MT           User Key  (r) = Recorded By, (t) = Taken By, (c) = Cosigned By    Initials Name Provider Type    MT Tricia Corado COTA Occupational Therapist Assistant               Outcome Measures     Row Name 11/22/22 1424          How much help from another is currently needed...    Putting on and taking off regular lower body clothing? 3  -MT     Bathing (including washing, rinsing, and drying) 3  -MT     Toileting (which includes using toilet bed pan or urinal) 4  -MT     Putting on and taking off regular upper body clothing 4  -MT     Taking care of personal grooming (such as brushing teeth) 4  -MT     Eating meals 4  -MT     AM-PAC 6 Clicks Score (OT) 22  -MT     Row Name 11/22/22 1050 11/22/22 0729       How much help from another person do you currently need...    Turning from your back to your side while in flat bed without using bedrails? 3  -WK 3  -CM    Moving from lying on back to sitting on the side of a flat bed without bedrails? 3  -WK 3  -CM    Moving to and from a bed to a chair (including a wheelchair)? 3  -WK 3  -CM    Standing up from a chair using your arms (e.g., wheelchair, bedside chair)? 3  -WK 3  -CM    Climbing 3-5 steps with a railing? 3  -WK 3  -CM    To walk in hospital room? 3  -WK 3  -CM    AM-PAC 6 Clicks Score (PT) 18  -WK 18  -CM    Highest level of mobility 6 --> Walked 10 steps or more  -WK 6 --> Walked 10 steps or more  -CM    Row Name 11/22/22 1050          Functional Assessment    Outcome Measure Options AM-PAC 6 Clicks Basic Mobility (PT)  -WK           User Key  (r) = Recorded By, (t) = Taken By, (c) = Cosigned By    Initials Name Provider  Type    WK Saundra Dolan, ENMA Physical Therapist Assistant    Tricia Lee COTA Occupational Therapist Assistant    Karin Euceda, RN Registered Nurse                Occupational Therapy Education     Title: PT OT SLP Therapies (In Progress)     Topic: Occupational Therapy (Done)     Point: ADL training (Done)     Description:   Instruct learner(s) on proper safety adaptation and remediation techniques during self care or transfers.   Instruct in proper use of assistive devices.              Learning Progress Summary           Patient Acceptance, E,TB, VU by  at 11/21/2022 0841                   Point: Home exercise program (Done)     Description:   Instruct learner(s) on appropriate technique for monitoring, assisting and/or progressing therapeutic exercises/activities.              Learning Progress Summary           Patient Acceptance, E,TB, VU by  at 11/21/2022 0841                   Point: Body mechanics (Done)     Description:   Instruct learner(s) on proper positioning and spine alignment during self-care, functional mobility activities and/or exercises.              Learning Progress Summary           Patient Acceptance, E,TB, VU by  at 11/21/2022 0841                               User Key     Initials Effective Dates Name Provider Type Discipline     04/09/19 -  Avelino Pantoja, OTR/L, CNT Occupational Therapist OT              OT Recommendation and Plan     Plan of Care Review  Plan of Care Reviewed With: patient  Progress: improving  Outcome Evaluation: Fxl mobility in room <> BR attempted with and with out RW pt with SBA for all mobility and no issues with balance. Performs sponge bathing routine SBA, declined need for feet or sock change anticipate would need assist with this aspect of task. Performed standing grooming sink side SBA. Toileting on commode S. Pt improving and demo's decreased endurance with tasks. Recommend HH/assist. Educated on energy conservation, work  simplification and endurance training when home to improve overall ADL fxn.     Time Calculation:    Time Calculation- OT     Row Name 11/22/22 1424             Time Calculation- OT    OT Start Time 1424  -MT      OT Stop Time 1504  -MT      OT Time Calculation (min) 40 min  -MT      Total Timed Code Minutes- OT 40 minute(s)  -MT      OT Received On 11/22/22  -MT         Timed Charges    93986 - OT Self Care/Mgmt Minutes 40  -MT         Total Minutes    Timed Charges Total Minutes 40  -MT       Total Minutes 40  -MT            User Key  (r) = Recorded By, (t) = Taken By, (c) = Cosigned By    Initials Name Provider Type    MT Tricia Corado COTA Occupational Therapist Assistant              Therapy Charges for Today     Code Description Service Date Service Provider Modifiers Qty    11526418981 HC OT SELF CARE/MGMT/TRAIN EA 15 MIN 11/22/2022 Tricia Corado COTA GO 3               SUZANNE Freed  11/22/2022

## 2022-11-22 NOTE — PLAN OF CARE
ADULT PROTOCOL: JET AEROSOL ASSESSMENT    Patient  Guero Dominguez     50 y.o.   female     11/7/2020  4:54 AM    Breath Sounds Pre Procedure: Right Breath Sounds: Diminished                               Left Breath Sounds: Diminished, Coarse    Breath Sounds Post Procedure: Right Breath Sounds: Diminished, Coarse                                 Left Breath Sounds: Diminished, Coarse    Breathing pattern: Pre procedure Breathing Pattern: Regular          Post procedure Breathing Pattern: Regular    Heart Rate: Pre procedure Pulse: 59           Post procedure Pulse: 58    Resp Rate: Pre procedure Respirations: 20           Post procedure Respirations: 20    Oxygen: O2 Device: Room air        Changed: NO    SpO2: Pre procedure SpO2: 96 %   without oxygen              Post procedure SpO2: 97 %  without oxygen    Nebulizer Therapy: Current medications Aerosolized Medications: Albuterol      Changed: NO    Smoking History: Never Smoked    Problem List:   Patient Active Problem List   Diagnosis Code    [facility] I63.9    S/P admn tPA in diff fac w/n last 24 hr bef adm to crnt fac Z92.82       Respiratory Therapist: Dione Massey RT Goal Outcome Evaluation:      Potassium 3.2. Phosphorus 2.3. Replacing electrolytes per potassium and phosphorus prn protocol. Lab to draw Potassium and Phosphorus level at midnight tonight. Increased fluids to NS @ 125 ml/ hr. Continued antibiotic-Rocephin IV. Pt worked with physical therapy today. Ambulating to the bathroom X 1 assist with walker. Monitoring blood glucose levels.

## 2022-11-22 NOTE — TELEPHONE ENCOUNTER
Called md2 and advised dexcom is not covered, and sent over Freestyle sue, pt did not  samples    Called pt and advised of change to Sue, if can not get freestyle for what ever reason can come to office and get a sample glucose meter, per Dr Crowe check with supper, bedtime and am and call    Pt stated understanding

## 2022-11-22 NOTE — CONSULTS
Adult Nutrition  Assessment/PES    Patient Name:  Kezia Otoole  YOB: 1950  MRN: [id number]  Admit Date:  11/18/2022    Assessment Date:  11/22/2022    Comments:  Diabetes diet education provided to pt.     Reason for Assessment     Row Name 11/22/22 1511 11/22/22 1152       Reason for Assessment    Reason For Assessment physician consult follow-up protocol    Diagnosis diabetes diagnosis/complications --               Nutrition/Diet History     Row Name 11/22/22 1511 11/22/22 1152       Nutrition/Diet History    Typical Intake (Food/Fluid/EN/PN) Pt is being d/c'd today, provided DM diet education, answered questions, provided her handouts to help make food choices at home, attached RD card to contact RD with any diet questions that may arise s/p d/c. Pt remains in COVID isolation. She reports that she feels about the same. Her appetite is fair. Electrolytes being replaced. No nausea noted since 11/20.               Labs/Tests/Procedures/Meds     Row Name 11/22/22 1153          Labs/Procedures/Meds    Lab Results Reviewed reviewed, pertinent     Lab Results Comments gluc, PO4, Cr, WBC        Diagnostic Tests/Procedures    Diagnostic Test/Procedure Reviewed reviewed        Medications    Pertinent Medications Reviewed reviewed, pertinent     Pertinent Medications Comments electrolyte replacement protocol                Physical Findings     Row Name 11/22/22 1153          Physical Findings    Overall Physical Appearance no BM noted since 11/16, berta score 20, weakness     Additional Documentation Fluid Accumulation (Group)        Fluid Accumulation    Location (Edema) generalized     Generalized (Edema) 1-->trace                  Nutrition Prescription Ordered     Row Name 11/22/22 1154          Nutrition Prescription PO    Current PO Diet Regular     Fluid Consistency Thin     Common Modifiers Consistent Carbohydrate                Evaluation of Received Nutrient/Fluid Intake     Row Name  11/22/22 1154          Nutrient/Fluid Evaluation    Number of Days Evaluated 3 days     Additional Documentation Fluid Intake Evaluation (Group);Intake Assessment (Group)        Fluid Intake Evaluation    Oral Fluid (mL) 160     Other Fluid (mL) 722        PO Evaluation    Number of Meals 2     % PO Intake 50                   Problem/Interventions:   Problem 1     Row Name 11/22/22 1154          Nutrition Diagnoses Problem 1    Problem 1 Altered Nutrition Related to Labs     Etiology (related to) Medical Diagnosis;Factors Affecting Nutrition     Endocrine DM2     Infectious Disease Other (comment)  COVID-19     Metabolic/ICU Ketoacidosis  recent     Appetite Poor Due to GI Factor     Mental State/Condition Weakness     Signs/Symptoms (evidenced by) PO Intake;Report/Observation;Biochemical;Potential Information Deficit  Pt reports she has had diet education in the past, but it has been a long time.     Percent (%) intake recorded 50 %     Over number of meals 2     Reported GI Symptoms --     Labs Reviewed Done     Specific Labs Noted Glucose;HgbA1C;Phosphorus;WBC                      Intervention Goal     Row Name 11/22/22 1156          Intervention Goal    General Improved nutrition related lab(s);Reduce/improve symptoms;Meet nutritional needs for age/condition;Disease management/therapy;Provide information regarding MNT for treatment/condition     PO Meet estimated needs     Weight Appropriate weight loss                Nutrition Intervention     Row Name 11/22/22 1156          Nutrition Intervention    RD/Tech Action Follow Tx progress;Care plan reviewd;Encourage intake;Supplement provided                  Education/Evaluation     Row Name 11/22/22 1158          Education    Education Provided education regarding;Education topics;Advised regarding habits/behavior  still in isolation, will add DM diet education to D/C paperwork     Provided education regarding Avoidance of associated  complications;Avoidance/improvement of symptoms;Diet rationale;Healthy eating for diabetes     Education Topics CHO counting;Diabetes     CHO (gm/day) --  2-4 CHO servings with each meal     Advised Regarding Habits/Behavior Appropriate beverage;Appropriate portions;Eating pattern;Food choices;Meal planning;Self monitor        Monitor/Evaluation    Monitor Per protocol     Education Follow-up Reinforce PRN  Provided RD contact information if questions arise s/p d/c. Also advised of OP nutrition counseling available and DM classes offered at the hospital.                 Electronically signed by:  Etta Gatica RDN, LD  11/22/22 15:17 CST

## 2022-11-22 NOTE — NURSING NOTE
Spoke with patient regarding referral for home health services. Patient is agreeable to services and confirms address and phone number on file is correct.

## 2022-11-22 NOTE — PLAN OF CARE
Problem: Adult Inpatient Plan of Care  Goal: Plan of Care Review    Plan of Care Reviewed With: patient  Outcome Evaluation: Pt performed bed mobility SBA and amb 40' SBA with RW.

## 2022-11-22 NOTE — THERAPY TREATMENT NOTE
Acute Care - Physical Therapy Treatment Note  [facility]     Patient Name: Kezia Otoole  : 1950  MRN: [id number]  Today's Date: 2022   Onset of Illness/Injury or Date of Surgery: 22  Visit Dx:     ICD-10-CM ICD-9-CM   1. Diabetic ketoacidosis without coma associated with other specified diabetes mellitus ([facility])  E13.10 250.12   2. Altered mental status, unspecified altered mental status type  R41.82 780.97   3. LEOLA (acute kidney injury) ([facility])  N17.9 584.9   4. Acute UTI (urinary tract infection)  N39.0 599.0   5. COVID  U07.1 079.89   6. Troponin I above reference range  R77.8 790.6   7. Decreased activities of daily living (ADL)  Z78.9 V49.89   8. Decreased functional activity tolerance  R68.89 780.99   9. General weakness  R53.1 780.79   10. Polypharmacy  Z79.899 V58.69   11. Type 2 diabetes mellitus with hyperglycemia, without long-term current use of insulin ([facility])  E11.65 250.00     790.29     Patient Active Problem List   Diagnosis   • Class 3 severe obesity due to excess calories with serious comorbidity and body mass index (BMI) of 40.0 to 44.9 in adult ([facility])   • Depression   • Elevated fasting glucose-see DM   • Allergic rhinitis: shots   • Osteoporosis:    • Primary generalized (osteo)arthritis   • HTN (hypertension), benign   • Obstructive sleep apnea-txing   • Menopause   • Diabetes type 2, controlled ([facility])   • Asthma   • Hoarseness-sees Ressor   • Gastroesophageal reflux disease-ent   • Wellness examination-done   • Purpura ([facility])   • Glaucoma   • Laboratory test`   • Anticoagulated DM2/ASA 81 (11.9.17 pt chose qod)   • Glaucoma suspect   • Hx of colonic polyps   • *Hx of anemia: ASA81,iron,gi(cp,div   • *Hx-iron deficiency   • Nuclear senile cataract   • Excessive cerumen in ear canal   • Status post joint replacement   • Primary osteoarthritis of right knee   • Hyperlipidemia-statin   • Hypercalcemia   • History of COVID-19   • DKA (diabetic ketoacidosis) ([facility])   •  COVID-19 virus infection   • General weakness     Past Medical History:   Diagnosis Date   • Allergic rhinitis    • Chronic laryngitis    • Diabetes mellitus (HCC)    • GERD (gastroesophageal reflux disease)    • Hypertension    • Hypertrophy of both inferior nasal turbinates    • Laryngopharyngeal reflux    • Nontoxic multinodular goiter    • Sicca laryngitis      Past Surgical History:   Procedure Laterality Date   • CARPAL TUNNEL RELEASE     • CHOLECYSTECTOMY     • COLONOSCOPY  10/01/2008    Multiple polyps removed   • COLONOSCOPY  09/18/2013    Diverticulosis in the sigmoid colon. Dr. Dave Mcnally Recall 5 years   • HAND SURGERY     • HYSTERECTOMY     • REPLACEMENT TOTAL KNEE Right 02/02/2021   • THYROIDECTOMY, PARTIAL Right 2010   • TONSILLECTOMY     • TUBAL ABDOMINAL LIGATION       PT Assessment (last 12 hours)     PT Evaluation and Treatment     Row Name 11/22/22 1050          Physical Therapy Time and Intention    Subjective Information complains of;weakness  -WK     Document Type therapy note (daily note)  -WK     Mode of Treatment physical therapy  -WK     Row Name 11/22/22 1050          General Information    Existing Precautions/Restrictions fall  -WK     Row Name 11/22/22 1050          Bed Mobility    Supine-Sit [place] (Bed Mobility) standby assist  -WK     Sit-Supine [place] (Bed Mobility) standby assist  -WK     Row Name 11/22/22 1050          Sit-Stand Transfer    Sit-Stand [place] (Transfers) standby assist  -WK     Assistive Device (Sit-Stand Transfers) walker, front-wheeled  -WK     Row Name 11/22/22 1050          Stand-Sit Transfer    Stand-Sit [place] (Transfers) standby assist  -WK     Assistive Device (Stand-Sit Transfers) walker, front-wheeled  -WK     Row Name 11/22/22 1050          Gait/Stairs (Locomotion)    [place] Level (Gait) contact guard  -WK     Assistive Device (Gait) walker, front-wheeled  -WK     Distance in Feet (Gait) 40  distance limited by having to  stay in room  -WK     Comment, (Gait/Stairs) educated on POC  -WK     Row Name 11/22/22 1050          Balance    Comment, Balance sitting EOB independent  -WK     Row Name 11/22/22 1050          Motor Skills    Motor Skills other (see comments)  -WK     Therapeutic Exercise aerobic  -WK     Row Name 11/22/22 1050          Aerobic Exercise    Comment, Aerobic Exercise (Therapeutic Exercise) AROM BLE 20 reps sitting  -WK     Row Name 11/22/22 1050          Plan of Care Review    Plan of Care Reviewed With patient  -WK     Outcome Evaluation Pt performed bed mobility SBA and amb 40' SBA with RW.  -WK     Row Name 11/22/22 1050          Positioning and Restraints    Pre-Treatment Position in bed  -WK     Post Treatment Position bed  -WK     In Bed fowlers;call light within reach;encouraged to call for assist  refused chair  -WK           User Key  (r) = Recorded By, (t) = Taken By, (c) = Cosigned By    Initials Name Provider Type    WK Saundra Dolan, PTA Physical Therapist Assistant                Physical Therapy Education     Title: PT OT SLP Therapies (In Progress)     Topic: Physical Therapy (In Progress)     Point: Mobility training (Done)     Learning Progress Summary           Patient Acceptance, E, VU by MS at 11/21/2022 1314    Comment: role of PT in her care                   Point: Home exercise program (Not Started)     Learner Progress:  Not documented in this visit.          Point: Body mechanics (Not Started)     Learner Progress:  Not documented in this visit.          Point: Precautions (Not Started)     Learner Progress:  Not documented in this visit.                      User Key     Initials Effective Dates Name Provider Type Discipline    MS 06/19/18 -  Terese Caldwell, PT, DPT, NCS Physical Therapist PT              PT Recommendation and Plan     Plan of Care Reviewed With: patient  Outcome Evaluation: Pt performed bed mobility SBA and amb 40' SBA with RW.   Outcome Measures     Row Name  11/22/22 1050 11/21/22 0745          How much help from another person do you currently need...    Turning from your back to your side while in flat bed without using bedrails? 3  -WK --     Moving from lying on back to sitting on the side of a flat bed without bedrails? 3  -WK --     Moving to and from a bed to a chair (including a wheelchair)? 3  -WK --     Standing up from a chair using your arms (e.g., wheelchair, bedside chair)? 3  -WK --     Climbing 3-5 steps with a railing? 3  -WK --     To walk in hospital room? 3  -WK --     AM-PAC 6 Clicks Score (PT) 18  -WK --        How much help from another is currently needed...    Putting on and taking off regular lower body clothing? -- 2  -AC     Bathing (including washing, rinsing, and drying) -- 2  -AC     Toileting (which includes using toilet bed pan or urinal) -- 4  -AC     Putting on and taking off regular upper body clothing -- 4  -AC     Taking care of personal grooming (such as brushing teeth) -- 4  -AC     Eating meals -- 4  -AC     AM-PAC 6 Clicks Score (OT) -- 20  -AC        Functional Assessment    Outcome Measure Options AM-PAC 6 Clicks Basic Mobility (PT)  -WK AM-PAC 6 Clicks Daily Activity (OT)  -AC           User Key  (r) = Recorded By, (t) = Taken By, (c) = Cosigned By    Initials Name Provider Type     Avelino Pantoja, OTR/L, CNT Occupational Therapist    WK Saundra Dolan PTA Physical Therapist Assistant                 Time Calculation:    PT Charges     Row Name 11/22/22 1312             Time Calculation    Start Time 1050  -WK      Stop Time 1117  -WK      Time Calculation (min) 27 min  -WK      PT Received On 11/22/22  -WK         Time Calculation- PT    Total Timed Code Minutes- PT 27 minute(s)  -WK            User Key  (r) = Recorded By, (t) = Taken By, (c) = Cosigned By    Initials Name Provider Type    WK Saundra Dolan PTA Physical Therapist Assistant              Therapy Charges for Today     Code Description Service Date  Service Provider Modifiers Qty    67363422799 HC GAIT TRAINING EA 15 MIN 11/22/2022 Saundra Dolan, PTA GP 1    87425564496 HC PT THER PROC EA 15 MIN 11/22/2022 Saundra Dolan, ENMA GP 1          PT G-Codes  Outcome Measure Options: AM-PAC 6 Clicks Basic Mobility (PT)  AM-PAC 6 Clicks Score (PT): 18  AM-PAC 6 Clicks Score (OT): 20    Saundra Dolan PTA  11/22/2022

## 2022-11-22 NOTE — DISCHARGE SUMMARY
[facility] Medicine Services  DISCHARGE SUMMARY       Date of Admission: 11/18/2022  Date of Discharge:  11/22/2022  Primary Care Physician: Len Crowe MD    Discharge Diagnoses:  Problem list includes:  DKA  Diabetes mellitus type 2 poorly controlled; questionable compliance with medication  Hypertension  COVID-19 virus infection.    Klebsiella urinary tract infection  Hypertension  Hypokalemia, hypophosphatemia  Gastroesophageal reflux disease  Obesity with BMI of 38  Gastroesophageal reflux disease  Dehydration  Hyponatremia likely from dehydration      Presenting Problem/History of Present Illness:  Diabetic ketoacidosis without coma associated with other specified diabetes mellitus (HCC) [E13.10]         Hospital Course    Information in this discharge summary is based on review of record from admission to present.  Patient is doing better and felt medically stable and appropriate for discharge.  She has been declined by [facility] because she is doing better.  Physical therapist said that she did bed mobility under standby assist and ambulated 40 feet under standby assist with rolling walker.  Patient feels more comfortable going home with home health given this progress as well as information that she has been declined.    I spoke to her son Jadiel and discuss above findings.  He works with home health.  He said that this is something within the measures that home health help patients with.    Basically, patient was admitted on November 18 by Dr. Mackenzie.  Patient reportedly presented with fall, altered mental status, generalized weakness.  She was found positive for COVID 19 although not requiring any particular treatment for it.  She was also been found with diabetic ketoacidosis.  She initially had hyperphosphatemia but subsequently hypophosphatemia likely related to translocation of phosphorus into the cells.  We replaced her electrolyte imbalances.   She will be sent home on supplemental also.  I discontinued hydrochlorothiazide as potassium can be affected by it.  I instructed her to monitor her blood pressure and bring record to primary care provider along with other medications used to treat her blood pressure.    She used to take metformin prior to this admission.  Likely due to lactic acidosis, this was discontinued.  I have no plans of continuing this for now.  Instead, I will continue on regimen of basal insulin and sliding scale insulin.  Patient prior to this admission was on semaglutide.      Diabetic educator and nutritionist were consulted.  Patient made aware of her A1c which is 11.3%.    Patient has known history of hypertension.  Medications were addressed accordingly.  Hydrochlorothiazide was discontinued.    Patient has polypharmacy.  I strongly encouraged her to follow closely with Dr. Crowe to discuss her medications.    Patient had leukocytosis during admission.  Patient was treated antibiotic for a 25,000 colony-forming units of Klebsiella seen in urine culture (UTI)      Dr. Mackenzie treated her for DKA, hypertension, of COVID-19 virus infection, acute kidney injury, hypertension, gastroesophageal reflux disease and weakness.      Procedures Performed:   None      Consults: None    Pertinent Test Results:   Lab Results (last 7 days)     Procedure Component Value Units Date/Time    POC Glucose Once [[id number]]  (Abnormal) Collected: 11/22/22 1100    Specimen: Blood Updated: 11/22/22 1111     Glucose 243 mg/dL      Comment: : [id number] Clem Frazier GloriaMeter ID: [id number]       Blood Culture - Blood, Arm, Right [[id number]]  (Normal) Collected: 11/18/22 0954    Specimen: Blood from Arm, Right Updated: 11/22/22 1016     Blood Culture No growth at 4 days    Blood Culture - Blood, Arm, Left [[id number]]  (Normal) Collected: 11/18/22 0953    Specimen: Blood from Arm, Left Updated: 11/22/22 1016     Blood Culture No growth at 4 days     Potassium [[id number]]  (Normal) Collected: 11/22/22 0907    Specimen: Blood Updated: 11/22/22 0934     Potassium 3.7 mmol/L      Comment: Slight hemolysis detected by analyzer. Results may be affected.       POC Glucose Once [[id number]]  (Normal) Collected: 11/22/22 0832    Specimen: Blood Updated: 11/22/22 0844     Glucose 127 mg/dL      Comment: : [id number] Clem ZuluagaiseMeter ID: [id number]       Magnesium [[id number]]  (Normal) Collected: 11/22/22 0048    Specimen: Blood Updated: 11/22/22 0155     Magnesium 1.6 mg/dL     Potassium [[id number]]  (Abnormal) Collected: 11/22/22 0048    Specimen: Blood Updated: 11/22/22 0128     Potassium 3.3 mmol/L     Basic Metabolic Panel [[id number]]  (Abnormal) Collected: 11/22/22 0048    Specimen: Blood Updated: 11/22/22 0128     Glucose 191 mg/dL      BUN 15 mg/dL      Creatinine 0.43 mg/dL      Sodium 133 mmol/L      Potassium 3.3 mmol/L      Chloride 100 mmol/L      CO2 22.0 mmol/L      Calcium 8.5 mg/dL      BUN/Creatinine Ratio 34.9     Anion Gap 11.0 mmol/L      eGFR 102.2 mL/min/1.73      Comment: National Kidney Foundation and American Society of Nephrology (ASN) Task Force recommended calculation based on the Chronic Kidney Disease Epidemiology Collaboration (CKD-EPI) equation refit without adjustment for race.       Narrative:      GFR Normal >60  Chronic Kidney Disease <60  Kidney Failure <15    The GFR formula is only valid for adults with stable renal function between ages 18 and 70.    Phosphorus [[id number]]  (Abnormal) Collected: 11/22/22 0048    Specimen: Blood Updated: 11/22/22 0128     Phosphorus 2.1 mg/dL     POC Glucose Once [[id number]]  (Abnormal) Collected: 11/21/22 2025    Specimen: Blood Updated: 11/21/22 2055     Glucose 287 mg/dL      Comment: : [id number] Emile Redding ID: [id number]       POC Glucose Once [[id number]]  (Abnormal) Collected: 11/21/22 1606    Specimen: Blood Updated: 11/21/22 1621     Glucose 332 mg/dL      Comment:  : [id number] Clem CastroMeter ID: [id number]       Phosphorus [[id number]]  (Abnormal) Collected: 11/21/22 0239    Specimen: Blood Updated: 11/21/22 1440     Phosphorus 2.3 mg/dL     POC Glucose Once [[id number]]  (Abnormal) Collected: 11/21/22 1122    Specimen: Blood Updated: 11/21/22 1133     Glucose 364 mg/dL      Comment: : [id number] Clem CastroMeter ID: [id number]       POC Glucose Once [[id number]]  (Abnormal) Collected: 11/21/22 0824    Specimen: Blood Updated: 11/21/22 0835     Glucose 208 mg/dL      Comment: : [id number] Clem CastroMeter ID: [id number]       Basic Metabolic Panel [[id number]]  (Abnormal) Collected: 11/21/22 0239    Specimen: Blood Updated: 11/21/22 0333     Glucose 218 mg/dL      BUN 20 mg/dL      Creatinine 0.43 mg/dL      Sodium 134 mmol/L      Potassium 3.2 mmol/L      Chloride 100 mmol/L      CO2 21.0 mmol/L      Calcium 8.0 mg/dL      BUN/Creatinine Ratio 46.5     Anion Gap 13.0 mmol/L      eGFR 102.2 mL/min/1.73      Comment: National Kidney Foundation and American Society of Nephrology (ASN) Task Force recommended calculation based on the Chronic Kidney Disease Epidemiology Collaboration (CKD-EPI) equation refit without adjustment for race.       Narrative:      GFR Normal >60  Chronic Kidney Disease <60  Kidney Failure <15    The GFR formula is only valid for adults with stable renal function between ages 18 and 70.    CBC & Differential [[id number]]  (Abnormal) Collected: 11/21/22 0239    Specimen: Blood Updated: 11/21/22 0315    Narrative:      The following orders were created for panel order CBC & Differential.  Procedure                               Abnormality         Status                     ---------                               -----------         ------                     CBC Auto Differential[[id number]]        Abnormal            Final result                 Please view results for these tests on the individual orders.     CBC Auto Differential [[id number]]  (Abnormal) Collected: 11/21/22 0239    Specimen: Blood Updated: 11/21/22 0315     WBC 15.29 10*3/mm3      RBC 5.35 10*6/mm3      Hemoglobin 16.2 g/dL      Hematocrit 47.6 %      MCV 89.0 fL      MCH 30.3 pg      MCHC 34.0 g/dL      RDW 12.8 %      RDW-SD 41.3 fl      MPV 10.1 fL      Platelets 183 10*3/mm3      Neutrophil % 83.7 %      Lymphocyte % 4.4 %      Monocyte % 9.7 %      Eosinophil % 0.1 %      Basophil % 0.3 %      Immature Grans % 1.8 %      Neutrophils, Absolute 12.82 10*3/mm3      Lymphocytes, Absolute 0.67 10*3/mm3      Monocytes, Absolute 1.48 10*3/mm3      Eosinophils, Absolute 0.01 10*3/mm3      Basophils, Absolute 0.04 10*3/mm3      Immature Grans, Absolute 0.27 10*3/mm3      nRBC 0.0 /100 WBC     POC Glucose Once [[id number]]  (Abnormal) Collected: 11/20/22 2049    Specimen: Blood Updated: 11/20/22 2115     Glucose 198 mg/dL      Comment: : [id number] Adrian Hartman AmberMeter ID: [id number]       POC Glucose Once [[id number]]  (Abnormal) Collected: 11/20/22 1730    Specimen: Blood Updated: 11/20/22 1740     Glucose 185 mg/dL      Comment: : [id number] Julio Cesar ShannonaMeter ID: [id number]       Urine Culture - Urine, Urine, Catheter [[id number]]  (Abnormal)  (Susceptibility) Collected: 11/18/22 1006    Specimen: Urine, Catheter Updated: 11/20/22 1148     Urine Culture 25,000 CFU/mL Klebsiella pneumoniae ssp pneumoniae    Narrative:      Colonization of the urinary tract without infection is common. Treatment is discouraged unless the patient is symptomatic, pregnant, or undergoing an invasive urologic procedure.    Susceptibility      Klebsiella pneumoniae ssp pneumoniae      NATALIE      Ampicillin Resistant     Ampicillin + Sulbactam Susceptible      Cefazolin Susceptible      Cefepime Susceptible      Ceftazidime Susceptible      Ceftriaxone Susceptible      Gentamicin Susceptible      Levofloxacin Susceptible      Nitrofurantoin Intermediate       Piperacillin + Tazobactam Susceptible      Trimethoprim + Sulfamethoxazole Susceptible                           POC Glucose Once [[id number]]  (Abnormal) Collected: 11/20/22 1109    Specimen: Blood Updated: 11/20/22 1120     Glucose 262 mg/dL      Comment: : [id number] Solo RosenMeter ID: [id number]       Basic Metabolic Panel [[id number]]  (Abnormal) Collected: 11/20/22 0857    Specimen: Blood Updated: 11/20/22 0929     Glucose 286 mg/dL      BUN 24 mg/dL      Creatinine 0.60 mg/dL      Sodium 132 mmol/L      Potassium 3.6 mmol/L      Comment: Slight hemolysis detected by analyzer. Results may be affected.        Chloride 98 mmol/L      CO2 20.0 mmol/L      Calcium 8.9 mg/dL      BUN/Creatinine Ratio 40.0     Anion Gap 14.0 mmol/L      eGFR 94.3 mL/min/1.73      Comment: National Kidney Foundation and American Society of Nephrology (ASN) Task Force recommended calculation based on the Chronic Kidney Disease Epidemiology Collaboration (CKD-EPI) equation refit without adjustment for race.       Narrative:      GFR Normal >60  Chronic Kidney Disease <60  Kidney Failure <15    The GFR formula is only valid for adults with stable renal function between ages 18 and 70.    POC Glucose Once [[id number]]  (Abnormal) Collected: 11/20/22 0725    Specimen: Blood Updated: 11/20/22 0736     Glucose 298 mg/dL      Comment: : [id number] Solo VangelMeter ID: [id number]       CBC & Differential [[id number]]  (Abnormal) Collected: 11/20/22 0217    Specimen: Blood Updated: 11/20/22 0230    Narrative:      The following orders were created for panel order CBC & Differential.  Procedure                               Abnormality         Status                     ---------                               -----------         ------                     CBC Auto Differential[[id number]]        Abnormal            Final result                 Please view results for these tests on the individual orders.    CBC Auto Differential  [[id number]]  (Abnormal) Collected: 11/20/22 0217    Specimen: Blood Updated: 11/20/22 0230     WBC 12.59 10*3/mm3      RBC 4.80 10*6/mm3      Hemoglobin 14.5 g/dL      Hematocrit 42.4 %      MCV 88.3 fL      MCH 30.2 pg      MCHC 34.2 g/dL      RDW 12.7 %      RDW-SD 41.6 fl      MPV 9.5 fL      Platelets 164 10*3/mm3      Neutrophil % 84.1 %      Lymphocyte % 4.6 %      Monocyte % 10.2 %      Eosinophil % 0.1 %      Basophil % 0.2 %      Immature Grans % 0.8 %      Neutrophils, Absolute 10.60 10*3/mm3      Lymphocytes, Absolute 0.58 10*3/mm3      Monocytes, Absolute 1.28 10*3/mm3      Eosinophils, Absolute 0.01 10*3/mm3      Basophils, Absolute 0.02 10*3/mm3      Immature Grans, Absolute 0.10 10*3/mm3      nRBC 0.0 /100 WBC     Phosphorus [[id number]]  (Abnormal) Collected: 11/19/22 2321    Specimen: Blood Updated: 11/19/22 2349     Phosphorus 2.0 mg/dL     POC Glucose Once [[id number]]  (Abnormal) Collected: 11/19/22 2030    Specimen: Blood Updated: 11/19/22 2048     Glucose 285 mg/dL      Comment: : [id number] GarciayMeter ID: [id number]       POC Glucose Once [[id number]]  (Abnormal) Collected: 11/19/22 1626    Specimen: Blood Updated: 11/19/22 1637     Glucose 288 mg/dL      Comment: : [id number] Solo RosenMeter ID: [id number]       POC Glucose Once [[id number]]  (Normal) Collected: 11/19/22 1217    Specimen: Blood Updated: 11/19/22 1228     Glucose 124 mg/dL      Comment: : [id number] Solo RosenMeter ID: [id number]       POC Glucose Once [[id number]]  (Abnormal) Collected: 11/19/22 1148    Specimen: Blood Updated: 11/19/22 1159     Glucose 136 mg/dL      Comment: : [id number] Solo RosenMeter ID: [id number]       POC Glucose Once [[id number]]  (Abnormal) Collected: 11/19/22 1038    Specimen: Blood Updated: 11/19/22 1051     Glucose 157 mg/dL      Comment: : [id number] Solo Upton ID: [id number]       Basic Metabolic Panel [[id number]]  (Abnormal) Collected: 11/19/22  0935    Specimen: Blood Updated: 11/19/22 1016     Glucose 155 mg/dL      BUN 29 mg/dL      Creatinine 0.66 mg/dL      Sodium 137 mmol/L      Potassium 3.7 mmol/L      Chloride 108 mmol/L      CO2 19.0 mmol/L      Calcium 8.3 mg/dL      BUN/Creatinine Ratio 43.9     Anion Gap 10.0 mmol/L      eGFR 92.2 mL/min/1.73      Comment: National Kidney Foundation and American Society of Nephrology (ASN) Task Force recommended calculation based on the Chronic Kidney Disease Epidemiology Collaboration (CKD-EPI) equation refit without adjustment for race.       Narrative:      GFR Normal >60  Chronic Kidney Disease <60  Kidney Failure <15    The GFR formula is only valid for adults with stable renal function between ages 18 and 70.    Phosphorus [[id number]]  (Abnormal) Collected: 11/19/22 0935    Specimen: Blood Updated: 11/19/22 1013     Phosphorus 2.0 mg/dL     Magnesium [[id number]]  (Normal) Collected: 11/19/22 0935    Specimen: Blood Updated: 11/19/22 1013     Magnesium 2.0 mg/dL     POC Glucose Once [[id number]]  (Abnormal) Collected: 11/19/22 0938    Specimen: Blood Updated: 11/19/22 0949     Glucose 149 mg/dL      Comment: : [id number] Solo DanielMeter ID: [id number]       POC Glucose Once [[id number]]  (Abnormal) Collected: 11/19/22 0834    Specimen: Blood Updated: 11/19/22 0845     Glucose 157 mg/dL      Comment: : [id number] Solo DanielMeter ID: [id number]       POC Glucose Once [[id number]]  (Abnormal) Collected: 11/19/22 0726    Specimen: Blood Updated: 11/19/22 0737     Glucose 170 mg/dL      Comment: : [id number] Solo DanielMeter ID: [id number]       Basic Metabolic Panel [[id number]]  (Abnormal) Collected: 11/19/22 0551    Specimen: Blood Updated: 11/19/22 0629     Glucose 184 mg/dL      BUN 33 mg/dL      Creatinine 0.58 mg/dL      Sodium 136 mmol/L      Potassium 5.1 mmol/L      Comment: Specimen hemolyzed.  Results may be affected.        Chloride 113 mmol/L      CO2 14.0 mmol/L      Calcium  8.2 mg/dL      BUN/Creatinine Ratio 56.9     Anion Gap 9.0 mmol/L      eGFR 95.1 mL/min/1.73      Comment: National Kidney Foundation and American Society of Nephrology (ASN) Task Force recommended calculation based on the Chronic Kidney Disease Epidemiology Collaboration (CKD-EPI) equation refit without adjustment for race.       Narrative:      GFR Normal >60  Chronic Kidney Disease <60  Kidney Failure <15    The GFR formula is only valid for adults with stable renal function between ages 18 and 70.    POC Glucose Once [[id number]]  (Abnormal) Collected: 11/19/22 0521    Specimen: Blood Updated: 11/19/22 0533     Glucose 154 mg/dL      Comment: : antoine Hardin PerkinsMeter ID: [id number]       POC Glucose Once [[id number]]  (Abnormal) Collected: 11/19/22 0319    Specimen: Blood Updated: 11/19/22 0330     Glucose 153 mg/dL      Comment: : antoine Wilfred PerkinsMeter ID: [id number]       POC Glucose Once [[id number]]  (Abnormal) Collected: 11/19/22 0227    Specimen: Blood Updated: 11/19/22 0238     Glucose 132 mg/dL      Comment: : antoine Hardin PerkinsMeter ID: [id number]       CBC & Differential [[id number]]  (Abnormal) Collected: 11/19/22 0144    Specimen: Blood Updated: 11/19/22 0227    Narrative:      The following orders were created for panel order CBC & Differential.  Procedure                               Abnormality         Status                     ---------                               -----------         ------                     CBC Auto Differential[[id number]]        Abnormal            Final result                 Please view results for these tests on the individual orders.    CBC Auto Differential [[id number]]  (Abnormal) Collected: 11/19/22 0144    Specimen: Blood Updated: 11/19/22 0227     WBC 18.08 10*3/mm3      RBC 4.65 10*6/mm3      Hemoglobin 14.2 g/dL      Hematocrit 42.1 %      MCV 90.5 fL      MCH 30.5 pg      MCHC 33.7 g/dL      RDW 13.0 %      RDW-SD 43.1  fl      MPV 9.9 fL      Platelets 199 10*3/mm3      Neutrophil % 88.0 %      Lymphocyte % 1.5 %      Monocyte % 9.3 %      Eosinophil % 0.1 %      Basophil % 0.2 %      Immature Grans % 0.9 %      Neutrophils, Absolute 15.92 10*3/mm3      Lymphocytes, Absolute 0.28 10*3/mm3      Monocytes, Absolute 1.68 10*3/mm3      Eosinophils, Absolute 0.01 10*3/mm3      Basophils, Absolute 0.03 10*3/mm3      Immature Grans, Absolute 0.16 10*3/mm3      nRBC 0.0 /100 WBC     POC Glucose Once [[id number]]  (Abnormal) Collected: 11/19/22 0130    Specimen: Blood Updated: 11/19/22 0141     Glucose 138 mg/dL      Comment: : antoine Hardin Quotient BiodiagnosticssMeter ID: [id number]       POC Glucose Once [[id number]]  (Normal) Collected: 11/19/22 0035    Specimen: Blood Updated: 11/19/22 0046     Glucose 125 mg/dL      Comment: : antoine Hardin Quotient BiodiagnosticssMeter ID: [id number]       Phosphorus [[id number]]  (Normal) Collected: 11/18/22 2347    Specimen: Blood Updated: 11/19/22 0020     Phosphorus 2.5 mg/dL     Magnesium [[id number]]  (Normal) Collected: 11/18/22 2347    Specimen: Blood Updated: 11/19/22 0018     Magnesium 2.3 mg/dL     POC Glucose Once [[id number]]  (Abnormal) Collected: 11/18/22 2336    Specimen: Blood Updated: 11/18/22 2347     Glucose 162 mg/dL      Comment: : antoine Hardin Quotient BiodiagnosticssMeter ID: [id number]       POC Glucose Once [[id number]]  (Abnormal) Collected: 11/18/22 2233    Specimen: Blood Updated: 11/18/22 2244     Glucose 226 mg/dL      Comment: : antoine Wilfred Quotient BiodiagnosticssMeter ID: [id number]       POC Glucose Once [[id number]]  (Abnormal) Collected: 11/18/22 2141    Specimen: Blood Updated: 11/18/22 2152     Glucose 197 mg/dL      Comment: : antoine Hardin Quotient BiodiagnosticssMeter ID: [id number]       POC Glucose Once [[id number]]  (Abnormal) Collected: 11/18/22 2048    Specimen: Blood Updated: 11/18/22 2059     Glucose 202 mg/dL      Comment: : antoine Hardin PerkinsMeter ID: [id number]        Basic Metabolic Panel [[id number]]  (Abnormal) Collected: 11/18/22 2022    Specimen: Blood Updated: 11/18/22 2053     Glucose 225 mg/dL      BUN 39 mg/dL      Creatinine 0.74 mg/dL      Sodium 136 mmol/L      Potassium 3.9 mmol/L      Chloride 110 mmol/L      CO2 12.0 mmol/L      Calcium 8.7 mg/dL      BUN/Creatinine Ratio 52.7     Anion Gap 14.0 mmol/L      eGFR 85.0 mL/min/1.73      Comment: National Kidney Foundation and American Society of Nephrology (ASN) Task Force recommended calculation based on the Chronic Kidney Disease Epidemiology Collaboration (CKD-EPI) equation refit without adjustment for race.       Narrative:      GFR Normal >60  Chronic Kidney Disease <60  Kidney Failure <15    The GFR formula is only valid for adults with stable renal function between ages 18 and 70.    Phosphorus [[id number]]  (Abnormal) Collected: 11/18/22 2022    Specimen: Blood Updated: 11/18/22 2053     Phosphorus 1.7 mg/dL     POC Glucose Once [[id number]]  (Abnormal) Collected: 11/1948    Specimen: Blood Updated: 11/18/22 1958     Glucose 203 mg/dL      Comment: : antoine Hardin PerkinsMeter ID: [id number]       Magnesium [[id number]]  (Normal) Collected: 11/18/22 1846    Specimen: Blood Updated: 11/18/22 1950     Magnesium 2.1 mg/dL     STAT Lactic Acid, Reflex [[id number]]  (Normal) Collected: 11/18/22 1845    Specimen: Blood Updated: 11/1948     Lactate 1.2 mmol/L     POC Glucose Once [[id number]]  (Abnormal) Collected: 11/18/22 1839    Specimen: Blood Updated: 11/18/22 1850     Glucose 183 mg/dL      Comment: : [id number] Ronald JerielMeter ID: [id number]       POC Glucose Once [[id number]]  (Abnormal) Collected: 11/18/22 1724    Specimen: Blood Updated: 11/18/22 1735     Glucose 209 mg/dL      Comment: : jolene Gallegos RyanMeter ID: [id number]       POC Glucose Once [[id number]]  (Abnormal) Collected: 11/18/22 1624    Specimen: Blood Updated: 11/18/22 1635     Glucose 219 mg/dL       Comment: : [email or web address] Ronald PettitielMeter ID: [id number]       STAT Lactic Acid, Reflex [[id number]]  (Abnormal) Collected: 11/18/22 1539    Specimen: Blood Updated: 11/18/22 1612     Lactate 2.2 mmol/L     POC Glucose Once [[id number]]  (Abnormal) Collected: 11/18/22 1518    Specimen: Blood Updated: 11/18/22 1532     Glucose 255 mg/dL      Comment: : [email or web address] Ronald PettitielMeter ID: [id number]       C-reactive Protein [[id number]]  (Normal) Collected: 11/18/22 1207    Specimen: Blood Updated: 11/18/22 1532     C-Reactive Protein <0.30 mg/dL     Osmolality, Serum [[id number]]  (Abnormal) Collected: 11/18/22 1315    Specimen: Blood Updated: 11/18/22 1415     Osmolality 331 mOsm/kg     POC Glucose Once [[id number]]  (Abnormal) Collected: 11/18/22 1350    Specimen: Blood Updated: 11/18/22 1400     Glucose 311 mg/dL      Comment: : [id number] Peter MccartyissaMeter ID: [id number]       STAT Lactic Acid, Reflex [[id number]]  (Abnormal) Collected: 11/18/22 1317    Specimen: Blood Updated: 11/18/22 1355     Lactate 2.3 mmol/L     POC Glucose Once [[id number]]  (Abnormal) Collected: 11/18/22 1314    Specimen: Blood Updated: 11/18/22 1326     Glucose 353 mg/dL      Comment: : [id number] Peter BookFreshissaMeter ID: [id number]       Basic Metabolic Panel [[id number]]  (Abnormal) Collected: 11/18/22 1207    Specimen: Blood Updated: 11/18/22 1257     Glucose 517 mg/dL      BUN 49 mg/dL      Creatinine 0.98 mg/dL      Sodium 135 mmol/L      Potassium 5.3 mmol/L      Comment: Slight hemolysis detected by analyzer. Results may be affected.        Chloride 96 mmol/L      CO2 8.0 mmol/L      Calcium 10.0 mg/dL      BUN/Creatinine Ratio 50.0     Anion Gap 31.0 mmol/L      eGFR 60.7 mL/min/1.73      Comment: National Kidney Foundation and American Society of Nephrology (ASN) Task Force recommended calculation based on the Chronic Kidney Disease Epidemiology Collaboration (CKD-EPI) equation refit without adjustment for race.        Narrative:      GFR Normal >60  Chronic Kidney Disease <60  Kidney Failure <15    The GFR formula is only valid for adults with stable renal function between ages 18 and 70.    Phosphorus [[id number]]  (Abnormal) Collected: 11/18/22 1207    Specimen: Blood Updated: 11/18/22 1240     Phosphorus 4.8 mg/dL     Magnesium [[id number]]  (Normal) Collected: 11/18/22 1207    Specimen: Blood Updated: 11/18/22 1237     Magnesium 2.4 mg/dL     POC Glucose Once [[id number]]  (Abnormal) Collected: 11/18/22 1212    Specimen: Blood Updated: 11/18/22 1223     Glucose 469 mg/dL      Comment: : [id number] Naida AlishaMeter ID: [id number]       Hemoglobin A1c [[id number]]  (Abnormal) Collected: 11/18/22 0955    Specimen: Blood Updated: 11/18/22 1208     Hemoglobin A1C 11.30 %     Narrative:      Hemoglobin A1C Ranges:    Increased Risk for Diabetes  5.7% to 6.4%  Diabetes                     >= 6.5%  Diabetic Goal                < 7.0%    Phosphorus [[id number]]  (Abnormal) Collected: 11/18/22 0955    Specimen: Blood Updated: 11/18/22 1203     Phosphorus 5.7 mg/dL     COVID PRE-OP / PRE-PROCEDURE SCREENING ORDER (NO ISOLATION) - Swab, Nasal Cavity [[id number]]  (Abnormal) Collected: 11/18/22 0956    Specimen: Swab from Nasal Cavity Updated: 11/18/22 1111    Narrative:      The following orders were created for panel order COVID PRE-OP / PRE-PROCEDURE SCREENING ORDER (NO ISOLATION) - Swab, Nasal Cavity.  Procedure                               Abnormality         Status                     ---------                               -----------         ------                     COVID-19 and FLU A/B PCR...[[id number]]  Abnormal            Final result                 Please view results for these tests on the individual orders.    COVID-19 and FLU A/B PCR - Swab, Nasopharynx [[id number]]  (Abnormal) Collected: 11/18/22 0956    Specimen: Swab from Nasopharynx Updated: 11/18/22 1111     COVID19 Detected     Influenza A PCR Not Detected      Influenza B PCR Not Detected    Narrative:      Fact sheet for providers: https://www.fda.gov/media/905838/download    Fact sheet for patients: https://www.fda.gov/media/507527/download    Test performed by PCR.  Influenza A and Influenza B negative results should be considered presumptive in samples that have a positive SARS-CoV-2 result.    Competitive inhibition studies showed that SARS-CoV-2 virus, when present at concentrations above 3.6E+04 copies/mL, can inhibit the detection and amplification of influenza A and influenza B virus RNA if present at or below 1.8E+02 copies/mL or 4.9E+02 copies/mL, respectively, and may lead to false negative influenza virus results. If co-infection with influenza A or influenza B virus is suspected in samples with a positive SARS-CoV-2 result, the sample should be re-tested with another FDA cleared, approved, or authorized influenza test, if influenza virus detection would change clinical management.    Comprehensive Metabolic Panel [[id number]]  (Abnormal) Collected: 11/18/22 0955    Specimen: Blood Updated: 11/18/22 1048     Glucose 601 mg/dL      BUN 51 mg/dL      Creatinine 1.15 mg/dL      Sodium 131 mmol/L      Potassium 4.8 mmol/L      Chloride 91 mmol/L      CO2 7.0 mmol/L      Calcium 10.7 mg/dL      Total Protein 7.6 g/dL      Albumin 4.50 g/dL      ALT (SGPT) 30 U/L      AST (SGOT) 14 U/L      Alkaline Phosphatase 108 U/L      Total Bilirubin 0.5 mg/dL      Globulin 3.1 gm/dL      A/G Ratio 1.5 g/dL      BUN/Creatinine Ratio 44.3     Anion Gap 33.0 mmol/L      eGFR 50.1 mL/min/1.73      Comment: National Kidney Foundation and American Society of Nephrology (ASN) Task Force recommended calculation based on the Chronic Kidney Disease Epidemiology Collaboration (CKD-EPI) equation refit without adjustment for race.       Narrative:      GFR Normal >60  Chronic Kidney Disease <60  Kidney Failure <15    The GFR formula is only valid for adults with stable renal function  "between ages 18 and 70.    Lipase [[id number]]  (Abnormal) Collected: 11/18/22 0955    Specimen: Blood Updated: 11/18/22 1042     Lipase 441 U/L     Troponin [[id number]]  (Abnormal) Collected: 11/18/22 0955    Specimen: Blood Updated: 11/18/22 1038     Troponin T 0.036 ng/mL     Narrative:      Troponin T Reference Range:  <= 0.03 ng/mL-   Negative for AMI  >0.03 ng/mL-     Abnormal for myocardial necrosis.  Clinicians would have to utilize clinical acumen, EKG, Troponin and serial changes to determine if it is an Acute Myocardial Infarction or myocardial injury due to an underlying chronic condition.       Results may be falsely decreased if patient taking Biotin.      Lactic Acid, Plasma [[id number]]  (Abnormal) Collected: 11/18/22 0955    Specimen: Blood Updated: 11/18/22 1038     Lactate 2.6 mmol/L     Procalcitonin [[id number]]  (Normal) Collected: 11/18/22 0955    Specimen: Blood Updated: 11/18/22 1036     Procalcitonin 0.10 ng/mL     Narrative:      As a Marker for Sepsis (Non-Neonates):    1. <0.5 ng/mL represents a low risk of severe sepsis and/or septic shock.  2. >2 ng/mL represents a high risk of severe sepsis and/or septic shock.    As a Marker for Lower Respiratory Tract Infections that require antibiotic therapy:    PCT on Admission    Antibiotic Therapy       6-12 Hrs later    >0.5                Strongly Recommended  >0.25 - <0.5        Recommended   0.1 - 0.25          Discouraged              Remeasure/reassess PCT  <0.1                Strongly Discouraged     Remeasure/reassess PCT    As 28 day mortality risk marker: \"Change in Procalcitonin Result\" (>80% or <=80%) if Day 0 (or Day 1) and Day 4 values are available. Refer to http://www.[facility]-pct-calculator.com    Change in PCT <=80%  A decrease of PCT levels below or equal to 80% defines a positive change in PCT test result representing a higher risk for 28-day all-cause mortality of patients diagnosed with severe sepsis for septic shock.    Change " in PCT >80%  A decrease of PCT levels of more than 80% defines a negative change in PCT result representing a lower risk for 28-day all-cause mortality of patients diagnosed with severe sepsis or septic shock.       Magnesium [[id number]]  (Abnormal) Collected: 11/18/22 0955    Specimen: Blood Updated: 11/18/22 1028     Magnesium 2.7 mg/dL     CBC & Differential [[id number]]  (Abnormal) Collected: 11/18/22 0955    Specimen: Blood Updated: 11/18/22 1025    Narrative:      The following orders were created for panel order CBC & Differential.  Procedure                               Abnormality         Status                     ---------                               -----------         ------                     CBC Auto Differential[[id number]]        Abnormal            Final result                 Please view results for these tests on the individual orders.    CBC Auto Differential [[id number]]  (Abnormal) Collected: 11/18/22 0955    Specimen: Blood Updated: 11/18/22 1025     WBC 26.97 10*3/mm3      RBC 5.99 10*6/mm3      Hemoglobin 18.1 g/dL      Hematocrit 55.2 %      MCV 92.2 fL      MCH 30.2 pg      MCHC 32.8 g/dL      RDW 12.8 %      RDW-SD 43.3 fl      MPV 9.5 fL      Platelets 394 10*3/mm3      Neutrophil % 89.3 %      Lymphocyte % 1.1 %      Monocyte % 7.0 %      Eosinophil % 0.0 %      Basophil % 0.3 %      Immature Grans % 2.3 %      Neutrophils, Absolute 24.08 10*3/mm3      Lymphocytes, Absolute 0.31 10*3/mm3      Monocytes, Absolute 1.88 10*3/mm3      Eosinophils, Absolute 0.00 10*3/mm3      Basophils, Absolute 0.09 10*3/mm3      Immature Grans, Absolute 0.61 10*3/mm3      nRBC 0.0 /100 WBC     POC Glucose Once [[id number]]  (Abnormal) Collected: 11/18/22 1011    Specimen: Blood Updated: 11/18/22 1023     Glucose 522 mg/dL      Comment: : [id number] Naida Mcdonaldter ID: [id number]       Protime-INR [[id number]]  (Normal) Collected: 11/18/22 0955    Specimen: Blood Updated: 11/18/22 1019     Protime 13.4  Seconds      INR 1.06    Urinalysis With Culture If Indicated - Urine, Catheter [[id number]]  (Abnormal) Collected: 11/18/22 1006    Specimen: Urine, Catheter Updated: 11/18/22 1016     Color, UA Yellow     Appearance, UA Clear     pH, UA <=5.0     Specific Gravity, UA 1.029     Glucose, UA >=1000 mg/dL (3+)     Ketones, UA >=160 mg/dL (4+)     Bilirubin, UA Negative     Blood, UA Small (1+)     Protein,  mg/dL (2+)     Leuk Esterase, UA Negative     Nitrite, UA Negative     Urobilinogen, UA 0.2 E.U./dL    Narrative:      In absence of clinical symptoms, the presence of pyuria, bacteria, and/or nitrites on the urinalysis result does not correlate with infection.    Urinalysis, Microscopic Only - Urine, Catheter [[id number]]  (Abnormal) Collected: 11/18/22 1006    Specimen: Urine, Catheter Updated: 11/18/22 1016     RBC, UA 0-2 /HPF      WBC, UA 6-12 /HPF      Bacteria, UA None Seen /HPF      Squamous Epithelial Cells, UA 3-6 /HPF      Hyaline Casts, UA 7-12 /LPF      Methodology Automated Microscopy    Blood Gas, Arterial - [[id number]]  (Abnormal) Collected: 11/18/22 0947    Specimen: Arterial Blood Updated: 11/18/22 0945     Site Left Brachial     Lenny's Test Positive     pH, Arterial 7.165 pH units      Comment: 85 Value below critical limit        pCO2, Arterial 11.8 mm Hg      Comment: 85 Value below critical limit        pO2, Arterial 127.0 mm Hg      Comment: 83 Value above reference range        HCO3, Arterial 4.2 mmol/L      Comment: 84 Value below reference range        Base Excess, Arterial -21.2 mmol/L      Comment: 84 Value below reference range        O2 Saturation, Arterial 98.7 %      Temperature 37.0 C      Barometric Pressure for Blood Gas 760 mmHg      Modality Room Air     Ventilator Mode NA     Notified Who DR HAND     Notified By [id number]     Notified Time 11/18/2022 09:48     Collected by [id number]     Comment: Meter: [id number]     :  [id number]        pCO2, Temperature Corrected  11.8 mm Hg      pH, Temp Corrected 7.165 pH Units      pO2, Temperature Corrected 127 mm Hg           Imaging Results (Last 7 Days)     Procedure Component Value Units Date/Time    CT Abdomen Pelvis Without Contrast [[id number]] Collected: 11/18/22 1136     Updated: 11/18/22 1158    Narrative:      EXAMINATION: CT ABDOMEN PELVIS WO CONTRAST-      11/18/2022 11:10 AM CST     HISTORY: Nausea/vomiting     In order to have a CT radiation dose as low as reasonably achievable  Automated Exposure Control was utilized for adjustment of the mA and/or  KV according to patient size.     DLP in mGycm= 1081     The CT scan of the abdomen and pelvis performed without intravenous or  oral contrast enhancement.     Images are acquired in axial plane with subsequent reconstruction in  coronal and sagittal planes.     There is no previous study for comparison.     There is significant motion artifacts which low the diagnostic yield of  the study. Several images were repeated.     The lung bases included in the study are unremarkable except for a tiny  subpleural nodule in the right middle lobe, image #2 and series 3, which  probably represent a small calcified granuloma.     The limited visualized cardiomediastinal structures show atheromatous  changes of the coronary arteries. No significant cardiomegaly.     Unenhanced enhanced liver and spleen are unremarkable. A very small  splenule is seen.     The gallbladder is surgically absent.     The unenhanced pancreas is unremarkable.     The adrenal glands bilaterally are unremarkable.     The kidneys bilaterally are suboptimally evaluated due to significant  motion artifact. No significant mass is identified. No calculi are seen.  No hydronephrosis. The limited visualized ureters are nondilated. The  urinary bladder is moderately distended. No focal intrinsic abnormality  or a calculus.     The uterus is surgically absent. No adnexal masses.     There is a fat-containing umbilical  hernia. There is moderate laxity of  the central middle to lower anterior abdominal wall with protrusion of  the large bowel loops. No donta herniation.     The stomach is full of fluid and small amount of gas. The duodenum and  small bowel are suboptimally evaluated due to extensive motion  artifacts. No finding to suggest obstruction. The appendix is not  visualized or evaluated. There is moderate gas and stool in the colon.  There is diverticulosis of the distal colon. No evidence for  diverticulitis.     Atheromatous changes of the abdominal aorta and iliac arteries. No  aneurysmal dilatation.     There is no evidence of abdominal or pelvic lymphadenopathy.     Images reviewed in bone window show chronic degenerative changes of the  lumbar spine with severe degeneration of the disc L4-5 and L5-S1. Mild  superior endplate compression of vertebra L1 is due to prominent Schmorl  node.       Impression:      1. Motion artifacts limits the diagnostic quality of the study. No acute  abnormality of the abdomen or pelvis.  2. Diverticulosis of the distal colon. No evidence for diverticulitis.     .                                   This report was finalized on 11/18/2022 11:55 by Dr. Pradeep Miller MD.    CT Head Without Contrast [[id number]] Collected: 11/18/22 1129     Updated: 11/18/22 1132    Narrative:      CT HEAD WO CONTRAST- 11/18/2022 11:10 AM CST     HISTORY: Mental status change, unknown cause     COMPARISON: None      DLP: 609 mGy cm. All CT scans are performed using dose optimization  techniques as appropriate to the performed exam and including at least  one of the following: Automated exposure control, adjustment of the mA  and/or kV according to size, and the use of the iterative reconstruction  technique.     TECHNIQUE: Serial axial tomographic images of the brain were obtained  without the use of intravenous contrast.      FINDINGS:   The midline structures are nondisplaced. There is mild cerebral  and  cerebellar atrophy, with an associated increase in the prominence of the  ventricles and sulci. The basilar cisterns are normal in size and  configuration. There is no evidence of intracranial hemorrhage or  mass-effect. There is low attenuation in the periventricular white  matter, consistent with chronic ischemic change. There are no abnormal  extra-axial fluid collections. There is no evidence of tonsillar  herniation.      The included orbits and their contents are unremarkable. The visualized  paranasal sinuses, mastoid air cells and middle ear cavities are clear.  The visualized osseous structures and overlying soft tissues of the  skull and face are intact.        Impression:         1. Mild cerebral and cerebellar atrophy with chronic microvascular  disease but no evidence of acute intracranial process.        This report was finalized on 11/18/2022 11:29 by Dr. Taran Cyr MD.    XR Chest 1 View [[id number]] Collected: 11/18/22 1047     Updated: 11/18/22 1051    Narrative:      EXAM/TECHNIQUE: XR CHEST 1 VW-     INDICATION: Central line placement     COMPARISON: 11/18/2022     FINDINGS:     RIGHT IJ CVL with tip overlying the SVC. Cardiac silhouette is normal  size. No pleural effusion, pneumothorax, or focal consolidation. No  acute osseous finding.       Impression:         RIGHT IJ CVL with tip overlying the SVC. No evidence of pneumothorax.  This report was finalized on 11/18/2022 10:48 by Dr. Javi Rothman MD.    XR Chest 1 View [[id number]] Collected: 11/18/22 0954     Updated: 11/18/22 0957    Narrative:      EXAM/TECHNIQUE: XR CHEST 1 VW-     INDICATION: vomiting     COMPARISON: None available.     FINDINGS:     The cardiac silhouette is normal size. No pleural effusion,  pneumothorax, or focal consolidation. No acute osseous findings.       Impression:         No acute findings.  This report was finalized on 11/18/2022 09:54 by Dr. Javi Rothman MD.          Condition on Discharge:   "stable    Physical Exam on Discharge:  /65 (BP Location: Left arm, Patient Position: Lying)   Pulse 87   Temp 97.8 °F (36.6 °C)   Resp 16   Ht 160 cm (63\")   Wt 97.8 kg (215 lb 11.2 oz)   SpO2 98%   BMI 38.21 kg/m²   Physical Exam     Seated comfortably at edge of bed  Not requiring any oxygen  Nontoxic-appearing  GEN: Awake, alert, interactive, in NAD  HEENT: Atraumatic, PERRLA, EOMI, Anicteric, Trachea midline  Lungs: CTAB, no wheezing/rales/rhonchi  Heart: RRR, +S1/s2, no rub  ABD: soft, nt/nd, +BS, no guarding/rebound  Extremities: atraumatic, no cyanosis, no edema  Skin: no rashes or lesions  Neuro: AAOx3, no focal deficits  Psych: normal mood & affect     Discharge Disposition:  Home-Health [facility]    Discharge Medications:     Discharge Medications      New Medications      Instructions Start Date   benzonatate 200 MG capsule  Commonly known as: TESSALON   200 mg, Oral, 3 Times Daily PRN      Dexcom G6  device   1 Device, Does not apply, Daily      Dexcom G6 Sensor   Does not apply, Every 10 Days      Dexcom G6 Transmitter misc   1 Device, Does not apply, Every 3 Months      insulin detemir 100 UNIT/ML injection  Commonly known as: LEVEMIR   30 Units, Subcutaneous, Every 12 Hours Scheduled      Insulin Lispro 100 UNIT/ML injection  Commonly known as: humaLOG   Inject 0-14 Units under the skin into the appropriate area as directed 3 (Three) Times a Day Before Meals.      K-Phos-Neutral 155-852-130 MG tablet   1 tablet, Oral, Daily         Continue These Medications      Instructions Start Date   albuterol sulfate  (90 Base) MCG/ACT inhaler  Commonly known as: PROVENTIL HFA;VENTOLIN HFA;PROAIR HFA   2 puffs, Inhalation, Every 4 Hours PRN      amLODIPine 5 MG tablet  Commonly known as: NORVASC   TAKE ONE TABLET DAILY      aspirin 81 MG EC tablet   81 mg, Oral, Every Other Day      atorvastatin 20 MG tablet  Commonly known as: LIPITOR   TAKE ONE TABLET DAILY      Calcium-Vitamin D " 600-400 MG-UNIT tablet   1 tablet, Oral, 2 Times Daily      carboxymethylcellulose 0.5 % solution  Commonly known as: REFRESH PLUS   2 drops, Daily PRN      Cholecalciferol 25 MCG (1000 UT) capsule   1 capsule, Oral, Daily      denosumab 60 MG/ML solution syringe  Commonly known as: PROLIA   60 mg, Subcutaneous, Every 6 Months      DULoxetine 20 MG capsule  Commonly known as: CYMBALTA   TAKE ONE CAPSULE DAILY      GLUCOSAMINE CHONDROITIN TRIPLE PO   1 tablet, Oral, 2 Times Daily      guaiFENesin 600 MG 12 hr tablet  Commonly known as: MUCINEX   600 mg, Oral, 2 Times Daily      irbesartan 300 MG tablet  Commonly known as: AVAPRO   TAKE ONE TABLET DAILY      latanoprost 0.005 % ophthalmic solution  Commonly known as: XALATAN   1 drop, Both Eyes, Nightly      montelukast 10 MG tablet  Commonly known as: SINGULAIR   TAKE ONE TABLET DAILY      multivitamin with minerals tablet tablet   1 tablet, Oral, Daily      potassium chloride 10 MEQ CR tablet   TAKE ONE TABLET DAILY      Semaglutide (2 MG/DOSE) 8 MG/3ML solution pen-injector   2 mg, Subcutaneous, Weekly      Symbicort 160-4.5 MCG/ACT inhaler  Generic drug: budesonide-formoterol   INHALE TWO PUFFS TWO (TWO) TIMES A DAY.         Stop These Medications    acetaminophen 500 MG tablet  Commonly known as: TYLENOL     diclofenac 75 MG EC tablet  Commonly known as: VOLTAREN     fluconazole 100 MG tablet  Commonly known as: Diflucan     hydroCHLOROthiazide 12.5 MG capsule  Commonly known as: MICROZIDE     metFORMIN  MG 24 hr tablet  Commonly known as: GLUCOPHAGE-XR     SUPER B COMPLEX PO            Discharge Diet:   Diet Instructions     Diet: Consistent Carbohydrate, Cardiac      Discharge Diet:  Consistent Carbohydrate  Cardiac             Discharge Care Plan / Instructions:   Monitor blood pressure  Monitor blood sugar      Activity at Discharge:   Activity Instructions     Gradually Increase Activity Until at Pre-Hospitalization Level      Measure Blood Pressure             Follow-up Appointments:  PCP within 1wk; encouraged patient to discuss about multiple medications she is on.  Follow-up on blood pressure including diabetes and posthospitalization follow-up      Test Results Pending at Discharge:   Pending Labs     Order Current Status    Blood Culture - Blood, Arm, Left Preliminary result    Blood Culture - Blood, Arm, Right Preliminary result           Electronically signed by Pantera Cotton MD, 11/22/2022, 13:43 CST.    Time: >30 mins      Part of this note may be an electronic transcription/translation of spoken language to printed text using the Dragon Dictation System.

## 2022-11-22 NOTE — NURSING NOTE
Diabetic education completed. Written information and demonstration taught on self administration of insulin. Also, spoke with son Jadiel that patient may need to be re-educated and shown how to prepare and give self insulin shots.

## 2022-11-22 NOTE — PLAN OF CARE
Goal Outcome Evaluation:                 IVF. Pt amb with walker and assist of one. No distress noted.

## 2022-11-22 NOTE — PLAN OF CARE
Goal Outcome Evaluation:  Plan of Care Reviewed With: patient        Progress: improving  Outcome Evaluation: Fxl mobility in room <> BR attempted with and with out RW pt with SBA for all mobility and no issues with balance. Performs sponge bathing routine SBA, declined need for feet or sock change anticipate would need assist with this aspect of task. Performed standing grooming sink side SBA. Toileting on commode S. Pt improving and demo's decreased endurance with tasks. Recommend HH/assist. Educated on energy conservation, work simplification and endurance training when home to improve overall ADL fxn.

## 2022-11-22 NOTE — CASE MANAGEMENT/SOCIAL WORK
Continued Stay Note   [place]     Patient Name: Kezia Otoole  MRN: [id number]  Today's Date: 11/22/2022    Admit Date: 11/18/2022    Plan: [facility]   Discharge Plan     Row Name 11/22/22 1238       Plan    Plan [facility]     Patient/Family in Agreement with Plan yes    Provided Post Acute Provider List? Yes    Post Acute Provider List Home Health    Provided Post Acute Provider Quality & Resource List? Yes    Post Acute Provider Quality and Resource List Home Health    Delivered To Patient    Method of Delivery Telephone    Final Discharge Disposition Code 06 - home with home health care    Final Note Pt is being discharged home today. Pt prefers [facility] for  so called Aydee there and provided referral [phone].  They are aware of d/c today.    Jadiel- son from [facility] called saying they want her set up with [facility].  Explained  has been telling this dept. They can not take anyone at this time, was told exception made since her son(Herminio) works for them.  Will inform [facility] of cancellation.  Son also saying he does not feel pt ready for d/c and trying to get in touch with Dr. Cotton.  Explained decision made with [facility], provided him number so he can call as well.     Row Name 11/22/22 1208       Plan    Final Discharge Disposition Code 01 - home or self-care               Discharge Codes    No documentation.               Expected Discharge Date and Time     Expected Discharge Date Expected Discharge Time    Nov 22, 2022             ROGELIO Enciso

## 2022-11-22 NOTE — PLAN OF CARE
Goal Outcome Evaluation:  Plan of Care Reviewed With: patient        Progress: no change  Outcome Evaluation: Ntn follow up. Pt is on a C.CHO diet. She has consumed 50% avg of the last 2 meals. Pt is having electrolytes replaced and IVF was increased yesterday. Will cont to encourage intake. She is receiving Boost gluc control BID. Follow per protocol.

## 2022-11-22 NOTE — CASE MANAGEMENT/SOCIAL WORK
Spoke with Zaria from [facility] unit [phone] to see if decision has been made re: referral.  She is going to reach out to staff to see and someone will call this SW back.     [facility] called back saying pt doing too well for their program.  Informed pt that prefers just returning home, is open to HH care being ordered. Will follow.

## 2022-11-23 ENCOUNTER — HOSPITAL ENCOUNTER (EMERGENCY)
Facility: HOSPITAL | Age: 72
Discharge: HOME | End: 2022-11-23
Attending: EMERGENCY MEDICINE | Admitting: EMERGENCY MEDICINE

## 2022-11-23 ENCOUNTER — HOME CARE VISIT (OUTPATIENT)
Dept: HOME HEALTH SERVICES | Facility: HOME HEALTHCARE | Age: 72
End: 2022-11-23

## 2022-11-23 ENCOUNTER — TRANSITIONAL CARE MANAGEMENT TELEPHONE ENCOUNTER (OUTPATIENT)
Dept: CALL CENTER | Facility: HOSPITAL | Age: 72
End: 2022-11-23

## 2022-11-23 ENCOUNTER — TELEPHONE (OUTPATIENT)
Dept: FAMILY MEDICINE CLINIC | Facility: CLINIC | Age: 72
End: 2022-11-23

## 2022-11-23 ENCOUNTER — APPOINTMENT (OUTPATIENT)
Dept: GENERAL RADIOLOGY | Facility: HOSPITAL | Age: 72
End: 2022-11-23

## 2022-11-23 VITALS
BODY MASS INDEX: 37.21 KG/M2 | DIASTOLIC BLOOD PRESSURE: 74 MMHG | HEIGHT: 63 IN | WEIGHT: 210 LBS | OXYGEN SATURATION: 98 % | TEMPERATURE: 98.2 F | RESPIRATION RATE: 20 BRPM | SYSTOLIC BLOOD PRESSURE: 152 MMHG | HEART RATE: 82 BPM

## 2022-11-23 DIAGNOSIS — Z74.09 IMPAIRED MOBILITY: ICD-10-CM

## 2022-11-23 DIAGNOSIS — R73.9 HYPERGLYCEMIA: ICD-10-CM

## 2022-11-23 DIAGNOSIS — R53.1 WEAKNESS: Primary | ICD-10-CM

## 2022-11-23 DIAGNOSIS — U07.1 COVID-19: ICD-10-CM

## 2022-11-23 LAB
ALBUMIN SERPL-MCNC: 3.2 G/DL (ref 3.5–5.2)
ALBUMIN/GLOB SERPL: 1.4 G/DL
ALP SERPL-CCNC: 91 U/L (ref 39–117)
ALT SERPL W P-5'-P-CCNC: 34 U/L (ref 1–33)
ANION GAP SERPL CALCULATED.3IONS-SCNC: 13 MMOL/L (ref 5–15)
AST SERPL-CCNC: 21 U/L (ref 1–32)
BACTERIA SPEC AEROBE CULT: NORMAL
BACTERIA SPEC AEROBE CULT: NORMAL
BASOPHILS # BLD AUTO: 0.05 10*3/MM3 (ref 0–0.2)
BASOPHILS NFR BLD AUTO: 0.3 % (ref 0–1.5)
BILIRUB SERPL-MCNC: 0.6 MG/DL (ref 0–1.2)
BUN SERPL-MCNC: 12 MG/DL (ref 8–23)
BUN/CREAT SERPL: 27.9 (ref 7–25)
CALCIUM SPEC-SCNC: 8.7 MG/DL (ref 8.6–10.5)
CHLORIDE SERPL-SCNC: 96 MMOL/L (ref 98–107)
CO2 SERPL-SCNC: 23 MMOL/L (ref 22–29)
CREAT SERPL-MCNC: 0.43 MG/DL (ref 0.57–1)
DEPRECATED RDW RBC AUTO: 42 FL (ref 37–54)
EGFRCR SERPLBLD CKD-EPI 2021: 102.2 ML/MIN/1.73
EOSINOPHIL # BLD AUTO: 0.02 10*3/MM3 (ref 0–0.4)
EOSINOPHIL NFR BLD AUTO: 0.1 % (ref 0.3–6.2)
ERYTHROCYTE [DISTWIDTH] IN BLOOD BY AUTOMATED COUNT: 12.8 % (ref 12.3–15.4)
FLUAV RNA RESP QL NAA+PROBE: NOT DETECTED
FLUBV RNA RESP QL NAA+PROBE: NOT DETECTED
GLOBULIN UR ELPH-MCNC: 2.3 GM/DL
GLUCOSE SERPL-MCNC: 205 MG/DL (ref 65–99)
HCT VFR BLD AUTO: 43.2 % (ref 34–46.6)
HGB BLD-MCNC: 14.6 G/DL (ref 12–15.9)
IMM GRANULOCYTES # BLD AUTO: 0.5 10*3/MM3 (ref 0–0.05)
IMM GRANULOCYTES NFR BLD AUTO: 3.3 % (ref 0–0.5)
LYMPHOCYTES # BLD AUTO: 0.89 10*3/MM3 (ref 0.7–3.1)
LYMPHOCYTES NFR BLD AUTO: 5.9 % (ref 19.6–45.3)
MCH RBC QN AUTO: 29.9 PG (ref 26.6–33)
MCHC RBC AUTO-ENTMCNC: 33.8 G/DL (ref 31.5–35.7)
MCV RBC AUTO: 88.5 FL (ref 79–97)
MONOCYTES # BLD AUTO: 1.36 10*3/MM3 (ref 0.1–0.9)
MONOCYTES NFR BLD AUTO: 9 % (ref 5–12)
NEUTROPHILS NFR BLD AUTO: 12.37 10*3/MM3 (ref 1.7–7)
NEUTROPHILS NFR BLD AUTO: 81.4 % (ref 42.7–76)
NRBC BLD AUTO-RTO: 0 /100 WBC (ref 0–0.2)
PLATELET # BLD AUTO: 235 10*3/MM3 (ref 140–450)
PMV BLD AUTO: 10 FL (ref 6–12)
POTASSIUM SERPL-SCNC: 3.5 MMOL/L (ref 3.5–5.2)
PROT SERPL-MCNC: 5.5 G/DL (ref 6–8.5)
RBC # BLD AUTO: 4.88 10*6/MM3 (ref 3.77–5.28)
SARS-COV-2 RNA RESP QL NAA+PROBE: NOT DETECTED
SODIUM SERPL-SCNC: 132 MMOL/L (ref 136–145)
WBC NRBC COR # BLD: 15.19 10*3/MM3 (ref 3.4–10.8)

## 2022-11-23 PROCEDURE — 71045 X-RAY EXAM CHEST 1 VIEW: CPT

## 2022-11-23 PROCEDURE — 99284 EMERGENCY DEPT VISIT MOD MDM: CPT

## 2022-11-23 PROCEDURE — 85025 COMPLETE CBC W/AUTO DIFF WBC: CPT | Performed by: EMERGENCY MEDICINE

## 2022-11-23 PROCEDURE — 87636 SARSCOV2 & INF A&B AMP PRB: CPT | Performed by: EMERGENCY MEDICINE

## 2022-11-23 PROCEDURE — 97161 PT EVAL LOW COMPLEX 20 MIN: CPT | Performed by: PHYSICAL THERAPIST

## 2022-11-23 PROCEDURE — 80053 COMPREHEN METABOLIC PANEL: CPT | Performed by: EMERGENCY MEDICINE

## 2022-11-23 RX ORDER — SODIUM CHLORIDE 0.9 % (FLUSH) 0.9 %
10 SYRINGE (ML) INJECTION AS NEEDED
Status: DISCONTINUED | OUTPATIENT
Start: 2022-11-23 | End: 2022-11-23 | Stop reason: HOSPADM

## 2022-11-23 NOTE — THERAPY DISCHARGE NOTE
Acute Care - Physical Therapy Discharge Summary  [facility]       Patient Name: Kezia Otoole  : 1950  MRN: [id number]    Today's Date: 2022  Onset of Illness/Injury or Date of Surgery: 22       Referring Physician: Dr. Huynh      Admit Date: 2022      PT Recommendation and Plan    Visit Dx:    ICD-10-CM ICD-9-CM   1. Diabetic ketoacidosis without coma associated with other specified diabetes mellitus (HCC)  E13.10 250.12   2. Altered mental status, unspecified altered mental status type  R41.82 780.97   3. LEOLA (acute kidney injury) ([facility])  N17.9 584.9   4. Acute UTI (urinary tract infection)  N39.0 599.0   5. COVID  U07.1 079.89   6. Troponin I above reference range  R77.8 790.6   7. Decreased activities of daily living (ADL)  Z78.9 V49.89   8. Decreased functional activity tolerance  R68.89 780.99   9. General weakness  R53.1 780.79   10. Polypharmacy  Z79.899 V58.69   11. Type 2 diabetes mellitus with hyperglycemia, without long-term current use of insulin ([facility])  E11.65 250.00     790.29        Outcome Measures     Row Name 22 1050 22 0745          How much help from another person do you currently need...    Turning from your back to your side while in flat bed without using bedrails? 3  -WK --     Moving from lying on back to sitting on the side of a flat bed without bedrails? 3  -WK --     Moving to and from a bed to a chair (including a wheelchair)? 3  -WK --     Standing up from a chair using your arms (e.g., wheelchair, bedside chair)? 3  -WK --     Climbing 3-5 steps with a railing? 3  -WK --     To walk in hospital room? 3  -WK --     AM-PAC 6 Clicks Score (PT) 18  -WK --        How much help from another is currently needed...    Putting on and taking off regular lower body clothing? -- 2  -AC     Bathing (including washing, rinsing, and drying) -- 2  -AC     Toileting (which includes using toilet bed pan or urinal) -- 4  -AC     Putting on and taking off  regular upper body clothing -- 4  -AC     Taking care of personal grooming (such as brushing teeth) -- 4  -AC     Eating meals -- 4  -AC     AM-PAC 6 Clicks Score (OT) -- 20  -AC        Functional Assessment    Outcome Measure Options AM-PAC 6 Clicks Basic Mobility (PT)  -WK AM-PAC 6 Clicks Daily Activity (OT)  -AC           User Key  (r) = Recorded By, (t) = Taken By, (c) = Cosigned By    Initials Name Provider Type    AC Avelino Pantoja, OTR/L, CNT Occupational Therapist    WK Saundra Dolan, PTA Physical Therapist Assistant                     PT Rehab Goals     Row Name 11/23/22 8139             Bed Mobility Goal 1 (PT)    Activity/Assistive Device (Bed Mobility Goal 1, PT) bed mobility activities, all  -NW      [place] Level/Cues Needed (Bed Mobility Goal 1, PT) independent  -NW      Time Frame (Bed Mobility Goal 1, PT) long term goal (LTG);by discharge  -NW      Progress/Outcomes (Bed Mobility Goal 1, PT) goal not met  -NW         Transfer Goal 1 (PT)    Activity/Assistive Device (Transfer Goal 1, PT) sit-to-stand/stand-to-sit;bed-to-chair/chair-to-bed;walker, rolling  -NW      [place] Level/Cues Needed (Transfer Goal 1, PT) modified independence  -NW      Time Frame (Transfer Goal 1, PT) long term goal (LTG);by discharge  -NW      Progress/Outcome (Transfer Goal 1, PT) goal not met  -NW         Gait Training Goal 1 (PT)    Activity/Assistive Device (Gait Training Goal 1, PT) gait (walking locomotion);assistive device use;improve balance and speed;increase endurance/gait distance;walker, rolling  -NW      [place] Level (Gait Training Goal 1, PT) modified independence  -NW      Distance (Gait Training Goal 1, PT) 100ft without rest break  -NW      Time Frame (Gait Training Goal 1, PT) long term goal (LTG);by discharge  -NW      Progress/Outcome (Gait Training Goal 1, PT) goal not met  -NW            User Key  (r) = Recorded By, (t) = Taken By, (c) = Cosigned By    Initials Name Provider Type  Discipline    NW Misty Mitchell PTA Physical Therapist Assistant PT                    PT Discharge Summary  Anticipated Discharge Disposition (PT): home with home health  Reason for Discharge: Discharge from facility  Outcomes Achieved: Refer to plan of care for updates on goals achieved  Discharge Destination: Home with home health      Misty Mitchell, ENMA   11/23/2022

## 2022-11-23 NOTE — CASE COMMUNICATION
Patient not admitted to home care today, 11/23/22, as patient is in [facility].  Unsure if patient will be admitted to hospital at this time.

## 2022-11-23 NOTE — THERAPY DISCHARGE NOTE
Acute Care - Occupational Therapy Discharge Summary  [facility]     Patient Name: Kezia Otoole  : 1950  MRN: [id number]    Today's Date: 2022  Onset of Illness/Injury or Date of Surgery: 22    Date of Referral to OT: 22  Referring Physician: Dr. Huynh      Admit Date: 2022        OT Recommendation and Plan    Visit Dx:    ICD-10-CM ICD-9-CM   1. Diabetic ketoacidosis without coma associated with other specified diabetes mellitus ([facility])  E13.10 250.12   2. Altered mental status, unspecified altered mental status type  R41.82 780.97   3. LEOLA (acute kidney injury) ([facility])  N17.9 584.9   4. Acute UTI (urinary tract infection)  N39.0 599.0   5. COVID  U07.1 079.89   6. Troponin I above reference range  R77.8 790.6   7. Decreased activities of daily living (ADL)  Z78.9 V49.89   8. Decreased functional activity tolerance  R68.89 780.99   9. General weakness  R53.1 780.79   10. Polypharmacy  Z79.899 V58.69   11. Type 2 diabetes mellitus with hyperglycemia, without long-term current use of insulin ([facility])  E11.65 250.00     790.29                OT Rehab Goals     Row Name 22 0700             Transfer Goal 1 (OT)    Activity/Assistive Device (Transfer Goal 1, OT) toilet  -CS      [place] Level/Cues Needed (Transfer Goal 1, OT) modified independence  -CS      Time Frame (Transfer Goal 1, OT) long term goal (LTG);10 days  -CS      Progress/Outcome (Transfer Goal 1, OT) goal not met  -CS         Dressing Goal 1 (OT)    Activity/Device (Dressing Goal 1, OT) lower body dressing  -CS      [place]/Cues Needed (Dressing Goal 1, OT) standby assist  -CS      Time Frame (Dressing Goal 1, OT) long term goal (LTG);10 days  -CS      Strategies/Barriers (Dressing Goal 1, OT) with AE  -CS      Progress/Outcome (Dressing Goal 1, OT) goal not met  -CS         Strength Goal 1 (OT)    Strength Goal 1 (OT) Increase BUE strength to 4+/5 to increase independence with ADL  -CS      Time Frame  (Strength Goal 1, OT) long term goal (LTG);10 days  -CS      Progress/Outcome (Strength Goal 1, OT) goal not met  -CS            User Key  (r) = Recorded By, (t) = Taken By, (c) = Cosigned By    Initials Name Provider Type Discipline    CS Park Yanira S, OTR/L, REYNA Occupational Therapist OT                 Outcome Measures     Row Name 11/22/22 1050 11/21/22 0745          How much help from another person do you currently need...    Turning from your back to your side while in flat bed without using bedrails? 3  -WK --     Moving from lying on back to sitting on the side of a flat bed without bedrails? 3  -WK --     Moving to and from a bed to a chair (including a wheelchair)? 3  -WK --     Standing up from a chair using your arms (e.g., wheelchair, bedside chair)? 3  -WK --     Climbing 3-5 steps with a railing? 3  -WK --     To walk in hospital room? 3  -WK --     AM-PAC 6 Clicks Score (PT) 18  -WK --        How much help from another is currently needed...    Putting on and taking off regular lower body clothing? -- 2  -AC     Bathing (including washing, rinsing, and drying) -- 2  -AC     Toileting (which includes using toilet bed pan or urinal) -- 4  -AC     Putting on and taking off regular upper body clothing -- 4  -AC     Taking care of personal grooming (such as brushing teeth) -- 4  -AC     Eating meals -- 4  -AC     AM-PAC 6 Clicks Score (OT) -- 20  -AC        Functional Assessment    Outcome Measure Options AM-PAC 6 Clicks Basic Mobility (PT)  -WK AM-PAC 6 Clicks Daily Activity (OT)  -AC           User Key  (r) = Recorded By, (t) = Taken By, (c) = Cosigned By    Initials Name Provider Type    AC Avelino Pantoja, OTR/L, CNT Occupational Therapist    WK Saundra Dolan, PTA Physical Therapist Assistant                Timed Therapy Charges  Total Units: 3    Suggested Charges  Total Units: 3    Procedure Name Documented Minutes Units Code    HC OT SELF CARE/MGMT/TRAIN EA 15 MIN 40  3    73033 (CPT®)                Documented Minutes  Total Minutes: 40    Therapy Provided Minutes    73741 - OT Self Care/Mgmt Minutes 40                    OT Discharge Summary  Anticipated Discharge Disposition (OT): home with assist, home with home health  Reason for Discharge: Discharge from facility  Outcomes Achieved: Refer to plan of care for updates on goals achieved  Discharge Destination: Home with assist, Home with home health      Yanira Park, OTLUCAS/L, REYNA  11/23/2022

## 2022-11-23 NOTE — OUTREACH NOTE
Call Center TCM Note    Flowsheet Row Responses   [facility] [place] patient discharged from? [place]   Does the patient have one of the following disease processes/diagnoses(primary or secondary)? COVID-19   COVID-19 underlying condition? None   TCM attempt successful? No   Unsuccessful attempts Attempt 2  [Per Dr. Crowe's recommendation, son, Herminio [place] health practice manager at , will take pt back to ER. ]   Discharge diagnosis DKA (diabetic ketoacidosis), COVID 19, Hypertenstion, UTI   What is the patient's perception of their health status since discharge? Same  [Per Dr. Crowe's recommendation, son will take pt back to ER. ]          Caitlyn Walker RN    11/23/2022, 09:34 CST

## 2022-11-23 NOTE — OUTREACH NOTE
Prep Survey    Flowsheet Row Responses   [facility] [place] patient discharged from? [place]   Is LACE score < 7 ? No   Emergency Room discharge w/ pulse ox? No   Eligibility [facility]   Date of Admission 11/18/22   Date of Discharge 11/22/22   Discharge Disposition Home-Health Care Sv   Discharge diagnosis DKA (diabetic ketoacidosis), COVID 19, Hypertenstion, UTI   Does the patient have one of the following disease processes/diagnoses(primary or secondary)? COVID-19   Does the patient have Home health ordered? Yes   What is the Home health agency?  [facility]    Is there a DME ordered? Yes   What DME was ordered? [facility] SWING BED    Prep survey completed? Yes          ULISES BAKER - Registered Nurse

## 2022-11-23 NOTE — TELEPHONE ENCOUNTER
Caller: Herminio Toro    Relationship: Emergency Contact    Best call back number: [phone]    Who are you requesting to speak with (clinical staff, provider,  specific staff member): CLINICAL/EMMA    What was the call regarding: PATIENTS SON REQUESTING CALLBACK FROM EMMA. SON STATES THEY HAVE BEEN WAITING IN THE EMERGENCY ROOM FOR 3 1/2 HOURS.     Do you require a callback: YES

## 2022-11-23 NOTE — TELEPHONE ENCOUNTER
"Son Herminio ([facility] nurse) called to advise 'I think she needs to be back in the hospital or swing bed if possible? We got her home last night around supper , when getting her out to the car she took two step and she had to sit on the ground and took two men with max assist to lift her to wheeled computer chair so we could get her in the house. [facility] PT in the hospital documented that she walked 40 ft when stand by assist and wheeled walker. She is still confused, more than baseline. Her freestyle radha reading at bedtime was 158, and it is telling me to change to sensor this morning, so I cant get a reading. I do have home health coming to see her today, ill try to get them to come asap\"    Advised to send message in with am glucose reading and will send message to Dr Crowe to advise?  "

## 2022-11-23 NOTE — OUTREACH NOTE
Call Center TCM Note    Flowsheet Row Responses   [facility] patient discharged from? [place]   Does the patient have one of the following disease processes/diagnoses(primary or secondary)? COVID-19   COVID-19 underlying condition? None   TCM attempt successful? No  [No verbal release however according to CM notes pt lives with spouse ]   Unsuccessful attempts Attempt 1   Discharge diagnosis DKA (diabetic ketoacidosis), COVID 19, Hypertenstion, UTI          Caitlyn Walker RN    11/23/2022, 08:31 CST

## 2022-11-23 NOTE — TELEPHONE ENCOUNTER
Needs to go to /ER    There confusion can be figured out   can call [facility] swing bed and see if they will reconsider but we need to know medically she is stable enough

## 2022-11-24 ENCOUNTER — DOCUMENTATION (OUTPATIENT)
Dept: FAMILY MEDICINE CLINIC | Facility: CLINIC | Age: 72
End: 2022-11-24

## 2022-11-24 NOTE — PROGRESS NOTES
"Patient ID: Kezia Otoole  MRN: [id number]       [facility]  Swing bed History/Physicial     Patient ID: Kezia Otoole  MRN: [id number]     Acct:  [id number]  Admit Date: 11.23.22  Date of service: 11.23.22    SOURCE: The source of this information is prior knowledge of the patient, review of her office records, her current chart, as well as discussion with she and nursing personal; all are considered reliable.      PATIENT PROFILE: The patient is a 73 y/o white  female resident of [facility]; she was cooperative.      CHIEF COMPLAINT: \"I'm so weak\"     HISTORY OF PRESENT ILLNESS: She had recent /[place] admit 11.18.22-11.22.22 presenting with DKA, klebsiella UTI, covid 19 infection and others.  She had history DM2 with A1c pattern:     Component      Latest Ref Rng & Units 3/4/2021 9/15/2021 3/15/2022 6/17/2022   Hemoglobin A1C      4.80 - 5.60 % 6.20 (H) 6.20 (H) 8.70 (H) 6.90 (H)     Component      Latest Ref Rng & Units 10/13/2022 11/18/2022   Hemoglobin A1C      4.80 - 5.60 % 8.2 (H) 11.30 (H)     The DKA was certainly a significant shift of her DM control like contributed to by the covid and UTI.  With appropriate tx she improved but remained weak to the point of her gait being difficult.  Sent home she promptly fell and was taken back to  ER where working with SS/case management and the staff there she was presented to the swing bed program at [facility] and accepted for admit.      Lab Results Recent:  Results from last 7 days   Lab Units 11/23/22  1135 11/21/22  0239 11/20/22  0217 11/19/22  0144 11/18/22  0955   WBC 10*3/mm3 15.19* 15.29* 12.59* 18.08* 26.97*   HEMOGLOBIN g/dL 14.6 16.2* 14.5 14.2 18.1*   HEMATOCRIT % 43.2 47.6* 42.4 42.1 55.2*   PLATELETS 10*3/mm3 235 183 164 199 394     BMP:   Results from last 7 days   Lab Units 11/23/22  1135 11/22/22  0907 11/22/22  0048 11/21/22  0239 11/20/22  0857 11/19/22  0935 11/19/22  0551 11/18/22 2022 11/18/22  1207 " 11/18/22  0955   SODIUM mmol/L 132*  --  133* 134* 132* 137 136 136   < > 131*   POTASSIUM mmol/L 3.5 3.7 3.3*  3.3* 3.2* 3.6 3.7 5.1 3.9   < > 4.8   CHLORIDE mmol/L 96*  --  100 100 98 108* 113* 110*   < > 91*   CO2 mmol/L 23.0  --  22.0 21.0* 20.0* 19.0* 14.0* 12.0*   < > 7.0*   BUN mg/dL 12  --  15 20 24* 29* 33* 39*   < > 51*   CREATININE mg/dL 0.43*  --  0.43* 0.43* 0.60 0.66 0.58 0.74   < > 1.15*   GLUCOSE mg/dL 205*  --  191* 218* 286* 155* 184* 225*   < > 601*   CALCIUM mg/dL 8.7  --  8.5* 8.0* 8.9 8.3* 8.2* 8.7   < > 10.7*   ALT (SGPT) U/L 34*  --   --   --   --   --   --   --   --  30    < > = values in this interval not displayed.     INR:   Results from last 7 days   Lab Units 11/18/22  0955   INR  1.06     CT Abdomen Pelvis Without Contrast    Result Date: 11/18/2022  1. Motion artifacts limits the diagnostic quality of the study. No acute abnormality of the abdomen or pelvis. 2. Diverticulosis of the distal colon. No evidence for diverticulitis.  .            This report was finalized on 11/18/2022 11:55 by Dr. Pradeep Miller MD.    CT Head Without Contrast    Result Date: 11/18/2022   1. Mild cerebral and cerebellar atrophy with chronic microvascular disease but no evidence of acute intracranial process.   This report was finalized on 11/18/2022 11:29 by Dr. Taran Cyr MD.    XR Chest 1 View    Result Date: 11/23/2022  Shallow inspiration with mild central vascular crowding, no acute infiltrates. Interval removal of the right internal jugular central line. No pneumothorax is identified. This report was finalized on 11/23/2022 12:36 by Dr. Christian Kim MD.    XR Chest 1 View    Result Date: 11/18/2022   RIGHT IJ CVL with tip overlying the SVC. No evidence of pneumothorax. This report was finalized on 11/18/2022 10:48 by Dr. Javi Rothman MD.    XR Chest 1 View    Result Date: 11/18/2022   No acute findings. This report was finalized on 11/18/2022 09:54 by Dr. Javi Rothman,  MD.      Allergies   Allergen Reactions   • Adhesive Tape Other (See Comments)     Tears and bruises skin   • Other Other (See Comments)     Bandaids make arm Red/slightly swollen       BH DISCHARGE MEDICATIONS:  Prior to Admission medications    Medication Sig Start Date End Date Taking? Authorizing Provider   albuterol sulfate  (90 Base) MCG/ACT inhaler Inhale 2 puffs Every 4 (Four) Hours As Needed for Wheezing or Shortness of Air. 11/2/21   Len Crowe MD   amLODIPine (NORVASC) 5 MG tablet TAKE ONE TABLET DAILY 7/8/22   Len Crowe MD   aspirin 81 MG EC tablet Take 1 tablet by mouth Every Other Day. 2/25/19   Len Crowe MD   atorvastatin (LIPITOR) 20 MG tablet TAKE ONE TABLET DAILY 8/19/22   Len Crowe MD   benzonatate (TESSALON) 200 MG capsule Take 1 capsule by mouth 3 (Three) Times a Day As Needed for Cough. 11/22/22   Pantera Cotton MD   Calcium Carb-Cholecalciferol (CALCIUM-VITAMIN D) 600-400 MG-UNIT tablet Take 1 tablet by mouth 2 (Two) Times a Day.    Estefania Mejia MD   carboxymethylcellulose (REFRESH PLUS) 0.5 % solution 2 drops Daily As Needed for dry eyes.    Estefania Mejia MD   Cholecalciferol 25 MCG (1000 UT) capsule Take 1 capsule by mouth Daily.    Estefania Mejia MD   Continuous Blood Gluc  (FreeStyle Sue 2 [place]) device 1 Device Daily. 11/22/22   Len Crowe MD   Continuous Blood Gluc Sensor (FreeStyle Sue 2 Sensor) misc 1 Device Every 14 (Fourteen) Days. Test QID with sliding scale tid and Lantus HS 11/22/22   Len Crowe MD   denosumab (PROLIA) 60 MG/ML solution syringe Inject 60 mg under the skin into the appropriate area as directed Every 6 (Six) Months. 2/13/19   Estefania Mejia MD   DULoxetine (CYMBALTA) 20 MG capsule TAKE ONE CAPSULE DAILY 9/6/22   Len Crowe MD   guaiFENesin (MUCINEX) 600 MG 12 hr tablet Take 600 mg by mouth 2 (Two) Times a Day.    Jackie  MD Estefania   insulin detemir (LEVEMIR) 100 UNIT/ML injection Inject 30 Units under the skin into the appropriate area as directed Every 12 (Twelve) Hours for 30 days. 22  Pantera Cotton MD   Insulin Lispro (humaLOG) 100 UNIT/ML injection Inject 0-14 Units under the skin into the appropriate area as directed 3 (Three) Times a Day Before Meals. 22  Pantera Cotton MD   irbesartan (AVAPRO) 300 MG tablet TAKE ONE TABLET DAILY 22   Len Crowe MD   K Phos [place]-Sod Phos Di & Mono (K-Phos-Neutral) 155-852-130 MG tablet Take 1 tablet by mouth Daily for 3 days. 22  Pantera Cotton MD   latanoprost (XALATAN) 0.005 % ophthalmic solution Administer 1 drop to both eyes Every Night. 16   Estefania Mejia MD   Misc Natural Products (GLUCOSAMINE CHONDROITIN TRIPLE PO) Take 1 tablet by mouth 2 (Two) Times a Day.    Estefania Mejia MD   montelukast (SINGULAIR) 10 MG tablet TAKE ONE TABLET DAILY 22   Len Crowe MD   Multiple Vitamins-Minerals (VITRUM 50+ SENIOR MULTI PO) Take 1 tablet by mouth Daily.    Estefania Mejia MD   potassium chloride 10 MEQ CR tablet TAKE ONE TABLET DAILY 22   Len Crowe MD   Semaglutide, 2 MG/DOSE, 8 MG/3ML solution pen-injector Inject 2 mg under the skin into the appropriate area as directed 1 (One) Time Per Week. 10/24/22   Len Crowe MD   Symbicort 160-4.5 MCG/ACT inhaler INHALE TWO PUFFS TWO (TWO) TIMES A DAY. 21   Len Crowe MD       PAST HISTORY:  CHILDHOOD: unremarkable.     PROCEDURES:     SCANNED   ,  Ab2 ,    Colonoscopy-polyp/Uli/04(3-5)  Colonoscopy+jwang-mahv-nxq-hem/MM[facility]/Mc/10-1-08/2y  US/bx R thyroid-benign/Kest/LH/10-1-2009  Colonoscopy+polyp/[facility]/Mc/10-6-10/3y  Colonoscopy+div/[facility]/Mc/.18.13/5y  Colon-p+div/Mc/BH/.19.18/5y    SURGERIES:  T&A/age 38  Tubal/age 33  Open gb/Yap/[facility]/age  38  Conization-in-situ/Tay/[facility]/11-11-08  SINDHU+BSO/Tay/[facility]/12-16-08  R Thyroid-benign/[facility]/[place]/2-18-11  Trigger finger/[place]/  R knee arthroscopy/[place]/LH/1.28.1  R total knee/LH/[place]/2.2.21    FAMILY HISTORY:  DM/m,si  HTN/f  Heart/f,m  CA-colon/none  CA-breast/a-m  CA-other/none    HABITS:  Tobacco-smoker/never  Alcohol/none    SOCIAL HISTORY:  /Remarried  Employment/[employer]  Children/2-1+2    Review of Systems   Constitutional: Positive for activity change, appetite change and fatigue. Negative for fever.   HENT: Negative.  Negative for mouth sores, sore throat and trouble swallowing.    Eyes: Negative for visual disturbance.   Respiratory: Positive for cough.    Cardiovascular: Negative for chest pain, palpitations and leg swelling.   Gastrointestinal: Positive for nausea. Negative for abdominal pain, blood in stool, constipation, diarrhea and vomiting.   Genitourinary: Negative for dysuria.   Musculoskeletal: Positive for gait problem. Negative for arthralgias, back pain and myalgias.   Neurological: Positive for weakness. Negative for tremors, headache and confusion.     PHYSICAL EXAMINATION:  T: 98.6 F P: 72/min  RR: 12/min BP: 135/87 Wt: 224 lbs Ht: 63 in    Physical Exam  GENERAL:  Well nourished/developed in no acute distress. Obese   SKIN: Turgor ok without wound, rash, lesion beyond venopuncture bruising  HEENT: Normocephalic without trauma.  Pupils equal round reactive to light. Extraocular motions full without nystagmus.   Oral cavity without growths, exudates, and moist.  Posterior pharynx without mass, obstruction, redness.  No thyromegaly, mass, tenderness, lymphadenopathy and supple.  CV: Regular rhythm.  No murmur, gallop, trace LE edema. Posterior pulses intact.  No carotid bruits.   CHEST: No chest wall tenderness or mass.   LUNGS: Symmetric motion with clear to auscultation.   ABD: Soft, nontender without mass.   ORTHO: Symmetric extremities without swelling/point tenderness.   Full gross range of motion.    NEURO: CN 2-12 grossly intact.  Symmetric facies. 1/4 x bicep equal reflexes.  UE/LE   2/5 strength throughout.  Nonfocal use extremities. Speech clear.    PSYCH: Oriented x 3.  Pleasant calm, well kept.  Purposeful/directed conversation with intact short/long gross memory.     ASSESSMENT/PROBLEM LIST:   71 y/o white female   Allergy/intolerance: see above  Procedural history: see above  Family history: see above  [id number]  Surgical menopause  osteoporosis  Obesity  Hypertension-essential  Hyperlipidemia  DM2-uncontrolled with hyperglycemia-insulin dependent  Gastroesophageal reflux  Degenerative joint disease  Status joint replacement-R total knee  Asthma  Obstructive sleep apnea  Depression-chronic    REASON FOR ADMISSION:    Weakness  Gait difficulty  Fall   Recent DKA  Recent UTI-klebsiella  Recent Covid infection  Hyponatremia  Hypokalemia    PLANS:   Rx-reviewed; ordered as needed and will review daily/as needed.   Includes anticoagulation for DVT prevention or antiembolic stockings as appropriate.  Others: sliding scale insulin/long acting insulin  LAB-reviewed; ordered as needed and will review daily.  Particular:  occ chemistry and CBC  Imaging/cardiology testing-reviewed; ordered as needed and will review daily.  Particular:  None planned  Consults none  Diet: ADA/encouraged  Fluids: oral/encouaged  Special issues: PT/OT  Discharge planning: home  Code status: full

## 2022-11-25 ENCOUNTER — TRANSITIONAL CARE MANAGEMENT TELEPHONE ENCOUNTER (OUTPATIENT)
Dept: CALL CENTER | Facility: HOSPITAL | Age: 72
End: 2022-11-25

## 2022-11-26 ENCOUNTER — OUTSIDE FACILITY SERVICE (OUTPATIENT)
Dept: FAMILY MEDICINE CLINIC | Facility: CLINIC | Age: 72
End: 2022-11-26

## 2022-11-26 PROCEDURE — 99308 SBSQ NF CARE LOW MDM 20: CPT | Performed by: FAMILY MEDICINE

## 2022-11-28 ENCOUNTER — OUTSIDE FACILITY SERVICE (OUTPATIENT)
Dept: FAMILY MEDICINE CLINIC | Facility: CLINIC | Age: 72
End: 2022-11-28

## 2022-11-28 PROCEDURE — 99308 SBSQ NF CARE LOW MDM 20: CPT | Performed by: FAMILY MEDICINE

## 2022-11-29 ENCOUNTER — DOCUMENTATION (OUTPATIENT)
Dept: FAMILY MEDICINE CLINIC | Facility: CLINIC | Age: 72
End: 2022-11-29

## 2022-11-29 ENCOUNTER — TELEPHONE (OUTPATIENT)
Dept: FAMILY MEDICINE CLINIC | Facility: CLINIC | Age: 72
End: 2022-11-29

## 2022-11-29 NOTE — TELEPHONE ENCOUNTER
"Barbara [facility] called   1 \"she is c/o of constipation, can she get a stool softer or laxative?\"  2 \"her spouse advised that she is having memory issues the last couple of months. Does Dr Crowe want me to do a mini mental exam?\"  3 \"Therapy is Wanting to discharge her today because [facility] to [facility]\"    Barbara did not know if you were making rounds today?  "

## 2022-11-29 NOTE — PROGRESS NOTES
Epic numbers  MRN: [id number]       [facility]  Swing bed Progress Note     Patient ID: Kezia Otoole  MRN: [id number]                                    Acct:  [id number]  Admit Date: 11.23.22  Date of service: 11.26.22    Patient Care Team:  LUCAS Crowe MD    Chief Complaint:  weakness    Subjective      HISTORY OF PRESENT ILLNESS: She had recent /[place] admit 11.18.22-11.22.22 presenting with DKA, klebsiella UTI, covid 19 infection and others.  She had history DM2 with A1c pattern:      Component      Latest Ref Rng & Units 3/4/2021 9/15/2021 3/15/2022 6/17/2022   Hemoglobin A1C      4.80 - 5.60 % 6.20 (H) 6.20 (H) 8.70 (H) 6.90 (H)      Component      Latest Ref Rng & Units 10/13/2022 11/18/2022   Hemoglobin A1C      4.80 - 5.60 % 8.2 (H) 11.30 (H)      The DKA was certainly a significant shift of her DM control like contributed to by the covid and UTI. With appropriate tx she improved but remained weak to the point of her gait being difficult.  Sent home she promptly fell and [facility] where working with SS/case management and the staff there she was presented to the swing bed program at [facility] and accepted for admit.       Interval History: Slept on/off; restless.  No pain.  Mild nausea/anorexia; no diarrhea, vomiting.  BS variable/without significant high/low.  Walked minimal.      Allergies   Allergen Reactions   • Adhesive Tape Other (See Comments)     Tears and bruises skin   • Other Other (See Comments)     Bandaids make arm Red/slightly swollen     Current meds:   Albuterol 2 puffs every 4 hours as needed  Norvasc 5 1 a day  Aspirin 81 mg a day  Lipitor 21 a day  Paula 800 mg 3 times daily as needed  Symbicort 160/4.52 puffs twice daily  Calcium vitamin D 500/200 twice daily  Vitamin D 8000 international units a day  Cymbalta 20 once a day  On Naprosyn 60 mg twice daily  Long-acting insulin 30 units twice daily  Sliding scale lispro  Irbesartan 150 mg 300 mg a  day  Latanoprost 2.51 drop each eye at bedtime  Singulair 10 mg a day  Multivitamin 1 a day  Glucosamine/chondroitin vitamin C 1 twice daily  Potassium chloride 20 bid daily  Potassium phosphorus 250 daily  IV NS 20 KCL @ 40 cc/hr    Review of Systems:   Constitutional: Positive for activity change, appetite change and fatigue. Negative for fever.   HENT: Negative.  Negative for mouth sores, sore throat and trouble swallowing.    Eyes: Negative for visual disturbance.   Respiratory: Positive for cough.    Cardiovascular: Negative for chest pain, palpitations and positive trace leg edema   Gastrointestinal: Positive for nausea. Negative for abdominal pain, blood in stool, constipation, diarrhea and vomiting.   Genitourinary: Negative for dysuria.   Musculoskeletal: Positive for gait problem. Negative for arthralgias, back pain and myalgias.   Neurological: Positive for weakness. Negative for tremors, headache and confusion.     Objective     Vital Signs  T: 97.9 F P: 90/min  RR: 18/min BP: 152/86 Wt: 224 lbs Ht: 63 in    Lab Results:  Na 130/improved, K 3.3/improved Cl 99 CO2 26, BUN 9.4, Cr 0.34 .8    Physical Exam  Vitals reviewed.   Constitutional:       General: She is not in acute distress.     Appearance: She is obese.   HENT:      Mouth/Throat:      Mouth: Mucous membranes are moist.   Eyes:      Extraocular Movements: Extraocular movements intact.      Conjunctiva/sclera: Conjunctivae normal.      Pupils: Pupils are equal, round, and reactive to light.   Cardiovascular:      Rate and Rhythm: Normal rate and regular rhythm.      Heart sounds: Normal heart sounds.   Pulmonary:      Effort: Pulmonary effort is normal.      Breath sounds: Normal breath sounds.   Abdominal:      Palpations: Abdomen is soft. There is no mass.      Tenderness: There is no abdominal tenderness.   Musculoskeletal:      Cervical back: Neck supple.      Right lower le/4 edema.      Left lower le/4 edema.   Lymphadenopathy:       Cervical: No cervical adenopathy.   Skin:     General: Skin is warm and dry.      Findings: Bruising present.   Neurological:      General: No focal deficit present.      Mental Status: She is alert and oriented to person, place, and time.     Results Review:    above    ASSESSMENT/PLAN:  Reason for admit/problems addressing acutely:  Weakness  Gait difficulty  Fall   Recent DKA  Recent UTI-klebsiella  Recent Covid infection  Hyponatremia-improved  Hypokalemia improved  Nausea-? Na  Elevated bp; asymptomatic     Chronic problems to review/consider during care:  73 y/o white female   Allergy/intolerance: see above  Procedural history: see above  Family history: see above  [id number]  Surgical menopause  osteoporosis  Obesity  Hypertension-essential  Hyperlipidemia  DM2-uncontrolled with hyperglycemia-insulin dependent  Gastroesophageal reflux  Degenerative joint disease  Status joint replacement-R total knee  Asthma  Obstructive sleep apnea  Depression-chronic       Rx-reviewed, considered with changes as needed: protonix 40 qd, lovonex 40 SQ/daily  Labs reviewed, considered and changes made as needed: BMP today; dc regular labs  Imaging reviewed, and need for considered: no changes  Consults needed: none  Diet reviewed, considered and changes made as needed: no changes/encouraged oral  Fluids reviewed, considered and changes made as needed: lock IV; encouraged oral  Increase activity: up more  Code status: full  Discussed possible/future discharge plans: patient expects home   Case discussed with patient, nursing  Available to talk if needed with POA/caregiver and members care team.    Len Crowe MD

## 2022-11-29 NOTE — PROGRESS NOTES
Epic numbers  MRN: [id number]       [facility]  Swing bed Progress Note     Patient ID: Kezia Otoole  MRN: [id number]                                    Acct:  [id number]  Admit Date: 11.23.22  Date of service: 11.24.22    Patient Care Team:  LUCAS Crowe MD    Chief Complaint:  weakness    Subjective      HISTORY OF PRESENT ILLNESS: She had recent /[place] admit 11.18.22-11.22.22 presenting with DKA, klebsiella UTI, covid 19 infection and others.  She had history DM2 with A1c pattern:      Component      Latest Ref Rng & Units 3/4/2021 9/15/2021 3/15/2022 6/17/2022   Hemoglobin A1C      4.80 - 5.60 % 6.20 (H) 6.20 (H) 8.70 (H) 6.90 (H)      Component      Latest Ref Rng & Units 10/13/2022 11/18/2022   Hemoglobin A1C      4.80 - 5.60 % 8.2 (H) 11.30 (H)      The DKA was certainly a significant shift of her DM control like contributed to by the covid and UTI. With appropriate tx she improved but remained weak to the point of her gait being difficult.  Sent home she promptly fell and was taken back to  [facility] where working with SS/case management and the staff there she was presented to the swing bed program at [facility] and accepted for admit.      Interval History: Slept on/off; restless.  No pain.  Mild nausea/anorexia; no diarrhea, vomiting.  BS variable/without significant high/low.  Walked minimal.      Allergies   Allergen Reactions   • Adhesive Tape Other (See Comments)     Tears and bruises skin   • Other Other (See Comments)     Bandaids make arm Red/slightly swollen     Current meds:   Albuterol 2 puffs every 4 hours as needed  Norvasc 5 1 a day  Aspirin 81 mg a day  Lipitor 21 a day  Paula 800 mg 3 times daily as needed  Symbicort 160/4.52 puffs twice daily  Calcium vitamin D 500/200 twice daily  Vitamin D 8000 international units a day  Cymbalta 20 once a day  On Naprosyn 60 mg twice daily  Long-acting insulin 30 units twice daily  Sliding scale lispro  Irbesartan 150 mg 300 mg a  day  Latanoprost 2.51 drop each eye at bedtime  Singulair 10 mg a day  Multivitamin 1 a day  Glucosamine/chondroitin vitamin C 1 twice daily  Potassium chloride 10 daily  Potassium phosphorus 250 daily    Review of Systems:   Constitutional: Positive for activity change, appetite change and fatigue. Negative for fever.   HENT: Negative.  Negative for mouth sores, sore throat and trouble swallowing.    Eyes: Negative for visual disturbance.   Respiratory: Positive for cough.    Cardiovascular: Negative for chest pain, palpitations and leg swelling.   Gastrointestinal: Positive for nausea. Negative for abdominal pain, blood in stool, constipation, diarrhea and vomiting.   Genitourinary: Negative for dysuria.   Musculoskeletal: Positive for gait problem. Negative for arthralgias, back pain and myalgias.   Neurological: Positive for weakness. Negative for tremors, headache and confusion.     Objective   Vital Signs  T: 96.9 F P: 82/min  RR: 16/min BP: 143/72 Wt: 224 lbs Ht: 63 in    Lab Results:  WBC 11.78 Hb 12.7 Plt 175  Na 128.4, K 2.8, Cl 98, CO2 25, BUN 10.9, Cr 0.44, .5    Physical Exam  Vitals reviewed.   Constitutional:       General: She is not in acute distress.     Appearance: She is obese.   HENT:      Mouth/Throat:      Mouth: Mucous membranes are moist.   Eyes:      Extraocular Movements: Extraocular movements intact.      Conjunctiva/sclera: Conjunctivae normal.      Pupils: Pupils are equal, round, and reactive to light.   Cardiovascular:      Rate and Rhythm: Normal rate and regular rhythm.      Heart sounds: Normal heart sounds.   Pulmonary:      Effort: Pulmonary effort is normal.      Breath sounds: Normal breath sounds.   Abdominal:      Palpations: Abdomen is soft. There is no mass.      Tenderness: There is no abdominal tenderness.   Musculoskeletal:      Cervical back: Neck supple.      Right lower leg: No edema.      Left lower leg: No edema.   Lymphadenopathy:      Cervical: No cervical  adenopathy.   Skin:     General: Skin is warm and dry.      Findings: Bruising present.   Neurological:      General: No focal deficit present.      Mental Status: She is alert and oriented to person, place, and time.       Results Review:    above    ASSESSMENT/PLAN:  Reason for admit/problems addressing acutely:  Weakness  Gait difficulty  Fall   Recent DKA  Recent UTI-klebsiella  Recent Covid infection  Hyponatremia-worse  Hypokalemia-worse  Nausea-? Na    Chronic problems to review/consider during care:  73 y/o white female   Allergy/intolerance: see above  Procedural history: see above  Family history: see above  [id number]  Surgical menopause  osteoporosis  Obesity  Hypertension-essential  Hyperlipidemia  DM2-uncontrolled with hyperglycemia-insulin dependent  Gastroesophageal reflux  Degenerative joint disease  Status joint replacement-R total knee  Asthma  Obstructive sleep apnea  Depression-chronic    Rx-reviewed, considered with changes as needed: increase oral KCL 20 bid  Labs reviewed, considered and changes made as needed: BMP AM  Imaging reviewed, and need for considered: no changes/none  Consults needed: none  Diet reviewed, considered and changes made as needed: oral/encouraged  Fluids reviewed, considered and changes made as needed: oral/encouraged-IV NS with 20 KCL/40 cc hr  Increase activity: encouraged  Code status: full  Discussed possible/future discharge plans: patient expects home   Case discussed with nursing/patient  Available to talk if needed with POA/caregiver and members care team.    Len Crowe MD

## 2022-11-29 NOTE — PROGRESS NOTES
Epic numbers  MRN: [id number]       [facility]  Swing bed Progress Note     Patient ID: Kezia Otoole  MRN: [id number]                                    Acct:  [id number]  Admit Date: 11.23.22  Date of service: 11.28.22     Patient Care Team:  LUCAS Crowe MD     Chief Complaint:  weakness     Subjective       HISTORY OF PRESENT ILLNESS: She had recent /[place] admit 11.18.22-11.22.22 presenting with DKA, klebsiella UTI, covid 19 infection and others.  She had history DM2 with A1c pattern:      Component      Latest Ref Rng & Units 3/4/2021 9/15/2021 3/15/2022 6/17/2022   Hemoglobin A1C      4.80 - 5.60 % 6.20 (H) 6.20 (H) 8.70 (H) 6.90 (H)      Component      Latest Ref Rng & Units 10/13/2022 11/18/2022   Hemoglobin A1C      4.80 - 5.60 % 8.2 (H) 11.30 (H)      The DKA was certainly a significant shift of her DM control like contributed to by the covid and UTI. With appropriate tx she improved but remained weak to the point of her gait being difficult.  Sent home she promptly fell and was [facility] to  [facility] where working with SS/case management and the staff there she was presented to the swing bed program at [facility] and accepted for admit.      Interval History:     Allergies   Allergen Reactions   • Adhesive Tape Other (See Comments)     Tears and bruises skin   • Other Other (See Comments)     Bandaids make arm Red/slightly swollen       Current meds:   Albuterol 2 puffs every 4 hours as needed  Norvasc 5 1 a day  Aspirin 81 mg a day  Lipitor 21 a day  Paula 800 mg 3 times daily as needed  Symbicort 160/4.52 puffs twice daily  Calcium vitamin D 500/200 twice daily  Vitamin D 8000 international units a day  Cymbalta 20 once a day  On Naprosyn 60 mg twice daily  Long-acting insulin 30 units twice daily  Sliding scale lispro  Irbesartan 150 mg 300 mg a day  Latanoprost 2.51 drop each eye at bedtime  Singulair 10 mg a day  Multivitamin 1 a day  Glucosamine/chondroitin vitamin C 1 twice  daily  Potassium chloride 20 bid daily  Potassium phosphorus 250 daily  Lasix 20 qd    Review of Systems:   Constitutional: Positive for activity change, appetite change and fatigue. Negative for fever.   HENT: Negative.  Negative for mouth sores, sore throat and trouble swallowing.    Eyes: Negative for visual disturbance.   Respiratory: Positive for cough.    Cardiovascular: Negative for chest pain, palpitations and positive trace leg edema   Gastrointestinal: Positive for nausea. Negative for abdominal pain, blood in stool, constipation, diarrhea and vomiting.   Genitourinary: Negative for dysuria.   Musculoskeletal: Positive for gait problem. Negative for arthralgias, back pain and myalgias.   Neurological: Positive for weakness. Negative for tremors, headache and confusion.     Objective     Vital Signs  T:  F P: 97.7/min  RR: 18/min BP: 156/69 Wt: 224 lbs Ht: 63 in    Lab Results:  Na 130S, K 4.27/improved, Cl 98, CO2 29, BUN 14, Cr 0.53   WBC 6.97, Hb 11.7down1, plt 197    Physical Exam  Vitals reviewed.   Constitutional:       General: She is not in acute distress.     Appearance: She is obese.   HENT:      Mouth/Throat:      Mouth: Mucous membranes are moist.   Eyes:      Extraocular Movements: Extraocular movements intact.      Conjunctiva/sclera: Conjunctivae normal.      Pupils: Pupils are equal, round, and reactive to light.   Cardiovascular:      Rate and Rhythm: Normal rate and regular rhythm.      Heart sounds: Normal heart sounds.   Pulmonary:      Effort: Pulmonary effort is normal.      Breath sounds: Normal breath sounds.   Abdominal:      Palpations: Abdomen is soft. There is no mass.      Tenderness: There is no abdominal tenderness.   Musculoskeletal:      Cervical back: Neck supple.      Right lower le/4 edema.      Left lower le/4 edema.   Lymphadenopathy:      Cervical: No cervical adenopathy.   Skin:     General: Skin is warm and dry.      Findings: Bruising present.    Neurological:      General: No focal deficit present.      Mental Status: She is alert and oriented to person, place, and time.      Results Review:    above     ASSESSMENT/PLAN:  Reason for admit/problems addressing acutely:  Weakness  Gait difficulty  Fall   Recent DKA  Recent UTI-klebsiella  Recent Covid infection  Hyponatremia-improved  Hypokalemia improved  Nausea-? Na  Elevated bp; asymptomatic  Anemia; mild-maybe dilutional  Edema; persists     Chronic problems to review/consider during care:  73 y/o white female   Allergy/intolerance: see above  Procedural history: see above  Family history: see above  [id number]  Surgical menopause  osteoporosis  Obesity  Hypertension-essential  Hyperlipidemia  DM2-uncontrolled with hyperglycemia-insulin dependent  Gastroesophageal reflux  Degenerative joint disease  Status joint replacement-R total knee  Asthma  Obstructive sleep apnea  Depression-chronic    Rx-reviewed, considered with changes as needed: Lasix 20/d; KCL 20 bid  Labs reviewed, considered and changes made as needed: occ  Imaging reviewed, and need for considered: none  Consults needed: none  Diet reviewed, considered and changes made as needed: oral/encouraged  Fluids reviewed, considered and changes made as needed: oral/encouraged  Increase activity: PT/OT working  Code status: full  Discussed possible/future discharge plans: patient expects home   Case discussed with patient, nursing  Available to talk if needed with POA/caregiver and members care team.    Len Crowe MD

## 2022-11-30 ENCOUNTER — OUTSIDE FACILITY SERVICE (OUTPATIENT)
Dept: FAMILY MEDICINE CLINIC | Facility: CLINIC | Age: 72
End: 2022-11-30

## 2022-11-30 ENCOUNTER — DOCUMENTATION (OUTPATIENT)
Dept: FAMILY MEDICINE CLINIC | Facility: CLINIC | Age: 72
End: 2022-11-30

## 2022-11-30 DIAGNOSIS — R19.8 GASTROINTESTINAL PROBLEM: Primary | ICD-10-CM

## 2022-11-30 PROCEDURE — 99315 NF DSCHRG MGMT 30 MIN/LESS: CPT | Performed by: FAMILY MEDICINE

## 2022-11-30 RX ORDER — PANTOPRAZOLE SODIUM 40 MG/1
40 TABLET, DELAYED RELEASE ORAL DAILY
Qty: 20 TABLET | Refills: 0 | Status: SHIPPED | OUTPATIENT
Start: 2022-11-30 | End: 2022-12-05

## 2022-12-02 ENCOUNTER — HOME CARE VISIT (OUTPATIENT)
Dept: HOME HEALTH SERVICES | Facility: CLINIC | Age: 72
End: 2022-12-02
Payer: MEDICARE

## 2022-12-02 VITALS
RESPIRATION RATE: 16 BRPM | DIASTOLIC BLOOD PRESSURE: 80 MMHG | HEART RATE: 94 BPM | OXYGEN SATURATION: 95 % | TEMPERATURE: 96.9 F | SYSTOLIC BLOOD PRESSURE: 142 MMHG

## 2022-12-02 PROCEDURE — G0151 HHCP-SERV OF PT,EA 15 MIN: HCPCS

## 2022-12-02 NOTE — HOME HEALTH
pre-screened covid 19    SOC Note: Patient reports she was discharged home from the hospital on Monday or Tuesday this week. Patient is ambulating with a rolling walker.  Patient denies any pain.      Home Health ordered for: disciplines: SN, PT, OT    Reason for Hosp/Primary Dx/Co-morbidities: diabetes, COVID 19, acute kidney failure, UTI, kiebsiella pneumoniae, weakness, obesity, history of falling, GERD, dehydration, HTN, Hypokalemia, disorder of phosphorus metabolism,     Focus of Care: skilled physical therapy services for gait training, endurance training, energy conservation, transfer training, HEP education, fall prevention, and home safety.     Current Functional status/mobility/DME: rolling walker, built-in shower chair, cane    HB status/Living Arrangements: Lives with spouse    Skin Integrity/wound status: intact    Code Status: CPR    Fall Risk: High    POC confirmed with Dr. Crowe on date 12/2/2022    Plan for next visit: Gait training, transfer training, HEP education guarded, superficial

## 2022-12-05 ENCOUNTER — HOME CARE VISIT (OUTPATIENT)
Dept: HOME HEALTH SERVICES | Facility: CLINIC | Age: 72
End: 2022-12-05
Payer: MEDICARE

## 2022-12-05 VITALS — OXYGEN SATURATION: 96 % | HEART RATE: 99 BPM | RESPIRATION RATE: 16 BRPM

## 2022-12-05 PROCEDURE — G0180 MD CERTIFICATION HHA PATIENT: HCPCS | Performed by: FAMILY MEDICINE

## 2022-12-05 PROCEDURE — G0157 HHC PT ASSISTANT EA 15: HCPCS

## 2022-12-05 NOTE — HOME HEALTH
"COVID SCREENING: no s/s  FOCUS OF CARE/SKILLED NEED: gait, ther ex  TEACHING/INTERVENTIONS: HEP, foot care, bleeding precautions  PROGRESS TOWARD GOALS: pt is progressing toward goals  PHYSICIAN CONTACT: Dr Crowe notified of pt's elevated HR.  INSURANCE CHANGES: no  FALLS SINCE LAST HH VISIT? no  MEDICATION CHANGES SINCE LAST HH VISIT? no  PLAN FOR NEXT VISIT: gait, ther ex    Pt says that she is feeling \"not too bad\" today."
Detail Level: Detailed

## 2022-12-06 ENCOUNTER — TELEPHONE (OUTPATIENT)
Dept: FAMILY MEDICINE CLINIC | Facility: CLINIC | Age: 72
End: 2022-12-06

## 2022-12-06 ENCOUNTER — OFFICE VISIT (OUTPATIENT)
Dept: FAMILY MEDICINE CLINIC | Facility: CLINIC | Age: 72
End: 2022-12-06

## 2022-12-06 VITALS
HEART RATE: 106 BPM | WEIGHT: 210 LBS | SYSTOLIC BLOOD PRESSURE: 138 MMHG | DIASTOLIC BLOOD PRESSURE: 82 MMHG | RESPIRATION RATE: 14 BRPM | HEIGHT: 63 IN | BODY MASS INDEX: 37.21 KG/M2 | OXYGEN SATURATION: 97 %

## 2022-12-06 DIAGNOSIS — K92.1 MELENA: ICD-10-CM

## 2022-12-06 DIAGNOSIS — E11.65 UNCONTROLLED TYPE 2 DIABETES MELLITUS WITH HYPERGLYCEMIA: ICD-10-CM

## 2022-12-06 DIAGNOSIS — Z86.16 HISTORY OF COVID-19: ICD-10-CM

## 2022-12-06 DIAGNOSIS — Z79.01 ANTICOAGULATED: ICD-10-CM

## 2022-12-06 DIAGNOSIS — R26.9 GAIT DIFFICULTY: ICD-10-CM

## 2022-12-06 DIAGNOSIS — R53.1 GENERAL WEAKNESS: ICD-10-CM

## 2022-12-06 DIAGNOSIS — R41.3 MEMORY LOSS: ICD-10-CM

## 2022-12-06 DIAGNOSIS — R15.9 INCONTINENCE OF FECES, UNSPECIFIED FECAL INCONTINENCE TYPE: ICD-10-CM

## 2022-12-06 PROBLEM — U07.1 COVID-19 VIRUS INFECTION: Status: RESOLVED | Noted: 2022-11-18 | Resolved: 2022-12-06

## 2022-12-06 PROCEDURE — 99214 OFFICE O/P EST MOD 30 MIN: CPT | Performed by: FAMILY MEDICINE

## 2022-12-06 RX ORDER — PANTOPRAZOLE SODIUM 40 MG/1
40 TABLET, DELAYED RELEASE ORAL DAILY
Qty: 30 TABLET | Refills: 2 | Status: SHIPPED | OUTPATIENT
Start: 2022-12-06 | End: 2022-12-09 | Stop reason: HOSPADM

## 2022-12-06 NOTE — TELEPHONE ENCOUNTER
Caller: Kezia Otoole    Relationship: Self    Best call back number: [phone]    What is the best time to reach you: SOON PLEASE    Who are you requesting to speak with (clinical staff, provider,  specific staff member): PROVIDER OR CLINICAL STAFF        What was the call regarding: PATIENT REQUESTING A CALL BACK TO SEE WHERE SHE CAN TAKE THE PRESCRIPTION FOR THE WHEELCHAIR PROVIDER GAVE HER TODAY    Do you require a callback: YES

## 2022-12-06 NOTE — PROGRESS NOTES
Subjective   Kezia Otoole is a 72 y.o. female presenting with chief complaint of:   Chief Complaint   Patient presents with   • Hospital Follow Up Visit       AWV 10.18.22    History of Present Illness :  With .  Here for primarily f/u [facility] swing bed:     11.23.22-11.30.22  Weakness-improved  Gait difficulty-improved  Fall   Recent DKA  Recent UTI-klebsiella  Recent Covid infection  Hyponatremia-improved  Hypokalemia improved  Nausea-? Na-resolved  Elevated bp; asymptomatic  Anemia; mild-maybe dilutional  Edema-resolved  malnutrition      Has multiple chronic problems to consider that might have a bearing on today's issues; somewhat therefore an interval appointment.       Chronic/acute problems reviewed today:   1. General weakness acute issues has been pervasive since the COVID DKA bad this admission and [place] swing bed.  It did get better in the hospital but it has plateaued.   2. Incontinence of feces, unspecified fecal incontinence type see #2.  She is actually controlling her bladder now better   3. Melena since out of [place] her stools are dark not bloody.  They are 2-3 a day and without warning.  She denies that they are diarrhea   4. Gait difficulty Chronic/stable:.  Ongoing issues with difficulties it results in difficulty with walking or gait.  No more falls.  No recent injuries.  Uses to help gait: walker when up.     5. History of COVID-19   2.26.21 moderna  3.26.21 moderna  10.28.21 moderna  11.5.22 + covid  There is no residual cough.  Unfortunately this helped likely lead to her episode recent of DKA   6. Memory loss 's brought to my attention that there is been 6 months of forgetfulness leading to difficulties with ADLs when it comes to doing finances and some issues with cognition.  Fortunately does not look progressive since the onset   7. Anticoagulated DM2/ASA 81 (11.9.17 pt chose qod) Chronic/stable reason for stopping or use of.  Denies bleeding issues; especially  epistaxis, hematochezia.  Upper arms/others do not significantly bruise easily.  No significant bleeding or significant falls.  Note melena ? today     8. Uncontrolled type 2 diabetes mellitus with hyperglycemia (HCC) with the Abir sue she is documenting sugars that typically are running 180s to 200s; nothing low.     Has an/another acute issue today: none.    The following portions of the patient's history were reviewed and updated as appropriate: allergies, current medications, past family history, past medical history, past social history, past surgical history and problem list.    Current Outpatient Medications:   •  albuterol sulfate  (90 Base) MCG/ACT inhaler, Inhale 2 puffs Every 4 (Four) Hours As Needed for Wheezing or Shortness of Air., Disp: 8 g, Rfl: 1  •  amLODIPine (NORVASC) 5 MG tablet, TAKE ONE TABLET DAILY, Disp: 90 tablet, Rfl: 1  •  aspirin 81 MG EC tablet, Take 1 tablet by mouth Every Other Day., Disp: 32 tablet, Rfl: 0  •  atorvastatin (LIPITOR) 20 MG tablet, TAKE ONE TABLET DAILY, Disp: 90 tablet, Rfl: 3  •  Calcium Carb-Cholecalciferol (CALCIUM-VITAMIN D) 600-400 MG-UNIT tablet, Take 1 tablet by mouth 2 (Two) Times a Day., Disp: , Rfl:   •  carboxymethylcellulose (REFRESH PLUS) 0.5 % solution, 2 drops Daily As Needed for dry eyes., Disp: , Rfl:   •  Cholecalciferol 25 MCG (1000 UT) capsule, Take 1 capsule by mouth Daily., Disp: , Rfl:   •  Continuous Blood Gluc  (FreeStyle Sue 2 [place]) device, 1 Device Daily., Disp: 1 each, Rfl: 0  •  Continuous Blood Gluc Sensor (FreeStyle Sue 2 Sensor) misc, 1 Device Every 14 (Fourteen) Days. Test QID with sliding scale tid and Lantus HS, Disp: 3 each, Rfl: 5  •  denosumab (PROLIA) 60 MG/ML solution syringe, Inject 60 mg under the skin into the appropriate area as directed Every 6 (Six) Months., Disp: , Rfl:   •  DULoxetine (CYMBALTA) 20 MG capsule, TAKE ONE CAPSULE DAILY, Disp: 30 capsule, Rfl: 5  •  guaiFENesin (MUCINEX) 600 MG 12 hr  tablet, Take 600 mg by mouth 2 (Two) Times a Day., Disp: , Rfl:   •  insulin detemir (LEVEMIR) 100 UNIT/ML injection, Inject 30 Units under the skin into the appropriate area as directed Every 12 (Twelve) Hours for 30 days., Disp: 20 mL, Rfl: 0  •  Insulin Lispro (humaLOG) 100 UNIT/ML injection, Inject 0-14 Units under the skin into the appropriate area as directed 3 (Three) Times a Day Before Meals., Disp: 10 mL, Rfl: 0  •  irbesartan (AVAPRO) 300 MG tablet, TAKE ONE TABLET DAILY, Disp: 90 tablet, Rfl: 3  •  latanoprost (XALATAN) 0.005 % ophthalmic solution, Administer 1 drop to both eyes Every Night., Disp: , Rfl: 5  •  Misc Natural Products (GLUCOSAMINE CHONDROITIN TRIPLE PO), Take 1 tablet by mouth 2 (Two) Times a Day., Disp: , Rfl:   •  montelukast (SINGULAIR) 10 MG tablet, TAKE ONE TABLET DAILY, Disp: 90 tablet, Rfl: 3  •  Multiple Vitamins-Minerals (VITRUM 50+ SENIOR MULTI PO), Take 1 tablet by mouth Daily., Disp: , Rfl:   •  potassium chloride 10 MEQ CR tablet, TAKE ONE TABLET DAILY, Disp: 90 tablet, Rfl: 3  •  Semaglutide, 2 MG/DOSE, 8 MG/3ML solution pen-injector, Inject 2 mg under the skin into the appropriate area as directed 1 (One) Time Per Week., Disp: 4.5 mL, Rfl: 5  •  Symbicort 160-4.5 MCG/ACT inhaler, INHALE TWO PUFFS TWO (TWO) TIMES A DAY., Disp: 30.6 each, Rfl: 3    No problems with medications.    Allergies   Allergen Reactions   • Adhesive Tape Other (See Comments)     Tears and bruises skin   • Other Other (See Comments)     Bandaids make arm Red/slightly swollen       Review of Systems  GENERAL:  Inactive/slower with limits, speed, stamina, various joint issues and weakness. Sleep is poor; apnea likely.  No fever now/recent.  ENDO:  No syncope, near or diaphoretic sweaty spells.  BS without download; with radha system verbalized.   HEENT: No head injury or headache.   No vision change.   No significant hearing loss.  Ears without pain/drainage.  No sore throat.   Same/occ nasal/sinus  "congestion/drainage. No epistaxis.  CHEST: No chest wall tenderness or mass.  Occ cough, wheeze.   No SOB; no hemoptysis.  CV: No chest pain, palpitations, usual  On/off ankle edema.  GI: No heartburn, dysphagia.  No abdominal pain, diarrhea, constipation.  No rectal bleeding; dark stools/? melena.    :  Voids without dysuria; occ incontinence to completion.  ORTHO: No painful/swollen joints but various on /off sore.  No change occ sore neck or back.  No acute neck or back pain without recent injury.  NEURO: No dizziness; diffuse weakness of extremities.  No numbness/paresthesias.   PSYCH: admitted memory loss.  Mood winter variable; occ mild anxious, depressed but/and not suicidal.  Tries to tolerate stress .   Screening:  Mammogram: [id number]/delayed for illness  Bone density: \"2021-22\" [facility]; continuing the prolia  12.11.18 [facility] hip -2.4; start prolia-this year-per [facility]  Low dose CT chest: Tobacco-smoker/never: NA  GI: Colon-p+div/[facility]/11.19.18/5y  Prostate: NA  Usual lab order  6m CBC, CMP, A1c  12m CBC, CMP, A1c, LIPID, TSH, Vit D, uric acid    Copy/paste function used for ROS/exam AND each area of these were reviewed, updated, confirmed and supplemented as needed.  Data reviewed:   Recent admit/ER/MD visits: discharge summary; last visit  Last cardiac testing:   Echo: none    Radiology considered:   CT Abdomen Pelvis Without Contrast    Result Date: 11/18/2022  1. Motion artifacts limits the diagnostic quality of the study. No acute abnormality of the abdomen or pelvis. 2. Diverticulosis of the distal colon. No evidence for diverticulitis.  .            This report was finalized on 11/18/2022 11:55 by Dr. Pradeep Miller MD.    CT Head Without Contrast    Result Date: 11/18/2022   1. Mild cerebral and cerebellar atrophy with chronic microvascular disease but no evidence of acute intracranial process.   This report was finalized on 11/18/2022 11:29 by Dr. Taran Cyr MD.    XR Chest 1 " View    Result Date: 11/23/2022  Shallow inspiration with mild central vascular crowding, no acute infiltrates. Interval removal of the right internal jugular central line. No pneumothorax is identified. This report was finalized on 11/23/2022 12:36 by Dr. Christian Kim MD.    XR Chest 1 View    Result Date: 11/18/2022   RIGHT IJ CVL with tip overlying the SVC. No evidence of pneumothorax. This report was finalized on 11/18/2022 10:48 by Dr. Javi Rothman MD.    XR Chest 1 View    Result Date: 11/18/2022   No acute findings. This report was finalized on 11/18/2022 09:54 by Dr. Javi Rothman MD.    Lab Results:  Results for orders placed or performed during the hospital encounter of 11/23/22   COVID-19 and FLU A/B PCR - Swab, Nasopharynx    Specimen: Nasopharynx; Swab   Result Value Ref Range    COVID19 Not Detected Not Detected - Ref. Range    Influenza A PCR Not Detected Not Detected    Influenza B PCR Not Detected Not Detected   Comprehensive Metabolic Panel    Specimen: Blood   Result Value Ref Range    Glucose 205 (H) 65 - 99 mg/dL    BUN 12 8 - 23 mg/dL    Creatinine 0.43 (L) 0.57 - 1.00 mg/dL    Sodium 132 (L) 136 - 145 mmol/L    Potassium 3.5 3.5 - 5.2 mmol/L    Chloride 96 (L) 98 - 107 mmol/L    CO2 23.0 22.0 - 29.0 mmol/L    Calcium 8.7 8.6 - 10.5 mg/dL    Total Protein 5.5 (L) 6.0 - 8.5 g/dL    Albumin 3.20 (L) 3.50 - 5.20 g/dL    ALT (SGPT) 34 (H) 1 - 33 U/L    AST (SGOT) 21 1 - 32 U/L    Alkaline Phosphatase 91 39 - 117 U/L    Total Bilirubin 0.6 0.0 - 1.2 mg/dL    Globulin 2.3 gm/dL    A/G Ratio 1.4 g/dL    BUN/Creatinine Ratio 27.9 (H) 7.0 - 25.0    Anion Gap 13.0 5.0 - 15.0 mmol/L    eGFR 102.2 >60.0 mL/min/1.73   CBC Auto Differential    Specimen: Blood   Result Value Ref Range    WBC 15.19 (H) 3.40 - 10.80 10*3/mm3    RBC 4.88 3.77 - 5.28 10*6/mm3    Hemoglobin 14.6 12.0 - 15.9 g/dL    Hematocrit 43.2 34.0 - 46.6 %    MCV 88.5 79.0 - 97.0 fL    MCH 29.9 26.6 - 33.0 pg    MCHC 33.8 31.5 - 35.7  g/dL    RDW 12.8 12.3 - 15.4 %    RDW-SD 42.0 37.0 - 54.0 fl    MPV 10.0 6.0 - 12.0 fL    Platelets 235 140 - 450 10*3/mm3    Neutrophil % 81.4 (H) 42.7 - 76.0 %    Lymphocyte % 5.9 (L) 19.6 - 45.3 %    Monocyte % 9.0 5.0 - 12.0 %    Eosinophil % 0.1 (L) 0.3 - 6.2 %    Basophil % 0.3 0.0 - 1.5 %    Immature Grans % 3.3 (H) 0.0 - 0.5 %    Neutrophils, Absolute 12.37 (H) 1.70 - 7.00 10*3/mm3    Lymphocytes, Absolute 0.89 0.70 - 3.10 10*3/mm3    Monocytes, Absolute 1.36 (H) 0.10 - 0.90 10*3/mm3    Eosinophils, Absolute 0.02 0.00 - 0.40 10*3/mm3    Basophils, Absolute 0.05 0.00 - 0.20 10*3/mm3    Immature Grans, Absolute 0.50 (H) 0.00 - 0.05 10*3/mm3    nRBC 0.0 0.0 - 0.2 /100 WBC       A1C:  Lab Results - Last 18 Months   Lab Units 11/18/22  0955 10/13/22  1123 06/17/22  0810 03/15/22  0739 09/15/21  0707   HEMOGLOBIN A1C % 11.30* 8.2* 6.90* 8.70* 6.20*     GLUCOSE:  Lab Results - Last 18 Months   Lab Units 11/23/22  1135 11/22/22  0048 11/21/22  0239 11/20/22  0857 11/19/22  0935 11/19/22  0551 11/18/22  2022   GLUCOSE mg/dL 205* 191* 218* 286* 155* 184* 225*     LIPID:  Lab Results - Last 18 Months   Lab Units 10/13/22  1123 09/15/21  0707   CHOLESTEROL mg/dL  --  156   LDL CHOL mg/dL 35 71   HDL CHOL mg/dL 48* 36*   TRIGLYCERIDES mg/dL 276* 303*     PSA:No results for input(s): PSA in the last 66525 hours.    CBC:  Lab Results - Last 18 Months   Lab Units 11/23/22  1135 11/21/22  0239 11/20/22  0217 11/19/22  0144 11/18/22  0955 10/13/22  1123 03/15/22  0739 09/15/21  0707   WBC 10*3/mm3 15.19* 15.29* 12.59* 18.08* 26.97* 9.40 10.68 6.44   HEMOGLOBIN g/dL 14.6 16.2* 14.5 14.2 18.1* 15.2 14.6 13.6   HEMATOCRIT % 43.2 47.6* 42.4 42.1 55.2* 43.7 45.1 40.7   PLATELETS 10*3/mm3 235 183 164 199 394 301 276 283   IRON mcg/dL  --   --   --   --   --  90 74 69      BMP/CMP:  Lab Results - Last 18 Months   Lab Units 11/23/22  1135 11/22/22  0907 11/22/22  0048 11/21/22  0239 11/20/22  0857 11/19/22  0935 11/19/22  0551  "11/18/22  2022 10/13/22  1122 06/17/22  0810 03/15/22  0739 09/15/21  0707   SODIUM mmol/L 132*  --  133* 134* 132* 137 136 136   < > 137 136 136   POTASSIUM mmol/L 3.5 3.7 3.3*  3.3* 3.2* 3.6 3.7 5.1 3.9   < > 4.4 4.9 4.4   CHLORIDE mmol/L 96*  --  100 100 98 108* 113* 110*   < > 100 96* 99   TOTAL CO2 mmol/L  --   --   --   --   --   --   --   --   --  25.5 25.7 26.5   CO2 mmol/L 23.0  --  22.0 21.0* 20.0* 19.0* 14.0* 12.0*   < >  --   --   --    GLUCOSE mg/dL 205*  --  191* 218* 286* 155* 184* 225*   < > 153* 220* 145*   BUN mg/dL 12  --  15 20 24* 29* 33* 39*   < > 13 32* 20   CREATININE mg/dL 0.43*  --  0.43* 0.43* 0.60 0.66 0.58 0.74   < > 0.63 0.96 0.73   EGFR IF NONAFRICN AM mL/min/1.73  --   --   --   --   --   --   --   --   --   --   --  78   EGFR IF AFRICN AM mL/min/1.73  --   --   --   --   --   --   --   --   --   --   --  95   EGFR RESULT mL/min/1.73  --   --   --   --   --   --   --   --   --  93.8 62.6  --    CALCIUM mg/dL 8.7  --  8.5* 8.0* 8.9 8.3* 8.2* 8.7   < > 9.6 10.1 10.2    < > = values in this interval not displayed.     HEPATIC:  Lab Results - Last 18 Months   Lab Units 11/23/22  1135 11/18/22 0955 10/13/22  1123 06/17/22  0810 03/15/22  0739 09/15/21  0707   ALT (SGPT) U/L 34* 30 31 19 58* 24   AST (SGOT) U/L 21 14 24 17 38* 23   ALK PHOS U/L 91 108 100 76 90 75     Vit D:  Lab Results - Last 18 Months   Lab Units 10/13/22  1123 10/13/22  1122 03/15/22  0739 09/15/21  0707   VIT D 25 HYDROXY ng/ml 52.8 54.4 44.1 49.2     THYROID:  Lab Results - Last 18 Months   Lab Units 10/13/22  1123 09/15/21  0707   TSH uIU/mL 0.787 1.600       Objective   /82   Pulse 106   Resp 14   Ht 160 cm (63\")   Wt 95.3 kg (210 lb)   SpO2 97%   BMI 37.20 kg/m²   Body mass index is 37.2 kg/m².    Recent Vitals       12/2/2022 12/5/2022 12/6/2022       BP: 142/80 -- 138/82     Pulse: 94 99 106     Temp: 96.9 °F (36.1 °C) -- --     Weight: -- -- 95.3 kg (210 lb)     BMI (Calculated): -- -- 37.2     "     Wt Readings from Last 15 Encounters:   12/06/22 1113 95.3 kg (210 lb)   11/23/22 1105 95.3 kg (210 lb)   11/20/22 0400 97.8 kg (215 lb 11.2 oz)   11/18/22 1433 93.9 kg (207 lb)   11/18/22 0910 99.8 kg (220 lb)   10/18/22 0917 104 kg (228 lb 3.2 oz)   10/17/22 1016 104 kg (229 lb 3.2 oz)   08/24/22 2030 106 kg (234 lb)   06/21/22 0957 106 kg (234 lb 3.2 oz)   04/01/22 1042 107 kg (236 lb)   03/21/22 0943 109 kg (239 lb 6.4 oz)   09/24/21 0945 103 kg (228 lb)   09/16/21 1005 104 kg (229 lb)   07/23/21 0928 102 kg (225 lb)   03/11/21 1420 106 kg (232 lb 9.6 oz)   03/08/21 1004 105 kg (232 lb)   02/08/21 1520 106 kg (234 lb)       Physical Exam  GENERAL: AFA/developed in no acute distress. Overweight.   SKIN: Turgor ok without wound, rash, lesion-pale.   HEENT: Normal cephalic without trauma.  Pupils equal round reactive to light. Extraocular motions full without nystagmus.  Posterior pharynx without mass, obstruction, redness.  No thyromegaly, mass, tenderness, lymphadenopathy and supple.   CV: Regular rhythm.  No murmur, gallop, trace equal LE edema. Posterior pulses intact.  No carotid bruits.  CHEST: No chest wall tenderness or mass.   LUNGS: Symmetric motion with clear to auscultation.    ABD: Soft, nontender without mass.   ORTHO: Symmetric extremities without swelling/point tenderness   Wheelchair.  NEURO: CN 2-12 grossly intact.  Symmetric facies and UE/LE. 2/5 strength throughout. 1/4 x bicep equal reflexes.  Nonfocal use extremities. Speech clear.    PSYCH: Oriented x 3.  Pleasant calm, well kept.  Purposeful/directed conservation with intact short/long gross memory.    Assessment & Plan     1. General weakness    2. Incontinence of feces, unspecified fecal incontinence type    3. Melena    4. Gait difficulty    5. History of COVID-19    6. Memory loss    7. Anticoagulated DM2/ASA 81 (11.9.17 pt chose qod)    8. Uncontrolled type 2 diabetes mellitus with hyperglycemia (HCC)        Data review above:    Discussions/medical decisions/reviews:  BP ok  Other vitals weight loss expected  Recent Na, K, BS issues  Recent anemia    Data review above:   Rx: reviewed and decisions:   Rx new/changes: (was suppose to have at discharge and did not get)  New Medications Ordered This Visit   Medications   • pantoprazole (Protonix) 40 MG EC tablet     Sig: Take 1 tablet by mouth Daily.     Dispense:  30 tablet     Refill:  2     Rx for wheelchair; go/look/pick out and forms as needed  Lab today; decision from tomorrow  Continue home health; PT/OT  Stop ASA; get on protonix  May have to alert GI; could need EGD minimum  May need gi opinion on incontinence issue  Memory loss noted; will need to wait on more studies, Rx    Orders placed:   LAB/Testing/Referrals: reviewed/orders:   Today:   Orders Placed This Encounter   Procedures   • Standard Wheelchair   • Comprehensive Metabolic Panel   • Iron Profile   • Ferritin   • CBC & Differential     Chronic/recurrent labs above or change to:   Same   Screening reviewed/updated; have to delay    Immunization History   Administered Date(s) Administered   • COVID-19 (MODERNA) 1st, 2nd, 3rd Dose Only 02/26/2021, 03/26/2021, 10/28/2021   • FLUAD TRI 65YR+ 10/17/2019   • Fluad Quad 65+ 10/14/2020   • Fluzone High Dose =>65 Years ([employer] ONLY) 10/01/2018   • Fluzone High-Dose 65+yrs 10/13/2022   • Fluzone Quad >6mos (Multi-dose) 10/28/2016, 10/30/2017   • Influenza Quad Vaccine (Inpatient) 10/30/2017   • Influenza, Unspecified 10/17/2019   • Pneumococcal Conjugate 13-Valent (PCV13) 10/21/2015   • Pneumococcal Polysaccharide (PPSV23) 10/30/2014   • Shingrix 09/06/2019, 12/06/2019     Vaccine reviewed: today none; later we advised/reaffirmed our support/suggestion for staying complete with covid- covid boosters, seasonal flu/yearly and any missing vaccine from list we supplied; we suggest contact with local health department office to review missing/needed vaccines and then bring nursing  documentation for these vaccines to this office.     Health maintenance:   Body mass index is 37.2 kg/m².  Class 2 Severe Obesity (BMI >=35 and <=39.9). Obesity-related health conditions include the following: diabetes mellitus. Obesity is better; due to illness. BMI is medically contraindicated currently. We discussed med contraindicated.    Tobacco use reviewed:   Kezia Otoole  reports that she has never smoked. She has never used smokeless tobacco..   There are no Patient Instructions on file for this visit.    Visit today involved chronic significant medical problems or differentials and/or intensive drug monitoring: ie potential to cause serious morbidity or death:     Follow up: Return for lab today-leave with wheelchair order and hemocult; Dr Crowe 2-3w-.  Future Appointments   Date Time Provider Department Center   12/7/2022 10:00 AM Mitch Fermin PTA [facility]   12/7/2022 To Be Determined Evy Vera RN [facility]   12/9/2022 To Be Determined Radha Brown OT [facility]   12/12/2022 To Be Determined Mitch Fermin PTA [facility]   12/14/2022 To Be Determined Mitch Fermin PTA [facility]   12/19/2022 To Be Determined Mitch Fermin PTA [facility]   12/20/2022  8:40 AM [facility] [place]   12/21/2022 To Be Determined Mitch Fermin PTA [facility]   12/27/2022 11:30 AM Len Crowe MD [facility]   1/5/2023  1:30 PM Len Crowe MD [facility]   1/16/2023 10:15 AM Huang Giraldo MD [facility]

## 2022-12-07 ENCOUNTER — HOSPITAL ENCOUNTER (OUTPATIENT)
Facility: HOSPITAL | Age: 72
Discharge: HOME HEALTH | End: 2022-12-09
Attending: INTERNAL MEDICINE | Admitting: INTERNAL MEDICINE

## 2022-12-07 ENCOUNTER — HOME CARE VISIT (OUTPATIENT)
Dept: HOME HEALTH SERVICES | Facility: HOME HEALTHCARE | Age: 72
End: 2022-12-07
Payer: MEDICARE

## 2022-12-07 ENCOUNTER — LAB (OUTPATIENT)
Dept: LAB | Facility: HOSPITAL | Age: 72
End: 2022-12-07

## 2022-12-07 ENCOUNTER — APPOINTMENT (OUTPATIENT)
Dept: CT IMAGING | Facility: HOSPITAL | Age: 72
End: 2022-12-07

## 2022-12-07 ENCOUNTER — HOME CARE VISIT (OUTPATIENT)
Dept: HOME HEALTH SERVICES | Facility: CLINIC | Age: 72
End: 2022-12-07
Payer: MEDICARE

## 2022-12-07 VITALS
SYSTOLIC BLOOD PRESSURE: 124 MMHG | DIASTOLIC BLOOD PRESSURE: 64 MMHG | HEART RATE: 99 BPM | RESPIRATION RATE: 16 BRPM | OXYGEN SATURATION: 97 % | TEMPERATURE: 98.2 F

## 2022-12-07 DIAGNOSIS — D64.9 ANEMIA, UNSPECIFIED TYPE: ICD-10-CM

## 2022-12-07 DIAGNOSIS — K92.1 MELENA: ICD-10-CM

## 2022-12-07 DIAGNOSIS — Z79.01 ANTICOAGULATED: ICD-10-CM

## 2022-12-07 DIAGNOSIS — K92.2 GASTROINTESTINAL HEMORRHAGE, UNSPECIFIED GASTROINTESTINAL HEMORRHAGE TYPE: Primary | ICD-10-CM

## 2022-12-07 DIAGNOSIS — K76.0 FATTY LIVER: ICD-10-CM

## 2022-12-07 DIAGNOSIS — Z78.9 DECREASED ACTIVITIES OF DAILY LIVING (ADL): ICD-10-CM

## 2022-12-07 DIAGNOSIS — K92.1 MELENA: Primary | ICD-10-CM

## 2022-12-07 DIAGNOSIS — K57.92 DIVERTICULITIS: ICD-10-CM

## 2022-12-07 DIAGNOSIS — K44.9 HIATAL HERNIA: ICD-10-CM

## 2022-12-07 LAB
ALBUMIN SERPL-MCNC: 3.4 G/DL (ref 3.5–5.2)
ALBUMIN SERPL-MCNC: 3.8 G/DL (ref 3.5–5.2)
ALBUMIN/GLOB SERPL: 1.7 G/DL
ALBUMIN/GLOB SERPL: 1.8 G/DL
ALP SERPL-CCNC: 68 U/L (ref 39–117)
ALP SERPL-CCNC: 88 U/L (ref 39–117)
ALT SERPL W P-5'-P-CCNC: 28 U/L (ref 1–33)
ALT SERPL-CCNC: 24 U/L (ref 1–33)
ANION GAP SERPL CALCULATED.3IONS-SCNC: 10 MMOL/L (ref 5–15)
APTT PPP: 23.5 SECONDS (ref 24.1–35)
AST SERPL-CCNC: 13 U/L (ref 1–32)
AST SERPL-CCNC: 15 U/L (ref 1–32)
BACTERIA UR QL AUTO: ABNORMAL /HPF
BASOPHILS # BLD AUTO: 0.05 10*3/MM3 (ref 0–0.2)
BASOPHILS # BLD AUTO: 0.05 10*3/MM3 (ref 0–0.2)
BASOPHILS NFR BLD AUTO: 0.6 % (ref 0–1.5)
BASOPHILS NFR BLD AUTO: 0.7 % (ref 0–1.5)
BILIRUB SERPL-MCNC: 0.5 MG/DL (ref 0–1.2)
BILIRUB SERPL-MCNC: 0.7 MG/DL (ref 0–1.2)
BILIRUB UR QL STRIP: NEGATIVE
BUN SERPL-MCNC: 21 MG/DL (ref 8–23)
BUN SERPL-MCNC: 32 MG/DL (ref 8–23)
BUN/CREAT SERPL: 43.8 (ref 7–25)
BUN/CREAT SERPL: 52.5 (ref 7–25)
CALCIUM SERPL-MCNC: 8.5 MG/DL (ref 8.6–10.5)
CALCIUM SPEC-SCNC: 8.8 MG/DL (ref 8.6–10.5)
CHLORIDE SERPL-SCNC: 96 MMOL/L (ref 98–107)
CHLORIDE SERPL-SCNC: 98 MMOL/L (ref 98–107)
CLARITY UR: CLEAR
CO2 SERPL-SCNC: 24 MMOL/L (ref 22–29)
CO2 SERPL-SCNC: 25.7 MMOL/L (ref 22–29)
COLOR UR: YELLOW
CREAT SERPL-MCNC: 0.48 MG/DL (ref 0.57–1)
CREAT SERPL-MCNC: 0.61 MG/DL (ref 0.57–1)
D-LACTATE SERPL-SCNC: 1.9 MMOL/L (ref 0.5–2)
D-LACTATE SERPL-SCNC: 2.3 MMOL/L (ref 0.5–2)
DEPRECATED RDW RBC AUTO: 54.3 FL (ref 37–54)
EGFRCR SERPLBLD CKD-EPI 2021: 93.9 ML/MIN/1.73
EGFRCR SERPLBLD CKD-EPI 2021: 99.5 ML/MIN/1.73
EOSINOPHIL # BLD AUTO: 0.02 10*3/MM3 (ref 0–0.4)
EOSINOPHIL # BLD AUTO: 0.04 10*3/MM3 (ref 0–0.4)
EOSINOPHIL NFR BLD AUTO: 0.3 % (ref 0.3–6.2)
EOSINOPHIL NFR BLD AUTO: 0.4 % (ref 0.3–6.2)
ERYTHROCYTE [DISTWIDTH] IN BLOOD BY AUTOMATED COUNT: 14.9 % (ref 12.3–15.4)
ERYTHROCYTE [DISTWIDTH] IN BLOOD BY AUTOMATED COUNT: 17.2 % (ref 12.3–15.4)
FERRITIN SERPL-MCNC: 208 NG/ML (ref 13–150)
FLUAV RNA RESP QL NAA+PROBE: NOT DETECTED
FLUBV RNA RESP QL NAA+PROBE: NOT DETECTED
GLOBULIN SER CALC-MCNC: 1.9 GM/DL
GLOBULIN UR ELPH-MCNC: 2.2 GM/DL
GLUCOSE SERPL-MCNC: 183 MG/DL (ref 65–99)
GLUCOSE SERPL-MCNC: 235 MG/DL (ref 65–99)
GLUCOSE UR STRIP-MCNC: ABNORMAL MG/DL
HCT VFR BLD AUTO: 24.2 % (ref 34–46.6)
HCT VFR BLD AUTO: 25.7 % (ref 34–46.6)
HCT VFR BLD AUTO: 26.6 % (ref 34–46.6)
HGB BLD-MCNC: 8.1 G/DL (ref 12–15.9)
HGB BLD-MCNC: 8.2 G/DL (ref 12–15.9)
HGB BLD-MCNC: 8.8 G/DL (ref 12–15.9)
HGB UR QL STRIP.AUTO: ABNORMAL
HYALINE CASTS UR QL AUTO: ABNORMAL /LPF
IMM GRANULOCYTES # BLD AUTO: 0.09 10*3/MM3 (ref 0–0.05)
IMM GRANULOCYTES # BLD AUTO: 0.1 10*3/MM3 (ref 0–0.05)
IMM GRANULOCYTES NFR BLD AUTO: 1 % (ref 0–0.5)
IMM GRANULOCYTES NFR BLD AUTO: 1.3 % (ref 0–0.5)
INR PPP: 0.96 (ref 0.91–1.09)
IRON 24H UR-MRATE: 39 MCG/DL (ref 37–145)
IRON SATN MFR SERPL: 11 % (ref 20–50)
IRON SATN MFR SERPL: 50 % (ref 20–50)
IRON SERPL-MCNC: 180 MCG/DL (ref 37–145)
KETONES UR QL STRIP: NEGATIVE
LEUKOCYTE ESTERASE UR QL STRIP.AUTO: ABNORMAL
LYMPHOCYTES # BLD AUTO: 1.27 10*3/MM3 (ref 0.7–3.1)
LYMPHOCYTES # BLD AUTO: 1.46 10*3/MM3 (ref 0.7–3.1)
LYMPHOCYTES NFR BLD AUTO: 14.3 % (ref 19.6–45.3)
LYMPHOCYTES NFR BLD AUTO: 19.6 % (ref 19.6–45.3)
MCH RBC QN AUTO: 30.3 PG (ref 26.6–33)
MCH RBC QN AUTO: 30.7 PG (ref 26.6–33)
MCHC RBC AUTO-ENTMCNC: 31.9 G/DL (ref 31.5–35.7)
MCHC RBC AUTO-ENTMCNC: 33.1 G/DL (ref 31.5–35.7)
MCV RBC AUTO: 91.7 FL (ref 79–97)
MCV RBC AUTO: 96.3 FL (ref 79–97)
MONOCYTES # BLD AUTO: 0.71 10*3/MM3 (ref 0.1–0.9)
MONOCYTES # BLD AUTO: 0.85 10*3/MM3 (ref 0.1–0.9)
MONOCYTES NFR BLD AUTO: 9.5 % (ref 5–12)
MONOCYTES NFR BLD AUTO: 9.5 % (ref 5–12)
NEUTROPHILS # BLD AUTO: 5.1 10*3/MM3 (ref 1.7–7)
NEUTROPHILS NFR BLD AUTO: 6.61 10*3/MM3 (ref 1.7–7)
NEUTROPHILS NFR BLD AUTO: 68.6 % (ref 42.7–76)
NEUTROPHILS NFR BLD AUTO: 74.2 % (ref 42.7–76)
NITRITE UR QL STRIP: POSITIVE
NRBC BLD AUTO-RTO: 0.6 /100 WBC (ref 0–0.2)
NRBC BLD AUTO-RTO: 0.7 /100 WBC (ref 0–0.2)
PH UR STRIP.AUTO: 5.5 [PH] (ref 5–8)
PLATELET # BLD AUTO: 256 10*3/MM3 (ref 140–450)
PLATELET # BLD AUTO: 266 10*3/MM3 (ref 140–450)
PMV BLD AUTO: 9.2 FL (ref 6–12)
POTASSIUM SERPL-SCNC: 3.7 MMOL/L (ref 3.5–5.2)
POTASSIUM SERPL-SCNC: 3.9 MMOL/L (ref 3.5–5.2)
PROCALCITONIN SERPL-MCNC: 0.16 NG/ML (ref 0–0.25)
PROT SERPL-MCNC: 5.3 G/DL (ref 6–8.5)
PROT SERPL-MCNC: 6 G/DL (ref 6–8.5)
PROT UR QL STRIP: ABNORMAL
PROTHROMBIN TIME: 12.8 SECONDS (ref 11.8–14.8)
RBC # BLD AUTO: 2.67 10*6/MM3 (ref 3.77–5.28)
RBC # BLD AUTO: 2.9 10*6/MM3 (ref 3.77–5.28)
RBC # UR STRIP: ABNORMAL /HPF
REF LAB TEST METHOD: ABNORMAL
RSV RNA NPH QL NAA+NON-PROBE: NOT DETECTED
SARS-COV-2 RNA RESP QL NAA+PROBE: NOT DETECTED
SODIUM SERPL-SCNC: 132 MMOL/L (ref 136–145)
SODIUM SERPL-SCNC: 136 MMOL/L (ref 136–145)
SP GR UR STRIP: >1.03 (ref 1–1.03)
SQUAMOUS #/AREA URNS HPF: ABNORMAL /HPF
TIBC SERPL-MCNC: 361 MCG/DL
TIBC SERPL-MCNC: 370 MCG/DL (ref 298–536)
TRANSFERRIN SERPL-MCNC: 248 MG/DL (ref 200–360)
UIBC SERPL-MCNC: 181 MCG/DL (ref 112–346)
UROBILINOGEN UR QL STRIP: ABNORMAL
WBC # BLD AUTO: 7.44 10*3/MM3 (ref 3.4–10.8)
WBC # UR STRIP: ABNORMAL /HPF
WBC NRBC COR # BLD: 8.91 10*3/MM3 (ref 3.4–10.8)

## 2022-12-07 PROCEDURE — 85025 COMPLETE CBC W/AUTO DIFF WBC: CPT | Performed by: PHYSICIAN ASSISTANT

## 2022-12-07 PROCEDURE — G0157 HHC PT ASSISTANT EA 15: HCPCS

## 2022-12-07 PROCEDURE — 85018 HEMOGLOBIN: CPT

## 2022-12-07 PROCEDURE — 83540 ASSAY OF IRON: CPT | Performed by: PHYSICIAN ASSISTANT

## 2022-12-07 PROCEDURE — 84466 ASSAY OF TRANSFERRIN: CPT | Performed by: PHYSICIAN ASSISTANT

## 2022-12-07 PROCEDURE — 96375 TX/PRO/DX INJ NEW DRUG ADDON: CPT

## 2022-12-07 PROCEDURE — 85610 PROTHROMBIN TIME: CPT | Performed by: PHYSICIAN ASSISTANT

## 2022-12-07 PROCEDURE — 87637 SARSCOV2&INF A&B&RSV AMP PRB: CPT | Performed by: PHYSICIAN ASSISTANT

## 2022-12-07 PROCEDURE — 85730 THROMBOPLASTIN TIME PARTIAL: CPT | Performed by: PHYSICIAN ASSISTANT

## 2022-12-07 PROCEDURE — G0378 HOSPITAL OBSERVATION PER HR: HCPCS

## 2022-12-07 PROCEDURE — 99285 EMERGENCY DEPT VISIT HI MDM: CPT

## 2022-12-07 PROCEDURE — 36415 COLL VENOUS BLD VENIPUNCTURE: CPT

## 2022-12-07 PROCEDURE — 87040 BLOOD CULTURE FOR BACTERIA: CPT | Performed by: PHYSICIAN ASSISTANT

## 2022-12-07 PROCEDURE — 96376 TX/PRO/DX INJ SAME DRUG ADON: CPT

## 2022-12-07 PROCEDURE — 82607 VITAMIN B-12: CPT | Performed by: PHYSICIAN ASSISTANT

## 2022-12-07 PROCEDURE — 25010000002 ONDANSETRON PER 1 MG: Performed by: PHYSICIAN ASSISTANT

## 2022-12-07 PROCEDURE — 82728 ASSAY OF FERRITIN: CPT

## 2022-12-07 PROCEDURE — 74177 CT ABD & PELVIS W/CONTRAST: CPT

## 2022-12-07 PROCEDURE — 85014 HEMATOCRIT: CPT

## 2022-12-07 PROCEDURE — 84145 PROCALCITONIN (PCT): CPT | Performed by: PHYSICIAN ASSISTANT

## 2022-12-07 PROCEDURE — 82746 ASSAY OF FOLIC ACID SERUM: CPT | Performed by: PHYSICIAN ASSISTANT

## 2022-12-07 PROCEDURE — 85025 COMPLETE CBC W/AUTO DIFF WBC: CPT | Performed by: INTERNAL MEDICINE

## 2022-12-07 PROCEDURE — 25010000002 IOPAMIDOL 61 % SOLUTION: Performed by: PHYSICIAN ASSISTANT

## 2022-12-07 PROCEDURE — 83605 ASSAY OF LACTIC ACID: CPT | Performed by: PHYSICIAN ASSISTANT

## 2022-12-07 PROCEDURE — 81001 URINALYSIS AUTO W/SCOPE: CPT | Performed by: PHYSICIAN ASSISTANT

## 2022-12-07 PROCEDURE — 96374 THER/PROPH/DIAG INJ IV PUSH: CPT

## 2022-12-07 PROCEDURE — 80053 COMPREHEN METABOLIC PANEL: CPT | Performed by: PHYSICIAN ASSISTANT

## 2022-12-07 PROCEDURE — C9803 HOPD COVID-19 SPEC COLLECT: HCPCS

## 2022-12-07 RX ORDER — DULOXETIN HYDROCHLORIDE 20 MG/1
20 CAPSULE, DELAYED RELEASE ORAL DAILY
Status: DISCONTINUED | OUTPATIENT
Start: 2022-12-08 | End: 2022-12-09 | Stop reason: HOSPADM

## 2022-12-07 RX ORDER — PANTOPRAZOLE SODIUM 40 MG/10ML
40 INJECTION, POWDER, LYOPHILIZED, FOR SOLUTION INTRAVENOUS EVERY 12 HOURS SCHEDULED
Status: DISCONTINUED | OUTPATIENT
Start: 2022-12-08 | End: 2022-12-09

## 2022-12-07 RX ORDER — ONDANSETRON 2 MG/ML
4 INJECTION INTRAMUSCULAR; INTRAVENOUS ONCE
Status: COMPLETED | OUTPATIENT
Start: 2022-12-07 | End: 2022-12-07

## 2022-12-07 RX ORDER — ATORVASTATIN CALCIUM 10 MG/1
20 TABLET, FILM COATED ORAL DAILY
Status: DISCONTINUED | OUTPATIENT
Start: 2022-12-08 | End: 2022-12-09 | Stop reason: HOSPADM

## 2022-12-07 RX ORDER — ACETAMINOPHEN 325 MG/1
650 TABLET ORAL EVERY 6 HOURS PRN
Status: DISCONTINUED | OUTPATIENT
Start: 2022-12-07 | End: 2022-12-09 | Stop reason: HOSPADM

## 2022-12-07 RX ORDER — SODIUM CHLORIDE 0.9 % (FLUSH) 0.9 %
10 SYRINGE (ML) INJECTION AS NEEDED
Status: DISCONTINUED | OUTPATIENT
Start: 2022-12-07 | End: 2022-12-09 | Stop reason: HOSPADM

## 2022-12-07 RX ORDER — GUAIFENESIN 600 MG/1
600 TABLET, EXTENDED RELEASE ORAL 2 TIMES DAILY
Status: DISCONTINUED | OUTPATIENT
Start: 2022-12-08 | End: 2022-12-09 | Stop reason: HOSPADM

## 2022-12-07 RX ORDER — ONDANSETRON 2 MG/ML
4 INJECTION INTRAMUSCULAR; INTRAVENOUS EVERY 6 HOURS PRN
Status: DISCONTINUED | OUTPATIENT
Start: 2022-12-07 | End: 2022-12-09 | Stop reason: HOSPADM

## 2022-12-07 RX ORDER — AMLODIPINE BESYLATE 5 MG/1
5 TABLET ORAL DAILY
Status: DISCONTINUED | OUTPATIENT
Start: 2022-12-08 | End: 2022-12-08

## 2022-12-07 RX ORDER — ALBUTEROL SULFATE 90 UG/1
2 AEROSOL, METERED RESPIRATORY (INHALATION) EVERY 4 HOURS PRN
Status: DISCONTINUED | OUTPATIENT
Start: 2022-12-07 | End: 2022-12-08 | Stop reason: ALTCHOICE

## 2022-12-07 RX ORDER — PANTOPRAZOLE SODIUM 40 MG/10ML
40 INJECTION, POWDER, LYOPHILIZED, FOR SOLUTION INTRAVENOUS ONCE
Status: COMPLETED | OUTPATIENT
Start: 2022-12-07 | End: 2022-12-07

## 2022-12-07 RX ADMIN — IOPAMIDOL 100 ML: 612 INJECTION, SOLUTION INTRAVENOUS at 19:14

## 2022-12-07 RX ADMIN — ONDANSETRON 4 MG: 2 INJECTION INTRAMUSCULAR; INTRAVENOUS at 18:55

## 2022-12-07 RX ADMIN — SODIUM CHLORIDE 1000 ML: 9 INJECTION, SOLUTION INTRAVENOUS at 18:56

## 2022-12-07 RX ADMIN — PANTOPRAZOLE SODIUM 40 MG: 40 INJECTION, POWDER, FOR SOLUTION INTRAVENOUS at 18:53

## 2022-12-07 NOTE — HOME HEALTH
COVID SCREENING: no s/s  FOCUS OF CARE/SKILLED NEED: chepe sommer  TEACHING/INTERVENTIONS: HEP, insulin compliance  PROGRESS TOWARD GOALS: pt is progressing toward goals  PHYSICIAN CONTACT: n/a  INSURANCE CHANGES: no  FALLS SINCE LAST HH VISIT? no  MEDICATION CHANGES SINCE LAST HH VISIT? no  PLAN FOR NEXT VISIT: chepe sommer    Pt has no new c/o today. She says that she has already been out to have her blood drawn this morning.

## 2022-12-07 NOTE — PROGRESS NOTES
Please call the patient - H/H dropping - ? GI  bleed, ED?  Vivien, can you update me on what has been going on with Ms. Mast the last few days?    Electronically signed by FABBY Sanchez, 12/07/22, 2:24 PM CST.

## 2022-12-07 NOTE — PROGRESS NOTES
Called pt son and advised that he has spoken to her spouse and was going to take her, advised that we dont have results, and pt isnt at the er as of yet

## 2022-12-07 NOTE — PROGRESS NOTES
Spoke to son and advised that they said they came to dr Crowe office for lab, not where son told them to go to Dr Crowe office, checked with lab eyal tech James and was advised that he jared iron profile and ferritin,   Called son back and advised will go get her and take her to [facility] for lab

## 2022-12-07 NOTE — PROGRESS NOTES
Attempted to call pt and pt spouse and vm left on both phones TRC, called son Jadiel and advised, will put order in for the H&H at [facility], but did advise if 7 or below will need to go back to  ER, son stated understanding and will contact his mother (patient) and advise and let office know what the decision is

## 2022-12-08 ENCOUNTER — ANESTHESIA EVENT (OUTPATIENT)
Dept: GASTROENTEROLOGY | Facility: HOSPITAL | Age: 72
End: 2022-12-08

## 2022-12-08 ENCOUNTER — HOME CARE VISIT (OUTPATIENT)
Dept: HOME HEALTH SERVICES | Facility: HOME HEALTHCARE | Age: 72
End: 2022-12-08
Payer: MEDICARE

## 2022-12-08 ENCOUNTER — ANESTHESIA (OUTPATIENT)
Dept: GASTROENTEROLOGY | Facility: HOSPITAL | Age: 72
End: 2022-12-08

## 2022-12-08 PROBLEM — K92.1 GASTROINTESTINAL HEMORRHAGE WITH MELENA: Status: ACTIVE | Noted: 2022-12-08

## 2022-12-08 PROBLEM — E66.9 CLASS 2 OBESITY IN ADULT: Status: ACTIVE | Noted: 2022-12-08

## 2022-12-08 PROBLEM — K26.9 DUODENAL ULCER: Status: ACTIVE | Noted: 2022-12-08

## 2022-12-08 PROBLEM — D62 ACUTE BLOOD LOSS ANEMIA: Status: ACTIVE | Noted: 2022-12-08

## 2022-12-08 PROBLEM — Z79.4 TYPE 2 DIABETES MELLITUS WITH HYPERGLYCEMIA, WITH LONG-TERM CURRENT USE OF INSULIN (HCC): Status: ACTIVE | Noted: 2022-12-08

## 2022-12-08 PROBLEM — E11.65 TYPE 2 DIABETES MELLITUS WITH HYPERGLYCEMIA, WITH LONG-TERM CURRENT USE OF INSULIN: Status: ACTIVE | Noted: 2022-12-08

## 2022-12-08 PROBLEM — D64.9 ANEMIA: Status: ACTIVE | Noted: 2022-12-08

## 2022-12-08 PROBLEM — E66.812 CLASS 2 OBESITY IN ADULT: Status: ACTIVE | Noted: 2022-12-08

## 2022-12-08 LAB
ABO GROUP BLD: NORMAL
ANION GAP SERPL CALCULATED.3IONS-SCNC: 10 MMOL/L (ref 5–15)
BASOPHILS # BLD AUTO: 0.04 10*3/MM3 (ref 0–0.2)
BASOPHILS NFR BLD AUTO: 0.5 % (ref 0–1.5)
BLD GP AB SCN SERPL QL: NEGATIVE
BUN SERPL-MCNC: 17 MG/DL (ref 8–23)
BUN/CREAT SERPL: 37 (ref 7–25)
CALCIUM SPEC-SCNC: 8 MG/DL (ref 8.6–10.5)
CHLORIDE SERPL-SCNC: 102 MMOL/L (ref 98–107)
CO2 SERPL-SCNC: 25 MMOL/L (ref 22–29)
CREAT SERPL-MCNC: 0.46 MG/DL (ref 0.57–1)
DEPRECATED RDW RBC AUTO: 55.4 FL (ref 37–54)
EGFRCR SERPLBLD CKD-EPI 2021: 100.6 ML/MIN/1.73
EOSINOPHIL # BLD AUTO: 0.04 10*3/MM3 (ref 0–0.4)
EOSINOPHIL NFR BLD AUTO: 0.5 % (ref 0.3–6.2)
ERYTHROCYTE [DISTWIDTH] IN BLOOD BY AUTOMATED COUNT: 17.4 % (ref 12.3–15.4)
FERRITIN SERPL-MCNC: 166 NG/ML (ref 13–150)
FERRITIN SERPL-MCNC: 192 NG/ML (ref 13–150)
FOLATE SERPL-MCNC: 18 NG/ML (ref 4.78–24.2)
GLUCOSE BLDC GLUCOMTR-MCNC: 235 MG/DL (ref 70–130)
GLUCOSE BLDC GLUCOMTR-MCNC: 99 MG/DL (ref 70–130)
GLUCOSE SERPL-MCNC: 105 MG/DL (ref 65–99)
HCT VFR BLD AUTO: 22.6 % (ref 34–46.6)
HCT VFR BLD AUTO: 22.8 % (ref 34–46.6)
HGB BLD-MCNC: 7.1 G/DL (ref 12–15.9)
HGB BLD-MCNC: 7.2 G/DL (ref 12–15.9)
IMM GRANULOCYTES # BLD AUTO: 0.06 10*3/MM3 (ref 0–0.05)
IMM GRANULOCYTES NFR BLD AUTO: 0.7 % (ref 0–0.5)
IRON 24H UR-MRATE: 88 MCG/DL (ref 37–145)
IRON SATN MFR SERPL: 19 % (ref 20–50)
IRON SATN MFR SERPL: 25 % (ref 20–50)
IRON SERPL-MCNC: 67 MCG/DL (ref 37–145)
LYMPHOCYTES # BLD AUTO: 1.31 10*3/MM3 (ref 0.7–3.1)
LYMPHOCYTES NFR BLD AUTO: 15.3 % (ref 19.6–45.3)
MCH RBC QN AUTO: 30.5 PG (ref 26.6–33)
MCHC RBC AUTO-ENTMCNC: 31.6 G/DL (ref 31.5–35.7)
MCV RBC AUTO: 96.6 FL (ref 79–97)
MONOCYTES # BLD AUTO: 0.7 10*3/MM3 (ref 0.1–0.9)
MONOCYTES NFR BLD AUTO: 8.2 % (ref 5–12)
NEUTROPHILS NFR BLD AUTO: 6.43 10*3/MM3 (ref 1.7–7)
NEUTROPHILS NFR BLD AUTO: 74.8 % (ref 42.7–76)
NRBC BLD AUTO-RTO: 0.5 /100 WBC (ref 0–0.2)
PLATELET # BLD AUTO: 209 10*3/MM3 (ref 140–450)
PMV BLD AUTO: 9.1 FL (ref 6–12)
POTASSIUM SERPL-SCNC: 3.7 MMOL/L (ref 3.5–5.2)
RBC # BLD AUTO: 2.36 10*6/MM3 (ref 3.77–5.28)
RH BLD: POSITIVE
SODIUM SERPL-SCNC: 137 MMOL/L (ref 136–145)
T&S EXPIRATION DATE: NORMAL
TIBC SERPL-MCNC: 352 MCG/DL
TIBC SERPL-MCNC: 358 MCG/DL (ref 298–536)
TRANSFERRIN SERPL-MCNC: 240 MG/DL (ref 200–360)
UIBC SERPL-MCNC: 285 MCG/DL (ref 112–346)
VIT B12 BLD-MCNC: 328 PG/ML (ref 211–946)
WBC NRBC COR # BLD: 8.58 10*3/MM3 (ref 3.4–10.8)

## 2022-12-08 PROCEDURE — 97165 OT EVAL LOW COMPLEX 30 MIN: CPT

## 2022-12-08 PROCEDURE — 96361 HYDRATE IV INFUSION ADD-ON: CPT

## 2022-12-08 PROCEDURE — 63710000001 INSULIN LISPRO (HUMAN) PER 5 UNITS: Performed by: NURSE PRACTITIONER

## 2022-12-08 PROCEDURE — G0378 HOSPITAL OBSERVATION PER HR: HCPCS

## 2022-12-08 PROCEDURE — A9270 NON-COVERED ITEM OR SERVICE: HCPCS | Performed by: NURSE PRACTITIONER

## 2022-12-08 PROCEDURE — 86901 BLOOD TYPING SEROLOGIC RH(D): CPT | Performed by: INTERNAL MEDICINE

## 2022-12-08 PROCEDURE — 80048 BASIC METABOLIC PNL TOTAL CA: CPT | Performed by: INTERNAL MEDICINE

## 2022-12-08 PROCEDURE — A9270 NON-COVERED ITEM OR SERVICE: HCPCS | Performed by: INTERNAL MEDICINE

## 2022-12-08 PROCEDURE — 96376 TX/PRO/DX INJ SAME DRUG ADON: CPT

## 2022-12-08 PROCEDURE — 86850 RBC ANTIBODY SCREEN: CPT | Performed by: INTERNAL MEDICINE

## 2022-12-08 PROCEDURE — 88342 IMHCHEM/IMCYTCHM 1ST ANTB: CPT | Performed by: INTERNAL MEDICINE

## 2022-12-08 PROCEDURE — 25010000002 PROPOFOL 10 MG/ML EMULSION: Performed by: NURSE ANESTHETIST, CERTIFIED REGISTERED

## 2022-12-08 PROCEDURE — 88305 TISSUE EXAM BY PATHOLOGIST: CPT | Performed by: INTERNAL MEDICINE

## 2022-12-08 PROCEDURE — 63710000001 GUAIFENESIN 600 MG TABLET SUSTAINED-RELEASE 12 HOUR: Performed by: INTERNAL MEDICINE

## 2022-12-08 PROCEDURE — 43239 EGD BIOPSY SINGLE/MULTIPLE: CPT | Performed by: INTERNAL MEDICINE

## 2022-12-08 PROCEDURE — 86900 BLOOD TYPING SEROLOGIC ABO: CPT | Performed by: INTERNAL MEDICINE

## 2022-12-08 PROCEDURE — 85014 HEMATOCRIT: CPT | Performed by: INTERNAL MEDICINE

## 2022-12-08 PROCEDURE — 99204 OFFICE O/P NEW MOD 45 MIN: CPT | Performed by: INTERNAL MEDICINE

## 2022-12-08 PROCEDURE — 86920 COMPATIBILITY TEST SPIN: CPT

## 2022-12-08 PROCEDURE — 85018 HEMOGLOBIN: CPT | Performed by: INTERNAL MEDICINE

## 2022-12-08 PROCEDURE — 82962 GLUCOSE BLOOD TEST: CPT

## 2022-12-08 RX ORDER — SODIUM CHLORIDE 9 MG/ML
40 INJECTION, SOLUTION INTRAVENOUS AS NEEDED
Status: DISCONTINUED | OUTPATIENT
Start: 2022-12-08 | End: 2022-12-08 | Stop reason: HOSPADM

## 2022-12-08 RX ORDER — ALBUTEROL SULFATE 2.5 MG/3ML
2.5 SOLUTION RESPIRATORY (INHALATION) EVERY 6 HOURS PRN
Status: DISCONTINUED | OUTPATIENT
Start: 2022-12-08 | End: 2022-12-09 | Stop reason: HOSPADM

## 2022-12-08 RX ORDER — INSULIN LISPRO 100 [IU]/ML
2-7 INJECTION, SOLUTION INTRAVENOUS; SUBCUTANEOUS
Status: DISCONTINUED | OUTPATIENT
Start: 2022-12-08 | End: 2022-12-09 | Stop reason: HOSPADM

## 2022-12-08 RX ORDER — POTASSIUM CHLORIDE 750 MG/1
10 TABLET, FILM COATED, EXTENDED RELEASE ORAL DAILY
COMMUNITY
End: 2023-02-16

## 2022-12-08 RX ORDER — IRBESARTAN 300 MG/1
300 TABLET ORAL DAILY
COMMUNITY

## 2022-12-08 RX ORDER — SODIUM CHLORIDE 0.9 % (FLUSH) 0.9 %
10 SYRINGE (ML) INJECTION EVERY 12 HOURS SCHEDULED
Status: DISCONTINUED | OUTPATIENT
Start: 2022-12-08 | End: 2022-12-08 | Stop reason: HOSPADM

## 2022-12-08 RX ORDER — LIDOCAINE HYDROCHLORIDE 20 MG/ML
INJECTION, SOLUTION EPIDURAL; INFILTRATION; INTRACAUDAL; PERINEURAL AS NEEDED
Status: DISCONTINUED | OUTPATIENT
Start: 2022-12-08 | End: 2022-12-08 | Stop reason: SURG

## 2022-12-08 RX ORDER — PROPOFOL 10 MG/ML
VIAL (ML) INTRAVENOUS AS NEEDED
Status: DISCONTINUED | OUTPATIENT
Start: 2022-12-08 | End: 2022-12-08 | Stop reason: SURG

## 2022-12-08 RX ORDER — NICOTINE POLACRILEX 4 MG
15 LOZENGE BUCCAL
Status: DISCONTINUED | OUTPATIENT
Start: 2022-12-08 | End: 2022-12-09 | Stop reason: HOSPADM

## 2022-12-08 RX ORDER — SODIUM CHLORIDE 0.9 % (FLUSH) 0.9 %
10 SYRINGE (ML) INJECTION AS NEEDED
Status: DISCONTINUED | OUTPATIENT
Start: 2022-12-08 | End: 2022-12-08 | Stop reason: HOSPADM

## 2022-12-08 RX ORDER — BUDESONIDE AND FORMOTEROL FUMARATE DIHYDRATE 160; 4.5 UG/1; UG/1
2 AEROSOL RESPIRATORY (INHALATION)
COMMUNITY

## 2022-12-08 RX ORDER — SODIUM CHLORIDE 9 MG/ML
100 INJECTION, SOLUTION INTRAVENOUS CONTINUOUS
Status: DISCONTINUED | OUTPATIENT
Start: 2022-12-08 | End: 2022-12-08

## 2022-12-08 RX ORDER — DEXTROSE MONOHYDRATE 25 G/50ML
25 INJECTION, SOLUTION INTRAVENOUS
Status: DISCONTINUED | OUTPATIENT
Start: 2022-12-08 | End: 2022-12-09 | Stop reason: HOSPADM

## 2022-12-08 RX ORDER — MONTELUKAST SODIUM 10 MG/1
10 TABLET ORAL NIGHTLY
COMMUNITY

## 2022-12-08 RX ORDER — ATORVASTATIN CALCIUM 20 MG/1
20 TABLET, FILM COATED ORAL DAILY
COMMUNITY

## 2022-12-08 RX ORDER — DULOXETIN HYDROCHLORIDE 20 MG/1
20 CAPSULE, DELAYED RELEASE ORAL DAILY
COMMUNITY

## 2022-12-08 RX ORDER — AMLODIPINE BESYLATE 5 MG/1
5 TABLET ORAL DAILY
COMMUNITY
End: 2023-02-16

## 2022-12-08 RX ADMIN — INSULIN LISPRO 3 UNITS: 100 INJECTION, SOLUTION INTRAVENOUS; SUBCUTANEOUS at 17:37

## 2022-12-08 RX ADMIN — GUAIFENESIN 600 MG: 600 TABLET, EXTENDED RELEASE ORAL at 21:07

## 2022-12-08 RX ADMIN — PANTOPRAZOLE SODIUM 40 MG: 40 INJECTION, POWDER, FOR SOLUTION INTRAVENOUS at 06:38

## 2022-12-08 RX ADMIN — PANTOPRAZOLE SODIUM 40 MG: 40 INJECTION, POWDER, FOR SOLUTION INTRAVENOUS at 21:07

## 2022-12-08 RX ADMIN — LIDOCAINE HYDROCHLORIDE 150 MG: 20 INJECTION, SOLUTION EPIDURAL; INFILTRATION; INTRACAUDAL; PERINEURAL at 11:48

## 2022-12-08 RX ADMIN — PROPOFOL 100 MG: 10 INJECTION, EMULSION INTRAVENOUS at 11:48

## 2022-12-08 RX ADMIN — SODIUM CHLORIDE 100 ML/HR: 9 INJECTION, SOLUTION INTRAVENOUS at 11:14

## 2022-12-08 NOTE — ANESTHESIA PREPROCEDURE EVALUATION
Anesthesia Evaluation     Patient summary reviewed   history of anesthetic complications: prolonged sedation  NPO Solid Status: > 8 hours             Airway   Mallampati: II  TM distance: >3 FB  Neck ROM: full  No difficulty expected  Dental      Pulmonary    (+) asthma,sleep apnea (possible),   (-) not a smoker  Cardiovascular   Exercise tolerance: good (4-7 METS)    ECG reviewed    (+) hypertension, hyperlipidemia,   (-) past MI, dysrhythmias, cardiac stents      Neuro/Psych  (-) seizures, TIA, CVA  GI/Hepatic/Renal/Endo    (+) obesity, morbid obesity, GI bleeding , diabetes mellitus, thyroid problem (s/p partial thyroidectomy) thyroid nodules  (-) liver disease, no renal disease    Musculoskeletal     Abdominal    Substance History      OB/GYN          Other   blood dyscrasia anemia,                     Anesthesia Plan    ASA 3 - emergent     MAC     intravenous induction     Anesthetic plan, risks, benefits, and alternatives have been provided, discussed and informed consent has been obtained with: patient.        CODE STATUS:

## 2022-12-08 NOTE — THERAPY DISCHARGE NOTE
Acute Care - Occupational Therapy Initial Evaluation/Discharge  [facility]     Patient Name: Kezia Otoole  : 1950  MRN: [id number]  Today's Date: 2022  Onset of Illness/Injury or Date of Surgery: 22  Date of Referral to OT: 22  Referring Physician: Jayne SEQUEIRA      Admit Date: 2022       ICD-10-CM ICD-9-CM   1. Gastrointestinal hemorrhage, unspecified gastrointestinal hemorrhage type  K92.2 578.9   2. Diverticulitis  K57.92 562.11   3. Hiatal hernia  K44.9 553.3   4. Fatty liver  K76.0 571.8   5. Melena  K92.1 578.1     Patient Active Problem List   Diagnosis   • Class 3 severe obesity due to excess calories with serious comorbidity and body mass index (BMI) of 40.0 to 44.9 in adult ([facility])   • Depression   • Elevated fasting glucose-see DM   • Allergic rhinitis: shots   • Osteoporosis:    • Primary generalized (osteo)arthritis   • HTN (hypertension), benign   • Obstructive sleep apnea-txing   • Menopause   • Diabetes type 2, controlled ([facility])   • Asthma   • Hoarseness-sees Ressor   • Gastroesophageal reflux disease-ent   • Wellness examination-done   • Purpura ([facility])   • Glaucoma   • Laboratory test`   • Anticoagulated DM2/ASA 81 (11.9.17 pt chose qod)   • Glaucoma suspect   • Hx of colonic polyps   • *Hx of anemia: ASA81,iron,gi(cp,div   • *Hx-iron deficiency   • Nuclear senile cataract   • Excessive cerumen in ear canal   • Status post joint replacement   • Primary osteoarthritis of right knee   • Hyperlipidemia-statin   • Hypercalcemia   • History of COVID-19   • DKA (diabetic ketoacidosis) ([facility])   • General weakness   • Memory loss   • Gastrointestinal hemorrhage with melena   • Class 2 obesity in adult   • Type 2 diabetes mellitus with hyperglycemia, with long-term current use of insulin ([facility])   • Anemia   • Duodenal ulcer     Past Medical History:   Diagnosis Date   • Allergic rhinitis    • Chronic laryngitis    • Diabetes mellitus ([facility])    • GERD  (gastroesophageal reflux disease)    • Hypertension    • Hypertrophy of both inferior nasal turbinates    • Laryngopharyngeal reflux    • Nontoxic multinodular goiter    • Sicca laryngitis      Past Surgical History:   Procedure Laterality Date   • CARPAL TUNNEL RELEASE     • CHOLECYSTECTOMY     • COLONOSCOPY  10/01/2008    Multiple polyps removed   • COLONOSCOPY  09/18/2013    Diverticulosis in the sigmoid colon. Dr. Dave Mcnally Recall 5 years   • ENDOSCOPY N/A 12/8/2022    Procedure: ESOPHAGOGASTRODUODENOSCOPY WITH ANESTHESIA;  Surgeon: Luis Hutchison MD;  Location: [facility] ENDOSCOPY;  Service: Gastroenterology;  Laterality: N/A;  pre op: melena  post op:duodenal ulcer, gastritis, hiatal hernia  PCP: Len Crowe MD   • HAND SURGERY     • HYSTERECTOMY     • REPLACEMENT TOTAL KNEE Right 02/02/2021   • THYROIDECTOMY, PARTIAL Right 2010   • TONSILLECTOMY     • TUBAL ABDOMINAL LIGATION         OT ASSESSMENT FLOWSHEET (last 12 hours)     OT Evaluation and Treatment     Row Name 12/08/22 1310                   OT Time and Intention    Subjective Information no complaints  -AC        Document Type evaluation  -AC        Mode of Treatment occupational therapy  -AC           General Information    Patient Profile Reviewed yes  -AC        Onset of Illness/Injury or Date of Surgery 12/07/22  -AC        Referring Physician Jayne SEQUEIRA  -        Prior Level of Function independent:;all household mobility;gait;transfer;bed mobility;ADL's  -AC        Equipment Currently Used at Home cane, straight;walker, rolling;shower chair;ramp  -AC        Pertinent History of Current Functional Problem down trending Hgb, Dx: GI bleed; PMH: underlying dementia  -AC        Existing Precautions/Restrictions fall  -AC        Barriers to Rehab none identified  -AC           Living Environment    Current Living Arrangements home  -AC        Home Accessibility wheelchair accessible  -AC        People in Home spouse  -AC           Pain  Assessment    Pretreatment Pain Rating 0/10 - no pain  -AC           Cognition    Orientation Status (Cognition) oriented to;oriented x 4  -AC        Follows Commands (Cognition) WFL  -AC        Personal Safety Interventions fall prevention program maintained;gait belt;muscle strengthening facilitated;nonskid shoes/slippers when out of bed;supervised activity  -AC           Range of Motion Comprehensive    Comment, General Range of Motion WFL AROM BUE  -AC           Strength Comprehensive (MMT)    Comment, General Manual Muscle Testing (MMT) Assessment LUE 4+/5, RUE 4-/5  -AC           Activities of Daily Living    BADL Assessment/Intervention lower body dressing;toileting;grooming  -AC           Lower Body Dressing Assessment/Training    [place] Level (Lower Body Dressing) don;socks;minimum assist (75% patient effort)  -        Position (Lower Body Dressing) edge of bed sitting  -AC           Grooming Assessment/Training    [place] Level (Grooming) wash face, hands;standby assist  -        Position (Grooming) sink side  -AC           Toileting Assessment/Training    [place] Level (Toileting) toileting skills;perform perineal hygiene;set up;supervision;adjust/manage clothing;minimum assist (75% patient effort)  -        Assistive Devices (Toileting) commode  -        Position (Toileting) unsupported sitting  -AC           BADL Safety/Performance    Impairments, BADL Safety/Performance balance;strength  -AC           Bed Mobility    Bed Mobility supine-sit  -AC        Supine-Sit [place] (Bed Mobility) modified independence  -        Assistive Device (Bed Mobility) bed rails;head of bed elevated  -           Functional Mobility    Functional Mobility- Ind. Level contact guard assist  -        Functional Mobility- Device walker, front-wheeled  -        Functional Mobility- Comment amb to BR with rw, back to chair without rw, needed CGA for all  -AC           Transfer  Assessment/Treatment    Transfers sit-stand transfer;stand-sit transfer;toilet transfer  -AC        Comment, (Transfers) also transferred from low sofa in room with CGA  -AC           Sit-Stand Transfer    Sit-Stand [place] (Transfers) contact guard  -AC        Assistive Device (Sit-Stand Transfers) walker, front-wheeled  -AC           Stand-Sit Transfer    Stand-Sit [place] (Transfers) contact guard  -AC        Assistive Device (Stand-Sit Transfers) walker, front-wheeled  -AC           Toilet Transfer    Type (Toilet Transfer) sit-stand;stand-sit  -AC        [place] Level (Toilet Transfer) contact guard  -AC        Assistive Device (Toilet Transfer) commode;grab bars/safety frame  -AC           Safety Issues, Functional Mobility    Impairments Affecting Function (Mobility) balance;strength  -AC           Balance    Balance Assessment sitting static balance;sitting dynamic balance;standing static balance;standing dynamic balance  -AC        Static Sitting Balance independent  -AC        Dynamic Sitting Balance standby assist  -AC        Position, Sitting Balance sitting edge of bed  -AC        Static Standing Balance standby assist  -AC        Dynamic Standing Balance contact guard  -AC        Position/Device Used, Standing Balance walker, front-wheeled  -AC           Plan of Care Review    Plan of Care Reviewed With patient  -AC        Progress no change  -AC        Outcome Evaluation OT eval completed.  Pt alert and oriented x4.  Pt came to EOB Kristin with bed rails and HOB elevated.  Strength in LUE 4+/5, RUE 4-/5.  Donned/doffed socks with David.  Transfers with SBA-CGA and ambulated to BR with rw.  Completed toileting and hygiene with SBA.  David needed for clothing mgmt.  Pt transferred from commode and low sofa with SBA.  Completed grooming at sink with SBA.  At this time pt appears to be near baseline function which she also agrees with and she is safe for discharge home.  Recommend resumption of  HH PT for continued strengthening and endurance training.  -AC           Positioning and Restraints    Pre-Treatment Position in bed  -AC        Post Treatment Position chair  -AC        In Chair sitting;call light within reach;encouraged to call for assist;with family/caregiver;with nsg  -           Therapy Assessment/Plan (OT)    Date of Referral to OT 12/08/22  -        OT Diagnosis decreased adl  -        Criteria for Skilled Therapeutic Interventions Met (OT) no problems identified which require skilled intervention  -        Therapy Frequency (OT) evaluation only  -              User Key  (r) = Recorded By, (t) = Taken By, (c) = Cosigned By    Initials Name Effective Dates     Avelino Pantoja, OTR/L, REYNA 04/09/19 -                 Occupational Therapy Education     Title: PT OT SLP Therapies (Done)     Topic: Occupational Therapy (Done)     Point: ADL training (Done)     Description:   Instruct learner(s) on proper safety adaptation and remediation techniques during self care or transfers.   Instruct in proper use of assistive devices.              Learning Progress Summary           Patient Acceptance, E, VU by  at 12/8/2022 1400                               User Key     Initials Effective Dates Name Provider Type Discipline     04/09/19 -  Avelino Pantoja, OTR/L, REYNA Occupational Therapist OT                OT Recommendation and Plan  Therapy Frequency (OT): evaluation only  Plan of Care Review  Plan of Care Reviewed With: patient  Progress: no change  Outcome Evaluation: OT eval completed.  Pt alert and oriented x4.  Pt came to EOB Kristin with bed rails and HOB elevated.  Strength in LUE 4+/5, RUE 4-/5.  Donned/doffed socks with David.  Transfers with SBA-CGA and ambulated to BR with rw.  Completed toileting and hygiene with SBA.  David needed for clothing mgmt.  Pt transferred from commode and low sofa with SBA.  Completed grooming at sink with SBA.  At this time pt appears to be near  baseline function which she also agrees with and she is safe for discharge home.  Recommend resumption of  PT for continued strengthening and endurance training.  Plan of Care Reviewed With: patient  Outcome Evaluation: OT eval completed.  Pt alert and oriented x4.  Pt came to EOB Kristin with bed rails and HOB elevated.  Strength in LUE 4+/5, RUE 4-/5.  Donned/doffed socks with David.  Transfers with SBA-CGA and ambulated to BR with rw.  Completed toileting and hygiene with SBA.  David needed for clothing mgmt.  Pt transferred from commode and low sofa with SBA.  Completed grooming at sink with SBA.  At this time pt appears to be near baseline function which she also agrees with and she is safe for discharge home.  Recommend resumption of  PT for continued strengthening and endurance training.          Outcome Measures     Row Name 12/08/22 1310             How much help from another is currently needed...    Putting on and taking off regular lower body clothing? 3  -AC      Bathing (including washing, rinsing, and drying) 3  -AC      Toileting (which includes using toilet bed pan or urinal) 4  -AC      Putting on and taking off regular upper body clothing 4  -AC      Taking care of personal grooming (such as brushing teeth) 4  -AC      Eating meals 4  -AC      AM-PAC 6 Clicks Score (OT) 22  -         Functional Assessment    Outcome Measure Options AM-PAC 6 Clicks Daily Activity (OT)  -            User Key  (r) = Recorded By, (t) = Taken By, (c) = Cosigned By    Initials Name Provider Type    Avelino Barnhart, OTR/L, CNT Occupational Therapist                Time Calculation:    Time Calculation- OT     Row Name 12/08/22 1401             Time Calculation- OT    OT Start Time 1310  -      OT Stop Time 1400  -      OT Time Calculation (min) 50 min  -      OT Received On 12/08/22  -            User Key  (r) = Recorded By, (t) = Taken By, (c) = Cosigned By    Initials Name Provider Type    CYNDEE Pantoja  ALESHIA Galvan/L, REYNA Occupational Therapist                Therapy Charges for Today     Code Description Service Date Service Provider Modifiers Qty    91324007587 HC OT EVAL LOW COMPLEXITY 3 12/8/2022 Avelino Pantoja OTR/L, REYNA GO 1                    ALESHIA Ogden/L, REYNA  12/8/2022

## 2022-12-08 NOTE — PROGRESS NOTES
[facility] Medicine Services  INPATIENT PROGRESS NOTE    Patient Name: Kezia Otoole  Date of Admission: 12/7/2022  Today's Date: 12/08/22  Length of Stay: 0  Primary Care Physician: Len Crowe MD    Subjective   Chief Complaint: Follow-up dark tarry stool  HPI   Reports no history of any NSAID use and denies any anticoagulation.  Her primary care provider has been monitoring her H&H, hemoglobin at office visit was 8.1 on 12/06.  Aspirin was discontinued and she was started on Protonix.     Sitting up in bed with  at bedside. She had a dark black tarry stool when in ED. Hemoglobin 7.1 today. Denies nausea, vomiting, and abdominal pain.  Dr. Hutchison with GI has evaluated and is planning for EGD this afternoon.    Review of Systems   All pertinent negatives and positives are as above. All other systems have been reviewed and are negative unless otherwise stated.     Objective    Temp:  [97 °F (36.1 °C)-98.3 °F (36.8 °C)] 97 °F (36.1 °C)  Heart Rate:  [] 88  Resp:  [16-20] 18  BP: ()/(41-66) 125/58  Physical Exam  Vitals reviewed.   Constitutional:       General: She is not in acute distress.     Appearance: She is obese. She is not ill-appearing or toxic-appearing.      Comments: Lying in bed. No acute distress.  at bedside. Nurse at bedside.   HENT:      Head: Normocephalic and atraumatic.      Mouth/Throat:      Mouth: Mucous membranes are moist.      Pharynx: Oropharynx is clear.   Eyes:      Extraocular Movements: Extraocular movements intact.      Conjunctiva/sclera: Conjunctivae normal.      Pupils: Pupils are equal, round, and reactive to light.   Cardiovascular:      Rate and Rhythm: Normal rate and regular rhythm.      Pulses: Normal pulses.   Pulmonary:      Effort: Pulmonary effort is normal. No respiratory distress.      Breath sounds: Normal breath sounds. No wheezing.   Abdominal:      General: Bowel sounds are normal. There  is no distension.      Palpations: Abdomen is soft.      Tenderness: There is no abdominal tenderness.   Musculoskeletal:         General: No swelling or tenderness. Normal range of motion.      Cervical back: Normal range of motion and neck supple. No muscular tenderness.   Skin:     General: Skin is warm and dry.      Findings: No erythema or rash.   Neurological:      General: No focal deficit present.      Mental Status: She is alert. Mental status is at baseline.      Cranial Nerves: No cranial nerve deficit.      Motor: No weakness.      Comments: Hx of underlying dementia per    Psychiatric:         Mood and Affect: Mood normal.         Behavior: Behavior normal.       Results Review:  I have reviewed the labs, radiology results, and diagnostic studies.    Laboratory Data:   Results from last 7 days   Lab Units 12/08/22  0654 12/07/22  2357 12/07/22  1843 12/07/22  1406 12/06/22  1108   WBC 10*3/mm3  --  8.58 8.91  --  7.44   HEMOGLOBIN g/dL 7.1* 7.2* 8.2*   < > 8.8*   HEMATOCRIT % 22.6* 22.8* 25.7*   < > 26.6*   PLATELETS 10*3/mm3  --  209 256  --  266    < > = values in this interval not displayed.        Results from last 7 days   Lab Units 12/08/22  0654 12/07/22  1843 12/06/22  1108   SODIUM mmol/L 137 132* 136   POTASSIUM mmol/L 3.7 3.7 3.9   CHLORIDE mmol/L 102 98 96*   TOTAL CO2 mmol/L  --   --  25.7   CO2 mmol/L 25.0 24.0  --    BUN mg/dL 17 21 32*   CREATININE mg/dL 0.46* 0.48* 0.61   CALCIUM mg/dL 8.0* 8.8 8.5*   BILIRUBIN mg/dL  --  0.5 0.7   ALK PHOS U/L  --  88 68   ALT (SGPT) U/L  --  28 24   AST (SGOT) U/L  --  15 13   GLUCOSE mg/dL 105* 183* 235*       Culture Data:   No results found for: ACANTHNAEG, AFBCX, BPERTUSSISCX, BLOODCX  No results found for: BCIDPCR, CXREFLEX, CSFCX, CULTURETIS  No results found for: CULTURES, HSVCX, URCX  No results found for: EYECULTURE, GCCX, HSVCULTURE, LABHSV  No results found for: LEGIONELLA, MRSACX, MUMPSCX, MYCOPLASCX  No results found for:  NOCARDIACX, STOOLCX  No results found for: THROATCX, UNSTIMCULT, URINECX, CULTURE, VZVCULTUR  No results found for: VIRALCULTU, WOUNDCX    Radiology Data:   Imaging Results (Last 24 Hours)     Procedure Component Value Units Date/Time    CT Abdomen Pelvis With Contrast [[id number]] Collected: 12/07/22 1929     Updated: 12/07/22 1938    Narrative:      EXAMINATION:  CT ABDOMEN PELVIS W CONTRAST-  12/7/2022 7:10 PM CST     HISTORY: GI bleed.     TECHNIQUE: Spiral CT was performed of the abdomen and pelvis with IV  contrast. Multiplanar images were reconstructed.     DLP: 768 mGy-cm. Automated dosage reduction technique was utilized to  reduce patient dose.     COMPARISON: 11/18/2022.      LUNG BASES: There is breathing motion artifact. There is no dense  infiltrate.     LIVER AND SPLEEN: There is geographic appearing fatty infiltration of  the liver. There has been prior cholecystectomy. The spleen is  unremarkable.     PANCREAS: No pancreatic mass or inflammatory change.     KIDNEYS AND ADRENALS: The kidneys and adrenal glands are unremarkable.  There is bladder wall thickening. There is underdistention of the  bladder.     BOWEL: There is a small hiatal hernia. There is thickening of the  gastric wall. There is some stranding of the fat around the gastric  antrum and proximal duodenum. There is wall thickening in this area.  Small bowel loops are nondilated. There is diverticulosis of the colon.  There is stranding of the fat around the distal sigmoid colon where  there is a diverticulum. There is wall thickening in this area. There is  a trace amount of free fluid. There is no abscess or perforation. There  is underdistention of portions of the colon.     OTHER: There has been prior hysterectomy. There is atheromatous disease  of the aortoiliac vessels. There are degenerative changes of the spine.  There is minimal compression deformity of L1, stable. No acute bony  abnormality is appreciated. There are degenerative  changes of both hips.       Impression:      1. There is diverticulosis of the colon. There is focal diverticulitis  in the distal sigmoid region where there is stranding of the adjacent  fat, wall thickening and a trace amount of free fluid. No perforation or  abscess is seen.  2. Wall thickening of the stomach likely due to underdistention. There  is some edema around the gastric antrum and proximal duodenum the could  be due to ulcer disease or inflammatory change. There is wall thickening  in this area.  3. Small hiatal hernia.  4. Geographic fatty infiltration of the liver. Prior cholecystectomy.  5. Prior hysterectomy. Other nonacute findings, as discussed.     The full report of this exam was immediately signed and available to the  emergency room. The patient is currently in the emergency room.  This report was finalized on 12/07/2022 19:35 by Dr. Neo Mejia MD.          I have reviewed the patient's current medications.     Assessment/Plan     Active Hospital Problems    Diagnosis    • **Gastrointestinal hemorrhage with melena    • Class 2 obesity in adult    • Type 2 diabetes mellitus with hyperglycemia, with long-term current use of insulin (HCC)    • Anemia        Plan:  Patient presented on 12/7 with dark tarry stools over the 2-3 months. She denied history of stomach ulcers, coffee-ground emesis or previous blood transfusion from GI bleed.  Reports no history of any NSAID use and denies any anticoagulation.  Her primary care provider has been monitoring her H&H, hemoglobin at office visit was 8.1.  Aspirin was discontinued and she was started on Protonix.  CT abdomen pelvis showed wall thickening of the stomach likely due to underdistention.  Some edema around the gastric antrum and proximal duodenum that could be due to ulcer disease or inflammatory change.    Gastroenterology consulted.  Protonix IV twice daily.    Hemoglobin 8.2 on admission.  Follow-up 7.1.  Will likely require blood  transfusion.  Serial hemoglobin/hematocrit.  Hemoglobin in November was 14.6.    A1c 11.30 11/18/2022.  Was recently admitted in November for DKA.  She was seen by diabetes educator.  She was prescribed a Dexcom with recommendations to continue Levemir 30 units twice daily and sliding scale insulin before meals.  Blood glucose 105.  Start sliding scale insulin.    SCD for DVT prophylaxis.    Discharge Planning: I expect the patient to be discharged to home in 1-2 days.    Electronically signed by FABBY Williamson, 12/08/22, 11:09 CST.

## 2022-12-08 NOTE — PLAN OF CARE
Goal Outcome Evaluation:      VSS.  Pt had endoscopy completed this shift.  Pt has  a non bleeding ulcer, hiatal hernia, and diverticulosis.  EMANI- on cardiac/diabetic diet.  Pt given a list of meds to avoid r/t ulcers. IV protonix continues.  OT came and evaluated pt.  Walker at bedside for pt to use.  Pt placed on fall risk.  SCD's on.

## 2022-12-08 NOTE — ANESTHESIA POSTPROCEDURE EVALUATION
"Patient: Kezia Otoole    Procedure Summary     Date: 12/08/22 Room / Location: [facility] ENDOSCOPY 6 / [facility] ENDOSCOPY    Anesthesia Start: 1145 Anesthesia Stop: 1204    Procedure: ESOPHAGOGASTRODUODENOSCOPY WITH ANESTHESIA Diagnosis:     Surgeons: Luis Hutchison MD Provider: Huang Hernandez CRNA    Anesthesia Type: MAC ASA Status: 3 - Emergent          Anesthesia Type: MAC    Vitals  Vitals Value Taken Time   /45 12/08/22 1221   Temp     Pulse 84 12/08/22 1221   Resp 20 12/08/22 1220   SpO2 100 % 12/08/22 1221   Vitals shown include unvalidated device data.        Post Anesthesia Care and Evaluation    Patient location during evaluation: PACU  Patient participation: complete - patient participated  Level of consciousness: awake and alert  Pain management: adequate    Airway patency: patent  Anesthetic complications: No anesthetic complications    Cardiovascular status: acceptable  Respiratory status: acceptable  Hydration status: acceptable    Comments: Blood pressure 107/46, pulse 81, temperature 97 °F (36.1 °C), temperature source Temporal, resp. rate 20, height 160 cm (63\"), weight 95.3 kg (210 lb), SpO2 95 %, not currently breastfeeding.    Pt discharged from PACU based on namita score >8      "

## 2022-12-08 NOTE — H&P
[facility]  HISTORY AND PHYSICAL    Date of Admission: 12/7/2022  Primary Care Physician: Len Crowe MD    Subjective     Chief Complaint: GI bleed    History of Present Illness  Patient presents with complaints of GI bleed.  Patient denies chest pain, abdominal pain, nausea or vomiting.  Patient reports she has been having some black tarry stools over the past few months.  She was also complaining of worsening generalized weakness and started to monitor her H&H with her PCP. She denied history of stomach ulcers, coffee-ground emesis or previous blood transfusion from GI bleed.  However she noted that her H&H was dropping, per report discussed with GI and recommended to be admitted for endoscopic evaluation in the a.m.        Review of Systems   12 point systems reviewed, pertinent positives mentioned in HPI.      Past Medical History:   Past Medical History:   Diagnosis Date   • Allergic rhinitis    • Chronic laryngitis    • Diabetes mellitus (HCC)    • GERD (gastroesophageal reflux disease)    • Hypertension    • Hypertrophy of both inferior nasal turbinates    • Laryngopharyngeal reflux    • Nontoxic multinodular goiter    • Sicca laryngitis      Past Surgical History:  Past Surgical History:   Procedure Laterality Date   • CARPAL TUNNEL RELEASE     • CHOLECYSTECTOMY     • COLONOSCOPY  10/01/2008    Multiple polyps removed   • COLONOSCOPY  09/18/2013    Diverticulosis in the sigmoid colon. Dr. Dave Mcnally Recall 5 years   • HAND SURGERY     • HYSTERECTOMY     • REPLACEMENT TOTAL KNEE Right 02/02/2021   • THYROIDECTOMY, PARTIAL Right 2010   • TONSILLECTOMY     • TUBAL ABDOMINAL LIGATION       Social History:  reports that she has never smoked. She has never used smokeless tobacco. She reports that she does not drink alcohol and does not use drugs.    Family History: family history includes Diabetes in her mother; Heart disease in her father and  mother; Stroke in her mother.       Allergies:  Allergies   Allergen Reactions   • Adhesive Tape Other (See Comments)     Tears and bruises skin   • Other Other (See Comments)     Bandaids make arm Red/slightly swollen       Medications:  Prior to Admission medications    Medication Sig Start Date End Date Taking? Authorizing Provider   albuterol sulfate  (90 Base) MCG/ACT inhaler Inhale 2 puffs Every 4 (Four) Hours As Needed for Wheezing or Shortness of Air. 11/2/21   Len Crowe MD   amLODIPine (NORVASC) 5 MG tablet TAKE ONE TABLET DAILY 7/8/22   Len Crowe MD   aspirin 81 MG EC tablet Take 1 tablet by mouth Every Other Day. 2/25/19   Len Crowe MD   atorvastatin (LIPITOR) 20 MG tablet TAKE ONE TABLET DAILY 8/19/22   Len Crowe MD   Calcium Carb-Cholecalciferol (CALCIUM-VITAMIN D) 600-400 MG-UNIT tablet Take 1 tablet by mouth 2 (Two) Times a Day.    Estefania Mejia MD   carboxymethylcellulose (REFRESH PLUS) 0.5 % solution 2 drops Daily As Needed for dry eyes.    Estefania Mejia MD   Cholecalciferol 25 MCG (1000 UT) capsule Take 1 capsule by mouth Daily.    Estefania Mejia MD   Continuous Blood Gluc  (FreeStyle Sue 2 Bandy) device 1 Device Daily. 11/22/22   Len Crowe MD   Continuous Blood Gluc Sensor (FreeStyle Sue 2 Sensor) misc 1 Device Every 14 (Fourteen) Days. Test QID with sliding scale tid and Lantus HS 11/22/22   Len Crowe MD   denosumab (PROLIA) 60 MG/ML solution syringe Inject 60 mg under the skin into the appropriate area as directed Every 6 (Six) Months. 2/13/19   Estefania Mejia MD   DULoxetine (CYMBALTA) 20 MG capsule TAKE ONE CAPSULE DAILY 9/6/22   Len Crowe MD   guaiFENesin (MUCINEX) 600 MG 12 hr tablet Take 600 mg by mouth 2 (Two) Times a Day.    Estefania Mejia MD   insulin detemir (LEVEMIR) 100 UNIT/ML injection Inject 30 Units under the skin into the appropriate area as  "directed Every 12 (Twelve) Hours for 30 days. 11/22/22 12/22/22  Pantera Cotton MD   Insulin Lispro (humaLOG) 100 UNIT/ML injection Inject 0-14 Units under the skin into the appropriate area as directed 3 (Three) Times a Day Before Meals. 11/22/22 12/22/22  Pantera Cotton MD   irbesartan (AVAPRO) 300 MG tablet TAKE ONE TABLET DAILY 4/26/22   Len Crowe MD   latanoprost (XALATAN) 0.005 % ophthalmic solution Administer 1 drop to both eyes Every Night. 9/14/16   Estefania Mejia MD   Misc Natural Products (GLUCOSAMINE CHONDROITIN TRIPLE PO) Take 1 tablet by mouth 2 (Two) Times a Day.    Estefania Mejia MD   montelukast (SINGULAIR) 10 MG tablet TAKE ONE TABLET DAILY 6/23/22   Len Crowe MD   Multiple Vitamins-Minerals (VITRUM 50+ SENIOR MULTI PO) Take 1 tablet by mouth Daily.    Estefania Mejia MD   pantoprazole (Protonix) 40 MG EC tablet Take 1 tablet by mouth Daily. 12/6/22   Len Crowe MD   potassium chloride 10 MEQ CR tablet TAKE ONE TABLET DAILY 11/21/22   Len Crowe MD   Semaglutide, 2 MG/DOSE, 8 MG/3ML solution pen-injector Inject 2 mg under the skin into the appropriate area as directed 1 (One) Time Per Week. 10/24/22   Len Crowe MD   Symbicort 160-4.5 MCG/ACT inhaler INHALE TWO PUFFS TWO (TWO) TIMES A DAY. 12/27/21   Len Crowe MD     I have utilized all available immediate resources to obtain, update, or review the patient's current medications (including all prescriptions, over-the-counter products, herbals, cannabis/cannabidiol products, and vitamin/mineral/dietary (nutritional) supplements).    Objective     Vital Signs: /62   Pulse 84   Temp 97.7 °F (36.5 °C) (Oral)   Resp 16   Ht 160 cm (63\")   Wt 95.3 kg (210 lb)   SpO2 100%   BMI 37.20 kg/m²     Physical Exam:   Temp:  [97.7 °F (36.5 °C)-98.2 °F (36.8 °C)] 97.7 °F (36.5 °C)  Heart Rate:  [] 83  Resp:  [16] 16  BP: " (120-137)/(41-64) 122/59  Physical Exam    General: NC/AT, appears stated age, alert and oriented x3  HEENT: PERRLA, EOMI, no scleral icterus, no conjunctival injection,   NECK:  Neck is supple, no JVD, no supraclavicular lymphadenopathy  CV: S1 S2 RRR, no murmurs, no gallops, no heaves, pulses palpable.  LUNG: CTA, no wheezing crackles or rhonchi  ABD: Nondistended, nontender, bowel sounds present, no guarding or rebound, organomegaly not appreciated.   EXT: FROM, strength is intact, no pitting edema, no joint effusion, no calf tenderness.  Pulses palpable  Neuro: CN 2-12, grossly intact,no  focal weakness appreciated  Skin: Warm and dry, no rash or lesions.      Results Reviewed:  Lab Results (last 24 hours)     Procedure Component Value Units Date/Time    STAT Lactic Acid, Reflex [[id number]]  (Normal) Collected: 12/07/22 2150    Specimen: Blood Updated: 12/07/22 2218     Lactate 1.9 mmol/L     Urinalysis With Culture If Indicated - Urine, Clean Catch [[id number]]  (Abnormal) Collected: 12/07/22 2030    Specimen: Urine, Clean Catch Updated: 12/07/22 2102     Color, UA Yellow     Appearance, UA Clear     pH, UA 5.5     Specific Gravity, UA >1.030     Glucose,  mg/dL (1+)     Ketones, UA Negative     Bilirubin, UA Negative     Blood, UA Small (1+)     Protein, UA Trace     Leuk Esterase, UA Moderate (2+)     Nitrite, UA Positive     Urobilinogen, UA 0.2 E.U./dL    Narrative:      In absence of clinical symptoms, the presence of pyuria, bacteria, and/or nitrites on the urinalysis result does not correlate with infection.    Urinalysis, Microscopic Only - Urine, Clean Catch [[id number]]  (Abnormal) Collected: 12/07/22 2030    Specimen: Urine, Clean Catch Updated: 12/07/22 2102     RBC, UA 3-5 /HPF      WBC, UA 3-5 /HPF      Comment: Urine culture not indicated.        Bacteria, UA 1+ /HPF      Squamous Epithelial Cells, UA 3-6 /HPF      Hyaline Casts, UA None Seen /LPF      Methodology Manual Light Microscopy     "COVID-19, FLU A/B, RSV PCR - Swab, Nasopharynx [[id number]]  (Normal) Collected: 12/07/22 1844    Specimen: Swab from Nasopharynx Updated: 12/07/22 1932     COVID19 Not Detected     Influenza A PCR Not Detected     Influenza B PCR Not Detected     RSV, PCR Not Detected    Narrative:      Fact sheet for providers: https://www.fda.gov/media/372115/download    Fact sheet for patients: https://www.fda.gov/media/095603/download    Test performed by PCR.    Procalcitonin [[id number]]  (Normal) Collected: 12/07/22 1843    Specimen: Blood Updated: 12/07/22 1920     Procalcitonin 0.16 ng/mL     Narrative:      As a Marker for Sepsis (Non-Neonates):    1. <0.5 ng/mL represents a low risk of severe sepsis and/or septic shock.  2. >2 ng/mL represents a high risk of severe sepsis and/or septic shock.    As a Marker for Lower Respiratory Tract Infections that require antibiotic therapy:    PCT on Admission    Antibiotic Therapy       6-12 Hrs later    >0.5                Strongly Recommended  >0.25 - <0.5        Recommended   0.1 - 0.25          Discouraged              Remeasure/reassess PCT  <0.1                Strongly Discouraged     Remeasure/reassess PCT    As 28 day mortality risk marker: \"Change in Procalcitonin Result\" (>80% or <=80%) if Day 0 (or Day 1) and Day 4 values are available. Refer to http://www.[employer]-pct-calculator.com    Change in PCT <=80%  A decrease of PCT levels below or equal to 80% defines a positive change in PCT test result representing a higher risk for 28-day all-cause mortality of patients diagnosed with severe sepsis for septic shock.    Change in PCT >80%  A decrease of PCT levels of more than 80% defines a negative change in PCT result representing a lower risk for 28-day all-cause mortality of patients diagnosed with severe sepsis or septic shock.       Iron Profile [[id number]]  (Abnormal) Collected: 12/07/22 1843    Specimen: Blood Updated: 12/07/22 1916     Iron 39 mcg/dL      Iron Saturation " 11 %      Transferrin 248 mg/dL      TIBC 370 mcg/dL     Lactic Acid, Plasma [[id number]]  (Abnormal) Collected: 12/07/22 1843    Specimen: Blood Updated: 12/07/22 1915     Lactate 2.3 mmol/L     Comprehensive Metabolic Panel [[id number]]  (Abnormal) Collected: 12/07/22 1843    Specimen: Blood Updated: 12/07/22 1914     Glucose 183 mg/dL      BUN 21 mg/dL      Creatinine 0.48 mg/dL      Sodium 132 mmol/L      Potassium 3.7 mmol/L      Chloride 98 mmol/L      CO2 24.0 mmol/L      Calcium 8.8 mg/dL      Total Protein 6.0 g/dL      Albumin 3.80 g/dL      ALT (SGPT) 28 U/L      AST (SGOT) 15 U/L      Alkaline Phosphatase 88 U/L      Total Bilirubin 0.5 mg/dL      Globulin 2.2 gm/dL      A/G Ratio 1.7 g/dL      BUN/Creatinine Ratio 43.8     Anion Gap 10.0 mmol/L      eGFR 99.5 mL/min/1.73      Comment: National Kidney Foundation and American Society of Nephrology (ASN) Task Force recommended calculation based on the Chronic Kidney Disease Epidemiology Collaboration (CKD-EPI) equation refit without adjustment for race.       Narrative:      GFR Normal >60  Chronic Kidney Disease <60  Kidney Failure <15    The GFR formula is only valid for adults with stable renal function between ages 18 and 70.    Protime-INR [[id number]]  (Normal) Collected: 12/07/22 1843    Specimen: Blood Updated: 12/07/22 1902     Protime 12.8 Seconds      INR 0.96    aPTT [[id number]]  (Abnormal) Collected: 12/07/22 1843    Specimen: Blood Updated: 12/07/22 1902     PTT 23.5 seconds     Blood Culture - Blood, Arm, Right [[id number]] Collected: 12/07/22 1843    Specimen: Blood from Arm, Right Updated: 12/07/22 1859    Blood Culture - Blood, Arm, Left [[id number]] Collected: 12/07/22 1843    Specimen: Blood from Arm, Left Updated: 12/07/22 1858    CBC & Differential [[id number]]  (Abnormal) Collected: 12/07/22 1843    Specimen: Blood Updated: 12/07/22 1855    Narrative:      The following orders were created for panel order CBC & Differential.  Procedure                                Abnormality         Status                     ---------                               -----------         ------                     CBC Auto Differential[[id number]]        Abnormal            Final result                 Please view results for these tests on the individual orders.    CBC Auto Differential [[id number]]  (Abnormal) Collected: 12/07/22 1843    Specimen: Blood Updated: 12/07/22 1855     WBC 8.91 10*3/mm3      RBC 2.67 10*6/mm3      Hemoglobin 8.2 g/dL      Hematocrit 25.7 %      MCV 96.3 fL      MCH 30.7 pg      MCHC 31.9 g/dL      RDW 17.2 %      RDW-SD 54.3 fl      MPV 9.2 fL      Platelets 256 10*3/mm3      Neutrophil % 74.2 %      Lymphocyte % 14.3 %      Monocyte % 9.5 %      Eosinophil % 0.4 %      Basophil % 0.6 %      Immature Grans % 1.0 %      Neutrophils, Absolute 6.61 10*3/mm3      Lymphocytes, Absolute 1.27 10*3/mm3      Monocytes, Absolute 0.85 10*3/mm3      Eosinophils, Absolute 0.04 10*3/mm3      Basophils, Absolute 0.05 10*3/mm3      Immature Grans, Absolute 0.09 10*3/mm3      nRBC 0.6 /100 WBC     Folate [[id number]] Collected: 12/07/22 1843    Specimen: Blood Updated: 12/07/22 1850    Vitamin B12 [[id number]] Collected: 12/07/22 1843    Specimen: Blood Updated: 12/07/22 1850        Imaging Results (Last 24 Hours)     Procedure Component Value Units Date/Time    CT Abdomen Pelvis With Contrast [[id number]] Collected: 12/07/22 1929     Updated: 12/07/22 1938    Narrative:      EXAMINATION:  CT ABDOMEN PELVIS W CONTRAST-  12/7/2022 7:10 PM CST     HISTORY: GI bleed.     TECHNIQUE: Spiral CT was performed of the abdomen and pelvis with IV  contrast. Multiplanar images were reconstructed.     DLP: 768 mGy-cm. Automated dosage reduction technique was utilized to  reduce patient dose.     COMPARISON: 11/18/2022.      LUNG BASES: There is breathing motion artifact. There is no dense  infiltrate.     LIVER AND SPLEEN: There is geographic appearing fatty infiltration  of  the liver. There has been prior cholecystectomy. The spleen is  unremarkable.     PANCREAS: No pancreatic mass or inflammatory change.     KIDNEYS AND ADRENALS: The kidneys and adrenal glands are unremarkable.  There is bladder wall thickening. There is underdistention of the  bladder.     BOWEL: There is a small hiatal hernia. There is thickening of the  gastric wall. There is some stranding of the fat around the gastric  antrum and proximal duodenum. There is wall thickening in this area.  Small bowel loops are nondilated. There is diverticulosis of the colon.  There is stranding of the fat around the distal sigmoid colon where  there is a diverticulum. There is wall thickening in this area. There is  a trace amount of free fluid. There is no abscess or perforation. There  is underdistention of portions of the colon.     OTHER: There has been prior hysterectomy. There is atheromatous disease  of the aortoiliac vessels. There are degenerative changes of the spine.  There is minimal compression deformity of L1, stable. No acute bony  abnormality is appreciated. There are degenerative changes of both hips.       Impression:      1. There is diverticulosis of the colon. There is focal diverticulitis  in the distal sigmoid region where there is stranding of the adjacent  fat, wall thickening and a trace amount of free fluid. No perforation or  abscess is seen.  2. Wall thickening of the stomach likely due to underdistention. There  is some edema around the gastric antrum and proximal duodenum the could  be due to ulcer disease or inflammatory change. There is wall thickening  in this area.  3. Small hiatal hernia.  4. Geographic fatty infiltration of the liver. Prior cholecystectomy.  5. Prior hysterectomy. Other nonacute findings, as discussed.     The full report of this exam was immediately signed and available to the  emergency room. The patient is currently in the emergency room.  This report was finalized on  12/07/2022 19:35 by Dr. Neo Mejia MD.        I have personally reviewed and interpreted the radiology studies and ECG obtained at time of admission.     Assessment / Plan   Patient presenting with complaints of worsening generalized weakness, melanotic stool and noted to have acute drop in H&H concern for GI bleed. She also tested occult stool positive and recommend evaluated for endoscopic evaluation in a.m by GI.  Patient admitted for further medical care.      Assessment:   Active Hospital Problems    Diagnosis    • **Gastrointestinal hemorrhage, unspecified gastrointestinal hemorrhage type          Plan:   Admit to observation  Follow H&H  Start Protonix 40 mg IV twice a day  N.p.o. after midnight  GI consult for possible endoscopic in the a.m.    Review home medications, restart those appropriate for continued management of other comorbid condition.    SCD for DVT prophylaxis.   Patient is a full code  Anticipated length of stay less than 2 days    Time spent on admission > 30mins        Electronically signed by Sb Acevedo MD, 12/07/22, 22:35 CST.

## 2022-12-08 NOTE — NURSING NOTE
Patient is current with [facility] at this time. Pt currently receiving PT and SN services. Services will continue upon hospital discharge if appropriate.

## 2022-12-08 NOTE — PLAN OF CARE
Goal Outcome Evaluation:  Plan of Care Reviewed With: patient        Progress: no change  Outcome Evaluation: OT tiffani completed.  Pt alert and oriented x4.  Pt came to EOB Kristin with bed rails and HOB elevated.  Strength in LUE 4+/5, RUE 4-/5.  Donned/doffed socks with David.  Transfers with SBA-CGA and ambulated to BR with rw.  Completed toileting and hygiene with SBA.  David needed for clothing mgmt.  Pt transferred from commode and low sofa with SBA.  Completed grooming at sink with SBA.  At this time pt appears to be near baseline function which she also agrees with and she is safe for discharge home.  Recommend resumption of HH PT for continued strengthening and endurance training.  Also provided education to spouse regarding fall recovery and gait belt use.  Spouse demo understanding.

## 2022-12-08 NOTE — CONSULTS
"  [facility]   Consult Note    Patient Name: Kezia Otoole  : 1950  MRN: [id number]  Primary Care Physician:  Len Crowe MD  Referring Physician: No ref. provider found  Date of admission: 2022    Subjective   Subjective     Reason for Consult/ Chief Complaint: Anemia. GI bleed    HPI:  Kezia Otoole is a 72 y.o. female This is a 72-year-old lady who presented to the emergency room at the request of her primary care physician because of anemia.  Patient stated that she has been progressively feeling weak and fatigue over the past few months.  She reports having had diarrhea for the past 2 to 3 months with urgency and some incontinence.  She reported the stools have been loose but not watery and they have been \"dark\".  No bright red blood per rectum.  Her last bowel movement was yesterday.    She also had for the past few days nausea and vomiting.  Emesis has been clear.  She reports no heartburn.  Denies any dysphagia.  No prior history of any peptic ulcer disease or liver disease or jaundice.    She has history of diverticulosis and adenomatous colon polyps and her last colonoscopy was in .    Patient was admitted in November for DKA.  She was started on insulin since that admission.  Hemoglobin at that time was 14.6.  Yesterday her hemoglobin was noted to be 8.1 and this morning is 7.1.  MCV is normal.  She reports no history of any NSAID use and denies any anticoagulation.  A CT of the abdomen pelvis with contrast on 2022 showed diverticulosis.  There was focal diverticulitis noted in the distal sigmoid colon with some stranding in the adjacent fat wall thickening and trace amount of free fluid.  No perforation or abscess is noted.  She also has a fatty liver, prior cholecystectomy and hysterectomy and a small hiatal hernia.  Also note was mention of wall thickening of the stomach and some edema around the gastric antrum and proximal duodenum.    Review of " Systems     All systems were reviewed and negative except for: Complains of generalized weakness and fatigue.  She has arthritis.  Trouble ambulation.  GI symptoms are as described.  Denies any chest pain or shortness of breath.      Personal History     Past Medical History:   Diagnosis Date   • Allergic rhinitis    • Chronic laryngitis    • Diabetes mellitus (HCC)    • GERD (gastroesophageal reflux disease)    • Hypertension    • Hypertrophy of both inferior nasal turbinates    • Laryngopharyngeal reflux    • Nontoxic multinodular goiter    • Sicca laryngitis        Past Surgical History:   Procedure Laterality Date   • CARPAL TUNNEL RELEASE     • CHOLECYSTECTOMY     • COLONOSCOPY  10/01/2008    Multiple polyps removed   • COLONOSCOPY  09/18/2013    Diverticulosis in the sigmoid colon. Dr. Dave Mcnally Recall 5 years   • HAND SURGERY     • HYSTERECTOMY     • REPLACEMENT TOTAL KNEE Right 02/02/2021   • THYROIDECTOMY, PARTIAL Right 2010   • TONSILLECTOMY     • TUBAL ABDOMINAL LIGATION         Family History: family history includes Diabetes in her mother; Heart disease in her father and mother; Stroke in her mother. Otherwise pertinent FHx was reviewed and not pertinent to current issue.    Social History:  reports that she has never smoked. She has never used smokeless tobacco. She reports that she does not drink alcohol and does not use drugs.    Home Medications:  Calcium-Vitamin D, Cholecalciferol, DULoxetine, FreeStyle Sue 2 Bartlett, FreeStyle Sue 2 Sensor, Insulin Lispro, Misc Natural Products, Semaglutide (2 MG/DOSE), albuterol sulfate HFA, amLODIPine, aspirin, atorvastatin, budesonide-formoterol, carboxymethylcellulose, denosumab, guaiFENesin, insulin detemir, irbesartan, latanoprost, montelukast, multivitamin with minerals, pantoprazole, and potassium chloride    Allergies:  Allergies   Allergen Reactions   • Adhesive Tape Other (See Comments)     Tears and bruises skin   • Other Other (See Comments)      Bandaids make arm Red/slightly swollen       Objective    Objective     Vitals:   Temp:  [97.7 °F (36.5 °C)-98.3 °F (36.8 °C)] 98.3 °F (36.8 °C)  Heart Rate:  [] 78  Resp:  [16-20] 20  BP: ()/(41-66) 107/52    Physical Exam:   Constitutional: Awake, alert.  No distress.  Obese.   Eyes: PERRLA, sclerae anicteric, no conjunctival injection   HENT: NCAT, mucous membranes moist   Neck: Supple, no thyromegaly, no lymphadenopathy, trachea midline   Respiratory: Clear to auscultation bilaterally, nonlabored respirations    Cardiovascular: RRR, no murmurs, rubs, or gallops, palpable pedal pulses bilaterally   Gastrointestinal: Positive bowel sounds, soft, nontender, nondistended.  Midline surgical scar   Musculoskeletal: 1+ bilateral ankle edema, no clubbing or cyanosis to extremities   Psychiatric: Appropriate affect, cooperative   Neurologic: Oriented x 3, strength symmetric in all extremities, Cranial Nerves grossly intact to confrontation, speech clear   Skin: No rashes     Scheduled Meds:atorvastatin, 20 mg, Oral, Daily  DULoxetine, 20 mg, Oral, Daily  guaiFENesin, 600 mg, Oral, BID  pantoprazole, 40 mg, Intravenous, Q12H      Continuous Infusions:   PRN Meds:.•  acetaminophen  •  albuterol  •  ondansetron  •  [COMPLETED] Insert Peripheral IV **AND** sodium chloride  •  [COMPLETED] Insert Peripheral IV **AND** sodium chloride      Result Review    Result Review:  I have personally reviewed the results from the time of this admission to 12/8/2022 10:03 CST and agree with these findings:    Lab Results (last 24 hours)     Procedure Component Value Units Date/Time    Basic Metabolic Panel [[id number]]  (Abnormal) Collected: 12/08/22 0654    Specimen: Blood from Arm, Left Updated: 12/08/22 0750     Glucose 105 mg/dL      BUN 17 mg/dL      Creatinine 0.46 mg/dL      Sodium 137 mmol/L      Potassium 3.7 mmol/L      Chloride 102 mmol/L      CO2 25.0 mmol/L      Calcium 8.0 mg/dL      BUN/Creatinine Ratio 37.0      Anion Gap 10.0 mmol/L      eGFR 100.6 mL/min/1.73      Comment: National Kidney Foundation and American Society of Nephrology (ASN) Task Force recommended calculation based on the Chronic Kidney Disease Epidemiology Collaboration (CKD-EPI) equation refit without adjustment for race.       Narrative:      GFR Normal >60  Chronic Kidney Disease <60  Kidney Failure <15    The GFR formula is only valid for adults with stable renal function between ages 18 and 70.    Hemoglobin & Hematocrit, Blood [[id number]]  (Abnormal) Collected: 12/08/22 0654    Specimen: Blood from Arm, Left Updated: 12/08/22 0703     Hemoglobin 7.1 g/dL      Hematocrit 22.6 %     Folate [[id number]]  (Normal) Collected: 12/07/22 1843    Specimen: Blood Updated: 12/08/22 0134     Folate 18.00 ng/mL     Narrative:      Results may be falsely increased if patient taking Biotin.      Vitamin B12 [[id number]]  (Normal) Collected: 12/07/22 1843    Specimen: Blood Updated: 12/08/22 0134     Vitamin B-12 328 pg/mL     Narrative:      Results may be falsely increased if patient taking Biotin.      CBC & Differential [[id number]]  (Abnormal) Collected: 12/07/22 2357    Specimen: Blood Updated: 12/08/22 0003    Narrative:      The following orders were created for panel order CBC & Differential.  Procedure                               Abnormality         Status                     ---------                               -----------         ------                     CBC Auto Differential[[id number]]        Abnormal            Final result                 Please view results for these tests on the individual orders.    CBC Auto Differential [[id number]]  (Abnormal) Collected: 12/07/22 2357    Specimen: Blood Updated: 12/08/22 0003     WBC 8.58 10*3/mm3      RBC 2.36 10*6/mm3      Hemoglobin 7.2 g/dL      Hematocrit 22.8 %      MCV 96.6 fL      MCH 30.5 pg      MCHC 31.6 g/dL      RDW 17.4 %      RDW-SD 55.4 fl      MPV 9.1 fL      Platelets 209 10*3/mm3       Neutrophil % 74.8 %      Lymphocyte % 15.3 %      Monocyte % 8.2 %      Eosinophil % 0.5 %      Basophil % 0.5 %      Immature Grans % 0.7 %      Neutrophils, Absolute 6.43 10*3/mm3      Lymphocytes, Absolute 1.31 10*3/mm3      Monocytes, Absolute 0.70 10*3/mm3      Eosinophils, Absolute 0.04 10*3/mm3      Basophils, Absolute 0.04 10*3/mm3      Immature Grans, Absolute 0.06 10*3/mm3      nRBC 0.5 /100 WBC     STAT Lactic Acid, Reflex [[id number]]  (Normal) Collected: 12/07/22 2150    Specimen: Blood Updated: 12/07/22 2218     Lactate 1.9 mmol/L     Urinalysis With Culture If Indicated - Urine, Clean Catch [[id number]]  (Abnormal) Collected: 12/07/22 2030    Specimen: Urine, Clean Catch Updated: 12/07/22 2102     Color, UA Yellow     Appearance, UA Clear     pH, UA 5.5     Specific Gravity, UA >1.030     Glucose,  mg/dL (1+)     Ketones, UA Negative     Bilirubin, UA Negative     Blood, UA Small (1+)     Protein, UA Trace     Leuk Esterase, UA Moderate (2+)     Nitrite, UA Positive     Urobilinogen, UA 0.2 E.U./dL    Narrative:      In absence of clinical symptoms, the presence of pyuria, bacteria, and/or nitrites on the urinalysis result does not correlate with infection.    Urinalysis, Microscopic Only - Urine, Clean Catch [[id number]]  (Abnormal) Collected: 12/07/22 2030    Specimen: Urine, Clean Catch Updated: 12/07/22 2102     RBC, UA 3-5 /HPF      WBC, UA 3-5 /HPF      Comment: Urine culture not indicated.        Bacteria, UA 1+ /HPF      Squamous Epithelial Cells, UA 3-6 /HPF      Hyaline Casts, UA None Seen /LPF      Methodology Manual Light Microscopy    COVID-19, FLU A/B, RSV PCR - Swab, Nasopharynx [[id number]]  (Normal) Collected: 12/07/22 1844    Specimen: Swab from Nasopharynx Updated: 12/07/22 1932     COVID19 Not Detected     Influenza A PCR Not Detected     Influenza B PCR Not Detected     RSV, PCR Not Detected    Narrative:      Fact sheet for providers: https://www.fda.gov/media/035137/download   "  Fact sheet for patients: https://www.fda.gov/media/450842/download    Test performed by PCR.    Procalcitonin [[id number]]  (Normal) Collected: 12/07/22 1843    Specimen: Blood Updated: 12/07/22 1920     Procalcitonin 0.16 ng/mL     Narrative:      As a Marker for Sepsis (Non-Neonates):    1. <0.5 ng/mL represents a low risk of severe sepsis and/or septic shock.  2. >2 ng/mL represents a high risk of severe sepsis and/or septic shock.    As a Marker for Lower Respiratory Tract Infections that require antibiotic therapy:    PCT on Admission    Antibiotic Therapy       6-12 Hrs later    >0.5                Strongly Recommended  >0.25 - <0.5        Recommended   0.1 - 0.25          Discouraged              Remeasure/reassess PCT  <0.1                Strongly Discouraged     Remeasure/reassess PCT    As 28 day mortality risk marker: \"Change in Procalcitonin Result\" (>80% or <=80%) if Day 0 (or Day 1) and Day 4 values are available. Refer to http://www.[employer]-pct-calculator.com    Change in PCT <=80%  A decrease of PCT levels below or equal to 80% defines a positive change in PCT test result representing a higher risk for 28-day all-cause mortality of patients diagnosed with severe sepsis for septic shock.    Change in PCT >80%  A decrease of PCT levels of more than 80% defines a negative change in PCT result representing a lower risk for 28-day all-cause mortality of patients diagnosed with severe sepsis or septic shock.       Iron Profile [[id number]]  (Abnormal) Collected: 12/07/22 1843    Specimen: Blood Updated: 12/07/22 1916     Iron 39 mcg/dL      Iron Saturation 11 %      Transferrin 248 mg/dL      TIBC 370 mcg/dL     Lactic Acid, Plasma [[id number]]  (Abnormal) Collected: 12/07/22 1843    Specimen: Blood Updated: 12/07/22 1915     Lactate 2.3 mmol/L     Comprehensive Metabolic Panel [[id number]]  (Abnormal) Collected: 12/07/22 1843    Specimen: Blood Updated: 12/07/22 1914     Glucose 183 mg/dL      BUN 21 mg/dL  "     Creatinine 0.48 mg/dL      Sodium 132 mmol/L      Potassium 3.7 mmol/L      Chloride 98 mmol/L      CO2 24.0 mmol/L      Calcium 8.8 mg/dL      Total Protein 6.0 g/dL      Albumin 3.80 g/dL      ALT (SGPT) 28 U/L      AST (SGOT) 15 U/L      Alkaline Phosphatase 88 U/L      Total Bilirubin 0.5 mg/dL      Globulin 2.2 gm/dL      A/G Ratio 1.7 g/dL      BUN/Creatinine Ratio 43.8     Anion Gap 10.0 mmol/L      eGFR 99.5 mL/min/1.73      Comment: National Kidney Foundation and American Society of Nephrology (ASN) Task Force recommended calculation based on the Chronic Kidney Disease Epidemiology Collaboration (CKD-EPI) equation refit without adjustment for race.       Narrative:      GFR Normal >60  Chronic Kidney Disease <60  Kidney Failure <15    The GFR formula is only valid for adults with stable renal function between ages 18 and 70.    Protime-INR [[id number]]  (Normal) Collected: 12/07/22 1843    Specimen: Blood Updated: 12/07/22 1902     Protime 12.8 Seconds      INR 0.96    aPTT [[id number]]  (Abnormal) Collected: 12/07/22 1843    Specimen: Blood Updated: 12/07/22 1902     PTT 23.5 seconds     Blood Culture - Blood, Arm, Right [[id number]] Collected: 12/07/22 1843    Specimen: Blood from Arm, Right Updated: 12/07/22 1859    Blood Culture - Blood, Arm, Left [[id number]] Collected: 12/07/22 1843    Specimen: Blood from Arm, Left Updated: 12/07/22 1858    CBC & Differential [[id number]]  (Abnormal) Collected: 12/07/22 1843    Specimen: Blood Updated: 12/07/22 1855    Narrative:      The following orders were created for panel order CBC & Differential.  Procedure                               Abnormality         Status                     ---------                               -----------         ------                     CBC Auto Differential[[id number]]        Abnormal            Final result                 Please view results for these tests on the individual orders.    CBC Auto Differential [[id number]]   (Abnormal) Collected: 12/07/22 1843    Specimen: Blood Updated: 12/07/22 1855     WBC 8.91 10*3/mm3      RBC 2.67 10*6/mm3      Hemoglobin 8.2 g/dL      Hematocrit 25.7 %      MCV 96.3 fL      MCH 30.7 pg      MCHC 31.9 g/dL      RDW 17.2 %      RDW-SD 54.3 fl      MPV 9.2 fL      Platelets 256 10*3/mm3      Neutrophil % 74.2 %      Lymphocyte % 14.3 %      Monocyte % 9.5 %      Eosinophil % 0.4 %      Basophil % 0.6 %      Immature Grans % 1.0 %      Neutrophils, Absolute 6.61 10*3/mm3      Lymphocytes, Absolute 1.27 10*3/mm3      Monocytes, Absolute 0.85 10*3/mm3      Eosinophils, Absolute 0.04 10*3/mm3      Basophils, Absolute 0.05 10*3/mm3      Immature Grans, Absolute 0.09 10*3/mm3      nRBC 0.6 /100 WBC              No results found for: NOCARDIACX, STOOLCX      Imaging Results (Last 24 Hours)     Procedure Component Value Units Date/Time    CT Abdomen Pelvis With Contrast [[id number]] Collected: 12/07/22 1929     Updated: 12/07/22 1938    Narrative:      EXAMINATION:  CT ABDOMEN PELVIS W CONTRAST-  12/7/2022 7:10 PM CST     HISTORY: GI bleed.     TECHNIQUE: Spiral CT was performed of the abdomen and pelvis with IV  contrast. Multiplanar images were reconstructed.     DLP: 768 mGy-cm. Automated dosage reduction technique was utilized to  reduce patient dose.     COMPARISON: 11/18/2022.      LUNG BASES: There is breathing motion artifact. There is no dense  infiltrate.     LIVER AND SPLEEN: There is geographic appearing fatty infiltration of  the liver. There has been prior cholecystectomy. The spleen is  unremarkable.     PANCREAS: No pancreatic mass or inflammatory change.     KIDNEYS AND ADRENALS: The kidneys and adrenal glands are unremarkable.  There is bladder wall thickening. There is underdistention of the  bladder.     BOWEL: There is a small hiatal hernia. There is thickening of the  gastric wall. There is some stranding of the fat around the gastric  antrum and proximal duodenum. There is wall  thickening in this area.  Small bowel loops are nondilated. There is diverticulosis of the colon.  There is stranding of the fat around the distal sigmoid colon where  there is a diverticulum. There is wall thickening in this area. There is  a trace amount of free fluid. There is no abscess or perforation. There  is underdistention of portions of the colon.     OTHER: There has been prior hysterectomy. There is atheromatous disease  of the aortoiliac vessels. There are degenerative changes of the spine.  There is minimal compression deformity of L1, stable. No acute bony  abnormality is appreciated. There are degenerative changes of both hips.       Impression:      1. There is diverticulosis of the colon. There is focal diverticulitis  in the distal sigmoid region where there is stranding of the adjacent  fat, wall thickening and a trace amount of free fluid. No perforation or  abscess is seen.  2. Wall thickening of the stomach likely due to underdistention. There  is some edema around the gastric antrum and proximal duodenum the could  be due to ulcer disease or inflammatory change. There is wall thickening  in this area.  3. Small hiatal hernia.  4. Geographic fatty infiltration of the liver. Prior cholecystectomy.  5. Prior hysterectomy. Other nonacute findings, as discussed.     The full report of this exam was immediately signed and available to the  emergency room. The patient is currently in the emergency room.  This report was finalized on 12/07/2022 19:35 by Dr. Neo Mejia MD.            Assessment & Plan   Assessment / Plan     Brief Patient Summary:  Kezia Otoole is a 72 y.o. female who has a following assessment    #1 GI bleed    Patient appears to have had a subacute GI blood loss.  She is hemodynamically stable.  Most likely etiology is upper GI source from either gastric or duodenal ulcer.  Erosive gastritis or upper GI malignancy are also possible.  A colonic lesion also is possible  although less likely.    #2 acute blood loss anemia    Hemoglobin 7.1 this morning    #3 diverticulosis    Question of mild diverticulitis on CT scan.  Patient is completely asymptomatic.    #4 history of adenomatous colon polyps    #5 diabetes        Active Hospital Problems:  Active Hospital Problems    Diagnosis    • **Gastrointestinal hemorrhage, unspecified gastrointestinal hemorrhage type      Plan:     #1 agree with close hemodynamic monitoring and following CBC  #2 continue IV Protonix 40 mg IV every 12 hours  #3 EGD this a.m. for further evaluation.  #4 we will consider colonoscopy if EGD is negative.  #5 consider IV iron infusion  #6 transfuse if hemoglobin less than 7.    Electronically signed by Luis Hutchison MD, 12/08/22, 10:03 AM CST.

## 2022-12-09 ENCOUNTER — TELEPHONE (OUTPATIENT)
Dept: FAMILY MEDICINE CLINIC | Facility: CLINIC | Age: 72
End: 2022-12-09

## 2022-12-09 VITALS
HEART RATE: 86 BPM | HEIGHT: 63 IN | WEIGHT: 210 LBS | RESPIRATION RATE: 18 BRPM | BODY MASS INDEX: 37.21 KG/M2 | SYSTOLIC BLOOD PRESSURE: 136 MMHG | DIASTOLIC BLOOD PRESSURE: 57 MMHG | TEMPERATURE: 97.5 F | OXYGEN SATURATION: 98 %

## 2022-12-09 LAB
ANION GAP SERPL CALCULATED.3IONS-SCNC: 6 MMOL/L (ref 5–15)
BASOPHILS # BLD AUTO: 0.02 10*3/MM3 (ref 0–0.2)
BASOPHILS NFR BLD AUTO: 0.5 % (ref 0–1.5)
BUN SERPL-MCNC: 12 MG/DL (ref 8–23)
BUN/CREAT SERPL: 26.1 (ref 7–25)
CALCIUM SPEC-SCNC: 7.6 MG/DL (ref 8.6–10.5)
CHLORIDE SERPL-SCNC: 102 MMOL/L (ref 98–107)
CO2 SERPL-SCNC: 25 MMOL/L (ref 22–29)
CREAT SERPL-MCNC: 0.46 MG/DL (ref 0.57–1)
DEPRECATED RDW RBC AUTO: 59 FL (ref 37–54)
EGFRCR SERPLBLD CKD-EPI 2021: 100.6 ML/MIN/1.73
EOSINOPHIL # BLD AUTO: 0.03 10*3/MM3 (ref 0–0.4)
EOSINOPHIL NFR BLD AUTO: 0.7 % (ref 0.3–6.2)
ERYTHROCYTE [DISTWIDTH] IN BLOOD BY AUTOMATED COUNT: 18.6 % (ref 12.3–15.4)
GLUCOSE BLDC GLUCOMTR-MCNC: 195 MG/DL (ref 70–130)
GLUCOSE BLDC GLUCOMTR-MCNC: 270 MG/DL (ref 70–130)
GLUCOSE SERPL-MCNC: 185 MG/DL (ref 65–99)
HCT VFR BLD AUTO: 21.3 % (ref 34–46.6)
HCT VFR BLD AUTO: 24.2 % (ref 34–46.6)
HGB BLD-MCNC: 6.7 G/DL (ref 12–15.9)
HGB BLD-MCNC: 7.9 G/DL (ref 12–15.9)
IMM GRANULOCYTES # BLD AUTO: 0.05 10*3/MM3 (ref 0–0.05)
IMM GRANULOCYTES NFR BLD AUTO: 1.2 % (ref 0–0.5)
LYMPHOCYTES # BLD AUTO: 0.9 10*3/MM3 (ref 0.7–3.1)
LYMPHOCYTES NFR BLD AUTO: 22.1 % (ref 19.6–45.3)
MCH RBC QN AUTO: 30.9 PG (ref 26.6–33)
MCHC RBC AUTO-ENTMCNC: 31.5 G/DL (ref 31.5–35.7)
MCV RBC AUTO: 98.2 FL (ref 79–97)
MONOCYTES # BLD AUTO: 0.47 10*3/MM3 (ref 0.1–0.9)
MONOCYTES NFR BLD AUTO: 11.5 % (ref 5–12)
NEUTROPHILS NFR BLD AUTO: 2.6 10*3/MM3 (ref 1.7–7)
NEUTROPHILS NFR BLD AUTO: 64 % (ref 42.7–76)
NRBC BLD AUTO-RTO: 1 /100 WBC (ref 0–0.2)
PLATELET # BLD AUTO: 214 10*3/MM3 (ref 140–450)
PMV BLD AUTO: 9 FL (ref 6–12)
POTASSIUM SERPL-SCNC: 3.6 MMOL/L (ref 3.5–5.2)
RBC # BLD AUTO: 2.17 10*6/MM3 (ref 3.77–5.28)
SODIUM SERPL-SCNC: 133 MMOL/L (ref 136–145)
WBC NRBC COR # BLD: 4.07 10*3/MM3 (ref 3.4–10.8)

## 2022-12-09 PROCEDURE — 63710000001 ATORVASTATIN 10 MG TABLET: Performed by: INTERNAL MEDICINE

## 2022-12-09 PROCEDURE — 99214 OFFICE O/P EST MOD 30 MIN: CPT | Performed by: INTERNAL MEDICINE

## 2022-12-09 PROCEDURE — 63710000001 INSULIN LISPRO (HUMAN) PER 5 UNITS: Performed by: NURSE PRACTITIONER

## 2022-12-09 PROCEDURE — 36430 TRANSFUSION BLD/BLD COMPNT: CPT

## 2022-12-09 PROCEDURE — 85014 HEMATOCRIT: CPT | Performed by: INTERNAL MEDICINE

## 2022-12-09 PROCEDURE — 86900 BLOOD TYPING SEROLOGIC ABO: CPT

## 2022-12-09 PROCEDURE — P9016 RBC LEUKOCYTES REDUCED: HCPCS

## 2022-12-09 PROCEDURE — A9270 NON-COVERED ITEM OR SERVICE: HCPCS | Performed by: NURSE PRACTITIONER

## 2022-12-09 PROCEDURE — G0378 HOSPITAL OBSERVATION PER HR: HCPCS

## 2022-12-09 PROCEDURE — A9270 NON-COVERED ITEM OR SERVICE: HCPCS | Performed by: INTERNAL MEDICINE

## 2022-12-09 PROCEDURE — 85018 HEMOGLOBIN: CPT | Performed by: INTERNAL MEDICINE

## 2022-12-09 PROCEDURE — 96376 TX/PRO/DX INJ SAME DRUG ADON: CPT

## 2022-12-09 PROCEDURE — 82962 GLUCOSE BLOOD TEST: CPT

## 2022-12-09 PROCEDURE — 85025 COMPLETE CBC W/AUTO DIFF WBC: CPT | Performed by: INTERNAL MEDICINE

## 2022-12-09 PROCEDURE — 63710000001 DULOXETINE 20 MG CAPSULE DELAYED-RELEASE PARTICLES: Performed by: INTERNAL MEDICINE

## 2022-12-09 PROCEDURE — 36415 COLL VENOUS BLD VENIPUNCTURE: CPT | Performed by: INTERNAL MEDICINE

## 2022-12-09 PROCEDURE — 63710000001 GUAIFENESIN 600 MG TABLET SUSTAINED-RELEASE 12 HOUR: Performed by: INTERNAL MEDICINE

## 2022-12-09 PROCEDURE — 80048 BASIC METABOLIC PNL TOTAL CA: CPT | Performed by: INTERNAL MEDICINE

## 2022-12-09 RX ORDER — PANTOPRAZOLE SODIUM 40 MG/1
40 TABLET, DELAYED RELEASE ORAL
Status: DISCONTINUED | OUTPATIENT
Start: 2022-12-09 | End: 2022-12-09 | Stop reason: HOSPADM

## 2022-12-09 RX ORDER — PANTOPRAZOLE SODIUM 40 MG/1
40 TABLET, DELAYED RELEASE ORAL
Qty: 60 TABLET | Refills: 0 | Status: SHIPPED | OUTPATIENT
Start: 2022-12-09 | End: 2023-01-08

## 2022-12-09 RX ADMIN — PANTOPRAZOLE SODIUM 40 MG: 40 INJECTION, POWDER, FOR SOLUTION INTRAVENOUS at 08:10

## 2022-12-09 RX ADMIN — INSULIN LISPRO 2 UNITS: 100 INJECTION, SOLUTION INTRAVENOUS; SUBCUTANEOUS at 08:11

## 2022-12-09 RX ADMIN — INSULIN LISPRO 4 UNITS: 100 INJECTION, SOLUTION INTRAVENOUS; SUBCUTANEOUS at 11:34

## 2022-12-09 RX ADMIN — ATORVASTATIN CALCIUM 20 MG: 10 TABLET, FILM COATED ORAL at 08:10

## 2022-12-09 RX ADMIN — DULOXETINE HYDROCHLORIDE 20 MG: 20 CAPSULE, DELAYED RELEASE ORAL at 08:11

## 2022-12-09 RX ADMIN — GUAIFENESIN 600 MG: 600 TABLET, EXTENDED RELEASE ORAL at 08:11

## 2022-12-09 NOTE — NURSING NOTE
Verified MD order with chemistry/blood bank twice and per Md order, pt does not need blood transfusion unless hemoglobin less than 7.  Pt.'s last hemoglobin  was 7.1.

## 2022-12-09 NOTE — PROGRESS NOTES
[facility] Gastroenterology  Inpatient Progress Note    Kezia Otoole  1950    12/9/2022    Subjective   Subjective     HPI:  Kezia Otoole is a 72 y.o. female admitted on 12/8/2022 because of progressive generalized weakness and fatigue and finding of significant anemia.  She has been having melenic stool for the past several days and also diarrhea for the past few months.    She has history of diverticulosis and adenomatous colon polyps last colonoscopy was in 2018.    CT of the abdomen and pelvis with contrast on 12/7/2022 showed diverticulosis and questionable mild diverticulitis in the sigmoid colon but patient has been asymptomatic.  There is some thickening of the gastric antrum and proximal duodenum.    EGD on 12/8/2022 showed gastritis and a large duodenal ulcer but clean-based.      Hemoglobin was decreased to 6.7 today.  No bowel movement since yesterday.  No abdominal pain.  No nausea or vomiting.    Review of Systems     All systems were reviewed and negative except for: No chest pain or shortness of breath.  Still some generalized weakness and fatigue.      Objective    Objective     Current Medications  Scheduled Meds:atorvastatin, 20 mg, Oral, Daily  DULoxetine, 20 mg, Oral, Daily  guaiFENesin, 600 mg, Oral, BID  insulin lispro, 2-7 Units, Subcutaneous, TID AC  pantoprazole, 40 mg, Oral, BID AC      Continuous Infusions:   PRN Meds:.•  acetaminophen  •  albuterol  •  dextrose  •  dextrose  •  glucagon (human recombinant)  •  ondansetron  •  [COMPLETED] Insert Peripheral IV **AND** sodium chloride  •  [COMPLETED] Insert Peripheral IV **AND** sodium chloride     Vitals:  Temp:  [97 °F (36.1 °C)-99.2 °F (37.3 °C)] 98.2 °F (36.8 °C)  Heart Rate:  [] 93  Resp:  [16-18] 16  BP: (102-146)/(45-59) 122/53    Physical Exam:   Constitutional: Awake, alert   Eyes: PERRLA, sclerae anicteric, no conjunctival injection   HENT: NCAT, mucous membranes moist   Neck: Supple, no  thyromegaly, no lymphadenopathy, trachea midline   Respiratory: Clear to auscultation bilaterally, nonlabored respirations    Cardiovascular: RRR, no murmurs, rubs, or gallops, palpable pedal pulses bilaterally   Gastrointestinal: Positive bowel sounds, soft, nontender, nondistended   Musculoskeletal: No bilateral ankle edema, no clubbing or cyanosis to extremities   Psychiatric: Appropriate affect, cooperative   Neurologic: Oriented x 3, strength symmetric in all extremities, Cranial Nerves grossly intact to confrontation, speech clear   Skin: No rashes        Results Review:    I have reviewed all of the patients current test results    Laboratory:    Lab Results (last 24 hours)     Procedure Component Value Units Date/Time    POC Glucose Once [[id number]]  (Abnormal) Collected: 12/09/22 1107    Specimen: Blood Updated: 12/09/22 1120     Glucose 270 mg/dL      Comment: : [id number] Haile Lombardi ID: [id number]       Basic Metabolic Panel [[id number]]  (Abnormal) Collected: 12/09/22 0644    Specimen: Blood Updated: 12/09/22 0718     Glucose 185 mg/dL      BUN 12 mg/dL      Creatinine 0.46 mg/dL      Sodium 133 mmol/L      Potassium 3.6 mmol/L      Chloride 102 mmol/L      CO2 25.0 mmol/L      Calcium 7.6 mg/dL      BUN/Creatinine Ratio 26.1     Anion Gap 6.0 mmol/L      eGFR 100.6 mL/min/1.73      Comment: National Kidney Foundation and American Society of Nephrology (ASN) Task Force recommended calculation based on the Chronic Kidney Disease Epidemiology Collaboration (CKD-EPI) equation refit without adjustment for race.       Narrative:      GFR Normal >60  Chronic Kidney Disease <60  Kidney Failure <15    The GFR formula is only valid for adults with stable renal function between ages 18 and 70.    POC Glucose Once [[id number]]  (Abnormal) Collected: 12/09/22 0700    Specimen: Blood Updated: 12/09/22 0711     Glucose 195 mg/dL      Comment: : [id number] Pastor Nicholson ID: [id number]       CBC &  Differential [[id number]]  (Abnormal) Collected: 12/09/22 0644    Specimen: Blood Updated: 12/09/22 0704    Narrative:      The following orders were created for panel order CBC & Differential.  Procedure                               Abnormality         Status                     ---------                               -----------         ------                     CBC Auto Differential[[id number]]        Abnormal            Final result                 Please view results for these tests on the individual orders.    CBC Auto Differential [[id number]]  (Abnormal) Collected: 12/09/22 0644    Specimen: Blood Updated: 12/09/22 0704     WBC 4.07 10*3/mm3      RBC 2.17 10*6/mm3      Hemoglobin 6.7 g/dL      Hematocrit 21.3 %      MCV 98.2 fL      MCH 30.9 pg      MCHC 31.5 g/dL      RDW 18.6 %      RDW-SD 59.0 fl      MPV 9.0 fL      Platelets 214 10*3/mm3      Neutrophil % 64.0 %      Lymphocyte % 22.1 %      Monocyte % 11.5 %      Eosinophil % 0.7 %      Basophil % 0.5 %      Immature Grans % 1.2 %      Neutrophils, Absolute 2.60 10*3/mm3      Lymphocytes, Absolute 0.90 10*3/mm3      Monocytes, Absolute 0.47 10*3/mm3      Eosinophils, Absolute 0.03 10*3/mm3      Basophils, Absolute 0.02 10*3/mm3      Immature Grans, Absolute 0.05 10*3/mm3      nRBC 1.0 /100 WBC     Blood Culture - Blood, Arm, Left [[id number]]  (Normal) Collected: 12/07/22 1843    Specimen: Blood from Arm, Left Updated: 12/08/22 1901     Blood Culture No growth at 24 hours    Blood Culture - Blood, Arm, Right [[id number]]  (Normal) Collected: 12/07/22 1843    Specimen: Blood from Arm, Right Updated: 12/08/22 1901     Blood Culture No growth at 24 hours    POC Glucose Once [[id number]]  (Abnormal) Collected: 12/08/22 1709    Specimen: Blood Updated: 12/08/22 1720     Glucose 235 mg/dL      Comment: : [id number] [facility] [id number]       Tissue Pathology Exam [[id number]] Collected: 12/08/22 1153    Specimen: Tissue from Stomach Updated:  12/08/22 1404    POC Glucose Once [[id number]]  (Normal) Collected: 12/08/22 1259    Specimen: Blood Updated: 12/08/22 1310     Glucose 99 mg/dL      Comment: : [id number] Zeynep AbarcaMeter ID: [id number]              Radiology:    Imaging Results (Last 24 Hours)     ** No results found for the last 24 hours. **            Assessment & Plan     Patient Active Problem List   Diagnosis Code   • Class 3 severe obesity due to excess calories with serious comorbidity and body mass index (BMI) of 40.0 to 44.9 in adult ([facility]) E66.01, Z68.41   • Depression F32.A   • Elevated fasting glucose-see DM R73.01   • Allergic rhinitis: shots J30.9   • Osteoporosis: 2018/OIWK M81.0   • Primary generalized (osteo)arthritis M15.0   • HTN (hypertension), benign I10   • Obstructive sleep apnea-txing G47.33   • Menopause Z78.0   • Diabetes type 2, controlled ([facility]) E11.9   • Asthma J45.909   • Hoarseness-sees Ressor R49.0   • Gastroesophageal reflux disease-ent K21.9   • Wellness examination-done Z00.00   • Purpura ([facility]) D69.2   • Glaucoma H40.9   • Laboratory test` Z01.89   • Anticoagulated DM2/ASA 81 (11.9.17 pt chose qod) Z79.01   • Glaucoma suspect H40.009   • Hx of colonic polyps Z86.010   • *Hx of anemia: ASA81,iron,gi(cp,div D64.9   • *Hx-iron deficiency E61.1   • Nuclear senile cataract H25.10   • Excessive cerumen in ear canal H61.20   • Status post joint replacement Z96.60   • Primary osteoarthritis of right knee M17.11   • Hyperlipidemia-statin E78.5   • Hypercalcemia E83.52   • History of COVID-19 Z86.16   • DKA (diabetic ketoacidosis) ([facility]) E11.10   • General weakness R53.1   • Memory loss R41.3   • Gastrointestinal hemorrhage with melena K92.1   • Class 2 obesity in adult E66.9   • Type 2 diabetes mellitus with hyperglycemia, with long-term current use of insulin ([facility]) E11.65, Z79.4   • Anemia D64.9   • Duodenal ulcer K26.9       Assessment and plan    #1 GI bleed    Bleeding appears to have stopped spontaneously.  EGD showing  clean-based duodenal ulcer.  Gastric biopsies pending for H. pylori.  Continue Protonix 40 mg twice a day.      #2 duodenal ulcer    This is clean-based.  Continue Protonix 40 mg twice a day.    Treat for H. pylori if positive.    #3 acute blood loss anemia    Hemoglobin dropped slightly this morning without any evidence of overt bleeding.  Most likely secondary to dilutional.  She has not had any bowel movements and I do not think this is any recurrent bleeding.  Agree with transfusion.  Patient being discharged by primary team today.  Can follow-up in the GI office in 2 to 3 weeks after discharge.    #4 diverticulosis    #5 history of adenomatous colon polyps        Luis Hutchison MD  12/09/22  12:21 CST      Electronically signed by Luis Hutchison MD, 12/09/22, 12:21 PM CST.

## 2022-12-09 NOTE — CASE MANAGEMENT/SOCIAL WORK
Discharge Planning Assessment   [facility]     Patient Name: Kezia Otoole  MRN: [id number]  Today's Date: 12/9/2022    Admit Date: 12/7/2022        Discharge Needs Assessment     Row Name 12/09/22 0948       Living Environment    People in Home spouse    Name(s) of People in Home Rollo    Current Living Arrangements home    Primary Care Provided by spouse/significant other    Provides Primary Care For no one    Family Caregiver if Needed spouse    Family Caregiver Names Rollo       Transition Planning    Patient/Family Anticipates Transition to home with family    Transportation Anticipated family or friend will provide       Discharge Needs Assessment    Readmission Within the Last 30 Days no previous admission in last 30 days    Equipment Currently Used at Home wheelchair;walker, rolling    Concerns to be Addressed no discharge needs identified    Equipment Needed After Discharge none    Outpatient/Agency/Support Group Needs homecare agency    Discharge Facility/Level of Care Needs home with home health    Discharge Coordination/Progress spoke to patient who lives with spouse; has RX coverage [facility] and PCP; spouse is caregiver will follow for DC needs               Discharge Plan    No documentation.               Continued Care and Services - Admitted Since 12/7/2022    Coordination has not been started for this encounter.     Selected Continued Care - Prior Encounters Includes continued care and service providers with selected services from prior encounters from 9/8/2022 to 12/9/2022    Discharged on 11/22/2022 Admission date: 11/18/2022 - Discharge disposition: Home-Health Care c    Home Medical Care     Service Provider Selected Services Address Phone Fax Patient Preferred    Pending sale to [facility] [address], [facility] [address] [phone] --                    Expected Discharge Date and Time     Expected Discharge Date Expected Discharge Time     Dec 9, 2022          Demographic Summary    No documentation.                Functional Status    No documentation.                Psychosocial    No documentation.                Abuse/Neglect    No documentation.                Legal    No documentation.                Substance Abuse    No documentation.                Patient Forms    No documentation.                   Kelsi Silva RN

## 2022-12-09 NOTE — PLAN OF CARE
Goal Outcome Evaluation:  Plan of Care Reviewed With: patient        Progress: no change  Outcome Evaluation: PT screen completed. Per chart review and OT, pt at baseline function. PT to sign off. Anticipate d/c home with assist and HH per chart.

## 2022-12-09 NOTE — PLAN OF CARE
Goal Outcome Evaluation:  Plan of Care Reviewed With: patient           Outcome Evaluation: Patient very pleasant, A/O, VSS; ambulating to bathroom using a walker, SCDs on when in bed, no c/o pain or discomfort. Voiding with no issues, no BM so far this shift.

## 2022-12-09 NOTE — DISCHARGE SUMMARY
[facility] Medicine Services  DISCHARGE SUMMARY       Date of Admission: 12/7/2022  Date of Discharge:  12/9/2022  Primary Care Physician: Len Crowe MD    Presenting Problem/Chief Complaint:  Dark tarry stool    Final Discharge Diagnoses:  Active Hospital Problems    Diagnosis    • **Gastrointestinal hemorrhage with melena    • Class 2 obesity in adult    • Type 2 diabetes mellitus with hyperglycemia, with long-term current use of insulin (HCC)    • Anemia    • Duodenal ulcer        Consults: Dr. Hutchison with gastroenterology.    Procedures Performed:     EGD with Dr. Hutchison on 12/8/2022      Pertinent Test Results:     Imaging Results (All)     Procedure Component Value Units Date/Time    CT Abdomen Pelvis With Contrast [[id number]] Collected: 12/07/22 1929     Updated: 12/07/22 1938    Narrative:      EXAMINATION:  CT ABDOMEN PELVIS W CONTRAST-  12/7/2022 7:10 PM CST     HISTORY: GI bleed.     TECHNIQUE: Spiral CT was performed of the abdomen and pelvis with IV  contrast. Multiplanar images were reconstructed.     DLP: 768 mGy-cm. Automated dosage reduction technique was utilized to  reduce patient dose.     COMPARISON: 11/18/2022.      LUNG BASES: There is breathing motion artifact. There is no dense  infiltrate.     LIVER AND SPLEEN: There is geographic appearing fatty infiltration of  the liver. There has been prior cholecystectomy. The spleen is  unremarkable.     PANCREAS: No pancreatic mass or inflammatory change.     KIDNEYS AND ADRENALS: The kidneys and adrenal glands are unremarkable.  There is bladder wall thickening. There is underdistention of the  bladder.     BOWEL: There is a small hiatal hernia. There is thickening of the  gastric wall. There is some stranding of the fat around the gastric  antrum and proximal duodenum. There is wall thickening in this area.  Small bowel loops are nondilated. There is diverticulosis of the colon.  There is stranding of  the fat around the distal sigmoid colon where  there is a diverticulum. There is wall thickening in this area. There is  a trace amount of free fluid. There is no abscess or perforation. There  is underdistention of portions of the colon.     OTHER: There has been prior hysterectomy. There is atheromatous disease  of the aortoiliac vessels. There are degenerative changes of the spine.  There is minimal compression deformity of L1, stable. No acute bony  abnormality is appreciated. There are degenerative changes of both hips.       Impression:      1. There is diverticulosis of the colon. There is focal diverticulitis  in the distal sigmoid region where there is stranding of the adjacent  fat, wall thickening and a trace amount of free fluid. No perforation or  abscess is seen.  2. Wall thickening of the stomach likely due to underdistention. There  is some edema around the gastric antrum and proximal duodenum the could  be due to ulcer disease or inflammatory change. There is wall thickening  in this area.  3. Small hiatal hernia.  4. Geographic fatty infiltration of the liver. Prior cholecystectomy.  5. Prior hysterectomy. Other nonacute findings, as discussed.     The full report of this exam was immediately signed and available to the  emergency room. The patient is currently in the emergency room.  This report was finalized on 12/07/2022 19:35 by Dr. Neo Mejia MD.          LAB RESULTS:      Lab 12/09/22  0644 12/08/22  0654 12/07/22  2357 12/07/22  2150 12/07/22  1843 12/07/22  1406 12/06/22  1108   WBC 4.07  --  8.58  --  8.91  --  7.44   HEMOGLOBIN 6.7* 7.1* 7.2*  --  8.2* 8.1* 8.8*   HEMATOCRIT 21.3* 22.6* 22.8*  --  25.7* 24.2* 26.6*   PLATELETS 214  --  209  --  256  --  266   NEUTROS ABS 2.60  --  6.43  --  6.61  --  5.10   IMMATURE GRANS (ABS) 0.05  --  0.06*  --  0.09*  --   --    LYMPHS ABS 0.90  --  1.31  --  1.27  --  1.46   MONOS ABS 0.47  --  0.70  --  0.85  --  0.71   EOS ABS 0.03  --  0.04   --  0.04  --  0.02   MCV 98.2*  --  96.6  --  96.3  --  91.7   PROCALCITONIN  --   --   --   --  0.16  --   --    LACTATE  --   --   --  1.9 2.3*  --   --    PROTIME  --   --   --   --  12.8  --   --    APTT  --   --   --   --  23.5*  --   --          Lab 12/09/22  0644 12/08/22  0654 12/07/22  1843 12/06/22  1108   SODIUM 133* 137 132* 136   POTASSIUM 3.6 3.7 3.7 3.9   CHLORIDE 102 102 98 96*   TOTAL CO2  --   --   --  25.7   CO2 25.0 25.0 24.0  --    ANION GAP 6.0 10.0 10.0  --    BUN 12 17 21 32*   CREATININE 0.46* 0.46* 0.48* 0.61   EGFR 100.6 100.6 99.5  --    GLUCOSE 185* 105* 183* 235*   CALCIUM 7.6* 8.0* 8.8 8.5*         Lab 12/07/22  1843 12/06/22  1108   TOTAL PROTEIN 6.0  --    ALBUMIN 3.80 3.40*   GLOBULIN 2.2  --    ALT (SGPT) 28 24   AST (SGOT) 15 13   BILIRUBIN 0.5 0.7   ALK PHOS 88 68         Lab 12/07/22  1843   PROTIME 12.8   INR 0.96             Lab 12/08/22  1805 12/07/22  1843 12/07/22  1406 12/07/22  1406 12/07/22  0840   IRON  --  39   < > 88  --    IRON SATURATION  --  11*   < > 25 19*   TIBC  --  370   < > 358 352   TRANSFERRIN  --  248   < > 240  --    FERRITIN  --   --   --  166.00* 192.00*   UIBC  --   --   --   --  285   FOLATE  --  18.00  --   --   --    VITAMIN B 12  --  328  --   --   --    ABO TYPING O  --   --   --   --    RH TYPING Positive  --   --   --   --    ANTIBODY SCREEN Negative  --   --   --   --     < > = values in this interval not displayed.         Brief Urine Lab Results  (Last result in the past 365 days)      Color   Clarity   Blood   Leuk Est   Nitrite   Protein   CREAT   Urine HCG        12/07/22 2030 Yellow   Clear   Small (1+)   Moderate (2+)   Positive   Trace               Microbiology Results (last 10 days)     Procedure Component Value - Date/Time    COVID-19, FLU A/B, RSV PCR - Swab, Nasopharynx [[id number]]  (Normal) Collected: 12/07/22 1844    Lab Status: Final result Specimen: Swab from Nasopharynx Updated: 12/07/22 1932     COVID19 Not Detected      Influenza A PCR Not Detected     Influenza B PCR Not Detected     RSV, PCR Not Detected    Narrative:      Fact sheet for providers: https://www.fda.gov/media/235498/download    Fact sheet for patients: https://www.fda.gov/media/789760/download    Test performed by PCR.    Blood Culture - Blood, Arm, Left [[id number]]  (Normal) Collected: 12/07/22 1843    Lab Status: Preliminary result Specimen: Blood from Arm, Left Updated: 12/08/22 1901     Blood Culture No growth at 24 hours    Blood Culture - Blood, Arm, Right [[id number]]  (Normal) Collected: 12/07/22 1843    Lab Status: Preliminary result Specimen: Blood from Arm, Right Updated: 12/08/22 1901     Blood Culture No growth at 24 hours          Chief Complaint on Day of Discharge: Tolerating a diet.  No further signs of bleeding.  Denies any acute complaints.  Feels ready for discharge.    Hospital Course:  The patient is a 72 y.o. female who presented to [facility] on 12/7 with dark tarry stools over the 2-3 months. She denied history of stomach ulcers, coffee-ground emesis or previous blood transfusion from GI bleed.  Reports no history of any NSAID use and denies any anticoagulation.  Her primary care provider has been monitoring her H&H, hemoglobin at office visit was 8.8 on 12/06.  Aspirin was discontinued and she was started on Protonix daily.  CT abdomen pelvis showed wall thickening of the stomach likely due to underdistention.  Some edema around the gastric antrum and proximal duodenum that could be due to ulcer disease or inflammatory change.     He was seen in consultation by Dr. Hernández with gastroenterology.  She was started on Protonix IV twice daily.  She underwent upper GI endoscopy on 12/8/2022, nonobstructing nonbleeding duodenal ulcer with a clean ulcer base.  Recommends to continue Protonix oral twice daily.  She is okay for discharge from gastroenterology standpoint with close follow-up in 1-2 weeks.     Hemoglobin 8.2 on admission.  "Hemoglobin in November was 14.6 and down to 8.8 on 12/6.  Follow-up hemoglobin 6.7.  No signs of active bleeding.  Will transfuse 1 unit packed red blood cells repeat H&H prior to discharge. Repeat hemoglobin 7.9.     A1c 11.30 11/18/2022.  Was recently admitted in November for DKA.  She was seen by diabetes educator.  She was prescribed a Dexcom with recommendations to continue Levemir 30 units twice daily and sliding scale insulin before meals.     SCD for DVT prophylaxis.      Tolerating a diet.  No further signs of bleeding.  Denies any acute complaints.  Feels ready for discharge.  She has reached maximum benefit of hospitalization.  She is medically stable and appropriate for discharge today.    Condition on Discharge:  Medically stable.    Physical Exam on Discharge:  /53   Pulse 93   Temp 98.2 °F (36.8 °C) (Oral)   Resp 16   Ht 160 cm (63\")   Wt 95.3 kg (210 lb)   SpO2 99%   BMI 37.20 kg/m²   Physical Exam  Vitals reviewed.   Constitutional:       General: She is not in acute distress.     Appearance: She is obese. She is not ill-appearing or toxic-appearing.      Comments: Lying in bed. No acute distress.  at bedside. Nurse at bedside.   HENT:      Head: Normocephalic and atraumatic.      Mouth/Throat:      Mouth: Mucous membranes are moist.      Pharynx: Oropharynx is clear.   Eyes:      Extraocular Movements: Extraocular movements intact.      Conjunctiva/sclera: Conjunctivae normal.      Pupils: Pupils are equal, round, and reactive to light.   Cardiovascular:      Rate and Rhythm: Normal rate and regular rhythm.      Pulses: Normal pulses.   Pulmonary:      Effort: Pulmonary effort is normal. No respiratory distress.      Breath sounds: Normal breath sounds. No wheezing.   Abdominal:      General: Bowel sounds are normal. There is no distension.      Palpations: Abdomen is soft.      Tenderness: There is no abdominal tenderness.   Musculoskeletal:         General: No swelling or " tenderness. Normal range of motion.      Cervical back: Normal range of motion and neck supple. No muscular tenderness.   Skin:     General: Skin is warm and dry.      Findings: No erythema or rash.   Neurological:      General: No focal deficit present.      Mental Status: She is alert. Mental status is at baseline.      Cranial Nerves: No cranial nerve deficit.      Motor: No weakness.      Comments: Hx of underlying dementia per    Psychiatric:         Mood and Affect: Mood normal.         Behavior: Behavior normal.    [place] [facility] [place] Medications:     Discharge Medications      Changes to Medications      Instructions Start Date   pantoprazole 40 MG EC tablet  Commonly known as: PROTONIX  What changed: when to take this   40 mg, Oral, 2 Times Daily Before Meals         Continue These Medications      Instructions Start Date   albuterol sulfate  (90 Base) MCG/ACT inhaler  Commonly known as: PROVENTIL HFA;VENTOLIN HFA;PROAIR HFA   2 puffs, Inhalation, Every 4 Hours PRN      amLODIPine 5 MG tablet  Commonly known as: NORVASC   5 mg, Oral, Daily      atorvastatin 20 MG tablet  Commonly known as: LIPITOR   20 mg, Oral, Daily      budesonide-formoterol 160-4.5 MCG/ACT inhaler  Commonly known as: SYMBICORT   2 puffs, Inhalation, 2 Times Daily - RT      Calcium-Vitamin D 600-400 MG-UNIT tablet   1 tablet, Oral, 2 Times Daily      carboxymethylcellulose 0.5 % solution  Commonly known as: REFRESH PLUS   2 drops, Daily PRN      Cholecalciferol 25 MCG (1000 UT) capsule   1 capsule, Oral, Daily      denosumab 60 MG/ML solution syringe  Commonly known as: PROLIA   60 mg, Subcutaneous, Every 6 Months      DULoxetine 20 MG capsule  Commonly known as: CYMBALTA   20 mg, Oral, Daily      GLUCOSAMINE CHONDROITIN TRIPLE PO   1 tablet, Oral, 2 Times Daily      guaiFENesin 600 MG 12 hr tablet  Commonly known as: MUCINEX   600 mg, Oral, 2 Times Daily      insulin detemir 100  UNIT/ML injection  Commonly known as: LEVEMIR   30 Units, Subcutaneous, Every 12 Hours Scheduled      Insulin Lispro 100 UNIT/ML injection  Commonly known as: humaLOG   Inject 0-14 Units under the skin into the appropriate area as directed 3 (Three) Times a Day Before Meals.      irbesartan 300 MG tablet  Commonly known as: AVAPRO   300 mg, Oral, Daily      latanoprost 0.005 % ophthalmic solution  Commonly known as: XALATAN   1 drop, Both Eyes, Nightly      montelukast 10 MG tablet  Commonly known as: SINGULAIR   10 mg, Oral, Nightly      multivitamin with minerals tablet tablet   1 tablet, Oral, Daily      potassium chloride 10 MEQ CR tablet   10 mEq, Oral, Daily      Semaglutide (2 MG/DOSE) 8 MG/3ML solution pen-injector   2 mg, Subcutaneous, Weekly         Stop These Medications    aspirin 81 MG EC tablet            Discharge Diet:   Diet Instructions     Diet: Regular, Consistent Carbohydrate, Cardiac      Discharge Diet:  Regular  Consistent Carbohydrate  Cardiac             Activity at Discharge:   Activity Instructions     Activity as Tolerated            Discharge Care Plan/Instructions:   1.  Home with home health as previous.  2.  Protonix oral twice daily.  3.  Seek evaluation for worsening symptoms.    Follow-up Appointments:   1.  Follow-up with Dr. Crowe in 1 week.  2.  Follow-up with primary gastroenterologist (Dr. Mcnally) in 3-4 weeks.  Future Appointments   Date Time Provider Department Center   12/12/2022  9:00 AM Mitch Fermin PTA [facility]   12/14/2022 To Be Determined Mitch Fermin PTA [facility]   12/19/2022 To Be Determined Mitch Fermin PTA [facility]   12/20/2022  8:40 AM [facility]   12/21/2022 To Be Determined Mitch Fermin PTA  [facility]   12/27/2022 11:30 AM Len Crowe MD [facility]   1/5/2023  1:30 PM Len Crowe MD [facility]   1/16/2023 10:15 AM Huang Giraldo MD [facility]       Test  Results Pending at Discharge: none.    Electronically signed by FABBY Williamson, 12/09/22, 10:30 CST.    Time: 35 minutes.

## 2022-12-09 NOTE — TELEPHONE ENCOUNTER
Pt is being discharged and wanted to see about orders for the weekend,pt was not discharged from home health and will resume her care and will do H&H on sunday

## 2022-12-09 NOTE — NURSING NOTE
Rosanna YOUNG remained at bedside during first 15 minutes of blood transfusion. Rosanna YOUNG reported to Iva YOUNG that after rate change to 150 mL/hr IV started leaking. Blood was paused. IV was flushed and IV was leaking the saline flush. IV was removed. Iva YOUNG attempted x2 to restart IV. House supervisor Lilliana YOUNG was called and she was able to successfully get IV in x1 attempt. Blood was restarted at 1030 without complications with IV.

## 2022-12-10 ENCOUNTER — READMISSION MANAGEMENT (OUTPATIENT)
Dept: CALL CENTER | Facility: HOSPITAL | Age: 72
End: 2022-12-10

## 2022-12-10 LAB
BH BB BLOOD EXPIRATION DATE: NORMAL
BH BB BLOOD TYPE BARCODE: 5100
BH BB DISPENSE STATUS: NORMAL
BH BB PRODUCT CODE: NORMAL
BH BB UNIT NUMBER: NORMAL
CROSSMATCH INTERPRETATION: NORMAL
UNIT  ABO: NORMAL
UNIT  RH: NORMAL

## 2022-12-10 NOTE — OUTREACH NOTE
Prep Survey    Flowsheet Row Responses   [facility] facility patient discharged from? [place]   Is LACE score < 7 ? No   Emergency Room discharge w/ pulse ox? No   Eligibility [facility]   Date of Admission 12/07/22   Date of Discharge 12/09/22   Discharge Disposition Home or Self Care   Discharge diagnosis GI hemorrhage with melena, T2DM, duodenal ulcer, anemia   Does the patient have one of the following disease processes/diagnoses(primary or secondary)? Other   Does the patient have Home health ordered? No   Is there a DME ordered? No   Prep survey completed? Yes          ARRON OLIVO - Registered Nurse

## 2022-12-11 ENCOUNTER — LAB REQUISITION (OUTPATIENT)
Dept: LAB | Facility: HOSPITAL | Age: 72
End: 2022-12-11

## 2022-12-11 ENCOUNTER — HOME CARE VISIT (OUTPATIENT)
Dept: HOME HEALTH SERVICES | Facility: CLINIC | Age: 72
End: 2022-12-11
Payer: MEDICARE

## 2022-12-11 DIAGNOSIS — Z00.00 ENCOUNTER FOR GENERAL ADULT MEDICAL EXAMINATION WITHOUT ABNORMAL FINDINGS: ICD-10-CM

## 2022-12-11 LAB
ALBUMIN SERPL-MCNC: 3.4 G/DL (ref 3.5–5.2)
ALBUMIN/GLOB SERPL: 1.5 G/DL
ALP SERPL-CCNC: 83 U/L (ref 39–117)
ALT SERPL W P-5'-P-CCNC: 30 U/L (ref 1–33)
ANION GAP SERPL CALCULATED.3IONS-SCNC: 9 MMOL/L (ref 5–15)
ANISOCYTOSIS BLD QL: ABNORMAL
AST SERPL-CCNC: 19 U/L (ref 1–32)
BILIRUB SERPL-MCNC: 0.5 MG/DL (ref 0–1.2)
BUN SERPL-MCNC: 6 MG/DL (ref 8–23)
BUN/CREAT SERPL: 16.7 (ref 7–25)
CALCIUM SPEC-SCNC: 8.1 MG/DL (ref 8.6–10.5)
CHLORIDE SERPL-SCNC: 100 MMOL/L (ref 98–107)
CLUMPED PLATELETS: PRESENT
CO2 SERPL-SCNC: 25 MMOL/L (ref 22–29)
CREAT SERPL-MCNC: 0.36 MG/DL (ref 0.57–1)
DEPRECATED RDW RBC AUTO: 56.7 FL (ref 37–54)
EGFRCR SERPLBLD CKD-EPI 2021: 106.7 ML/MIN/1.73
ERYTHROCYTE [DISTWIDTH] IN BLOOD BY AUTOMATED COUNT: 17.3 % (ref 12.3–15.4)
GIANT PLATELETS: ABNORMAL
GLOBULIN UR ELPH-MCNC: 2.2 GM/DL
GLUCOSE SERPL-MCNC: 226 MG/DL (ref 65–99)
HCT VFR BLD AUTO: 27.2 % (ref 34–46.6)
HGB BLD-MCNC: 8.9 G/DL (ref 12–15.9)
LYMPHOCYTES # BLD MANUAL: 0.86 10*3/MM3 (ref 0.7–3.1)
LYMPHOCYTES NFR BLD MANUAL: 8.1 % (ref 5–12)
MCH RBC QN AUTO: 30.5 PG (ref 26.6–33)
MCHC RBC AUTO-ENTMCNC: 32.7 G/DL (ref 31.5–35.7)
MCV RBC AUTO: 93.2 FL (ref 79–97)
MONOCYTES # BLD: 0.41 10*3/MM3 (ref 0.1–0.9)
NEUTROPHILS # BLD AUTO: 3.74 10*3/MM3 (ref 1.7–7)
NEUTROPHILS NFR BLD MANUAL: 69.7 % (ref 42.7–76)
NEUTS BAND NFR BLD MANUAL: 5.1 % (ref 0–5)
NRBC SPEC MANUAL: 3 /100 WBC (ref 0–0.2)
PLATELET # BLD AUTO: 351 10*3/MM3 (ref 140–450)
PMV BLD AUTO: 8.8 FL (ref 6–12)
POLYCHROMASIA BLD QL SMEAR: ABNORMAL
POTASSIUM SERPL-SCNC: 3.4 MMOL/L (ref 3.5–5.2)
PROT SERPL-MCNC: 5.6 G/DL (ref 6–8.5)
RBC # BLD AUTO: 2.92 10*6/MM3 (ref 3.77–5.28)
SODIUM SERPL-SCNC: 134 MMOL/L (ref 136–145)
VARIANT LYMPHS NFR BLD MANUAL: 13.1 % (ref 19.6–45.3)
VARIANT LYMPHS NFR BLD MANUAL: 4 % (ref 0–5)
WBC MORPH BLD: NORMAL
WBC NRBC COR # BLD: 5.01 10*3/MM3 (ref 3.4–10.8)

## 2022-12-11 PROCEDURE — 85027 COMPLETE CBC AUTOMATED: CPT | Performed by: FAMILY MEDICINE

## 2022-12-11 PROCEDURE — G0299 HHS/HOSPICE OF RN EA 15 MIN: HCPCS

## 2022-12-11 PROCEDURE — 85007 BL SMEAR W/DIFF WBC COUNT: CPT | Performed by: FAMILY MEDICINE

## 2022-12-11 PROCEDURE — 80053 COMPREHEN METABOLIC PANEL: CPT | Performed by: FAMILY MEDICINE

## 2022-12-12 ENCOUNTER — TRANSITIONAL CARE MANAGEMENT TELEPHONE ENCOUNTER (OUTPATIENT)
Dept: CALL CENTER | Facility: HOSPITAL | Age: 72
End: 2022-12-12

## 2022-12-12 ENCOUNTER — HOME CARE VISIT (OUTPATIENT)
Dept: HOME HEALTH SERVICES | Facility: CLINIC | Age: 72
End: 2022-12-12
Payer: MEDICARE

## 2022-12-12 VITALS
TEMPERATURE: 97.9 F | SYSTOLIC BLOOD PRESSURE: 118 MMHG | OXYGEN SATURATION: 98 % | DIASTOLIC BLOOD PRESSURE: 70 MMHG | HEART RATE: 78 BPM | RESPIRATION RATE: 20 BRPM

## 2022-12-12 VITALS
TEMPERATURE: 98.4 F | DIASTOLIC BLOOD PRESSURE: 70 MMHG | SYSTOLIC BLOOD PRESSURE: 138 MMHG | RESPIRATION RATE: 12 BRPM | HEART RATE: 88 BPM | OXYGEN SATURATION: 97 %

## 2022-12-12 LAB
BACTERIA SPEC AEROBE CULT: NORMAL
BACTERIA SPEC AEROBE CULT: NORMAL
CYTO UR: NORMAL
LAB AP CASE REPORT: NORMAL
Lab: NORMAL
PATH REPORT.ADDENDUM SPEC: NORMAL
PATH REPORT.FINAL DX SPEC: NORMAL
PATH REPORT.GROSS SPEC: NORMAL

## 2022-12-12 PROCEDURE — G0157 HHC PT ASSISTANT EA 15: HCPCS

## 2022-12-12 NOTE — HOME HEALTH
"COVID SCREENING: no s/s  FOCUS OF CARE/SKILLED NEED: gait, ther ex  TEACHING/INTERVENTIONS: HEP  PROGRESS TOWARD GOALS: pt is progressing toward goals  PHYSICIAN CONTACT: n/a  INSURANCE CHANGES: no  FALLS SINCE LAST HH VISIT? no  MEDICATION CHANGES SINCE LAST HH VISIT? no  PLAN FOR NEXT VISIT: gait, ther ex    Pt says that she is still feeling \"draggy\". Pt was in the hospital during the past week as obs status. Pt had a blood transfusion while there."

## 2022-12-12 NOTE — OUTREACH NOTE
Call Center TCM Note    Flowsheet Row Responses   [facility] patient discharged from? [facility]   Does the patient have one of the following disease processes/diagnoses(primary or secondary)? Other   TCM attempt successful? Yes   Call start time 1451   Call end time 1453   Discharge diagnosis GI hemorrhage with melena, T2DM, duodenal ulcer, anemia   Meds reviewed with patient/caregiver? Yes   Is the patient having any side effects they believe may be caused by any medication additions or changes? No   Does the patient have all medications ordered at discharge? Yes   Is the patient taking all medications as directed (includes completed medication regime)? Yes   Does the patient have an appointment with their PCP within 7 days of discharge? No   Nursing Interventions Assisted patient with making appointment per protocol   Has home health visited the patient within 72 hours of discharge? N/A   Psychosocial issues? No   Did the patient receive a copy of their discharge instructions? Yes   Nursing interventions Reviewed instructions with patient   What is the patient's perception of their health status since discharge? Improving   Is the patient/caregiver able to teach back signs and symptoms related to disease process for when to call PCP? Yes   Is the patient/caregiver able to teach back signs and symptoms related to disease process for when to call 911? Yes   Is the patient/caregiver able to teach back the hierarchy of who to call/visit for symptoms/problems? PCP, Specialist, Home health nurse, Urgent Care, ED, 911 Yes   If the patient is a current smoker, are they able to teach back resources for cessation? Not a smoker   TCM call completed? Yes   Call end time 1453   Would this patient benefit from a Referral to Amb Social Work? No   Is the patient interested in additional calls from an ambulatory ?  NOTE:  applies to high risk patients requiring additional follow-up. No          Sherry Watson,  LPN    12/12/2022, 14:55 CST

## 2022-12-12 NOTE — OUTREACH NOTE
Call Center TCM Note    Flowsheet Row Responses   [facility] [place] patient discharged from? [place]   Does the patient have one of the following disease processes/diagnoses(primary or secondary)? Other   TCM attempt successful? No   Unsuccessful attempts Attempt 1          Sherry Watson LPN    12/12/2022, 09:36 CST

## 2022-12-13 NOTE — HOME HEALTH
"FOCUS OF VISIT: CPA/EDUCATION:     TEACHING/INTERVENTIONS: AAO X 3. PT DENIES CHEST PAIN, SOB OR FALLS.      PATIENT EDUCATION: SN VISIT FOR LAB DRAW, CBC AND CMP DRAWN IN THE RIGHT WRIST PER [employer]TER[employer] PT LISHA WELL SPECIMANS LABLED AND TAKEN TO [facility] IN [place] FOR DR JENNYFER SMART RN NOTIFIED    SHORT TERM GOAL: NO FALLS/NO HOSPITAL VISITS    LONG TERM GOAL:  DC TO FAMILY/SELF CARE WHEN GOALS MET.     MD CONTACT: NONE    INSURANCE CHANGES: PT DENIES    PT DENIES ANY S/S OF COVID 19. PT/CG STATE THEY HAVE BEEN ADHEARING CDC GUIDLINES AND  SOCIAL DISTANCING AND WEARING FACE COVERINGS/MASK       Medication Knowledge:  \"             Have there been any changes to the pt's medication?NO  \"             Is pt aware of purpose of each medication? YES  \"             Is pt aware of side effects of each medication? YES  \"             Has the pt had any adverse reactions to any medication? NO"

## 2022-12-14 ENCOUNTER — HOME CARE VISIT (OUTPATIENT)
Dept: HOME HEALTH SERVICES | Facility: CLINIC | Age: 72
End: 2022-12-14
Payer: MEDICARE

## 2022-12-14 VITALS
HEART RATE: 72 BPM | RESPIRATION RATE: 18 BRPM | SYSTOLIC BLOOD PRESSURE: 144 MMHG | DIASTOLIC BLOOD PRESSURE: 84 MMHG | TEMPERATURE: 98.1 F | OXYGEN SATURATION: 98 %

## 2022-12-14 VITALS
RESPIRATION RATE: 16 BRPM | TEMPERATURE: 98.2 F | SYSTOLIC BLOOD PRESSURE: 144 MMHG | HEART RATE: 84 BPM | DIASTOLIC BLOOD PRESSURE: 84 MMHG

## 2022-12-14 DIAGNOSIS — E11.65 UNCONTROLLED TYPE 2 DIABETES MELLITUS WITH HYPERGLYCEMIA: ICD-10-CM

## 2022-12-14 DIAGNOSIS — E11.9 INSULIN DEPENDENT TYPE 2 DIABETES MELLITUS: Primary | ICD-10-CM

## 2022-12-14 DIAGNOSIS — Z79.4 INSULIN DEPENDENT TYPE 2 DIABETES MELLITUS: Primary | ICD-10-CM

## 2022-12-14 PROCEDURE — G0299 HHS/HOSPICE OF RN EA 15 MIN: HCPCS

## 2022-12-14 PROCEDURE — G0157 HHC PT ASSISTANT EA 15: HCPCS

## 2022-12-14 RX ORDER — PEN NEEDLE, DIABETIC 31 G X1/4"
1 NEEDLE, DISPOSABLE MISCELLANEOUS
Qty: 100 EACH | Refills: 5 | Status: SHIPPED | OUTPATIENT
Start: 2022-12-14

## 2022-12-14 RX ORDER — INSULIN LISPRO 100 [IU]/ML
7 INJECTION, SOLUTION INTRAVENOUS; SUBCUTANEOUS 3 TIMES DAILY
Qty: 20 ML | Refills: 1 | Status: SHIPPED | OUTPATIENT
Start: 2022-12-14

## 2022-12-14 NOTE — TELEPHONE ENCOUNTER
[facility], need to know the sliding dosing for her Humalog and she needs new prescription for pen type, also needs pen needles    Rx Refill Note  Requested Prescriptions     Pending Prescriptions Disp Refills   • insulin detemir (LEVEMIR) 100 UNIT/ML injection 60 mL 1     Sig: Inject 30 Units under the skin into the appropriate area as directed Every 12 (Twelve) Hours. Provide 90 day if possible   • Insulin Lispro, 1 Unit Dial, (HumaLOG KwikPen) 100 UNIT/ML solution pen-injector 20 mL 1     Sig: Inject 7 Units under the skin into the appropriate area as directed 3 (Three) Times a Day. Max units 21 per 24 hours   • Insulin Pen Needle (MM Pen Needles) 31G X 6 MM misc 100 each 5     Si each 5 (Five) Times a Day. BID with Levemir, use TID with Humalog prn      Last office visit with prescribing clinician: 2022      Next office visit with prescribing clinician: 2022            Vivien Mercado LPN  22, 10:20 CST

## 2022-12-14 NOTE — HOME HEALTH
COVID SCREENING: no s/s  FOCUS OF CARE/SKILLED NEED: gait, ther ex  TEACHING/INTERVENTIONS: HEP  PROGRESS TOWARD GOALS: pt is progressing toward goals  PHYSICIAN CONTACT: n/a  INSURANCE CHANGES: no  FALLS SINCE LAST HH VISIT? no  MEDICATION CHANGES SINCE LAST HH VISIT? no  PLAN FOR NEXT VISIT: gait, balance activities    Pt says that she is feeling better today than she has been recently.

## 2022-12-14 NOTE — HOME HEALTH
FOCUS OF CARE/SKILLED NEED: CPA/EDUCATION    TEACHING/INTERVENTIONS: Pt ambulatory in home upon sn arrival. States she is feeling fine, however, when her hgb dropped. Reviewed all s/sx of low hemoglobin, ie weakness, chills, palor, vertigo. Reviewed all s/sx of potential GI bleed, visible blood in stools, coffee ground appearing stool in the commode, abdo pain, abdo distention, tenderness, pain. Pt states she is just a little more tired when her hgb dropped last time. pt blood sugar log reviewed. Pt sensor fell off yesterday and will be replacing today. Results on the diabetic form. Pt did not take her ozempic this past week, but is injecting today. Pt has not been doing her sliding scale insulin. Explained checking sugar before breakfast, lunch, and dinner and taking humalog as directed per the sliding scale. Written directions left and placed on patient refrigerator. Pt verb understanding. Assessment completed and wnl for today. pt will require further education and assessment of diabetic management.     PROGRESS TOWARD GOALS: Progressing as expected.     PHYSICIAN CONTACT:  MARIO dang at Dr. Crowe office to get rx for insulin pens to be filled next time instead of the needles and bottles. Sliding scale obtained.     INSURANCE CHANGES?  Denies    FALLS SINCE LAST VISIT?  Denies    MEDICATION CHANGES? Denies    PLAN FOR NEXT VISIT: Assess blood sugars and pt compliance with insulin use.     PRE-SCREENED FOR COVID? No s/s

## 2022-12-19 ENCOUNTER — LAB REQUISITION (OUTPATIENT)
Dept: LAB | Facility: HOSPITAL | Age: 72
End: 2022-12-19

## 2022-12-19 ENCOUNTER — HOME CARE VISIT (OUTPATIENT)
Dept: HOME HEALTH SERVICES | Facility: CLINIC | Age: 72
End: 2022-12-19
Payer: MEDICARE

## 2022-12-19 VITALS
OXYGEN SATURATION: 97 % | DIASTOLIC BLOOD PRESSURE: 70 MMHG | RESPIRATION RATE: 20 BRPM | HEART RATE: 78 BPM | TEMPERATURE: 97.9 F | SYSTOLIC BLOOD PRESSURE: 120 MMHG

## 2022-12-19 VITALS
SYSTOLIC BLOOD PRESSURE: 148 MMHG | TEMPERATURE: 97.5 F | RESPIRATION RATE: 18 BRPM | HEART RATE: 94 BPM | DIASTOLIC BLOOD PRESSURE: 76 MMHG | OXYGEN SATURATION: 97 %

## 2022-12-19 DIAGNOSIS — Z00.00 ENCOUNTER FOR GENERAL ADULT MEDICAL EXAMINATION WITHOUT ABNORMAL FINDINGS: ICD-10-CM

## 2022-12-19 LAB
ALBUMIN SERPL-MCNC: 3.7 G/DL (ref 3.5–5.2)
ALBUMIN/GLOB SERPL: 1.5 G/DL
ALP SERPL-CCNC: 98 U/L (ref 39–117)
ALT SERPL W P-5'-P-CCNC: 23 U/L (ref 1–33)
ANION GAP SERPL CALCULATED.3IONS-SCNC: 11 MMOL/L (ref 5–15)
ANISOCYTOSIS BLD QL: ABNORMAL
AST SERPL-CCNC: 18 U/L (ref 1–32)
BILIRUB SERPL-MCNC: 0.8 MG/DL (ref 0–1.2)
BUN SERPL-MCNC: 8 MG/DL (ref 8–23)
BUN/CREAT SERPL: 17.4 (ref 7–25)
CALCIUM SPEC-SCNC: 8.6 MG/DL (ref 8.6–10.5)
CHLORIDE SERPL-SCNC: 98 MMOL/L (ref 98–107)
CO2 SERPL-SCNC: 24 MMOL/L (ref 22–29)
CREAT SERPL-MCNC: 0.46 MG/DL (ref 0.57–1)
DEPRECATED RDW RBC AUTO: 57.1 FL (ref 37–54)
EGFRCR SERPLBLD CKD-EPI 2021: 100.6 ML/MIN/1.73
ERYTHROCYTE [DISTWIDTH] IN BLOOD BY AUTOMATED COUNT: 17.2 % (ref 12.3–15.4)
GLOBULIN UR ELPH-MCNC: 2.5 GM/DL
GLUCOSE SERPL-MCNC: 300 MG/DL (ref 65–99)
HBA1C MFR BLD: 7.8 % (ref 4.8–5.6)
HCT VFR BLD AUTO: 32.2 % (ref 34–46.6)
HGB BLD-MCNC: 9.9 G/DL (ref 12–15.9)
IRON 24H UR-MRATE: 75 MCG/DL (ref 37–145)
LYMPHOCYTES # BLD MANUAL: 0.66 10*3/MM3 (ref 0.7–3.1)
LYMPHOCYTES NFR BLD MANUAL: 9 % (ref 5–12)
MAGNESIUM SERPL-MCNC: 1.8 MG/DL (ref 1.6–2.4)
MCH RBC QN AUTO: 28.9 PG (ref 26.6–33)
MCHC RBC AUTO-ENTMCNC: 30.7 G/DL (ref 31.5–35.7)
MCV RBC AUTO: 93.9 FL (ref 79–97)
MONOCYTES # BLD: 0.4 10*3/MM3 (ref 0.1–0.9)
MYELOCYTES NFR BLD MANUAL: 1 % (ref 0–0)
NEUTROPHILS # BLD AUTO: 3.32 10*3/MM3 (ref 1.7–7)
NEUTROPHILS NFR BLD MANUAL: 57 % (ref 42.7–76)
NEUTS BAND NFR BLD MANUAL: 18 % (ref 0–5)
NRBC SPEC MANUAL: 1 /100 WBC (ref 0–0.2)
PHOSPHATE SERPL-MCNC: 2.9 MG/DL (ref 2.5–4.5)
PLAT MORPH BLD: NORMAL
PLATELET # BLD AUTO: 270 10*3/MM3 (ref 140–450)
PMV BLD AUTO: 9.3 FL (ref 6–12)
POLYCHROMASIA BLD QL SMEAR: ABNORMAL
POTASSIUM SERPL-SCNC: 3.8 MMOL/L (ref 3.5–5.2)
PROT SERPL-MCNC: 6.2 G/DL (ref 6–8.5)
RBC # BLD AUTO: 3.43 10*6/MM3 (ref 3.77–5.28)
SODIUM SERPL-SCNC: 133 MMOL/L (ref 136–145)
VARIANT LYMPHS NFR BLD MANUAL: 12 % (ref 19.6–45.3)
VARIANT LYMPHS NFR BLD MANUAL: 3 % (ref 0–5)
WBC MORPH BLD: NORMAL
WBC NRBC COR # BLD: 4.42 10*3/MM3 (ref 3.4–10.8)

## 2022-12-19 PROCEDURE — 83735 ASSAY OF MAGNESIUM: CPT | Performed by: FAMILY MEDICINE

## 2022-12-19 PROCEDURE — 80053 COMPREHEN METABOLIC PANEL: CPT | Performed by: FAMILY MEDICINE

## 2022-12-19 PROCEDURE — 83036 HEMOGLOBIN GLYCOSYLATED A1C: CPT | Performed by: FAMILY MEDICINE

## 2022-12-19 PROCEDURE — 85007 BL SMEAR W/DIFF WBC COUNT: CPT | Performed by: FAMILY MEDICINE

## 2022-12-19 PROCEDURE — G0157 HHC PT ASSISTANT EA 15: HCPCS

## 2022-12-19 PROCEDURE — 84100 ASSAY OF PHOSPHORUS: CPT | Performed by: FAMILY MEDICINE

## 2022-12-19 PROCEDURE — G0300 HHS/HOSPICE OF LPN EA 15 MIN: HCPCS

## 2022-12-19 PROCEDURE — 83540 ASSAY OF IRON: CPT | Performed by: FAMILY MEDICINE

## 2022-12-19 PROCEDURE — 85027 COMPLETE CBC AUTOMATED: CPT | Performed by: FAMILY MEDICINE

## 2022-12-19 NOTE — HOME HEALTH
COVID SCREENING: no s/s  FOCUS OF CARE/SKILLED NEED: gait, balance  TEACHING/INTERVENTIONS: balance activities  PROGRESS TOWARD GOALS: pt is progressing toward goals  PHYSICIAN CONTACT: n/a  INSURANCE CHANGES: no  FALLS SINCE LAST HH VISIT? no  MEDICATION CHANGES SINCE LAST HH VISIT? no  PLAN FOR NEXT VISIT: address remaining goals and d/c    Pt says that she is moving slow, but doing ok.

## 2022-12-20 ENCOUNTER — HOME CARE VISIT (OUTPATIENT)
Dept: HOME HEALTH SERVICES | Facility: HOME HEALTHCARE | Age: 72
End: 2022-12-20
Payer: MEDICARE

## 2022-12-20 ENCOUNTER — OFFICE VISIT (OUTPATIENT)
Dept: FAMILY MEDICINE CLINIC | Facility: CLINIC | Age: 72
End: 2022-12-20

## 2022-12-20 VITALS
WEIGHT: 211 LBS | OXYGEN SATURATION: 97 % | DIASTOLIC BLOOD PRESSURE: 88 MMHG | HEART RATE: 103 BPM | RESPIRATION RATE: 14 BRPM | HEIGHT: 63 IN | BODY MASS INDEX: 37.39 KG/M2 | SYSTOLIC BLOOD PRESSURE: 138 MMHG

## 2022-12-20 DIAGNOSIS — R26.9 GAIT DIFFICULTY: ICD-10-CM

## 2022-12-20 DIAGNOSIS — K26.9 DUODENAL ULCER: ICD-10-CM

## 2022-12-20 DIAGNOSIS — R53.1 GENERAL WEAKNESS: ICD-10-CM

## 2022-12-20 DIAGNOSIS — I10 HTN (HYPERTENSION), BENIGN: Chronic | ICD-10-CM

## 2022-12-20 DIAGNOSIS — K92.1 GASTROINTESTINAL HEMORRHAGE WITH MELENA: ICD-10-CM

## 2022-12-20 DIAGNOSIS — E61.1 IRON DEFICIENCY: ICD-10-CM

## 2022-12-20 DIAGNOSIS — D64.9 ANEMIA, UNSPECIFIED TYPE: ICD-10-CM

## 2022-12-20 DIAGNOSIS — Z91.89 AT RISK FOR MEDICATION NONCOMPLIANCE: ICD-10-CM

## 2022-12-20 DIAGNOSIS — E11.9 CONTROLLED TYPE 2 DIABETES MELLITUS WITHOUT COMPLICATION, WITHOUT LONG-TERM CURRENT USE OF INSULIN: Chronic | ICD-10-CM

## 2022-12-20 DIAGNOSIS — Z79.01 ANTICOAGULATED: ICD-10-CM

## 2022-12-20 PROCEDURE — 99495 TRANSJ CARE MGMT MOD F2F 14D: CPT | Performed by: FAMILY MEDICINE

## 2022-12-20 PROCEDURE — 1111F DSCHRG MED/CURRENT MED MERGE: CPT | Performed by: FAMILY MEDICINE

## 2022-12-20 NOTE — PROGRESS NOTES
Transitional Care Follow Up Visit  Subjective     Kezia Brandee Otoole is a 72 y.o. female who presents for a transitional care management visit. With .    Within 48 business hours after discharge our office contacted her via telephone to coordinate her care and needs.      I reviewed and discussed the details of that call along with the discharge summary, hospital problems, inpatient lab results, inpatient diagnostic studies, and consultation reports with Kezia.     Current outpatient and discharge medications have been reconciled for the patient.    Date of TCM Phone Call 11/22/2022 12/10/2022   [facility]   Date of Admission 11/18/2022 12/7/2022   Date of Discharge 11/22/2022 12/9/2022   Discharge Disposition Home-Health Care [facility] or Self Care     • **Gastrointestinal hemorrhage with melena     • Class 2 obesity in adult     • Type 2 diabetes mellitus with hyperglycemia, with long-term current use of insulin (HCC)     • Anemia     • Duodenal ulcer      Risk for Readmission (LACE) Score: 10 (12/9/2022  5:00 AM)    History of Present Illness after her last visit we confirm she was significantly anemic and combined with her history most likely bleeding.  She was contacted and sent to [facility] ER and confirmed to probably have a GI bleed.  She had already been taken off of aspirin and placed on a PPI by us.    Course During Hospital Stay: In-house GI found a duodenal ulcer with no current bleeding.  Her hemoglobin was stabilized; she was sent home without iron.    12.6.22 visit  1. General weakness    2. Incontinence of feces, unspecified fecal incontinence type    3. Melena    4. Gait difficulty    5. History of COVID-19    6. Memory loss    7. Anticoagulated DM2/ASA 81 (11.9.17 pt chose qod)    8. Uncontrolled type 2 diabetes mellitus with hyperglycemia (HCC)          Data review above:   Discussions/medical decisions/reviews:  BP ok  Other vitals weight loss expected  Recent  Na, K, BS issues  Recent anemia     Data review above:   Rx: reviewed and decisions:   Rx new/changes: (was suppose to have at discharge and did not get)       New Medications Ordered This Visit   Medications   • pantoprazole (Protonix) 40 MG EC tablet       Sig: Take 1 tablet by mouth Daily.       Dispense:  30 tablet       Refill:  2      Rx for wheelchair; go/look/pick out and forms as needed  Lab today; decision from tomorrow  Continue home health; PT/OT  Stop ASA; get on protonix  May have to alert GI; could need EGD minimum  May need gi opinion on incontinence issue  Memory loss noted; will need to wait on more studies, Rx       The following portions of the patient's history were reviewed and updated as appropriate: allergies, current medications, past family history, past medical history, past social history, past surgical history and problem list.       Current Outpatient Medications:   •  albuterol sulfate  (90 Base) MCG/ACT inhaler, Inhale 2 puffs Every 4 (Four) Hours As Needed for Wheezing or Shortness of Air., Disp: 8 g, Rfl: 1  •  amLODIPine (NORVASC) 5 MG tablet, Take 5 mg by mouth Daily., Disp: , Rfl:   •  atorvastatin (LIPITOR) 20 MG tablet, Take 20 mg by mouth Daily., Disp: , Rfl:   •  budesonide-formoterol (SYMBICORT) 160-4.5 MCG/ACT inhaler, Inhale 2 puffs 2 (Two) Times a Day., Disp: , Rfl:   •  Calcium Carb-Cholecalciferol (CALCIUM-VITAMIN D) 600-400 MG-UNIT tablet, Take 1 tablet by mouth 2 (Two) Times a Day., Disp: , Rfl:   •  carboxymethylcellulose (REFRESH PLUS) 0.5 % solution, 2 drops Daily As Needed for dry eyes., Disp: , Rfl:   •  Cholecalciferol 25 MCG (1000 UT) capsule, Take 1 capsule by mouth Daily., Disp: , Rfl:   •  denosumab (PROLIA) 60 MG/ML solution syringe, Inject 60 mg under the skin into the appropriate area as directed Every 6 (Six) Months., Disp: , Rfl:   •  DULoxetine (CYMBALTA) 20 MG capsule, Take 20 mg by mouth Daily., Disp: , Rfl:   •  guaiFENesin (MUCINEX) 600 MG  12 hr tablet, Take 600 mg by mouth 2 (Two) Times a Day., Disp: , Rfl:   •  insulin detemir (LEVEMIR) 100 UNIT/ML injection, Inject 30 Units under the skin into the appropriate area as directed Every 12 (Twelve) Hours. Provide 90 day if possible, Disp: 60 mL, Rfl: 1  •  Insulin Lispro, 1 Unit Dial, (HumaLOG KwikPen) 100 UNIT/ML solution pen-injector, Inject 7 Units under the skin into the appropriate area as directed 3 (Three) Times a Day. Max units 21 per 24 hours, Disp: 20 mL, Rfl: 1  •  Insulin Pen Needle (MM Pen Needles) 31G X 6 MM misc, 1 each 5 (Five) Times a Day. BID with Levemir, use TID with Humalog prn, Disp: 100 each, Rfl: 5  •  irbesartan (AVAPRO) 300 MG tablet, Take 300 mg by mouth Daily., Disp: , Rfl:   •  latanoprost (XALATAN) 0.005 % ophthalmic solution, Administer 1 drop to both eyes Every Night., Disp: , Rfl: 5  •  Misc Natural Products (GLUCOSAMINE CHONDROITIN TRIPLE PO), Take 1 tablet by mouth 2 (Two) Times a Day., Disp: , Rfl:   •  montelukast (SINGULAIR) 10 MG tablet, Take 10 mg by mouth Every Night., Disp: , Rfl:   •  Multiple Vitamins-Minerals (VITRUM 50+ SENIOR MULTI PO), Take 1 tablet by mouth Daily., Disp: , Rfl:   •  pantoprazole (PROTONIX) 40 MG EC tablet, Take 1 tablet by mouth 2 (Two) Times a Day Before Meals for 30 days., Disp: 60 tablet, Rfl: 0  •  potassium chloride 10 MEQ CR tablet, Take 10 mEq by mouth Daily., Disp: , Rfl:   •  Semaglutide, 2 MG/DOSE, 8 MG/3ML solution pen-injector, Inject 2 mg under the skin into the appropriate area as directed 1 (One) Time Per Week., Disp: 4.5 mL, Rfl: 5      Current Outpatient Medications for DM  •  insulin detemir (LEVEMIR) 100 UNIT/ML injection, Inject 30 Units under the skin into the appropriate area as directed Every 12 (Twelve) Hours. Provide 90 day if possible, Disp: 60 mL, Rfl: 1  •  Insulin Lispro, 1 Unit Dial, (HumaLOG KwikPen) 100 UNIT/ML solution pen-injector, Inject 7 Units under the skin into the appropriate area as directed 3  "(Three) Times a Day. Max units 21 per 24 hours, Disp: 20 mL, Rfl: 1  •  Semaglutide, 2 MG/DOSE, 8 MG/3ML solution pen-injector, Inject 2 mg under the skin into the appropriate area as directed 1 (One) Time Per Week., Disp: 4.5 mL, Rfl: 5        Review of Systems  GENERAL:  Inactive/slower with limits, speed, stamina, various joint issues and weakness. Sleep is poor; apnea likely.  No fever now/recent.  ENDO:  No syncope, near or diaphoretic sweaty spells.  BS without download; with [employer] system verbalized.   HEENT: No head injury or headache.   No vision change.   No significant hearing loss.  Ears without pain/drainage.  No sore throat.   Same/occ nasal/sinus congestion/drainage. No epistaxis.  CHEST: No chest wall tenderness or mass.  Occ cough, wheeze.   No SOB; no hemoptysis.  CV: No chest pain, palpitations, usual  On/off ankle edema.  GI: No heartburn, dysphagia.  No abdominal pain, diarrhea, constipation.  No rectal bleeding; dark stools/? melena.    :  Voids without dysuria; occ incontinence to completion.  ORTHO: No painful/swollen joints but various on /off sore.  No change occ sore neck or back.  No acute neck or back pain without recent injury.  NEURO: No dizziness; diffuse weakness of extremities.  No numbness/paresthesias.   PSYCH: admitted memory loss.  Mood winter variable; occ mild anxious, depressed but/and not suicidal.  Tries to tolerate stress .   Screening:  Mammogram: 12.22.2167-[id number]/delayed for illness  Bone density: \"2021-22\" [facility]; continuing the prolia  12.11.18 [facility] hip -2.4; start prolia-this year-per [facility]  Low dose CT chest: Tobacco-smoker/never: NA  GI: Colon-p+div///11.19.18/5y  Prostate: NA  Usual lab order  6m CBC, CMP, A1c  12m CBC, CMP, A1c, LIPID, TSH, Vit D, uric acid      Results for orders placed or performed in visit on 12/19/22   Comprehensive Metabolic Panel    Specimen: Blood   Result Value Ref Range    Glucose 300 (H) 65 - 99 mg/dL    BUN 8 8 - 23 mg/dL    " Creatinine 0.46 (L) 0.57 - 1.00 mg/dL    Sodium 133 (L) 136 - 145 mmol/L    Potassium 3.8 3.5 - 5.2 mmol/L    Chloride 98 98 - 107 mmol/L    CO2 24.0 22.0 - 29.0 mmol/L    Calcium 8.6 8.6 - 10.5 mg/dL    Total Protein 6.2 6.0 - 8.5 g/dL    Albumin 3.70 3.50 - 5.20 g/dL    ALT (SGPT) 23 1 - 33 U/L    AST (SGOT) 18 1 - 32 U/L    Alkaline Phosphatase 98 39 - 117 U/L    Total Bilirubin 0.8 0.0 - 1.2 mg/dL    Globulin 2.5 gm/dL    A/G Ratio 1.5 g/dL    BUN/Creatinine Ratio 17.4 7.0 - 25.0    Anion Gap 11.0 5.0 - 15.0 mmol/L    eGFR 100.6 >60.0 mL/min/1.73   Magnesium    Specimen: Blood   Result Value Ref Range    Magnesium 1.8 1.6 - 2.4 mg/dL   Phosphorus    Specimen: Blood   Result Value Ref Range    Phosphorus 2.9 2.5 - 4.5 mg/dL   Hemoglobin A1c    Specimen: Blood   Result Value Ref Range    Hemoglobin A1C 7.80 (H) 4.80 - 5.60 %   Iron    Specimen: Blood   Result Value Ref Range    Iron 75 37 - 145 mcg/dL   CBC Auto Differential    Specimen: Blood   Result Value Ref Range    WBC 4.42 3.40 - 10.80 10*3/mm3    RBC 3.43 (L) 3.77 - 5.28 10*6/mm3    Hemoglobin 9.9 (L) 12.0 - 15.9 g/dL    Hematocrit 32.2 (L) 34.0 - 46.6 %    MCV 93.9 79.0 - 97.0 fL    MCH 28.9 26.6 - 33.0 pg    MCHC 30.7 (L) 31.5 - 35.7 g/dL    RDW 17.2 (H) 12.3 - 15.4 %    RDW-SD 57.1 (H) 37.0 - 54.0 fl    MPV 9.3 6.0 - 12.0 fL    Platelets 270 140 - 450 10*3/mm3   Manual Differential    Specimen: Blood   Result Value Ref Range    Neutrophil % 57.0 42.7 - 76.0 %    Lymphocyte % 12.0 (L) 19.6 - 45.3 %    Monocyte % 9.0 5.0 - 12.0 %    Bands %  18.0 (H) 0.0 - 5.0 %    Myelocyte % 1.0 (H) 0.0 - 0.0 %    Atypical Lymphocyte % 3.0 0.0 - 5.0 %    Neutrophils Absolute 3.32 1.70 - 7.00 10*3/mm3    Lymphocytes Absolute 0.66 (L) 0.70 - 3.10 10*3/mm3    Monocytes Absolute 0.40 0.10 - 0.90 10*3/mm3    nRBC 1.0 (H) 0.0 - 0.2 /100 WBC    Anisocytosis Mod/2+ None Seen    Polychromasia Mod/2+ None Seen    WBC Morphology Normal Normal    Platelet Morphology Normal Normal        No results found for: PSA     Lab Results:  CBC:  Lab Results - Last 18 Months   Lab Units 12/19/22  1230 12/11/22  1045 12/09/22  1440 12/09/22  0644 12/08/22  0654 12/07/22  2357 12/07/22  1843 12/07/22  1406 12/07/22  0840 12/06/22  1108 11/23/22  1135 11/18/22  0955 10/13/22  1123 03/15/22  0739 09/15/21  0707   WBC 10*3/mm3 4.42 5.01  --  4.07  --  8.58 8.91  --   --  7.44 15.19*   < > 9.40 10.68 6.44   HEMOGLOBIN g/dL 9.9* 8.9* 7.9* 6.7* 7.1* 7.2* 8.2* 8.1*  --  8.8* 14.6   < > 15.2 14.6 13.6   HEMATOCRIT % 32.2* 27.2* 24.2* 21.3* 22.6* 22.8* 25.7* 24.2*  --  26.6* 43.2   < > 43.7 45.1 40.7   PLATELETS 10*3/mm3 270 351  --  214  --  209 256  --   --  266 235   < > 301 276 283   IRON mcg/dL 75  --   --   --   --   --  39 88 67 180*  --   --  90 74 69    < > = values in this interval not displayed.      BMP/CMP:  Lab Results - Last 18 Months   Lab Units 12/19/22  1230 12/11/22  1045 12/09/22  0644 12/08/22  0654 12/07/22  1843 12/06/22  1108 11/23/22  1135 03/15/22  0739 09/15/21  0707   SODIUM mmol/L 133* 134* 133* 137 132* 136 132*   < > 136   POTASSIUM mmol/L 3.8 3.4* 3.6 3.7 3.7 3.9 3.5   < > 4.4   CHLORIDE mmol/L 98 100 102 102 98 96* 96*   < > 99   TOTAL CO2 mmol/L  --   --   --   --   --  25.7  --    < > 26.5   CO2 mmol/L 24.0 25.0 25.0 25.0 24.0  --  23.0   < >  --    BUN mg/dL 8 6* 12 17 21 32* 12   < > 20   CREATININE mg/dL 0.46* 0.36* 0.46* 0.46* 0.48* 0.61 0.43*   < > 0.73   EGFR IF NONAFRICN AM mL/min/1.73  --   --   --   --   --   --   --   --  78   EGFR IF AFRICN AM mL/min/1.73  --   --   --   --   --   --   --   --  95   CALCIUM mg/dL 8.6 8.1* 7.6* 8.0* 8.8 8.5* 8.7   < > 10.2    < > = values in this interval not displayed.     HEPATIC:  Lab Results - Last 18 Months   Lab Units 12/19/22  1230 12/11/22  1045 12/07/22  1843 12/06/22  1108 11/23/22  1135 11/18/22  0955 10/13/22  1123   ALT (SGPT) U/L 23 30 28 24 34* 30 31   AST (SGOT) U/L 18 19 15 13 21 14 24   ALK PHOS U/L 98 83 88 68 91 108  "100     THYROID:  Lab Results - Last 18 Months   Lab Units 10/13/22  1123 09/15/21  0707   TSH uIU/mL 0.787 1.600     A1C:  Lab Results - Last 18 Months   Lab Units 12/19/22  1230 11/18/22  0955 10/13/22  1123 06/17/22  0810 03/15/22  0739 09/15/21  0707   HEMOGLOBIN A1C % 7.80* 11.30* 8.2* 6.90* 8.70* 6.20*     PSA:No results for input(s): PSA in the last 14550 hours.      Objective   /88   Pulse 103   Resp 14   Ht 160 cm (63\")   Wt 95.7 kg (211 lb)   SpO2 97%   BMI 37.38 kg/m²   Body mass index is 37.38 kg/m².    Physical Exam  GENERAL:  Well nourished/developed in no acute distress. Obese.   SKIN: Turgor ok without wound, rash, lesion.   HEENT: Normal cephalic without trauma.  Pupils equal round reactive to light. Extraocular motions full without nystagmus.   External canals initially full soft wax; mildly red after irritation.  Tymphatic membranes daniel with elvis structures intact after irrigation.   Oral cavity without growths, exudates, and moist.  Posterior pharynx without mass, obstruction, redness.  No thyromegaly, mass, tenderness, lymphadenopathy and supple.   CV: Regular rhythm.  No murmur, gallop, trace equal LE edema. Posterior pulses intact.  No carotid bruits.  CHEST: No chest wall tenderness or mass.   LUNGS: Symmetric motion with clear to auscultation.    ABD: Soft, nontender without mass.   ORTHO: Symmetric extremities without swelling/point tenderness compression soreness R foot between 2nd/3rd. Full gross range of motion diffuse.   Walking without assistance.   NEURO: CN 2-12 grossly intact.  Symmetric facies and UE/LE. 3/5 strength throughout. 1/4 x bicep equal reflexes.  Nonfocal use extremities. Speech clear.    PSYCH: Oriented x 3.  Pleasant calm, well kept.  Purposeful/directed conservation with intact short/long gross memory.      Assessment & Plan     1. Duodenal ulcer    2. Gastrointestinal hemorrhage with melena    3. Anemia, unspecified type    4. *Hx-iron deficiency    5. " General weakness    6. Gait difficulty    7. Controlled type 2 diabetes mellitus without complication, without long-term current use of insulin (HCC)    8. Anticoagulated DM2/ASA 81 (11.9.17 pt chose qod)    9. HTN (hypertension), benign    10. At risk for medication noncompliance      Face to face for wheelchair The patient needs to have this/these to prevent medical complications; . Using  is/are part of a comprehensive health care plan ongoing.   Diagnosis for this is: DJD, total joint, spinal DDD/DJD, decline in gait, falls.      Mobility limitations impairs her abilities to move about her home and successfully accomplish feeding dressing grooming  A wheelchair would improve her ability for better living  The difficulty she is having with mobility currently cannot be solved sufficiently or safely with a cane or walker  The patient does have upper strength to propel herself in a limited fashion or her caregivers can provide this assistance  The expected length of need is lifetime      Rx: reviewed/changes:  No orders of the defined types were placed in this encounter.    LAB/Testing/Referrals: reviewed/orders:   Today: home health 2 weeks; CBC, iron, ferritin, BMP    No orders of the defined types were placed in this encounter.    Usual:   same    Discussions:   Has to remember or work out system to take insulin; insulin needs look long term  Will try if pattern to simpify meal insulin  Anemia improving  Aricept/other later; once more stable avoid gi upset  Asking HH/son to work out michaela/reminder for meds/including insulins  No ASA for now; ? Long term      Body mass index is 37.38 kg/m².   Class 2 Severe Obesity (BMI >=35 and <=39.9). Obesity-related health conditions include the following: diabetes mellitus. Obesity is unchanged. BMI is is above average; BMI management plan is completed. We discussed portion control, increasing exercise and as able.  Non-smoker    There are no Patient Instructions on file for  this visit.    Follow up: Return for reset all visits with Dr Crowe 1m.  Future Appointments   Date Time Provider Department Center   12/22/2022 To Be Determined David Fabian, PT [facility]   12/29/2022 To Be Determined Evy Vera RN [facility]   1/5/2023 To Be Determined Evy Vera RN [facility]   1/5/2023  1:30 PM Len Crowe MD [facility]   1/10/2023 10:15 AM Serena Li APRN [facility]   1/12/2023 To Be Determined Evy Vera RN [facility]   1/16/2023 10:15 AM Huang Giraldo MD [facility]   1/24/2023 10:30 AM Len Crowe MD [facility]              Current outpatient and discharge medications have been reconciled for the patient.    Transitional Care Management Certification  I certify that the following are true:  1. Communication was made within 2 business days of discharge.  2. Complexity of Medical Decision Making is high.  3. Face to face visit occurred within 10 days.    *Note: 49042 is for high complexity patients with a face to face visit within 7 days of discharge.  90252 is for high complexity patients with a face to face on days 8-14 post discharge or medium complexity with face to face visit within 14 days post discharge.

## 2022-12-20 NOTE — CASE COMMUNICATION
In Basket notification to Dr. Crowe about new lab results in [facility] drawn 12/19.

## 2022-12-21 ENCOUNTER — READMISSION MANAGEMENT (OUTPATIENT)
Dept: CALL CENTER | Facility: HOSPITAL | Age: 72
End: 2022-12-21

## 2022-12-21 NOTE — OUTREACH NOTE
Medical Week 2 Survey    Flowsheet Row Responses   [facility] patient discharged from? [place]   Does the patient have one of the following disease processes/diagnoses(primary or secondary)? Other   Week 2 attempt successful? Yes   Call start time 1215   Discharge diagnosis GI hemorrhage with melena, T2DM, duodenal ulcer, anemia   Call end time 1215   Meds reviewed with patient/caregiver? Yes   Is the patient having any side effects they believe may be caused by any medication additions or changes? No   Does the patient have all medications ordered at discharge? Yes   Is the patient taking all medications as directed (includes completed medication regime)? Yes   Does the patient have a primary care provider?  Yes   Has the patient kept scheduled appointments due by today? Yes   What is the Home health agency?  [facility]    Has home health visited the patient within 72 hours of discharge? Yes   Psychosocial issues? No   Did the patient receive a copy of their discharge instructions? Yes   Nursing interventions Reviewed instructions with patient   What is the patient's perception of their health status since discharge? Improving   Is the patient/caregiver able to teach back signs and symptoms related to disease process for when to call PCP? Yes   Is the patient/caregiver able to teach back signs and symptoms related to disease process for when to call 911? Yes   Is the patient/caregiver able to teach back the hierarchy of who to call/visit for symptoms/problems? PCP, Specialist, Home health nurse, Urgent Care, ED, 911 Yes   If the patient is a current smoker, are they able to teach back resources for cessation? Not a smoker   Week 2 Call Completed? Yes   Graduated Yes   Is the patient interested in additional calls from an ambulatory ?  NOTE:  applies to high risk patients requiring additional follow-up. No   Did the patient feel the follow up calls were helpful during their recovery period?  Yes   Was the number of calls appropriate? Yes   Graduated/Revoked comments Doing very well.           OSWALDO IGNACIO - Registered Nurse

## 2022-12-21 NOTE — HOME HEALTH
ORDERED LABS DRAW VIA BUTTERFLY IN THE LEFT WRIST X 2 ATTEMPTS SPECIMANS LABLED AND TAKEN TO [facility] OFFICE STAFF NOTIFIED

## 2022-12-22 ENCOUNTER — HOME CARE VISIT (OUTPATIENT)
Dept: HOME HEALTH SERVICES | Facility: CLINIC | Age: 72
End: 2022-12-22
Payer: MEDICARE

## 2022-12-22 VITALS
HEART RATE: 110 BPM | OXYGEN SATURATION: 98 % | RESPIRATION RATE: 16 BRPM | DIASTOLIC BLOOD PRESSURE: 80 MMHG | TEMPERATURE: 96 F | SYSTOLIC BLOOD PRESSURE: 140 MMHG

## 2022-12-22 PROCEDURE — G0151 HHCP-SERV OF PT,EA 15 MIN: HCPCS

## 2022-12-22 NOTE — HOME HEALTH
pre-screened covid 19    Patient reports she is ready to discharge from physical therapy.  Patient denies any pain today.  Patient reports she is improving and about 75% better.

## 2022-12-23 ENCOUNTER — HOME CARE VISIT (OUTPATIENT)
Dept: HOME HEALTH SERVICES | Facility: HOME HEALTHCARE | Age: 72
End: 2022-12-23
Payer: MEDICARE

## 2022-12-29 ENCOUNTER — HOME CARE VISIT (OUTPATIENT)
Dept: HOME HEALTH SERVICES | Facility: CLINIC | Age: 72
End: 2022-12-29
Payer: MEDICARE

## 2022-12-29 VITALS
DIASTOLIC BLOOD PRESSURE: 82 MMHG | OXYGEN SATURATION: 98 % | RESPIRATION RATE: 18 BRPM | TEMPERATURE: 98.2 F | SYSTOLIC BLOOD PRESSURE: 138 MMHG | HEART RATE: 78 BPM

## 2022-12-29 PROCEDURE — G0299 HHS/HOSPICE OF RN EA 15 MIN: HCPCS

## 2022-12-29 NOTE — HOME HEALTH
FOCUS OF CARE/SKILLED NEED: CPA/EDUCATION    TEACHING/INTERVENTIONS: Pt up in home with ease. No complaints of pain at present. Pt states blood sugars have been running the low 300s. Has not been using sliding scale insulin. Pt states she got the insulin pens yesterday. Explained the use of the pens, and the ease with the sliding scale. Explained the importance of maintaining normal blood glucose ranges r/t the adverse effects on the kidneys, heart and blood pressure. Pt verb understanding and states she will try to be compliant with the sliding scale insulin. Recommendations given on laying the sliding scale paper on her plate for the next day, sitting it in the table, etc. Right now it is hanging on pt refrigerator.     PROGRESS TOWARD GOALS: Progressing as expected.     PHYSICIAN CONTACT:  None needed    INSURANCE CHANGES?  Denies    FALLS SINCE LAST VISIT?  Denies    MEDICATION CHANGES? Denies    PLAN FOR NEXT VISIT: cpa/education/dm education    PRE-SCREENED FOR COVID? No s/s

## 2023-01-03 DIAGNOSIS — E11.9 CONTROLLED TYPE 2 DIABETES MELLITUS WITHOUT COMPLICATION, WITHOUT LONG-TERM CURRENT USE OF INSULIN: Primary | Chronic | ICD-10-CM

## 2023-01-04 ENCOUNTER — LAB REQUISITION (OUTPATIENT)
Dept: LAB | Facility: HOSPITAL | Age: 73
End: 2023-01-04
Payer: MEDICARE

## 2023-01-04 ENCOUNTER — HOME CARE VISIT (OUTPATIENT)
Dept: HOME HEALTH SERVICES | Facility: CLINIC | Age: 73
End: 2023-01-04
Payer: MEDICARE

## 2023-01-04 DIAGNOSIS — Z00.00 ENCOUNTER FOR GENERAL ADULT MEDICAL EXAMINATION WITHOUT ABNORMAL FINDINGS: ICD-10-CM

## 2023-01-04 LAB
BASOPHILS # BLD MANUAL: 0.05 10*3/MM3 (ref 0–0.2)
BASOPHILS NFR BLD MANUAL: 1 % (ref 0–1.5)
DEPRECATED RDW RBC AUTO: 50.9 FL (ref 37–54)
ERYTHROCYTE [DISTWIDTH] IN BLOOD BY AUTOMATED COUNT: 15.4 % (ref 12.3–15.4)
FERRITIN SERPL-MCNC: 34.86 NG/ML (ref 13–150)
GIANT PLATELETS: ABNORMAL
HCT VFR BLD AUTO: 37.2 % (ref 34–46.6)
HGB BLD-MCNC: 11.6 G/DL (ref 12–15.9)
IRON 24H UR-MRATE: 37 MCG/DL (ref 37–145)
LYMPHOCYTES # BLD MANUAL: 1.17 10*3/MM3 (ref 0.7–3.1)
LYMPHOCYTES NFR BLD MANUAL: 4.1 % (ref 5–12)
MCH RBC QN AUTO: 28.1 PG (ref 26.6–33)
MCHC RBC AUTO-ENTMCNC: 31.2 G/DL (ref 31.5–35.7)
MCV RBC AUTO: 90.1 FL (ref 79–97)
MONOCYTES # BLD: 0.22 10*3/MM3 (ref 0.1–0.9)
NEUTROPHILS # BLD AUTO: 4 10*3/MM3 (ref 1.7–7)
NEUTROPHILS NFR BLD MANUAL: 71.4 % (ref 42.7–76)
NEUTS BAND NFR BLD MANUAL: 2 % (ref 0–5)
PLATELET # BLD AUTO: 358 10*3/MM3 (ref 140–450)
PMV BLD AUTO: 9.5 FL (ref 6–12)
POLYCHROMASIA BLD QL SMEAR: ABNORMAL
RBC # BLD AUTO: 4.13 10*6/MM3 (ref 3.77–5.28)
VARIANT LYMPHS NFR BLD MANUAL: 13.3 % (ref 19.6–45.3)
VARIANT LYMPHS NFR BLD MANUAL: 8.2 % (ref 0–5)
WBC MORPH BLD: NORMAL
WBC NRBC COR # BLD: 5.44 10*3/MM3 (ref 3.4–10.8)

## 2023-01-04 PROCEDURE — 83540 ASSAY OF IRON: CPT | Performed by: FAMILY MEDICINE

## 2023-01-04 PROCEDURE — 85007 BL SMEAR W/DIFF WBC COUNT: CPT | Performed by: FAMILY MEDICINE

## 2023-01-04 PROCEDURE — G0300 HHS/HOSPICE OF LPN EA 15 MIN: HCPCS

## 2023-01-04 PROCEDURE — 82728 ASSAY OF FERRITIN: CPT | Performed by: FAMILY MEDICINE

## 2023-01-04 PROCEDURE — 85027 COMPLETE CBC AUTOMATED: CPT | Performed by: FAMILY MEDICINE

## 2023-01-06 VITALS
RESPIRATION RATE: 18 BRPM | DIASTOLIC BLOOD PRESSURE: 76 MMHG | OXYGEN SATURATION: 98 % | HEART RATE: 78 BPM | SYSTOLIC BLOOD PRESSURE: 124 MMHG | TEMPERATURE: 97.9 F

## 2023-01-06 NOTE — PROGRESS NOTES
Pt has reviewed on my chart and sent detailed message to see if pt wants to get her one month labs done here in office or elsewhere?

## 2023-01-10 ENCOUNTER — TELEPHONE (OUTPATIENT)
Dept: FAMILY MEDICINE CLINIC | Facility: CLINIC | Age: 73
End: 2023-01-10
Payer: MEDICARE

## 2023-01-10 ENCOUNTER — OFFICE VISIT (OUTPATIENT)
Dept: GASTROENTEROLOGY | Facility: CLINIC | Age: 73
End: 2023-01-10
Payer: MEDICARE

## 2023-01-10 VITALS
DIASTOLIC BLOOD PRESSURE: 62 MMHG | TEMPERATURE: 97.1 F | WEIGHT: 223 LBS | HEART RATE: 89 BPM | OXYGEN SATURATION: 97 % | SYSTOLIC BLOOD PRESSURE: 132 MMHG | BODY MASS INDEX: 39.51 KG/M2 | HEIGHT: 63 IN

## 2023-01-10 DIAGNOSIS — I10 HTN (HYPERTENSION), BENIGN: ICD-10-CM

## 2023-01-10 DIAGNOSIS — Z12.11 ENCOUNTER FOR SCREENING FOR MALIGNANT NEOPLASM OF COLON: Primary | ICD-10-CM

## 2023-01-10 DIAGNOSIS — K92.1 GASTROINTESTINAL HEMORRHAGE WITH MELENA: ICD-10-CM

## 2023-01-10 DIAGNOSIS — E61.1 IRON DEFICIENCY: ICD-10-CM

## 2023-01-10 DIAGNOSIS — E61.1 IRON DEFICIENCY: Primary | ICD-10-CM

## 2023-01-10 DIAGNOSIS — K26.9 DUODENAL ULCER: ICD-10-CM

## 2023-01-10 DIAGNOSIS — D64.9 ANEMIA, UNSPECIFIED TYPE: Primary | ICD-10-CM

## 2023-01-10 DIAGNOSIS — Z78.9 NONSMOKER: ICD-10-CM

## 2023-01-10 PROCEDURE — 99214 OFFICE O/P EST MOD 30 MIN: CPT | Performed by: CLINICAL NURSE SPECIALIST

## 2023-01-10 RX ORDER — PNV NO.95/FERROUS FUM/FOLIC AC 28MG-0.8MG
325 TABLET ORAL EVERY OTHER DAY
Qty: 45 TABLET | Refills: 1 | Status: SHIPPED | OUTPATIENT
Start: 2023-01-10 | End: 2023-04-10

## 2023-01-10 RX ORDER — PANTOPRAZOLE SODIUM 40 MG/1
40 TABLET, DELAYED RELEASE ORAL 2 TIMES DAILY
COMMUNITY

## 2023-01-10 NOTE — PROGRESS NOTES
Kezia Otoole  1950    1/10/2023  Chief Complaint   Patient presents with   • GI Problem     Hospital follow up for a duodenal ulcer     Subjective   HPI  Kezia Otoole is a 72 y.o. female who presents with a complaint of recent admission 12/8/22 followed by [facility]. Admitted for blood loss anemia, melena stools at that time. Endoscopy performed.- Normal esophagus.  - Small hiatal hernia.  - Erythematous mucosa in the antrum. Biopsied.  - Erythematous mucosa in the gastric body. Biopsied.  - Non-obstructing non-bleeding duodenal ulcer with a clean ulcer base (Eran Class III).  Gastric antrum, endoscopic biopsy:  A. Fragments of benign gastric mucosa demonstrating chronic inflammation, mild.  B. No intestinal metaplasia identified.  C. No glandular dysplasia identified.  Negative for hpylori  Has not had a oclonoscopy since 2013. No signs for further bleeding. No abdominal pain. No nausea or vomiting. No wt loss. No fever chills or sweats. She is on Protonix.   H/H on 12/19 was 9.9 on admission is was 6.7.   Past Medical History:   Diagnosis Date   • Allergic rhinitis    • Chronic laryngitis    • Diabetes mellitus (HCC)    • GERD (gastroesophageal reflux disease)    • Hypertension    • Hypertrophy of both inferior nasal turbinates    • Laryngopharyngeal reflux    • Nontoxic multinodular goiter    • Sicca laryngitis      Past Surgical History:   Procedure Laterality Date   • CARPAL TUNNEL RELEASE     • CHOLECYSTECTOMY     • COLONOSCOPY  10/01/2008    Multiple polyps removed   • COLONOSCOPY  09/18/2013    Diverticulosis in the sigmoid colon. Dr. Dave Mcnally Recall 5 years   • ENDOSCOPY N/A 12/08/2022    Non-bleeding duodenal ulcer, small HH   • HAND SURGERY     • HYSTERECTOMY     • REPLACEMENT TOTAL KNEE Right 02/02/2021   • THYROIDECTOMY, PARTIAL Right 2010   • TONSILLECTOMY     • TUBAL ABDOMINAL LIGATION         Outpatient Medications Marked as Taking for the 1/10/23 encounter (Office Visit)  with Serena Li APRN   Medication Sig Dispense Refill   • albuterol sulfate  (90 Base) MCG/ACT inhaler Inhale 2 puffs Every 4 (Four) Hours As Needed for Wheezing or Shortness of Air. 8 g 1   • amLODIPine (NORVASC) 5 MG tablet Take 5 mg by mouth Daily.     • atorvastatin (LIPITOR) 20 MG tablet Take 20 mg by mouth Daily.     • budesonide-formoterol (SYMBICORT) 160-4.5 MCG/ACT inhaler Inhale 2 puffs 2 (Two) Times a Day.     • Calcium Carb-Cholecalciferol (CALCIUM-VITAMIN D) 600-400 MG-UNIT tablet Take 1 tablet by mouth 2 (Two) Times a Day.     • carboxymethylcellulose (REFRESH PLUS) 0.5 % solution 2 drops Daily As Needed for dry eyes.     • Cholecalciferol 25 MCG (1000 UT) capsule Take 1 capsule by mouth Daily.     • denosumab (PROLIA) 60 MG/ML solution syringe Inject 60 mg under the skin into the appropriate area as directed Every 6 (Six) Months.     • DULoxetine (CYMBALTA) 20 MG capsule Take 20 mg by mouth Daily.     • guaiFENesin (MUCINEX) 600 MG 12 hr tablet Take 600 mg by mouth 2 (Two) Times a Day.     • insulin detemir (LEVEMIR) 100 UNIT/ML injection Inject 30 Units under the skin into the appropriate area as directed Every 12 (Twelve) Hours. Provide 90 day if possible 60 mL 1   • Insulin Lispro, 1 Unit Dial, (HumaLOG KwikPen) 100 UNIT/ML solution pen-injector Inject 7 Units under the skin into the appropriate area as directed 3 (Three) Times a Day. Max units 21 per 24 hours 20 mL 1   • Insulin Pen Needle (MM Pen Needles) 31G X 6 MM misc 1 each 5 (Five) Times a Day. BID with Levemir, use TID with Humalog prn 100 each 5   • irbesartan (AVAPRO) 300 MG tablet Take 300 mg by mouth Daily.     • latanoprost (XALATAN) 0.005 % ophthalmic solution Administer 1 drop to both eyes Every Night.  5   • Misc Natural Products (GLUCOSAMINE CHONDROITIN TRIPLE PO) Take 1 tablet by mouth 2 (Two) Times a Day.     • montelukast (SINGULAIR) 10 MG tablet Take 10 mg by mouth Every Night.     • Multiple Vitamins-Minerals  (VITRUM 50+ SENIOR MULTI PO) Take 1 tablet by mouth Daily.     • pantoprazole (PROTONIX) 40 MG EC tablet Take 40 mg by mouth 2 (Two) Times a Day.     • potassium chloride 10 MEQ CR tablet Take 10 mEq by mouth Daily.     • Semaglutide, 2 MG/DOSE, 8 MG/3ML solution pen-injector Inject 2 mg under the skin into the appropriate area as directed 1 (One) Time Per Week. 4.5 mL 5     Allergies   Allergen Reactions   • Adhesive Tape Other (See Comments)     Tears and bruises skin   • Other Other (See Comments)     Bandaids make arm Red/slightly swollen     Social History     Socioeconomic History   • Marital status:      Spouse name: Laurie   • Number of children: 2   • Years of education: 16   Tobacco Use   • Smoking status: Never   • Smokeless tobacco: Never   • Tobacco comments:     \"lived with smokers most of my life\"   Vaping Use   • Vaping Use: Never used   Substance and Sexual Activity   • Alcohol use: No     Comment: Rare   • Drug use: No   • Sexual activity: Defer     Partners: Male     Birth control/protection: None     Family History   Problem Relation Age of Onset   • Diabetes Mother    • Stroke Mother    • Heart disease Mother    • Heart disease Father    • Colon cancer Neg Hx    • Colon polyps Neg Hx      Health Maintenance   Topic Date Due   • TDAP/TD VACCINES (1 - Tdap) Never done   • DIABETIC FOOT EXAM  10/21/2016   • DIABETIC EYE EXAM  10/21/2016   • DXA SCAN  12/11/2020   • COVID-19 Vaccine (4 - Booster for Moderna series) 12/23/2021   • URINE MICROALBUMIN  09/15/2022   • HEMOGLOBIN A1C  06/19/2023   • LIPID PANEL  10/13/2023   • ANNUAL WELLNESS VISIT  10/18/2023   • COLORECTAL CANCER SCREENING  11/19/2023   • MAMMOGRAM  12/20/2023   • HEPATITIS C SCREENING  Completed   • INFLUENZA VACCINE  Completed   • Pneumococcal Vaccine 65+  Completed   • ZOSTER VACCINE  Completed     Review of Systems   Constitutional: Negative for activity change, appetite change, chills, diaphoresis, fatigue, fever and  unexpected weight change.   HENT: Negative for ear pain, hearing loss, mouth sores, sore throat, trouble swallowing and voice change.    Eyes: Negative.    Respiratory: Negative for cough, choking, shortness of breath and wheezing.    Cardiovascular: Negative for chest pain and palpitations.   Gastrointestinal: Negative for abdominal pain, blood in stool, constipation, diarrhea, nausea and vomiting.   Endocrine: Negative for cold intolerance and heat intolerance.   Genitourinary: Negative for decreased urine volume, dysuria, frequency, hematuria and urgency.   Musculoskeletal: Negative for back pain, gait problem and myalgias.   Skin: Negative for color change, pallor and rash.   Allergic/Immunologic: Negative for food allergies and immunocompromised state.   Neurological: Negative for dizziness, tremors, seizures, syncope, weakness, light-headedness, numbness and headaches.   Hematological: Negative for adenopathy. Does not bruise/bleed easily.   Psychiatric/Behavioral: Negative for agitation and confusion. The patient is not nervous/anxious.    All other systems reviewed and are negative.    Objective   Vitals:    01/10/23 1305   BP: 132/62   Pulse: 89   Temp: 97.1 °F (36.2 °C)   TempSrc: Temporal   SpO2: 97%   Weight: 101 kg (223 lb)   Height: 160 cm (63\")     Body mass index is 39.5 kg/m².  Physical Exam  Constitutional:       Appearance: She is well-developed.   HENT:      Head: Normocephalic and atraumatic.   Eyes:      Pupils: Pupils are equal, round, and reactive to light.   Neck:      Trachea: No tracheal deviation.   Cardiovascular:      Rate and Rhythm: Normal rate and regular rhythm.      Heart sounds: Normal heart sounds. No murmur heard.    No friction rub. No gallop.   Pulmonary:      Effort: Pulmonary effort is normal. No respiratory distress.      Breath sounds: Normal breath sounds. No wheezing or rales.   Chest:      Chest wall: No tenderness.   Abdominal:      General: Bowel sounds are normal.  There is no distension.      Palpations: Abdomen is soft. Abdomen is not rigid.      Tenderness: There is no abdominal tenderness. There is no guarding or rebound.   Musculoskeletal:         General: No tenderness or deformity. Normal range of motion.      Cervical back: Normal range of motion and neck supple.   Skin:     General: Skin is warm and dry.      Coloration: Skin is not pale.      Findings: No rash.   Neurological:      Mental Status: She is alert and oriented to person, place, and time.      Deep Tendon Reflexes: Reflexes are normal and symmetric.   Psychiatric:         Behavior: Behavior normal.         Thought Content: Thought content normal.         Judgment: Judgment normal.       Assessment & Plan   Diagnoses and all orders for this visit:    1. Encounter for screening for malignant neoplasm of colon (Primary)  -     Case Request; Standing  -     Follow Anesthesia Guidelines / Protocol; Future  -     Obtain Informed Consent; Future  -     Implement Anesthesia Orders Day of Procedure; Standing  -     Obtain Informed Consent; Standing  -     Verify bowel prep was successful; Standing  -     Case Request    2. Duodenal ulcer    3. Gastrointestinal hemorrhage with melena    4. Nonsmoker    5. HTN (hypertension), benign  Comments:  cont BP medication the day of procedure    continue PPI therapy  Keep follow up with PCP for anemia  Colonoscopy for screening purposes.   Call or go to ER with any recurrent bleeding.  No NSAIDS.     COLONOSCOPY WITH ANESTHESIA (N/A)  Part of this note may be an electronic transcription/translation of spoken language to printed text using the Dragon Dictation System.  Body mass index is 39.5 kg/m².  No follow-ups on file.    Class 2 Severe Obesity (BMI >=35 and <=39.9). Obesity-related health conditions include the following: hypertension. Obesity is unchanged. BMI is is above average; BMI management plan is completed. We discussed portion control and increasing  exercise.      All risks, benefits, alternatives, and indications of colonoscopy and/or Endoscopy procedure have been discussed with the patient. Risks to include perforation of the colon requiring possible surgery or colostomy, risk of bleeding from biopsies or removal of colon tissue, possibility of missing a colon polyp or cancer, or adverse drug reaction.  Benefits to include the diagnosis and management of disease of the colon and rectum. Alternatives to include barium enema, radiographic evaluation, lab testing or no intervention. Pt verbalizes understanding and agrees.     Serena Li, APRN  1/10/2023  13:29 CST          If you smoke or use tobacco, 4 minutes reading provided  Steps to Quit Smoking  Smoking tobacco can be harmful to your health and can affect almost every organ in your body. Smoking puts you, and those around you, at risk for developing many serious chronic diseases. Quitting smoking is difficult, but it is one of the best things that you can do for your health. It is never too late to quit.  What are the benefits of quitting smoking?  When you quit smoking, you lower your risk of developing serious diseases and conditions, such as:  · Lung cancer or lung disease, such as COPD.  · Heart disease.  · Stroke.  · Heart attack.  · Infertility.  · Osteoporosis and bone fractures.  Additionally, symptoms such as coughing, wheezing, and shortness of breath may get better when you quit. You may also find that you get sick less often because your body is stronger at fighting off colds and infections. If you are pregnant, quitting smoking can help to reduce your chances of having a baby of low birth weight.  How do I get ready to quit?  When you decide to quit smoking, create a plan to make sure that you are successful. Before you quit:  · Pick a date to quit. Set a date within the next two weeks to give you time to prepare.  · Write down the reasons why you are quitting. Keep this list in  places where you will see it often, such as on your bathroom mirror or in your car or wallet.  · Identify the people, places, things, and activities that make you want to smoke (triggers) and avoid them. Make sure to take these actions:  ¨ Throw away all cigarettes at home, at work, and in your car.  ¨ Throw away smoking accessories, such as ashtrays and lighters.  ¨ Clean your car and make sure to empty the ashtray.  ¨ Clean your home, including curtains and carpets.  · Tell your family, friends, and coworkers that you are quitting. Support from your loved ones can make quitting easier.  · Talk with your health care provider about your options for quitting smoking.  · Find out what treatment options are covered by your health insurance.  What strategies can I use to quit smoking?  Talk with your healthcare provider about different strategies to quit smoking. Some strategies include:  · Quitting smoking altogether instead of gradually lessening how much you smoke over a period of time. Research shows that quitting “cold turkey” is more successful than gradually quitting.  · Attending in-person counseling to help you build problem-solving skills. You are more likely to have success in quitting if you attend several counseling sessions. Even short sessions of 10 minutes can be effective.  · Finding resources and support systems that can help you to quit smoking and remain smoke-free after you quit. These resources are most helpful when you use them often. They can include:  ¨ Online chats with a counselor.  ¨ Telephone quitlines.  ¨ Printed self-help materials.  ¨ Support groups or group counseling.  ¨ Text messaging programs.  ¨ Mobile phone applications.  · Taking medicines to help you quit smoking. (If you are pregnant or breastfeeding, talk with your health care provider first.) Some medicines contain nicotine and some do not. Both types of medicines help with cravings, but the medicines that include nicotine help  to relieve withdrawal symptoms. Your health care provider may recommend:  ¨ Nicotine patches, gum, or lozenges.  ¨ Nicotine inhalers or sprays.  ¨ Non-nicotine medicine that is taken by mouth.  Talk with your health care provider about combining strategies, such as taking medicines while you are also receiving in-person counseling. Using these two strategies together makes you more likely to succeed in quitting than if you used either strategy on its own.  If you are pregnant or breastfeeding, talk with your health care provider about finding counseling or other support strategies to quit smoking. Do not take medicine to help you quit smoking unless told to do so by your health care provider.  What things can I do to make it easier to quit?  Quitting smoking might feel overwhelming at first, but there is a lot that you can do to make it easier. Take these important actions:  · Reach out to your family and friends and ask that they support and encourage you during this time. Call telephone quitlines, reach out to support groups, or work with a counselor for support.  · Ask people who smoke to avoid smoking around you.  · Avoid places that trigger you to smoke, such as bars, parties, or smoke-break areas at work.  · Spend time around people who do not smoke.  · Lessen stress in your life, because stress can be a smoking trigger for some people. To lessen stress, try:  ¨ Exercising regularly.  ¨ Deep-breathing exercises.  ¨ Yoga.  ¨ Meditating.  ¨ Performing a body scan. This involves closing your eyes, scanning your body from head to toe, and noticing which parts of your body are particularly tense. Purposefully relax the muscles in those areas.  · Download or purchase mobile phone or tablet apps (applications) that can help you stick to your quit plan by providing reminders, tips, and encouragement. There are many free apps, such as QuitGuide from the CDC (Centers for Disease Control and Prevention). You can find  other support for quitting smoking (smoking cessation) through smokefree.gov and other websites.  How will I feel when I quit smoking?  Within the first 24 hours of quitting smoking, you may start to feel some withdrawal symptoms. These symptoms are usually most noticeable 2-3 days after quitting, but they usually do not last beyond 2-3 weeks. Changes or symptoms that you might experience include:  · Mood swings.  · Restlessness, anxiety, or irritation.  · Difficulty concentrating.  · Dizziness.  · Strong cravings for sugary foods in addition to nicotine.  · Mild weight gain.  · Constipation.  · Nausea.  · Coughing or a sore throat.  · Changes in how your medicines work in your body.  · A depressed mood.  · Difficulty sleeping (insomnia).  After the first 2-3 weeks of quitting, you may start to notice more positive results, such as:  · Improved sense of smell and taste.  · Decreased coughing and sore throat.  · Slower heart rate.  · Lower blood pressure.  · Clearer skin.  · The ability to breathe more easily.  · Fewer sick days.  Quitting smoking is very challenging for most people. Do not get discouraged if you are not successful the first time. Some people need to make many attempts to quit before they achieve long-term success. Do your best to stick to your quit plan, and talk with your health care provider if you have any questions or concerns.  This information is not intended to replace advice given to you by your health care provider. Make sure you discuss any questions you have with your health care provider.  Document Released: 12/12/2002 Document Revised: 08/15/2017 Document Reviewed: 05/03/2016  Elsevier Interactive Patient Education © 2017 [employer]

## 2023-01-10 NOTE — TELEPHONE ENCOUNTER
Per Dr Crowe     \"If stomach doing ok  Could take 325 mg qod and move things along faster\"    Pt called back and willing to take iron and wants rx to [facility] and done

## 2023-01-10 NOTE — TELEPHONE ENCOUNTER
Len Crowe MD   1/4/2023 12:47 PM CST       Lots better but no need to stop; same iron  1m CBC, iron, ferritin     Orders placed and appt made

## 2023-01-13 ENCOUNTER — HOME CARE VISIT (OUTPATIENT)
Dept: HOME HEALTH SERVICES | Facility: CLINIC | Age: 73
End: 2023-01-13
Payer: MEDICARE

## 2023-01-13 VITALS
OXYGEN SATURATION: 98 % | TEMPERATURE: 97.2 F | RESPIRATION RATE: 18 BRPM | DIASTOLIC BLOOD PRESSURE: 78 MMHG | HEART RATE: 80 BPM | SYSTOLIC BLOOD PRESSURE: 132 MMHG

## 2023-01-13 PROCEDURE — G0299 HHS/HOSPICE OF RN EA 15 MIN: HCPCS

## 2023-01-13 NOTE — HOME HEALTH
FOCUS OF CARE/SKILLED NEED: Discharge from services r/t goals met.     TEACHING/INTERVENTIONS: Patient agreeable to homecare discharge. Discharge instructions reviewed and signed by the pt. Medication reconciliation completed and no issues found. Pt had no further questions or concerns regarding the pt's medications or discharge.    PROGRESS TOWARD GOALS: Goals met.     PHYSICIAN CONTACT: Dr. Crowe r/t goals met.     INSURANCE CHANGES? Denies    FALLS SINCE LAST VISIT? Denies    MEDICATION CHANGES? Denies    PRE-SCREENED FOR COVID? Yes, No s/s of COVID. No recent exposure.

## 2023-01-23 ENCOUNTER — TELEPHONE (OUTPATIENT)
Dept: FAMILY MEDICINE CLINIC | Facility: CLINIC | Age: 73
End: 2023-01-23
Payer: MEDICARE

## 2023-01-23 DIAGNOSIS — E66.01 CLASS 3 SEVERE OBESITY DUE TO EXCESS CALORIES WITH SERIOUS COMORBIDITY AND BODY MASS INDEX (BMI) OF 40.0 TO 44.9 IN ADULT: ICD-10-CM

## 2023-01-23 DIAGNOSIS — R23.8 SKIN BREAKDOWN: Primary | ICD-10-CM

## 2023-01-23 DIAGNOSIS — R53.1 GENERAL WEAKNESS: ICD-10-CM

## 2023-01-23 DIAGNOSIS — R15.9 INCONTINENCE OF FECES, UNSPECIFIED FECAL INCONTINENCE TYPE: ICD-10-CM

## 2023-01-23 DIAGNOSIS — R26.9 GAIT DIFFICULTY: ICD-10-CM

## 2023-01-23 DIAGNOSIS — E11.10 DIABETIC KETOACIDOSIS WITHOUT COMA ASSOCIATED WITH TYPE 2 DIABETES MELLITUS: ICD-10-CM

## 2023-01-23 NOTE — TELEPHONE ENCOUNTER
"    Caller: JOHNNY     Relationship: [facility]    Best call back number: [phone]    What medication are you requesting: BACK AND SEAT CUSHION FOR 18\"      WHEELCHAIR            If a prescription is needed, what is your preferred pharmacy and phone number:      [facility]     [phone]  Additional notes:          "

## 2023-01-24 ENCOUNTER — OFFICE VISIT (OUTPATIENT)
Dept: FAMILY MEDICINE CLINIC | Facility: CLINIC | Age: 73
End: 2023-01-24
Payer: MEDICARE

## 2023-01-24 VITALS
DIASTOLIC BLOOD PRESSURE: 76 MMHG | OXYGEN SATURATION: 97 % | HEART RATE: 79 BPM | BODY MASS INDEX: 39.51 KG/M2 | HEIGHT: 63 IN | SYSTOLIC BLOOD PRESSURE: 134 MMHG | RESPIRATION RATE: 14 BRPM | WEIGHT: 223 LBS

## 2023-01-24 DIAGNOSIS — M15.0 PRIMARY GENERALIZED (OSTEO)ARTHRITIS: ICD-10-CM

## 2023-01-24 DIAGNOSIS — Z91.14 PATIENT FORGETS TO TAKE MEDICATION: ICD-10-CM

## 2023-01-24 DIAGNOSIS — E11.9 CONTROLLED TYPE 2 DIABETES MELLITUS WITHOUT COMPLICATION, WITHOUT LONG-TERM CURRENT USE OF INSULIN: Chronic | ICD-10-CM

## 2023-01-24 DIAGNOSIS — I10 HTN (HYPERTENSION), BENIGN: Chronic | ICD-10-CM

## 2023-01-24 DIAGNOSIS — R41.3 MEMORY LOSS: ICD-10-CM

## 2023-01-24 DIAGNOSIS — G47.10 HYPERSOMNIA: ICD-10-CM

## 2023-01-24 DIAGNOSIS — K21.9 GASTROESOPHAGEAL REFLUX DISEASE, UNSPECIFIED WHETHER ESOPHAGITIS PRESENT: ICD-10-CM

## 2023-01-24 DIAGNOSIS — R32 URINARY INCONTINENCE, UNSPECIFIED TYPE: ICD-10-CM

## 2023-01-24 DIAGNOSIS — E61.1 IRON DEFICIENCY: ICD-10-CM

## 2023-01-24 DIAGNOSIS — E78.2 MIXED HYPERLIPIDEMIA: ICD-10-CM

## 2023-01-24 DIAGNOSIS — D64.9 ANEMIA, UNSPECIFIED TYPE: ICD-10-CM

## 2023-01-24 DIAGNOSIS — R73.9 HYPERGLYCEMIA: ICD-10-CM

## 2023-01-24 DIAGNOSIS — R15.9 INCONTINENCE OF FECES, UNSPECIFIED FECAL INCONTINENCE TYPE: ICD-10-CM

## 2023-01-24 DIAGNOSIS — R26.9 GAIT DIFFICULTY: ICD-10-CM

## 2023-01-24 PROCEDURE — 99214 OFFICE O/P EST MOD 30 MIN: CPT | Performed by: FAMILY MEDICINE

## 2023-01-24 RX ORDER — DONEPEZIL HYDROCHLORIDE 5 MG/1
5 TABLET, FILM COATED ORAL NIGHTLY
Qty: 30 TABLET | Refills: 0 | Status: SHIPPED | OUTPATIENT
Start: 2023-01-24 | End: 2023-02-16 | Stop reason: DRUGHIGH

## 2023-01-24 RX ORDER — DONEPEZIL HYDROCHLORIDE 10 MG/1
10 TABLET, FILM COATED ORAL NIGHTLY
Qty: 30 TABLET | Refills: 5 | Status: SHIPPED | OUTPATIENT
Start: 2023-01-24

## 2023-01-24 RX ORDER — HYDROCHLOROTHIAZIDE 12.5 MG/1
12.5 CAPSULE, GELATIN COATED ORAL EVERY MORNING
COMMUNITY
Start: 2023-01-19

## 2023-01-24 NOTE — PROGRESS NOTES
Attempted to call spouse and vm left [facility], did also inform pt son. Will call back tomorrow to ensure they understand the directions.

## 2023-01-24 NOTE — PROGRESS NOTES
Subjective   Kezia Otoole is a 72 y.o. female presenting with chief complaint of:   Chief Complaint   Patient presents with   • Follow-up     1 mo follow up       History of Present Illness :  With .  Here for primarily f/u last visit.       Has multiple chronic problems to consider that might have a bearing on today's issues;  an interval appointment.       Chronic/acute problems reviewed today:   1. Anemia, unspecified type Chronic or past history/improved over recent weeks with iron: This has been present before.    There has been GI evaulation in the past. There is no current melena, hematochezia. It has been benign to date and stable/treating/watching.  Contributing comorbidities to date: iron def/gi bleed.     2. Controlled type 2 diabetes mellitus without complication, without long-term current use of insulin ([facility]) Chronic/often 300 AM.   No problem/pattern hypoglycemia/hyperglycemia manifest by poly- dypsia, phagia, uria, or sweats, diaphoretic episodes, syncope/near.     3. Urinary incontinence, unspecified type wears a diaper; usually incontinent.  Has some urge and then has difficulty getting from her chair to the bathroom   4. Incontinence of feces, unspecified fecal incontinence type sometimes cannot make it to the restroom; already wearing a diaper primarily for voiding and she sometimes soils around the diaper: Denies constipation diarrhea.  Has difficulty getting out of her recliner and making it to the restroom   5. Hypersomnia sleeps a lot; no accidents   6. Iron deficiency continues her iron therapy; last labs were better.  Denies melena hematochezia since her GI bleed   7. Patient forgets to take medication for get some meds; she admits this   8. Memory loss prior to her recent DKA admission;  says she was being coming increasingly forgetful.  That has continued   9. Gait difficulty Chronic/stable:.  Ongoing issues with difficulties it results in difficulty with walking or  gait.  No recent falls.  No recent injuries.  Uses to help gait: nothing now.     10. Hyperglycemia blood sugars run about 300.  Despite attempts to provide the patient she forgets both long and short acting insulins   11. HTN (hypertension), benign Chronic/stable. Stable here past/no recent home blood pressures.  No significant chest pain, SOB, LE edema, orthopnea, near syncope, dizziness/light headness.   Recent Vitals       1/10/2023 1/13/2023 1/24/2023       BP: 132/62 132/78 134/76     Pulse: 89 80 79     Temp: 97.1 °F (36.2 °C) 97.2 °F (36.2 °C) --     Weight: 101 kg (223 lb) -- 101 kg (223 lb)     BMI (Calculated): 39.5 -- 39.5            12. Mixed hyperlipidemia Chronic/stable.  Tolerated use of Rx with labs showing improved lipid values and tolerant liver labs. No muscle aches unexpected.      13. Gastroesophageal reflux disease, unspecified whether esophagitis present Chronic/stable.  Controlledheartburn, reflux without dysphagia, melena.  Rx used, periods not used proven currently needed with symptoms -currently doing ok.      14. Primary generalized (osteo)arthritis Chronic/stable.  Various on/off joint pains/soreness/stiffness.  Particular joint problems with knees.  No joint swelling.  Treats mainly with reduced activity, Rx listed, a total knee, Tylenol. No NSAIDs, and no injections.        Has an/another acute issue today: none.    The following portions of the patient's history were reviewed and updated as appropriate: allergies, current medications, past family history, past medical history, past social history, past surgical history and problem list.      Current Outpatient Medications:   •  albuterol sulfate  (90 Base) MCG/ACT inhaler, Inhale 2 puffs Every 4 (Four) Hours As Needed for Wheezing or Shortness of Air., Disp: 8 g, Rfl: 1  •  amLODIPine (NORVASC) 5 MG tablet, Take 5 mg by mouth Daily., Disp: , Rfl:   •  atorvastatin (LIPITOR) 20 MG tablet, Take 20 mg by mouth Daily., Disp: ,  Rfl:   •  budesonide-formoterol (SYMBICORT) 160-4.5 MCG/ACT inhaler, Inhale 2 puffs 2 (Two) Times a Day., Disp: , Rfl:   •  Calcium Carb-Cholecalciferol (CALCIUM-VITAMIN D) 600-400 MG-UNIT tablet, Take 1 tablet by mouth 2 (Two) Times a Day., Disp: , Rfl:   •  carboxymethylcellulose (REFRESH PLUS) 0.5 % solution, 2 drops Daily As Needed for dry eyes., Disp: , Rfl:   •  Cholecalciferol 25 MCG (1000 UT) capsule, Take 1 capsule by mouth Daily., Disp: , Rfl:   •  denosumab (PROLIA) 60 MG/ML solution syringe, Inject 60 mg under the skin into the appropriate area as directed Every 6 (Six) Months., Disp: , Rfl:   •  DULoxetine (CYMBALTA) 20 MG capsule, Take 20 mg by mouth Daily., Disp: , Rfl:   •  ferrous sulfate 325 (65 Fe) MG tablet, Take 1 tablet by mouth Every Other Day for 90 days., Disp: 45 tablet, Rfl: 1  •  guaiFENesin (MUCINEX) 600 MG 12 hr tablet, Take 600 mg by mouth 2 (Two) Times a Day., Disp: , Rfl:   •  hydroCHLOROthiazide (MICROZIDE) 12.5 MG capsule, , Disp: , Rfl:   •  insulin detemir (LEVEMIR) 100 UNIT/ML injection, Inject 30 Units under the skin into the appropriate area as directed Every 12 (Twelve) Hours. Provide 90 day if possible, Disp: 60 mL, Rfl: 1  •  Insulin Lispro, 1 Unit Dial, (HumaLOG KwikPen) 100 UNIT/ML solution pen-injector, Inject 7 Units under the skin into the appropriate area as directed 3 (Three) Times a Day. Max units 21 per 24 hours, Disp: 20 mL, Rfl: 1  •  Insulin Pen Needle (MM Pen Needles) 31G X 6 MM misc, 1 each 5 (Five) Times a Day. BID with Levemir, use TID with Humalog prn, Disp: 100 each, Rfl: 5  •  irbesartan (AVAPRO) 300 MG tablet, Take 300 mg by mouth Daily., Disp: , Rfl:   •  latanoprost (XALATAN) 0.005 % ophthalmic solution, Administer 1 drop to both eyes Every Night., Disp: , Rfl: 5  •  Misc Natural Products (GLUCOSAMINE CHONDROITIN TRIPLE PO), Take 1 tablet by mouth 2 (Two) Times a Day., Disp: , Rfl:   •  montelukast (SINGULAIR) 10 MG tablet, Take 10 mg by mouth Every  Night., Disp: , Rfl:   •  Multiple Vitamins-Minerals (VITRUM 50+ SENIOR MULTI PO), Take 1 tablet by mouth Daily., Disp: , Rfl:   •  pantoprazole (PROTONIX) 40 MG EC tablet, Take 40 mg by mouth 2 (Two) Times a Day., Disp: , Rfl:   •  potassium chloride 10 MEQ CR tablet, Take 10 mEq by mouth Daily., Disp: , Rfl:   •  Semaglutide, 2 MG/DOSE, 8 MG/3ML solution pen-injector, Inject 2 mg under the skin into the appropriate area as directed 1 (One) Time Per Week., Disp: 4.5 mL, Rfl: 5      Current Outpatient Medications:   •  insulin detemir (LEVEMIR) 100 UNIT/ML injection, Inject 30 Units under the skin into the appropriate area as directed Every 12 (Twelve) Hours. Provide 90 day if possible, Disp: 60 mL, Rfl: 1  •  Insulin Lispro, 1 Unit Dial, (HumaLOG KwikPen) 100 UNIT/ML solution pen-injector, Inject 7 Units under the skin into the appropriate area as directed 3 (Three) Times a Day. Max units 21 per 24 hours, Disp: 20 mL, Rfl: 1    No problems with medications except missing Rx.     Allergies   Allergen Reactions   • Adhesive Tape Other (See Comments)     Tears and bruises skin   • Other Other (See Comments)     Bandaids make arm Red/slightly swollen     Review of Systems  GENERAL:  Inactive/slower with limits, speed, stamina, various joint issues and weakness. Sleep is poor; apnea likely.  No fever now/recent.  ENDO:  No syncope, near or diaphoretic sweaty spells.  BS without download  HEENT: No head injury or headache.   No vision change.   No significant hearing loss.  Ears without pain/drainage.  No sore throat.   Same/occ nasal/sinus congestion/drainage. No epistaxis.  CHEST: No chest wall tenderness or mass.  Occ cough, wheeze.   No SOB; no hemoptysis.  CV: No chest pain, palpitations, usual  On/off ankle edema.  GI: No heartburn, dysphagia.  No abdominal pain, diarrhea, constipation; occ fecal incontience.  No rectal bleeding; dark stools or melena.    :  Voids without dysuria; occ incontinence to  "completion.  ORTHO: No painful/swollen joints but various on /off sore.  No change occ sore neck or back.  No acute neck or back pain without recent injury.  NEURO: No dizziness; diffuse weakness of extremities.  No numbness/paresthesias.   PSYCH: Admitted memory loss.  Mood winter variable; occ mild anxious, depressed but/and not suicidal.  Tries to tolerate stress .   Screening:  Mammogram: [id number]/delayed for illness  Bone density: \"2021-22\" [facility]; continuing the prolia  12.11.18 [facility] hip -2.4; start prolia-this year-per [facility]  Low dose CT chest: Tobacco-smoker/never: NA  GI: Colon-p+div/[facility]/11.19.18/5y  Prostate: NA  Usual lab order  6m CBC, CMP, A1c  12m CBC, CMP, A1c, LIPID, TSH, Vit D, uric acid    Copy/paste function used for ROS/exam AND each area of these were reviewed, updated, confirmed and supplemented as needed.  Data reviewed:   Recent admit/ER/MD visits: 12.20.22    1. Duodenal ulcer    2. Gastrointestinal hemorrhage with melena    3. Anemia, unspecified type    4. *Hx-iron deficiency    5. General weakness    6. Gait difficulty    7. Controlled type 2 diabetes mellitus without complication, without long-term current use of insulin (HCC)    8. Anticoagulated DM2/ASA 81 (11.9.17 pt chose qod)    9. HTN (hypertension), benign    10. At risk for medication noncompliance       Face to face for wheelchair The patient needs to have this/these to prevent medical complications; . Using  is/are part of a comprehensive health care plan ongoing.   Diagnosis for this is: DJD, total joint, spinal DDD/DJD, decline in gait, falls.       Mobility limitations impairs her abilities to move about her home and successfully accomplish feeding dressing grooming  A wheelchair would improve her ability for better living  The difficulty she is having with mobility currently cannot be solved sufficiently or safely with a cane or walker  The patient does have upper strength to propel herself in a limited fashion " or her caregivers can provide this assistance  The expected length of need is lifetime        Rx: reviewed/changes:  No orders of the defined types were placed in this encounter.     LAB/Testing/Referrals: reviewed/orders:   Today: home health 2 weeks; CBC, iron, ferritin, BMP     No orders of the defined types were placed in this encounter.     Usual:   same     Discussions:   Has to remember or work out system to take insulin; insulin needs look long term  Will try if pattern to simpify meal insulin  Anemia improving  Aricept/other later; once more stable avoid gi upset  Asking HH/son to work out michaela/reminder for meds/including insulins  No ASA for now; ? Long term    Last cardiac testing:   Echo: none    Radiology considered:   CT Abdomen Pelvis Without Contrast    Result Date: 11/18/2022  1. Motion artifacts limits the diagnostic quality of the study. No acute abnormality of the abdomen or pelvis. 2. Diverticulosis of the distal colon. No evidence for diverticulitis.  .            This report was finalized on 11/18/2022 11:55 by Dr. Pradeep Miller MD.    CT Head Without Contrast    Result Date: 11/18/2022   1. Mild cerebral and cerebellar atrophy with chronic microvascular disease but no evidence of acute intracranial process.   This report was finalized on 11/18/2022 11:29 by Dr. Taran Cyr MD.    CT Abdomen Pelvis With Contrast    Result Date: 12/7/2022  1. There is diverticulosis of the colon. There is focal diverticulitis in the distal sigmoid region where there is stranding of the adjacent fat, wall thickening and a trace amount of free fluid. No perforation or abscess is seen. 2. Wall thickening of the stomach likely due to underdistention. There is some edema around the gastric antrum and proximal duodenum the could be due to ulcer disease or inflammatory change. There is wall thickening in this area. 3. Small hiatal hernia. 4. Geographic fatty infiltration of the liver. Prior cholecystectomy. 5.  Prior hysterectomy. Other nonacute findings, as discussed.  The full report of this exam was immediately signed and available to the emergency room. The patient is currently in the emergency room. This report was finalized on 12/07/2022 19:35 by Dr. Neo Mejia MD.    XR Chest 1 View    Result Date: 11/23/2022  Shallow inspiration with mild central vascular crowding, no acute infiltrates. Interval removal of the right internal jugular central line. No pneumothorax is identified. This report was finalized on 11/23/2022 12:36 by Dr. Christian Kim MD.    XR Chest 1 View    Result Date: 11/18/2022   RIGHT IJ CVL with tip overlying the SVC. No evidence of pneumothorax. This report was finalized on 11/18/2022 10:48 by Dr. Javi Rothman MD.    XR Chest 1 View    Result Date: 11/18/2022   No acute findings. This report was finalized on 11/18/2022 09:54 by Dr. Javi Rothman MD.    Lab Results:  Results for orders placed or performed in visit on 01/04/23   Iron    Specimen: Blood   Result Value Ref Range    Iron 37 37 - 145 mcg/dL   Ferritin    Specimen: Blood   Result Value Ref Range    Ferritin 34.86 13.00 - 150.00 ng/mL   CBC Auto Differential    Specimen: Blood   Result Value Ref Range    WBC 5.44 3.40 - 10.80 10*3/mm3    RBC 4.13 3.77 - 5.28 10*6/mm3    Hemoglobin 11.6 (L) 12.0 - 15.9 g/dL    Hematocrit 37.2 34.0 - 46.6 %    MCV 90.1 79.0 - 97.0 fL    MCH 28.1 26.6 - 33.0 pg    MCHC 31.2 (L) 31.5 - 35.7 g/dL    RDW 15.4 12.3 - 15.4 %    RDW-SD 50.9 37.0 - 54.0 fl    MPV 9.5 6.0 - 12.0 fL    Platelets 358 140 - 450 10*3/mm3   Manual Differential    Specimen: Blood   Result Value Ref Range    Neutrophil % 71.4 42.7 - 76.0 %    Lymphocyte % 13.3 (L) 19.6 - 45.3 %    Monocyte % 4.1 (L) 5.0 - 12.0 %    Basophil % 1.0 0.0 - 1.5 %    Bands %  2.0 0.0 - 5.0 %    Atypical Lymphocyte % 8.2 (H) 0.0 - 5.0 %    Neutrophils Absolute 4.00 1.70 - 7.00 10*3/mm3    Lymphocytes Absolute 1.17 0.70 - 3.10 10*3/mm3    Monocytes  Absolute 0.22 0.10 - 0.90 10*3/mm3    Basophils Absolute 0.05 0.00 - 0.20 10*3/mm3    Polychromasia Slight/1+ None Seen    WBC Morphology Normal Normal    Giant Platelets Slight/1+ None Seen       A1C:  Lab Results - Last 18 Months   Lab Units 12/19/22  1230 11/18/22  0955 10/13/22  1123 06/17/22  0810 03/15/22  0739 09/15/21  0707   HEMOGLOBIN A1C % 7.80* 11.30* 8.2* 6.90* 8.70* 6.20*     GLUCOSE:  Lab Results - Last 18 Months   Lab Units 12/19/22  1230 12/11/22  1045 12/09/22  0644 12/08/22  0654 12/07/22  1843 12/06/22  1108 11/23/22  1135   GLUCOSE mg/dL 300* 226* 185* 105* 183* 235* 205*     LIPID:  Lab Results - Last 18 Months   Lab Units 10/13/22  1123 09/15/21  0707   CHOLESTEROL mg/dL  --  156   LDL CHOL mg/dL 35 71   HDL CHOL mg/dL 48* 36*   TRIGLYCERIDES mg/dL 276* 303*     PSA:No results for input(s): PSA in the last 95566 hours.    CBC:  Lab Results - Last 18 Months   Lab Units 01/04/23  1100 12/19/22  1230 12/11/22  1045 12/09/22  1440 12/09/22  0644 12/08/22  0654 12/07/22  2357 12/07/22  1843 12/07/22  1406 12/07/22  0840 12/06/22  1108 11/18/22  0955 10/13/22  1123 03/15/22  0739 09/15/21  0707   WBC 10*3/mm3 5.44 4.42 5.01  --  4.07  --  8.58 8.91  --   --  7.44   < > 9.40 10.68 6.44   HEMOGLOBIN g/dL 11.6* 9.9* 8.9* 7.9* 6.7* 7.1* 7.2* 8.2* 8.1*  --  8.8*   < > 15.2 14.6 13.6   HEMATOCRIT % 37.2 32.2* 27.2* 24.2* 21.3* 22.6* 22.8* 25.7* 24.2*  --  26.6*   < > 43.7 45.1 40.7   PLATELETS 10*3/mm3 358 270 351  --  214  --  209 256  --   --  266   < > 301 276 283   IRON mcg/dL 37 75  --   --   --   --   --  39 88 67 180*  --  90 74 69    < > = values in this interval not displayed.      BMP/CMP:  Lab Results - Last 18 Months   Lab Units 12/19/22  1230 12/11/22  1045 12/09/22  0644 12/08/22  0654 12/07/22  1843 12/06/22  1108 11/23/22  1135 10/13/22  1122 06/17/22  0810 03/15/22  0739 09/15/21  0707   SODIUM mmol/L 133* 134* 133* 137 132* 136 132*   < > 137 136 136   POTASSIUM mmol/L 3.8 3.4* 3.6 3.7  "3.7 3.9 3.5   < > 4.4 4.9 4.4   CHLORIDE mmol/L 98 100 102 102 98 96* 96*   < > 100 96* 99   TOTAL CO2 mmol/L  --   --   --   --   --  25.7  --   --  25.5 25.7 26.5   CO2 mmol/L 24.0 25.0 25.0 25.0 24.0  --  23.0   < >  --   --   --    GLUCOSE mg/dL 300* 226* 185* 105* 183* 235* 205*   < > 153* 220* 145*   BUN mg/dL 8 6* 12 17 21 32* 12   < > 13 32* 20   CREATININE mg/dL 0.46* 0.36* 0.46* 0.46* 0.48* 0.61 0.43*   < > 0.63 0.96 0.73   EGFR IF NONAFRICN AM mL/min/1.73  --   --   --   --   --   --   --   --   --   --  78   EGFR IF AFRICN AM mL/min/1.73  --   --   --   --   --   --   --   --   --   --  95   EGFR RESULT mL/min/1.73  --   --   --   --   --  93.9  --   --  93.8 62.6  --    CALCIUM mg/dL 8.6 8.1* 7.6* 8.0* 8.8 8.5* 8.7   < > 9.6 10.1 10.2    < > = values in this interval not displayed.     HEPATIC:  Lab Results - Last 18 Months   Lab Units 12/19/22  1230 12/11/22  1045 12/07/22  1843 12/06/22  1108 11/23/22  1135 11/18/22  0955 10/13/22  1123   ALT (SGPT) U/L 23 30 28 24 34* 30 31   AST (SGOT) U/L 18 19 15 13 21 14 24   ALK PHOS U/L 98 83 88 68 91 108 100     Vit D:  Lab Results - Last 18 Months   Lab Units 10/13/22  1123 10/13/22  1122 03/15/22  0739 09/15/21  0707   VIT D 25 HYDROXY ng/ml 52.8 54.4 44.1 49.2     THYROID:  Lab Results - Last 18 Months   Lab Units 10/13/22  1123 09/15/21  0707   TSH uIU/mL 0.787 1.600       Objective   /76   Pulse 79   Resp 14   Ht 160 cm (63\")   Wt 101 kg (223 lb)   SpO2 97%   BMI 39.50 kg/m²   Body mass index is 39.5 kg/m².    Recent Vitals       1/10/2023 1/13/2023 1/24/2023       BP: 132/62 132/78 134/76     Pulse: 89 80 79     Temp: 97.1 °F (36.2 °C) 97.2 °F (36.2 °C) --     Weight: 101 kg (223 lb) -- 101 kg (223 lb)     BMI (Calculated): 39.5 -- 39.5         Wt Readings from Last 15 Encounters:   01/24/23 1027 101 kg (223 lb)   01/10/23 1305 101 kg (223 lb)   12/20/22 1202 95.7 kg (211 lb)   12/07/22 1505 95.3 kg (210 lb)   12/06/22 1113 95.3 kg (210 lb) "   11/23/22 1105 95.3 kg (210 lb)   11/20/22 0400 97.8 kg (215 lb 11.2 oz)   11/18/22 1433 93.9 kg (207 lb)   11/18/22 0910 99.8 kg (220 lb)   10/18/22 0917 104 kg (228 lb 3.2 oz)   10/17/22 1016 104 kg (229 lb 3.2 oz)   08/24/22 2030 106 kg (234 lb)   06/21/22 0957 106 kg (234 lb 3.2 oz)   04/01/22 1042 107 kg (236 lb)   03/21/22 0943 109 kg (239 lb 6.4 oz)   09/24/21 0945 103 kg (228 lb)   09/16/21 1005 104 kg (229 lb)       Physical Exam  GENERAL:  Well nourished/developed in no acute distress. Obese.   SKIN: Turgor excellent, without wound, rash, lesion.   HEENT: Normal cephalic without trauma.  Pupils equal round reactive to light. Extraocular motions full without nystagmus.     Oral cavity without growths, exudates, and moist.  Posterior pharynx without mass, obstruction, redness.  No thyromegaly, mass, tenderness, lymphadenopathy and supple.   CV: Regular rhythm.  No murmur, gallop, trace equal LE edema. Posterior pulses intact.  No carotid bruits.  CHEST: No chest wall tenderness or mass.   LUNGS: Symmetric motion with clear to auscultation.    ABD: Soft, nontender without mass.   ORTHO: Symmetric extremities without swelling/point tenderness compression soreness R foot between 2nd/3rd. Full gross range of motion diffuse.   Walking without assistance.   NEURO: CN 2-12 grossly intact.  Symmetric facies and UE/LE. 3/5 strength throughout. 1/4 x bicep equal reflexes.  Nonfocal use extremities. Speech clear.    PSYCH: Oriented x 3.  Pleasant calm, well kept.  Purposeful/directed conservation with intact short/long gross memory.    Assessment & Plan     1. Anemia, unspecified type    2. Controlled type 2 diabetes mellitus without complication, without long-term current use of insulin (HCC)    3. Urinary incontinence, unspecified type    4. Incontinence of feces, unspecified fecal incontinence type    5. Hypersomnia    6. Iron deficiency    7. Patient forgets to take medication    8. Memory loss    9. Gait  difficulty    10. Hyperglycemia    11. HTN (hypertension), benign    12. Mixed hyperlipidemia    13. Gastroesophageal reflux disease, unspecified whether esophagitis present    14. Primary generalized (osteo)arthritis        Data review above:   Discussions/medical decisions/reviews:  BP ok  Other vitals ok  DM/BS 7.8 12.19.22  Lipid LDL 35 10.13.22; lipitor 20  PSA NA  CBC 11.6 1.4.23  Iron 37N 1.4.23 on 325/d  Renal ok 12.19.22  Liver ok 12.19.22  Vit D 52 10.13.22  Thyroid TSH ok 10.13.22    ? Bedside commode  Timed voiding/stooling  MRI head-  Start aricept 5 then 10 next month  Suggested hold on driving  Check iron today; hoping we can stop    Data review above:   Rx: reviewed and decisions:   Rx new/changes: noneok  New Medications Ordered This Visit   Medications   • donepezil (Aricept) 5 MG tablet     Sig: Take 1 tablet by mouth Every Night.     Dispense:  30 tablet     Refill:  0   • donepezil (Aricept) 10 MG tablet     Sig: Take 1 tablet by mouth Every Night.     Dispense:  30 tablet     Refill:  5       Orders placed:   LAB/Testing/Referrals: reviewed/orders:   Today:   Orders Placed This Encounter   Procedures   • Commode Chair   • MRI Brain With & Without Contrast   • Ferritin   • Iron Profile   • Basic Metabolic Panel   • CBC & Differential     Chronic/recurrent labs above or change to:   Same   Personal interpretation of tests/procedures:   Procedures   Discussion with independent historian (because of patient inabilities/or to confirm): son; Karen [phone]   He agreed bedside commode  He will see if PT/OT could come back  He agrees no driving  He will consider [facility]  He will see if can work out timed voiding  Increase levemir 10 units day (5 bid); needs to remember at least this one  Tell  to increase 30 levemir bid to 35 bid; this is the most important insulin  IF not better AM/fasting 1 week increase another 40 bid and keep us informed    Screening reviewed/updated delaying further  mammogram    Immunization History   Administered Date(s) Administered   • COVID-19 (MODERNA) 1st, 2nd, 3rd Dose Only 02/26/2021, 03/26/2021, 10/28/2021   • FLUAD TRI 65YR+ 10/17/2019   • Fluad Quad 65+ 10/14/2020   • Fluzone High Dose =>65 Years ([employer] ONLY) 10/01/2018   • Fluzone High-Dose 65+yrs 10/13/2022   • Fluzone Quad >6mos (Multi-dose) 10/28/2016, 10/30/2017   • Influenza Quad Vaccine (Inpatient) 10/30/2017   • Influenza, Unspecified 10/17/2019   • Pneumococcal Conjugate 13-Valent (PCV13) 10/21/2015   • Pneumococcal Polysaccharide (PPSV23) 10/30/2014   • Shingrix 09/06/2019, 12/06/2019     Vaccine reviewed: today none; later we advised/reaffirmed our support/suggestion for staying complete with covid- covid boosters, seasonal flu/yearly and any missing vaccine from list we supplied; we suggest contact with local health department office to review missing/needed vaccines and then bring nursing documentation for these vaccines to this office.     Health maintenance:   Body mass index is 39.5 kg/m².  Class 2 Severe Obesity (BMI >=35 and <=39.9). Obesity-related health conditions include the following: many; but medically contraindicated. Obesity is unchanged. BMI is is above average; BMI management plan is completed. We discussed contraindicated .      Tobacco use reviewed:   Kezia Otoole  reports that she has never smoked. She has never used smokeless tobacco..     l health, mental health, immunizations, screenings as appropriate. As appropriate see AVS.      There are no Patient Instructions on file for this visit.    Visit today involved chronic significant medical problems or differentials and/or intensive drug monitoring: ie potential to cause serious morbidity or death:     Follow up: No follow-ups on file.  Future Appointments   Date Time Provider Department Center   2/10/2023  9:20 AM [facility]   2/16/2023  2:45 PM Len Crowe MD [facility]   3/9/2023  11:00 AM Huang Giraldo MD [facility]

## 2023-01-25 ENCOUNTER — NURSE TRIAGE (OUTPATIENT)
Dept: CALL CENTER | Facility: HOSPITAL | Age: 73
End: 2023-01-25
Payer: MEDICARE

## 2023-01-25 ENCOUNTER — TELEPHONE (OUTPATIENT)
Dept: FAMILY MEDICINE CLINIC | Facility: CLINIC | Age: 73
End: 2023-01-25
Payer: MEDICARE

## 2023-01-25 DIAGNOSIS — Z79.4 INSULIN DEPENDENT TYPE 2 DIABETES MELLITUS: ICD-10-CM

## 2023-01-25 DIAGNOSIS — E11.10 DIABETIC KETOACIDOSIS WITHOUT COMA ASSOCIATED WITH TYPE 2 DIABETES MELLITUS: ICD-10-CM

## 2023-01-25 DIAGNOSIS — Z79.4 INSULIN DEPENDENT TYPE 2 DIABETES MELLITUS: Primary | ICD-10-CM

## 2023-01-25 DIAGNOSIS — E11.9 INSULIN DEPENDENT TYPE 2 DIABETES MELLITUS: Primary | ICD-10-CM

## 2023-01-25 DIAGNOSIS — E11.9 INSULIN DEPENDENT TYPE 2 DIABETES MELLITUS: ICD-10-CM

## 2023-01-25 LAB
BASOPHILS # BLD AUTO: 0.04 10*3/MM3 (ref 0–0.2)
BASOPHILS NFR BLD AUTO: 0.6 % (ref 0–1.5)
BUN SERPL-MCNC: 11 MG/DL (ref 8–23)
BUN/CREAT SERPL: 14.9 (ref 7–25)
CALCIUM SERPL-MCNC: 9.4 MG/DL (ref 8.6–10.5)
CHLORIDE SERPL-SCNC: 93 MMOL/L (ref 98–107)
CO2 SERPL-SCNC: 28.1 MMOL/L (ref 22–29)
CREAT SERPL-MCNC: 0.74 MG/DL (ref 0.57–1)
EGFRCR SERPLBLD CKD-EPI 2021: 85 ML/MIN/1.73
EOSINOPHIL # BLD AUTO: 0 10*3/MM3 (ref 0–0.4)
EOSINOPHIL NFR BLD AUTO: 0 % (ref 0.3–6.2)
ERYTHROCYTE [DISTWIDTH] IN BLOOD BY AUTOMATED COUNT: 15.1 % (ref 12.3–15.4)
FERRITIN SERPL-MCNC: 28.2 NG/ML (ref 13–150)
GLUCOSE SERPL-MCNC: 497 MG/DL (ref 65–99)
HCT VFR BLD AUTO: 42.9 % (ref 34–46.6)
HGB BLD-MCNC: 12.7 G/DL (ref 12–15.9)
IMM GRANULOCYTES # BLD AUTO: 0.02 10*3/MM3 (ref 0–0.05)
IMM GRANULOCYTES NFR BLD AUTO: 0.3 % (ref 0–0.5)
IRON SATN MFR SERPL: 79 % (ref 20–50)
IRON SERPL-MCNC: 344 MCG/DL (ref 37–145)
LYMPHOCYTES # BLD AUTO: 1.42 10*3/MM3 (ref 0.7–3.1)
LYMPHOCYTES NFR BLD AUTO: 21.3 % (ref 19.6–45.3)
MCH RBC QN AUTO: 26.3 PG (ref 26.6–33)
MCHC RBC AUTO-ENTMCNC: 29.6 G/DL (ref 31.5–35.7)
MCV RBC AUTO: 88.8 FL (ref 79–97)
MONOCYTES # BLD AUTO: 0.67 10*3/MM3 (ref 0.1–0.9)
MONOCYTES NFR BLD AUTO: 10.1 % (ref 5–12)
NEUTROPHILS # BLD AUTO: 4.51 10*3/MM3 (ref 1.7–7)
NEUTROPHILS NFR BLD AUTO: 67.7 % (ref 42.7–76)
NRBC BLD AUTO-RTO: 0 /100 WBC (ref 0–0.2)
PLATELET # BLD AUTO: 336 10*3/MM3 (ref 140–450)
POTASSIUM SERPL-SCNC: 5 MMOL/L (ref 3.5–5.2)
RBC # BLD AUTO: 4.83 10*6/MM3 (ref 3.77–5.28)
SODIUM SERPL-SCNC: 135 MMOL/L (ref 136–145)
TIBC SERPL-MCNC: 438 MCG/DL
UIBC SERPL-MCNC: 94 MCG/DL (ref 112–346)
WBC # BLD AUTO: 6.66 10*3/MM3 (ref 3.4–10.8)

## 2023-01-25 RX ORDER — BLOOD-GLUCOSE METER
1 EACH MISCELLANEOUS AS NEEDED
Qty: 1 KIT | Refills: 0 | Status: SHIPPED | OUTPATIENT
Start: 2023-01-25 | End: 2023-01-25

## 2023-01-25 RX ORDER — BLOOD-GLUCOSE METER
1 EACH MISCELLANEOUS AS NEEDED
Qty: 1 KIT | Refills: 0 | COMMUNITY
Start: 2023-01-25

## 2023-01-25 NOTE — TELEPHONE ENCOUNTER
"Called pt and advised \"I have not been taking my ozempic since I started the insulin, I didn't think I was supposed to still be taking it?\"    Advised to stay with the 35 U of levemir bid and appt made for tomorrow am with Khris SEQUEIRA, will take the 13 U Humalog now and recheck sugar in 2 hours  "

## 2023-01-25 NOTE — TELEPHONE ENCOUNTER
"Per Dr Crowe \"ozempic is off the table at this time, call me with a 4 pm reading and how much long acting for this day and how much short acting?\"    4 pm blood sugar reading 250    Levemir 40 units  Humalog 43 units this far    Per Dr Crowe., per take Sliding scale with supper and take Levemir 45 units tonight and pt will have appt in am  "

## 2023-01-25 NOTE — TELEPHONE ENCOUNTER
Megan with lab dept called at 0400 1/25/23 with critical glucose level of 497 that was collected 1/24/23 at 1019. Called Dr emma herbert at [phone] with results.

## 2023-01-25 NOTE — TELEPHONE ENCOUNTER
"Pt and sister in law Doug called to advise, \"we had to do a finger stick with the sample meter that you sent for an emergency back up. Two hours past taking the extra Levemir 40 U and the Humalog 15 U, her blood sugar right now is 390\"     will have son apply CGM when he gets home, (sample from office)    Advised will send Dr Crowe the message  "

## 2023-01-25 NOTE — TELEPHONE ENCOUNTER
Humalog 13 units SQ now x1, does she need to come in tomorrow and discuss further?  Is this a case where her ozempic is out of stock and she hasn't had due to supply issues?    Electronically signed by FABBY Sanchez, 01/25/23, 2:14 PM CST.

## 2023-01-25 NOTE — TELEPHONE ENCOUNTER
"Called pt and advised \"I dont have a way to check my blood sugar, my sensor came off last night when I took my shirt off. I dont have a way to do a finger stick, I dont have an additional meter. I have not been taking any short acting insulin unless someone tells me to.\"     Verbal per Dr Crowe take \"Levemir 40 U now, and Humalog (short acting) 15U now and call blood sugar reading in 2 hours\"      Advised pt to send spouse for a sample free style radha 2 sensor and apply and to keep a snack at her side if she gets to low, pt is agreeable and will have spouse to come and get sample sensor ASAP        "

## 2023-01-25 NOTE — TELEPHONE ENCOUNTER
"Pt called \"we had lunch in town and my blood sugar after the second 15u Humalog was 360, pt advised that she feels fine, just tired. But \"because im lazy\"  "

## 2023-01-25 NOTE — PROGRESS NOTES
"Called pt and advised \"I dont have a way to check my blood sugar, my sensor came off last night when I took my shirt off. I dont have a way to do a finger stick, I dont have an additional meter. I have not been taking any short acting insulin unless someone tells me to.\"     Verbal per Dr Crowe take \"Levemir 40 U now, and Humalog (short acting) 15U now and call blood sugar reading in 2 hours\"      Advised pt to send spouse for a sample free style rdaha 2 sensor and apply and to keep a snack at her side if she gets to low, pt is agreeable and will have spouse to come and get sample sensor ASAP      "

## 2023-01-25 NOTE — PROGRESS NOTES
"Called pt and advised \"I dont have a way to check my blood sugar, my sensor came off last night when I took my shirt off. I dont have a way to do a finger stick, I dont have an additional meter. I have not been taking any short acting insulin unless someone tells me to.\"    Verbal per Dr Crowe take \"Levemir 40 U now, and Humalog (short acting) 15U now and call blood sugar reading in 2 hours\"     Advised pt to send spouse for a sample free style radha 2 sensor and apply and to keep a snack at her side if she gets to low  "

## 2023-01-25 NOTE — TELEPHONE ENCOUNTER
Give 15 units short acting now  Eat when time; no coverage this time  Repeat BS around 1 and call  Stay hydrated  Assuming eating meals

## 2023-01-25 NOTE — TELEPHONE ENCOUNTER
Called pt and stated understanding, and advised that she is eating regular meals and will call back with next glucose test results

## 2023-01-26 ENCOUNTER — OFFICE VISIT (OUTPATIENT)
Dept: FAMILY MEDICINE CLINIC | Facility: CLINIC | Age: 73
End: 2023-01-26
Payer: MEDICARE

## 2023-01-26 VITALS
SYSTOLIC BLOOD PRESSURE: 118 MMHG | DIASTOLIC BLOOD PRESSURE: 76 MMHG | WEIGHT: 223 LBS | TEMPERATURE: 96.9 F | HEIGHT: 63 IN | BODY MASS INDEX: 39.51 KG/M2 | OXYGEN SATURATION: 96 % | RESPIRATION RATE: 18 BRPM | HEART RATE: 98 BPM

## 2023-01-26 DIAGNOSIS — E66.01 CLASS 2 SEVERE OBESITY WITH SERIOUS COMORBIDITY AND BODY MASS INDEX (BMI) OF 39.0 TO 39.9 IN ADULT, UNSPECIFIED OBESITY TYPE: ICD-10-CM

## 2023-01-26 DIAGNOSIS — E11.65 UNCONTROLLED TYPE 2 DIABETES MELLITUS WITH HYPERGLYCEMIA, WITH LONG-TERM CURRENT USE OF INSULIN: Primary | ICD-10-CM

## 2023-01-26 DIAGNOSIS — Z79.4 UNCONTROLLED TYPE 2 DIABETES MELLITUS WITH HYPERGLYCEMIA, WITH LONG-TERM CURRENT USE OF INSULIN: Primary | ICD-10-CM

## 2023-01-26 PROCEDURE — 99213 OFFICE O/P EST LOW 20 MIN: CPT | Performed by: NURSE PRACTITIONER

## 2023-01-26 NOTE — PROGRESS NOTES
Subjective   Chief Complaint:  Evaluation of blood glucose.    History of Present Illness:  This 72 y.o. female was seen in the office today.    The patient is accompanied by an adult male.    The patient was unable to record her blood glucose level today secondary to her Sue 2 not having battery life. The patient adds she has a smart phone. On 01/25/2023, she reports took 15 units of Levemir, however; she was informed she needed to take 35 units of Levemir twice daily. She states she has a sliding scale in the refrigerator.    Allergies   Allergen Reactions   • Adhesive Tape Other (See Comments)     Tears and bruises skin   • Other Other (See Comments)     Bandaids make arm Red/slightly swollen      Current Outpatient Medications on File Prior to Visit   Medication Sig   • albuterol sulfate  (90 Base) MCG/ACT inhaler Inhale 2 puffs Every 4 (Four) Hours As Needed for Wheezing or Shortness of Air.   • amLODIPine (NORVASC) 5 MG tablet Take 5 mg by mouth Daily.   • atorvastatin (LIPITOR) 20 MG tablet Take 20 mg by mouth Daily.   • Blood Glucose Monitoring Suppl (OneTouch Verio Flex System) w/Device kit 1 each As Needed (emergency use if no CGM in place).   • budesonide-formoterol (SYMBICORT) 160-4.5 MCG/ACT inhaler Inhale 2 puffs 2 (Two) Times a Day.   • Calcium Carb-Cholecalciferol (CALCIUM-VITAMIN D) 600-400 MG-UNIT tablet Take 1 tablet by mouth 2 (Two) Times a Day.   • carboxymethylcellulose (REFRESH PLUS) 0.5 % solution 2 drops Daily As Needed for dry eyes.   • Cholecalciferol 25 MCG (1000 UT) capsule Take 1 capsule by mouth Daily.   • Continuous Blood Gluc Sensor (FreeStyle Sue 2 Sensor) misc 1 Device Every 14 (Fourteen) Days. Test QID with sliding scale tid and Levemir HS   • denosumab (PROLIA) 60 MG/ML solution syringe Inject 60 mg under the skin into the appropriate area as directed Every 6 (Six) Months.   • donepezil (Aricept) 10 MG tablet Take 1 tablet by mouth Every Night.   • donepezil (Aricept)  5 MG tablet Take 1 tablet by mouth Every Night.   • DULoxetine (CYMBALTA) 20 MG capsule Take 20 mg by mouth Daily.   • ferrous sulfate 325 (65 Fe) MG tablet Take 1 tablet by mouth Every Other Day for 90 days.   • guaiFENesin (MUCINEX) 600 MG 12 hr tablet Take 600 mg by mouth 2 (Two) Times a Day.   • hydroCHLOROthiazide (MICROZIDE) 12.5 MG capsule    • insulin detemir (LEVEMIR) 100 UNIT/ML injection Inject 30 Units under the skin into the appropriate area as directed Every 12 (Twelve) Hours. Provide 90 day if possible   • Insulin Lispro, 1 Unit Dial, (HumaLOG KwikPen) 100 UNIT/ML solution pen-injector Inject 7 Units under the skin into the appropriate area as directed 3 (Three) Times a Day. Max units 21 per 24 hours   • Insulin Pen Needle (MM Pen Needles) 31G X 6 MM misc 1 each 5 (Five) Times a Day. BID with Levemir, use TID with Humalog prn   • irbesartan (AVAPRO) 300 MG tablet Take 300 mg by mouth Daily.   • latanoprost (XALATAN) 0.005 % ophthalmic solution Administer 1 drop to both eyes Every Night.   • Misc Natural Products (GLUCOSAMINE CHONDROITIN TRIPLE PO) Take 1 tablet by mouth 2 (Two) Times a Day.   • montelukast (SINGULAIR) 10 MG tablet Take 10 mg by mouth Every Night.   • Multiple Vitamins-Minerals (VITRUM 50+ SENIOR MULTI PO) Take 1 tablet by mouth Daily.   • pantoprazole (PROTONIX) 40 MG EC tablet Take 40 mg by mouth 2 (Two) Times a Day.   • potassium chloride 10 MEQ CR tablet Take 10 mEq by mouth Daily.   • [DISCONTINUED] Semaglutide, 2 MG/DOSE, 8 MG/3ML solution pen-injector Inject 2 mg under the skin into the appropriate area as directed 1 (One) Time Per Week.     No current facility-administered medications on file prior to visit.      Past Medical, Surgical, Social, and Family History:  Past Medical History:   Diagnosis Date   • Allergic rhinitis    • Chronic laryngitis    • Diabetes mellitus (HCC)    • GERD (gastroesophageal reflux disease)    • Hypertension    • Hypertrophy of both inferior nasal  "turbinates    • Laryngopharyngeal reflux    • Nontoxic multinodular goiter    • Sicca laryngitis      Past Surgical History:   Procedure Laterality Date   • CARPAL TUNNEL RELEASE     • CHOLECYSTECTOMY     • COLONOSCOPY  10/01/2008    Multiple polyps removed   • COLONOSCOPY  09/18/2013    Diverticulosis in the sigmoid colon. Dr. Dave Mcnally Recall 5 years   • ENDOSCOPY N/A 12/08/2022    Non-bleeding duodenal ulcer, small HH   • HAND SURGERY     • HYSTERECTOMY     • REPLACEMENT TOTAL KNEE Right 02/02/2021   • THYROIDECTOMY, PARTIAL Right 2010   • TONSILLECTOMY     • TUBAL ABDOMINAL LIGATION       Social History     Socioeconomic History   • Marital status:      Spouse name: Laurie   • Number of children: 2   • Years of education: 16   Tobacco Use   • Smoking status: Never   • Smokeless tobacco: Never   • Tobacco comments:     \"lived with smokers most of my life\"   Vaping Use   • Vaping Use: Never used   Substance and Sexual Activity   • Alcohol use: No     Comment: Rare   • Drug use: No   • Sexual activity: Defer     Partners: Male     Birth control/protection: None     Family History   Problem Relation Age of Onset   • Diabetes Mother    • Stroke Mother    • Heart disease Mother    • Heart disease Father    • Colon cancer Neg Hx    • Colon polyps Neg Hx        Objective   Physical Exam  Vitals reviewed.   Constitutional:       General: She is not in acute distress.     Appearance: Normal appearance. She is obese.   Cardiovascular:      Rate and Rhythm: Normal rate and regular rhythm.   Pulmonary:      Effort: Pulmonary effort is normal.      Breath sounds: Normal breath sounds.     /76 (BP Location: Left arm, Patient Position: Sitting, Cuff Size: Adult)   Pulse 98   Temp 96.9 °F (36.1 °C) (Infrared)   Resp 18   Ht 160 cm (63\")   Wt 101 kg (223 lb)   SpO2 96%   BMI 39.50 kg/m²     Prior Visit Notes/Records, Lab, Imaging, and Diagnostic Results Reviewed:  CBC:  Lab Results - Last 18 Months   Lab " Units 01/24/23  1019 01/04/23  1100 12/19/22  1230 12/11/22  1045 12/09/22  1440 12/09/22  0644 12/08/22  0654 12/07/22  2357 12/07/22  1843 12/07/22  1406 12/07/22  0840 12/06/22  1108 11/18/22  0955 10/13/22  1123 03/15/22  0739 09/15/21  0707   WBC 10*3/mm3 6.66 5.44 4.42 5.01  --  4.07  --  8.58 8.91  --   --  7.44   < > 9.40 10.68 6.44   HEMOGLOBIN g/dL 12.7 11.6* 9.9* 8.9* 7.9* 6.7* 7.1* 7.2* 8.2* 8.1*  --  8.8*   < > 15.2 14.6 13.6   HEMATOCRIT % 42.9 37.2 32.2* 27.2* 24.2* 21.3* 22.6* 22.8* 25.7* 24.2*  --  26.6*   < > 43.7 45.1 40.7   PLATELETS 10*3/mm3 336 358 270 351  --  214  --  209 256  --   --  266   < > 301 276 283   IRON mcg/dL 344* 37 75  --   --   --   --   --  39 88 67 180*  --  90 74 69    < > = values in this interval not displayed.      Chemistry:  Lab Results - Last 18 Months   Lab Units 01/24/23  1019 12/19/22  1230 12/11/22  1045 12/09/22  0644 12/08/22  0654 12/07/22  1843 12/06/22  1108 10/13/22  1122 06/17/22  0810 03/15/22  0739 09/15/21  0707   SODIUM mmol/L 135* 133* 134* 133* 137 132* 136   < > 137 136 136   POTASSIUM mmol/L 5.0 3.8 3.4* 3.6 3.7 3.7 3.9   < > 4.4 4.9 4.4   CHLORIDE mmol/L 93* 98 100 102 102 98 96*   < > 100 96* 99   TOTAL CO2 mmol/L 28.1  --   --   --   --   --  25.7  --  25.5 25.7 26.5   CO2 mmol/L  --  24.0 25.0 25.0 25.0 24.0  --    < >  --   --   --    GLUCOSE mg/dL 497* 300* 226* 185* 105* 183* 235*   < > 153* 220* 145*   BUN mg/dL 11 8 6* 12 17 21 32*   < > 13 32* 20   CREATININE mg/dL 0.74 0.46* 0.36* 0.46* 0.46* 0.48* 0.61   < > 0.63 0.96 0.73   EGFR IF NONAFRICN AM mL/min/1.73  --   --   --   --   --   --   --   --   --   --  78   EGFR IF AFRICN AM mL/min/1.73  --   --   --   --   --   --   --   --   --   --  95   EGFR RESULT mL/min/1.73 85.0  --   --   --   --   --  93.9  --  93.8 62.6  --    CALCIUM mg/dL 9.4 8.6 8.1* 7.6* 8.0* 8.8 8.5*   < > 9.6 10.1 10.2    < > = values in this interval not displayed.     Lab Results - Last 18 Months   Lab Units  12/19/22  1230 12/11/22  1045 12/07/22  1843 12/06/22  1108 11/23/22  1135 11/18/22  0955 10/13/22  1123   ALT (SGPT) U/L 23 30 28 24 34* 30 31   AST (SGOT) U/L 18 19 15 13 21 14 24   ALK PHOS U/L 98 83 88 68 91 108 100       Assessment & Plan   Diagnoses and all orders for this visit:    1. Uncontrolled type 2 diabetes mellitus with hyperglycemia, with long-term current use of insulin (HCC) (Primary)    2. Class 2 severe obesity with serious comorbidity and body mass index (BMI) of 39.0 to 39.9 in adult, unspecified obesity type (HCC)    Discussion:  Advised and educated plan of care.  Advised the patient to continue with Levemir 35 units b.i.d. today 35 units. Try to get the sensor working and then check a fasting glucose in the morning. If greater than 180 mg/dL, I will have her titrate Levemir insulin by 5 units increase to 40 units SQ BID. I did verify she does have a sliding scale available during mealtime.  Advised not to skip any sliding scale opportunities with elevated glucoses-advised to stop Ozempic.     Class 2 Severe Obesity (BMI >=35 and <=39.9). Obesity-related health conditions include the following: diabetes mellitus. Obesity is unchanged. BMI is is above average; no BMI management plan is appropriate. We discussed portion control, increasing exercise and weight is ok.    Follow-up:  Return for Next scheduled follow up as already scheduled..    Transcribed from ambient dictation for FABBY Sanchez by Sourav Dolan.  01/26/23   11:16 CST    Patient or patient representative verbalized consent to the visit recording.  I have personally performed the services described in this document as transcribed by the above individual, and it is both accurate and complete.    Electronically signed by Khris Shanks, 01/26/23, 11:16 AM CST.

## 2023-02-01 ENCOUNTER — HOSPITAL ENCOUNTER (OUTPATIENT)
Dept: MRI IMAGING | Facility: HOSPITAL | Age: 73
Discharge: HOME | End: 2023-02-01
Payer: MEDICARE

## 2023-02-02 DIAGNOSIS — E11.9 INSULIN DEPENDENT TYPE 2 DIABETES MELLITUS: ICD-10-CM

## 2023-02-02 DIAGNOSIS — Z79.4 INSULIN DEPENDENT TYPE 2 DIABETES MELLITUS: ICD-10-CM

## 2023-02-02 DIAGNOSIS — E11.65 UNCONTROLLED TYPE 2 DIABETES MELLITUS WITH HYPERGLYCEMIA: ICD-10-CM

## 2023-02-02 NOTE — TELEPHONE ENCOUNTER
Caller: Guanakito Toroy    Relationship: Emergency Contact    Best call back number: [phone]    Requested Prescriptions:   Requested Prescriptions     Pending Prescriptions Disp Refills   • insulin detemir (LEVEMIR) 100 UNIT/ML injection 60 mL 1     Sig: Inject 30 Units under the skin into the appropriate area as directed Every 12 (Twelve) Hours. Provide 90 day if possible        Pharmacy where request should be sent: [facility] - [address] [phone] PH - [phone] FX     Does the patient have less than a 3 day supply:  [] Yes  [] No    Would you like a call back once the refill request has been completed: [x] Yes [] No    If the office needs to give you a call back, can they leave a voicemail: [x] Yes [] No    Ryan Mohan Rep   02/02/23 13:48 CST

## 2023-02-02 NOTE — TELEPHONE ENCOUNTER
Rx Refill Note  Requested Prescriptions     Pending Prescriptions Disp Refills   • insulin detemir (LEVEMIR) 100 UNIT/ML injection 40 mL 5     Sig: Inject 60 Units under the skin into the appropriate area as directed Every 12 (Twelve) Hours for 30 days. Please provide 30 day supply unsure how many pens in a box, thank you      Last office visit with prescribing clinician: 1/24/2023      Next office visit with prescribing clinician: 2/16/2023            Vivien Mercado LPN  02/02/23, 13:59 CST

## 2023-02-03 DIAGNOSIS — E11.65 UNCONTROLLED TYPE 2 DIABETES MELLITUS WITH HYPERGLYCEMIA: Primary | ICD-10-CM

## 2023-02-03 RX ORDER — SYRINGE-NEEDLE,INSULIN,0.5 ML 31 GX5/16"
SYRINGE, EMPTY DISPOSABLE MISCELLANEOUS
Qty: 60 EACH | Refills: 5 | Status: SHIPPED | OUTPATIENT
Start: 2023-02-03

## 2023-02-03 RX ORDER — SYRING-NEEDL,DISP,INSUL,0.3 ML 31 GX5/16"
SYRINGE, EMPTY DISPOSABLE MISCELLANEOUS
Qty: 90 EACH | Refills: 5 | Status: SHIPPED | OUTPATIENT
Start: 2023-02-03

## 2023-02-03 NOTE — TELEPHONE ENCOUNTER
"Rx Refill Note  Requested Prescriptions     Pending Prescriptions Disp Refills   • TRUEplus Insulin Syringe 31G X 5/16\" 0.5 ML misc [Pharmacy Med Name: TRUEPLUS INSULIN SYRINGE/U-100/0.5ML/31G X 5/16\" 0.5/31G MISC] 60 each 5     Sig: USE AS DIRECTED TWICE DAILY WITH LEVEMIR   • TRUEplus Insulin Syringe 31G X 5/16\" 0.3 ML misc [Pharmacy Med Name: TRUEPLUS INSULIN SYRINGE/U-100/0.3ML/31G X 5/16\" 0.3/31G MISC] 90 each 5     Sig: USE AS DIRECTED THREE TIMES DAILY      Last office visit with prescribing clinician: 1/24/2023      Next office visit with prescribing clinician: 2/16/2023            Vivien Mercado, COLTON  02/03/23, 12:34 CST  "

## 2023-02-07 ENCOUNTER — TELEPHONE (OUTPATIENT)
Dept: FAMILY MEDICINE CLINIC | Facility: CLINIC | Age: 73
End: 2023-02-07
Payer: MEDICARE

## 2023-02-07 NOTE — TELEPHONE ENCOUNTER
Call son/brian    Can he get us 24 hr BS numbers  .  1. Before breakfast  2. 2 hrs after breakfast  3. Before lunch  4. 2 hrs after lunch  5. Before dinner/supper  6. 2 hrs after dinner/supper  7. Bedtime

## 2023-02-08 ENCOUNTER — HOSPITAL ENCOUNTER (OUTPATIENT)
Dept: MRI IMAGING | Facility: HOSPITAL | Age: 73
Discharge: HOME | End: 2023-02-08
Admitting: FAMILY MEDICINE
Payer: MEDICARE

## 2023-02-08 DIAGNOSIS — R41.3 MEMORY LOSS: ICD-10-CM

## 2023-02-08 DIAGNOSIS — E61.1 IRON DEFICIENCY: ICD-10-CM

## 2023-02-08 DIAGNOSIS — E11.9 CONTROLLED TYPE 2 DIABETES MELLITUS WITHOUT COMPLICATION, WITHOUT LONG-TERM CURRENT USE OF INSULIN: Chronic | ICD-10-CM

## 2023-02-08 DIAGNOSIS — D64.9 ANEMIA, UNSPECIFIED TYPE: ICD-10-CM

## 2023-02-08 LAB — CREAT BLDA-MCNC: 0.8 MG/DL (ref 0.6–1.3)

## 2023-02-08 PROCEDURE — 82565 ASSAY OF CREATININE: CPT

## 2023-02-08 PROCEDURE — A9577 INJ MULTIHANCE: HCPCS | Performed by: FAMILY MEDICINE

## 2023-02-08 PROCEDURE — 70553 MRI BRAIN STEM W/O & W/DYE: CPT

## 2023-02-08 PROCEDURE — 0 GADOBENATE DIMEGLUMINE 529 MG/ML SOLUTION: Performed by: FAMILY MEDICINE

## 2023-02-08 RX ADMIN — GADOBENATE DIMEGLUMINE 20 ML: 529 INJECTION, SOLUTION INTRAVENOUS at 13:06

## 2023-02-10 ENCOUNTER — LAB (OUTPATIENT)
Dept: FAMILY MEDICINE CLINIC | Facility: CLINIC | Age: 73
End: 2023-02-10
Payer: MEDICARE

## 2023-02-13 ENCOUNTER — TELEPHONE (OUTPATIENT)
Dept: FAMILY MEDICINE CLINIC | Facility: CLINIC | Age: 73
End: 2023-02-13
Payer: MEDICARE

## 2023-02-13 RX ORDER — FLUCONAZOLE 100 MG/1
100 TABLET ORAL DAILY
Qty: 3 TABLET | Refills: 0 | Status: SHIPPED | OUTPATIENT
Start: 2023-02-13 | End: 2023-02-16

## 2023-02-16 ENCOUNTER — OFFICE VISIT (OUTPATIENT)
Dept: FAMILY MEDICINE CLINIC | Facility: CLINIC | Age: 73
End: 2023-02-16
Payer: MEDICARE

## 2023-02-16 VITALS
RESPIRATION RATE: 16 BRPM | WEIGHT: 227 LBS | BODY MASS INDEX: 40.22 KG/M2 | HEART RATE: 90 BPM | DIASTOLIC BLOOD PRESSURE: 88 MMHG | SYSTOLIC BLOOD PRESSURE: 154 MMHG | OXYGEN SATURATION: 95 % | HEIGHT: 63 IN | TEMPERATURE: 96.8 F

## 2023-02-16 DIAGNOSIS — R26.9 GAIT DIFFICULTY: ICD-10-CM

## 2023-02-16 DIAGNOSIS — I10 HTN (HYPERTENSION), BENIGN: Chronic | ICD-10-CM

## 2023-02-16 DIAGNOSIS — E11.65 TYPE 2 DIABETES MELLITUS WITH HYPERGLYCEMIA, WITH LONG-TERM CURRENT USE OF INSULIN: ICD-10-CM

## 2023-02-16 DIAGNOSIS — E78.2 MIXED HYPERLIPIDEMIA: ICD-10-CM

## 2023-02-16 DIAGNOSIS — M25.562 CHRONIC PAIN OF LEFT KNEE: ICD-10-CM

## 2023-02-16 DIAGNOSIS — I10 HTN (HYPERTENSION), BENIGN: Primary | ICD-10-CM

## 2023-02-16 DIAGNOSIS — Z79.4 TYPE 2 DIABETES MELLITUS WITH HYPERGLYCEMIA, WITH LONG-TERM CURRENT USE OF INSULIN: ICD-10-CM

## 2023-02-16 DIAGNOSIS — R41.3 MEMORY LOSS: ICD-10-CM

## 2023-02-16 DIAGNOSIS — G89.29 CHRONIC PAIN OF LEFT KNEE: ICD-10-CM

## 2023-02-16 DIAGNOSIS — R03.0 ELEVATED BLOOD PRESSURE READING: ICD-10-CM

## 2023-02-16 DIAGNOSIS — D64.9 ANEMIA, UNSPECIFIED TYPE: ICD-10-CM

## 2023-02-16 DIAGNOSIS — R32 URINARY INCONTINENCE, UNSPECIFIED TYPE: ICD-10-CM

## 2023-02-16 PROCEDURE — 99214 OFFICE O/P EST MOD 30 MIN: CPT | Performed by: FAMILY MEDICINE

## 2023-02-16 RX ORDER — SPIRONOLACTONE 25 MG/1
12.5 TABLET ORAL DAILY
Qty: 45 TABLET | Refills: 1 | Status: SHIPPED | OUTPATIENT
Start: 2023-02-16

## 2023-02-16 RX ORDER — AMLODIPINE BESYLATE 5 MG/1
TABLET ORAL
Qty: 90 TABLET | Refills: 1 | Status: SHIPPED | OUTPATIENT
Start: 2023-02-16

## 2023-02-16 NOTE — PROGRESS NOTES
Subjective   Kezia Otoole is a 72 y.o. female presenting with chief complaint of:   Chief Complaint   Patient presents with   • Follow-up   • Diabetes     AWV 10.18.22    History of Present Illness :  With .   Here for review of chronic problems that includes DM2 uncontrolled and others.     Has multiple chronic problems to consider that might have a bearing on today's issues;  an interval appointment.       Chronic/acute problems reviewed today:   1. Urinary incontinence, unspecified type    2. HTN (hypertension), benign    3. Elevated blood pressure reading    4. Chronic pain of left knee    5. Mixed hyperlipidemia    6. Type 2 diabetes mellitus with hyperglycemia, with long-term current use of insulin (HCC)    7. Anemia, unspecified type   Colonoscopy-polyp/Uli/07-23-04(3-5)  Colonoscopy+lhqdk-hpfi-dpx-hem/[facility]/10-1-08/2y  Colonoscopy+polyp/[facility]/10-6-10/3y  Colonoscopy+div/[facility]/9.18.13/5y  Colon-p+div/Mc/[facility]/11.19.18/5y  VJO-vtvxtw-xmgde/Hernández/BH/12.8.22  B12 328N 12.7.22  Folate 18N; 12.7.22  Ferritin 39.3N (never low) 2.10.23  Iron 27L, sat 6L, UIBC 416H, TIBC 443H-56N 2.10.23  hemocult 3/3 neg 2.18.19     8. Memory loss chronic over times slow progressive decline in memory and cognition leading to current declined levels of ADLs.   9. Gait difficulty Chronic/stable:.  Ongoing issues with difficulties it results in difficulty with walking or gait.  No recent falls.  No recent injuries.  Uses to help gait: occ cane.       Has an/another acute issue today: none.    The following portions of the patient's history were reviewed and updated as appropriate: allergies, current medications, past family history, past medical history, past social history, past surgical history and problem list.      Current Outpatient Medications:   •  amLODIPine (NORVASC) 5 MG tablet, Take 5 mg by mouth Daily., Disp: , Rfl:   •  atorvastatin (LIPITOR) 20 MG tablet, Take 20 mg by mouth Daily., Disp: , Rfl:   •   Calcium Carb-Cholecalciferol (CALCIUM-VITAMIN D) 600-400 MG-UNIT tablet, Take 1 tablet by mouth 2 (Two) Times a Day., Disp: , Rfl:   •  Cholecalciferol 25 MCG (1000 UT) capsule, Take 1 capsule by mouth Daily., Disp: , Rfl:   •  Continuous Blood Gluc Sensor (FreeStyle Sue 2 Sensor) misc, 1 Device Every 14 (Fourteen) Days. Test QID with sliding scale tid and Levemir HS, Disp: 2 each, Rfl: 0  •  denosumab (PROLIA) 60 MG/ML solution syringe, Inject 60 mg under the skin into the appropriate area as directed Every 6 (Six) Months., Disp: , Rfl:   •  donepezil (Aricept) 10 MG tablet, Take 1 tablet by mouth Every Night., Disp: 30 tablet, Rfl: 5  •  DULoxetine (CYMBALTA) 20 MG capsule, Take 20 mg by mouth Daily., Disp: , Rfl:   •  ferrous sulfate 325 (65 Fe) MG tablet, Take 1 tablet by mouth Every Other Day for 90 days., Disp: 45 tablet, Rfl: 1  •  guaiFENesin (MUCINEX) 600 MG 12 hr tablet, Take 600 mg by mouth 2 (Two) Times a Day., Disp: , Rfl:   •  hydroCHLOROthiazide (MICROZIDE) 12.5 MG capsule, Take 12.5 mg by mouth Every Morning., Disp: , Rfl:   •  insulin detemir (LEVEMIR) 100 UNIT/ML injection, Inject 60 Units under the skin into the appropriate area as directed Every 12 (Twelve) Hours for 30 days. Please provide 30 day supply unsure how many pens in a box, thank you (Patient taking differently: Inject 40 Units under the skin into the appropriate area as directed Every 12 (Twelve) Hours. Please provide 30 day supply unsure how many pens in a box, thank you), Disp: 40 mL, Rfl: 5  •  Insulin Lispro, 1 Unit Dial, (HumaLOG KwikPen) 100 UNIT/ML solution pen-injector, Inject 7 Units under the skin into the appropriate area as directed 3 (Three) Times a Day. Max units 21 per 24 hours, Disp: 20 mL, Rfl: 1  •  Insulin Pen Needle (MM Pen Needles) 31G X 6 MM misc, 1 each 5 (Five) Times a Day. BID with Levemir, use TID with Humalog prn, Disp: 100 each, Rfl: 5  •  irbesartan (AVAPRO) 300 MG tablet, Take 300 mg by mouth Daily.,  "Disp: , Rfl:   •  latanoprost (XALATAN) 0.005 % ophthalmic solution, Administer 1 drop to both eyes Every Night., Disp: , Rfl: 5  •  Misc Natural Products (GLUCOSAMINE CHONDROITIN TRIPLE PO), Take 1 tablet by mouth 2 (Two) Times a Day., Disp: , Rfl:   •  montelukast (SINGULAIR) 10 MG tablet, Take 10 mg by mouth Every Night., Disp: , Rfl:   •  Multiple Vitamins-Minerals (VITRUM 50+ SENIOR MULTI PO), Take 1 tablet by mouth Daily., Disp: , Rfl:   •  potassium chloride 10 MEQ CR tablet, Take 10 mEq by mouth Daily., Disp: , Rfl:   •  TRUEplus Insulin Syringe 31G X 5/16\" 0.3 ML misc, USE AS DIRECTED THREE TIMES DAILY, Disp: 90 each, Rfl: 5  •  TRUEplus Insulin Syringe 31G X 5/16\" 0.5 ML misc, USE AS DIRECTED TWICE DAILY WITH LEVEMIR, Disp: 60 each, Rfl: 5  •  albuterol sulfate  (90 Base) MCG/ACT inhaler, Inhale 2 puffs Every 4 (Four) Hours As Needed for Wheezing or Shortness of Air., Disp: 8 g, Rfl: 1  •  Blood Glucose Monitoring Suppl (OneTouch Verio Flex System) w/Device kit, 1 each As Needed (emergency use if no CGM in place)., Disp: 1 kit, Rfl: 0  •  budesonide-formoterol (SYMBICORT) 160-4.5 MCG/ACT inhaler, Inhale 2 puffs 2 (Two) Times a Day., Disp: , Rfl:   •  carboxymethylcellulose (REFRESH PLUS) 0.5 % solution, 2 drops Daily As Needed for dry eyes., Disp: , Rfl:   •  donepezil (Aricept) 5 MG tablet, Take 1 tablet by mouth Every Night., Disp: 30 tablet, Rfl: 0  •  pantoprazole (PROTONIX) 40 MG EC tablet, Take 40 mg by mouth 2 (Two) Times a Day., Disp: , Rfl:     Current Outpatient Medications for DM  •  insulin detemir (LEVEMIR) 100 UNIT/ML injection, Inject 60 Units under the skin into the appropriate area as directed Every 12 (Twelve) Hours for 30 days. Please provide 30 day supply unsure how many pens in a box, thank you (Patient taking differently: Inject 40 Units under the skin into the appropriate area as directed Every 12 (Twelve) Hours. Please provide 30 day supply unsure how many pens in a box, " "thank you), Disp: 40 mL, Rfl: 5  •  Insulin Lispro, 1 Unit Dial, (HumaLOG KwikPen) 100 UNIT/ML solution pen-injector, Inject 7 Units under the skin into the appropriate area as directed 3 (Three) Times a Day. Max units 21 per 24 hours, Disp: 20 mL, Rfl: 1    Did not hear/know to increase long acting insulin    No problems with medications.    Allergies   Allergen Reactions   • Adhesive Tape Other (See Comments)     Tears and bruises skin   • Other Other (See Comments)     Bandaids make arm Red/slightly swollen       Review of Systems  GENERAL:  Inactive/slower with limits, speed, stamina, various joint issues and weakness. Sleep is poor; apnea likely.  No fever now/recent.  ENDO:  No syncope, near or diaphoretic sweaty spells.  BS still high; despite abbott no download. Misses insulin sometimes.   HEENT: No head injury or headache.   No vision change.   No significant hearing loss.  Ears without pain/drainage.  No sore throat.   Same/occ nasal/sinus congestion/drainage. No epistaxis.  CHEST: No chest wall tenderness or mass.  Occ cough, wheeze.   No SOB; no hemoptysis.  CV: No chest pain, palpitations, usual  On/off ankle edema.  GI: No heartburn, dysphagia.  No abdominal pain, diarrhea, constipation; occ fecal incontience.  No rectal bleeding; dark stools or melena.    :  Voids without dysuria; occ incontinence to completion.  ORTHO: No painful/swollen joints but various on /off sore.  No change occ sore neck or back.  No acute neck or back pain without recent injury.  NEURO: No dizziness; diffuse weakness of extremities.  No numbness/paresthesias.   PSYCH: Admitted memory loss.  Mood winter variable; occ mild anxious, depressed but/and not suicidal.  Tries to tolerate stress .   Screening:  Mammogram: [id number]-ABT-hicuhlh/delayed for illness  Bone density: \"2021-22\" [facility]; continuing the prolia  12.11.18 [facility] hip -2.4; start prolia-this year-per [facility]  Low dose CT chest: Tobacco-smoker/never: NA  GI: " Colon-p+div/[facility]/11.19.18/5y  Prostate: NA  Usual lab order  6m CBC, CMP, A1c  12m CBC, CMP, A1c, LIPID, TSH, Vit D, uric acid      Copy/paste function used for ROS/exam AND each area of these were reviewed, updated, confirmed and supplemented as needed.  Data reviewed:   Recent admit/ER/MD visits: 1.24.22    1. Anemia, unspecified type    2. Controlled type 2 diabetes mellitus without complication, without long-term current use of insulin (HCC)    3. Urinary incontinence, unspecified type    4. Incontinence of feces, unspecified fecal incontinence type    5. Hypersomnia    6. Iron deficiency    7. Patient forgets to take medication    8. Memory loss    9. Gait difficulty    10. Hyperglycemia    11. HTN (hypertension), benign    12. Mixed hyperlipidemia    13. Gastroesophageal reflux disease, unspecified whether esophagitis present    14. Primary generalized (osteo)arthritis          Data review above:   Discussions/medical decisions/reviews:  BP ok  Other vitals ok  DM/BS 7.8 12.19.22  Lipid LDL 35 10.13.22; lipitor 20  PSA NA  CBC 11.6 1.4.23  Iron 37N 1.4.23 on 325/d  Renal ok 12.19.22  Liver ok 12.19.22  Vit D 52 10.13.22  Thyroid TSH ok 10.13.22     ? Bedside commode-did not get  Timed voiding/stooling  MRI head-below  Start aricept 5 then 10 next month-did  Suggested hold on driving  Check iron today; hoping we can stop     Data review above:   Rx: reviewed and decisions:   Rx new/changes: noneok       New Medications Ordered This Visit   Medications   • donepezil (Aricept) 5 MG tablet       Sig: Take 1 tablet by mouth Every Night.       Dispense:  30 tablet       Refill:  0   • donepezil (Aricept) 10 MG tablet       Sig: Take 1 tablet by mouth Every Night.       Dispense:  30 tablet       Refill:  5         Orders placed:   LAB/Testing/Referrals: reviewed/orders:   Today:       Orders Placed This Encounter   Procedures   • Commode Chair   • MRI Brain With & Without Contrast   • Ferritin   • Iron Profile   •  Basic Metabolic Panel   • CBC & Differential      Chronic/recurrent labs above or change to:   Same   Personal interpretation of tests/procedures:   Procedures   Discussion with independent historian (because of patient inabilities/or to confirm): son; Karen [phone]   He agreed bedside commode  He will see if PT/OT could come back  He agrees no driving  He will consider [facility] PT  He will see if can work out timed voiding  Increase levemir 10 units day (5 bid); needs to remember at least this one  Tell  to increase 30 levemir bid to 35 bid; this is the most important insulin  IF not better AM/fasting 1 week increase another 40 bid and keep us informed    Last cardiac testing:   Echo:       Radiology considered:   MRI Brain With & Without Contrast    Result Date: 2/8/2023  Impression:  1. Underlying moderate chronic small vessel ischemic change. Otherwise unremarkable.  This report was finalized on 02/08/2023 16:07 by Dr Saleem Titus, .    CT Abdomen Pelvis With Contrast    Result Date: 12/7/2022  1. There is diverticulosis of the colon. There is focal diverticulitis in the distal sigmoid region where there is stranding of the adjacent fat, wall thickening and a trace amount of free fluid. No perforation or abscess is seen. 2. Wall thickening of the stomach likely due to underdistention. There is some edema around the gastric antrum and proximal duodenum the could be due to ulcer disease or inflammatory change. There is wall thickening in this area. 3. Small hiatal hernia. 4. Geographic fatty infiltration of the liver. Prior cholecystectomy. 5. Prior hysterectomy. Other nonacute findings, as discussed.  The full report of this exam was immediately signed and available to the emergency room. The patient is currently in the emergency room. This report was finalized on 12/07/2022 19:35 by Dr. Neo Mejia MD.    XR Chest 1 View    Result Date: 11/23/2022  Shallow inspiration with mild central vascular  crowding, no acute infiltrates. Interval removal of the right internal jugular central line. No pneumothorax is identified. This report was finalized on 11/23/2022 12:36 by Dr. Christian Kim MD.      Lab Results:  Results for orders placed or performed in visit on 02/10/23   Iron    Specimen: Blood   Result Value Ref Range    Iron 56 37 - 145 mcg/dL   Ferritin    Specimen: Blood   Result Value Ref Range    Ferritin 39.30 13.00 - 150.00 ng/mL   CBC & Differential    Specimen: Blood   Result Value Ref Range    WBC 6.21 3.40 - 10.80 10*3/mm3    RBC 4.80 3.77 - 5.28 10*6/mm3    Hemoglobin 13.1 12.0 - 15.9 g/dL    Hematocrit 42.4 34.0 - 46.6 %    MCV 88.3 79.0 - 97.0 fL    MCH 27.3 26.6 - 33.0 pg    MCHC 30.9 (L) 31.5 - 35.7 g/dL    RDW 15.2 12.3 - 15.4 %    Platelets 281 140 - 450 10*3/mm3    Neutrophil Rel % 67.9 42.7 - 76.0 %    Lymphocyte Rel % 20.8 19.6 - 45.3 %    Monocyte Rel % 9.7 5.0 - 12.0 %    Eosinophil Rel % 0.3 0.3 - 6.2 %    Basophil Rel % 0.8 0.0 - 1.5 %    Neutrophils Absolute 4.22 1.70 - 7.00 10*3/mm3    Lymphocytes Absolute 1.29 0.70 - 3.10 10*3/mm3    Monocytes Absolute 0.60 0.10 - 0.90 10*3/mm3    Eosinophils Absolute 0.02 0.00 - 0.40 10*3/mm3    Basophils Absolute 0.05 0.00 - 0.20 10*3/mm3    Immature Granulocyte Rel % 0.5 0.0 - 0.5 %    Immature Grans Absolute 0.03 0.00 - 0.05 10*3/mm3    nRBC 0.2 0.0 - 0.2 /100 WBC       A1C:  Lab Results - Last 18 Months   Lab Units 12/19/22  1230 11/18/22  0955 10/13/22  1123 06/17/22  0810 03/15/22  0739 09/15/21  0707   HEMOGLOBIN A1C % 7.80* 11.30* 8.2* 6.90* 8.70* 6.20*     GLUCOSE:  Lab Results - Last 18 Months   Lab Units 01/24/23  1019 12/19/22  1230 12/11/22  1045 12/09/22  0644 12/08/22  0654 12/07/22  1843 12/06/22  1108   GLUCOSE mg/dL 497* 300* 226* 185* 105* 183* 235*     LIPID:  Lab Results - Last 18 Months   Lab Units 10/13/22  1123 09/15/21  0707   CHOLESTEROL mg/dL  --  156   LDL CHOL mg/dL 35 71   HDL CHOL mg/dL 48* 36*   TRIGLYCERIDES mg/dL  276* 303*     PSA:No results found for: PSA    CBC:  Lab Results - Last 18 Months   Lab Units 02/10/23  0822 01/24/23  1019 01/04/23  1100 12/19/22  1230 12/11/22  1045 12/09/22  1440 12/09/22  0644 12/08/22  0654 12/07/22  2357 12/07/22  1843 12/07/22  1406 12/07/22  0840 12/06/22  1108 11/18/22  0955 10/13/22  1123 03/15/22  0739 09/15/21  0707   WBC 10*3/mm3 6.21 6.66 5.44 4.42 5.01  --  4.07  --  8.58 8.91  --   --  7.44   < > 9.40 10.68 6.44   HEMOGLOBIN g/dL 13.1 12.7 11.6* 9.9* 8.9* 7.9* 6.7*   < > 7.2* 8.2* 8.1*  --  8.8*   < > 15.2 14.6 13.6   HEMATOCRIT % 42.4 42.9 37.2 32.2* 27.2* 24.2* 21.3*   < > 22.8* 25.7* 24.2*  --  26.6*   < > 43.7 45.1 40.7   PLATELETS 10*3/mm3 281 336 358 270 351  --  214  --  209 256  --   --  266   < > 301 276 283   IRON mcg/dL 56 344* 37 75  --   --   --   --   --  39 88 67 180*  --  90 74 69    < > = values in this interval not displayed.      BMP/CMP:  Lab Results - Last 18 Months   Lab Units 02/08/23  1159 01/24/23  1019 12/19/22  1230 12/11/22  1045 12/09/22  0644 12/08/22  0654 12/07/22  1843 12/06/22  1108 10/13/22  1122 06/17/22  0810 03/15/22  0739 09/15/21  0707   SODIUM mmol/L  --  135* 133* 134* 133* 137 132* 136   < > 137 136 136   POTASSIUM mmol/L  --  5.0 3.8 3.4* 3.6 3.7 3.7 3.9   < > 4.4 4.9 4.4   CHLORIDE mmol/L  --  93* 98 100 102 102 98 96*   < > 100 96* 99   TOTAL CO2 mmol/L  --  28.1  --   --   --   --   --  25.7  --  25.5 25.7 26.5   CO2 mmol/L  --   --  24.0 25.0 25.0 25.0 24.0  --    < >  --   --   --    GLUCOSE mg/dL  --  497* 300* 226* 185* 105* 183* 235*   < > 153* 220* 145*   BUN mg/dL  --  11 8 6* 12 17 21 32*   < > 13 32* 20   CREATININE mg/dL 0.80 0.74 0.46* 0.36* 0.46* 0.46* 0.48* 0.61   < > 0.63 0.96 0.73   EGFR IF NONAFRICN AM mL/min/1.73  --   --   --   --   --   --   --   --   --   --   --  78   EGFR IF AFRICN AM mL/min/1.73  --   --   --   --   --   --   --   --   --   --   --  95   EGFR RESULT mL/min/1.73  --  85.0  --   --   --   --   --   "93.9  --  93.8 62.6  --    CALCIUM mg/dL  --  9.4 8.6 8.1* 7.6* 8.0* 8.8 8.5*   < > 9.6 10.1 10.2    < > = values in this interval not displayed.     HEPATIC:  Lab Results - Last 18 Months   Lab Units 12/19/22  1230 12/11/22  1045 12/07/22  1843 12/06/22  1108 11/23/22  1135 11/18/22  0955 10/13/22  1123   ALT (SGPT) U/L 23 30 28 24 34* 30 31   AST (SGOT) U/L 18 19 15 13 21 14 24   ALK PHOS U/L 98 83 88 68 91 108 100     Vit D:  Lab Results - Last 18 Months   Lab Units 10/13/22  1123 10/13/22  1122 03/15/22  0739 09/15/21  0707   VIT D 25 HYDROXY ng/ml 52.8 54.4 44.1 49.2     THYROID:  Lab Results - Last 18 Months   Lab Units 10/13/22  1123 09/15/21  0707   TSH uIU/mL 0.787 1.600         Objective   /88   Pulse 90   Temp 96.8 °F (36 °C) (Infrared)   Resp 16   Ht 160 cm (63\")   Wt 103 kg (227 lb)   SpO2 95%   BMI 40.21 kg/m²   Body mass index is 40.21 kg/m².    Recent Vitals       2/8/2023 2/16/2023 2/16/2023       BP: -- 160/80 154/88     Pulse: -- 90 --     Temp: -- 96.8 °F (36 °C) --     Weight: 101 kg (222 lb 10.6 oz) 103 kg (227 lb) --     BMI (Calculated): 39.5 40.2 --         Wt Readings from Last 15 Encounters:   02/16/23 1515 103 kg (227 lb)   01/26/23 0845 101 kg (223 lb)   02/08/23 1218 101 kg (222 lb 10.6 oz)   01/24/23 1027 101 kg (223 lb)   01/10/23 1305 101 kg (223 lb)   12/20/22 1202 95.7 kg (211 lb)   12/07/22 1505 95.3 kg (210 lb)   12/06/22 1113 95.3 kg (210 lb)   11/23/22 1105 95.3 kg (210 lb)   11/20/22 0400 97.8 kg (215 lb 11.2 oz)   11/18/22 1433 93.9 kg (207 lb)   11/18/22 0910 99.8 kg (220 lb)   10/18/22 0917 104 kg (228 lb 3.2 oz)   10/17/22 1016 104 kg (229 lb 3.2 oz)   08/24/22 2030 106 kg (234 lb)   06/21/22 0957 106 kg (234 lb 3.2 oz)   04/01/22 1042 107 kg (236 lb)       Physical Exam  GENERAL:  Well nourished/developed in no acute distress. Obese.   SKIN: Turgor excellent, without wound, rash, lesion.   HEENT: Normal cephalic without trauma.  Pupils equal round reactive to " light. Extraocular motions full without nystagmus.     Oral cavity without growths, exudates, and moist.  Posterior pharynx without mass, obstruction, redness.  No thyromegaly, mass, tenderness, lymphadenopathy and supple.   CV: Regular rhythm.  No murmur, gallop, trace equal LE edema. Posterior pulses intact.  No carotid bruits.  CHEST: No chest wall tenderness or mass.   LUNGS: Symmetric motion with clear to auscultation.    ABD: Soft, nontender without mass.   ORTHO: Symmetric extremities without swelling/point tenderness compression soreness R foot between 2nd/3rd. Full gross range of motion diffuse.   Walking without assistance.   NEURO: CN 2-12 grossly intact.  Symmetric facies and UE/LE. 3/5 strength throughout. 1/4 x bicep equal reflexes.  Nonfocal use extremities. Speech clear.    PSYCH: Oriented x 2-did not know date.  Pleasant calm, well kept.  Shallow but purposeful/directed conservation with intact short/long gross memory.    Assessment & Plan     1. Urinary incontinence, unspecified type    2. HTN (hypertension), benign    3. Elevated blood pressure reading    4. Chronic pain of left knee    5. Mixed hyperlipidemia    6. Type 2 diabetes mellitus with hyperglycemia, with long-term current use of insulin (HCC)    7. Anemia, unspecified type    8. Memory loss    9. Gait difficulty        Data review above:   Discussions/medical decisions/reviews:  BP too high; asymptomatic  Other vitals weight still too high  DM/BS 7.8 12.19.22 better 11.3  Lipid LDL 35 10.13.22 statin  PSA NA  CBC ok 2.10.23; anemia resolved  Iron 56N 2.10.23  Renal ok 2.8.23  Liver ok 12.19.22  Vit D 52 10.13.22  Thyroid TSH ok 10.13.22    For now sliding scale each meal; once pattern can try to use routine insulin doses  Did not understand; increase levemir 3 units/3 days till -120  Wrote for bedside commode again; suggested get/Rudy  MRI discussed; appears to be a lot of small vessel over amyloid/like  Offered [facility] dementia  clinic review  L knee; back to [place]  Careful add 12.5 aldactone; drop KCL 10    Data review above:   Rx: reviewed and decisions:   Rx new/changes: for bp  New Medications Ordered This Visit   Medications   • spironolactone (Aldactone) 25 MG tablet     Sig: Take 0.5 tablets by mouth Daily.     Dispense:  45 tablet     Refill:  1       Orders placed:   LAB/Testing/Referrals: reviewed/orders:   Today:   Orders Placed This Encounter   Procedures   • Commode Chair   • Basic metabolic panel   • Ambulatory Referral to Orthopedic Surgery     Chronic/recurrent labs above or change to:   Same   Screening reviewed/updated     Immunization History   Administered Date(s) Administered   • COVID-19 (MODERNA) 1st, 2nd, 3rd Dose Only 02/26/2021, 03/26/2021, 10/28/2021   • FLUAD TRI 65YR+ 10/17/2019   • Fluad Quad 65+ 10/14/2020   • Fluzone High Dose =>65 Years ([employer] ONLY) 10/01/2018   • Fluzone High-Dose 65+yrs 10/13/2022   • Fluzone Quad >6mos (Multi-dose) 10/28/2016, 10/30/2017   • Influenza Quad Vaccine (Inpatient) 10/30/2017   • Influenza, Unspecified 10/17/2019   • Pneumococcal Conjugate 13-Valent (PCV13) 10/21/2015   • Pneumococcal Polysaccharide (PPSV23) 10/30/2014   • Shingrix 09/06/2019, 12/06/2019     Vaccine reviewed: today none; later we advised/reaffirmed our support/suggestion for staying complete with covid- covid boosters, seasonal flu/yearly and any missing vaccine from list we supplied; we suggest contact with local health department office to review missing/needed vaccines and then bring nursing documentation for these vaccines to this office.     Health maintenance:   Body mass index is 40.21 kg/m².  Class 3 Severe Obesity (BMI >=40). Obesity-related health conditions include the following: diabetes mellitus. Obesity is unchanged. BMI is is above average; BMI management plan is completed. We discussed portion control and increasing exercise.      Tobacco use reviewed:   Kezia Otoole  reports that  she has never smoked. She has never used smokeless tobacco..       Patient Instructions   Your blood pressure was higher than what we really wanted for you today at 150/x.  It is often a problem for blood pressures in the doctor's office to be higher than home (called white coat syndrome).   Goals for your blood pressure are 130-140 range top and 80-89 range bottom and you feeling ok.     We really advise rechecking your blood pressure at home (or with a friend) daily to every other day for the next several days and let us know if your pattern is not the goals above.  If you will do this make sure you control variables that can make your blood pressure show higher than it really is:   A. Use a machine that measures your blood pressure at the upper arm level; not wrist or lower  B. Take off any shirts/garmets and apply the cuff to your bare arm  C. Be sure and rest your arm being used on a table/like so it is totally supported  D. Do not cross your legs while taking your blood pressure  E. Be calm, rested and not upset/anxious in any way while taking your blood pressure  F. Avoid talking while taking the blood pressure  G. Be sure your back is supported and not in a strain while taking your blood pressure.     If you cannot do this at home/with a friend OR want it done here we are happy to make appointments here for that (we only charge/bill for a nurse visit for doing this).      Please CALL US if your blood pressure stays up as that probably means you have a problem; we likely will adjust things even over the phone.  We want your blood pressure to be normal.     [employer] is a reliable/common home blood automatic blood pressure device that can range around 50-75$ and most of this brand is certified by the government.  A good model would be [employer]ron model 7.  Many chain brands ([employer], etc) if you look will say they are made by [employer].     ########################        Visit today involved chronic significant  medical problems or differentials and/or intensive drug monitoring: ie potential to cause serious morbidity or death:     Follow up: Return for lab one week; then Dr Crowe 4-6w.  Future Appointments   Date Time Provider Department Center   2/23/2023 11:00 AM [facility]   3/9/2023 11:00 AM Huang Giraldo MD [facility]   3/21/2023  1:00 PM Len Crowe MD [facility]

## 2023-02-16 NOTE — TELEPHONE ENCOUNTER
Rx Refill Note  Requested Prescriptions     Pending Prescriptions Disp Refills   • amLODIPine (NORVASC) 5 MG tablet [Pharmacy Med Name: AMLODIPINE BESYLATE 5MG TABLET] 90 tablet 1     Sig: TAKE ONE TABLET DAILY      Last office visit with prescribing clinician: 2/16/2023      Next office visit with prescribing clinician: 3/21/2023            Vivien Mercado, LPN  02/16/23, 16:19 CST

## 2023-02-16 NOTE — PATIENT INSTRUCTIONS
Your blood pressure was higher than what we really wanted for you today at 150/x.  It is often a problem for blood pressures in the doctor's office to be higher than home (called white coat syndrome).   Goals for your blood pressure are 130-140 range top and 80-89 range bottom and you feeling ok.     We really advise rechecking your blood pressure at home (or with a friend) daily to every other day for the next several days and let us know if your pattern is not the goals above.  If you will do this make sure you control variables that can make your blood pressure show higher than it really is:   A. Use a machine that measures your blood pressure at the upper arm level; not wrist or lower  B. Take off any shirts/garmets and apply the cuff to your bare arm  C. Be sure and rest your arm being used on a table/like so it is totally supported  D. Do not cross your legs while taking your blood pressure  E. Be calm, rested and not upset/anxious in any way while taking your blood pressure  F. Avoid talking while taking the blood pressure  G. Be sure your back is supported and not in a strain while taking your blood pressure.     If you cannot do this at home/with a friend OR want it done here we are happy to make appointments here for that (we only charge/bill for a nurse visit for doing this).      Please CALL US if your blood pressure stays up as that probably means you have a problem; we likely will adjust things even over the phone.  We want your blood pressure to be normal.     [employer] is a reliable/common home blood automatic blood pressure device that can range around 50-75$ and most of this brand is certified by the government.  A good model would be [employer] model 7.  Many chain brands ([facility], etc) if you look will say they are made by [employer].     ########################

## 2023-03-13 ENCOUNTER — LAB REQUISITION (OUTPATIENT)
Dept: LAB | Facility: HOSPITAL | Age: 73
End: 2023-03-13
Payer: MEDICARE

## 2023-03-13 ENCOUNTER — OUTSIDE FACILITY SERVICE (OUTPATIENT)
Dept: GASTROENTEROLOGY | Facility: CLINIC | Age: 73
End: 2023-03-13
Payer: MEDICARE

## 2023-03-13 DIAGNOSIS — Z00.00 ENCOUNTER FOR GENERAL ADULT MEDICAL EXAMINATION WITHOUT ABNORMAL FINDINGS: ICD-10-CM

## 2023-03-13 PROCEDURE — 88305 TISSUE EXAM BY PATHOLOGIST: CPT | Performed by: INTERNAL MEDICINE

## 2023-03-13 PROCEDURE — 45385 COLONOSCOPY W/LESION REMOVAL: CPT | Performed by: INTERNAL MEDICINE

## 2023-03-15 LAB
CYTO UR: NORMAL
LAB AP CASE REPORT: NORMAL
LAB AP CLINICAL INFORMATION: NORMAL
Lab: NORMAL
PATH REPORT.FINAL DX SPEC: NORMAL
PATH REPORT.GROSS SPEC: NORMAL

## 2023-03-21 ENCOUNTER — OFFICE VISIT (OUTPATIENT)
Dept: FAMILY MEDICINE CLINIC | Facility: CLINIC | Age: 73
End: 2023-03-21
Payer: MEDICARE

## 2023-03-21 VITALS
WEIGHT: 222.2 LBS | TEMPERATURE: 96.6 F | OXYGEN SATURATION: 98 % | HEIGHT: 63 IN | DIASTOLIC BLOOD PRESSURE: 80 MMHG | HEART RATE: 94 BPM | SYSTOLIC BLOOD PRESSURE: 132 MMHG | BODY MASS INDEX: 39.37 KG/M2

## 2023-03-21 DIAGNOSIS — D64.9 ANEMIA, UNSPECIFIED TYPE: ICD-10-CM

## 2023-03-21 DIAGNOSIS — R32 URINARY INCONTINENCE, UNSPECIFIED TYPE: ICD-10-CM

## 2023-03-21 DIAGNOSIS — F03.90 DEMENTIA, UNSPECIFIED DEMENTIA SEVERITY, UNSPECIFIED DEMENTIA TYPE, UNSPECIFIED WHETHER BEHAVIORAL, PSYCHOTIC, OR MOOD DISTURBANCE OR ANXIETY: ICD-10-CM

## 2023-03-21 DIAGNOSIS — R26.9 GAIT DIFFICULTY: ICD-10-CM

## 2023-03-21 DIAGNOSIS — R15.9 INCONTINENCE OF FECES, UNSPECIFIED FECAL INCONTINENCE TYPE: ICD-10-CM

## 2023-03-21 DIAGNOSIS — K21.9 GASTROESOPHAGEAL REFLUX DISEASE, UNSPECIFIED WHETHER ESOPHAGITIS PRESENT: ICD-10-CM

## 2023-03-21 DIAGNOSIS — E11.9 CONTROLLED TYPE 2 DIABETES MELLITUS WITHOUT COMPLICATION, WITHOUT LONG-TERM CURRENT USE OF INSULIN: Chronic | ICD-10-CM

## 2023-03-21 DIAGNOSIS — M15.0 PRIMARY GENERALIZED (OSTEO)ARTHRITIS: ICD-10-CM

## 2023-03-21 DIAGNOSIS — I10 HTN (HYPERTENSION), BENIGN: Chronic | ICD-10-CM

## 2023-03-21 NOTE — PROGRESS NOTES
Subjective   Kezia Otoole is a 72 y.o. female presenting with chief complaint of:   Chief Complaint   Patient presents with   • Follow-up     1 month f/u     AWV October 18, 2022    History of Present Illness :  With .   Here for review of chronic problems that includes DM2 and others.      Has multiple chronic problems to consider that might have a bearing on today's issues;  an interval appointment.       Chronic/acute problems reviewed today:   1. Incontinence of feces, unspecified fecal incontinence type: chronic/continued difficulty with occ fecal incontinece.     2. Urinary incontinence, unspecified type: chronic/continued difficulty with incontinence   3. Dementia, unspecified dementia severity, unspecified dementia type, unspecified whether behavioral, psychotic, or mood disturbance or anxiety (HCC) : chronic/continued difficulty with forgetfullness/memory loss and cognitive difficulties, ADL difficulties.    4. Controlled type 2 diabetes mellitus without complication, without long-term current use of insulin (HCC) : Chronic/variable but improving as increasing slowly long acting insulin.  Not remembering to use short acting/meal.  No problem/pattern hypoglycemia/hyperglycemia manifest by poly- dypsia, phagia, uria, or sweats, diaphoretic episodes, syncope/near.     5. HTN (hypertension), benign: Chronic/stable. Stable here past/no recent home blood pressures.  No significant chest pain, SOB, LE edema, orthopnea, near syncope, dizziness/light headness.   Recent Vitals       2/16/2023 2/16/2023 3/21/2023       BP: 160/80 154/88 132/80     Pulse: 90 -- 94     Temp: 96.8 °F (36 °C) -- 96.6 °F (35.9 °C)     Weight: 103 kg (227 lb) -- 101 kg (222 lb 3.2 oz)     BMI (Calculated): 40.2 -- 39.4            6. Gastroesophageal reflux disease, unspecified whether esophagitis present: Chronic/stable.  Controlled heartburn, reflux without dysphagia, melena.  Rx used, periods not used proven currently  needed with symptoms -currently doing ok.  No melena.       7. Anemia, unspecified type: Chronic or past history/variable but improved still on iron (after gi bleed): This has been present before.    There has been GI evaulation in the past. There is no current melena, hematochezia. It has been benign to date and stable/treating/watching.  Contributing comorbidities to date: bleed/iron def.     8. Primary generalized (osteo)arthritis: Chronic/stable.  Various on/off joint pains/soreness/stiffness.  Particular joint problems with knees.  No joint swelling.  Treats mainly with reduced activity, Rx listed, Tylenol.  No  NSAIDs, and failed Sotero injections; looking at tenative June total knee.      9. Gait difficulty: Chronic/stable:.  Ongoing issues with difficulties it results in difficulty with walking or gait.  No recent falls.  No recent injuries.  Uses to help gait: slower/less gait.       Has an/another acute issue today: none.    The following portions of the patient's history were reviewed and updated as appropriate: allergies, current medications, past family history, past medical history, past social history, past surgical history and problem list.      Current Outpatient Medications:   •  albuterol sulfate  (90 Base) MCG/ACT inhaler, Inhale 2 puffs Every 4 (Four) Hours As Needed for Wheezing or Shortness of Air., Disp: 8 g, Rfl: 1  •  amLODIPine (NORVASC) 5 MG tablet, TAKE ONE TABLET DAILY, Disp: 90 tablet, Rfl: 1  •  atorvastatin (LIPITOR) 20 MG tablet, Take 20 mg by mouth Daily., Disp: , Rfl:   •  Blood Glucose Monitoring Suppl (OneTouch Verio Flex System) w/Device kit, 1 each As Needed (emergency use if no CGM in place)., Disp: 1 kit, Rfl: 0  •  budesonide-formoterol (SYMBICORT) 160-4.5 MCG/ACT inhaler, Inhale 2 puffs 2 (Two) Times a Day., Disp: , Rfl:   •  Calcium Carb-Cholecalciferol (CALCIUM-VITAMIN D) 600-400 MG-UNIT tablet, Take 1 tablet by mouth 2 (Two) Times a Day., Disp: , Rfl:   •   carboxymethylcellulose (REFRESH PLUS) 0.5 % solution, 2 drops Daily As Needed for dry eyes., Disp: , Rfl:   •  Cholecalciferol 25 MCG (1000 UT) capsule, Take 1 capsule by mouth Daily., Disp: , Rfl:   •  Continuous Blood Gluc Sensor (FreeStyle Sue 2 Sensor) misc, 1 Device Every 14 (Fourteen) Days. Test QID with sliding scale tid and Levemir HS, Disp: 2 each, Rfl: 0  •  denosumab (PROLIA) 60 MG/ML solution syringe, Inject 60 mg under the skin into the appropriate area as directed Every 6 (Six) Months., Disp: , Rfl:   •  donepezil (Aricept) 10 MG tablet, Take 1 tablet by mouth Every Night., Disp: 30 tablet, Rfl: 5  •  DULoxetine (CYMBALTA) 20 MG capsule, Take 20 mg by mouth Daily., Disp: , Rfl:   •  ferrous sulfate 325 (65 Fe) MG tablet, Take 1 tablet by mouth Every Other Day for 90 days., Disp: 45 tablet, Rfl: 1  •  guaiFENesin (MUCINEX) 600 MG 12 hr tablet, Take 600 mg by mouth 2 (Two) Times a Day., Disp: , Rfl:   •  hydroCHLOROthiazide (MICROZIDE) 12.5 MG capsule, Take 12.5 mg by mouth Every Morning., Disp: , Rfl:   •  insulin detemir (LEVEMIR) 100 UNIT/ML injection, Inject 60 Units under the skin into the appropriate area as directed Every 12 (Twelve) Hours for 30 days. Please provide 30 day supply unsure how many pens in a box, thank you (Patient taking differently: Inject 40 Units under the skin into the appropriate area as directed Every 12 (Twelve) Hours. Please provide 30 day supply unsure how many pens in a box, thank you), Disp: 40 mL, Rfl: 5  •  Insulin Lispro, 1 Unit Dial, (HumaLOG KwikPen) 100 UNIT/ML solution pen-injector, Inject 7 Units under the skin into the appropriate area as directed 3 (Three) Times a Day. Max units 21 per 24 hours, Disp: 20 mL, Rfl: 1  •  Insulin Pen Needle (MM Pen Needles) 31G X 6 MM misc, 1 each 5 (Five) Times a Day. BID with Levemir, use TID with Humalog prn, Disp: 100 each, Rfl: 5  •  irbesartan (AVAPRO) 300 MG tablet, Take 300 mg by mouth Daily., Disp: , Rfl:   •   "latanoprost (XALATAN) 0.005 % ophthalmic solution, Administer 1 drop to both eyes Every Night., Disp: , Rfl: 5  •  Misc Natural Products (GLUCOSAMINE CHONDROITIN TRIPLE PO), Take 1 tablet by mouth 2 (Two) Times a Day., Disp: , Rfl:   •  montelukast (SINGULAIR) 10 MG tablet, Take 10 mg by mouth Every Night., Disp: , Rfl:   •  Multiple Vitamins-Minerals (VITRUM 50+ SENIOR MULTI PO), Take 1 tablet by mouth Daily., Disp: , Rfl:   •  pantoprazole (PROTONIX) 40 MG EC tablet, Take 40 mg by mouth 2 (Two) Times a Day., Disp: , Rfl:   •  spironolactone (Aldactone) 25 MG tablet, Take 0.5 tablets by mouth Daily., Disp: 45 tablet, Rfl: 1  •  TRUEplus Insulin Syringe 31G X 5/16\" 0.3 ML misc, USE AS DIRECTED THREE TIMES DAILY, Disp: 90 each, Rfl: 5  •  TRUEplus Insulin Syringe 31G X 5/16\" 0.5 ML misc, USE AS DIRECTED TWICE DAILY WITH LEVEMIR, Disp: 60 each, Rfl: 5    FOR DM:   •  insulin detemir (LEVEMIR) 100 UNIT/ML injection, Inject 60 Units under the skin into the appropriate area as directed Every 12 (Twelve) Hours for 30 days. Please provide 30 day supply unsure how many pens in a box, thank you (Patient taking differently: Inject 40 Units under the skin into the appropriate area as directed Every 12 (Twelve) Hours. Please provide 30 day supply unsure how many pens in a box, thank you), Disp: 40 mL, Rfl: 5  •  Insulin Lispro, 1 Unit Dial, (HumaLOG KwikPen) 100 UNIT/ML solution pen-injector, Inject 7 Units under the skin into the appropriate area as directed 3 (Three) Times a Day. Max units 21 per 24 hours, Disp: 20 mL, Rfl: 1    64 levemir  No meal insulin    No problems with medications.    Allergies   Allergen Reactions   • Adhesive Tape Other (See Comments)     Tears and bruises skin   • Other Other (See Comments)     Bandaids make arm Red/slightly swollen       Review of Systems  GENERAL:  Inactive/slower with limits, speed, stamina, various joint issues and weakness. Sleep is poor; apnea likely.  No fever " "now/recent.  ENDO:  No syncope, near or diaphoretic sweaty spells.  BS still high; despite abbott no download. Misses insulin sometimes.   HEENT: No head injury or headache.   No vision change.   No significant hearing loss.  Ears without pain/drainage.  No sore throat.   Same/occ nasal/sinus congestion/drainage. No epistaxis.  CHEST: No chest wall tenderness or mass.  Occ cough, wheeze.   No SOB; no hemoptysis.  CV: No chest pain, palpitations, usual  On/off ankle edema.  GI: No heartburn, dysphagia.  No abdominal pain, diarrhea, constipation; occ fecal incontience.  No rectal bleeding; dark stools or melena.    :  Voids without dysuria; occ incontinence to completion.  ORTHO: No painful/swollen joints but various on /off sore.  No change occ sore neck or back.  No acute neck or back pain without recent injury.  NEURO: No dizziness; diffuse weakness of extremities.  No numbness/paresthesias.   PSYCH: Admitted memory loss.  Mood winter variable; occ mild anxious, depressed but/and not suicidal.  Tries to tolerate stress .   Screening:  Mammogram: [id number]/delayed for illness  Bone density: \"2021-22\" [facility]; continuing the prolia  12.11.18 [facility] hip -2.4; start prolia-this year-per [facility]  Low dose CT chest: Tobacco-smoker/never: NA  GI: Colon-p+div/[facility]/11.19.18/5y  Prostate: NA  Usual lab order  6m CBC, CMP, A1c  12m CBC, CMP, A1c, LIPID, TSH, Vit D, uric acid      Copy/paste function used for ROS/exam AND each area of these were reviewed, updated, confirmed and supplemented as needed.  Data reviewed:   Recent admit/ER/MD visits: 2.16.22    1. Urinary incontinence, unspecified type    2. HTN (hypertension), benign    3. Elevated blood pressure reading    4. Chronic pain of left knee    5. Mixed hyperlipidemia    6. Type 2 diabetes mellitus with hyperglycemia, with long-term current use of insulin (HCC)    7. Anemia, unspecified type    8. Memory loss    9. Gait difficulty          Data review above: "   Discussions/medical decisions/reviews:  BP too high; asymptomatic  Other vitals weight still too high  DM/BS 7.8 12.19.22 better 11.3  Lipid LDL 35 10.13.22 statin  PSA NA  CBC ok 2.10.23; anemia resolved  Iron 56N 2.10.23  Renal ok 2.8.23  Liver ok 12.19.22  Vit D 52 10.13.22  Thyroid TSH ok 10.13.22     For now sliding scale each meal; once pattern can try to use routine insulin doses  Did not understand; increase levemir 3 units/3 days till -120  Wrote for bedside commode again; suggested get/Rudy-has/not using  MRI discussed; appears to be a lot of small vessel over amyloid/like  Offered [facility] dementia clinic review-not aware of apt  L knee; back to Sotero-got shot/no better ? surgery  Careful add 12.5 aldactone; drop KCL 10     Data review above:   Rx: reviewed and decisions:   Rx new/changes: for bp       New Medications Ordered This Visit   Medications   • spironolactone (Aldactone) 25 MG tablet       Sig: Take 0.5 tablets by mouth Daily.       Dispense:  45 tablet       Refill:  1         Orders placed:   LAB/Testing/Referrals: reviewed/orders:   Today:       Orders Placed This Encounter   Procedures   • Commode Chair   • Basic metabolic panel   • Ambulatory Referral to Orthopedic Surgery     Last cardiac testing:   Echo: no recent     Radiology considered:   MRI Brain With & Without Contrast    Result Date: 2/8/2023  Impression:  1. Underlying moderate chronic small vessel ischemic change. Otherwise unremarkable.  This report was finalized on 02/08/2023 16:07 by Dr Saleem Titus, .    Lab Results:  Results for orders placed or performed in visit on 03/13/23   Tissue Pathology Exam    Specimen: 1: Colon; Tissue    2: Colon; Tissue    3: Colon; Tissue   Result Value Ref Range    Note to Patients       This report may contain a detailed description of human tissue sent by a health care provider to the laboratory for pathologic evaluation. The content of this report is essential for diagnosis and  "may provide important critical findings. This information may be unfamiliar to patients to review without a medical professional present. It is advised that the patient review this report in the presence of a health care provider who can answer questions and explain the results.      Case Report       Surgical Pathology Report                         Case: [id number]                                  Authorizing Provider:  Dave Mcnally MD        Collected:           03/13/2023 09:08 AM          Ordering Location:     [facility]     Received:            03/14/2023 09:08 AM                                 LABORATORY                                                                   Pathologist:           Minnie Sellers MD                                                        Specimens:   1) - Colon, polyp ascending colon x 4                                                               2) - Colon, polyp at 70 cm                                                                          3) - Colon, polyp at 20 cm                                                                 Clinical Information       Pre-Op Diagnosis:       Hx polyps      Final Diagnosis       1.  \"Polyps at ascending colon x4\": Fragments of adenomatous polyps.    2.  \"Polyp at 70 cm\": Adenomatous polyp.    3.  \"Polyp at 20 cm\": Polypoid fragment of benign denuded granulation tissue exhibiting acute inflammation and extensive cautery artifact.      Gross Description       1. Colon.  Received in formalin and labeled with the patient's name and date of birth and designated \"polyps at ascending colon x4\".  The specimen consists of multiple pieces of pink-tan soft tissue measuring in aggregate 0.8 x 0.8 x 0.3 cm.  The specimen is entirely submitted in block 1A.    2. Colon.  Received in formalin and labeled with the patient's name and date of birth and designated \"polyp at 70 cm\".  The specimen consists of 1 piece of yellow-tan soft " "tissue measuring 0.4 x 0.3 x 0.2 cm.  The specimen is entirely submitted in block 2A.    3. Colon.  Received in formalin and labeled with the patient's name and date of birth and designated \"polyp at 20 cm\".  The specimen consists of 1 piece of pink-tan soft tissue measuring 0.3 x 0.2 x 0.2 cm.  The specimen is entirely submitted in block 3A.        Microscopic Description       1.  Sections of the submitted polyps at ascending colon x4 reveal fragments of adenomatous polyps of the tubular type.  High-grade dysplasia is not seen.  There is no evidence of malignancy.    2.  Sections of the colon polyp at 70 cm reveal an adenomatous polyp of the tubular type.  High-grade dysplasia is not seen.  There is no evidence of malignancy.    3.  Sections of the submitted polyp at 20 cm reveal a polypoid fragment of denuded granulation tissue demonstrating acute inflammation and extensive cautery artifact.  Histologic changes of an adenomatous polyp or hyperplastic polyp are not seen.  There is no evidence of malignancy.         A1C:  Lab Results - Last 18 Months   Lab Units 12/19/22  1230 11/18/22  0955 10/13/22  1123 06/17/22  0810 03/15/22  0739   HEMOGLOBIN A1C % 7.80* 11.30* 8.2* 6.90* 8.70*     GLUCOSE:  Lab Results - Last 18 Months   Lab Units 01/24/23  1019 12/19/22  1230 12/11/22  1045 12/09/22  0644 12/08/22  0654 12/07/22  1843 12/06/22  1108   GLUCOSE mg/dL 497* 300* 226* 185* 105* 183* 235*     LIPID:  Lab Results - Last 18 Months   Lab Units 10/13/22  1123   LDL CHOL mg/dL 35   HDL CHOL mg/dL 48*   TRIGLYCERIDES mg/dL 276*     PSA:No results found for: PSA    CBC:  Lab Results - Last 18 Months   Lab Units 02/10/23  0822 01/24/23  1019 01/04/23  1100 12/19/22  1230 12/11/22  1045 12/09/22  1440 12/09/22  0644 12/08/22  0654 12/07/22  2357 12/07/22  1843 12/07/22  1406 12/07/22  0840 12/06/22  1108 11/18/22  0955 10/13/22  1123 03/15/22  0739   WBC 10*3/mm3 6.21 6.66 5.44 4.42 5.01  --  4.07  --  8.58 8.91  --   --  " 7.44   < > 9.40 10.68   HEMOGLOBIN g/dL 13.1 12.7 11.6* 9.9* 8.9* 7.9* 6.7*   < > 7.2* 8.2* 8.1*  --  8.8*   < > 15.2 14.6   HEMATOCRIT % 42.4 42.9 37.2 32.2* 27.2* 24.2* 21.3*   < > 22.8* 25.7* 24.2*  --  26.6*   < > 43.7 45.1   PLATELETS 10*3/mm3 281 336 358 270 351  --  214  --  209 256  --   --  266   < > 301 276   IRON mcg/dL 56 344* 37 75  --   --   --   --   --  39 88 67 180*  --  90 74    < > = values in this interval not displayed.      BMP/CMP:  Lab Results - Last 18 Months   Lab Units 02/08/23  1159 01/24/23  1019 12/19/22  1230 12/11/22  1045 12/09/22  0644 12/08/22  0654 12/07/22  1843 12/06/22  1108 10/13/22  1122 06/17/22  0810 03/15/22  0739   SODIUM mmol/L  --  135* 133* 134* 133* 137 132* 136   < > 137 136   POTASSIUM mmol/L  --  5.0 3.8 3.4* 3.6 3.7 3.7 3.9   < > 4.4 4.9   CHLORIDE mmol/L  --  93* 98 100 102 102 98 96*   < > 100 96*   TOTAL CO2 mmol/L  --  28.1  --   --   --   --   --  25.7  --  25.5 25.7   CO2 mmol/L  --   --  24.0 25.0 25.0 25.0 24.0  --    < >  --   --    GLUCOSE mg/dL  --  497* 300* 226* 185* 105* 183* 235*   < > 153* 220*   BUN mg/dL  --  11 8 6* 12 17 21 32*   < > 13 32*   CREATININE mg/dL 0.80 0.74 0.46* 0.36* 0.46* 0.46* 0.48* 0.61   < > 0.63 0.96   EGFR RESULT mL/min/1.73  --  85.0  --   --   --   --   --  93.9  --  93.8 62.6   CALCIUM mg/dL  --  9.4 8.6 8.1* 7.6* 8.0* 8.8 8.5*   < > 9.6 10.1    < > = values in this interval not displayed.     HEPATIC:  Lab Results - Last 18 Months   Lab Units 12/19/22  1230 12/11/22  1045 12/07/22  1843 12/06/22  1108 11/23/22  1135 11/18/22  0955 10/13/22  1123   ALT (SGPT) U/L 23 30 28 24 34* 30 31   AST (SGOT) U/L 18 19 15 13 21 14 24   ALK PHOS U/L 98 83 88 68 91 108 100     Vit D:  Lab Results - Last 18 Months   Lab Units 10/13/22  1123 10/13/22  1122 03/15/22  0739   VIT D 25 HYDROXY ng/ml 52.8 54.4 44.1     THYROID:  Lab Results - Last 18 Months   Lab Units 10/13/22  1123   TSH uIU/mL 0.787         Objective   /80 (BP  "Location: Left arm, Patient Position: Sitting, Cuff Size: Adult)   Pulse 94   Temp 96.6 °F (35.9 °C) (Temporal)   Ht 160 cm (63\")   Wt 101 kg (222 lb 3.2 oz)   SpO2 98%   BMI 39.36 kg/m²   Body mass index is 39.36 kg/m².    Recent Vitals       2/16/2023 2/16/2023 3/21/2023       BP: 160/80 154/88 --     Pulse: 90 -- 94     Temp: 96.8 °F (36 °C) -- 96.6 °F (35.9 °C)     Weight: 103 kg (227 lb) -- 101 kg (222 lb 3.2 oz)     BMI (Calculated): 40.2 -- 39.4         Wt Readings from Last 15 Encounters:   03/21/23 1306 101 kg (222 lb 3.2 oz)   02/16/23 1515 103 kg (227 lb)   01/26/23 0845 101 kg (223 lb)   02/08/23 1218 101 kg (222 lb 10.6 oz)   01/24/23 1027 101 kg (223 lb)   01/10/23 1305 101 kg (223 lb)   12/20/22 1202 95.7 kg (211 lb)   12/07/22 1505 95.3 kg (210 lb)   12/06/22 1113 95.3 kg (210 lb)   11/23/22 1105 95.3 kg (210 lb)   11/20/22 0400 97.8 kg (215 lb 11.2 oz)   11/18/22 1433 93.9 kg (207 lb)   11/18/22 0910 99.8 kg (220 lb)   10/18/22 0917 104 kg (228 lb 3.2 oz)   10/17/22 1016 104 kg (229 lb 3.2 oz)   08/24/22 2030 106 kg (234 lb)   06/21/22 0957 106 kg (234 lb 3.2 oz)       Physical Exam  GENERAL:  Well nourished/developed in no acute distress. Obese.   SKIN: Turgor excellent, without wound, rash, lesion.   HEENT: Normal cephalic without trauma.  Pupils equal round reactive to light. Extraocular motions full without nystagmus.     Oral cavity without growths, exudates, and moist.  Posterior pharynx without mass, obstruction, redness.  No thyromegaly, mass, tenderness, lymphadenopathy and supple.   CV: Regular rhythm.  No murmur, gallop, trace equal LE edema. Posterior pulses intact.  No carotid bruits.  CHEST: No chest wall tenderness or mass.   LUNGS: Symmetric motion with clear to auscultation.    ABD: Soft, nontender without mass.   ORTHO: Symmetric extremities without swelling/point tenderness   Full gross range of motion diffuse.   Walking without assistance.   NEURO: CN 2-12 grossly intact.  " Symmetric facies and UE/LE. 3/5 strength throughout. 1/4 x bicep equal reflexes.  Nonfocal use extremities. Speech clear.    PSYCH: Oriented x 3  Pleasant calm, well kept.  Shallow but purposeful/directed conservation with intact short/long gross memory.    Assessment & Plan     1. Incontinence of feces, unspecified fecal incontinence type    2. Urinary incontinence, unspecified type    3. Dementia, unspecified dementia severity, unspecified dementia type, unspecified whether behavioral, psychotic, or mood disturbance or anxiety ([facility])    4. Controlled type 2 diabetes mellitus without complication, without long-term current use of insulin ([facility])    5. HTN (hypertension), benign    6. Gastroesophageal reflux disease, unspecified whether esophagitis present    7. Anemia, unspecified type    8. Primary generalized (osteo)arthritis    9. Gait difficulty        Data review above:   Discussions/medical decisions/reviews:  BP ok  Other vitals ok  DM/BS  7.8 12.19.22  Lipid LDL 35 10.13.22; lipitor 20  PSA NA  CBC ok 2.10.23  Renal ok 1.24.23  Liver ok 12.19.22  Vit D 52 10.13.22  Thyroid TSH ok 10.13.22    Screening reviewed/updated get mammogram  Referrals GI; nausea, fecal incontinence  Referrals urology;  Urinary incontience  Referrals [facility]  Noted; total knee L set for June-would require with Sotero 325 mg ASA for a month  Hope off iron after lab today; hope nausea resolves     Data review above:   Rx: reviewed and decisions:   Rx new/changes: none  No orders of the defined types were placed in this encounter.    Orders placed:   LAB/Testing/Referrals: reviewed/orders:   Today:   Orders Placed This Encounter   Procedures   • Ferritin   • Iron Profile   • Comprehensive Metabolic Panel   • Hemoglobin A1c   • Ambulatory Referral to Neurology   • Ambulatory Referral to Urology   • Ambulatory Referral to Gastroenterology   • CBC & Differential     Chronic/recurrent labs above or change to:   Same  "    Immunization History   Administered Date(s) Administered   • COVID-19 (MODERNA) 1st, 2nd, 3rd Dose Only 02/26/2021, 03/26/2021, 10/28/2021   • FLUAD TRI 65YR+ 10/17/2019   • Fluad Quad 65+ 10/14/2020   • Fluzone High Dose =>65 Years ([employer] ONLY) 10/01/2018   • Fluzone High-Dose 65+yrs 10/13/2022   • Fluzone Quad >6mos (Multi-dose) 10/28/2016, 10/30/2017   • Influenza Quad Vaccine (Inpatient) 10/30/2017   • Influenza, Unspecified 10/17/2019   • Pneumococcal Conjugate 13-Valent (PCV13) 10/21/2015   • Pneumococcal Polysaccharide (PPSV23) 10/30/2014   • Shingrix 09/06/2019, 12/06/2019     We advised/reaffirmed our support/suggestion for staying complete with covid- covid boosters, seasonal flu/yearly and any missing vaccine from list we supplied; we suggest contact with local health department office to review missing/needed vaccines and then bring nursing documentation for these vaccines to this office or call this information in. Shingles became \"free\" 1.1.23.     Health maintenance:   Body mass index is 39.36 kg/m².  Class 2 Severe Obesity (BMI >=35 and <=39.9). Obesity-related health conditions include the following: diabetes mellitus. Obesity is unchanged. BMI is is above average; BMI management plan is completed. We discussed portion control and increasing exercise.      Tobacco use reviewed:   Kezia Otoole  reports that she has never smoked. She has never used smokeless tobacco..   There are no Patient Instructions on file for this visit.    Visit today involved chronic significant medical problems or differentials and/or intensive drug monitoring: ie potential to cause serious morbidity or death:     Follow up: Return for lab today then lab/Dr Crowe 1m.  Future Appointments   Date Time Provider Department Center   4/13/2023  9:35 AM  [facility]  [facility]   4/13/2023 10:00 AM [facility]  [facility]   4/18/2023  8:25 AM [facility] [place] [facility] "   4/25/2023  2:15 PM Len Crowe MD [facility]

## 2023-03-22 ENCOUNTER — TELEPHONE (OUTPATIENT)
Dept: GASTROENTEROLOGY | Facility: CLINIC | Age: 73
End: 2023-03-22
Payer: MEDICARE

## 2023-03-22 LAB
ALBUMIN SERPL-MCNC: 4.5 G/DL (ref 3.5–5.2)
ALBUMIN/GLOB SERPL: 1.7 G/DL
ALP SERPL-CCNC: 110 U/L (ref 39–117)
ALT SERPL-CCNC: 37 U/L (ref 1–33)
AST SERPL-CCNC: 35 U/L (ref 1–32)
BASOPHILS # BLD AUTO: 0.07 10*3/MM3 (ref 0–0.2)
BASOPHILS NFR BLD AUTO: 0.6 % (ref 0–1.5)
BILIRUB SERPL-MCNC: 0.8 MG/DL (ref 0–1.2)
BUN SERPL-MCNC: 22 MG/DL (ref 8–23)
BUN/CREAT SERPL: 25.3 (ref 7–25)
CALCIUM SERPL-MCNC: 9.9 MG/DL (ref 8.6–10.5)
CHLORIDE SERPL-SCNC: 94 MMOL/L (ref 98–107)
CO2 SERPL-SCNC: 27 MMOL/L (ref 22–29)
CREAT SERPL-MCNC: 0.87 MG/DL (ref 0.57–1)
EGFRCR SERPLBLD CKD-EPI 2021: 70 ML/MIN/1.73
EOSINOPHIL # BLD AUTO: 0.1 10*3/MM3 (ref 0–0.4)
EOSINOPHIL NFR BLD AUTO: 0.9 % (ref 0.3–6.2)
ERYTHROCYTE [DISTWIDTH] IN BLOOD BY AUTOMATED COUNT: 15 % (ref 12.3–15.4)
FERRITIN SERPL-MCNC: 94.3 NG/ML (ref 13–150)
GLOBULIN SER CALC-MCNC: 2.6 GM/DL
GLUCOSE SERPL-MCNC: 246 MG/DL (ref 65–99)
HBA1C MFR BLD: 10.5 % (ref 4.8–5.6)
HCT VFR BLD AUTO: 49.3 % (ref 34–46.6)
HGB BLD-MCNC: 16.2 G/DL (ref 12–15.9)
IMM GRANULOCYTES # BLD AUTO: 0.05 10*3/MM3 (ref 0–0.05)
IMM GRANULOCYTES NFR BLD AUTO: 0.5 % (ref 0–0.5)
IRON SATN MFR SERPL: 31 % (ref 20–50)
IRON SERPL-MCNC: 150 MCG/DL (ref 37–145)
LYMPHOCYTES # BLD AUTO: 1.91 10*3/MM3 (ref 0.7–3.1)
LYMPHOCYTES NFR BLD AUTO: 17.2 % (ref 19.6–45.3)
MCH RBC QN AUTO: 27.7 PG (ref 26.6–33)
MCHC RBC AUTO-ENTMCNC: 32.9 G/DL (ref 31.5–35.7)
MCV RBC AUTO: 84.4 FL (ref 79–97)
MONOCYTES # BLD AUTO: 0.98 10*3/MM3 (ref 0.1–0.9)
MONOCYTES NFR BLD AUTO: 8.8 % (ref 5–12)
NEUTROPHILS # BLD AUTO: 7.98 10*3/MM3 (ref 1.7–7)
NEUTROPHILS NFR BLD AUTO: 72 % (ref 42.7–76)
NRBC BLD AUTO-RTO: 0 /100 WBC (ref 0–0.2)
PLATELET # BLD AUTO: 380 10*3/MM3 (ref 140–450)
POTASSIUM SERPL-SCNC: 4.3 MMOL/L (ref 3.5–5.2)
PROT SERPL-MCNC: 7.1 G/DL (ref 6–8.5)
RBC # BLD AUTO: 5.84 10*6/MM3 (ref 3.77–5.28)
SODIUM SERPL-SCNC: 134 MMOL/L (ref 136–145)
TIBC SERPL-MCNC: 490 MCG/DL
UIBC SERPL-MCNC: 340 MCG/DL (ref 112–346)
WBC # BLD AUTO: 11.09 10*3/MM3 (ref 3.4–10.8)

## 2023-04-10 RX ORDER — PANTOPRAZOLE SODIUM 40 MG/1
TABLET, DELAYED RELEASE ORAL
Qty: 30 TABLET | Refills: 2 | Status: SHIPPED | OUTPATIENT
Start: 2023-04-10

## 2023-04-10 NOTE — TELEPHONE ENCOUNTER
Rx Refill Note  Requested Prescriptions     Pending Prescriptions Disp Refills   • pantoprazole (PROTONIX) 40 MG EC tablet [Pharmacy Med Name: PANTOPRAZOLE SODIUM 40MG TABLET DR] 30 tablet 2     Sig: TAKE ONE TABLET DAILY      Last office visit with prescribing clinician: 3/21/2023   Last telemedicine visit with prescribing clinician: 4/18/2023   Next office visit with prescribing clinician: 4/25/2023                         Would you like a call back once the refill request has been completed: [] Yes [] No    If the office needs to give you a call back, can they leave a voicemail: [] Yes [] No    Rosanna Torres MA  04/10/23, 11:28 CDT

## 2023-04-20 LAB — MICROALBUMIN UR-MCNC: 27.6 UG/ML

## 2023-04-25 ENCOUNTER — OFFICE VISIT (OUTPATIENT)
Dept: FAMILY MEDICINE CLINIC | Facility: CLINIC | Age: 73
End: 2023-04-25
Payer: MEDICARE

## 2023-04-25 VITALS
RESPIRATION RATE: 14 BRPM | SYSTOLIC BLOOD PRESSURE: 142 MMHG | BODY MASS INDEX: 40.4 KG/M2 | DIASTOLIC BLOOD PRESSURE: 82 MMHG | WEIGHT: 228 LBS | OXYGEN SATURATION: 98 % | HEART RATE: 87 BPM | HEIGHT: 63 IN

## 2023-04-25 DIAGNOSIS — E78.2 MIXED HYPERLIPIDEMIA: ICD-10-CM

## 2023-04-25 DIAGNOSIS — R41.3 MEMORY LOSS: ICD-10-CM

## 2023-04-25 DIAGNOSIS — E11.65 UNCONTROLLED TYPE 2 DIABETES MELLITUS WITH HYPERGLYCEMIA: ICD-10-CM

## 2023-04-25 DIAGNOSIS — R15.9 INCONTINENCE OF FECES, UNSPECIFIED FECAL INCONTINENCE TYPE: ICD-10-CM

## 2023-04-25 DIAGNOSIS — R32 URINARY INCONTINENCE, UNSPECIFIED TYPE: ICD-10-CM

## 2023-04-25 DIAGNOSIS — R26.9 GAIT DIFFICULTY: ICD-10-CM

## 2023-04-25 DIAGNOSIS — D64.9 ANEMIA, UNSPECIFIED TYPE: ICD-10-CM

## 2023-04-25 DIAGNOSIS — E83.52 HYPERCALCEMIA: ICD-10-CM

## 2023-04-25 DIAGNOSIS — I10 HTN (HYPERTENSION), BENIGN: Chronic | ICD-10-CM

## 2023-04-25 NOTE — PROGRESS NOTES
Subjective   Kezia Otoole is a 72 y.o. female presenting with chief complaint of:   Chief Complaint   Patient presents with   • Follow-up     1 mo f/u   fecal incontinence     AWV 10.18.22  Last Completed Annual Wellness Visit          ANNUAL WELLNESS VISIT (Yearly) Next due on 10/18/2023    10/18/2022  Level of Service: PPPS, SUBSEQ VISIT    09/16/2021  Level of Service: ND PPPS, SUBSEQ VISIT    07/09/2020  Level of Service: ND PPPS, SUBSEQ VISIT    07/09/2020  Done               History of Present Illness :  With .  Here for primarily f/u DM which has been out of control.   Here for review of chronic problems that includes same and others.      Has multiple chronic problems to consider that might have a bearing on today's issues;  an interval appointment.       Chronic/acute problems reviewed today:   1. Hypercalcemia calcium is high this time without significant understanding as to why; is on a calcium supplement   2. HTN (hypertension), benign Chronic/stable. Stable here past/no recent home blood pressures.  No significant chest pain, SOB, LE edema, orthopnea, near syncope, dizziness/light headness.   Recent Vitals       2/16/2023 3/21/2023 4/25/2023       BP: 154/88 132/80 142/82     Pulse: -- 94 87     Temp: -- 96.6 °F (35.9 °C) --     Weight: -- 101 kg (222 lb 3.2 oz) 103 kg (228 lb)     BMI (Calculated): -- 39.4 40.4            3. Mixed hyperlipidemia Chronic/stable.  Tolerated use of Rx with labs showing improved lipid values and tolerant liver labs. No muscle aches unexpected.      4. Anemia, unspecified type Chronic or past history/stable: This has been present before.    There has been GI evaulation in the past. There is no current melena, hematochezia. It has been benign to date and stable/treated/watching.  Contributing comorbidities to date: benign.     5. Gait difficulty Chronic/stable:.  Ongoing issues with difficulties it results in difficulty with walking or gait.  No recent falls.   No recent injuries.  Uses to help gait: slower/intentional walking.     6. Memory loss chronic slowly worsening   7. Uncontrolled type 2 diabetes mellitus with hyperglycemia for up to 90 units of insulin twice a day; finally her blood sugar fasting are looking at 1 20-1 50.  There is been no hypos.  She is not using her [email or web address] system   8. Incontinence of feces, unspecified fecal incontinence type had a colonoscopy only some findings of polyps.  GI made no mention of possible thoughts or cause on her incontinence.  Her memory and some aspects continues to worsen per her    9. Urinary incontinence, unspecified type chronic continues.  The son set up a timer on her phone and she cannot hear go off every 2 hours during the day     Has an/another acute issue today: none.    The following portions of the patient's history were reviewed and updated as appropriate: allergies, current medications, past family history, past medical history, past social history, past surgical history and problem list.      Current Outpatient Medications:   •  albuterol sulfate  (90 Base) MCG/ACT inhaler, Inhale 2 puffs Every 4 (Four) Hours As Needed for Wheezing or Shortness of Air., Disp: 8 g, Rfl: 1  •  amLODIPine (NORVASC) 5 MG tablet, TAKE ONE TABLET DAILY, Disp: 90 tablet, Rfl: 1  •  atorvastatin (LIPITOR) 20 MG tablet, Take 1 tablet by mouth Daily., Disp: , Rfl:   •  Blood Glucose Monitoring Suppl (OneTouch Verio Flex System) w/Device kit, 1 each As Needed (emergency use if no CGM in place)., Disp: 1 kit, Rfl: 0  •  budesonide-formoterol (SYMBICORT) 160-4.5 MCG/ACT inhaler, Inhale 2 puffs 2 (Two) Times a Day., Disp: , Rfl:   •  Calcium Carb-Cholecalciferol (CALCIUM-VITAMIN D) 600-400 MG-UNIT tablet, Take 1 tablet by mouth 2 (Two) Times a Day., Disp: , Rfl:   •  carboxymethylcellulose (REFRESH PLUS) 0.5 % solution, 2 drops Daily As Needed for Dry Eyes., Disp: , Rfl:   •  Cholecalciferol 25 MCG (1000 UT) capsule, Take 1  capsule by mouth Daily., Disp: , Rfl:   •  Continuous Blood Gluc Sensor (FreeStyle Sue 2 Sensor) misc, 1 Device Every 14 (Fourteen) Days. Test QID with sliding scale tid and Levemir HS, Disp: 2 each, Rfl: 0  •  denosumab (PROLIA) 60 MG/ML solution syringe, Inject 1 mL under the skin into the appropriate area as directed Every 6 (Six) Months., Disp: , Rfl:   •  donepezil (Aricept) 10 MG tablet, Take 1 tablet by mouth Every Night., Disp: 30 tablet, Rfl: 5  •  DULoxetine (CYMBALTA) 20 MG capsule, Take 1 capsule by mouth Daily., Disp: , Rfl:   •  guaiFENesin (MUCINEX) 600 MG 12 hr tablet, Take 1 tablet by mouth 2 (Two) Times a Day., Disp: , Rfl:   •  hydroCHLOROthiazide (MICROZIDE) 12.5 MG capsule, Take 1 capsule by mouth Every Morning., Disp: , Rfl:   •  insulin detemir (LEVEMIR) 100 UNIT/ML injection, Inject 60 Units under the skin into the appropriate area as directed Every 12 (Twelve) Hours for 30 days. Please provide 30 day supply unsure how many pens in a box, thank you (Patient taking differently: Inject 40 Units under the skin into the appropriate area as directed Every 12 (Twelve) Hours. Please provide 30 day supply unsure how many pens in a box, thank you), Disp: 40 mL, Rfl: 5  •  Insulin Lispro, 1 Unit Dial, (HumaLOG KwikPen) 100 UNIT/ML solution pen-injector, Inject 7 Units under the skin into the appropriate area as directed 3 (Three) Times a Day. Max units 21 per 24 hours, Disp: 20 mL, Rfl: 1  •  Insulin Pen Needle (MM Pen Needles) 31G X 6 MM misc, 1 each 5 (Five) Times a Day. BID with Levemir, use TID with Humalog prn, Disp: 100 each, Rfl: 5  •  irbesartan (AVAPRO) 300 MG tablet, Take 1 tablet by mouth Daily., Disp: , Rfl:   •  latanoprost (XALATAN) 0.005 % ophthalmic solution, Administer 1 drop to both eyes Every Night., Disp: , Rfl: 5  •  Misc Natural Products (GLUCOSAMINE CHONDROITIN TRIPLE PO), Take 1 tablet by mouth 2 (Two) Times a Day., Disp: , Rfl:   •  montelukast (SINGULAIR) 10 MG tablet, Take  "1 tablet by mouth Every Night., Disp: , Rfl:   •  Multiple Vitamins-Minerals (VITRUM 50+ SENIOR MULTI PO), Take 1 tablet by mouth Daily., Disp: , Rfl:   •  pantoprazole (PROTONIX) 40 MG EC tablet, TAKE ONE TABLET DAILY, Disp: 30 tablet, Rfl: 2  •  spironolactone (Aldactone) 25 MG tablet, Take 0.5 tablets by mouth Daily., Disp: 45 tablet, Rfl: 1  •  TRUEplus Insulin Syringe 31G X 5/16\" 0.3 ML misc, USE AS DIRECTED THREE TIMES DAILY, Disp: 90 each, Rfl: 5  •  TRUEplus Insulin Syringe 31G X 5/16\" 0.5 ML misc, USE AS DIRECTED TWICE DAILY WITH LEVEMIR, Disp: 60 each, Rfl: 5    No problems with medications.    Allergies   Allergen Reactions   • Adhesive Tape Other (See Comments)     Tears and bruises skin   • Other Other (See Comments)     Bandaids make arm Red/slightly swollen       Review of Systems  GENERAL:  Inactive/slower with limits, speed, stamina, various joint issues and weakness. Sleep is poor; apnea likely.  No fever now/recent.  ENDO:  No syncope, near or diaphoretic sweaty spells.  BS still high; despite abbott no download. Misses insulin sometimes.   HEENT: No head injury or headache.   No vision change.   No significant hearing loss.  Ears without pain/drainage.  No sore throat.   Same/occ nasal/sinus congestion/drainage. No epistaxis.  CHEST: No chest wall tenderness or mass.  Occ cough, wheeze.   No SOB; no hemoptysis.  CV: No chest pain, palpitations, usual  On/off ankle edema.  GI: No heartburn, dysphagia.  No abdominal pain, diarrhea, constipation; occ fecal incontience.  No rectal bleeding; dark stools or melena.    :  Voids without dysuria; occ incontinence to completion.  ORTHO: No painful/swollen joints but various on /off sore.  No change occ sore neck or back.  No acute neck or back pain without recent injury.  NEURO: No dizziness; diffuse weakness of extremities.  No numbness/paresthesias.   PSYCH: Admitted memory loss.  Mood winter variable; occ mild anxious, depressed but/and not suicidal.  " "Tries to tolerate stress .   Screening:  Mammogram: 12.22.2151-[id number]/delayed for illness  Bone density: \"2021-22\" [facility]; continuing the prolia  12.11.18 [facility] hip -2.4; start prolia-this year-per [facility]  Low dose CT chest: Tobacco-smoker/never: NA  GI:   Colon-p/[facility]/3.13.23/1y  Prostate: NA  Usual lab order  6m CBC, CMP, A1c  12m CBC, CMP, A1c, LIPID, TSH, Vit D, uric acid    Copy/paste function used for ROS/exam AND each area of these were reviewed, updated, confirmed and supplemented as needed.  Data reviewed:   Recent admit/ER/MD visits: 3.21.22    1. Incontinence of feces, unspecified fecal incontinence type    2. Urinary incontinence, unspecified type    3. Dementia, unspecified dementia severity, unspecified dementia type, unspecified whether behavioral, psychotic, or mood disturbance or anxiety (HCC)    4. Controlled type 2 diabetes mellitus without complication, without long-term current use of insulin (HCC)    5. HTN (hypertension), benign    6. Gastroesophageal reflux disease, unspecified whether esophagitis present    7. Anemia, unspecified type    8. Primary generalized (osteo)arthritis    9. Gait difficulty          Data review above:   Discussions/medical decisions/reviews:  BP ok  Other vitals ok  DM/BS  7.8 12.19.22  Lipid LDL 35 10.13.22; lipitor 20  PSA NA  CBC ok 2.10.23  Renal ok 1.24.23  Liver ok 12.19.22  Vit D 52 10.13.22  Thyroid TSH ok 10.13.22     Screening reviewed/updated get mammogram  Referrals GI; nausea, fecal incontinence  Referrals urology;  Urinary incontience  Referrals [facility]  Noted; total knee L set for June-would require with Sotero 325 mg ASA for a month  Hope off iron after lab today; hope nausea resolves      Data review above:   Rx: reviewed and decisions:   Rx new/changes: none  No orders of the defined types were placed in this encounter.     Orders placed:   LAB/Testing/Referrals: reviewed/orders:   Today:       Orders Placed This Encounter "   Procedures   • Ferritin   • Iron Profile   • Comprehensive Metabolic Panel   • Hemoglobin A1c   • Ambulatory Referral to Neurology   • Ambulatory Referral to Urology   • Ambulatory Referral to Gastroenterology   • CBC & Differential     Last cardiac testing:   Echo: no recent    Radiology considered:   MRI Brain With & Without Contrast    Result Date: 2/8/2023  Impression:  1. Underlying moderate chronic small vessel ischemic change. Otherwise unremarkable.  This report was finalized on 02/08/2023 16:07 by Dr Saleem Titus, .    Lab Results:  Results for orders placed or performed in visit on 04/19/23   MicroAlbumin, Urine, Random -   Result Value Ref Range    Microalbumin, Urine 27.6 Not Estab. ug/mL     A1C:  Lab Results - Last 18 Months   Lab Units 04/18/23  0843 03/21/23  1322 12/19/22  1230 11/18/22  0955 10/13/22  1123 06/17/22  0810 03/15/22  0739   HEMOGLOBIN A1C % 9.80* 10.50* 7.80* 11.30* 8.2* 6.90* 8.70*     GLUCOSE:  Lab Results - Last 18 Months   Lab Units 04/18/23  0843 03/21/23  1322 01/24/23  1019 12/19/22  1230 12/11/22  1045 12/09/22  0644 12/08/22  0654   GLUCOSE mg/dL 244* 246* 497* 300* 226* 185* 105*     LIPID:  Lab Results - Last 18 Months   Lab Units 04/18/23  0843 10/13/22  1123   CHOLESTEROL mg/dL 126  --    LDL CHOL mg/dL 50 35   HDL CHOL mg/dL 37* 48*   TRIGLYCERIDES mg/dL 244* 276*     PSA:No results found for: PSA    CBC:  Lab Results - Last 18 Months   Lab Units 04/18/23  0843 03/21/23  1322 02/10/23  0822 01/24/23  1019 01/04/23  1100 12/19/22  1230 12/11/22  1045 12/07/22  2357 12/07/22  1843 12/07/22  1406 12/07/22  0840 12/06/22  1108 11/18/22  0955 10/13/22  1123 03/15/22  0739   WBC 10*3/mm3 8.20 11.09* 6.21 6.66 5.44 4.42 5.01   < > 8.91  --   --  7.44   < > 9.40 10.68   HEMOGLOBIN g/dL 15.3 16.2* 13.1 12.7 11.6* 9.9* 8.9*   < > 8.2* 8.1*  --  8.8*   < > 15.2 14.6   HEMATOCRIT % 44.9 49.3* 42.4 42.9 37.2 32.2* 27.2*   < > 25.7* 24.2*  --  26.6*   < > 43.7 45.1   PLATELETS  10*3/mm3 290 380 281 336 358 270 351   < > 256  --   --  266   < > 301 276   IRON mcg/dL 100 150* 56 344* 37 75  --   --  39 88 67 180*  --  90 74   VITAMIN B 12 pg/mL 474  --   --   --   --   --   --   --  328  --   --   --   --   --   --    FOLATE ng/mL >20.00  --   --   --   --   --   --   --  18.00  --   --   --   --   --   --     < > = values in this interval not displayed.     BMP/CMP:  Lab Results - Last 18 Months   Lab Units 04/18/23  0843 03/21/23  1322 02/08/23  1159 01/24/23  1019 12/19/22  1230 12/11/22  1045 12/09/22  0644 12/08/22  0654 12/07/22  1843 12/06/22  1108 10/13/22  1122 06/17/22  0810 03/15/22  0739   SODIUM mmol/L 140 134*  --  135* 133* 134* 133* 137   < > 136   < > 137 136   POTASSIUM mmol/L 4.5 4.3  --  5.0 3.8 3.4* 3.6 3.7   < > 3.9   < > 4.4 4.9   CHLORIDE mmol/L 97* 94*  --  93* 98 100 102 102   < > 96*   < > 100 96*   TOTAL CO2 mmol/L 31.4* 27.0  --  28.1  --   --   --   --   --  25.7  --  25.5 25.7   CO2 mmol/L  --   --   --   --  24.0 25.0 25.0 25.0   < >  --    < >  --   --    GLUCOSE mg/dL 244* 246*  --  497* 300* 226* 185* 105*   < > 235*   < > 153* 220*   BUN mg/dL 17 22  --  11 8 6* 12 17   < > 32*   < > 13 32*   CREATININE mg/dL 0.82 0.87 0.80 0.74 0.46* 0.36* 0.46* 0.46*   < > 0.61   < > 0.63 0.96   EGFR RESULT mL/min/1.73 75.2 70.0  --  85.0  --   --   --   --   --  93.9  --  93.8 62.6   CALCIUM mg/dL 11.3* 9.9  --  9.4 8.6 8.1* 7.6* 8.0*   < > 8.5*   < > 9.6 10.1   URIC ACID mg/dL 5.6  --   --   --   --   --   --   --   --   --   --   --   --     < > = values in this interval not displayed.     HEPATIC:  Lab Results - Last 18 Months   Lab Units 04/18/23  0843 03/21/23  1322 12/19/22  1230 12/11/22  1045 12/07/22  1843 12/06/22  1108 11/23/22  1135   ALT (SGPT) U/L 46* 37* 23 30 28 24 34*   AST (SGOT) U/L 39* 35* 18 19 15 13 21   ALK PHOS U/L 87 110 98 83 88 68 91     Vit D:  Lab Results - Last 18 Months   Lab Units 04/18/23  0843 10/13/22  1123 10/13/22  1122  "03/15/22  0739   VIT D 25 HYDROXY ng/ml 46.4 52.8 54.4 44.1     THYROID:  Lab Results - Last 18 Months   Lab Units 04/18/23  0843 10/13/22  1123   TSH uIU/mL 1.400 0.787         Objective   /82   Pulse 87   Resp 14   Ht 160 cm (63\")   Wt 103 kg (228 lb)   SpO2 98%   BMI 40.39 kg/m²   Body mass index is 40.39 kg/m².    Recent Vitals       2/16/2023 2/16/2023 3/21/2023       BP: 160/80 154/88 132/80     Pulse: 90 -- 94     Temp: 96.8 °F (36 °C) -- 96.6 °F (35.9 °C)     Weight: 103 kg (227 lb) -- 101 kg (222 lb 3.2 oz)     BMI (Calculated): 40.2 -- 39.4         Wt Readings from Last 15 Encounters:   04/25/23 1432 103 kg (228 lb)   03/21/23 1306 101 kg (222 lb 3.2 oz)   02/16/23 1515 103 kg (227 lb)   01/26/23 0845 101 kg (223 lb)   02/08/23 1218 101 kg (222 lb 10.6 oz)   01/24/23 1027 101 kg (223 lb)   01/10/23 1305 101 kg (223 lb)   12/20/22 1202 95.7 kg (211 lb)   12/07/22 1505 95.3 kg (210 lb)   12/06/22 1113 95.3 kg (210 lb)   11/23/22 1105 95.3 kg (210 lb)   11/20/22 0400 97.8 kg (215 lb 11.2 oz)   11/18/22 1433 93.9 kg (207 lb)   11/18/22 0910 99.8 kg (220 lb)   10/18/22 0917 104 kg (228 lb 3.2 oz)   10/17/22 1016 104 kg (229 lb 3.2 oz)   08/24/22 2030 106 kg (234 lb)       Physical Exam  GENERAL:  Well nourished/developed in no acute distress. Obese.   SKIN: Turgor excellent, without wound, rash, lesion.   HEENT: Normal cephalic without trauma.  Pupils equal round reactive to light. Extraocular motions full without nystagmus.     Oral cavity without growths, exudates, and moist.  Posterior pharynx without mass, obstruction, redness.  No thyromegaly, mass, tenderness, lymphadenopathy and supple.   CV: Regular rhythm.  No murmur, gallop, trace equal LE edema. Posterior pulses intact.  No carotid bruits.  CHEST: No chest wall tenderness or mass.   LUNGS: Symmetric motion with clear to auscultation.    ABD: Soft, nontender without mass.   ORTHO: Symmetric extremities without swelling/point tenderness " compression soreness R foot between 2nd/3rd. Full gross range of motion diffuse.   Walking without assistance.   NEURO: CN 2-12 grossly intact.  Symmetric facies and UE/LE. 3/5 strength throughout. 1/4 x bicep equal reflexes.  Nonfocal use extremities. Speech clear.    PSYCH: Oriented x 2-did not know date.  Pleasant calm, well kept.  Shallow but purposeful/directed conservation with intact short/long gross memory.    Diabetic foot exam:   Left: Filament test present   Pulses Dorsalis Pedis:  present   Reflexes 1+    Vibratory sensation diminished   Proprioception diminished   Sharp/dull discrimination diminished       Right: Filament test present   Pulses Dorsalis Pedis:  present   Reflexes 1+    Vibratory sensation diminished   Proprioception diminished   Sharp/dull discrimination diminished      Assessment & Plan     1. Hypercalcemia    2. HTN (hypertension), benign    3. Mixed hyperlipidemia    4. Anemia, unspecified type    5. Gait difficulty    6. Memory loss    7. Uncontrolled type 2 diabetes mellitus with hyperglycemia    8. Incontinence of feces, unspecified fecal incontinence type    9. Urinary incontinence, unspecified type        Data review above:   Discussions/medical decisions/reviews:  BP ok  Other vitals ok  DM/BS 9.8 4.19.23  Lipid LDL 5- 4.19.23 lipitor 20  PSA NA  CBC ok 4.18.23  Iron 100 4.18.23-no iron  Folate ok 4.18.23  B12 ok 4.18.23  OB neg 2.28.19-missing last ordered  Renal ok 4.18.23  Ca 11.3 4.18.23-PTH ok 10.20.22  Liver ALT 46H, AST 39H/stable 4.18.23  Vit D 46 4.18.23  Thyroid TSH ok 4.18.23    Screening reviewed/updated mammogram due, DM eye   Reviewed home BS  Labs; oh Ca/PH/PTH  [facility] mid May-dementia clinic  Change insulin to stronger concentration  Hold current insulin dose 90/90 till get libre2 running again  Prefer link the radha to the phone    Data review above:   Rx: reviewed and decisions:   Rx new/changes: none-but looking for another insulin/concentrated  No orders  "of the defined types were placed in this encounter.      Orders placed:   LAB/Testing/Referrals: reviewed/orders:   Today:   Orders Placed This Encounter   Procedures   • Phosphorus   • PTH, Intact   • Calcium, Ionized     Chronic/recurrent labs above or change to:   Same     Immunization History   Administered Date(s) Administered   • COVID-19 (MODERNA) 1st,2nd,3rd Dose Monovalent 02/26/2021, 03/26/2021, 10/28/2021   • FLUAD TRI 65YR+ 10/17/2019   • Fluad Quad 65+ 10/14/2020   • Fluzone High Dose =>65 Years ([employer] ONLY) 10/01/2018   • Fluzone High-Dose 65+yrs 10/13/2022   • Fluzone Quad >6mos (Multi-dose) 10/28/2016, 10/30/2017   • Influenza Quad Vaccine (Inpatient) 10/30/2017   • Influenza, Unspecified 10/17/2019   • Pneumococcal Conjugate 13-Valent (PCV13) 10/21/2015   • Pneumococcal Polysaccharide (PPSV23) 10/30/2014   • Shingrix 09/06/2019, 12/06/2019     We advised/reaffirmed our support/suggestion for staying complete with covid- covid boosters, seasonal flu/yearly and any missing vaccine from list we supplied; we suggest contact with local health department office to review missing/needed vaccines and then bring nursing documentation for these vaccines to this office or call this information in. Shingles became \"free\" 1.1.23 for medicare insurance.    Health maintenance:   Body mass index is 40.39 kg/m².  Class 2 Severe Obesity (BMI >=35 and <=39.9). Obesity-related health conditions include the following: osteoarthritis. Obesity is unchanged. BMI is is above average; BMI management plan is completed. We discussed portion control and increasing exercise.      Tobacco use reviewed:   Kezia Ootole  reports that she has never smoked. She has never used smokeless tobacco..      Patient Instructions     A1C:  Lab Results - Last 18 Months   Lab Units 04/18/23  0843 03/21/23  1322 12/19/22  1230 11/18/22  0955 10/13/22  1123 06/17/22  0810 03/15/22  0739   HEMOGLOBIN A1C % 9.80* 10.50* 7.80* 11.30* 8.2* " 6.90* 8.70*       Your A1c pattern/today is above for your review    A1c values:   <5.5 what we see on a normal/non-diabetic person  6.5 what it takes to be diagnosed with diabetes AND what most endocrinologist recommend if you are a diabetic  7.5 what the American diabetic association says you should be.      ########################        Follow up: Return for lab today then lab/Dr Crowe 6w.  Future Appointments   Date Time Provider Department Center   5/3/2023  2:30 PM Bal Pandya PA [facility] PAD   6/22/2023 10:30 AM [facility]   6/29/2023  2:45 PM Len Crowe MD [facility]

## 2023-04-25 NOTE — PATIENT INSTRUCTIONS
A1C:  Lab Results - Last 18 Months   Lab Units 04/18/23  0843 03/21/23  1322 12/19/22  1230 11/18/22  0955 10/13/22  1123 06/17/22  0810 03/15/22  0739   HEMOGLOBIN A1C % 9.80* 10.50* 7.80* 11.30* 8.2* 6.90* 8.70*       Your A1c pattern/today is above for your review    A1c values:   <5.5 what we see on a normal/non-diabetic person  6.5 what it takes to be diagnosed with diabetes AND what most endocrinologist recommend if you are a diabetic  7.5 what the American diabetic association says you should be.      ########################

## 2023-04-26 LAB
CA-I SERPL ISE-MCNC: 5.3 MG/DL (ref 4.5–5.6)
PHOSPHATE SERPL-MCNC: 4.5 MG/DL (ref 2.5–4.5)
PTH-INTACT SERPL-MCNC: 15 PG/ML (ref 15–65)

## 2023-05-22 RX ORDER — DULOXETIN HYDROCHLORIDE 20 MG/1
CAPSULE, DELAYED RELEASE ORAL
Qty: 30 CAPSULE | Refills: 5 | Status: SHIPPED | OUTPATIENT
Start: 2023-05-22

## 2023-06-02 ENCOUNTER — HOSPITAL ENCOUNTER (OUTPATIENT)
Dept: PREADMISSION TESTING | Age: 73
Discharge: HOME | End: 2023-06-02

## 2023-06-08 RX ORDER — IRBESARTAN 300 MG/1
TABLET ORAL
Qty: 90 TABLET | Refills: 3 | Status: SHIPPED | OUTPATIENT
Start: 2023-06-08

## 2023-06-22 ENCOUNTER — TELEPHONE (OUTPATIENT)
Dept: FAMILY MEDICINE CLINIC | Facility: CLINIC | Age: 73
End: 2023-06-22

## 2023-06-22 NOTE — TELEPHONE ENCOUNTER
Caller: [facility] - [place], [place] - [address] - [phone]  - [phone] FX    Relationship: Pharmacy  SPOKE WITH PIERRE Munson call back number: [phone]     What is the best time to reach you: ANYTIME    Who are you requesting to speak with (clinical staff, provider,  specific staff member): CLINICAL    What was the call regarding: insulin detemir (LEVEMIR) 100 UNIT/ML injection

## 2023-06-22 NOTE — TELEPHONE ENCOUNTER
Rosalia stated that she will need a pa on this only due to quantity not the medication/I told her we will watch for it on cover my meds

## 2023-06-30 PROBLEM — F03.90 DEMENTIA: Status: ACTIVE | Noted: 2022-12-06

## 2023-07-27 RX ORDER — PANTOPRAZOLE SODIUM 40 MG/1
TABLET, DELAYED RELEASE ORAL
Qty: 30 TABLET | Refills: 2 | Status: SHIPPED | OUTPATIENT
Start: 2023-07-27

## 2023-07-27 NOTE — TELEPHONE ENCOUNTER
Rx Refill Note  Requested Prescriptions     Pending Prescriptions Disp Refills    pantoprazole (PROTONIX) 40 MG EC tablet [Pharmacy Med Name: PANTOPRAZOLE SODIUM 40MG TABLET DR] 30 tablet 2     Sig: TAKE ONE TABLET DAILY      Last office visit with prescribing clinician: 6/29/2023      Next office visit with prescribing clinician: 10/25/2023            Nabila Quinonez MA  07/27/23, 09:01 CDT

## 2023-08-07 RX ORDER — MONTELUKAST SODIUM 10 MG/1
TABLET ORAL
Qty: 90 TABLET | Refills: 3 | Status: SHIPPED | OUTPATIENT
Start: 2023-08-07

## 2023-08-11 ENCOUNTER — CLINICAL SUPPORT (OUTPATIENT)
Dept: FAMILY MEDICINE CLINIC | Facility: CLINIC | Age: 73
End: 2023-08-11
Payer: MEDICARE

## 2023-08-11 ENCOUNTER — TELEPHONE (OUTPATIENT)
Dept: FAMILY MEDICINE CLINIC | Facility: CLINIC | Age: 73
End: 2023-08-11

## 2023-08-11 DIAGNOSIS — R05.9 COUGH, UNSPECIFIED TYPE: Primary | ICD-10-CM

## 2023-08-11 LAB
EXPIRATION DATE: NORMAL
INTERNAL CONTROL: NORMAL
Lab: NORMAL
SARS-COV-2 AG UPPER RESP QL IA.RAPID: NOT DETECTED

## 2023-08-11 PROCEDURE — 87426 SARSCOV CORONAVIRUS AG IA: CPT | Performed by: NURSE PRACTITIONER

## 2023-08-11 RX ORDER — AMOXICILLIN AND CLAVULANATE POTASSIUM 875; 125 MG/1; MG/1
1 TABLET, FILM COATED ORAL 2 TIMES DAILY
Qty: 20 TABLET | Refills: 0 | Status: SHIPPED | OUTPATIENT
Start: 2023-08-11 | End: 2023-08-21

## 2023-08-11 NOTE — TELEPHONE ENCOUNTER
Caller: Kezia Otoole    Relationship: Self    Best call back number: [phone]    What medication are you requesting:     PCP RECOMMENDATION    What are your current symptoms:     POST ALLERGY    DRY COUGH, SORE THROAT, ONE SIDE EAR WAS SORE FROM THROAT TO EAR, DRAINAGE, NO FEVER    How long have you been experiencing symptoms:     1ST OF WEEK    Have you had these symptoms before:    [x] Yes  [] No    Have you been treated for these symptoms before:   [x] Yes  [] No    If a prescription is needed, what is your preferred pharmacy and phone number:      [facility] - [address] [phone] [facility] [phone]

## 2023-08-11 NOTE — TELEPHONE ENCOUNTER
Will send ATB but needs to get a covid test first.  Can get home test or come by for nurse visit and let me know.    Electronically signed by FABBY Sanchez, 08/11/23, 1:38 PM CDT.

## 2023-08-24 RX ORDER — ATORVASTATIN CALCIUM 20 MG/1
TABLET, FILM COATED ORAL
Qty: 90 TABLET | Refills: 3 | Status: SHIPPED | OUTPATIENT
Start: 2023-08-24

## 2023-09-14 DIAGNOSIS — J45.20 MILD INTERMITTENT ASTHMA, UNSPECIFIED WHETHER COMPLICATED: ICD-10-CM

## 2023-09-14 RX ORDER — ALBUTEROL SULFATE 90 UG/1
AEROSOL, METERED RESPIRATORY (INHALATION)
Qty: 6.7 G | Refills: 1 | Status: SHIPPED | OUTPATIENT
Start: 2023-09-14

## 2023-09-22 ENCOUNTER — OFFICE VISIT (OUTPATIENT)
Dept: FAMILY MEDICINE CLINIC | Facility: CLINIC | Age: 73
End: 2023-09-22

## 2023-09-22 VITALS
SYSTOLIC BLOOD PRESSURE: 128 MMHG | HEART RATE: 77 BPM | WEIGHT: 229 LBS | DIASTOLIC BLOOD PRESSURE: 76 MMHG | HEIGHT: 63 IN | OXYGEN SATURATION: 98 % | RESPIRATION RATE: 18 BRPM | TEMPERATURE: 96.8 F | BODY MASS INDEX: 40.57 KG/M2

## 2023-09-22 DIAGNOSIS — E11.8 CONTROLLED TYPE 2 DIABETES MELLITUS WITH COMPLICATION, WITHOUT LONG-TERM CURRENT USE OF INSULIN: Primary | ICD-10-CM

## 2023-09-22 DIAGNOSIS — M17.12 PRIMARY OSTEOARTHRITIS OF LEFT KNEE: ICD-10-CM

## 2023-09-22 LAB
EXPIRATION DATE: ABNORMAL
HBA1C MFR BLD: 9 %
Lab: ABNORMAL

## 2023-09-22 NOTE — PROGRESS NOTES
Subjective   Chief Complaint:  Elevation of diabetes    History of Present Illness:  This 72 y.o. female was seen in the office today.      The patient presents today for evaluation of diabetes. Her A1c is 9.0 percent on arrival via POC testing. She has osteoarthritis of the left knee and is going to get a knee replacement in 12/2023 and is needing to get her A1c down between now and then.    Allergies   Allergen Reactions    Adhesive Tape Other (See Comments)     Tears and bruises skin    Other Other (See Comments)     Bandaids make arm Red/slightly swollen      Current Outpatient Medications on File Prior to Visit   Medication Sig    albuterol sulfate  (90 Base) MCG/ACT inhaler INHALE TWO PUFFS EVERY FOUR  HOURS AS NEEDED FOR WHEEZING OR SHORTNESS OF AIR    amLODIPine (NORVASC) 5 MG tablet TAKE ONE TABLET DAILY    atorvastatin (LIPITOR) 20 MG tablet TAKE ONE TABLET DAILY    Blood Glucose Monitoring Suppl (OneTouch Verio Flex System) w/Device kit 1 each As Needed (emergency use if no CGM in place).    budesonide-formoterol (SYMBICORT) 160-4.5 MCG/ACT inhaler Inhale 2 puffs 2 (Two) Times a Day.    Calcium Carb-Cholecalciferol (CALCIUM-VITAMIN D) 600-400 MG-UNIT tablet Take 1 tablet by mouth 2 (Two) Times a Day.    carboxymethylcellulose (REFRESH PLUS) 0.5 % solution 2 drops Daily As Needed for Dry Eyes.    Cholecalciferol 25 MCG (1000 UT) capsule Take 1 capsule by mouth Daily.    Continuous Blood Gluc Sensor (FreeStyle Sue 2 Sensor) misc USE TO TEST FOUR TIMES DAILY WITH SLIDING SCALE THREE TIMES DAILY AND LANTUS AT BEDTIME.    denosumab (PROLIA) 60 MG/ML solution syringe Inject 1 mL under the skin into the appropriate area as directed Every 6 (Six) Months.    DULoxetine (CYMBALTA) 20 MG capsule TAKE ONE CAPSULE DAILY    guaiFENesin (MUCINEX) 600 MG 12 hr tablet Take 1 tablet by mouth 2 (Two) Times a Day.    hydroCHLOROthiazide (MICROZIDE) 12.5 MG capsule Take 1 capsule by mouth Every Morning.    insulin  "detemir (LEVEMIR) 100 UNIT/ML injection Inject 110 Units under the skin into the appropriate area as directed Every 12 (Twelve) Hours. Please provide 30 day supply unsure how many pens in a box, thank you    Insulin Lispro, 1 Unit Dial, (HumaLOG KwikPen) 100 UNIT/ML solution pen-injector Inject 7 Units under the skin into the appropriate area as directed 3 (Three) Times a Day. Max units 21 per 24 hours    Insulin Pen Needle (MM Pen Needles) 31G X 6 MM misc 1 each 5 (Five) Times a Day. BID with Levemir, use TID with Humalog prn    irbesartan (AVAPRO) 300 MG tablet TAKE ONE TABLET DAILY    latanoprost (XALATAN) 0.005 % ophthalmic solution Administer 1 drop to both eyes Every Night.    Misc Natural Products (GLUCOSAMINE CHONDROITIN TRIPLE PO) Take 1 tablet by mouth 2 (Two) Times a Day.    montelukast (SINGULAIR) 10 MG tablet TAKE ONE TABLET DAILY    Multiple Vitamins-Minerals (VITRUM 50+ SENIOR MULTI PO) Take 1 tablet by mouth Daily.    pantoprazole (PROTONIX) 40 MG EC tablet TAKE ONE TABLET DAILY    spironolactone (ALDACTONE) 25 MG tablet TAKE ONE HALF (1/2) TABLET DAILY    TRUEplus Insulin Syringe 31G X 5/16\" 0.3 ML misc USE AS DIRECTED THREE TIMES DAILY    TRUEplus Insulin Syringe 31G X 5/16\" 0.5 ML misc USE AS DIRECTED TWICE DAILY WITH LEVEMIR    [DISCONTINUED] empagliflozin (Jardiance) 10 MG tablet tablet Take 1 tablet by mouth Daily.    donepezil (Aricept) 10 MG tablet Take 1 tablet by mouth Every Night.     No current facility-administered medications on file prior to visit.      Past Medical, Surgical, Social, and Family History:  Past Medical History:   Diagnosis Date    Allergic rhinitis     Chronic laryngitis     Diabetes mellitus     GERD (gastroesophageal reflux disease)     Hypertension     Hypertrophy of both inferior nasal turbinates     Laryngopharyngeal reflux     Nontoxic multinodular goiter     Sicca laryngitis      Past Surgical History:   Procedure Laterality Date    CARPAL TUNNEL RELEASE      " "CHOLECYSTECTOMY      COLONOSCOPY  10/01/2008    Multiple polyps removed    COLONOSCOPY  09/18/2013    Diverticulosis in the sigmoid colon. Dr. Dave Mcnally Recall 5 years    ENDOSCOPY N/A 12/08/2022    Non-bleeding duodenal ulcer, small HH    HAND SURGERY      HYSTERECTOMY      REPLACEMENT TOTAL KNEE Right 02/02/2021    THYROIDECTOMY, PARTIAL Right 2010    TONSILLECTOMY      TUBAL ABDOMINAL LIGATION       Social History     Socioeconomic History    Marital status:      Spouse name: Laurie    Number of children: 2    Years of education: 16   Tobacco Use    Smoking status: Never    Smokeless tobacco: Never    Tobacco comments:     \"lived with smokers most of my life\"   Vaping Use    Vaping Use: Never used   Substance and Sexual Activity    Alcohol use: No     Comment: Rare    Drug use: No    Sexual activity: Defer     Partners: Male     Birth control/protection: None     Family History   Problem Relation Age of Onset    Diabetes Mother     Stroke Mother     Heart disease Mother     Heart disease Father     Colon cancer Neg Hx     Colon polyps Neg Hx        Prior Visit Notes/Records, Lab, Imaging, and Diagnostic Results Reviewed:  A1C:  Lab Results - Last 18 Months   Lab Units 09/22/23  0833 06/22/23  0922 04/18/23  0843 03/21/23  1322 12/19/22  1230 11/18/22  0955 10/13/22  1123   HEMOGLOBIN A1C % 9.0 8.10* 9.80* 10.50* 7.80* 11.30* 8.2*     GLUCOSE:  Lab Results - Last 18 Months   Lab Units 06/22/23  0922 04/18/23  0843 03/21/23  1322 01/24/23  1019 12/19/22  1230 12/11/22  1045 12/09/22  0644   GLUCOSE mg/dL 221* 244* 246* 497* 300* 226* 185*     LIPID:  Lab Results - Last 18 Months   Lab Units 04/18/23  0843 10/13/22  1123   CHOLESTEROL mg/dL 126  --    LDL CHOL mg/dL 50 35   HDL CHOL mg/dL 37* 48*   TRIGLYCERIDES mg/dL 244* 276*     PSA:No results for input(s): PSA in the last 79642 hours.  CBC:  Lab Results - Last 18 Months   Lab Units 06/22/23  0922 04/18/23  0843 03/21/23  1322 02/10/23  0822 " 01/24/23  1019 01/04/23  1100 12/19/22  1230 12/07/22  2357 12/07/22  1843 12/07/22  1406 12/07/22  0840 12/06/22  1108 11/18/22  0955 10/13/22  1123   WBC 10*3/mm3 8.32 8.20 11.09* 6.21 6.66 5.44 4.42   < > 8.91  --   --  7.44   < > 9.40   HEMOGLOBIN g/dL 14.0 15.3 16.2* 13.1 12.7 11.6* 9.9*   < > 8.2* 8.1*  --  8.8*   < > 15.2   HEMATOCRIT % 42.6 44.9 49.3* 42.4 42.9 37.2 32.2*   < > 25.7* 24.2*  --  26.6*   < > 43.7   PLATELETS 10*3/mm3 285 290 380 281 336 358 270   < > 256  --   --  266   < > 301   IRON mcg/dL 57 100 150* 56 344* 37 75  --  39 88 67 180*  --  90    < > = values in this interval not displayed.      BMP/CMP:  Lab Results - Last 18 Months   Lab Units 06/22/23  0922 04/18/23  0843 03/21/23  1322 02/08/23  1159 01/24/23  1019 12/19/22  1230 12/11/22  1045 12/09/22  0644 12/07/22  1843 12/06/22  1108 10/13/22  1122 06/17/22  0810   SODIUM mmol/L 141 140 134*  --  135* 133* 134* 133*   < > 136   < > 137   POTASSIUM mmol/L 4.5 4.5 4.3  --  5.0 3.8 3.4* 3.6   < > 3.9   < > 4.4   CHLORIDE mmol/L 101 97* 94*  --  93* 98 100 102   < > 96*   < > 100   CO2 mmol/L 26.8 31.4* 27.0  --  28.1 24.0 25.0 25.0   < > 25.7   < > 25.5   GLUCOSE mg/dL 221* 244* 246*  --  497* 300* 226* 185*   < > 235*   < > 153*   BUN mg/dL 18 17 22  --  11 8 6* 12   < > 32*   < > 13   CREATININE mg/dL 0.73 0.82 0.87 0.80 0.74 0.46* 0.36* 0.46*   < > 0.61   < > 0.63   EGFR RESULT mL/min/1.73 86.4 75.2 70.0  --  85.0  --   --   --   --  93.9  --  93.8   CALCIUM mg/dL 9.9 11.3* 9.9  --  9.4 8.6 8.1* 7.6*   < > 8.5*   < > 9.6    < > = values in this interval not displayed.     HEPATIC:  Lab Results - Last 18 Months   Lab Units 06/22/23  0922 04/18/23  0843 03/21/23  1322 12/19/22  1230 12/11/22  1045 12/07/22  1843 12/06/22  1108   ALT (SGPT) U/L 22 46* 37* 23 30 28 24   AST (SGOT) U/L 17 39* 35* 18 19 15 13   ALK PHOS U/L 113 87 110 98 83 88 68     Vit D:  Lab Results - Last 18 Months   Lab Units 04/18/23  0843 10/13/22  1123  "10/13/22  1122   VIT D 25 HYDROXY ng/ml 46.4 52.8 54.4     THYROID:  Lab Results - Last 18 Months   Lab Units 04/18/23  0843 10/13/22  1123   TSH uIU/mL 1.400 0.787     BMI Trend:  BMI Readings from Last 10 Encounters:   09/22/23 40.57 kg/m²   06/29/23 41.27 kg/m²   04/25/23 40.39 kg/m²   03/21/23 39.36 kg/m²   02/16/23 40.21 kg/m²   01/26/23 39.50 kg/m²   02/08/23 39.45 kg/m²   01/24/23 39.50 kg/m²   01/10/23 39.50 kg/m²   12/20/22 37.38 kg/m²     Objective   Vital Signs  /76   Pulse 77   Temp 96.8 °F (36 °C) (Temporal)   Resp 18   Ht 160 cm (63\")   Wt 104 kg (229 lb)   SpO2 98%   BMI 40.57 kg/m²   Physical Exam  Vitals reviewed.   Constitutional:       General: She is not in acute distress.     Appearance: Normal appearance. She is obese.   Cardiovascular:      Rate and Rhythm: Normal rate and regular rhythm.   Pulmonary:      Effort: Pulmonary effort is normal.      Breath sounds: Normal breath sounds.       Assessment & Plan   Diagnoses and all orders for this visit:    1. Controlled type 2 diabetes mellitus with complication, without long-term current use of insulin (Primary)  -     POC Glycosylated Hemoglobin (Hb A1C)    2. Primary osteoarthritis of left knee    Other orders  -     empagliflozin (JARDIANCE) 25 MG tablet tablet; Take 1 tablet by mouth Daily.  Dispense: 30 tablet; Refill: 5    Discussion:  Advised and educated plan of care.    Advised titrate Jardiance to 25 mg p.o. daily. Advised diet and we will recheck the A1c couple of weeks prior to her surgery.    Follow-up:  Return in 2 months (on 11/27/2023) for Nurse Visit:  POC A1C.    Transcribed from ambient dictation for FABBY Sanchez by Bryon Cramer.  09/22/23   10:46 CDT    I have personally performed the services described in this document as transcribed by the above individual, and it is both accurate and complete.    Electronically signed by Khris Shanks, 09/22/23, 10:43 AM CDT.   "

## 2023-09-29 ENCOUNTER — TELEPHONE (OUTPATIENT)
Dept: FAMILY MEDICINE CLINIC | Facility: CLINIC | Age: 73
End: 2023-09-29

## 2023-09-29 RX ORDER — FLUCONAZOLE 150 MG/1
150 TABLET ORAL
Qty: 3 TABLET | Refills: 0 | Status: SHIPPED | OUTPATIENT
Start: 2023-09-29

## 2023-09-29 NOTE — TELEPHONE ENCOUNTER
Empiric treatment with Diflucan is sent.  If no improvement of symptoms by Monday, see if she can come in for a BV Nu-swab.    Electronically signed by FABBY Sanchez, 09/29/23, 9:08 AM CDT.

## 2023-09-29 NOTE — TELEPHONE ENCOUNTER
Caller: Kezia Otoole    Relationship: Self    Best call back number: [phone]     What medication are you requesting: SOMETHING TO HELP WITH SYMPTOMS    What are your current symptoms: ITCHINESS AND DISCHARGE IN VAGINAL AREA    How long have you been experiencing symptoms: A COUPLE OF DAYS    Have you had these symptoms before:    [x] Yes  [] No    Have you been treated for these symptoms before:   [x] Yes  [] No    If a prescription is needed, what is your preferred pharmacy and phone number: [facility] - [address] [phone] [facility] [phone]      Additional notes:

## 2023-10-05 DIAGNOSIS — E11.65 UNCONTROLLED TYPE 2 DIABETES MELLITUS WITH HYPERGLYCEMIA: ICD-10-CM

## 2023-10-05 DIAGNOSIS — E11.9 INSULIN DEPENDENT TYPE 2 DIABETES MELLITUS: ICD-10-CM

## 2023-10-05 DIAGNOSIS — Z79.4 INSULIN DEPENDENT TYPE 2 DIABETES MELLITUS: ICD-10-CM

## 2023-10-05 RX ORDER — PEN NEEDLE, DIABETIC 31 G X1/4"
NEEDLE, DISPOSABLE MISCELLANEOUS
Qty: 200 EACH | Refills: 5 | Status: SHIPPED | OUTPATIENT
Start: 2023-10-05

## 2023-10-16 ENCOUNTER — TELEPHONE (OUTPATIENT)
Dept: FAMILY MEDICINE CLINIC | Facility: CLINIC | Age: 73
End: 2023-10-16

## 2023-10-16 NOTE — TELEPHONE ENCOUNTER
Caller: Herminio Toro    Relationship: Emergency Contact    Best call back number: [phone]     What medication are you requesting: WHATEVER IS RECOMMENDED    What are your current symptoms: DEPRESSION    How long have you been experiencing symptoms: ON/OFF PAST YEAR     Have you had these symptoms before:    [] Yes  [x] No    Have you been treated for these symptoms before:   [] Yes  [x] No    If a prescription is needed, what is your preferred pharmacy and phone number: [facility] - [address] [phone] [facility] [phone]      Additional notes:

## 2023-10-25 ENCOUNTER — OFFICE VISIT (OUTPATIENT)
Dept: FAMILY MEDICINE CLINIC | Facility: CLINIC | Age: 73
End: 2023-10-25
Payer: MEDICARE

## 2023-10-25 VITALS
OXYGEN SATURATION: 99 % | BODY MASS INDEX: 41.11 KG/M2 | RESPIRATION RATE: 18 BRPM | HEIGHT: 63 IN | DIASTOLIC BLOOD PRESSURE: 84 MMHG | WEIGHT: 232 LBS | SYSTOLIC BLOOD PRESSURE: 126 MMHG | TEMPERATURE: 96.9 F | HEART RATE: 70 BPM

## 2023-10-25 DIAGNOSIS — F03.90 DEMENTIA, UNSPECIFIED DEMENTIA SEVERITY, UNSPECIFIED DEMENTIA TYPE, UNSPECIFIED WHETHER BEHAVIORAL, PSYCHOTIC, OR MOOD DISTURBANCE OR ANXIETY: ICD-10-CM

## 2023-10-25 DIAGNOSIS — E11.65 UNCONTROLLED TYPE 2 DIABETES MELLITUS WITH HYPERGLYCEMIA: ICD-10-CM

## 2023-10-25 DIAGNOSIS — M81.0 OSTEOPOROSIS, UNSPECIFIED OSTEOPOROSIS TYPE, UNSPECIFIED PATHOLOGICAL FRACTURE PRESENCE: ICD-10-CM

## 2023-10-25 DIAGNOSIS — F32.A DEPRESSION, UNSPECIFIED DEPRESSION TYPE: Chronic | ICD-10-CM

## 2023-10-25 DIAGNOSIS — E78.2 MIXED HYPERLIPIDEMIA: ICD-10-CM

## 2023-10-25 DIAGNOSIS — M15.0 PRIMARY GENERALIZED (OSTEO)ARTHRITIS: ICD-10-CM

## 2023-10-25 DIAGNOSIS — D64.9 ANEMIA, UNSPECIFIED TYPE: ICD-10-CM

## 2023-10-25 DIAGNOSIS — R69 MULTIPLE CHRONIC DISEASES: ICD-10-CM

## 2023-10-25 DIAGNOSIS — Z78.0 MENOPAUSE: ICD-10-CM

## 2023-10-25 DIAGNOSIS — E83.52 HYPERCALCEMIA: ICD-10-CM

## 2023-10-25 DIAGNOSIS — Z12.31 ENCOUNTER FOR SCREENING MAMMOGRAM FOR BREAST CANCER: ICD-10-CM

## 2023-10-25 DIAGNOSIS — R26.9 GAIT DIFFICULTY: ICD-10-CM

## 2023-10-25 DIAGNOSIS — I10 HTN (HYPERTENSION), BENIGN: Chronic | ICD-10-CM

## 2023-10-25 RX ORDER — SEMAGLUTIDE 1.34 MG/ML
0.5 INJECTION, SOLUTION SUBCUTANEOUS WEEKLY
Qty: 1.5 ML | Refills: 1 | Status: SHIPPED | OUTPATIENT
Start: 2023-10-25

## 2023-10-25 NOTE — PATIENT INSTRUCTIONS
"There are several vaccines used for individuals near/over 65 years old.      1. Tetanus.   Like anyone this needs to given every 10 years; sooner for/with lacerations/wounds.   Likely when getting this booster it needs to be a tetanus called Tdap (tetanus mixed with diptheria and pertussis).   Years ago you had this vaccine.  We now know we can lose our immunity to pertussis (a part of this vaccine) and run a risk of catching this.  Now only would this make us ill; but more importantly we can spread this to very young children (and for them it can be a much more dangerous illness).   We call this the grandparent vaccine for this reason.     2. Pneumonia.   This comes now as a one time vaccine for most persons.  Even if you have had these before; we need to review when and your current health situation/s as you may need a booster     3. Shingles.  You do not want to catch shingles.  Though you will recover from this; the pain associated with shingles can be severe.  Even if you have had the now older zostavax, or have had shingles; it is recommended you still get the Shingrix (the new just available early 2018 shingles vaccine).   Medicare began 1.1.23 waving any co-pays for this; it is therefore free.     4. Flu/influenza: Yearly flu vaccine given from September through April each year.  For those over 65 it should be \"high dose\" flu (to complement the possibility the immune system is alittle weaker)    5. RSV: If you are over 60; beginning 10.1.23 you can now take a RSV vaccination.  This would lower your change for catching this winter/\"cold\" virus that can under some circumstances cause significant respiratory symptoms and even require hospitalization.  Being vaccinated also makes it more difficult for you to be contagious and get this to children; particularly children less than 6 months of where this can be a very dangerous illness.      6. COVID: Since the early onset of COVID illness and a the many COVID " vaccines that were initially developed; we will have a winter 2023 (and maybe beyond)  booster particularly for those at higher risk of complications and over age 65.    7. Travel vaccines:  If you are one to do international travel; be sure and ask us for any particular unusual vaccines you may need.     8.  Miscellaneous:  If you have certain health situations/disease you may need specific/particular vaccines not give to the general public.     Your records we have on file:   Immunization History   Administered Date(s) Administered    COVID-19 (MODERNA) 1st,2nd,3rd Dose Monovalent 02/26/2021, 03/26/2021, 10/28/2021    COVID-19 (MODERNA) BIVALENT 12+YRS 02/25/2023    FLUAD TRI 65YR+ 10/17/2019    Fluad Quad 65+ 10/14/2020    Fluzone High Dose =>65 Years ([employer] ONLY) 10/01/2018    Fluzone High-Dose 65+yrs 10/13/2022    Fluzone Quad >6mos (Multi-dose) 10/28/2016, 10/30/2017    Influenza Quad Vaccine (Inpatient) 10/30/2017    Influenza, Unspecified 10/17/2019, 10/06/2023    Pneumococcal Conjugate 13-Valent (PCV13) 10/21/2015    Pneumococcal Polysaccharide (PPSV23) 10/30/2014    Shingrix 09/06/2019, 12/06/2019       You therefore could need the ones if we listed below:   Winter 2023 shots: latest/updated covid vaccine + 2023 flu/high dose flu (can/should be given same day)                                 2-3 weeks after then get the new RSV vaccine    After this; take a break and then work on getting those other vaccines still needed:   Tdap      Because of many restrictions on this office always having all the above vaccines; you may be advised to work with your local health department or various pharmacies to keep up with your individual vaccine needs.  If you are forced to get a vaccine outside this office in order to keep your health record up to date; we request you personally notify us after any vaccines of the type and date.

## 2023-10-25 NOTE — PROGRESS NOTES
DAVID”.   Subjective   Kezia Otoole is a 73 y.o. female presenting with chief complaint of:   Chief Complaint   Patient presents with    Follow-up     AWV 10.18.22  Last Completed Annual Wellness Visit       This patient has no relevant Health Maintenance data.             History of Present Illness :  With son.   Here for review of chronic problems that includes DM2 and others.      Has multiple chronic problems to consider that might have a bearing on today's issues;  an interval appointment.       Chronic/acute problems reviewed today:   1. Uncontrolled type 2 diabetes mellitus with hyperglycemia Chronic/variable-high.  No problem/pattern hypoglycemia/hyperglycemia manifest by poly- dypsia, phagia, uria, or sweats, diaphoretic episodes, syncope/near.  Despite having librie3 no sliding scale; no attention to diet.     2. Hypercalcemia history of elevated calcium; denies significant muscle cramps   3. Anemia, unspecified type Chronic or past history/stable: This has been present before.    There has been GI evaulation in the past. There is no current melena, hematochezia. It has been benign to date and stable//watching.  Contributing comorbidities to date: all benign.     4. Depression, unspecified depression type past significant  mood swings, down moods, nervousness, difficulty with concentration to function home/work.  Others close have not been concerned.  No suicide ideation/intent.  Rx helps      5. Osteoporosis, unspecified osteoporosis type, unspecified pathological fracture presence    6. Primary generalized (osteo)arthritis Chronic/stable.  Various on/off joint pains/soreness/stiffness.  Particular joint problems with various.  No joint swelling.  Treats mainly with reduced activity, Rx listed, Tylenol.  No  NSAIDs, and no injections.      7. Dementia, unspecified dementia severity, unspecified dementia type, unspecified whether behavioral, psychotic, or mood disturbance or anxiety chronic  continued difficulties with memory cognition and some ADLs; but it has not worsened.  Did see neurology/Dr. Paulson and [facility] and is follow-up with both   8. Gait difficulty Chronic/stable:.  Ongoing issues with difficulties it results in difficulty with walking or gait.  No recent falls.  No recent injuries.  Uses to help gait: slower walking.     9. Multiple chronic diseases Has multiple chronic problems with increased risks for management (involves polypharmacy, multiple providers, many required labs/imaging/ to manage)     10. Mixed hyperlipidemia Chronic/stable.  Tolerated use of Rx with labs showing improved lipid values and tolerant liver labs. No muscle aches unexpected.      11. HTN (hypertension), benign Chronic/stable. Stable here past/no recent home blood pressures.  No significant chest pain, SOB, LE edema, orthopnea, near syncope, dizziness/light headness.   Recent Vitals         6/29/2023 9/22/2023 10/25/2023       BP: 138/82 128/76 126/84     Pulse: -- 77 70     Temp: -- 96.8 °F (36 °C) 96.9 °F (36.1 °C)     Weight: -- 104 kg (229 lb) 105 kg (232 lb)     BMI (Calculated): -- 40.6 41.1              12. Encounter for screening mammogram for breast cancer Chronic/ongoing yearly need to review for breast cancer.  No breast pain, masses, discharge.       13. Menopause Chronic/stable.  Last bone density/if below.   Has had priolia/ Rx.  Ok further bone density screening when due.        Has an/another acute issue today: none.    The following portions of the patient's history were reviewed and updated as appropriate: allergies, current medications, past family history, past medical history, past social history, past surgical history, and problem list.      Current Outpatient Medications:     albuterol sulfate  (90 Base) MCG/ACT inhaler, INHALE TWO PUFFS EVERY FOUR  HOURS AS NEEDED FOR WHEEZING OR SHORTNESS OF AIR, Disp: 6.7 g, Rfl: 1    amLODIPine (NORVASC) 5 MG tablet, TAKE ONE TABLET DAILY,  Disp: 90 tablet, Rfl: 1    atorvastatin (LIPITOR) 20 MG tablet, TAKE ONE TABLET DAILY, Disp: 90 tablet, Rfl: 3    Blood Glucose Monitoring Suppl (OneTouch Verio Flex System) w/Device kit, 1 each As Needed (emergency use if no CGM in place)., Disp: 1 kit, Rfl: 0    budesonide-formoterol (SYMBICORT) 160-4.5 MCG/ACT inhaler, Inhale 2 puffs 2 (Two) Times a Day., Disp: , Rfl:     Calcium Carb-Cholecalciferol (CALCIUM-VITAMIN D) 600-400 MG-UNIT tablet, Take 1 tablet by mouth 2 (Two) Times a Day., Disp: , Rfl:     carboxymethylcellulose (REFRESH PLUS) 0.5 % solution, 2 drops Daily As Needed for Dry Eyes., Disp: , Rfl:     Cholecalciferol 25 MCG (1000 UT) capsule, Take 1 capsule by mouth Daily., Disp: , Rfl:     Continuous Blood Gluc Sensor (FreeStyle Sue 2 Sensor) misc, USE TO TEST FOUR TIMES DAILY WITH SLIDING SCALE THREE TIMES DAILY AND LANTUS AT BEDTIME., Disp: 2 each, Rfl: 2    denosumab (PROLIA) 60 MG/ML solution syringe, Inject 1 mL under the skin into the appropriate area as directed Every 6 (Six) Months., Disp: , Rfl:     donepezil (Aricept) 10 MG tablet, Take 1 tablet by mouth Every Night., Disp: 30 tablet, Rfl: 5    DULoxetine (CYMBALTA) 20 MG capsule, TAKE ONE CAPSULE DAILY, Disp: 30 capsule, Rfl: 5    empagliflozin (JARDIANCE) 25 MG tablet tablet, Take 1 tablet by mouth Daily., Disp: 30 tablet, Rfl: 5    fluconazole (DIFLUCAN) 150 MG tablet, Take 1 tablet by mouth Every 3 (Three) Days., Disp: 3 tablet, Rfl: 0    guaiFENesin (MUCINEX) 600 MG 12 hr tablet, Take 1 tablet by mouth 2 (Two) Times a Day., Disp: , Rfl:     hydroCHLOROthiazide (MICROZIDE) 12.5 MG capsule, Take 1 capsule by mouth Every Morning., Disp: , Rfl:     insulin detemir (LEVEMIR) 100 UNIT/ML injection, Inject 110 Units under the skin into the appropriate area as directed Every 12 (Twelve) Hours. Please provide 30 day supply unsure how many pens in a box, thank you, Disp: 33 mL, Rfl: 5    Insulin Lispro, 1 Unit Dial, (HumaLOG KwikPen) 100  "UNIT/ML solution pen-injector, Inject 7 Units under the skin into the appropriate area as directed 3 (Three) Times a Day. Max units 21 per 24 hours, Disp: 20 mL, Rfl: 1    irbesartan (AVAPRO) 300 MG tablet, TAKE ONE TABLET DAILY, Disp: 90 tablet, Rfl: 3    latanoprost (XALATAN) 0.005 % ophthalmic solution, Administer 1 drop to both eyes Every Night., Disp: , Rfl: 5    Misc Natural Products (GLUCOSAMINE CHONDROITIN TRIPLE PO), Take 1 tablet by mouth 2 (Two) Times a Day., Disp: , Rfl:     montelukast (SINGULAIR) 10 MG tablet, TAKE ONE TABLET DAILY, Disp: 90 tablet, Rfl: 3    Multiple Vitamins-Minerals (VITRUM 50+ SENIOR MULTI PO), Take 1 tablet by mouth Daily., Disp: , Rfl:     pantoprazole (PROTONIX) 40 MG EC tablet, TAKE ONE TABLET DAILY, Disp: 30 tablet, Rfl: 2    spironolactone (ALDACTONE) 25 MG tablet, TAKE ONE HALF (1/2) TABLET DAILY, Disp: 45 tablet, Rfl: 1    TRUEplus 5-Bevel Pen Needles 31G X 6 MM misc, ONE EACH FIVE TIMES A DAY. TWICE DAILY WITH LEVEMIR, THREE TIMES DAILY WITH HUMALOG AS NEEDED, Disp: 200 each, Rfl: 5    TRUEplus Insulin Syringe 31G X 5/16\" 0.3 ML misc, USE AS DIRECTED THREE TIMES DAILY, Disp: 90 each, Rfl: 5    TRUEplus Insulin Syringe 31G X 5/16\" 0.5 ML misc, USE AS DIRECTED TWICE DAILY WITH LEVEMIR, Disp: 60 each, Rfl: 5    Current Outpatient Medications for DM    empagliflozin (JARDIANCE) 25 MG tablet tablet, Take 1 tablet by mouth Daily., Disp: 30 tablet, Rfl: 5    insulin detemir (LEVEMIR) 100 UNIT/ML injection, Inject 110 Units under the skin into the appropriate area as directed Every 12 (Twelve) Hours. Please provide 30 day supply unsure how many pens in a box, thank you, Disp: 33 mL, Rfl: 5    Insulin Lispro, 1 Unit Dial, (HumaLOG KwikPen) 100 UNIT/ML solution pen-injector, Inject 7 Units under the skin into the appropriate area as directed 3 (Three) Times a Day. Max units 21 per 24 hours, Disp: 20 mL, Rfl: 1    No problems with medications.    Allergies   Allergen Reactions    " "Adhesive Tape Other (See Comments)     Tears and bruises skin    Other Other (See Comments)     Bandaids make arm Red/slightly swollen       Review of Systems  GENERAL:  Inactive/slower with limits, speed, stamina, various joint issues and weakness. Sleep is poor; apnea likely.  No fever now/recent.  ENDO:  No syncope, near or diaphoretic sweaty spells.  BS still high; despite abbott no download. Misses insulin sometimes.   HEENT: No head injury or headache.   No vision change.   No significant hearing loss.  Ears without pain/drainage.  No sore throat.   Same/occ nasal/sinus congestion/drainage. No epistaxis.  CHEST: No chest wall tenderness or mass.  Occ cough, wheeze.   No SOB; no hemoptysis.  CV: No chest pain, palpitations, usual  On/off ankle edema.  GI: No heartburn, dysphagia.  No abdominal pain, diarrhea, constipation; occ fecal incontience.  No rectal bleeding; dark stools or melena.    :  Voids without dysuria; occ incontinence to completion.  ORTHO: No painful/swollen joints but various on /off sore.  No change occ sore neck or back.  No acute neck or back pain without recent injury.  NEURO: No dizziness; diffuse weakness of extremities.  No numbness/paresthesias.   PSYCH: Admitted memory loss.  Mood winter variable; occ mild anxious, depressed but/and not suicidal.  Tries to tolerate stress .   Screening:  Mammogram: [id number]-[id number]-[id number]/delayed for illness  Bone density: \"2021-22\" [facility]; continuing the prolia  12.11.18 [facility] hip -2.4; start prolia-this year-per [facility]  Low dose CT chest: Tobacco-smoker/never: NA  GI:   Colon-p//MM/3.13.23/1y  Prostate: NA  Usual lab order  6m CBC, CMP, A1c  12m CBC, CMP, A1c, LIPID, TSH, Vit D, uric acid    Copy/paste function used for ROS/exam AND each area of these were reviewed, updated, confirmed and supplemented as needed.  Data reviewed:   Recent admit/ER/MD visits: 6.29.23    1. Insulin dependent type 2 diabetes mellitus    2. Uncontrolled type 2 diabetes " mellitus with hyperglycemia    3. Mild dementia, unspecified dementia type, unspecified whether behavioral, psychotic, or mood disturbance or anxiety    4. Mixed hyperlipidemia    5. HTN (hypertension), benign    6. Excessive cerumen in both ear canals    7. Controlled type 2 diabetes mellitus with complication, without long-term current use of insulin    8. Gastroesophageal reflux disease, unspecified whether esophagitis present    9. Anemia, unspecified type    10. *Hx-iron deficiency    11. Primary generalized (osteo)arthritis    12. Gait difficulty    13. Multiple chronic diseases          Data review above:   Discussions/medical decisions/reviews:  BP ok  Other vitals ok  DM/A1c 8.1 6.22.23  DM/ 6.22.23; running better home  Lipid LDL 50; lipitor   PSA NA  CBC ok 6.22.23  Iron 57N 6.22.23  B12 448 6.22.23  Folate > 20 6.22.23  Renal ok 6.22.23  Liver ok 6.22.23  Vit D 46 4.18.23  Thyroid TSH ok 4.18.23     Screening reviewed/updated mammogram reordered  Fix 1m levemir  Pro/con newer [place] treatments; but recommendation to strongly consider working with [facility] as they have at least offered as a possible treatment and/or see Dr Paulson and see if she has ability to do same  Home ear wash kit suggested  Ear wash today  Dont suggest another knee surgery right now     Data review above:   Rx: reviewed and decisions:   Rx new/changes: update refill       New Medications Ordered This Visit   Medications    insulin detemir (LEVEMIR) 100 UNIT/ML injection       Sig: Inject 110 Units under the skin into the appropriate area as directed Every 12 (Twelve) Hours. Please provide 30 day supply unsure how many pens in a box, thank you       Dispense:  33 mL       Refill:  5       DX E11.9, Dx E11.65         Orders placed:   LAB/Testing/Referrals: reviewed/orders:   Today:       Orders Placed This Encounter   Procedures    Ambulatory Referral to Neurology       Last cardiac testing:   Echo:     Radiology considered:   No  "radiology results for the last 90 days.    Lab Results:  Results for orders placed or performed in visit on 09/22/23   POC Glycosylated Hemoglobin (Hb A1C)    Specimen: Blood   Result Value Ref Range    Hemoglobin A1C 9.0 %    Lot Number [id number]     Expiration Date 4,218,025        A1C:  Lab Results - Last 18 Months   Lab Units 09/22/23  0833 06/22/23  0922 04/18/23  0843 03/21/23  1322 12/19/22  1230 11/18/22  0955 10/13/22  1123   HEMOGLOBIN A1C % 9.0 8.10* 9.80* 10.50* 7.80* 11.30* 8.2*     GLUCOSE:  Lab Results - Last 18 Months   Lab Units 06/22/23  0922 04/18/23  0843 03/21/23  1322 01/24/23  1019 12/19/22  1230 12/11/22  1045 12/09/22  0644   GLUCOSE mg/dL 221* 244* 246* 497* 300* 226* 185*     LIPID:  Lab Results - Last 18 Months   Lab Units 04/18/23  0843 10/13/22  1123   CHOLESTEROL mg/dL 126  --    LDL CHOL mg/dL 50 35   HDL CHOL mg/dL 37* 48*   TRIGLYCERIDES mg/dL 244* 276*     PSA:No results found for: \"PSA\"    CBC:  Lab Results - Last 18 Months   Lab Units 06/22/23  0922 05/31/23  1432 04/18/23  0843 03/21/23  1322 02/10/23  0822 01/24/23  1019 01/04/23  1100 12/19/22  1230 12/07/22  2357 12/07/22  1843 12/07/22  1406 12/07/22  0840 12/06/22  1108 11/18/22  0955 10/13/22  1123   WBC 10*3/mm3 8.32  --  8.20 11.09* 6.21 6.66 5.44 4.42   < > 8.91  --   --  7.44   < > 9.40   HEMOGLOBIN g/dL 14.0  --  15.3 16.2* 13.1 12.7 11.6* 9.9*   < > 8.2* 8.1*  --  8.8*   < > 15.2   HEMATOCRIT % 42.6  --  44.9 49.3* 42.4 42.9 37.2 32.2*   < > 25.7* 24.2*  --  26.6*   < > 43.7   PLATELETS 10*3/mm3 285  --  290 380 281 336 358 270   < > 256  --   --  266   < > 301   IRON mcg/dL 57  --  100 150* 56 344* 37 75  --  39 88 67 180*  --  90   VITAMIN B 12 pg/mL 448 549 474  --   --   --   --   --   --  328  --   --   --   --   --    FOLATE ng/mL >20.00  --  >20.00  --   --   --   --   --   --  18.00  --   --   --   --   --     < > = values in this interval not displayed.     BMP/CMP:  Lab Results - Last 18 Months   Lab Units " "06/22/23  0922 04/18/23  0843 03/21/23  1322 02/08/23  1159 01/24/23  1019 12/19/22  1230 12/11/22  1045 12/09/22  0644 12/07/22  1843 12/06/22  1108 10/13/22  1122 06/17/22  0810   SODIUM mmol/L 141 140 134*  --  135* 133* 134* 133*   < > 136   < > 137   POTASSIUM mmol/L 4.5 4.5 4.3  --  5.0 3.8 3.4* 3.6   < > 3.9   < > 4.4   CHLORIDE mmol/L 101 97* 94*  --  93* 98 100 102   < > 96*   < > 100   CO2 mmol/L 26.8 31.4* 27.0  --  28.1 24.0 25.0 25.0   < > 25.7   < > 25.5   GLUCOSE mg/dL 221* 244* 246*  --  497* 300* 226* 185*   < > 235*   < > 153*   BUN mg/dL 18 17 22  --  11 8 6* 12   < > 32*   < > 13   CREATININE mg/dL 0.73 0.82 0.87 0.80 0.74 0.46* 0.36* 0.46*   < > 0.61   < > 0.63   EGFR RESULT mL/min/1.73 86.4 75.2 70.0  --  85.0  --   --   --   --  93.9  --  93.8   CALCIUM mg/dL 9.9 11.3* 9.9  --  9.4 8.6 8.1* 7.6*   < > 8.5*   < > 9.6   URIC ACID mg/dL  --  5.6  --   --   --   --   --   --   --   --   --   --     < > = values in this interval not displayed.       HEPATIC:  Lab Results - Last 18 Months   Lab Units 06/22/23 0922 04/18/23  0843 03/21/23  1322 12/19/22  1230 12/11/22  1045 12/07/22  1843 12/06/22  1108   ALT (SGPT) U/L 22 46* 37* 23 30 28 24   AST (SGOT) U/L 17 39* 35* 18 19 15 13   ALK PHOS U/L 113 87 110 98 83 88 68     HEPATITIS C ANTIBODY:   Lab Results   Component Value Date/Time    HEPCVIRUSABY <0.1 05/15/2017 08:52 AM     Vit D:  Lab Results - Last 18 Months   Lab Units 04/18/23  0843 10/13/22  1123 10/13/22  1122   VIT D 25 HYDROXY ng/ml 46.4 52.8 54.4     THYROID:  Lab Results - Last 18 Months   Lab Units 04/18/23  0843 10/13/22  1123   TSH uIU/mL 1.400 0.787       Objective   /84   Pulse 70   Temp 96.9 °F (36.1 °C) (Temporal)   Resp 18   Ht 160 cm (63\")   Wt 105 kg (232 lb)   SpO2 99%   BMI 41.10 kg/m²   Body mass index is 41.1 kg/m².    Recent Vitals         6/29/2023 9/22/2023 10/25/2023       BP: 138/82 128/76 126/84     Pulse: -- 77 70     Temp: -- 96.8 °F (36 °C) 96.9 °F " (36.1 °C)     Weight: -- 104 kg (229 lb) 105 kg (232 lb)     BMI (Calculated): -- 40.6 41.1           Wt Readings from Last 15 Encounters:   10/25/23 0946 105 kg (232 lb)   09/22/23 0813 104 kg (229 lb)   06/29/23 1445 106 kg (233 lb)   04/25/23 1432 103 kg (228 lb)   03/21/23 1306 101 kg (222 lb 3.2 oz)   02/16/23 1515 103 kg (227 lb)   01/26/23 0845 101 kg (223 lb)   02/08/23 1218 101 kg (222 lb 10.6 oz)   01/24/23 1027 101 kg (223 lb)   01/10/23 1305 101 kg (223 lb)   12/20/22 1202 95.7 kg (211 lb)   12/07/22 1505 95.3 kg (210 lb)   12/06/22 1113 95.3 kg (210 lb)   11/23/22 1105 95.3 kg (210 lb)   11/20/22 0400 97.8 kg (215 lb 11.2 oz)   11/18/22 1433 93.9 kg (207 lb)   11/18/22 0910 99.8 kg (220 lb)       Physical Exam  GENERAL:  Well nourished/developed in no acute distress. Obese.   SKIN: Turgor excellent, without wound, rash, lesion.   HEENT: Normal cephalic without trauma.  Pupils equal round reactive to light. Extraocular motions full without nystagmus.     Oral cavity without growths, exudates, and moist.  Posterior pharynx without mass, obstruction, redness.  No thyromegaly, mass, tenderness, lymphadenopathy and supple.   CV: Regular rhythm.  No murmur, gallop, trace equal LE edema. Posterior pulses intact.  No carotid bruits.  CHEST: No chest wall tenderness or mass.   LUNGS: Symmetric motion with clear to auscultation.    ABD: Soft, nontender without mass.   ORTHO: Symmetric extremities without swelling/point tenderness compression soreness R foot between 2nd/3rd. Full gross range of motion diffuse.   Walking without assistance.   NEURO: CN 2-12 grossly intact.  Symmetric facies and UE/LE. 3/5 strength throughout. 1/4 x bicep equal reflexes.  Nonfocal use extremities. Speech clear.    PSYCH: Oriented x 2-did not know date.  Pleasant calm, well kept.  Shallow but purposeful/directed conservation with intact short/long gross memory.    Assessment & Plan     1. Uncontrolled type 2 diabetes mellitus with  hyperglycemia    2. Hypercalcemia    3. Anemia, unspecified type    4. Depression, unspecified depression type    5. Osteoporosis, unspecified osteoporosis type, unspecified pathological fracture presence    6. Primary generalized (osteo)arthritis    7. Dementia, unspecified dementia severity, unspecified dementia type, unspecified whether behavioral, psychotic, or mood disturbance or anxiety    8. Gait difficulty    9. Multiple chronic diseases    10. Mixed hyperlipidemia    11. HTN (hypertension), benign    12. Encounter for screening mammogram for breast cancer    13. Menopause        Data review above:   Discussions/medical decisions/reviews:  BP ok  Other vitals ok  Recent Vitals         6/29/2023 9/22/2023 10/25/2023       BP: 138/82 128/76 126/84     Pulse: -- 77 70     Temp: -- 96.8 °F (36 °C) 96.9 °F (36.1 °C)     Weight: -- 104 kg (229 lb) 105 kg (232 lb)     BMI (Calculated): -- 40.6 41.1           DM/A1c 9.0 9.22.23  DM/ 6.22.23  Lipid LDL 50 4.18.23;   PSA NA  CBC ok 6.22.23  Iron 57N 6.22.23-none  B12 448 6.22.23-none  Folate > 20 6.22.23-none  Renal ok 6.22.23  Liver ok 6.22.23; better  Vit D 46 4.18.23  Thyroid TSH ok 4.18.23; none    Screening reviewed/updated decision  decision on mammogram/bone d-move forward [facility]  Vaccines discussed (see below) (suggest 30 days covid and 2-3w later RSV); then others  Eye exam due-advised  BS load librie on phone  More attention diet  Resume sliding scale  Start ozempic; start 0.25 weekly for 4 weeks (son will give)  Keep up with [facility]/neuro       Follow up: Return for lab/Dr Crowe .  Future Appointments   Date Time Provider Department Center   11/20/2023  8:00 AM NURSE/MA [facility]DIV[facility]   1/24/2024 10:30 AM Len Crowe MD [facility]       Data review above:   Rx: reviewed and decisions:   Rx new/changes: start ozempic; resume sliding scale  New Medications Ordered This Visit   Medications    Semaglutide,0.25 or 0.5MG/DOS,  "(Ozempic, 0.25 or 0.5 MG/DOSE,) 2 MG/1.5ML solution pen-injector     Sig: Inject 0.5 mg under the skin into the appropriate area as directed 1 (One) Time Per Week.     Dispense:  1.5 mL     Refill:  1       Orders placed:   LAB/Testing/Referrals: reviewed/orders:   Today:   Orders Placed This Encounter   Procedures    Mammo Screening Digital Tomosynthesis Bilateral With CAD    DEXA Bone Density Axial    Comprehensive Metabolic Panel    Hemoglobin A1c    T4, Free    TSH    CBC & Differential     Chronic/recurrent labs above or change to:   Same     Immunization History   Administered Date(s) Administered    COVID-19 (MODERNA) 1st,2nd,3rd Dose Monovalent 02/26/2021, 03/26/2021, 10/28/2021    COVID-19 (MODERNA) BIVALENT 12+YRS 02/25/2023    FLUAD TRI 65YR+ 10/17/2019    Fluad Quad 65+ 10/14/2020    Fluzone High Dose =>65 Years ([employer] ONLY) 10/01/2018    Fluzone High-Dose 65+yrs 10/13/2022    Fluzone Quad >6mos (Multi-dose) 10/28/2016, 10/30/2017    Influenza Quad Vaccine (Inpatient) 10/30/2017    Influenza, Unspecified 10/17/2019, 10/06/2023    Pneumococcal Conjugate 13-Valent (PCV13) 10/21/2015    Pneumococcal Polysaccharide (PPSV23) 10/30/2014    Shingrix 09/06/2019, 12/06/2019     We advised/reaffirmed our support/suggestion for staying complete with covid- covid boosters, seasonal flu/yearly and any missing vaccine from list we supplied; when cannot be given here we suggest contact with local health department office or pharmacy to review missing/needed vaccines and then bring nursing documentation for these vaccines to this office or call this information in. Shingles became \"free\" 1.1.23 for medicare insurance.    Health maintenance:   Body mass index is 41.1 kg/m².     Tobacco use reviewed:   Kezia Otoole  reports that she has never smoked. She has never used smokeless tobacco..    Patient Instructions   There are several vaccines used for individuals near/over 65 years old.      1. Tetanus.   Like " "anyone this needs to given every 10 years; sooner for/with lacerations/wounds.   Likely when getting this booster it needs to be a tetanus called Tdap (tetanus mixed with diptheria and pertussis).   Years ago you had this vaccine.  We now know we can lose our immunity to pertussis (a part of this vaccine) and run a risk of catching this.  Now only would this make us ill; but more importantly we can spread this to very young children (and for them it can be a much more dangerous illness).   We call this the grandparent vaccine for this reason.     2. Pneumonia.   This comes now as a one time vaccine for most persons.  Even if you have had these before; we need to review when and your current health situation/s as you may need a booster     3. Shingles.  You do not want to catch shingles.  Though you will recover from this; the pain associated with shingles can be severe.  Even if you have had the now older zostavax, or have had shingles; it is recommended you still get the Shingrix (the new just available early 2018 shingles vaccine).   Medicare began 1.1.23 waving any co-pays for this; it is therefore free.     4. Flu/influenza: Yearly flu vaccine given from September through April each year.  For those over 65 it should be \"high dose\" flu (to complement the possibility the immune system is alittle weaker)    5. RSV: If you are over 60; beginning 10.1.23 you can now take a RSV vaccination.  This would lower your change for catching this winter/\"cold\" virus that can under some circumstances cause significant respiratory symptoms and even require hospitalization.  Being vaccinated also makes it more difficult for you to be contagious and get this to children; particularly children less than 6 months of where this can be a very dangerous illness.      6. COVID: Since the early onset of COVID illness and a the many COVID vaccines that were initially developed; we will have a winter 2023 (and maybe beyond)  booster " particularly for those at higher risk of complications and over age 65.    7. Travel vaccines:  If you are one to do international travel; be sure and ask us for any particular unusual vaccines you may need.     8.  Miscellaneous:  If you have certain health situations/disease you may need specific/particular vaccines not give to the general public.     Your records we have on file:   Immunization History   Administered Date(s) Administered    COVID-19 (MODERNA) 1st,2nd,3rd Dose Monovalent 02/26/2021, 03/26/2021, 10/28/2021    COVID-19 (MODERNA) BIVALENT 12+YRS 02/25/2023    FLUAD TRI 65YR+ 10/17/2019    Fluad Quad 65+ 10/14/2020    Fluzone High Dose =>65 Years ([facility] ONLY) 10/01/2018    Fluzone High-Dose 65+yrs 10/13/2022    Fluzone Quad >6mos (Multi-dose) 10/28/2016, 10/30/2017    Influenza Quad Vaccine (Inpatient) 10/30/2017    Influenza, Unspecified 10/17/2019, 10/06/2023    Pneumococcal Conjugate 13-Valent (PCV13) 10/21/2015    Pneumococcal Polysaccharide (PPSV23) 10/30/2014    Shingrix 09/06/2019, 12/06/2019       You therefore could need the ones if we listed below:   Winter 2023 shots: latest/updated covid vaccine + 2023 flu/high dose flu (can/should be given same day)                                 2-3 weeks after then get the new RSV vaccine    After this; take a break and then work on getting those other vaccines still needed:   Tdap      Because of many restrictions on this office always having all the above vaccines; you may be advised to work with your local health department or various pharmacies to keep up with your individual vaccine needs.  If you are forced to get a vaccine outside this office in order to keep your health record up to date; we request you personally notify us after any vaccines of the type and date.

## 2023-11-01 NOTE — OUTREACH NOTE
Call Center TCM Note    Flowsheet Row Responses   [facility] patient discharged from? [place]   Does the patient have one of the following disease processes/diagnoses(primary or secondary)? COVID-19   COVID-19 underlying condition? None   TCM attempt successful? No   Unsuccessful attempts Attempt 3  [No updated verbal release on file from PCP group ]   Discharge diagnosis DKA (diabetic ketoacidosis), COVID 19, Hypertenstion, UTI          Mercedes Chapman RN    11/25/2022, 10:25 CST       No

## 2023-11-09 ENCOUNTER — TELEPHONE (OUTPATIENT)
Dept: FAMILY MEDICINE CLINIC | Facility: CLINIC | Age: 73
End: 2023-11-09

## 2023-11-09 ENCOUNTER — TELEPHONE (OUTPATIENT)
Dept: FAMILY MEDICINE CLINIC | Facility: CLINIC | Age: 73
End: 2023-11-09
Payer: MEDICARE

## 2023-11-09 RX ORDER — DULOXETIN HYDROCHLORIDE 60 MG/1
60 CAPSULE, DELAYED RELEASE ORAL DAILY
Qty: 30 CAPSULE | Refills: 5 | Status: SHIPPED | OUTPATIENT
Start: 2023-11-09

## 2023-11-09 NOTE — TELEPHONE ENCOUNTER
I have sent in Cymbalta 60 mg.  Needs to take 2 of the 20 mg = 40 mg for a few doses before goes up to 60.-Give this a month and if no better needs to follow-up.    Electronically signed by FABBY Sanchez, 11/09/23, 1:58 PM CST.

## 2023-11-09 NOTE — TELEPHONE ENCOUNTER
Caller: Herminio Toro    Relationship to patient: Emergency Contact    Best call back number: [phone]    Patient is needing: STATES THAT INSURANCE/PHARMACY DENIED PATIENT FOR OZEMPIC, MAY NEED A PA   no

## 2023-11-09 NOTE — TELEPHONE ENCOUNTER
Caller: Herminio Toro    Relationship: Emergency Contact    Best call back number: [phone]    What is the best time to reach you: ANYTIME    Who are you requesting to speak with (clinical staff, provider,  specific staff member): CLINICNAL    What was the call regarding: WOULD LIKE TO LOOK INTO UPPING ANTI-DEPRESSANT MEDICATIONS, STATES SHE'S NOT BEEN GOING OUT RECENTLY, IN A SLUMP MOOD, AMONGST OTHER THINGS.

## 2023-11-10 ENCOUNTER — TELEPHONE (OUTPATIENT)
Dept: FAMILY MEDICINE CLINIC | Facility: CLINIC | Age: 73
End: 2023-11-10
Payer: MEDICARE

## 2023-11-10 RX ORDER — DULOXETIN HYDROCHLORIDE 20 MG/1
CAPSULE, DELAYED RELEASE ORAL
Qty: 14 CAPSULE | Refills: 0 | Status: SHIPPED | OUTPATIENT
Start: 2023-11-10

## 2023-11-10 NOTE — TELEPHONE ENCOUNTER
Caller: Herminio Toro    Relationship: Emergency Contact    Best call back number: [phone]     What medication are you requesting: DULoxetine (CYMBALTA) 40  MG capsule       If a prescription is needed, what is your preferred pharmacy and phone number: [facility] - [address] [phone] [facility] [phone]      Additional notes: PATIENT'S SON STATED THAT [facility] TOLD HER TO TAKE 40MG OF MEDICATION FOR A WEEK THEN MOVE UP TO 60MG, PATIENT HAD SOME 20MG THAT SHE WAS DOUBLING BUT ISN'T GOING TO HAVE ENOUGH TO LAST THE TIME HE WANTS HER TO TAKE IT SO THEY ARE REQUESTING A WEEK OF THE 40MG TO BE CALLED IN TO THE PHARMACY.

## 2023-11-14 NOTE — TELEPHONE ENCOUNTER
PA has been sent and attempted to call patient to let her know this but unable to reach or lvm for patient

## 2023-11-15 RX ORDER — PANTOPRAZOLE SODIUM 40 MG/1
TABLET, DELAYED RELEASE ORAL
Qty: 30 TABLET | Refills: 2 | Status: SHIPPED | OUTPATIENT
Start: 2023-11-15

## 2023-11-20 ENCOUNTER — CLINICAL SUPPORT (OUTPATIENT)
Dept: FAMILY MEDICINE CLINIC | Facility: CLINIC | Age: 73
End: 2023-11-20
Payer: MEDICARE

## 2023-11-20 DIAGNOSIS — E11.8 CONTROLLED TYPE 2 DIABETES MELLITUS WITH COMPLICATION, WITHOUT LONG-TERM CURRENT USE OF INSULIN: Primary | Chronic | ICD-10-CM

## 2023-11-20 LAB
EXPIRATION DATE: ABNORMAL
EXPIRATION DATE: ABNORMAL
HBA1C MFR BLD: 8.1 % (ref 4.5–5.7)
HGB BLDA-MCNC: 8.1 G/DL (ref 12–17)
Lab: ABNORMAL
Lab: ABNORMAL

## 2023-11-21 NOTE — PROGRESS NOTES
Attempted to call patient unable to lvm, patient to call back on labs, SIMEON says A1C is better and to keep up the good work

## 2023-11-24 DIAGNOSIS — E11.10 DIABETIC KETOACIDOSIS WITHOUT COMA ASSOCIATED WITH TYPE 2 DIABETES MELLITUS: ICD-10-CM

## 2023-11-24 DIAGNOSIS — Z79.4 INSULIN DEPENDENT TYPE 2 DIABETES MELLITUS: ICD-10-CM

## 2023-11-24 DIAGNOSIS — E11.9 INSULIN DEPENDENT TYPE 2 DIABETES MELLITUS: ICD-10-CM

## 2023-12-01 ENCOUNTER — PRE-ADMISSION TESTING (OUTPATIENT)
Dept: PREADMISSION TESTING | Facility: HOSPITAL | Age: 73
End: 2023-12-01
Payer: MEDICARE

## 2023-12-01 VITALS
WEIGHT: 232.14 LBS | RESPIRATION RATE: 18 BRPM | OXYGEN SATURATION: 95 % | HEIGHT: 62 IN | HEART RATE: 77 BPM | DIASTOLIC BLOOD PRESSURE: 72 MMHG | BODY MASS INDEX: 42.72 KG/M2 | SYSTOLIC BLOOD PRESSURE: 155 MMHG

## 2023-12-01 LAB
ANION GAP SERPL CALCULATED.3IONS-SCNC: 8 MMOL/L (ref 5–15)
BUN SERPL-MCNC: 17 MG/DL (ref 8–23)
BUN/CREAT SERPL: 23.6 (ref 7–25)
CALCIUM SPEC-SCNC: 9.8 MG/DL (ref 8.6–10.5)
CHLORIDE SERPL-SCNC: 99 MMOL/L (ref 98–107)
CO2 SERPL-SCNC: 30 MMOL/L (ref 22–29)
CREAT SERPL-MCNC: 0.72 MG/DL (ref 0.57–1)
DEPRECATED RDW RBC AUTO: 45.3 FL (ref 37–54)
EGFRCR SERPLBLD CKD-EPI 2021: 87.3 ML/MIN/1.73
ERYTHROCYTE [DISTWIDTH] IN BLOOD BY AUTOMATED COUNT: 14.3 % (ref 12.3–15.4)
GLUCOSE SERPL-MCNC: 121 MG/DL (ref 65–99)
HBA1C MFR BLD: 8.3 % (ref 4.8–5.6)
HCT VFR BLD AUTO: 47.1 % (ref 34–46.6)
HGB BLD-MCNC: 14.6 G/DL (ref 12–15.9)
MCH RBC QN AUTO: 26.8 PG (ref 26.6–33)
MCHC RBC AUTO-ENTMCNC: 31 G/DL (ref 31.5–35.7)
MCV RBC AUTO: 86.4 FL (ref 79–97)
PLATELET # BLD AUTO: 266 10*3/MM3 (ref 140–450)
PMV BLD AUTO: 9.1 FL (ref 6–12)
POTASSIUM SERPL-SCNC: 4.2 MMOL/L (ref 3.5–5.2)
QT INTERVAL: 390 MS
QTC INTERVAL: 444 MS
RBC # BLD AUTO: 5.45 10*6/MM3 (ref 3.77–5.28)
SODIUM SERPL-SCNC: 137 MMOL/L (ref 136–145)
WBC NRBC COR # BLD AUTO: 6.78 10*3/MM3 (ref 3.4–10.8)

## 2023-12-01 PROCEDURE — 80048 BASIC METABOLIC PNL TOTAL CA: CPT

## 2023-12-01 PROCEDURE — 83036 HEMOGLOBIN GLYCOSYLATED A1C: CPT

## 2023-12-01 PROCEDURE — 87081 CULTURE SCREEN ONLY: CPT

## 2023-12-01 PROCEDURE — 36415 COLL VENOUS BLD VENIPUNCTURE: CPT

## 2023-12-01 PROCEDURE — 85027 COMPLETE CBC AUTOMATED: CPT

## 2023-12-01 PROCEDURE — 93005 ELECTROCARDIOGRAM TRACING: CPT

## 2023-12-01 NOTE — DISCHARGE INSTRUCTIONS
How to Use Chlorhexidine Before Surgery  Chlorhexidine gluconate (CHG) is a germ-killing (antiseptic) solution that is used to clean the skin. It can get rid of the bacteria that normally live on the skin and can keep them away for about 24 hours. To clean your skin with CHG, you may be given:  A CHG solution to use in the shower or as part of a sponge bath.  A prepackaged cloth that contains CHG.  Cleaning your skin with CHG may help lower the risk for infection:  While you are staying in the intensive care unit of the hospital.  If you have a vascular access, such as a central line, to provide short-term or long-term access to your veins.  If you have a catheter to drain urine from your bladder.  If you are on a ventilator. A ventilator is a machine that helps you breathe by moving air in and out of your lungs.  After surgery.  What are the risks?  Risks of using CHG include:  A skin reaction.  Hearing loss, if CHG gets in your ears and you have a perforated eardrum.  Eye injury, if CHG gets in your eyes and is not rinsed out.  The CHG product catching fire.  Make sure that you avoid smoking and flames after applying CHG to your skin.  Do not use CHG:  If you have a chlorhexidine allergy or have previously reacted to chlorhexidine.  On babies younger than 2 months of age.  How to use CHG solution  Use CHG only as told by your health care provider, and follow the instructions on the label.  Use the full amount of CHG as directed. Usually, this is one bottle.  During a shower    Follow these steps when using CHG solution during a shower (unless your health care provider gives you different instructions):  Start the shower.  Use your normal soap and shampoo to wash your face and hair.  Turn off the shower or move out of the shower stream.  Pour the CHG onto a clean washcloth. Do not use any type of brush or rough-edged sponge.  Starting at your neck, lather your body down to your toes. Make sure you follow these  instructions:  If you will be having surgery, pay special attention to the part of your body where you will be having surgery. Scrub this area for at least 1 minute.  Do not use CHG on your head or face. If the solution gets into your ears or eyes, rinse them well with water.  Avoid your genital area.  Avoid any areas of skin that have broken skin, cuts, or scrapes.  Scrub your back and under your arms. Make sure to wash skin folds.  Let the lather sit on your skin for 1-2 minutes or as long as told by your health care provider.  Thoroughly rinse your entire body in the shower. Make sure that all body creases and crevices are rinsed well.  Dry off with a clean towel. Do not put any substances on your body afterward--such as powder, lotion, or perfume--unless you are told to do so by your health care provider. Only use lotions that are recommended by the .  Put on clean clothes or pajamas.  If it is the night before your surgery, sleep in clean sheets.     During a sponge bath  Follow these steps when using CHG solution during a sponge bath (unless your health care provider gives you different instructions):  Use your normal soap and shampoo to wash your face and hair.  Pour the CHG onto a clean washcloth.  Starting at your neck, lather your body down to your toes. Make sure you follow these instructions:  If you will be having surgery, pay special attention to the part of your body where you will be having surgery. Scrub this area for at least 1 minute.  Do not use CHG on your head or face. If the solution gets into your ears or eyes, rinse them well with water.  Avoid your genital area.  Avoid any areas of skin that have broken skin, cuts, or scrapes.  Scrub your back and under your arms. Make sure to wash skin folds.  Let the lather sit on your skin for 1-2 minutes or as long as told by your health care provider.  Using a different clean, wet washcloth, thoroughly rinse your entire body. Make sure that  all body creases and crevices are rinsed well.  Dry off with a clean towel. Do not put any substances on your body afterward--such as powder, lotion, or perfume--unless you are told to do so by your health care provider. Only use lotions that are recommended by the .  Put on clean clothes or pajamas.  If it is the night before your surgery, sleep in clean sheets.  How to use CHG prepackaged cloths  Only use CHG cloths as told by your health care provider, and follow the instructions on the label.  Use the CHG cloth on clean, dry skin.  Do not use the CHG cloth on your head or face unless your health care provider tells you to.  When washing with the CHG cloth:  Avoid your genital area.  Avoid any areas of skin that have broken skin, cuts, or scrapes.  Before surgery    Follow these steps when using a CHG cloth to clean before surgery (unless your health care provider gives you different instructions):  Using the CHG cloth, vigorously scrub the part of your body where you will be having surgery. Scrub using a back-and-forth motion for 3 minutes. The area on your body should be completely wet with CHG when you are done scrubbing.  Do not rinse. Discard the cloth and let the area air-dry. Do not put any substances on the area afterward, such as powder, lotion, or perfume.  Put on clean clothes or pajamas.  If it is the night before your surgery, sleep in clean sheets.     For general bathing  Follow these steps when using CHG cloths for general bathing (unless your health care provider gives you different instructions).  Use a separate CHG cloth for each area of your body. Make sure you wash between any folds of skin and between your fingers and toes. Wash your body in the following order, switching to a new cloth after each step:  The front of your neck, shoulders, and chest.  Both of your arms, under your arms, and your hands.  Your stomach and groin area, avoiding the genitals.  Your right leg and  foot.  Your left leg and foot.  The back of your neck, your back, and your buttocks.  Do not rinse. Discard the cloth and let the area air-dry. Do not put any substances on your body afterward--such as powder, lotion, or perfume--unless you are told to do so by your health care provider. Only use lotions that are recommended by the .  Put on clean clothes or pajamas.  Contact a health care provider if:  Your skin gets irritated after scrubbing.  You have questions about using your solution or cloth.  You swallow any chlorhexidine. Call your local poison control center (1-282.456.9322 in the [place]  Get help right away if:  Your eyes itch badly, or they become very red or swollen.  Your skin itches badly and is red or swollen.  Your hearing changes.  You have trouble seeing.  You have swelling or tingling in your mouth or throat.  You have trouble breathing.  These symptoms may represent a serious problem that is an emergency. Do not wait to see if the symptoms will go away. Get medical help right away. Call your local emergency services (755 in the [place] Do not drive yourself to the hospital.  Summary  Chlorhexidine gluconate (CHG) is a germ-killing (antiseptic) solution that is used to clean the skin. Cleaning your skin with CHG may help to lower your risk for infection.  You may be given CHG to use for bathing. It may be in a bottle or in a prepackaged cloth to use on your skin. Carefully follow your health care provider's instructions and the instructions on the product label.  Do not use CHG if you have a chlorhexidine allergy.  Contact your health care provider if your skin gets irritated after scrubbing.  This information is not intended to replace advice given to you by your health care provider. Make sure you discuss any questions you have with your health care provider.  Document Revised: 04/17/2023 Document Reviewed: 02/28/2022  Elsevier Patient Education © 2023 [employer] Inc[employer]      Before you  come to the hospital        Arrival time: AS DIRECTED BY OFFICE     YOU MAY TAKE THE FOLLOWING MEDICATION(S) THE MORNING OF SURGERY WITH A SIP OF WATER: ***           ALL OTHER HOME MEDICATION CHECK WITH YOUR PHYSICIAN (especially if   you are taking diabetes medicines or blood thinners)    Do not take any Erectile Dysfunction medications (EX: CIALIS, VIAGRA) 24 hours prior to surgery.      If you were given and instructed to use a germ- killing soap, use as directed the night before surgery and again the morning of surgery or as directed by your surgeon. (Use one-half of the bottle with each shower.)   See attached information for How to Use Chlorhexidine for Bathing if applicable.            Eating and drinking restrictions prior to scheduled arrival time    2 Hours before arrival time STOP   Drinking Clear liquids (water, black coffee-NO CREAM,  apple juice-no pulp)    Clear Liquids    Water and flavored water                                                                      Clear Fruit juices, such as cranberry juice and apple juice.  Black coffee (NO cream of any kind, including powdered).  Plain tea  Clear bouillon or broth.  Flavored gelatin.  Soda.  Gatorade or Powerade.    8 Hours before arrival time STOP   All food, full liquids, and dairy products  Full liquid examples  Juices that have pulp.  Frozen ice pops that contain fruit pieces.  Coffee with creamer  Milk.  Yogurt.    (It is extremely important that you follow these guidelines to prevent delay or cancelation of your procedure)                       MANAGING PAIN AFTER SURGERY    We know you are probably wondering what your pain will be like after surgery.  Following surgery it is unrealistic to expect you will not have pain.   Pain is how our bodies let us know that something is wrong or cautions us to be careful.  That said, our goal is to make your pain tolerable.    Methods we may use to treat your pain include (oral or IV medications, PCAs,  epidurals, nerve blocks, etc.)   While some procedures require IV pain medications for a short time after surgery, transitioning to pain medications by mouth allows for better management of pain.   Your nurse will encourage you to take oral pain medications whenever possible.  IV medications work almost immediately, but only last a short while.  Taking medications by mouth allows for a more constant level of medication in your blood stream for a longer period of time.      Once your pain is out of control it is harder to get back under control.  It is important you are aware when your next dose of pain medication is due.  If you are admitted, your nurse may write the time of your next dose on the white board in your room to help you remember.      We are interested in your pain and encourage you to inform us about aggravating factors during your visit.   Many times a simple repositioning every few hours can make a big difference.    If your physician says it is okay, do not let your pain prevent you from getting out of bed. Be sure to call your nurse for assistance prior to getting up so you do not fall.      Before surgery, please decide your tolerable pain goal.  These faces help describe the pain ratings we use on a 0-10 scale.   Be prepared to tell us your goal and whether or not you take pain or anxiety medications at home.          Preparing for Surgery  Preparing for surgery is an important part of your care. It can make things go more smoothly and help you avoid complications. The steps leading up to surgery may vary among hospitals. Follow all instructions given to you by your health care providers. Ask questions if you do not understand something. Talk about any concerns that you have.  Here are some questions to consider asking before your surgery:  If my surgery is not an emergency (is elective), when would be the best time to have the surgery?  What arrangements do I need to make for work, home, or  school?  What will my recovery be like? How long will it be before I can return to normal activities?  Will I need to prepare my home? Will I need to arrange care for me or my children?  Should I expect to have pain after surgery? What are my pain management options? Are there nonmedical options that I can try for pain?  Tell a health care provider about:  Any allergies you have.  All medicines you are taking, including vitamins, herbs, eye drops, creams, and over-the-counter medicines.  Any problems you or family members have had with anesthetic medicines.  Any blood disorders you have.  Any surgeries you have had.  Any medical conditions you have.  Whether you are pregnant or may be pregnant.  What are the risks?  The risks and complications of surgery depend on the specific procedure that you have. Discuss all the risks with your health care providers before your surgery. Ask about common surgical complications, which may include:  Infection.  Bleeding or a need for blood replacement (transfusion).  Allergic reactions to medicines.  Damage to surrounding nerves, tissues, or structures.  A blood clot.  Scarring.  Failure of the surgery to correct the problem.  Follow these instructions before the procedure:  Several days or weeks before your procedure  You may have a physical exam by your primary health care provider to make sure it is safe for you to have surgery.  You may have testing. This may include a chest X-ray, blood and urine tests, electrocardiogram (ECG), or other testing.  Ask your health care provider about:  Changing or stopping your regular medicines. This is especially important if you are taking diabetes medicines or blood thinners.  Taking medicines such as aspirin and ibuprofen. These medicines can thin your blood. Do not take these medicines unless your health care provider tells you to take them.  Taking over-the-counter medicines, vitamins, herbs, and supplements.  Do not use any products  that contain nicotine or tobacco, such as cigarettes and e-cigarettes. If you need help quitting, ask your health care provider.  Avoid alcohol.  Ask your health care provider if there are exercises you can do to prepare for surgery.  Eat a healthy diet.   Plan to have someone 18 years of age or older to take you home from the hospital. We will need to verify your ride on the morning of surgery if you are being discharged home on the same day. Tell your ride to be expecting a call from the hospital prior to your procedure.   Plan to have a responsible adult care for you for at least 24 hours after you leave the hospital or clinic. This is important.  The day before your procedure  You may be given antibiotic medicine to take by mouth to help prevent infection. Take it as told by your health care provider.  You may be asked to shower with a germ-killing soap.  Follow instructions from your health care provider about eating and drinking restrictions. This includes gum, mints and hard candy.  Pack comfortable clothes according to your procedure.   The day of your procedure  You may need to take another shower with a germ-killing soap before you leave home in the morning.  With a small sip of water, take only the medicines that you are told to take.  Remove all jewelry including rings.   Leave anything you consider valuable at home except hearing aids if needed.  You do not need to bring your home medications into the hospital.   Do not wear any makeup, nail polish, powder, deodorant, lotion, hair accessories, or anything on your skin or body except your clothes.  If you will be staying in the hospital, bring a case to hold your glasses, contacts, or dentures. You may also want to bring your robe and non-skid footwear.       (Do not use denture adhesives since you will be asked to remove them during  surgery).   If you wear oxygen at home, bring it with you the day of surgery.  If instructed by your health care  provider, bring your sleep apnea device with you on the day of your surgery (if this applies to you).  You may want to leave your suitcase and sleep apnea device in the car until after surgery.   Arrive at the hospital as scheduled.  Bring a friend or family member with you who can help to answer questions and be present while you meet with your health care provider.  At the hospital  When you arrive at the hospital:  Go to registration located at the main entrance of the hospital. You will be registered and given a beeper and a sticker sheet. Take the stickers to the Outpatient nurses desk and place in the black tray. This is to notify staff that you have arrived. Then return to the lobby to wait.   When your beeper lights up and vibrates proceed through the double doors, under the stairs, and a member of the Outpatient Surgery staff will escort you to your preoperative room.  You may have to wear compression sleeves. These help to prevent blood clots and reduce swelling in your legs.  An IV may be inserted into one of your veins.              In the operating room, you may be given one or more of the following:        A medicine to help you relax (sedative).        A medicine to numb the area (local anesthetic).        A medicine to make you fall asleep (general anesthetic).        A medicine that is injected into an area of your body to numb everything below the                      injection site (regional anesthetic).  You may be given an antibiotic through your IV to help prevent infection.  Your surgical site will be marked or identified.    Contact a health care provider if you:  Develop a fever of more than 100.4°F (38°C) or other feelings of illness during the 48 hours before your surgery.  Have symptoms that get worse.  Have questions or concerns about your surgery.  Summary  Preparing for surgery can make the procedure go more smoothly and lower your risk of complications.  Before surgery, make a list of  questions and concerns to discuss with your surgeon. Ask about the risks and possible complications.  In the days or weeks before your surgery, follow all instructions from your health care provider. You may need to stop smoking, avoid alcohol, follow eating restrictions, and change or stop your regular medicines.  Contact your surgeon if you develop a fever or other signs of illness during the few days before your surgery.  This information is not intended to replace advice given to you by your health care provider. Make sure you discuss any questions you have with your health care provider.  Document Revised: 12/21/2018 Document Reviewed: 10/23/2018  Elsevier Patient Education © 2021 [employer] Inc.

## 2023-12-02 LAB — MRSA SPEC QL CULT: NORMAL

## 2023-12-05 LAB
QT INTERVAL: 390 MS
QTC INTERVAL: 444 MS

## 2023-12-08 ENCOUNTER — HOSPITAL ENCOUNTER (OUTPATIENT)
Facility: HOSPITAL | Age: 73
Discharge: HOME HEALTH | End: 2023-12-11
Attending: ORTHOPAEDIC SURGERY | Admitting: ORTHOPAEDIC SURGERY
Payer: MEDICARE

## 2023-12-08 ENCOUNTER — ANESTHESIA EVENT (OUTPATIENT)
Dept: PERIOP | Facility: HOSPITAL | Age: 73
End: 2023-12-08
Payer: MEDICARE

## 2023-12-08 ENCOUNTER — ANESTHESIA (OUTPATIENT)
Dept: PERIOP | Facility: HOSPITAL | Age: 73
End: 2023-12-08
Payer: MEDICARE

## 2023-12-08 DIAGNOSIS — M17.12 PRIMARY OSTEOARTHRITIS OF LEFT KNEE: ICD-10-CM

## 2023-12-08 DIAGNOSIS — Z74.09 IMPAIRED MOBILITY: Primary | ICD-10-CM

## 2023-12-08 LAB
GLUCOSE BLDC GLUCOMTR-MCNC: 177 MG/DL (ref 70–130)
GLUCOSE BLDC GLUCOMTR-MCNC: 203 MG/DL (ref 70–130)
GLUCOSE BLDC GLUCOMTR-MCNC: 260 MG/DL (ref 70–130)

## 2023-12-08 PROCEDURE — 25010000002 HYDROMORPHONE PER 4 MG: Performed by: ANESTHESIOLOGY

## 2023-12-08 PROCEDURE — C1776 JOINT DEVICE (IMPLANTABLE): HCPCS | Performed by: ORTHOPAEDIC SURGERY

## 2023-12-08 PROCEDURE — 63710000001 CALCIUM CARB-CHOLECALCIFEROL 600-20 MG-MCG TABLET: Performed by: STUDENT IN AN ORGANIZED HEALTH CARE EDUCATION/TRAINING PROGRAM

## 2023-12-08 PROCEDURE — 25010000002 ONDANSETRON PER 1 MG: Performed by: NURSE ANESTHETIST, CERTIFIED REGISTERED

## 2023-12-08 PROCEDURE — A9270 NON-COVERED ITEM OR SERVICE: HCPCS | Performed by: STUDENT IN AN ORGANIZED HEALTH CARE EDUCATION/TRAINING PROGRAM

## 2023-12-08 PROCEDURE — 82948 REAGENT STRIP/BLOOD GLUCOSE: CPT

## 2023-12-08 PROCEDURE — 25010000002 CEFAZOLIN PER 500 MG: Performed by: NURSE ANESTHETIST, CERTIFIED REGISTERED

## 2023-12-08 PROCEDURE — 25010000002 SUGAMMADEX 200 MG/2ML SOLUTION: Performed by: NURSE ANESTHETIST, CERTIFIED REGISTERED

## 2023-12-08 PROCEDURE — 63710000001 EMPAGLIFLOZIN 25 MG TABLET: Performed by: STUDENT IN AN ORGANIZED HEALTH CARE EDUCATION/TRAINING PROGRAM

## 2023-12-08 PROCEDURE — 63710000001 VITAMIN D3 125 MCG (5000 UT) CAPSULE: Performed by: STUDENT IN AN ORGANIZED HEALTH CARE EDUCATION/TRAINING PROGRAM

## 2023-12-08 PROCEDURE — 25010000002 PROPOFOL 10 MG/ML EMULSION: Performed by: NURSE ANESTHETIST, CERTIFIED REGISTERED

## 2023-12-08 PROCEDURE — 25010000002 CEFAZOLIN PER 500 MG: Performed by: ORTHOPAEDIC SURGERY

## 2023-12-08 PROCEDURE — 25010000002 ROPIVACAINE PER 1 MG: Performed by: ORTHOPAEDIC SURGERY

## 2023-12-08 PROCEDURE — 63710000001 ATORVASTATIN 10 MG TABLET: Performed by: STUDENT IN AN ORGANIZED HEALTH CARE EDUCATION/TRAINING PROGRAM

## 2023-12-08 PROCEDURE — 25010000002 VANCOMYCIN 1 G RECONSTITUTED SOLUTION: Performed by: ORTHOPAEDIC SURGERY

## 2023-12-08 PROCEDURE — A9270 NON-COVERED ITEM OR SERVICE: HCPCS | Performed by: ORTHOPAEDIC SURGERY

## 2023-12-08 PROCEDURE — 63710000001 INSULIN LISPRO (HUMAN) PER 5 UNITS: Performed by: STUDENT IN AN ORGANIZED HEALTH CARE EDUCATION/TRAINING PROGRAM

## 2023-12-08 PROCEDURE — 63710000001 INSULIN DETEMIR PER 5 UNITS: Performed by: STUDENT IN AN ORGANIZED HEALTH CARE EDUCATION/TRAINING PROGRAM

## 2023-12-08 PROCEDURE — 25010000002 FENTANYL CITRATE (PF) 100 MCG/2ML SOLUTION: Performed by: NURSE ANESTHETIST, CERTIFIED REGISTERED

## 2023-12-08 PROCEDURE — 63710000001 MELOXICAM 7.5 MG TABLET: Performed by: ORTHOPAEDIC SURGERY

## 2023-12-08 PROCEDURE — 63710000001 OXYCODONE 5 MG TABLET: Performed by: ORTHOPAEDIC SURGERY

## 2023-12-08 PROCEDURE — 97165 OT EVAL LOW COMPLEX 30 MIN: CPT | Performed by: OCCUPATIONAL THERAPIST

## 2023-12-08 PROCEDURE — 97162 PT EVAL MOD COMPLEX 30 MIN: CPT

## 2023-12-08 PROCEDURE — C1713 ANCHOR/SCREW BN/BN,TIS/BN: HCPCS | Performed by: ORTHOPAEDIC SURGERY

## 2023-12-08 PROCEDURE — 63710000001 PANTOPRAZOLE 40 MG TABLET DELAYED-RELEASE: Performed by: STUDENT IN AN ORGANIZED HEALTH CARE EDUCATION/TRAINING PROGRAM

## 2023-12-08 PROCEDURE — 63710000001 MONTELUKAST 10 MG TABLET: Performed by: STUDENT IN AN ORGANIZED HEALTH CARE EDUCATION/TRAINING PROGRAM

## 2023-12-08 PROCEDURE — 25810000003 LACTATED RINGERS PER 1000 ML: Performed by: ORTHOPAEDIC SURGERY

## 2023-12-08 PROCEDURE — 25010000002 FENTANYL CITRATE (PF) 50 MCG/ML SOLUTION: Performed by: ANESTHESIOLOGY

## 2023-12-08 PROCEDURE — 25010000002 ROPIVACAINE PER 1 MG: Performed by: ANESTHESIOLOGY

## 2023-12-08 PROCEDURE — 63710000001 DULOXETINE 30 MG CAPSULE DELAYED-RELEASE PARTICLES: Performed by: STUDENT IN AN ORGANIZED HEALTH CARE EDUCATION/TRAINING PROGRAM

## 2023-12-08 PROCEDURE — 25010000002 DEXAMETHASONE PER 1 MG: Performed by: ANESTHESIOLOGY

## 2023-12-08 DEVICE — IMPLANTABLE DEVICE: Type: IMPLANTABLE DEVICE | Site: PATELLA | Status: FUNCTIONAL

## 2023-12-08 DEVICE — STEM TIB/KN PERSONA CMT 5D SZC LT: Type: IMPLANTABLE DEVICE | Site: PATELLA | Status: FUNCTIONAL

## 2023-12-08 DEVICE — CMT BONE R 1X40: Type: IMPLANTABLE DEVICE | Site: PATELLA | Status: FUNCTIONAL

## 2023-12-08 DEVICE — CAP EXT STEM KN UPCHRG: Type: IMPLANTABLE DEVICE | Site: PATELLA | Status: FUNCTIONAL

## 2023-12-08 DEVICE — CAP TOTL KN CMT PRIMARY: Type: IMPLANTABLE DEVICE | Status: FUNCTIONAL

## 2023-12-08 DEVICE — EXT STEM FEM/KN PERSONA TPR 14XPLS30MM: Type: IMPLANTABLE DEVICE | Site: PATELLA | Status: FUNCTIONAL

## 2023-12-08 DEVICE — CAP KNEE CPS UPCHRG: Type: IMPLANTABLE DEVICE | Site: PATELLA | Status: FUNCTIONAL

## 2023-12-08 RX ORDER — SODIUM CHLORIDE 0.9 % (FLUSH) 0.9 %
3 SYRINGE (ML) INJECTION AS NEEDED
Status: DISCONTINUED | OUTPATIENT
Start: 2023-12-08 | End: 2023-12-08 | Stop reason: HOSPADM

## 2023-12-08 RX ORDER — ATORVASTATIN CALCIUM 20 MG/1
20 TABLET, FILM COATED ORAL DAILY
COMMUNITY

## 2023-12-08 RX ORDER — DEXTROSE MONOHYDRATE 25 G/50ML
25 INJECTION, SOLUTION INTRAVENOUS
Status: DISCONTINUED | OUTPATIENT
Start: 2023-12-08 | End: 2023-12-11 | Stop reason: HOSPADM

## 2023-12-08 RX ORDER — IRBESARTAN 300 MG/1
300 TABLET ORAL DAILY
COMMUNITY

## 2023-12-08 RX ORDER — FENTANYL CITRATE 50 UG/ML
50 INJECTION, SOLUTION INTRAMUSCULAR; INTRAVENOUS ONCE
Status: COMPLETED | OUTPATIENT
Start: 2023-12-08 | End: 2023-12-08

## 2023-12-08 RX ORDER — GINGER ROOT/GINGER ROOT EXT 262.5 MG
1 CAPSULE ORAL DAILY
Status: DISCONTINUED | OUTPATIENT
Start: 2023-12-08 | End: 2023-12-11 | Stop reason: HOSPADM

## 2023-12-08 RX ORDER — MELOXICAM 7.5 MG/1
15 TABLET ORAL DAILY
Status: DISCONTINUED | OUTPATIENT
Start: 2023-12-08 | End: 2023-12-11 | Stop reason: HOSPADM

## 2023-12-08 RX ORDER — ONDANSETRON 4 MG/1
4 TABLET, FILM COATED ORAL EVERY 6 HOURS PRN
Status: DISCONTINUED | OUTPATIENT
Start: 2023-12-08 | End: 2023-12-11 | Stop reason: HOSPADM

## 2023-12-08 RX ORDER — PANTOPRAZOLE SODIUM 40 MG/1
40 TABLET, DELAYED RELEASE ORAL DAILY
COMMUNITY

## 2023-12-08 RX ORDER — IBUPROFEN 600 MG/1
600 TABLET ORAL ONCE AS NEEDED
Status: DISCONTINUED | OUTPATIENT
Start: 2023-12-08 | End: 2023-12-08 | Stop reason: HOSPADM

## 2023-12-08 RX ORDER — HYDROMORPHONE HYDROCHLORIDE 1 MG/ML
0.5 INJECTION, SOLUTION INTRAMUSCULAR; INTRAVENOUS; SUBCUTANEOUS
Status: DISCONTINUED | OUTPATIENT
Start: 2023-12-08 | End: 2023-12-08 | Stop reason: HOSPADM

## 2023-12-08 RX ORDER — DULOXETIN HYDROCHLORIDE 30 MG/1
60 CAPSULE, DELAYED RELEASE ORAL DAILY
Status: DISCONTINUED | OUTPATIENT
Start: 2023-12-08 | End: 2023-12-11 | Stop reason: HOSPADM

## 2023-12-08 RX ORDER — ONDANSETRON 2 MG/ML
INJECTION INTRAMUSCULAR; INTRAVENOUS AS NEEDED
Status: DISCONTINUED | OUTPATIENT
Start: 2023-12-08 | End: 2023-12-08 | Stop reason: SURG

## 2023-12-08 RX ORDER — ROPIVACAINE HYDROCHLORIDE 5 MG/ML
INJECTION, SOLUTION EPIDURAL; INFILTRATION; PERINEURAL
Status: COMPLETED | OUTPATIENT
Start: 2023-12-08 | End: 2023-12-08

## 2023-12-08 RX ORDER — CEFAZOLIN SODIUM 1 G/3ML
INJECTION, POWDER, FOR SOLUTION INTRAMUSCULAR; INTRAVENOUS AS NEEDED
Status: DISCONTINUED | OUTPATIENT
Start: 2023-12-08 | End: 2023-12-08 | Stop reason: SURG

## 2023-12-08 RX ORDER — NALOXONE HCL 0.4 MG/ML
0.4 VIAL (ML) INJECTION
Status: DISCONTINUED | OUTPATIENT
Start: 2023-12-08 | End: 2023-12-11 | Stop reason: HOSPADM

## 2023-12-08 RX ORDER — OXYCODONE HYDROCHLORIDE 5 MG/1
5 TABLET ORAL EVERY 6 HOURS PRN
COMMUNITY
End: 2023-12-11 | Stop reason: HOSPADM

## 2023-12-08 RX ORDER — PANTOPRAZOLE SODIUM 40 MG/1
40 TABLET, DELAYED RELEASE ORAL DAILY
Status: DISCONTINUED | OUTPATIENT
Start: 2023-12-08 | End: 2023-12-11 | Stop reason: HOSPADM

## 2023-12-08 RX ORDER — LIDOCAINE HYDROCHLORIDE 10 MG/ML
0.5 INJECTION, SOLUTION EPIDURAL; INFILTRATION; INTRACAUDAL; PERINEURAL ONCE AS NEEDED
Status: DISCONTINUED | OUTPATIENT
Start: 2023-12-08 | End: 2023-12-08 | Stop reason: HOSPADM

## 2023-12-08 RX ORDER — PROPOFOL 10 MG/ML
VIAL (ML) INTRAVENOUS AS NEEDED
Status: DISCONTINUED | OUTPATIENT
Start: 2023-12-08 | End: 2023-12-08 | Stop reason: SURG

## 2023-12-08 RX ORDER — SODIUM CHLORIDE, SODIUM LACTATE, POTASSIUM CHLORIDE, CALCIUM CHLORIDE 600; 310; 30; 20 MG/100ML; MG/100ML; MG/100ML; MG/100ML
1000 INJECTION, SOLUTION INTRAVENOUS CONTINUOUS
Status: DISCONTINUED | OUTPATIENT
Start: 2023-12-08 | End: 2023-12-08

## 2023-12-08 RX ORDER — FENTANYL CITRATE 50 UG/ML
25 INJECTION, SOLUTION INTRAMUSCULAR; INTRAVENOUS
Status: DISCONTINUED | OUTPATIENT
Start: 2023-12-08 | End: 2023-12-08 | Stop reason: HOSPADM

## 2023-12-08 RX ORDER — TRANEXAMIC ACID 100 MG/ML
INJECTION, SOLUTION INTRAVENOUS AS NEEDED
Status: DISCONTINUED | OUTPATIENT
Start: 2023-12-08 | End: 2023-12-08 | Stop reason: SURG

## 2023-12-08 RX ORDER — FLUMAZENIL 0.1 MG/ML
0.2 INJECTION INTRAVENOUS AS NEEDED
Status: DISCONTINUED | OUTPATIENT
Start: 2023-12-08 | End: 2023-12-08 | Stop reason: HOSPADM

## 2023-12-08 RX ORDER — ASPIRIN 325 MG
325 TABLET ORAL EVERY 12 HOURS SCHEDULED
Status: DISCONTINUED | OUTPATIENT
Start: 2023-12-09 | End: 2023-12-11 | Stop reason: HOSPADM

## 2023-12-08 RX ORDER — SPIRONOLACTONE 25 MG/1
12.5 TABLET ORAL DAILY
COMMUNITY

## 2023-12-08 RX ORDER — LABETALOL HYDROCHLORIDE 5 MG/ML
5 INJECTION, SOLUTION INTRAVENOUS
Status: DISCONTINUED | OUTPATIENT
Start: 2023-12-08 | End: 2023-12-08 | Stop reason: HOSPADM

## 2023-12-08 RX ORDER — ROPIVACAINE HYDROCHLORIDE 5 MG/ML
INJECTION, SOLUTION EPIDURAL; INFILTRATION; PERINEURAL AS NEEDED
Status: DISCONTINUED | OUTPATIENT
Start: 2023-12-08 | End: 2023-12-08 | Stop reason: HOSPADM

## 2023-12-08 RX ORDER — OXYCODONE AND ACETAMINOPHEN 10; 325 MG/1; MG/1
1 TABLET ORAL ONCE AS NEEDED
Status: DISCONTINUED | OUTPATIENT
Start: 2023-12-08 | End: 2023-12-08 | Stop reason: HOSPADM

## 2023-12-08 RX ORDER — METOPROLOL TARTRATE 1 MG/ML
INJECTION, SOLUTION INTRAVENOUS AS NEEDED
Status: DISCONTINUED | OUTPATIENT
Start: 2023-12-08 | End: 2023-12-08 | Stop reason: SURG

## 2023-12-08 RX ORDER — DROPERIDOL 2.5 MG/ML
0.62 INJECTION, SOLUTION INTRAMUSCULAR; INTRAVENOUS ONCE AS NEEDED
Status: DISCONTINUED | OUTPATIENT
Start: 2023-12-08 | End: 2023-12-08 | Stop reason: HOSPADM

## 2023-12-08 RX ORDER — INSULIN LISPRO 100 [IU]/ML
3-14 INJECTION, SOLUTION INTRAVENOUS; SUBCUTANEOUS
Status: DISCONTINUED | OUTPATIENT
Start: 2023-12-08 | End: 2023-12-11 | Stop reason: HOSPADM

## 2023-12-08 RX ORDER — MONTELUKAST SODIUM 10 MG/1
10 TABLET ORAL NIGHTLY
Status: DISCONTINUED | OUTPATIENT
Start: 2023-12-08 | End: 2023-12-11 | Stop reason: HOSPADM

## 2023-12-08 RX ORDER — AMLODIPINE BESYLATE 5 MG/1
5 TABLET ORAL DAILY
COMMUNITY

## 2023-12-08 RX ORDER — PROMETHAZINE HYDROCHLORIDE 25 MG/1
12.5 TABLET ORAL EVERY 6 HOURS PRN
Status: DISCONTINUED | OUTPATIENT
Start: 2023-12-08 | End: 2023-12-11 | Stop reason: HOSPADM

## 2023-12-08 RX ORDER — FENTANYL CITRATE 50 UG/ML
INJECTION, SOLUTION INTRAMUSCULAR; INTRAVENOUS AS NEEDED
Status: DISCONTINUED | OUTPATIENT
Start: 2023-12-08 | End: 2023-12-08 | Stop reason: SURG

## 2023-12-08 RX ORDER — LIDOCAINE HYDROCHLORIDE 20 MG/ML
INJECTION, SOLUTION EPIDURAL; INFILTRATION; INTRACAUDAL; PERINEURAL AS NEEDED
Status: DISCONTINUED | OUTPATIENT
Start: 2023-12-08 | End: 2023-12-08 | Stop reason: SURG

## 2023-12-08 RX ORDER — SODIUM CHLORIDE 0.9 % (FLUSH) 0.9 %
10 SYRINGE (ML) INJECTION EVERY 12 HOURS SCHEDULED
Status: DISCONTINUED | OUTPATIENT
Start: 2023-12-08 | End: 2023-12-08 | Stop reason: HOSPADM

## 2023-12-08 RX ORDER — NALOXONE HCL 0.4 MG/ML
0.04 VIAL (ML) INJECTION AS NEEDED
Status: DISCONTINUED | OUTPATIENT
Start: 2023-12-08 | End: 2023-12-08 | Stop reason: HOSPADM

## 2023-12-08 RX ORDER — MONTELUKAST SODIUM 10 MG/1
10 TABLET ORAL DAILY
COMMUNITY

## 2023-12-08 RX ORDER — BUDESONIDE AND FORMOTEROL FUMARATE DIHYDRATE 160; 4.5 UG/1; UG/1
2 AEROSOL RESPIRATORY (INHALATION)
Status: DISCONTINUED | OUTPATIENT
Start: 2023-12-08 | End: 2023-12-11 | Stop reason: HOSPADM

## 2023-12-08 RX ORDER — DEXTROSE MONOHYDRATE 25 G/50ML
12.5 INJECTION, SOLUTION INTRAVENOUS AS NEEDED
Status: DISCONTINUED | OUTPATIENT
Start: 2023-12-08 | End: 2023-12-08 | Stop reason: HOSPADM

## 2023-12-08 RX ORDER — ALBUTEROL SULFATE 2.5 MG/3ML
2.5 SOLUTION RESPIRATORY (INHALATION) EVERY 6 HOURS PRN
Status: DISCONTINUED | OUTPATIENT
Start: 2023-12-08 | End: 2023-12-11 | Stop reason: HOSPADM

## 2023-12-08 RX ORDER — ONDANSETRON 2 MG/ML
4 INJECTION INTRAMUSCULAR; INTRAVENOUS EVERY 6 HOURS PRN
Status: DISCONTINUED | OUTPATIENT
Start: 2023-12-08 | End: 2023-12-11 | Stop reason: HOSPADM

## 2023-12-08 RX ORDER — ALBUTEROL SULFATE 90 UG/1
2 AEROSOL, METERED RESPIRATORY (INHALATION) EVERY 4 HOURS PRN
COMMUNITY

## 2023-12-08 RX ORDER — ATORVASTATIN CALCIUM 10 MG/1
20 TABLET, FILM COATED ORAL NIGHTLY
Status: DISCONTINUED | OUTPATIENT
Start: 2023-12-08 | End: 2023-12-11 | Stop reason: HOSPADM

## 2023-12-08 RX ORDER — SODIUM CHLORIDE, SODIUM LACTATE, POTASSIUM CHLORIDE, CALCIUM CHLORIDE 600; 310; 30; 20 MG/100ML; MG/100ML; MG/100ML; MG/100ML
125 INJECTION, SOLUTION INTRAVENOUS CONTINUOUS
Status: DISCONTINUED | OUTPATIENT
Start: 2023-12-08 | End: 2023-12-10

## 2023-12-08 RX ORDER — NICOTINE POLACRILEX 4 MG
15 LOZENGE BUCCAL
Status: DISCONTINUED | OUTPATIENT
Start: 2023-12-08 | End: 2023-12-11 | Stop reason: HOSPADM

## 2023-12-08 RX ORDER — OXYCODONE HYDROCHLORIDE 5 MG/1
5 TABLET ORAL EVERY 4 HOURS PRN
Status: DISCONTINUED | OUTPATIENT
Start: 2023-12-08 | End: 2023-12-11 | Stop reason: HOSPADM

## 2023-12-08 RX ORDER — DEXAMETHASONE SODIUM PHOSPHATE 4 MG/ML
4 INJECTION, SOLUTION INTRA-ARTICULAR; INTRALESIONAL; INTRAMUSCULAR; INTRAVENOUS; SOFT TISSUE ONCE AS NEEDED
Status: COMPLETED | OUTPATIENT
Start: 2023-12-08 | End: 2023-12-08

## 2023-12-08 RX ORDER — ROCURONIUM BROMIDE 10 MG/ML
INJECTION, SOLUTION INTRAVENOUS AS NEEDED
Status: DISCONTINUED | OUTPATIENT
Start: 2023-12-08 | End: 2023-12-08 | Stop reason: SURG

## 2023-12-08 RX ORDER — ONDANSETRON 2 MG/ML
4 INJECTION INTRAMUSCULAR; INTRAVENOUS
Status: DISCONTINUED | OUTPATIENT
Start: 2023-12-08 | End: 2023-12-08 | Stop reason: HOSPADM

## 2023-12-08 RX ORDER — IBUPROFEN 600 MG/1
1 TABLET ORAL
Status: DISCONTINUED | OUTPATIENT
Start: 2023-12-08 | End: 2023-12-11 | Stop reason: HOSPADM

## 2023-12-08 RX ORDER — VANCOMYCIN HYDROCHLORIDE 1 G/20ML
INJECTION, POWDER, LYOPHILIZED, FOR SOLUTION INTRAVENOUS AS NEEDED
Status: DISCONTINUED | OUTPATIENT
Start: 2023-12-08 | End: 2023-12-08 | Stop reason: HOSPADM

## 2023-12-08 RX ORDER — SODIUM CHLORIDE 0.9 % (FLUSH) 0.9 %
10 SYRINGE (ML) INJECTION AS NEEDED
Status: DISCONTINUED | OUTPATIENT
Start: 2023-12-08 | End: 2023-12-08 | Stop reason: HOSPADM

## 2023-12-08 RX ORDER — SODIUM CHLORIDE, SODIUM LACTATE, POTASSIUM CHLORIDE, CALCIUM CHLORIDE 600; 310; 30; 20 MG/100ML; MG/100ML; MG/100ML; MG/100ML
9 INJECTION, SOLUTION INTRAVENOUS CONTINUOUS
Status: DISCONTINUED | OUTPATIENT
Start: 2023-12-08 | End: 2023-12-10

## 2023-12-08 RX ADMIN — PROPOFOL 150 MG: 10 INJECTION, EMULSION INTRAVENOUS at 08:16

## 2023-12-08 RX ADMIN — ONDANSETRON 4 MG: 2 INJECTION INTRAMUSCULAR; INTRAVENOUS at 09:35

## 2023-12-08 RX ADMIN — PANTOPRAZOLE SODIUM 40 MG: 40 TABLET, DELAYED RELEASE ORAL at 20:07

## 2023-12-08 RX ADMIN — MELOXICAM 15 MG: 7.5 TABLET ORAL at 13:14

## 2023-12-08 RX ADMIN — ROCURONIUM BROMIDE 50 MG: 10 INJECTION, SOLUTION INTRAVENOUS at 08:16

## 2023-12-08 RX ADMIN — CEFAZOLIN 2 G: 1 INJECTION, POWDER, FOR SOLUTION INTRAMUSCULAR; INTRAVENOUS at 08:14

## 2023-12-08 RX ADMIN — ROPIVACAINE HYDROCHLORIDE 20 ML: 5 INJECTION, SOLUTION EPIDURAL; INFILTRATION; PERINEURAL at 08:07

## 2023-12-08 RX ADMIN — OXYCODONE HYDROCHLORIDE 5 MG: 5 TABLET ORAL at 18:38

## 2023-12-08 RX ADMIN — FENTANYL CITRATE 50 MCG: 50 INJECTION, SOLUTION INTRAMUSCULAR; INTRAVENOUS at 08:06

## 2023-12-08 RX ADMIN — Medication 1 TABLET: at 20:10

## 2023-12-08 RX ADMIN — TRANEXAMIC ACID 1000 MG: 100 INJECTION INTRAVENOUS at 09:40

## 2023-12-08 RX ADMIN — EMPAGLIFLOZIN 25 MG: 25 TABLET, FILM COATED ORAL at 20:10

## 2023-12-08 RX ADMIN — ROCURONIUM BROMIDE 25 MG: 10 INJECTION, SOLUTION INTRAVENOUS at 09:00

## 2023-12-08 RX ADMIN — HYDROMORPHONE HYDROCHLORIDE 0.5 MG: 1 INJECTION, SOLUTION INTRAMUSCULAR; INTRAVENOUS; SUBCUTANEOUS at 11:00

## 2023-12-08 RX ADMIN — HYDROMORPHONE HYDROCHLORIDE 0.5 MG: 1 INJECTION, SOLUTION INTRAMUSCULAR; INTRAVENOUS; SUBCUTANEOUS at 11:23

## 2023-12-08 RX ADMIN — TRANEXAMIC ACID 1000 MG: 100 INJECTION INTRAVENOUS at 08:30

## 2023-12-08 RX ADMIN — INSULIN DETEMIR 20 UNITS: 100 INJECTION, SOLUTION SUBCUTANEOUS at 20:07

## 2023-12-08 RX ADMIN — PROPOFOL 50 MG: 10 INJECTION, EMULSION INTRAVENOUS at 09:36

## 2023-12-08 RX ADMIN — PROPOFOL 50 MG: 10 INJECTION, EMULSION INTRAVENOUS at 09:05

## 2023-12-08 RX ADMIN — DULOXETINE HYDROCHLORIDE 60 MG: 30 CAPSULE, DELAYED RELEASE ORAL at 20:10

## 2023-12-08 RX ADMIN — Medication 5000 UNITS: at 20:11

## 2023-12-08 RX ADMIN — ATORVASTATIN CALCIUM 20 MG: 10 TABLET, FILM COATED ORAL at 20:10

## 2023-12-08 RX ADMIN — SUGAMMADEX 200 MG: 100 INJECTION, SOLUTION INTRAVENOUS at 09:45

## 2023-12-08 RX ADMIN — SODIUM CHLORIDE, POTASSIUM CHLORIDE, SODIUM LACTATE AND CALCIUM CHLORIDE 125 ML/HR: 600; 310; 30; 20 INJECTION, SOLUTION INTRAVENOUS at 13:14

## 2023-12-08 RX ADMIN — METOPROLOL TARTRATE 5 MG: 5 INJECTION INTRAVENOUS at 09:05

## 2023-12-08 RX ADMIN — CEFAZOLIN 2000 MG: 2 INJECTION, POWDER, FOR SOLUTION INTRAMUSCULAR; INTRAVENOUS at 16:21

## 2023-12-08 RX ADMIN — INSULIN LISPRO 8 UNITS: 100 INJECTION, SOLUTION INTRAVENOUS; SUBCUTANEOUS at 20:07

## 2023-12-08 RX ADMIN — FENTANYL CITRATE 100 MCG: 50 INJECTION, SOLUTION INTRAMUSCULAR; INTRAVENOUS at 09:06

## 2023-12-08 RX ADMIN — LIDOCAINE HYDROCHLORIDE 100 MG: 20 INJECTION, SOLUTION EPIDURAL; INFILTRATION; INTRACAUDAL; PERINEURAL at 08:16

## 2023-12-08 RX ADMIN — SODIUM CHLORIDE, POTASSIUM CHLORIDE, SODIUM LACTATE AND CALCIUM CHLORIDE 1000 ML: 600; 310; 30; 20 INJECTION, SOLUTION INTRAVENOUS at 07:37

## 2023-12-08 RX ADMIN — MONTELUKAST SODIUM 10 MG: 10 TABLET, FILM COATED ORAL at 20:10

## 2023-12-08 RX ADMIN — DEXAMETHASONE SODIUM PHOSPHATE 4 MG: 4 INJECTION INTRA-ARTICULAR; INTRALESIONAL; INTRAMUSCULAR; INTRAVENOUS; SOFT TISSUE at 08:06

## 2023-12-08 NOTE — ANESTHESIA PREPROCEDURE EVALUATION
Anesthesia Evaluation     Patient summary reviewed   no history of anesthetic complications:   NPO Solid Status: > 8 hours             Airway   Mallampati: II  TM distance: >3 FB  Neck ROM: full  Dental      Pulmonary    (+) asthma,  (-) COPD, sleep apnea, not a smoker  Cardiovascular   Exercise tolerance: good (4-7 METS)    (+) hypertension, hyperlipidemia  (-) pacemaker, past MI, angina, cardiac stents      Neuro/Psych  (-) seizures, TIA, CVA  GI/Hepatic/Renal/Endo    (+) morbid obesity, GERD, diabetes mellitus using insulin, thyroid problem   (-) liver disease, no renal disease    Musculoskeletal     Abdominal    Substance History      OB/GYN          Other                    Anesthesia Plan    ASA 3     general with block     intravenous induction     Anesthetic plan, risks, benefits, and alternatives have been provided, discussed and informed consent has been obtained with: patient.    CODE STATUS:

## 2023-12-08 NOTE — THERAPY EVALUATION
Patient Name: Kezia Otoole  : 1950    MRN: [id number]                              Today's Date: 2023       Admit Date: 2023    Visit Dx:     ICD-10-CM ICD-9-CM   1. Impaired mobility [Z74.09]  Z74.09 799.89     Patient Active Problem List   Diagnosis    Class 3 severe obesity due to excess calories with serious comorbidity and body mass index (BMI) of 40.0 to 44.9 in adult    Depression    Elevated fasting glucose-see DM    Allergic rhinitis: shots    Osteoporosis: /OIWK    Primary generalized (osteo)arthritis    HTN (hypertension), benign    Obstructive sleep apnea-txing    Menopause    Diabetes type 2, controlled    Asthma    Hoarseness-sees Ressor    Gastroesophageal reflux disease-ent    Wellness examination-done    Purpura    Glaucoma    Laboratory test`    Anticoagulated DM2/ASA 81 (11.9.17 pt chose qod)    Glaucoma suspect    Hx of colonic polyps    *Hx of anemia: ASA81,iron,gi(cp,div    *Hx-iron deficiency    Nuclear senile cataract    Excessive cerumen in ear canal    Status post joint replacement    Primary osteoarthritis of right knee    Hyperlipidemia-statin    Hypercalcemia    History of COVID-19    DKA (diabetic ketoacidosis)    General weakness    Dementia    Gastrointestinal hemorrhage with melena    Class 2 obesity in adult    Type 2 diabetes mellitus with hyperglycemia, with long-term current use of insulin    Anemia    Duodenal ulcer    Gait difficulty    Uncontrolled type 2 diabetes mellitus with hyperglycemia    Multiple chronic diseases    Primary osteoarthritis of left knee     Past Medical History:   Diagnosis Date    Allergic rhinitis     Arthritis     Asthma     Chronic laryngitis     Diabetes mellitus     GERD (gastroesophageal reflux disease)     Hypertension     Hypertrophy of both inferior nasal turbinates     Laryngopharyngeal reflux     Nontoxic multinodular goiter     Sicca laryngitis      Past Surgical History:   Procedure Laterality Date    CARPAL  TUNNEL RELEASE      CHOLECYSTECTOMY      COLONOSCOPY  10/01/2008    Multiple polyps removed    COLONOSCOPY  09/18/2013    Diverticulosis in the sigmoid colon. Dr. Dave Mcnally Recall 5 years    ENDOSCOPY N/A 12/08/2022    Non-bleeding duodenal ulcer, small HH    HAND SURGERY      HYSTERECTOMY      REPLACEMENT TOTAL KNEE Right 02/02/2021    THYROIDECTOMY, PARTIAL Right 2010    TONSILLECTOMY      TUBAL ABDOMINAL LIGATION        General Information       Row Name 12/08/23 1253          Physical Therapy Time and Intention    Document Type evaluation  L knee primary OA. s/p 12/8 L cemented post stabilized knee arthroplasty (FWB)  -KALIN     Mode of Treatment physical therapy  -KALIN       Row Name 12/08/23 1253          General Information    Patient Profile Reviewed yes  -KALIN     Prior Level of Function independent:;all household mobility;ADL's;dressing;bathing  -KALIN     Existing Precautions/Restrictions fall  -KALIN     Barriers to Rehab physical barrier  -KALIN       Row Name 12/08/23 1253          Living Environment    People in Home spouse  -KALIN       Row Name 12/08/23 1253          Home Main Entrance    Number of Stairs, Main Entrance none  ramp, walk in shower. owns w/c, BSC, RW.  -KALIN       Row Name 12/08/23 1253          Stairs Within Home, Primary    Number of Stairs, Within Home, Primary none  -KALIN       Row Name 12/08/23 1253          Cognition    Orientation Status (Cognition) oriented x 4  -KALIN       Row Name 12/08/23 1253          Safety Issues, Functional Mobility    Impairments Affecting Function (Mobility) pain;range of motion (ROM);strength  -KALIN               User Key  (r) = Recorded By, (t) = Taken By, (c) = Cosigned By      Initials Name Provider Type    Atif Mcfadden, PT DPT Physical Therapist                   Mobility       Row Name 12/08/23 1253          Bed Mobility    Bed Mobility supine-sit  -KALIN     Supine-Sit Davison (Bed Mobility) contact guard  -KALIN     Assistive Device (Bed Mobility) head of bed  elevated;bed rails  -KALIN       Row Name 12/08/23 1253          Sit-Stand Transfer    Sit-Stand Dyer (Transfers) standby assist;contact guard  -KALIN     Assistive Device (Sit-Stand Transfers) walker, front-wheeled  -KALIN       Row Name 12/08/23 1253          Gait/Stairs (Locomotion)    Dyer Level (Gait) standby assist;contact guard  -KALIN     Assistive Device (Gait) walker, front-wheeled  -KALIN     Patient was able to Ambulate yes  -KALIN     Distance in Feet (Gait) 125 ft  -KALIN     Deviations/Abnormal Patterns (Gait) left sided deviations;antalgic;gait speed decreased  -KALIN       Row Name 12/08/23 1253          Mobility    Extremity Weight-bearing Status left lower extremity  -KALIN     Left Lower Extremity (Weight-bearing Status) full weight-bearing (FWB)  -KALIN               User Key  (r) = Recorded By, (t) = Taken By, (c) = Cosigned By      Initials Name Provider Type    Atif Mcfadden, PT DPT Physical Therapist                   Obj/Interventions       Row Name 12/08/23 1253          Range of Motion Comprehensive    Comment, General Range of Motion R LE AROM WFL, L ankle AROM WFL, L knee AROM can fled near 80-90 degrees flexion with ace wrap intact  -KALIN       Row Name 12/08/23 1253          Strength Comprehensive (MMT)    Comment, General Manual Muscle Testing (MMT) Assessment R LE grossly 4+/5, L LE grossly 4-/5  -KALIN       Row Name 12/08/23 1253          Balance    Balance Assessment sitting dynamic balance;standing dynamic balance  -KALIN     Dynamic Sitting Balance supervision  -KALIN     Position, Sitting Balance unsupported;sitting in chair;sitting edge of bed  -KALIN     Dynamic Standing Balance standby assist;contact guard  -KALIN     Position/Device Used, Standing Balance supported;unsupported;walker, rolling  -KALIN       Row Name 12/08/23 1253          Sensory Assessment (Somatosensory)    Sensory Assessment (Somatosensory) LE sensation intact;other (see comments)  L LE nerve block but can feel pressure when  touching her L LE  -KALIN               User Key  (r) = Recorded By, (t) = Taken By, (c) = Cosigned By      Initials Name Provider Type    Atif Mcfadden PT DPT Physical Therapist                   Goals/Plan       Row Name 12/08/23 1253          Transfer Goal 1 (PT)    Activity/Assistive Device (Transfer Goal 1, PT) sit-to-stand/stand-to-sit;bed-to-chair/chair-to-bed;walker, rolling  -KALIN     Logan Level/Cues Needed (Transfer Goal 1, PT) supervision required  -KALIN     Time Frame (Transfer Goal 1, PT) long term goal (LTG);10 days  -KALIN     Progress/Outcome (Transfer Goal 1, PT) new goal  -KALIN       Row Name 12/08/23 1253          Gait Training Goal 1 (PT)    Activity/Assistive Device (Gait Training Goal 1, PT) gait (walking locomotion);assistive device use;decrease fall risk;improve balance and speed;increase endurance/gait distance  -KALIN     Logan Level (Gait Training Goal 1, PT) supervision required  -KALIN     Distance (Gait Training Goal 1, PT) 200 ft  -KALIN     Time Frame (Gait Training Goal 1, PT) long term goal (LTG);10 days  -KALIN     Progress/Outcome (Gait Training Goal 1, PT) new goal  -KALIN       Row Name 12/08/23 1253          ROM Goal 1 (PT)    ROM Goal 1 (PT) L knee 0-90 degrees AROM while sitting EOB.  -KALIN     Time Frame (ROM Goal 1, PT) long-term goal (LTG);10 days  -KALIN     Progress/Outcome (ROM Goal 1, PT) new goal  -KALIN       Row Name 12/08/23 1253          Therapy Assessment/Plan (PT)    Planned Therapy Interventions (PT) bed mobility training;transfer training;gait training;balance training;home exercise program;patient/family education;postural re-education;ROM (range of motion);stair training;strengthening  -KALIN               User Key  (r) = Recorded By, (t) = Taken By, (c) = Cosigned By      Initials Name Provider Type    Atif Mcfadden PT DPT Physical Therapist                   Clinical Impression       Row Name 12/08/23 1253          Pain    Pretreatment Pain Rating 3/10  -KALIN      Pain Location - Side/Orientation Left  -KALIN     Pain Location incisional  -KALIN     Pain Location - knee  -KALIN     Pain Intervention(s) Repositioned;Ambulation/increased activity  -KALIN       Row Name 12/08/23 1253          Plan of Care Review    Plan of Care Reviewed With patient  -KALIN     Outcome Evaluation PT tiffani complete. She is alert and oriented x 4. She lives at home with her spouse where she is independent in her mobiilty. Owns a w/c, BSC, RW and has a ramp to enter her home. Today she stands and ambulates with CGA > SBA x 1 and RW. Ambulates ~ 125 ft total. Demos a good pace and swing through gait in B LE. PT will cont with mobility, stair trianing and L knee ROM and strengthening. I am hopeful she will cont to recover and d.c home with spouse and outpatient PT.  -KALIN       Row Name 12/08/23 1253          Therapy Assessment/Plan (PT)    Patient/Family Therapy Goals Statement (PT) go home  -KALIN     Rehab Potential (PT) good, to achieve stated therapy goals  -KALIN     Criteria for Skilled Interventions Met (PT) yes;meets criteria;skilled treatment is necessary  -KALIN     Therapy Frequency (PT) 2 times/day  -KALIN     Predicted Duration of Therapy Intervention (PT) unti ld.c  -KALIN       Row Name 12/08/23 1253          Positioning and Restraints    Pre-Treatment Position in bed  -KALIN     Post Treatment Position chair  -KALIN     In Chair notified nsg;reclined;call light within reach;encouraged to call for assist;with other staff  -KALIN               User Key  (r) = Recorded By, (t) = Taken By, (c) = Cosigned By      Initials Name Provider Type    KALIN Atif Esquivel, PT DPT Physical Therapist                   Outcome Measures       Row Name 12/08/23 1253          How much help from another person do you currently need...    Turning from your back to your side while in flat bed without using bedrails? 3  -KALIN     Moving from lying on back to sitting on the side of a flat bed without bedrails? 3  -KALIN     Moving to and from a bed to a  chair (including a wheelchair)? 3  -KALIN     Standing up from a chair using your arms (e.g., wheelchair, bedside chair)? 3  -KALIN     Climbing 3-5 steps with a railing? 3  -KALIN     To walk in hospital room? 3  -KALIN     AM-PAC 6 Clicks Score (PT) 18  -KALIN     Highest Level of Mobility Goal 6 --> Walk 10 steps or more  -KALIN       Row Name 12/08/23 1305 12/08/23 1253       Functional Assessment    Outcome Measure Options AM-PAC 6 Clicks Daily Activity (OT)  -STEPHAN AM-PAC 6 Clicks Basic Mobility (PT)  -KALIN              User Key  (r) = Recorded By, (t) = Taken By, (c) = Cosigned By      Initials Name Provider Type    Atif Mcfadden, PT DPT Physical Therapist    Serena Waller, OTR/L, CSRS Occupational Therapist                                 Physical Therapy Education       Title: PT OT SLP Therapies (In Progress)       Topic: Physical Therapy (In Progress)       Point: Mobility training (Done)       Learning Progress Summary             Patient Acceptance, E, VU,DU by KALIN at 12/8/2023 1253    Comment: benefits of activity, progression of PT POC                         Point: Home exercise program (Not Started)       Learner Progress:  Not documented in this visit.              Point: Body mechanics (Not Started)       Learner Progress:  Not documented in this visit.              Point: Precautions (Not Started)       Learner Progress:  Not documented in this visit.                              User Key       Initials Effective Dates Name Provider Type Discipline    KALIN 02/03/23 -  Atif Esquivel, PT DPT Physical Therapist PT                  PT Recommendation and Plan  Planned Therapy Interventions (PT): bed mobility training, transfer training, gait training, balance training, home exercise program, patient/family education, postural re-education, ROM (range of motion), stair training, strengthening  Plan of Care Reviewed With: patient  Outcome Evaluation: PT eval complete. She is alert and oriented x 4. She lives  at home with her spouse where she is independent in her mobiilty. Owns a w/c, BSC, RW and has a ramp to enter her home. Today she stands and ambulates with CGA > SBA x 1 and RW. Ambulates ~ 125 ft total. Demos a good pace and swing through gait in B LE. PT will cont with mobility, stair trianing and L knee ROM and strengthening. I am hopeful she will cont to recover and d.c home with spouse and outpatient PT.     Time Calculation:         PT Charges       Row Name 12/08/23 1541             Time Calculation    Start Time 1253  -KALIN      Stop Time 1349  -KALIN      Time Calculation (min) 56 min  -KALIN      PT Received On 12/08/23  -KALIN      PT Goal Re-Cert Due Date 12/18/23  -KALIN                User Key  (r) = Recorded By, (t) = Taken By, (c) = Cosigned By      Initials Name Provider Type    Atif Mcfadden, PT DPT Physical Therapist                  Therapy Charges for Today       Code Description Service Date Service Provider Modifiers Qty    [id number] HC PT EVAL MOD COMPLEXITY 4 12/8/2023 Atif Esquivel, PT DPT GP 1            PT G-Codes  Outcome Measure Options: AM-PAC 6 Clicks Daily Activity (OT)  AM-PAC 6 Clicks Score (PT): 18  AM-PAC 6 Clicks Score (OT): 22  PT Discharge Summary  Anticipated Discharge Disposition (PT): home with assist, home with outpatient therapy services    Atif Esquivel, QIAN DPT  12/8/2023

## 2023-12-08 NOTE — ANESTHESIA PROCEDURE NOTES
Airway  Urgency: elective    Date/Time: 12/8/2023 8:17 AM  Airway not difficult    General Information and Staff    Patient location during procedure: OR  CRNA/CAA: YOVANY Nascimento CRNA    Indications and Patient Condition  Indications for airway management: airway protection    Preoxygenated: yes  MILS maintained throughout  Mask difficulty assessment: 1 - vent by mask    Final Airway Details  Final airway type: endotracheal airway      Successful airway: ETT  Cuffed: yes   Successful intubation technique: direct laryngoscopy  Facilitating devices/methods: intubating stylet  Endotracheal tube insertion site: oral  Blade: Sharma  Blade size: 3  ETT size (mm): 7.5  Cormack-Lehane Classification: grade I - full view of glottis  Placement verified by: chest auscultation and capnometry   Cuff volume (mL): 5  Measured from: gums  Number of attempts at approach: 1  Assessment: lips, teeth, and gum same as pre-op and atraumatic intubation

## 2023-12-08 NOTE — OP NOTE
TOTAL KNEE ARTHROPLASTY OPERATIVE NOTE    NAME OF SURGEON / : MILA Bey MD  PATIENT:   Kezia Otoole  Date: 12/8/2023        Time: 09:52 CST   Referring Physician: ________________________    PREOP DIAGNOSIS:  left Knee  Primary osteoarthritis   POSTOP DIAGNOSIS:  Same     PROCEDURE:   left   Cemented Posterior Stabilized Knee arthroplasty  Bmi 43.  Complex Primary, please add modifier -22.  This procedure required 50 % more effort from the surgeon and staff. The thick adipose layer made the approach to the joint and exposure much more difficult than normal.  Extra deep retractors and more effort were required to maintain the joint in position and allow visualization to perform the procedure.  The added weight of the extremity made it extremely difficult to manipulate the joint and to check range of motion and stability.  The closure was prolonged due to the added length and depth of the wound.      IMPLANTS:   Implant Name Type Inv. Item Serial No.  Lot No. LRB No. Used Action   CMT BONE R 1X40 - [id number] Implant CMT BONE R 1X40  [employer]  Left 1 Implanted   CMT BONE R 1X40 - [id number] Implant CMT BONE R 1X40  [employer]  Left 1 Implanted   PAT KN PERSONA ALLPOLY CMT 8.5X32MM - [id number] Implant PAT KN PERSONA ALLPOLY CMT 8.5X32MM  [employer] [id number] Left 1 Implanted   EXT STEM FEM/KN PERSONA TPR [id number] - [id number] Implant EXT STEM FEM/KN PERSONA TPR [id number]  [employer] [id number] Left 1 Implanted   STEM TIB/KN PERSONA CMT 5D SZC LT - [id number] Implant STEM TIB/KN PERSONA CMT 5D SZC LT  [employer]  Left 1 Implanted   COMP FEM PERSONA PS COCR CMT NRW SZ5 LT - [id number] Implant COMP FEM PERSONA PS COCR CMT NRW SZ5 LT  [employer]  Left 1 Implanted   ART/SRF KN PERSONA/VE PS MC CD SZ3TO5 14MM LT - [id number] Implant ART/SRF KN PERSONA/VE PS MC CD SZ3TO5 14MM LT  [employer]  Left 1 Implanted       FINDINGS:  Preop ROM:  Full except for   -  full   extension  Alignment:    neutral  Bone quality:   Normal   Cartilage wear:  Medial - severe  Lateral -moderate  Pat-fem -severe  ACL -Intact  Assistant: Chas Gonzales CST  ANESTHESIA:  General  EBL:  500 mL  TOURNIQUET:  none  FLUIDS: See anesthesia record  BLOOD PRODUCTS:  None  COMPLICATIONS:  None  SPECIMEN:  None            INDICATIONS:  Patient presents for the above procedure having failed conservative treatment.  Patient consents to the procedure above understanding the risks of bleeding, infection, anesthesia, nerve injury, stiffness, and blood clots.    PROCEDURE:  The patient was brought to the operating room and placed in a supine position on the operating table.  Anesthesia as specified above was placed.  An antibiotic was given IV.  The unoperated extremities were well padded.  A tourniquet was placed around the proximal thigh.  The lower extremity was prepped with chlorhexidine/alcohol and draped sterilely using ioban barriers.  A time out was performed.    An anterior knee incision was made and fasciocutaneous flaps were developed.  A mini midvastus approach was made and the patella subluxed.   The prepatellar fat pad, ACL, and the anterior horns of the menisci were excised.      The AP femoral axis was marked with electrocautery.  A starter drill was placed down the femoral shaft 1 cm anterior to the PCL origin.  An alignment richie was placed down the femoral shaft.  The distal femoral cutting guide, set at 5 degree of valgus was then placed over the alignment richie, and pinned perpendicular to the AP axis.  The cutting block was then set to remove an extra 0 mm of distal femur and the distal cut made.     Hohmann and a PCL retractor were placed around the tibia. The external alignment guide set to cut 10 mm off the intact side at 7° degree posterior slope was pinned in place. I stepped back and verified that the guide followed the anterior cortex of the tibia to the ankle. The tibial cut was made.  The  tibial fragment and osteophytes were removed. Posterior meniscal horns were resected. The extension gap was satisfactory.     The epicondylar axis was marked with electrocautery.  Femoral trials were laid on the distal femur to estimate the appropriate size.  The femoral sizer, with posterior paddles set at 3 degree of external rotation, and an anterior stylus was placed.  The sizer reading matched the size predicted by the trial.   The pinholes were drilled for the 4 in 1 femoral cutting block.  This block was impacted on the distal femur and sat flush with the proper rotation relative to the AP and epicondylar axes.  A drill was advanced through the anterior slot and did not notch. The remaining femoral cuts were made.  Femoral osteophytes were removed with a rongeur.  The flexion gap was satisfactory.     Posterior femoral osteophytes were removed with a 3/4 inch curved osteotome and rongeur.   The appropriate tibial post punch guide covered the tibia without overhang and sat flush.  It was lined up with the median third of the tibial tubercle.  The tibia was reamed, then the keel impacted in place.    The femoral trial was impacted onto the femur.  A 10 mm thick, posterior stabilized tibial liner was snapped in place and ROM and stability were checked.   The knee had full ROM and symmetric varus/valgus stability in flexion and extension after    *release of  NO RELEASES  * resection of 2 mm more bone from the proximal tibia at 2 deg varus   *the appropriate size tibial liner was placed (see above)    Note: Stability to varus/valgus stress(mm):  Extension ( 2   ,   1 )   Mid-Flexion (  1  ,  1  )  Flexion (  1  ,  0  )  The tibial trial rotation, which lined up with the medial third of the tibial tubercle, was marked on the proximal tibia with electrocautery.     The maximum patella thickness was 22 mm.   The patella as resected with an oscillating saw and consistent thickness verified with caliper.  The trial  patella was placed and the overall thickness was restored.   The lateral patellar facet was resected with a saw.  The patella tracked normally.    A femoral  bone plug placed, the femoral lug holes drilled and the trials were removed.  The surfaces were rinsed with pulse lavage and dried.  Two batches of cement  were mixed.  Appropriate sized implants were cemented in place, a trial tibial liner placed, and the knee held in full extension until cement hardened. Fifty milliliters of Betadine was poured into the joint and suctioned out once the cement was hardened.  The capsule was injected with a ropivacaine orthopedic cocktail.  Excess cement was removed, and the actual tibial liner was snapped in place.    No thumbs patella tracking was checked. No release was needed.   Hemostasis was achieved with electrocautery.  The wound was copiously irrigated with antibiotic irrigation.  The capsule was closed with interrupted #2 Vicryl.  Subcutaneous tissue was closed with running 2-0 Vicryl.  Skin was closed with 3-0 Vicryl and Prineo. A sterile dressing was applied.     The patient was awakened, extubated and transferred to the recovery room in stable condition.            PLAN:  Full weight bearing, ROM and aspirin for DVT prophylaxis.      Electronically signed by MILA Gould MD on 12/8/2023 at 09:52 CST

## 2023-12-08 NOTE — H&P
[facility] Pre-Operative History and Physical    Patient Name: Alhaji  : 1950        Chief Complaint: Left knee pain  History of Present Illness:   This patient has had ongoing pain for several weeks/months that has been unresponsive to conservative care which has included injection, therapy, activity modification and presents now for surgery.    Past Medical History:   Past Medical History:   Diagnosis Date    Allergic rhinitis     Arthritis     Asthma     Chronic laryngitis     Diabetes mellitus     GERD (gastroesophageal reflux disease)     Hypertension     Hypertrophy of both inferior nasal turbinates     Laryngopharyngeal reflux     Nontoxic multinodular goiter     Sicca laryngitis      Past Surgical History:   Past Surgical History:   Procedure Laterality Date    CARPAL TUNNEL RELEASE      CHOLECYSTECTOMY      COLONOSCOPY  10/01/2008    Multiple polyps removed    COLONOSCOPY  2013    Diverticulosis in the sigmoid colon. Dr. Dave Mcnally Recall 5 years    ENDOSCOPY N/A 2022    Non-bleeding duodenal ulcer, small HH    HAND SURGERY      HYSTERECTOMY      REPLACEMENT TOTAL KNEE Right 2021    THYROIDECTOMY, PARTIAL Right 2010    TONSILLECTOMY      TUBAL ABDOMINAL LIGATION         Medications:   Prior to Admission medications    Medication Sig Start Date End Date Taking? Authorizing Provider   albuterol sulfate  (90 Base) MCG/ACT inhaler INHALE TWO PUFFS EVERY FOUR  HOURS AS NEEDED FOR WHEEZING OR SHORTNESS OF AIR 23   Len Crowe MD   amLODIPine (NORVASC) 5 MG tablet TAKE ONE TABLET DAILY 23   Len Crowe MD   atorvastatin (LIPITOR) 20 MG tablet TAKE ONE TABLET DAILY 23   Len Crowe MD   Blood Glucose Monitoring Suppl (OneTouch Verio Flex System) w/Device kit 1 each As Needed (emergency use if no CGM in place). 23   Len Crowe MD   budesonide-formoterol (SYMBICORT) 160-4.5 MCG/ACT inhaler Inhale 2 puffs 2 (Two)  Times a Day As Needed.    Estefania Mejia MD   Calcium Carb-Cholecalciferol (CALCIUM-VITAMIN D) 600-400 MG-UNIT tablet Take 1 tablet by mouth 2 (Two) Times a Day.    Estefania Mejia MD   carboxymethylcellulose (REFRESH PLUS) 0.5 % solution 2 drops Daily As Needed for Dry Eyes.    Estefania Mejia MD   Cholecalciferol 25 MCG (1000 UT) capsule Take 1 capsule by mouth Daily.    Estefania Mejia MD   Continuous Blood Gluc Sensor (FreeStyle Sue 2 Sensor) misc USE TO TEST FOUR TIMES DAILY WITH SLIDING SCALE THREE TIMES DAILY AND LANTUS AT BEDTIME. 11/25/23   Len Crowe MD   denosumab (PROLIA) 60 MG/ML solution syringe Inject 1 mL under the skin into the appropriate area as directed Every 6 (Six) Months. 2/13/19   Estefania Mejia MD   DULoxetine (CYMBALTA) 60 MG capsule Take 1 capsule by mouth Daily. 11/9/23   Khris Shanks APRN   empagliflozin (JARDIANCE) 25 MG tablet tablet Take 1 tablet by mouth Daily. 9/22/23   Khris Shanks APRN   guaiFENesin (MUCINEX) 600 MG 12 hr tablet Take 1 tablet by mouth 2 (Two) Times a Day As Needed.    Estefania Mejia MD   hydroCHLOROthiazide (MICROZIDE) 12.5 MG capsule Take 1 capsule by mouth Every Morning. 1/19/23   Estefania Mejia MD   insulin detemir (LEVEMIR) 100 UNIT/ML injection Inject 110 Units under the skin into the appropriate area as directed Every 12 (Twelve) Hours. Please provide 30 day supply unsure how many pens in a box, thank you 6/29/23   Len Crowe MD   Insulin Lispro, 1 Unit Dial, (HumaLOG KwikPen) 100 UNIT/ML solution pen-injector Inject 7 Units under the skin into the appropriate area as directed 3 (Three) Times a Day. Max units 21 per 24 hours 12/14/22   Len Crowe MD   irbesartan (AVAPRO) 300 MG tablet TAKE ONE TABLET DAILY 6/8/23   Len Crowe MD   [facility] (GLUCOSAMINE CHONDROITIN TRIPLE PO) Take 1 tablet by mouth 2 (Two) Times a Day.    Estefania Mejia MD  "  montelukast (SINGULAIR) 10 MG tablet TAKE ONE TABLET DAILY 8/7/23   Len Crowe MD   Multiple Vitamins-Minerals (VITRUM 50+ SENIOR MULTI PO) Take 1 tablet by mouth Daily.    Provider, MD Estefania   pantoprazole (PROTONIX) 40 MG EC tablet TAKE ONE TABLET DAILY 11/15/23   Len Crowe MD   Semaglutide,0.25 or 0.5MG/DOS, (Ozempic, 0.25 or 0.5 MG/DOSE,) 2 MG/1.5ML solution pen-injector Inject 0.5 mg under the skin into the appropriate area as directed 1 (One) Time Per Week. 10/25/23   Len Crowe MD   spironolactone (ALDACTONE) 25 MG tablet TAKE ONE HALF (1/2) TABLET DAILY 6/22/23   Len Crowe MD   TRUEplus 5-Bevel Pen Needles 31G X 6 MM misc ONE EACH FIVE TIMES A DAY. TWICE DAILY WITH LEVEMIR, THREE TIMES DAILY WITH HUMALOG AS NEEDED 10/5/23   Len Crowe MD   TRUEplus Insulin Syringe 31G X 5/16\" 0.3 ML misc USE AS DIRECTED THREE TIMES DAILY 2/3/23   Len Crowe MD   TRUEplus Insulin Syringe 31G X 5/16\" 0.5 ML misc USE AS DIRECTED TWICE DAILY WITH LEVEMIR 2/3/23   Len Crowe MD       Allergies:  Adhesive tape and Other    Social History:   Tobacco:  reports that she has never smoked. She has never used smokeless tobacco.   Alcohol:  reports no history of alcohol use.    Review of Systems:  General: Denies any fever or chills  EYES: Denies any diplopia  ENT: Tinnitus or vertigo  Resp: Denies any shortness of breath, cough or wheezing  Cardiac: Denies any chest pain, palpitations, claudication or edema  GI: Denies any melena, hematochezia, hematemesis or pyrosis  : Denies any frequency, urgency, hesitancy or incontinence  Musculoskeletal: Denies back pain, joint pain, myalgias  Heme: Denies bruising or bleeding easily  Endocrine: Denies any history of diabetes or thyroid disease  Psych: Denies anxiety or depression  Neuro: Denies any focal motor or sensory deficits      Physical Exam:  Vitals: There were no vitals taken for this " "visit.  CONSTITUTIONAL: Alert, appropriate, no acute distress.  PSYCH: mood and affect are normal with a normal rate and tone of speech  EYES: Non icteric, EOM intact, pupils equal round and reactive to light  ENT: Mucus membranes moist, no oral pharyngeal lesions, nares patent   NECK: Supple, no masses, no JVD, trachea mid line   CHEST/LUNGS: CTA bilaterally, normal respiratory effort   CARDIOVASCULAR: RRR, no murmurs,  2+ DP and radial pulses bilaterally  ABDOMEN: soft, nontender  EXTREMITIES: warm, well perfused, no edema.  Left knee joint with mildly reduced range of motion and generalized tenderness.  Neurovascular exam normal  SKIN: warm, dry with no rashes or lesions  LYMPH: No cervical or inguinal lymphadenopathy    RADIOLOGY: xrays of left knee show severe knee arthritis  LABORATORY:    CBC :  No results found for: \"WBC\", \"HGB\", \"HCT\", \"PLT\"  BMP: No results found for: \"NA\", \"K\", \"CL\", \"CO2\", \"BUN\", \"CREATININE\", \"CALCIUM\", \"GFRAA\", \"LABGLOM\", \"GLUCOSE\", \"GLU\"  PT/INR:  No results found for: \"PROTIME\", \"INR\"  U/A: No results found for: \"NITRITE\", \"WBCUA\", \"RBCUA\", \"BACTERIA\"  HgBA1c:  No results found for: \"HGBA1C\"    IMPRESSION:   Left primary knee osteoarthritis. Most recent office note reviewed and there has been no change in health status.       PLAN: Left knee replacement.  I explained to the patient/family the patient's diagnosis and operative procedure in detail. They said they understood basically what was wrong and how I planned to fix it.  They understand the expected recovery and the risks which include excessive bleeding, infection, reaction to anesthesia, nerve injury, stiffness, fracture, deformity and dislocation.  They then signed an operative consent form.      Provider: MILA Gould MD  Date: 12/8/2023  "

## 2023-12-08 NOTE — PLAN OF CARE
Goal Outcome Evaluation:  Plan of Care Reviewed With: patient           Outcome Evaluation: PT tiffani complete. She is alert and oriented x 4. She lives at home with her spouse where she is independent in her mobiilty. Owns a w/c, BSC, RW and has a ramp to enter her home. Today she stands and ambulates with CGA > SBA x 1 and RW. Ambulates ~ 125 ft total. Demos a good pace and swing through gait in B LE. PT will cont with mobility, stair trianing and L knee ROM and strengthening. I am hopeful she will cont to recover and d.c home with spouse and outpatient PT.      Anticipated Discharge Disposition (PT): home with assist, home with outpatient therapy services

## 2023-12-08 NOTE — PLAN OF CARE
Goal Outcome Evaluation: Pt is alert and oriented, no s/s of infection, vitals are stable, room air, c/o mild pain, will cont to monitor

## 2023-12-08 NOTE — THERAPY DISCHARGE NOTE
Acute Care - Occupational Therapy Discharge  [facility]    Patient Name: Kezia Otoole  : 1950    MRN: [id number]                              Today's Date: 2023       Admit Date: 2023    Visit Dx: No diagnosis found.  Patient Active Problem List   Diagnosis    Class 3 severe obesity due to excess calories with serious comorbidity and body mass index (BMI) of 40.0 to 44.9 in adult    Depression    Elevated fasting glucose-see DM    Allergic rhinitis: shots    Osteoporosis: OIWK    Primary generalized (osteo)arthritis    HTN (hypertension), benign    Obstructive sleep apnea-txing    Menopause    Diabetes type 2, controlled    Asthma    Hoarseness-sees Ressor    Gastroesophageal reflux disease-ent    Wellness examination-done    Purpura    Glaucoma    Laboratory test`    Anticoagulated DM2/ASA 81 (11.9.17 pt chose qod)    Glaucoma suspect    Hx of colonic polyps    *Hx of anemia: ASA81,iron,gi(cp,div    *Hx-iron deficiency    Nuclear senile cataract    Excessive cerumen in ear canal    Status post joint replacement    Primary osteoarthritis of right knee    Hyperlipidemia-statin    Hypercalcemia    History of COVID-19    DKA (diabetic ketoacidosis)    General weakness    Dementia    Gastrointestinal hemorrhage with melena    Class 2 obesity in adult    Type 2 diabetes mellitus with hyperglycemia, with long-term current use of insulin    Anemia    Duodenal ulcer    Gait difficulty    Uncontrolled type 2 diabetes mellitus with hyperglycemia    Multiple chronic diseases    Primary osteoarthritis of left knee     Past Medical History:   Diagnosis Date    Allergic rhinitis     Arthritis     Asthma     Chronic laryngitis     Diabetes mellitus     GERD (gastroesophageal reflux disease)     Hypertension     Hypertrophy of both inferior nasal turbinates     Laryngopharyngeal reflux     Nontoxic multinodular goiter     Sicca laryngitis      Past Surgical History:   Procedure Laterality Date     CARPAL TUNNEL RELEASE      CHOLECYSTECTOMY      COLONOSCOPY  10/01/2008    Multiple polyps removed    COLONOSCOPY  09/18/2013    Diverticulosis in the sigmoid colon. Dr. Dave Mcnally Recall 5 years    ENDOSCOPY N/A 12/08/2022    Non-bleeding duodenal ulcer, small HH    HAND SURGERY      HYSTERECTOMY      REPLACEMENT TOTAL KNEE Right 02/02/2021    THYROIDECTOMY, PARTIAL Right 2010    TONSILLECTOMY      TUBAL ABDOMINAL LIGATION        General Information       Row Name 12/08/23 1305          OT Time and Intention    Document Type evaluation  L knee primary OA. s/p 12/8 L cemented post stabilized knee arthroplasty (FWB)  -     Mode of Treatment occupational therapy  -J       Row Name 12/08/23 1305          General Information    Patient Profile Reviewed yes  -JJ     Prior Level of Function independent:;all household mobility;transfer;bed mobility;ADL's  -J     Existing Precautions/Restrictions fall  FWB L LE  -JJ     Barriers to Rehab physical barrier  -       Row Name 12/08/23 1305          Occupational Profile    Environmental Supports and Barriers (Occupational Profile) walk in shower. owns w/c, BSC, RW  -       Row Name 12/08/23 1305          Living Environment    People in Home spouse  -       Row Name 12/08/23 1305          Home Main Entrance    Number of Stairs, Main Entrance none;other (see comments)  ramp  -JJ     Stair Railings, Main Entrance none  -       Row Name 12/08/23 1305          Stairs Within Home, Primary    Number of Stairs, Within Home, Primary none  -J       Row Name 12/08/23 1305          Cognition    Orientation Status (Cognition) oriented x 4  -       Row Name 12/08/23 1305          Safety Issues, Functional Mobility    Impairments Affecting Function (Mobility) pain;range of motion (ROM)  -               User Key  (r) = Recorded By, (t) = Taken By, (c) = Cosigned By      Initials Name Provider Type    Serena Waller, OTR/L, CSRS Occupational Therapist                    Mobility/ADL's       Row Name 12/08/23 1305          Bed Mobility    Bed Mobility supine-sit  -     Supine-Sit [place] (Bed Mobility) contact guard  -     Assistive Device (Bed Mobility) head of bed elevated;bed rails  -       Row Name 12/08/23 1305          Transfers    Transfers sit-stand transfer;stand-sit transfer  -       Row Name 12/08/23 1305          Sit-Stand Transfer    Sit-Stand [place] (Transfers) supervision;contact guard  -       Row Name 12/08/23 1305          Stand-Sit Transfer    Stand-Sit [place] (Transfers) supervision;contact guard  -[facility] Name 12/08/23 1305          Functional Mobility    Functional Mobility- Ind. Level supervision required  -     Functional Mobility- Device walker, front-wheeled  -     Functional Mobility- Comment in hallway.  -     Patient was able to Ambulate yes  -[facility] Name 12/08/23 1305          Activities of Daily Living    BADL Assessment/Intervention lower body dressing  -JJ       Row Name 12/08/23 1305          Mobility    Extremity Weight-bearing Status left lower extremity  -     Left Lower Extremity (Weight-bearing Status) full weight-bearing (FWB)  -[facility] Name 12/08/23 1305          Lower Body Dressing Assessment/Training    [place] Level (Lower Body Dressing) don;maximum assist (25% patient effort)  -     Position (Lower Body Dressing) edge of bed sitting  -     Comment, (Lower Body Dressing) L sock due to limites ROM. Reports spouse will be at home to assist with tasks.  -               User Key  (r) = Recorded By, (t) = Taken By, (c) = Cosigned By      Initials Name Provider Type    Serena Waller OTR/L, CSRS Occupational Therapist                   Obj/Interventions       Row Name 12/08/23 1305          Sensory Assessment (Somatosensory)    Sensory Assessment (Somatosensory) UE sensation intact  -       Row Name 12/08/23 1305          Vision Assessment/Intervention    Visual  Impairment/Limitations WFL  -JJ       Row Name 12/08/23 1305          Range of Motion Comprehensive    General Range of Motion bilateral upper extremity ROM WFL  -J       Row Name 12/08/23 1305          Strength Comprehensive (MMT)    Comment, General Manual Muscle Testing (MMT) Assessment B UE strength 5/5  -JJ       Row Name 12/08/23 1305          Balance    Balance Assessment sitting static balance;sitting dynamic balance;standing dynamic balance;standing static balance  -JJ     Static Sitting Balance independent  -JJ     Dynamic Sitting Balance independent  -JJ     Position, Sitting Balance unsupported;sitting edge of bed  -JJ     Static Standing Balance supervision  -JJ     Dynamic Standing Balance supervision;contact guard  -JJ     Position/Device Used, Standing Balance supported;walker, front-wheeled  -J               User Key  (r) = Recorded By, (t) = Taken By, (c) = Cosigned By      Initials Name Provider Type    Serena Waller, OTR/L, CSRS Occupational Therapist                   Goals/Plan    No documentation.                  Clinical Impression       Row Name 12/08/23 1305          Pain Assessment    Pretreatment Pain Rating 3/10  -JJ     Posttreatment Pain Rating 3/10  -JJ     Pain Location - Side/Orientation Left  -JJ     Pain Location incisional  -JJ     Pain Location - knee  -JJ     Pain Intervention(s) Medication (See MAR);Repositioned;Ambulation/increased activity  -JJ       Row Name 12/08/23 1305          Plan of Care Review    Plan of Care Reviewed With patient  -JJ     Outcome Evaluation OT eval completed. Pt presents alert and oriented x4. She reports at baseline being independent with all adls and mobility, residing at home with spouse. Today she was educated on knee precations and FWB L LE. She was able to bring self to sitting at EOB with CGA. Max A to don L socks, but pt reports spouse will be at home to assist with these tasks. Sup-CGA for sit <> stand t/f and functional mobility  in hallway with use of rwx. At this time pt does not display deficits which require skilled OT services. Will defer continued mobility training to PT. Anticipate return home with spouse. OT to sign off.  -       Row Name 12/08/23 1305          Therapy Assessment/Plan (OT)    Criteria for Skilled Therapeutic Interventions Met (OT) no;no problems identified which require skilled intervention  -J     Therapy Frequency (OT) evaluation only  -J       Row Name 12/08/23 1305          Therapy Plan Review/Discharge Plan (OT)    Anticipated Discharge Disposition (OT) home with assist  -       Row Name 12/08/23 1305          Positioning and Restraints    Pre-Treatment Position in bed  -JJ     Post Treatment Position chair  -JJ     In Chair notified nsg;reclined;call light within reach;encouraged to call for assist  -               User Key  (r) = Recorded By, (t) = Taken By, (c) = Cosigned By      Initials Name Provider Type    Serena Waller OTR/L, CSRS Occupational Therapist                   Outcome Measures       Row Name 12/08/23 1305          How much help from another is currently needed...    Putting on and taking off regular lower body clothing? 3  -JJ     Bathing (including washing, rinsing, and drying) 3  -JJ     Toileting (which includes using toilet bed pan or urinal) 4  -JJ     Putting on and taking off regular upper body clothing 4  -JJ     Taking care of personal grooming (such as brushing teeth) 4  -JJ     Eating meals 4  -JJ     AM-PAC 6 Clicks Score (OT) 22  -       Row Name 12/08/23 1305          Functional Assessment    Outcome Measure Options AM-PAC 6 Clicks Daily Activity (OT)  -               User Key  (r) = Recorded By, (t) = Taken By, (c) = Cosigned By      Initials Name Provider Type    Serena Waller OTR/L, CSRS Occupational Therapist                  Occupational Therapy Education       Title: PT OT SLP Therapies (In Progress)       Topic: Occupational Therapy (In  Progress)       Point: ADL training (Done)       Description:   Instruct learner(s) on proper safety adaptation and remediation techniques during self care or transfers.   Instruct in proper use of assistive devices.                  Learning Progress Summary             Patient Acceptance, E, VU by  at 12/8/2023 1406                         Point: Home exercise program (Not Started)       Description:   Instruct learner(s) on appropriate technique for monitoring, assisting and/or progressing therapeutic exercises/activities.                  Learner Progress:  Not documented in this visit.              Point: Precautions (Not Started)       Description:   Instruct learner(s) on prescribed precautions during self-care and functional transfers.                  Learner Progress:  Not documented in this visit.              Point: Body mechanics (Done)       Description:   Instruct learner(s) on proper positioning and spine alignment during self-care, functional mobility activities and/or exercises.                  Learning Progress Summary             Patient Acceptance, E, VU by KIMMIE at 12/8/2023 1406                                         User Key       Initials Effective Dates Name Provider Type Discipline     07/11/23 -  Serena Pandya, OTR/L, CSRS Occupational Therapist OT                  OT Recommendation and Plan  Therapy Frequency (OT): evaluation only  Plan of Care Review  Plan of Care Reviewed With: patient  Outcome Evaluation: OT eval completed. Pt presents alert and oriented x4. She reports at baseline being independent with all adls and mobility, residing at home with spouse. Today she was educated on knee precations and FWB L LE. She was able to bring self to sitting at EOB with CGA. Max A to don L socks, but pt reports spouse will be at home to assist with these tasks. Sup-CGA for sit <> stand t/f and functional mobility in hallway with use of rwx. At this time pt does not display deficits  which require skilled OT services. Will defer continued mobility training to PT. Anticipate return home with spouse. OT to sign off.  Plan of Care Reviewed With: patient  Outcome Evaluation: OT eval completed. Pt presents alert and oriented x4. She reports at baseline being independent with all adls and mobility, residing at home with spouse. Today she was educated on knee precations and FWB L LE. She was able to bring self to sitting at EOB with CGA. Max A to don L socks, but pt reports spouse will be at home to assist with these tasks. Sup-CGA for sit <> stand t/f and functional mobility in hallway with use of rwx. At this time pt does not display deficits which require skilled OT services. Will defer continued mobility training to PT. Anticipate return home with spouse. OT to sign off.     Time Calculation:         Time Calculation- OT       Row Name 12/08/23 1305             Time Calculation- OT    OT Start Time 1305  -      OT Stop Time 1358  -      OT Time Calculation (min) 53 min  -      OT Received On 12/08/23  -                User Key  (r) = Recorded By, (t) = Taken By, (c) = Cosigned By      Initials Name Provider Type    Serena Waller OTR/L, CSRS Occupational Therapist                  Therapy Charges for Today       Code Description Service Date Service Provider Modifiers Qty    [id number]  OT EVAL LOW COMPLEXITY 4 12/8/2023 Serena Pandya OTR/L, CSRS GO 1               OT Discharge Summary  Anticipated Discharge Disposition (OT): home with assist  Reason for Discharge: At baseline function  Outcomes Achieved: Other (eval x1)  Discharge Destination: Home with assist    Serena Pandya, OTR/L, CSRS  12/8/2023

## 2023-12-08 NOTE — PLAN OF CARE
Goal Outcome Evaluation:  Plan of Care Reviewed With: patient           Outcome Evaluation: OT eval completed. Pt presents alert and oriented x4. She reports at baseline being independent with all adls and mobility, residing at home with spouse. Today she was educated on knee precations and FWB L LE. She was able to bring self to sitting at EOB with CGA. Max A to don L socks, but pt reports spouse will be at home to assist with these tasks. Sup-CGA for sit <> stand t/f and functional mobility in hallway with use of rwx. At this time pt does not display deficits which require skilled OT services. Will defer continued mobility training to PT. Anticipate return home with spouse. OT to sign off.      Anticipated Discharge Disposition (OT): home with assist

## 2023-12-08 NOTE — ANESTHESIA PROCEDURE NOTES
Peripheral Block      Patient reassessed immediately prior to procedure    Patient location during procedure: holding area  Start time: 12/8/2023 8:07 AM  Stop time: 12/8/2023 8:08 AM  Reason for block: procedure for pain, at surgeon's request, post-op pain management and Requested by   Performed by  Anesthesiologist: Tani Barraza MD  Preanesthetic Checklist  Completed: patient identified, IV checked, site marked, risks and benefits discussed, surgical consent, monitors and equipment checked, pre-op evaluation and timeout performed  Prep:  Pt Position: supine  Prep: ChloraPrep  Patient monitoring: blood pressure monitoring, continuous pulse oximetry and EKG  Procedure    Sedation: yes    Guidance:ultrasound guided and femoral artery identified in adductor canal and local anesthetic seen surrounding artery    ULTRASOUND INTERPRETATION.  Using ultrasound guidance a 20 G gauge needle was placed in close proximity to the nerve, at which point, under ultrasound guidance anesthetic was injected in the area of the nerve and spread of the anesthesia was seen on ultrasound in close proximity thereto.  There were no abnormalities seen on ultrasound; a digital image was taken; and the patient tolerated the procedure with no complications. Images:still images obtained, printed/placed on chart (picture printed and placed in patients chart)    Laterality:left  Block Type:adductor canal block  Injection Technique:single-shot  Needle Type:echogenic      Medications Used: ropivacaine (NAROPIN) injection 0.5 % - Injection   20 mL - 12/8/2023 8:07:00 AM      Post Assessment  Injection Assessment: negative aspiration for heme, no paresthesia on injection and incremental injection  Patient Tolerance:comfortable throughout block  Complications:no

## 2023-12-08 NOTE — ANESTHESIA POSTPROCEDURE EVALUATION
"Patient: Kezia Otoole    Procedure Summary       Date: 12/08/23 Room / Location:  [facility] 09 /  [facility]    Anesthesia Start: 0814 Anesthesia Stop: 1004    Procedure: LEFT COMPLEX TOTAL KNEE REPLACEMENT (Left: Knee) Diagnosis: (M17.12)    Surgeons: PATRICIA Gould MD Provider: YOVANY Nascimento CRNA    Anesthesia Type: general with block ASA Status: 3            Anesthesia Type: general with block    Vitals  Vitals Value Taken Time   /71 12/08/23 1150   Temp 97.4 °F (36.3 °C) 12/08/23 1150   Pulse 66 12/08/23 1150   Resp 16 12/08/23 1150   SpO2 96 % 12/08/23 1150           Post Anesthesia Care and Evaluation    Patient location during evaluation: PACU  Patient participation: complete - patient participated  Level of consciousness: awake and alert  Pain management: adequate    Airway patency: patent  Anesthetic complications: No anesthetic complications    Cardiovascular status: acceptable  Respiratory status: acceptable  Hydration status: acceptable    Comments: Blood pressure 124/78, pulse 66, temperature 97.6 °F (36.4 °C), temperature source Oral, resp. rate 18, height 157 cm (61.81\"), weight 105 kg (231 lb 7.7 oz), SpO2 98%, not currently breastfeeding.    Pt discharged from PACU based on namita score >8    "

## 2023-12-08 NOTE — CASE MANAGEMENT/SOCIAL WORK
"Continued Stay Note   [place]     Patient Name: Kezia Otoole  MRN: [id number]  Today's Date: 12/8/2023    Admit Date: 12/8/2023        Discharge Plan       Row Name 12/08/23 1400       Plan    Plan Comments Received order for \"rehab [facility]\". Therapy is currently working with pt. Pt is currently in outpt status therefore inpt rehab will not be covered. Will follow to see how pt does with therapy.                   Discharge Codes    No documentation.                       SERENA Staples    "

## 2023-12-09 LAB
ANION GAP SERPL CALCULATED.3IONS-SCNC: 9 MMOL/L (ref 5–15)
BUN SERPL-MCNC: 20 MG/DL (ref 8–23)
BUN/CREAT SERPL: 26 (ref 7–25)
CALCIUM SPEC-SCNC: 9.2 MG/DL (ref 8.6–10.5)
CHLORIDE SERPL-SCNC: 100 MMOL/L (ref 98–107)
CO2 SERPL-SCNC: 26 MMOL/L (ref 22–29)
CREAT SERPL-MCNC: 0.77 MG/DL (ref 0.57–1)
EGFRCR SERPLBLD CKD-EPI 2021: 80.6 ML/MIN/1.73
GLUCOSE BLDC GLUCOMTR-MCNC: 147 MG/DL (ref 70–130)
GLUCOSE BLDC GLUCOMTR-MCNC: 167 MG/DL (ref 70–130)
GLUCOSE BLDC GLUCOMTR-MCNC: 181 MG/DL (ref 70–130)
GLUCOSE BLDC GLUCOMTR-MCNC: 250 MG/DL (ref 70–130)
GLUCOSE SERPL-MCNC: 202 MG/DL (ref 65–99)
HBA1C MFR BLD: 8.1 % (ref 4.8–5.6)
HCT VFR BLD AUTO: 38 % (ref 34–46.6)
HGB BLD-MCNC: 12.3 G/DL (ref 12–15.9)
POTASSIUM SERPL-SCNC: 4.4 MMOL/L (ref 3.5–5.2)
SODIUM SERPL-SCNC: 135 MMOL/L (ref 136–145)

## 2023-12-09 PROCEDURE — A9270 NON-COVERED ITEM OR SERVICE: HCPCS | Performed by: ORTHOPAEDIC SURGERY

## 2023-12-09 PROCEDURE — 85014 HEMATOCRIT: CPT | Performed by: ORTHOPAEDIC SURGERY

## 2023-12-09 PROCEDURE — 94640 AIRWAY INHALATION TREATMENT: CPT

## 2023-12-09 PROCEDURE — A9270 NON-COVERED ITEM OR SERVICE: HCPCS | Performed by: STUDENT IN AN ORGANIZED HEALTH CARE EDUCATION/TRAINING PROGRAM

## 2023-12-09 PROCEDURE — 63710000001 ASPIRIN 325 MG TABLET: Performed by: ORTHOPAEDIC SURGERY

## 2023-12-09 PROCEDURE — 63710000001 CALCIUM CARB-CHOLECALCIFEROL 600-20 MG-MCG TABLET: Performed by: STUDENT IN AN ORGANIZED HEALTH CARE EDUCATION/TRAINING PROGRAM

## 2023-12-09 PROCEDURE — 63710000001 ATORVASTATIN 10 MG TABLET: Performed by: STUDENT IN AN ORGANIZED HEALTH CARE EDUCATION/TRAINING PROGRAM

## 2023-12-09 PROCEDURE — 63710000001 EMPAGLIFLOZIN 25 MG TABLET: Performed by: STUDENT IN AN ORGANIZED HEALTH CARE EDUCATION/TRAINING PROGRAM

## 2023-12-09 PROCEDURE — 94799 UNLISTED PULMONARY SVC/PX: CPT

## 2023-12-09 PROCEDURE — 63710000001 PANTOPRAZOLE 40 MG TABLET DELAYED-RELEASE: Performed by: STUDENT IN AN ORGANIZED HEALTH CARE EDUCATION/TRAINING PROGRAM

## 2023-12-09 PROCEDURE — 80048 BASIC METABOLIC PNL TOTAL CA: CPT | Performed by: ORTHOPAEDIC SURGERY

## 2023-12-09 PROCEDURE — 63710000001 MONTELUKAST 10 MG TABLET: Performed by: STUDENT IN AN ORGANIZED HEALTH CARE EDUCATION/TRAINING PROGRAM

## 2023-12-09 PROCEDURE — 25010000002 CEFAZOLIN PER 500 MG: Performed by: ORTHOPAEDIC SURGERY

## 2023-12-09 PROCEDURE — 82948 REAGENT STRIP/BLOOD GLUCOSE: CPT

## 2023-12-09 PROCEDURE — 63710000001 DULOXETINE 30 MG CAPSULE DELAYED-RELEASE PARTICLES: Performed by: STUDENT IN AN ORGANIZED HEALTH CARE EDUCATION/TRAINING PROGRAM

## 2023-12-09 PROCEDURE — 63710000001 BUDESONIDE-FORMOTEROL 160-4.5 MCG/ACT AEROSOL 6 G INHALER: Performed by: STUDENT IN AN ORGANIZED HEALTH CARE EDUCATION/TRAINING PROGRAM

## 2023-12-09 PROCEDURE — 97110 THERAPEUTIC EXERCISES: CPT

## 2023-12-09 PROCEDURE — 97116 GAIT TRAINING THERAPY: CPT

## 2023-12-09 PROCEDURE — 94664 DEMO&/EVAL PT USE INHALER: CPT

## 2023-12-09 PROCEDURE — 63710000001 VITAMIN D3 125 MCG (5000 UT) CAPSULE: Performed by: STUDENT IN AN ORGANIZED HEALTH CARE EDUCATION/TRAINING PROGRAM

## 2023-12-09 PROCEDURE — 25810000003 LACTATED RINGERS PER 1000 ML: Performed by: ORTHOPAEDIC SURGERY

## 2023-12-09 PROCEDURE — 63710000001 INSULIN LISPRO (HUMAN) PER 5 UNITS: Performed by: STUDENT IN AN ORGANIZED HEALTH CARE EDUCATION/TRAINING PROGRAM

## 2023-12-09 PROCEDURE — 83036 HEMOGLOBIN GLYCOSYLATED A1C: CPT | Performed by: FAMILY MEDICINE

## 2023-12-09 PROCEDURE — 63710000001 INSULIN DETEMIR PER 5 UNITS: Performed by: STUDENT IN AN ORGANIZED HEALTH CARE EDUCATION/TRAINING PROGRAM

## 2023-12-09 PROCEDURE — 63710000001 MELOXICAM 7.5 MG TABLET: Performed by: ORTHOPAEDIC SURGERY

## 2023-12-09 PROCEDURE — 85018 HEMOGLOBIN: CPT | Performed by: ORTHOPAEDIC SURGERY

## 2023-12-09 PROCEDURE — 63710000001 OXYCODONE 5 MG TABLET: Performed by: ORTHOPAEDIC SURGERY

## 2023-12-09 RX ADMIN — MONTELUKAST SODIUM 10 MG: 10 TABLET, FILM COATED ORAL at 21:43

## 2023-12-09 RX ADMIN — INSULIN LISPRO 8 UNITS: 100 INJECTION, SOLUTION INTRAVENOUS; SUBCUTANEOUS at 12:31

## 2023-12-09 RX ADMIN — OXYCODONE HYDROCHLORIDE 5 MG: 5 TABLET ORAL at 05:23

## 2023-12-09 RX ADMIN — INSULIN DETEMIR 20 UNITS: 100 INJECTION, SOLUTION SUBCUTANEOUS at 21:43

## 2023-12-09 RX ADMIN — Medication 1 TABLET: at 09:17

## 2023-12-09 RX ADMIN — CEFAZOLIN 2000 MG: 2 INJECTION, POWDER, FOR SOLUTION INTRAMUSCULAR; INTRAVENOUS at 00:26

## 2023-12-09 RX ADMIN — PANTOPRAZOLE SODIUM 40 MG: 40 TABLET, DELAYED RELEASE ORAL at 09:19

## 2023-12-09 RX ADMIN — MELOXICAM 15 MG: 7.5 TABLET ORAL at 09:18

## 2023-12-09 RX ADMIN — ASPIRIN 325 MG: 325 TABLET, FILM COATED ORAL at 21:43

## 2023-12-09 RX ADMIN — BUDESONIDE AND FORMOTEROL FUMARATE DIHYDRATE 2 PUFF: 160; 4.5 AEROSOL RESPIRATORY (INHALATION) at 20:11

## 2023-12-09 RX ADMIN — EMPAGLIFLOZIN 25 MG: 25 TABLET, FILM COATED ORAL at 09:18

## 2023-12-09 RX ADMIN — OXYCODONE HYDROCHLORIDE 5 MG: 5 TABLET ORAL at 11:00

## 2023-12-09 RX ADMIN — ASPIRIN 325 MG: 325 TABLET, FILM COATED ORAL at 09:18

## 2023-12-09 RX ADMIN — INSULIN LISPRO 3 UNITS: 100 INJECTION, SOLUTION INTRAVENOUS; SUBCUTANEOUS at 21:43

## 2023-12-09 RX ADMIN — INSULIN DETEMIR 20 UNITS: 100 INJECTION, SOLUTION SUBCUTANEOUS at 09:17

## 2023-12-09 RX ADMIN — ATORVASTATIN CALCIUM 20 MG: 10 TABLET, FILM COATED ORAL at 21:43

## 2023-12-09 RX ADMIN — BUDESONIDE AND FORMOTEROL FUMARATE DIHYDRATE 2 PUFF: 160; 4.5 AEROSOL RESPIRATORY (INHALATION) at 06:32

## 2023-12-09 RX ADMIN — DULOXETINE HYDROCHLORIDE 60 MG: 30 CAPSULE, DELAYED RELEASE ORAL at 09:17

## 2023-12-09 RX ADMIN — INSULIN LISPRO 3 UNITS: 100 INJECTION, SOLUTION INTRAVENOUS; SUBCUTANEOUS at 17:52

## 2023-12-09 RX ADMIN — Medication 5000 UNITS: at 09:18

## 2023-12-09 RX ADMIN — SODIUM CHLORIDE, POTASSIUM CHLORIDE, SODIUM LACTATE AND CALCIUM CHLORIDE 125 ML/HR: 600; 310; 30; 20 INJECTION, SOLUTION INTRAVENOUS at 00:27

## 2023-12-09 NOTE — PLAN OF CARE
Goal Outcome Evaluation:  Plan of Care Reviewed With: patient        Progress: improving  Outcome Evaluation: Pt was able to transfer supine to sitting with CGA with HOB elevated.  Transfered sit to stand with CGA.  Pt c/o increase pain today.  Amb 60' with RWX and CGA started with step to gait pattern, able to progress to step through.  Amb into the bathroom, able to transfer with CGA.  Pt states she does not feel ready to go home.  However, if pt continues to progress well should be able to go home with home health vs outpatient.

## 2023-12-09 NOTE — PROGRESS NOTES
"Subjective:     Post-Operative Day: 1 No complaints.  Walked in [place] this am    Objective:     Patient Vitals for the past 24 hrs:   BP Temp Temp src Pulse Resp SpO2 Height Weight   12/09/23 0755 123/46 98.3 °F (36.8 °C) Oral 74 16 97 % -- --   12/09/23 0632 -- -- -- 74 16 99 % -- --   12/09/23 0336 124/68 98.3 °F (36.8 °C) Oral 80 16 97 % -- --   12/08/23 2359 104/46 97.7 °F (36.5 °C) Oral 74 16 96 % -- --   12/08/23 2003 123/51 98.2 °F (36.8 °C) Oral 73 16 97 % -- --   12/08/23 1527 (!) 80/65 97.8 °F (36.6 °C) Oral 69 18 93 % -- --   12/08/23 1239 140/52 97.7 °F (36.5 °C) Oral 65 18 96 % -- --   12/08/23 1208 124/78 97.6 °F (36.4 °C) Oral 66 18 98 % 157 cm (61.81\") 105 kg (231 lb 7.7 oz)   12/08/23 1150 131/71 97.4 °F (36.3 °C) Temporal 66 16 96 % -- --   12/08/23 1135 130/60 -- -- 69 16 96 % -- --   12/08/23 1120 137/62 -- -- 66 16 97 % -- --   12/08/23 1105 131/69 -- -- 68 14 97 % -- --   12/08/23 1050 133/65 -- -- 65 16 98 % -- --   12/08/23 1035 125/81 -- -- 65 16 97 % -- --   12/08/23 1020 127/80 -- -- 68 16 95 % -- --   12/08/23 1015 122/82 -- -- 69 14 96 % -- --   12/08/23 1010 132/52 -- -- 73 14 95 % -- --   12/08/23 1006 129/64 97.6 °F (36.4 °C) Temporal 65 14 97 % -- --       General: alert, appears stated age, and cooperative   Wound: Dressing clean,dry and intact and mod swelling    Neurovascular: Exam normal   DVT Exam: No evidence of DVT seen on physical exam.         Data Review:    LABS:  Results from last 7 days   Lab Units 12/09/23  0426   HEMOGLOBIN g/dL 12.3       Results from last 7 days   Lab Units 12/09/23  0426   SODIUM mmol/L 135*   POTASSIUM mmol/L 4.4   CREATININE mg/dL 0.77           Assessment:     Status Post left  Total Knee Arthroplasty. Doing well without complication.  Plan:     Pain control  PT/OT  DVT prophylaxis (Aspirin)  Ice and elevate  [facility]     "

## 2023-12-09 NOTE — PLAN OF CARE
Goal Outcome Evaluation:  Plan of Care Reviewed With: patient        Progress: improving  Outcome Evaluation: Pt A/Ox4. Pt appears to be resting comfortably throughout the shift, no c/o pain thus far. Drsg on L knee remains cdi. PPP. Upx1 with walker to BR. IVF and abx infusing. VSS. No neuro changes. Safety maintained.

## 2023-12-09 NOTE — CONSULTS
History and Physical    Patient:  Kezia Otoole  MRN: [id number]    CHIEF COMPLAINT: Left knee pain    History Obtained From: the patient   PCP: Len Crowe MD    HISTORY OF PRESENT ILLNESS:   The patient is a 73 y.o. female who presents with ongoing chronic osteoarthritis of her left knee having failed maximal outpatient management including physical therapy, injections, NSAIDs.  Underwent TKA 12/8/2023 without complication.  She is resting comfortably in bed this morning and reports that her pain is reasonably well-controlled.  She does have underlying type 2 diabetes on insulin that she reports has not been incredibly well-controlled of late.  Last A1c was 9% but does report ports that her glucose has been better within the last month or so.  She also has depression for which she takes Cymbalta.  She is open obese with a BMI of 42.  Has hypertension for which she is on medication as well as hyperlipidemia.  Has not yet had a bowel movement since surgery.    REVIEW OF SYSTEMS:    Review of Systems   Constitutional:  Negative for chills and fever.   HENT:  Negative for congestion and sore throat.    Respiratory:  Negative for cough and shortness of breath.    Cardiovascular:  Negative for chest pain and leg swelling.   Gastrointestinal:  Positive for constipation. Negative for anal bleeding, diarrhea, nausea and vomiting.   Genitourinary:  Negative for dysuria and urgency.   Musculoskeletal:  Positive for arthralgias. Negative for back pain and neck pain.   Skin:  Negative for pallor and rash.   Neurological:  Negative for dizziness and numbness.   Psychiatric/Behavioral:  Negative for agitation and confusion.           Past Medical History:  Past Medical History:   Diagnosis Date    Allergic rhinitis     Arthritis     Asthma     Chronic laryngitis     Diabetes mellitus     GERD (gastroesophageal reflux disease)     Hypertension     Hypertrophy of both inferior nasal turbinates      Laryngopharyngeal reflux     Nontoxic multinodular goiter     Sicca laryngitis        Past Surgical History:  Past Surgical History:   Procedure Laterality Date    CARPAL TUNNEL RELEASE      CHOLECYSTECTOMY      COLONOSCOPY  10/01/2008    Multiple polyps removed    COLONOSCOPY  09/18/2013    Diverticulosis in the sigmoid colon. Dr. Dave Mcnally Recall 5 years    ENDOSCOPY N/A 12/08/2022    Non-bleeding duodenal ulcer, small HH    HAND SURGERY      HYSTERECTOMY      REPLACEMENT TOTAL KNEE Right 02/02/2021    THYROIDECTOMY, PARTIAL Right 2010    TONSILLECTOMY      TUBAL ABDOMINAL LIGATION         Medications Prior to Admission:    Medications Prior to Admission   Medication Sig Dispense Refill Last Dose    albuterol sulfate  (90 Base) MCG/ACT inhaler Inhale 2 puffs Every 4 (Four) Hours As Needed for Wheezing or Shortness of Air.   12/7/2023    amLODIPine (NORVASC) 5 MG tablet Take 1 tablet by mouth Daily.   12/7/2023    atorvastatin (LIPITOR) 20 MG tablet Take 1 tablet by mouth Daily.   12/7/2023    Calcium Carb-Cholecalciferol (CALCIUM-VITAMIN D) 600-400 MG-UNIT tablet Take 1 tablet by mouth 2 (Two) Times a Day.   12/7/2023 at 0800    Cholecalciferol 25 MCG (1000 UT) capsule Take 1 capsule by mouth Daily.   12/7/2023 at 0800    DULoxetine (CYMBALTA) 60 MG capsule Take 1 capsule by mouth Daily. 30 capsule 5 12/7/2023 at 0800    empagliflozin (JARDIANCE) 25 MG tablet tablet Take 1 tablet by mouth Daily. 30 tablet 5 12/7/2023 at 0800    guaiFENesin (MUCINEX) 600 MG 12 hr tablet Take 1 tablet by mouth 2 (Two) Times a Day As Needed for Cough or Congestion.   12/7/2023 at 0800    hydroCHLOROthiazide (MICROZIDE) 12.5 MG capsule Take 1 capsule by mouth Every Morning.   12/7/2023 at 0800    insulin detemir (LEVEMIR) 100 UNIT/ML injection Inject 110 Units under the skin into the appropriate area as directed 2 (Two) Times a Day.   12/7/2023    Insulin Lispro, 1 Unit Dial, (HumaLOG KwikPen) 100 UNIT/ML solution  "pen-injector Inject 7 Units under the skin into the appropriate area as directed 3 (Three) Times a Day. Max units 21 per 24 hours 20 mL 1 12/7/2023 at 2000    irbesartan (AVAPRO) 300 MG tablet Take 1 tablet by mouth Daily.   12/7/2023    Misc Natural Products (GLUCOSAMINE CHONDROITIN TRIPLE PO) Take 1 tablet by mouth 2 (Two) Times a Day.   12/7/2023 at 0800    montelukast (SINGULAIR) 10 MG tablet Take 1 tablet by mouth Daily.   12/7/2023    Multiple Vitamins-Minerals (VITRUM 50+ SENIOR MULTI PO) Take 1 tablet by mouth Daily.   12/7/2023 at 0800    mupirocin (BACTROBAN) 2 % ointment Apply 1 application  topically to the appropriate area as directed 3 (Three) Times a Day.   Past Week    oxyCODONE (ROXICODONE) 5 MG immediate release tablet Take 1 tablet by mouth Every 6 (Six) Hours As Needed for Moderate Pain.   Past Week    pantoprazole (PROTONIX) 40 MG EC tablet Take 1 tablet by mouth Daily.   12/7/2023    spironolactone (ALDACTONE) 25 MG tablet Take 0.5 tablets by mouth Daily.   12/7/2023    Blood Glucose Monitoring Suppl (OneTouch Verio Flex System) w/Device kit 1 each As Needed (emergency use if no CGM in place). 1 kit 0     budesonide-formoterol (SYMBICORT) 160-4.5 MCG/ACT inhaler Inhale 2 puffs 2 (Two) Times a Day.   Unknown    Continuous Blood Gluc Sensor (FreeStyle Sue 2 Sensor) misc USE TO TEST FOUR TIMES DAILY WITH SLIDING SCALE THREE TIMES DAILY AND LANTUS AT BEDTIME. 2 each 3     denosumab (PROLIA) 60 MG/ML solution syringe Inject 1 mL under the skin into the appropriate area as directed Every 6 (Six) Months.   More than a month    TRUEplus 5-Bevel Pen Needles 31G X 6 MM misc ONE EACH FIVE TIMES A DAY. TWICE DAILY WITH LEVEMIR, THREE TIMES DAILY WITH HUMALOG AS NEEDED 200 each 5     TRUEplus Insulin Syringe 31G X 5/16\" 0.3 ML misc USE AS DIRECTED THREE TIMES DAILY 90 each 5     TRUEplus Insulin Syringe 31G X 5/16\" 0.5 ML misc USE AS DIRECTED TWICE DAILY WITH LEVEMIR 60 each 5        Allergies:  Adhesive " "tape and Other    Social History:   Social History     Socioeconomic History    Marital status:      Spouse name: Laurie    Number of children: 2    Years of education: 16   Tobacco Use    Smoking status: Never    Smokeless tobacco: Never    Tobacco comments:     \"lived with smokers most of my life\"   Vaping Use    Vaping Use: Never used   Substance and Sexual Activity    Alcohol use: No     Comment: Rare    Drug use: No    Sexual activity: Defer     Partners: Male     Birth control/protection: None       Family History:   Family History   Problem Relation Age of Onset    Diabetes Mother     Stroke Mother     Heart disease Mother     Heart disease Father     Colon cancer Neg Hx     Colon polyps Neg Hx            Physical Exam:    Vitals: /55 (BP Location: Left arm, Patient Position: Lying)   Pulse 76   Temp 98.3 °F (36.8 °C) (Oral)   Resp 16   Ht 157 cm (61.81\")   Wt 105 kg (231 lb 7.7 oz)   SpO2 94%   BMI 42.60 kg/m²   Physical Exam  Constitutional:       Appearance: She is well-developed. She is obese.   HENT:      Head: Normocephalic and atraumatic.   Eyes:      Conjunctiva/sclera: Conjunctivae normal.      Pupils: Pupils are equal, round, and reactive to light.   Cardiovascular:      Rate and Rhythm: Normal rate and regular rhythm.      Heart sounds: Normal heart sounds. No murmur heard.     No friction rub.   Pulmonary:      Effort: Pulmonary effort is normal. No respiratory distress.      Breath sounds: Normal breath sounds. No wheezing or rales.   Abdominal:      General: Bowel sounds are normal. There is no distension.      Palpations: Abdomen is soft.      Tenderness: There is no abdominal tenderness.   Musculoskeletal:      Cervical back: Normal range of motion.      Comments: Left knee incision CDI   Skin:     Capillary Refill: Capillary refill takes less than 2 seconds.   Neurological:      Mental Status: She is alert and oriented to person, place, and time.      Cranial Nerves: No " cranial nerve deficit.   Psychiatric:         Behavior: Behavior normal.           Lab Results (last 24 hours)       Procedure Component Value Units Date/Time    POC Glucose Once [[id number]]  (Abnormal) Collected: 12/09/23 0758    Specimen: Blood Updated: 12/09/23 0809     Glucose 147 mg/dL      Comment: : [id number] Cookie Ambrosio ID: [id number]       Basic Metabolic Panel [[id number]]  (Abnormal) Collected: 12/09/23 0426    Specimen: Blood Updated: 12/09/23 0513     Glucose 202 mg/dL      BUN 20 mg/dL      Creatinine 0.77 mg/dL      Sodium 135 mmol/L      Potassium 4.4 mmol/L      Chloride 100 mmol/L      CO2 26.0 mmol/L      Calcium 9.2 mg/dL      BUN/Creatinine Ratio 26.0     Anion Gap 9.0 mmol/L      eGFR 80.6 mL/min/1.73     Narrative:      GFR Normal >60  Chronic Kidney Disease <60  Kidney Failure <15    The GFR formula is only valid for adults with stable renal function between ages 18 and 70.    Hemoglobin & Hematocrit, Blood [[id number]]  (Normal) Collected: 12/09/23 0426    Specimen: Blood Updated: 12/09/23 0452     Hemoglobin 12.3 g/dL      Hematocrit 38.0 %     POC Glucose Once [[id number]]  (Abnormal) Collected: 12/08/23 1935    Specimen: Blood Updated: 12/08/23 1946     Glucose 260 mg/dL      Comment: : [id number] Divya TeriMeter ID: [id number]                -----------------------------------------------------------------    Radiology:     Peripheral Block    Result Date: 12/8/2023  Tani Barraza MD     12/8/2023  8:08 AM Peripheral Block Patient reassessed immediately prior to procedure Patient location during procedure: holding area Start time: 12/8/2023 8:07 AM Stop time: 12/8/2023 8:08 AM Reason for block: procedure for pain, at surgeon's request, post-op pain management and Requested by  Performed by Anesthesiologist: Tani Barraza MD Preanesthetic Checklist Completed: patient identified, IV checked, site marked, risks and benefits discussed, surgical  consent, monitors and equipment checked, pre-op evaluation and timeout performed Prep: Pt Position: supine Prep: ChloraPrep Patient monitoring: blood pressure monitoring, continuous pulse oximetry and EKG Procedure Sedation: yes Guidance:ultrasound guided and femoral artery identified in adductor canal and local anesthetic seen surrounding artery ULTRASOUND INTERPRETATION.  Using ultrasound guidance a 20 G gauge needle was placed in close proximity to the nerve, at which point, under ultrasound guidance anesthetic was injected in the area of the nerve and spread of the anesthesia was seen on ultrasound in close proximity thereto.  There were no abnormalities seen on ultrasound; a digital image was taken; and the patient tolerated the procedure with no complications. Images:still images obtained, printed/placed on chart (picture printed and placed in patients chart) Laterality:left Block Type:adductor canal block Injection Technique:single-shot Needle Type:echogenic Medications Used: ropivacaine (NAROPIN) injection 0.5 % - Injection  20 mL - 12/8/2023 8:07:00 AM Post Assessment Injection Assessment: negative aspiration for heme, no paresthesia on injection and incremental injection Patient Tolerance:comfortable throughout block Complications:no       Assessment and Plan   Primary osteoarthritis of left knee  Underwent TKA 12/8/2023 with Dr. Gould.  Recovering well.  Plan for rehab at discharge.  Management per primary team.    Type 2 diabetes mellitus, uncontrolled  Continue home Levemir 20 units twice daily as well as sliding scale.  May need to add back mealtime insulin but will monitor closely with suspected lower intake while in the hospital.  Check A1c given last was reported as uncontrolled.  Continue Jardiance.    Hyperlipidemia  Resume home statin    Major depressive disorder, recurrent  Resume home Cymbalta    Hypertension  Blood pressure reasonably controlled on current regimen, monitor  closely.    Disposition: Medically stable      Primary osteoarthritis of left knee    Class 3 severe obesity due to excess calories with serious comorbidity and body mass index (BMI) of 40.0 to 44.9 in adult    Depression    HTN (hypertension), benign    Hyperlipidemia-statin    Type 2 diabetes mellitus with hyperglycemia, with long-term current use of insulin      Kenyon Raygoza MD 12/9/2023 11:00 CST

## 2023-12-09 NOTE — THERAPY TREATMENT NOTE
Acute Care - Physical Therapy Treatment Note  [facility]     Patient Name: Kezia Ootole  : 1950  MRN: [id number]  Today's Date: 2023      Visit Dx:     ICD-10-CM ICD-9-CM   1. Impaired mobility [Z74.09]  Z74.09 799.89     Patient Active Problem List   Diagnosis    Class 3 severe obesity due to excess calories with serious comorbidity and body mass index (BMI) of 40.0 to 44.9 in adult    Depression    Elevated fasting glucose-see DM    Allergic rhinitis: shots    Osteoporosis: OI    Primary generalized (osteo)arthritis    HTN (hypertension), benign    Obstructive sleep apnea-txing    Menopause    Diabetes type 2, controlled    Asthma    Hoarseness-sees Ressor    Gastroesophageal reflux disease-ent    Wellness examination-done    Purpura    Glaucoma    Laboratory test`    Anticoagulated DM2/ASA 81 (11.9.17 pt chose qod)    Glaucoma suspect    Hx of colonic polyps    *Hx of anemia: ASA81,iron,gi(cp,div    *Hx-iron deficiency    Nuclear senile cataract    Excessive cerumen in ear canal    Status post joint replacement    Primary osteoarthritis of right knee    Hyperlipidemia-statin    Hypercalcemia    History of COVID-19    DKA (diabetic ketoacidosis)    General weakness    Dementia    Gastrointestinal hemorrhage with melena    Class 2 obesity in adult    Type 2 diabetes mellitus with hyperglycemia, with long-term current use of insulin    Anemia    Duodenal ulcer    Gait difficulty    Uncontrolled type 2 diabetes mellitus with hyperglycemia    Multiple chronic diseases    Primary osteoarthritis of left knee     Past Medical History:   Diagnosis Date    Allergic rhinitis     Arthritis     Asthma     Chronic laryngitis     Diabetes mellitus     GERD (gastroesophageal reflux disease)     Hypertension     Hypertrophy of both inferior nasal turbinates     Laryngopharyngeal reflux     Nontoxic multinodular goiter     Sicca laryngitis      Past Surgical History:   Procedure Laterality Date     CARPAL TUNNEL RELEASE      CHOLECYSTECTOMY      COLONOSCOPY  10/01/2008    Multiple polyps removed    COLONOSCOPY  09/18/2013    Diverticulosis in the sigmoid colon. Dr. Dave Mcnally Recall 5 years    ENDOSCOPY N/A 12/08/2022    Non-bleeding duodenal ulcer, small HH    HAND SURGERY      HYSTERECTOMY      REPLACEMENT TOTAL KNEE Right 02/02/2021    THYROIDECTOMY, PARTIAL Right 2010    TONSILLECTOMY      TUBAL ABDOMINAL LIGATION       PT Assessment (last 12 hours)       PT Evaluation and Treatment       Row Name 12/09/23 1412 12/09/23 0828       Physical Therapy Time and Intention    Subjective Information complains of;weakness;pain  -CHELLE complains of;weakness;fatigue;pain  -CHELLE    Document Type therapy note (daily note)  -CHELLE therapy note (daily note)  -CHELLE    Mode of Treatment physical therapy  -CHELLE physical therapy  -CHELLE      Row Name 12/09/23 1412 12/09/23 0828       General Information    Existing Precautions/Restrictions fall  L TKR, WBAT  -CHELLE fall  WBAT  -CHELLE      Row Name 12/09/23 1412 12/09/23 0828       Pain    Pretreatment Pain Rating 4/10  -CHELLE 4/10  -CHELLE    Posttreatment Pain Rating 4/10  -CHELLE 7/10  -CHELLE    Pain Location - Side/Orientation Left  -CHELLE Left  -CHELLE    Pain Location lower  -CHELLE --    Pain Location - extremity;knee  -CHELLE knee  -CHELLE    Pain Intervention(s) Repositioned;Ambulation/increased activity  -CHELLE Medication (See MAR);Repositioned  -CHELLE      Row Name 12/09/23 1412 12/09/23 0828       Bed Mobility    Bed Mobility supine-sit;sit-supine  -CHELLE --    Supine-Sit [place] (Bed Mobility) verbal cues;contact guard  -CHELLE verbal cues;contact guard  -CHELLE    Assistive Device (Bed Mobility) -- head of bed elevated  -CHELLE      Row Name 12/09/23 1412 12/09/23 0828       Sit-Stand Transfer    Sit-Stand [place] (Transfers) verbal cues;contact guard  -CHELLE verbal cues;contact guard  -CHELLE    Assistive Device (Sit-Stand Transfers) walker, front-wheeled  -CHELLE walker, front-wheeled  -CHELLE      Row Name 12/09/23 1412 12/09/23 0828        Stand-Sit Transfer    Stand-Sit [place] (Transfers) verbal cues;contact guard  -CHELLE verbal cues;contact guard  -CHELLE      Row Name 12/09/23 1412 12/09/23 0828       Gait/Stairs (Locomotion)    [place] Level (Gait) verbal cues;contact guard  -CHELLE verbal cues;contact guard  -CHELLE    Assistive Device (Gait) walker, front-wheeled  -CHELLE walker, front-wheeled  -CHELLE    Distance in Feet (Gait) 100  -CHELLE 70  -CHELLE    Pattern (Gait) step-through  -CHELLE step-to;step-through  started with step to, progressed to step through  -CHELLE    Deviations/Abnormal Patterns (Gait) gait speed decreased;stride length decreased;weight shifting decreased;antalgic  -CHELLE gait speed decreased;stride length decreased;weight shifting decreased  -CHELLE      Row Name 12/09/23 1412 12/09/23 0828       Motor Skills    Comments, Therapeutic Exercise sitting AROM BLE X 10  -CHELLE sitting, AROM BLE.  ankle pumps, LAQ  -CHELLE    Additional Documentation -- Comments, Therapeutic Exercise (Row)  -CHELLE      Row Name             Wound 12/08/23 0858 Left anterior knee Incision    Wound - Properties Group Placement Date: 12/08/23  -CB Placement Time: 0858  -CB Present on Original Admission: N  -CB Side: Left  -CB Orientation: anterior  -CB Location: knee  -CB Primary Wound Type: Incision  -CB    Retired Wound - Properties Group Placement Date: 12/08/23  -CB Placement Time: 0858  -CB Present on Original Admission: N  -CB Side: Left  -CB Orientation: anterior  -CB Location: knee  -CB Primary Wound Type: Incision  -CB    Retired Wound - Properties Group Date first assessed: 12/08/23  -CB Time first assessed: 0858  -CB Present on Original Admission: N  -CB Side: Left  -CB Location: knee  -CB Primary Wound Type: Incision  -CB      Row Name 12/09/23 1412 12/09/23 0828       Positioning and Restraints    Pre-Treatment Position in bed  -CHELLE in bed  -CHELLE    Post Treatment Position chair  -CHELLE chair  -CHELLE    In Chair reclined;call light within reach;encouraged to call for assist  -CHELLE  reclined;call light within reach;encouraged to call for assist;with family/caregiver  -CHELLE              User Key  (r) = Recorded By, (t) = Taken By, (c) = Cosigned By      Initials Name Provider Type    Jose Will PTA Physical Therapist Assistant    Sebastián Kennedy, RN Registered Nurse                    Physical Therapy Education       Title: PT OT SLP Therapies (In Progress)       Topic: Physical Therapy (In Progress)       Point: Mobility training (Done)       Learning Progress Summary             Patient Acceptance, E, VU,DU by KALIN at 12/8/2023 7833    Comment: benefits of activity, progression of PT POC                         Point: Home exercise program (Not Started)       Learner Progress:  Not documented in this visit.              Point: Body mechanics (Not Started)       Learner Progress:  Not documented in this visit.              Point: Precautions (Not Started)       Learner Progress:  Not documented in this visit.                              User Key       Initials Effective Dates Name Provider Type Ciarra GAGNON 02/03/23 -  Atif Esquivel, PT DPT Physical Therapist PT                  PT Recommendation and Plan     Plan of Care Reviewed With: patient  Progress: improving  Outcome Evaluation: Pt was able to transfer supine to sitting with CGA with HOB elevated.  Transfered sit to stand with CGA.  Pt c/o increase pain today.  Amb 60' with RWX and CGA started with step to gait pattern, able to progress to step through.  Amb into the bathroom, able to transfer with CGA.  Pt states she does not feel ready to go home.  However, if pt continues to progress well should be able to go home with home health vs outpatient.   Outcome Measures       Row Name 12/09/23 0917             How much help from another person do you currently need...    Turning from your back to your side while in flat bed without using bedrails? 3  -CHELLE      Moving from lying on back to sitting on the side of a flat bed  without bedrails? 3  -CHELLE      Moving to and from a bed to a chair (including a wheelchair)? 3  -CHELLE      Standing up from a chair using your arms (e.g., wheelchair, bedside chair)? 3  -CHELLE      Climbing 3-5 steps with a railing? 3  -CHELLE      To walk in hospital room? 3  -CHELLE      AM-PAC 6 Clicks Score (PT) 18  -CHELLE      Highest Level of Mobility Goal 6 --> Walk 10 steps or more  -CHELLE         Functional Assessment    Outcome Measure Options AM-PAC 6 Clicks Basic Mobility (PT)  -CHELLE                User Key  (r) = Recorded By, (t) = Taken By, (c) = Cosigned By      Initials Name Provider Type    Jose Will PTA Physical Therapist Assistant                     Time Calculation:    PT Charges       Row Name 12/09/23 1412 12/09/23 0835          Time Calculation    Start Time 1412  -CHELLE 0835  -CHELLE     Stop Time 1444  -CHELLE 0859  -CHELLE     Time Calculation (min) 32 min  -CHELLE 24 min  -CHELLE     PT Received On 12/09/23  -CHELLE 12/09/23  -CHELLE        Time Calculation- PT    Total Timed Code Minutes- PT 32 minute(s)  -CHELLE 24 minute(s)  -CHELLE        Timed Charges    46496 - PT Therapeutic Exercise Minutes 12  -CHELLE --     28808 - Gait Training Minutes  20  -CHELLE 24  -CHELLE        Total Minutes    Timed Charges Total Minutes 32  -CHELLE 24  -CHELLE      Total Minutes 32  -CHELLE 24  -CHELLE               User Key  (r) = Recorded By, (t) = Taken By, (c) = Cosigned By      Initials Name Provider Type    Jose Will PTA Physical Therapist Assistant                  Therapy Charges for Today       Code Description Service Date Service Provider Modifiers Qty    79371220436 HC GAIT TRAINING EA 15 MIN 12/9/2023 Jose Smith, PTA GP 2    97047300393 HC GAIT TRAINING EA 15 MIN 12/9/2023 Jose Smith, PTA GP 1    54106213245 HC PT THER PROC EA 15 MIN 12/9/2023 Jose Smith, PTA GP 1            PT G-Codes  Outcome Measure Options: AM-PAC 6 Clicks Basic Mobility (PT)  AM-PAC 6 Clicks Score (PT): 18  AM-PAC 6 Clicks Score (OT): 22    Jose L. Luis,  PTA  12/9/2023

## 2023-12-09 NOTE — PLAN OF CARE
Goal Outcome Evaluation:  Plan of Care Reviewed With: patient        Progress: improving       Pt A/X4, resp e/u, HRR, VSS. Pt has dressing to LLE, CDI. Ice pack applied to LLE. Pt has IV to LFA IID, CDI. Roxicodone is effective for pt's pain to LLE. Pt is up X1 assist. Pt has required s/s insulin with breakfast and lunch so far. Pt has no needs or complaints. Bed in low position, call light in reach, safety maintained.

## 2023-12-09 NOTE — THERAPY TREATMENT NOTE
Acute Care - Physical Therapy Treatment Note  [facility]     Patient Name: Kezia Otoole  : 1950  MRN: [id number]  Today's Date: 2023      Visit Dx:     ICD-10-CM ICD-9-CM   1. Impaired mobility [Z74.09]  Z74.09 799.89     Patient Active Problem List   Diagnosis    Class 3 severe obesity due to excess calories with serious comorbidity and body mass index (BMI) of 40.0 to 44.9 in adult    Depression    Elevated fasting glucose-see DM    Allergic rhinitis: shots    Osteoporosis: OI    Primary generalized (osteo)arthritis    HTN (hypertension), benign    Obstructive sleep apnea-txing    Menopause    Diabetes type 2, controlled    Asthma    Hoarseness-sees Ressor    Gastroesophageal reflux disease-ent    Wellness examination-done    Purpura    Glaucoma    Laboratory test`    Anticoagulated DM2/ASA 81 (11.9.17 pt chose qod)    Glaucoma suspect    Hx of colonic polyps    *Hx of anemia: ASA81,iron,gi(cp,div    *Hx-iron deficiency    Nuclear senile cataract    Excessive cerumen in ear canal    Status post joint replacement    Primary osteoarthritis of right knee    Hyperlipidemia-statin    Hypercalcemia    History of COVID-19    DKA (diabetic ketoacidosis)    General weakness    Dementia    Gastrointestinal hemorrhage with melena    Class 2 obesity in adult    Type 2 diabetes mellitus with hyperglycemia, with long-term current use of insulin    Anemia    Duodenal ulcer    Gait difficulty    Uncontrolled type 2 diabetes mellitus with hyperglycemia    Multiple chronic diseases    Primary osteoarthritis of left knee     Past Medical History:   Diagnosis Date    Allergic rhinitis     Arthritis     Asthma     Chronic laryngitis     Diabetes mellitus     GERD (gastroesophageal reflux disease)     Hypertension     Hypertrophy of both inferior nasal turbinates     Laryngopharyngeal reflux     Nontoxic multinodular goiter     Sicca laryngitis      Past Surgical History:   Procedure Laterality Date     CARPAL TUNNEL RELEASE      CHOLECYSTECTOMY      COLONOSCOPY  10/01/2008    Multiple polyps removed    COLONOSCOPY  09/18/2013    Diverticulosis in the sigmoid colon. Dr. Dave Mcnally Recall 5 years    ENDOSCOPY N/A 12/08/2022    Non-bleeding duodenal ulcer, small HH    HAND SURGERY      HYSTERECTOMY      REPLACEMENT TOTAL KNEE Right 02/02/2021    THYROIDECTOMY, PARTIAL Right 2010    TONSILLECTOMY      TUBAL ABDOMINAL LIGATION       PT Assessment (last 12 hours)       PT Evaluation and Treatment       Row Name 12/09/23 0828          Physical Therapy Time and Intention    Subjective Information complains of;weakness;fatigue;pain  -CHELLE     Document Type therapy note (daily note)  -CHELLE     Mode of Treatment physical therapy  -CHELLE       Row Name 12/09/23 0828          General Information    Existing Precautions/Restrictions fall  WBAT  -CHELLE       Row Name 12/09/23 0828          Pain    Pretreatment Pain Rating 4/10  -CHELLE     Posttreatment Pain Rating 7/10  -CHELLE     Pain Location - Side/Orientation Left  -CHELLE     Pain Location - knee  -CHELLE     Pain Intervention(s) Medication (See MAR);Repositioned  -CHELLE       Row Name 12/09/23 0828          Bed Mobility    Supine-Sit [place] (Bed Mobility) verbal cues;contact guard  -CHELLE     Assistive Device (Bed Mobility) head of bed elevated  -CHELLE       Row Name 12/09/23 0828          Sit-Stand Transfer    Sit-Stand [place] (Transfers) verbal cues;contact guard  -CHELLE     Assistive Device (Sit-Stand Transfers) walker, front-wheeled  -CHELLE       Row Name 12/09/23 0828          Stand-Sit Transfer    Stand-Sit [place] (Transfers) verbal cues;contact guard  -CHELLE       Row Name 12/09/23 0828          Gait/Stairs (Locomotion)    [place] Level (Gait) verbal cues;contact guard  -CHELLE     Assistive Device (Gait) walker, front-wheeled  -CHELLE     Distance in Feet (Gait) 70  -CHELLE     Pattern (Gait) step-to;step-through  started with step to, progressed to step through  -CHELLE     Deviations/Abnormal  Patterns (Gait) gait speed decreased;stride length decreased;weight shifting decreased  -CHELLE       Row Name 12/09/23 0828          Motor Skills    Comments, Therapeutic Exercise sitting, AROM BLE.  ankle pumps, LAQ  -CHELLE     Additional Documentation Comments, Therapeutic Exercise (Row)  -CHELLE       Row Name             Wound 12/08/23 0858 Left anterior knee Incision    Wound - Properties Group Placement Date: 12/08/23  -CB Placement Time: 0858 -CB Present on Original Admission: N  -CB Side: Left  -CB Orientation: anterior  -CB Location: knee  -CB Primary Wound Type: Incision  -CB    Retired Wound - Properties Group Placement Date: 12/08/23  -CB Placement Time: 0858  -CB Present on Original Admission: N  -CB Side: Left  -CB Orientation: anterior  -CB Location: knee  -CB Primary Wound Type: Incision  -CB    Retired Wound - Properties Group Date first assessed: 12/08/23  -CB Time first assessed: 0858  -CB Present on Original Admission: N  -CB Side: Left  -CB Location: knee  -CB Primary Wound Type: Incision  -CB      Row Name 12/09/23 0828          Positioning and Restraints    Pre-Treatment Position in bed  -CHELLE     Post Treatment Position chair  -CHELLE     In Chair reclined;call light within reach;encouraged to call for assist;with family/caregiver  -CHELLE               User Key  (r) = Recorded By, (t) = Taken By, (c) = Cosigned By      Initials Name Provider Type    Jose Will PTA Physical Therapist Assistant    CB Sebastián Shahid, RN Registered Nurse                    Physical Therapy Education       Title: PT OT SLP Therapies (In Progress)       Topic: Physical Therapy (In Progress)       Point: Mobility training (Done)       Learning Progress Summary             Patient Acceptance, E, ROSANNA,JAYSON by KALIN at 12/8/2023 1253    Comment: benefits of activity, progression of PT POC                         Point: Home exercise program (Not Started)       Learner Progress:  Not documented in this visit.              Point: Body  mechanics (Not Started)       Learner Progress:  Not documented in this visit.              Point: Precautions (Not Started)       Learner Progress:  Not documented in this visit.                              User Key       Initials Effective Dates Name Provider Type Ciarra GAGNON 02/03/23 -  Atif Esquivel PT DPT Physical Therapist PT                  PT Recommendation and Plan     Plan of Care Reviewed With: patient  Progress: improving  Outcome Evaluation: Pt was able to transfer supine to sitting with CGA with HOB elevated.  Transfered sit to stand with CGA.  Pt c/o increase pain today.  Amb 60' with RWX and CGA started with step to gait pattern, able to progress to step through.  Amb into the bathroom, able to transfer with CGA.  Pt states she does not feel ready to go home.  However, if pt continues to progress well should be able to go home with home health vs outpatient.   Outcome Measures       Row Name 12/09/23 0935             How much help from another person do you currently need...    Turning from your back to your side while in flat bed without using bedrails? 3  -CHELLE      Moving from lying on back to sitting on the side of a flat bed without bedrails? 3  -CHELLE      Moving to and from a bed to a chair (including a wheelchair)? 3  -CHELLE      Standing up from a chair using your arms (e.g., wheelchair, bedside chair)? 3  -CHELLE      Climbing 3-5 steps with a railing? 3  -CHELLE      To walk in hospital room? 3  -CHELLE      AM-PAC 6 Clicks Score (PT) 18  -CHELLE      Highest Level of Mobility Goal 6 --> Walk 10 steps or more  -CHELLE         Functional Assessment    Outcome Measure Options AM-PAC 6 Clicks Basic Mobility (PT)  -CHELLE                User Key  (r) = Recorded By, (t) = Taken By, (c) = Cosigned By      Initials Name Provider Type    Jose Will, PTA Physical Therapist Assistant                     Time Calculation:    PT Charges       Row Name 12/09/23 3836             Time Calculation    Start Time  0835  -CHELLE      Stop Time 0859  -CHELLE      Time Calculation (min) 24 min  -CHELLE      PT Received On 12/09/23  -CHELLE         Time Calculation- PT    Total Timed Code Minutes- PT 24 minute(s)  -CHELLE         Timed Charges    86805 - Gait Training Minutes  24  -CHELLE         Total Minutes    Timed Charges Total Minutes 24  -CHELLE       Total Minutes 24  -CHELLE                User Key  (r) = Recorded By, (t) = Taken By, (c) = Cosigned By      Initials Name Provider Type    Jose Will, PTA Physical Therapist Assistant                  Therapy Charges for Today       Code Description Service Date Service Provider Modifiers Qty    60805636943 HC GAIT TRAINING EA 15 MIN 12/9/2023 Jose Smith PTA GP 2            PT G-Codes  Outcome Measure Options: AM-PAC 6 Clicks Basic Mobility (PT)  AM-PAC 6 Clicks Score (PT): 18  AM-PAC 6 Clicks Score (OT): 22    Jose Smith PTA  12/9/2023

## 2023-12-10 LAB
ANION GAP SERPL CALCULATED.3IONS-SCNC: 9 MMOL/L (ref 5–15)
BUN SERPL-MCNC: 20 MG/DL (ref 8–23)
BUN/CREAT SERPL: 28.6 (ref 7–25)
CALCIUM SPEC-SCNC: 8.6 MG/DL (ref 8.6–10.5)
CHLORIDE SERPL-SCNC: 101 MMOL/L (ref 98–107)
CO2 SERPL-SCNC: 27 MMOL/L (ref 22–29)
CREAT SERPL-MCNC: 0.7 MG/DL (ref 0.57–1)
EGFRCR SERPLBLD CKD-EPI 2021: 90.3 ML/MIN/1.73
GLUCOSE BLDC GLUCOMTR-MCNC: 120 MG/DL (ref 70–130)
GLUCOSE BLDC GLUCOMTR-MCNC: 163 MG/DL (ref 70–130)
GLUCOSE BLDC GLUCOMTR-MCNC: 179 MG/DL (ref 70–130)
GLUCOSE BLDC GLUCOMTR-MCNC: 195 MG/DL (ref 70–130)
GLUCOSE SERPL-MCNC: 146 MG/DL (ref 65–99)
HCT VFR BLD AUTO: 35.8 % (ref 34–46.6)
HGB BLD-MCNC: 11.1 G/DL (ref 12–15.9)
POTASSIUM SERPL-SCNC: 4.2 MMOL/L (ref 3.5–5.2)
SODIUM SERPL-SCNC: 137 MMOL/L (ref 136–145)

## 2023-12-10 PROCEDURE — A9270 NON-COVERED ITEM OR SERVICE: HCPCS | Performed by: STUDENT IN AN ORGANIZED HEALTH CARE EDUCATION/TRAINING PROGRAM

## 2023-12-10 PROCEDURE — 63710000001 DULOXETINE 30 MG CAPSULE DELAYED-RELEASE PARTICLES: Performed by: STUDENT IN AN ORGANIZED HEALTH CARE EDUCATION/TRAINING PROGRAM

## 2023-12-10 PROCEDURE — 63710000001 MELOXICAM 7.5 MG TABLET: Performed by: ORTHOPAEDIC SURGERY

## 2023-12-10 PROCEDURE — 94799 UNLISTED PULMONARY SVC/PX: CPT

## 2023-12-10 PROCEDURE — A9270 NON-COVERED ITEM OR SERVICE: HCPCS | Performed by: FAMILY MEDICINE

## 2023-12-10 PROCEDURE — 97110 THERAPEUTIC EXERCISES: CPT

## 2023-12-10 PROCEDURE — A9270 NON-COVERED ITEM OR SERVICE: HCPCS | Performed by: ORTHOPAEDIC SURGERY

## 2023-12-10 PROCEDURE — 63710000001 INSULIN DETEMIR PER 5 UNITS: Performed by: STUDENT IN AN ORGANIZED HEALTH CARE EDUCATION/TRAINING PROGRAM

## 2023-12-10 PROCEDURE — 82948 REAGENT STRIP/BLOOD GLUCOSE: CPT

## 2023-12-10 PROCEDURE — 63710000001 INSULIN LISPRO (HUMAN) PER 5 UNITS: Performed by: STUDENT IN AN ORGANIZED HEALTH CARE EDUCATION/TRAINING PROGRAM

## 2023-12-10 PROCEDURE — 63710000001 VITAMIN D3 125 MCG (5000 UT) CAPSULE: Performed by: STUDENT IN AN ORGANIZED HEALTH CARE EDUCATION/TRAINING PROGRAM

## 2023-12-10 PROCEDURE — 85018 HEMOGLOBIN: CPT | Performed by: ORTHOPAEDIC SURGERY

## 2023-12-10 PROCEDURE — 63710000001 POLYETHYLENE GLYCOL 17 G PACK: Performed by: FAMILY MEDICINE

## 2023-12-10 PROCEDURE — 63710000001 ASPIRIN 325 MG TABLET: Performed by: ORTHOPAEDIC SURGERY

## 2023-12-10 PROCEDURE — 97116 GAIT TRAINING THERAPY: CPT

## 2023-12-10 PROCEDURE — 63710000001 OXYCODONE 5 MG TABLET: Performed by: ORTHOPAEDIC SURGERY

## 2023-12-10 PROCEDURE — 63710000001 EMPAGLIFLOZIN 25 MG TABLET: Performed by: STUDENT IN AN ORGANIZED HEALTH CARE EDUCATION/TRAINING PROGRAM

## 2023-12-10 PROCEDURE — 63710000001 CALCIUM CARB-CHOLECALCIFEROL 600-20 MG-MCG TABLET: Performed by: STUDENT IN AN ORGANIZED HEALTH CARE EDUCATION/TRAINING PROGRAM

## 2023-12-10 PROCEDURE — 63710000001 PANTOPRAZOLE 40 MG TABLET DELAYED-RELEASE: Performed by: STUDENT IN AN ORGANIZED HEALTH CARE EDUCATION/TRAINING PROGRAM

## 2023-12-10 PROCEDURE — 63710000001 ATORVASTATIN 10 MG TABLET: Performed by: STUDENT IN AN ORGANIZED HEALTH CARE EDUCATION/TRAINING PROGRAM

## 2023-12-10 PROCEDURE — 85014 HEMATOCRIT: CPT | Performed by: ORTHOPAEDIC SURGERY

## 2023-12-10 PROCEDURE — 80048 BASIC METABOLIC PNL TOTAL CA: CPT | Performed by: ORTHOPAEDIC SURGERY

## 2023-12-10 PROCEDURE — 63710000001 MONTELUKAST 10 MG TABLET: Performed by: STUDENT IN AN ORGANIZED HEALTH CARE EDUCATION/TRAINING PROGRAM

## 2023-12-10 RX ORDER — POLYETHYLENE GLYCOL 3350 17 G/17G
17 POWDER, FOR SOLUTION ORAL 2 TIMES DAILY
Status: DISCONTINUED | OUTPATIENT
Start: 2023-12-10 | End: 2023-12-11 | Stop reason: HOSPADM

## 2023-12-10 RX ADMIN — ASPIRIN 325 MG: 325 TABLET, FILM COATED ORAL at 08:21

## 2023-12-10 RX ADMIN — MELOXICAM 15 MG: 7.5 TABLET ORAL at 08:20

## 2023-12-10 RX ADMIN — BUDESONIDE AND FORMOTEROL FUMARATE DIHYDRATE 2 PUFF: 160; 4.5 AEROSOL RESPIRATORY (INHALATION) at 19:42

## 2023-12-10 RX ADMIN — MONTELUKAST SODIUM 10 MG: 10 TABLET, FILM COATED ORAL at 21:08

## 2023-12-10 RX ADMIN — PANTOPRAZOLE SODIUM 40 MG: 40 TABLET, DELAYED RELEASE ORAL at 08:20

## 2023-12-10 RX ADMIN — Medication 5000 UNITS: at 08:20

## 2023-12-10 RX ADMIN — DULOXETINE HYDROCHLORIDE 60 MG: 30 CAPSULE, DELAYED RELEASE ORAL at 08:21

## 2023-12-10 RX ADMIN — OXYCODONE HYDROCHLORIDE 5 MG: 5 TABLET ORAL at 07:17

## 2023-12-10 RX ADMIN — BUDESONIDE AND FORMOTEROL FUMARATE DIHYDRATE 2 PUFF: 160; 4.5 AEROSOL RESPIRATORY (INHALATION) at 06:59

## 2023-12-10 RX ADMIN — POLYETHYLENE GLYCOL 3350 17 G: 17 POWDER, FOR SOLUTION ORAL at 21:08

## 2023-12-10 RX ADMIN — Medication 1 TABLET: at 08:21

## 2023-12-10 RX ADMIN — ATORVASTATIN CALCIUM 20 MG: 10 TABLET, FILM COATED ORAL at 21:08

## 2023-12-10 RX ADMIN — ASPIRIN 325 MG: 325 TABLET, FILM COATED ORAL at 21:08

## 2023-12-10 RX ADMIN — EMPAGLIFLOZIN 25 MG: 25 TABLET, FILM COATED ORAL at 08:20

## 2023-12-10 RX ADMIN — INSULIN LISPRO 3 UNITS: 100 INJECTION, SOLUTION INTRAVENOUS; SUBCUTANEOUS at 12:36

## 2023-12-10 RX ADMIN — POLYETHYLENE GLYCOL 3350 17 G: 17 POWDER, FOR SOLUTION ORAL at 12:36

## 2023-12-10 RX ADMIN — INSULIN DETEMIR 20 UNITS: 100 INJECTION, SOLUTION SUBCUTANEOUS at 21:16

## 2023-12-10 RX ADMIN — INSULIN DETEMIR 20 UNITS: 100 INJECTION, SOLUTION SUBCUTANEOUS at 08:20

## 2023-12-10 RX ADMIN — INSULIN LISPRO 3 UNITS: 100 INJECTION, SOLUTION INTRAVENOUS; SUBCUTANEOUS at 17:40

## 2023-12-10 RX ADMIN — INSULIN LISPRO 3 UNITS: 100 INJECTION, SOLUTION INTRAVENOUS; SUBCUTANEOUS at 21:16

## 2023-12-10 RX ADMIN — OXYCODONE HYDROCHLORIDE 5 MG: 5 TABLET ORAL at 21:16

## 2023-12-10 NOTE — PROGRESS NOTES
Subjective:     Post-Operative Day: 2 No complaints.  Walked in [place] this am    Objective:     Patient Vitals for the past 24 hrs:   BP Temp Temp src Pulse Resp SpO2   12/10/23 0736 147/58 98.3 °F (36.8 °C) Oral 71 16 94 %   12/10/23 0659 -- -- -- 70 16 93 %   12/10/23 0433 141/56 97.6 °F (36.4 °C) Oral 74 16 93 %   12/10/23 0030 140/70 98.3 °F (36.8 °C) Oral 76 18 93 %   12/09/23 2014 -- -- -- 88 18 94 %   12/09/23 2011 -- -- -- 88 18 95 %   12/09/23 1933 143/50 97.7 °F (36.5 °C) Oral 81 16 94 %   12/09/23 1517 139/47 98.6 °F (37 °C) Oral 71 16 93 %   12/09/23 1054 153/55 98.3 °F (36.8 °C) Oral 76 16 94 %       General: alert, appears stated age, and cooperative   Wound: Dressing clean,dry and intact and mod swelling    Neurovascular: Exam normal   DVT Exam: No evidence of DVT seen on physical exam.         Data Review:    LABS:  Results from last 7 days   Lab Units 12/10/23  0351 12/09/23  0426   HEMOGLOBIN g/dL 11.1* 12.3       Results from last 7 days   Lab Units 12/10/23  0351 12/09/23  0426   SODIUM mmol/L 137 135*   POTASSIUM mmol/L 4.2 4.4   CREATININE mg/dL 0.70 0.77           Assessment:     Status Post left  Total Knee Arthroplasty. Doing well without complication.  Plan:     Pain control  PT/OT  DVT prophylaxis (Aspirin)  Ice and elevate  Discharge SN[facility]

## 2023-12-10 NOTE — THERAPY TREATMENT NOTE
Acute Care - Physical Therapy Treatment Note  [facility]     Patient Name: Kezia Otoole  : 1950  MRN: [id number]  Today's Date: 12/10/2023      Visit Dx:     ICD-10-CM ICD-9-CM   1. Impaired mobility [Z74.09]  Z74.09 799.89     Patient Active Problem List   Diagnosis    Class 3 severe obesity due to excess calories with serious comorbidity and body mass index (BMI) of 40.0 to 44.9 in adult    Depression    Elevated fasting glucose-see DM    Allergic rhinitis: shots    Osteoporosis: OIK    Primary generalized (osteo)arthritis    HTN (hypertension), benign    Obstructive sleep apnea-txing    Menopause    Diabetes type 2, controlled    Asthma    Hoarseness-sees Ressor    Gastroesophageal reflux disease-ent    Wellness examination-done    Purpura    Glaucoma    Laboratory test`    Anticoagulated DM2/ASA 81 (11.9.17 pt chose qod)    Glaucoma suspect    Hx of colonic polyps    *Hx of anemia: ASA81,iron,gi(cp,div    *Hx-iron deficiency    Nuclear senile cataract    Excessive cerumen in ear canal    Status post joint replacement    Primary osteoarthritis of right knee    Hyperlipidemia-statin    Hypercalcemia    History of COVID-19    DKA (diabetic ketoacidosis)    General weakness    Dementia    Gastrointestinal hemorrhage with melena    Class 2 obesity in adult    Type 2 diabetes mellitus with hyperglycemia, with long-term current use of insulin    Anemia    Duodenal ulcer    Gait difficulty    Uncontrolled type 2 diabetes mellitus with hyperglycemia    Multiple chronic diseases    Primary osteoarthritis of left knee     Past Medical History:   Diagnosis Date    Allergic rhinitis     Arthritis     Asthma     Chronic laryngitis     Diabetes mellitus     GERD (gastroesophageal reflux disease)     Hypertension     Hypertrophy of both inferior nasal turbinates     Laryngopharyngeal reflux     Nontoxic multinodular goiter     Sicca laryngitis      Past Surgical History:   Procedure Laterality Date     CARPAL TUNNEL RELEASE      CHOLECYSTECTOMY      COLONOSCOPY  10/01/2008    Multiple polyps removed    COLONOSCOPY  09/18/2013    Diverticulosis in the sigmoid colon. Dr. Dave Mcnally Recall 5 years    ENDOSCOPY N/A 12/08/2022    Non-bleeding duodenal ulcer, small HH    HAND SURGERY      HYSTERECTOMY      REPLACEMENT TOTAL KNEE Right 02/02/2021    THYROIDECTOMY, PARTIAL Right 2010    TONSILLECTOMY      TUBAL ABDOMINAL LIGATION       PT Assessment (last 12 hours)       PT Evaluation and Treatment       Row Name 12/10/23 1428 12/10/23 08       Physical Therapy Time and Intention    Subjective Information complains of;pain  -LY complains of;pain  -CHELLE    Document Type therapy note (daily note)  -LY therapy note (daily note)  -CHELLE    Mode of Treatment physical therapy  -LY physical therapy  -CHELLE    Comment HEP given and pt able to work through correctly  -LY --      Row Name 12/10/23 1428 12/10/23 0847       General Information    Existing Precautions/Restrictions fall  L TKR, WBAT  -LY fall  L TKR, WBAT  -CHELLE      Row Name 12/10/23 1428 12/10/23 0847       Pain    Pretreatment Pain Rating 5/10  -LY 4/10  -CHELLE    Posttreatment Pain Rating 8/10  -LY 8/10  -CHELLE    Pain Location - Side/Orientation Left  -LY Left  -CHELLE    Pain Location lower  -LY lower  -CHELLE    Pain Location - knee  -LY extremity;knee  -CHELLE    Pre/Posttreatment Pain Comment -- increase pain with weight bearing  -CHELLE    Pain Intervention(s) Medication (See MAR);Ambulation/increased activity  -LY Repositioned  -CHELLE      Row Name 12/10/23 1428 12/10/23 0847       Bed Mobility    Supine-Sit [place] (Bed Mobility) -- standby assist  -CHELLE    Sit-Supine [place] (Bed Mobility) -- supervision  -CHELLE    Comment, (Bed Mobility) up in chair  -LY --      Row Name 12/10/23 0847          Transfers    Transfers toilet transfer  -CHELLE       Row Name 12/10/23 1428 12/10/23 0847       Sit-Stand Transfer    Sit-Stand [place] (Transfers) verbal cues;contact guard  -LY verbal  cues;contact guard  -CHELLE    Assistive Device (Sit-Stand Transfers) walker, front-wheeled  -LY walker, front-wheeled  -CHELLE      Row Name 12/10/23 1428 12/10/23 0847       Stand-Sit Transfer    Stand-Sit [place] (Transfers) verbal cues;contact guard  -LY verbal cues;contact guard  -CHELLE      Row Name 12/10/23 0847          Toilet Transfer    Type (Toilet Transfer) sit-stand;stand-sit  -CHELLE     [place] Level (Toilet Transfer) verbal cues;contact guard  -CHELLE     Assistive Device (Toilet Transfer) commode;grab bars/safety frame;walker, front-wheeled  -CHELLE       Row Name 12/10/23 1428 12/10/23 0847       Gait/Stairs (Locomotion)    [place] Level (Gait) verbal cues;contact guard  -LY verbal cues;minimum assist (75% patient effort)  -CHELLE    Assistive Device (Gait) walker, front-wheeled  -LY walker, front-wheeled  -CHELLE    Distance in Feet (Gait) 100'  -  -CHELLE    Pattern (Gait) step-through  -LY step-through  -CHELLE    Deviations/Abnormal Patterns (Gait) antalgic;gait speed decreased;stride length decreased;weight shifting decreased  -LY antalgic;gait speed decreased;stride length decreased;weight shifting decreased  -CHELLE    Comment, (Gait/Stairs) cues for mechanics, improved heel strike with time  -LY --      Row Name 12/10/23 1428          Motor Skills    Comments, Therapeutic Exercise TKR HEP with pt reclined/sitting  -LY       Row Name             Wound 12/08/23 0858 Left anterior knee Incision    Wound - Properties Group Placement Date: 12/08/23  -CB Placement Time: 0858  -CB Present on Original Admission: N  -CB Side: Left  -CB Orientation: anterior  -CB Location: knee  -CB Primary Wound Type: Incision  -CB    Retired Wound - Properties Group Placement Date: 12/08/23  -CB Placement Time: 0858  -CB Present on Original Admission: N  -CB Side: Left  -CB Orientation: anterior  -CB Location: knee  -CB Primary Wound Type: Incision  -CB    Retired Wound - Properties Group Date first assessed: 12/08/23  -CB Time first  assessed: 0858  -CB Present on Original Admission: N  -CB Side: Left  -CB Location: knee  -CB Primary Wound Type: Incision  -CB      Row Name 12/10/23 1428 12/10/23 0847       Positioning and Restraints    Pre-Treatment Position sitting in chair/recliner  -LY in bed  -CHELLE    Post Treatment Position chair  -LY bed  -CHELLE    In Bed -- fowlers;call light within reach;encouraged to call for assist;side rails up x2  -CHELLE    In Chair reclined;call light within reach;encouraged to call for assist;with family/caregiver  -LY --              User Key  (r) = Recorded By, (t) = Taken By, (c) = Cosigned By      Initials Name Provider Type    CHELLE Jose Smith, PTA Physical Therapist Assistant    Sandra Bedoya PTA Physical Therapist Assistant    Sebastián Kennedy, RN Registered Nurse                    Physical Therapy Education       Title: PT OT SLP Therapies (In Progress)       Topic: Physical Therapy (In Progress)       Point: Mobility training (Done)       Learning Progress Summary             Patient EagRAFAEL camp VU by MD at 12/9/2023 1518    AcceptanceRAFAEL VU, DU by KALIN at 12/8/2023 1253    Comment: benefits of activity, progression of PT POC                         Point: Home exercise program (Not Started)       Learner Progress:  Not documented in this visit.              Point: Body mechanics (Not Started)       Learner Progress:  Not documented in this visit.              Point: Precautions (Not Started)       Learner Progress:  Not documented in this visit.                              User Key       Initials Effective Dates Name Provider Type Discipline    KALIN 02/03/23 -  Atif Esquivel PT DPT Physical Therapist PT    MD 08/14/23 -  Aydee Lopez RN Registered Nurse Nurse                  PT Recommendation and Plan         Outcome Measures       Row Name 12/10/23 0847 12/09/23 0935          How much help from another person do you currently need...    Turning from your back to your side while in flat bed without  using bedrails? 3  -CHELLE 3  -CHELLE     Moving from lying on back to sitting on the side of a flat bed without bedrails? 3  -CHELLE 3  -CHELLE     Moving to and from a bed to a chair (including a wheelchair)? 3  -CHELLE 3  -CHELLE     Standing up from a chair using your arms (e.g., wheelchair, bedside chair)? 3  -CHELLE 3  -CHELLE     Climbing 3-5 steps with a railing? 3  -CHELLE 3  -CHELLE     To walk in hospital room? 3  -CHELLE 3  -CHELLE     AM-PAC 6 Clicks Score (PT) 18  -CHELLE 18  -CHELLE     Highest Level of Mobility Goal 6 --> Walk 10 steps or more  -CHELLE 6 --> Walk 10 steps or more  -CHELLE        Functional Assessment    Outcome Measure Options AM-PAC 6 Clicks Basic Mobility (PT)  -CHELLE AM-PAC 6 Clicks Basic Mobility (PT)  -CHELLE               User Key  (r) = Recorded By, (t) = Taken By, (c) = Cosigned By      Initials Name Provider Type    Jose Will PTA Physical Therapist Assistant                     Time Calculation:    PT Charges       Row Name 12/10/23 1455 12/10/23 0847          Time Calculation    Start Time 1428  -LY 0847  -CHELLE     Stop Time 1453  -LY 0915  -CHELLE     Time Calculation (min) 25 min  -LY 28 min  -CHELLE     PT Received On 12/10/23  -LY 12/10/23  -CHELLE        Time Calculation- PT    Total Timed Code Minutes- PT 25 minute(s)  -LY 28 minute(s)  -CHELLE        Timed Charges    84909 - PT Therapeutic Exercise Minutes 15  -LY --     02867 - Gait Training Minutes  10  -LY 28  -CHELLE        Total Minutes    Timed Charges Total Minutes 25  -LY 28  -CHELLE      Total Minutes 25  -LY 28  -CHELLE               User Key  (r) = Recorded By, (t) = Taken By, (c) = Cosigned By      Initials Name Provider Type    Jose Will PTA Physical Therapist Assistant    Sandra Bedoya PTA Physical Therapist Assistant                  Therapy Charges for Today       Code Description Service Date Service Provider Modifiers Qty    00917940469 HC PT THER PROC EA 15 MIN 12/10/2023 Sandra Jean PTA GP 1    01156441650 HC GAIT TRAINING EA 15 MIN 12/10/2023 Sandra Jean PTA  GP 1            PT G-Codes  Outcome Measure Options: AM-PAC 6 Clicks Basic Mobility (PT)  AM-PAC 6 Clicks Score (PT): 18  AM-PAC 6 Clicks Score (OT): 22    Sandra Jean, PTA  12/10/2023

## 2023-12-10 NOTE — THERAPY TREATMENT NOTE
Acute Care - Physical Therapy Treatment Note  [facility]     Patient Name: Kezia Otoole  : 1950  MRN: [id number]  Today's Date: 12/10/2023      Visit Dx:     ICD-10-CM ICD-9-CM   1. Impaired mobility [Z74.09]  Z74.09 799.89     Patient Active Problem List   Diagnosis    Class 3 severe obesity due to excess calories with serious comorbidity and body mass index (BMI) of 40.0 to 44.9 in adult    Depression    Elevated fasting glucose-see DM    Allergic rhinitis: shots    Osteoporosis: OIK    Primary generalized (osteo)arthritis    HTN (hypertension), benign    Obstructive sleep apnea-txing    Menopause    Diabetes type 2, controlled    Asthma    Hoarseness-sees Ressor    Gastroesophageal reflux disease-ent    Wellness examination-done    Purpura    Glaucoma    Laboratory test`    Anticoagulated DM2/ASA 81 (11.9.17 pt chose qod)    Glaucoma suspect    Hx of colonic polyps    *Hx of anemia: ASA81,iron,gi(cp,div    *Hx-iron deficiency    Nuclear senile cataract    Excessive cerumen in ear canal    Status post joint replacement    Primary osteoarthritis of right knee    Hyperlipidemia-statin    Hypercalcemia    History of COVID-19    DKA (diabetic ketoacidosis)    General weakness    Dementia    Gastrointestinal hemorrhage with melena    Class 2 obesity in adult    Type 2 diabetes mellitus with hyperglycemia, with long-term current use of insulin    Anemia    Duodenal ulcer    Gait difficulty    Uncontrolled type 2 diabetes mellitus with hyperglycemia    Multiple chronic diseases    Primary osteoarthritis of left knee     Past Medical History:   Diagnosis Date    Allergic rhinitis     Arthritis     Asthma     Chronic laryngitis     Diabetes mellitus     GERD (gastroesophageal reflux disease)     Hypertension     Hypertrophy of both inferior nasal turbinates     Laryngopharyngeal reflux     Nontoxic multinodular goiter     Sicca laryngitis      Past Surgical History:   Procedure Laterality Date     CARPAL TUNNEL RELEASE      CHOLECYSTECTOMY      COLONOSCOPY  10/01/2008    Multiple polyps removed    COLONOSCOPY  09/18/2013    Diverticulosis in the sigmoid colon. Dr. Dave Mcnally Recall 5 years    ENDOSCOPY N/A 12/08/2022    Non-bleeding duodenal ulcer, small HH    HAND SURGERY      HYSTERECTOMY      REPLACEMENT TOTAL KNEE Right 02/02/2021    THYROIDECTOMY, PARTIAL Right 2010    TONSILLECTOMY      TUBAL ABDOMINAL LIGATION       PT Assessment (last 12 hours)       PT Evaluation and Treatment       Row Name 12/10/23 0847          Physical Therapy Time and Intention    Subjective Information complains of;pain  -CHELLE     Document Type therapy note (daily note)  -     Mode of Treatment physical therapy  -CHELLE       Row Name 12/10/23 0847          General Information    Existing Precautions/Restrictions fall  L TKR, WBAT  -CHELLE       Row Name 12/10/23 0847          Pain    Pretreatment Pain Rating 4/10  -CHELLE     Posttreatment Pain Rating 8/10  -CHELLE     Pain Location - Side/Orientation Left  -CHELLE     Pain Location lower  -CHELLE     Pain Location - extremity;knee  -CHELLE     Pre/Posttreatment Pain Comment increase pain with weight bearing  -     Pain Intervention(s) Repositioned  -       Row Name 12/10/23 0847          Bed Mobility    Supine-Sit [place] (Bed Mobility) standby assist  -     Sit-Supine [place] (Bed Mobility) supervision  -       Row Name 12/10/23 0847          Transfers    Transfers toilet transfer  -       Row Name 12/10/23 0847          Sit-Stand Transfer    Sit-Stand [place] (Transfers) verbal cues;contact guard  -CHELLE     Assistive Device (Sit-Stand Transfers) walker, front-wheeled  -CHELLE       Row Name 12/10/23 0847          Stand-Sit Transfer    Stand-Sit [place] (Transfers) verbal cues;contact guard  -CHELLE       Row Name 12/10/23 0847          Toilet Transfer    Type (Toilet Transfer) sit-stand;stand-sit  -CHELLE     [place] Level (Toilet Transfer) verbal cues;contact guard  -CHELLE      Assistive Device (Toilet Transfer) commode;grab bars/safety frame;walker, front-wheeled  -CHELLE       Row Name 12/10/23 0847          Gait/Stairs (Locomotion)    [place] Level (Gait) verbal cues;minimum assist (75% patient effort)  -CHELLE     Assistive Device (Gait) walker, front-wheeled  -CHELLE     Distance in Feet (Gait) 120  -CHELLE     Pattern (Gait) step-through  -CHELLE     Deviations/Abnormal Patterns (Gait) antalgic;gait speed decreased;stride length decreased;weight shifting decreased  -CHELLE       Row Name             Wound 12/08/23 0858 Left anterior knee Incision    Wound - Properties Group Placement Date: 12/08/23  -CB Placement Time: 0858  -CB Present on Original Admission: N  -CB Side: Left  -CB Orientation: anterior  -CB Location: knee  -CB Primary Wound Type: Incision  -CB    Retired Wound - Properties Group Placement Date: 12/08/23  -CB Placement Time: 0858  -CB Present on Original Admission: N  -CB Side: Left  -CB Orientation: anterior  -CB Location: knee  -CB Primary Wound Type: Incision  -CB    Retired Wound - Properties Group Date first assessed: 12/08/23  -CB Time first assessed: 0858  -CB Present on Original Admission: N  -CB Side: Left  -CB Location: knee  -CB Primary Wound Type: Incision  -CB      Row Name 12/10/23 0847          Positioning and Restraints    Pre-Treatment Position in bed  -CHELLE     Post Treatment Position bed  -CHELLE     In Bed fowlers;call light within reach;encouraged to call for assist;side rails up x2  -CHELLE               User Key  (r) = Recorded By, (t) = Taken By, (c) = Cosigned By      Initials Name Provider Type    Jose Will PTA Physical Therapist Assistant    CB Sebastián Shahid, RN Registered Nurse                    Physical Therapy Education       Title: PT OT SLP Therapies (In Progress)       Topic: Physical Therapy (In Progress)       Point: Mobility training (Done)       Learning Progress Summary             Patient RAFAEL Jeong VU by MD at 12/9/2023 0212    Acceptance, E,  JAYSON MARTE by KALIN at 12/8/2023 9806    Comment: benefits of activity, progression of PT POC                         Point: Home exercise program (Not Started)       Learner Progress:  Not documented in this visit.              Point: Body mechanics (Not Started)       Learner Progress:  Not documented in this visit.              Point: Precautions (Not Started)       Learner Progress:  Not documented in this visit.                              User Key       Initials Effective Dates Name Provider Type Discipline    KALIN 02/03/23 -  Atif Esquivel PT DPT Physical Therapist PT    MD 08/14/23 -  Aydee Lopez RN Registered Nurse Nurse                  PT Recommendation and Plan     Plan of Care Reviewed With: patient  Progress: improving  Outcome Evaluation: Pt was able to perform bed mobility with SBA.  Transfered sit to stand with CGA.  Amb 120' with RWX and CGA with cues to improve gait pattern   Outcome Measures       Row Name 12/10/23 0847 12/09/23 0935          How much help from another person do you currently need...    Turning from your back to your side while in flat bed without using bedrails? 3  -CHELLE 3  -CHELLE     Moving from lying on back to sitting on the side of a flat bed without bedrails? 3  -CHELLE 3  -CHELLE     Moving to and from a bed to a chair (including a wheelchair)? 3  -CHELLE 3  -CHELLE     Standing up from a chair using your arms (e.g., wheelchair, bedside chair)? 3  -CHELLE 3  -CHELLE     Climbing 3-5 steps with a railing? 3  -CHELLE 3  -CHELLE     To walk in hospital room? 3  -CHELLE 3  -CHELLE     AM-PAC 6 Clicks Score (PT) 18  -CHELLE 18  -CHELLE     Highest Level of Mobility Goal 6 --> Walk 10 steps or more  -CHELLE 6 --> Walk 10 steps or more  -CHELLE        Functional Assessment    Outcome Measure Options AM-PAC 6 Clicks Basic Mobility (PT)  -CHELLE AM-PAC 6 Clicks Basic Mobility (PT)  -CHELLE               User Key  (r) = Recorded By, (t) = Taken By, (c) = Cosigned By      Initials Name Provider Type    Jose Will, PTA Physical Therapist  Assistant                     Time Calculation:    PT Charges       Row Name 12/10/23 0847             Time Calculation    Start Time 0847  -CHELLE      Stop Time 0915  -CHELLE      Time Calculation (min) 28 min  -CHELLE      PT Received On 12/10/23  -CHELLE         Time Calculation- PT    Total Timed Code Minutes- PT 28 minute(s)  -CHELLE         Timed Charges    20268 - Gait Training Minutes  28  -CHELLE         Total Minutes    Timed Charges Total Minutes 28  -CHELLE       Total Minutes 28  -CHELLE                User Key  (r) = Recorded By, (t) = Taken By, (c) = Cosigned By      Initials Name Provider Type    Jose Will PTA Physical Therapist Assistant                  Therapy Charges for Today       Code Description Service Date Service Provider Modifiers Qty    69527665529 HC GAIT TRAINING EA 15 MIN 12/9/2023 Jose Smith, PTA GP 2    17509135149 HC GAIT TRAINING EA 15 MIN 12/9/2023 Jose Smith, PTA GP 1    33804256819 HC PT THER PROC EA 15 MIN 12/9/2023 Jose Smith, PTA GP 1    61383774792 HC GAIT TRAINING EA 15 MIN 12/10/2023 Jose Smith, PTA GP 2            PT G-Codes  Outcome Measure Options: AM-PAC 6 Clicks Basic Mobility (PT)  AM-PAC 6 Clicks Score (PT): 18  AM-PAC 6 Clicks Score (OT): 22    Jose Smith PTA  12/10/2023

## 2023-12-10 NOTE — PROGRESS NOTES
Daily Progress Note  Kezia Otoole  MRN: [id number] LOS: 0    Admit Date: 12/8/2023   12/10/2023 10:44 CST    Subjective:          Chief Complaint:  Left knee pain    Interval History:    Reviewed overnight events and nursing notes.   Doing very well this morning.  Has not yet had a bowel movement.  Reports her pain is much better today.  Has been up with therapy.    Review of Systems   Constitutional:  Negative for chills and fever.   Respiratory:  Negative for shortness of breath.    Gastrointestinal:  Positive for constipation.   Musculoskeletal:  Positive for arthralgias.       DIET:  Diet: Regular/House Diet, Diabetic Diets; Consistent Carbohydrate; Texture: Regular Texture (IDDSI 7); Fluid Consistency: Thin (IDDSI 0)    Medications:   lactated ringers, 125 mL/hr, Last Rate: 125 mL/hr (12/09/23 0027)  lactated ringers, 9 mL/hr      aspirin, 325 mg, Oral, Q12H  atorvastatin, 20 mg, Oral, Nightly  budesonide-formoterol, 2 puff, Inhalation, BID - RT  Calcium Carb-Cholecalciferol, 1 tablet, Oral, Daily  DULoxetine, 60 mg, Oral, Daily  empagliflozin, 25 mg, Oral, Daily  insulin detemir, 20 Units, Subcutaneous, BID  insulin lispro, 3-14 Units, Subcutaneous, 4x Daily AC & at Bedtime  meloxicam, 15 mg, Oral, Daily  montelukast, 10 mg, Oral, Nightly  pantoprazole, 40 mg, Oral, Daily  vitamin D3, 5,000 Units, Oral, Daily        Data:     Code Status:   Code Status and Medical Interventions:   Ordered at: 12/08/23 1211     Code Status (Patient has no pulse and is not breathing):    CPR (Attempt to Resuscitate)     Medical Interventions (Patient has pulse or is breathing):    Full       Family History   Problem Relation Age of Onset   • Diabetes Mother    • Stroke Mother    • Heart disease Mother    • Heart disease Father    • Colon cancer Neg Hx    • Colon polyps Neg Hx      Social History     Socioeconomic History   • Marital status:      Spouse name: Laurie   • Number of children: 2   • Years of  "education: 16   Tobacco Use   • Smoking status: Never   • Smokeless tobacco: Never   • Tobacco comments:     \"lived with smokers most of my life\"   Vaping Use   • Vaping Use: Never used   Substance and Sexual Activity   • Alcohol use: No     Comment: Rare   • Drug use: No   • Sexual activity: Defer     Partners: Male     Birth control/protection: None       Labs:    Lab Results (last 72 hours)       Procedure Component Value Units Date/Time    POC Glucose Once [[id number]]  (Normal) Collected: 12/10/23 0738    Specimen: Blood Updated: 12/10/23 0749     Glucose 120 mg/dL      Comment: : [id number] Luis Fernando CastanedabyMeter ID: [id number]       Basic Metabolic Panel [[id number]]  (Abnormal) Collected: 12/10/23 0351    Specimen: Blood Updated: 12/10/23 0456     Glucose 146 mg/dL      BUN 20 mg/dL      Creatinine 0.70 mg/dL      Sodium 137 mmol/L      Potassium 4.2 mmol/L      Comment: Slight hemolysis detected by analyzer. Result may be falsely elevated.        Chloride 101 mmol/L      CO2 27.0 mmol/L      Calcium 8.6 mg/dL      BUN/Creatinine Ratio 28.6     Anion Gap 9.0 mmol/L      eGFR 90.3 mL/min/1.73     Narrative:      GFR Normal >60  Chronic Kidney Disease <60  Kidney Failure <15    The GFR formula is only valid for adults with stable renal function between ages 18 and 70.    Hemoglobin & Hematocrit, Blood [[id number]]  (Abnormal) Collected: 12/10/23 0351    Specimen: Blood Updated: 12/10/23 0441     Hemoglobin 11.1 g/dL      Hematocrit 35.8 %     POC Glucose Once [[id number]]  (Abnormal) Collected: 12/09/23 2130    Specimen: Blood Updated: 12/09/23 2141     Glucose 167 mg/dL      Comment: : [id number] Aidee JonyochMeter ID: [id number]       POC Glucose Once [[id number]]  (Abnormal) Collected: 12/09/23 1617    Specimen: Blood Updated: 12/09/23 1629     Glucose 181 mg/dL      Comment: : [id number] Cookie Hebert ID: [id number]       POC Glucose Once [[id number]]  (Abnormal) Collected: 12/09/23 1154    " Specimen: Blood Updated: 12/09/23 1205     Glucose 250 mg/dL      Comment: : [id number] Cookie LeoseMeter ID: [id number]       Hemoglobin A1c [[id number]]  (Abnormal) Collected: 12/09/23 0426    Specimen: Blood Updated: 12/09/23 1124     Hemoglobin A1C 8.10 %     Narrative:      Hemoglobin A1C Ranges:    Increased Risk for Diabetes  5.7% to 6.4%  Diabetes                     >= 6.5%  Diabetic Goal                < 7.0%    POC Glucose Once [[id number]]  (Abnormal) Collected: 12/09/23 0758    Specimen: Blood Updated: 12/09/23 0809     Glucose 147 mg/dL      Comment: : [id number] Cookie LeoseMeter ID: [id number]       Basic Metabolic Panel [[id number]]  (Abnormal) Collected: 12/09/23 0426    Specimen: Blood Updated: 12/09/23 0513     Glucose 202 mg/dL      BUN 20 mg/dL      Creatinine 0.77 mg/dL      Sodium 135 mmol/L      Potassium 4.4 mmol/L      Chloride 100 mmol/L      CO2 26.0 mmol/L      Calcium 9.2 mg/dL      BUN/Creatinine Ratio 26.0     Anion Gap 9.0 mmol/L      eGFR 80.6 mL/min/1.73     Narrative:      GFR Normal >60  Chronic Kidney Disease <60  Kidney Failure <15    The GFR formula is only valid for adults with stable renal function between ages 18 and 70.    Hemoglobin & Hematocrit, Blood [[id number]]  (Normal) Collected: 12/09/23 0426    Specimen: Blood Updated: 12/09/23 0452     Hemoglobin 12.3 g/dL      Hematocrit 38.0 %     POC Glucose Once [[id number]]  (Abnormal) Collected: 12/08/23 1935    Specimen: Blood Updated: 12/08/23 1946     Glucose 260 mg/dL      Comment: : [id number] Bates TeriMeter ID: [id number]       POC Glucose Once [[id number]]  (Abnormal) Collected: 12/08/23 1009    Specimen: Blood Updated: 12/08/23 1021     Glucose 203 mg/dL      Comment: : [id number] Timothycate MonroyuaMeter ID: [id number]       POC Glucose Once [[id number]]  (Abnormal) Collected: 12/08/23 0727    Specimen: Blood Updated: 12/08/23 0740     Glucose 177 mg/dL      Comment: : [id number] Harry WhitneyMeter ID:  "[id number]                 Objective:     Vitals: /58 (BP Location: Left arm, Patient Position: Lying)   Pulse 71   Temp 98.3 °F (36.8 °C) (Oral)   Resp 16   Ht 157 cm (61.81\")   Wt 105 kg (231 lb 7.7 oz)   SpO2 94%   BMI 42.60 kg/m²    Intake/Output Summary (Last 24 hours) at 12/10/2023 1044  Last data filed at 12/10/2023 0928  Gross per 24 hour   Intake 720 ml   Output 550 ml   Net 170 ml    Temp (24hrs), Av.1 °F (36.7 °C), Min:97.6 °F (36.4 °C), Max:98.6 °F (37 °C)      Physical Exam  Constitutional:       Appearance: She is well-developed.   HENT:      Head: Normocephalic and atraumatic.   Eyes:      Conjunctiva/sclera: Conjunctivae normal.      Pupils: Pupils are equal, round, and reactive to light.   Cardiovascular:      Rate and Rhythm: Normal rate and regular rhythm.      Heart sounds: Normal heart sounds. No murmur heard.     No friction rub.   Pulmonary:      Effort: Pulmonary effort is normal. No respiratory distress.      Breath sounds: Normal breath sounds. No wheezing or rales.   Abdominal:      General: Bowel sounds are normal. There is no distension.      Palpations: Abdomen is soft.      Tenderness: There is no abdominal tenderness.   Musculoskeletal:      Cervical back: Normal range of motion.      Comments: Left knee incision CDI   Skin:     Capillary Refill: Capillary refill takes less than 2 seconds.   Neurological:      Mental Status: She is alert and oriented to person, place, and time.      Cranial Nerves: No cranial nerve deficit.   Psychiatric:         Behavior: Behavior normal.             Assessment and Plan:     Primary Problem:  Primary osteoarthritis of left knee    Hospital Problem list:    Primary osteoarthritis of left knee    Class 3 severe obesity due to excess calories with serious comorbidity and body mass index (BMI) of 40.0 to 44.9 in adult    Depression    HTN (hypertension), benign    Hyperlipidemia-statin    Type 2 diabetes mellitus with hyperglycemia, with " long-term current use of insulin      PMH:  Past Medical History:   Diagnosis Date   • Allergic rhinitis    • Arthritis    • Asthma    • Chronic laryngitis    • Diabetes mellitus    • GERD (gastroesophageal reflux disease)    • Hypertension    • Hypertrophy of both inferior nasal turbinates    • Laryngopharyngeal reflux    • Nontoxic multinodular goiter    • Sicca laryngitis        Treatment Plan:  Primary osteoarthritis of left knee  Underwent TKA 12/8/2023 with Dr. Gould.  Recovering well.  Plan for rehab at discharge.  Management per primary team.     Type 2 diabetes mellitus, uncontrolled  Continue home Levemir 20 units twice daily as well as sliding scale.  May need to add back mealtime insulin but will monitor closely with suspected lower intake while in the hospital.  Sugar reasonable today.  A1c above goal at 8.1%.  Will need to follow-up with her PCP to further address her inadequate A1c control.     Hyperlipidemia  Continue statin     Major depressive disorder, recurrent  Continue Cymbalta     Hypertension  Blood pressure reasonably controlled on current regimen, monitor closely.    Constipation  Related to opioid use in the postoperative setting.  Start MiraLAX twice daily.  Escalate as needed     Disposition: Medically stable    Reviewed treatment plans with the patient and/or family.   20 minutes spent in face to face interaction and coordination of care.     Electronically signed by Kenyon Raygoza MD on 12/10/2023 at 10:44 CST

## 2023-12-10 NOTE — PLAN OF CARE
Goal Outcome Evaluation:  Plan of Care Reviewed With: patient        Progress: improving  Outcome Evaluation: Pt was able to perform bed mobility with SBA.  Transfered sit to stand with CGA.  Amb 120' with RWX and CGA with cues to improve gait pattern

## 2023-12-10 NOTE — PLAN OF CARE
Goal Outcome Evaluation:  Plan of Care Reviewed With: patient, spouse              Patient is alert and oriented x 4. VSS. RA. Mild to moderate pain. PRN med and ice pack with good relief. Incision site steristrip CDI. Full weight bearing, walker stand by assist to BR. Voiding. LBM 2 days ago. Bowel regimen started. Tolerating eating and drinking well. Denies nerurovascular changes. Safety precautions maintained.Will continue to monitor.

## 2023-12-10 NOTE — CASE MANAGEMENT/SOCIAL WORK
Continued Stay Note   [place]     Patient Name: Kezia Otoole  MRN: [id number]  Today's Date: 12/10/2023    Admit Date: 12/8/2023        Discharge Plan       Row Name 12/10/23 1136       Plan    Plan Comments Pt does not meet criteria for inpt which she would need to be placed at SNF through her Medicare.  SW will continue to follow.                   Discharge Codes    No documentation.                       PATTI Barrett

## 2023-12-11 VITALS
OXYGEN SATURATION: 96 % | BODY MASS INDEX: 42.6 KG/M2 | TEMPERATURE: 97.9 F | DIASTOLIC BLOOD PRESSURE: 67 MMHG | SYSTOLIC BLOOD PRESSURE: 150 MMHG | HEART RATE: 70 BPM | RESPIRATION RATE: 16 BRPM | WEIGHT: 231.48 LBS | HEIGHT: 62 IN

## 2023-12-11 LAB
ANION GAP SERPL CALCULATED.3IONS-SCNC: 9 MMOL/L (ref 5–15)
BUN SERPL-MCNC: 16 MG/DL (ref 8–23)
BUN/CREAT SERPL: 25 (ref 7–25)
CALCIUM SPEC-SCNC: 8.8 MG/DL (ref 8.6–10.5)
CHLORIDE SERPL-SCNC: 101 MMOL/L (ref 98–107)
CO2 SERPL-SCNC: 28 MMOL/L (ref 22–29)
CREAT SERPL-MCNC: 0.64 MG/DL (ref 0.57–1)
EGFRCR SERPLBLD CKD-EPI 2021: 92.3 ML/MIN/1.73
GLUCOSE BLDC GLUCOMTR-MCNC: 113 MG/DL (ref 70–130)
GLUCOSE BLDC GLUCOMTR-MCNC: 187 MG/DL (ref 70–130)
GLUCOSE SERPL-MCNC: 107 MG/DL (ref 65–99)
HCT VFR BLD AUTO: 36 % (ref 34–46.6)
HGB BLD-MCNC: 11.1 G/DL (ref 12–15.9)
POTASSIUM SERPL-SCNC: 3.8 MMOL/L (ref 3.5–5.2)
SODIUM SERPL-SCNC: 138 MMOL/L (ref 136–145)

## 2023-12-11 PROCEDURE — A9270 NON-COVERED ITEM OR SERVICE: HCPCS | Performed by: FAMILY MEDICINE

## 2023-12-11 PROCEDURE — A9270 NON-COVERED ITEM OR SERVICE: HCPCS | Performed by: STUDENT IN AN ORGANIZED HEALTH CARE EDUCATION/TRAINING PROGRAM

## 2023-12-11 PROCEDURE — A9270 NON-COVERED ITEM OR SERVICE: HCPCS | Performed by: ORTHOPAEDIC SURGERY

## 2023-12-11 PROCEDURE — 82948 REAGENT STRIP/BLOOD GLUCOSE: CPT

## 2023-12-11 PROCEDURE — 63710000001 LOSARTAN 50 MG TABLET: Performed by: STUDENT IN AN ORGANIZED HEALTH CARE EDUCATION/TRAINING PROGRAM

## 2023-12-11 PROCEDURE — 63710000001 VITAMIN D3 125 MCG (5000 UT) CAPSULE: Performed by: STUDENT IN AN ORGANIZED HEALTH CARE EDUCATION/TRAINING PROGRAM

## 2023-12-11 PROCEDURE — 63710000001 INSULIN DETEMIR PER 5 UNITS: Performed by: STUDENT IN AN ORGANIZED HEALTH CARE EDUCATION/TRAINING PROGRAM

## 2023-12-11 PROCEDURE — 97116 GAIT TRAINING THERAPY: CPT

## 2023-12-11 PROCEDURE — 94799 UNLISTED PULMONARY SVC/PX: CPT

## 2023-12-11 PROCEDURE — 63710000001 DULOXETINE 30 MG CAPSULE DELAYED-RELEASE PARTICLES: Performed by: STUDENT IN AN ORGANIZED HEALTH CARE EDUCATION/TRAINING PROGRAM

## 2023-12-11 PROCEDURE — 80048 BASIC METABOLIC PNL TOTAL CA: CPT | Performed by: ORTHOPAEDIC SURGERY

## 2023-12-11 PROCEDURE — 63710000001 MELOXICAM 7.5 MG TABLET: Performed by: ORTHOPAEDIC SURGERY

## 2023-12-11 PROCEDURE — 85014 HEMATOCRIT: CPT | Performed by: ORTHOPAEDIC SURGERY

## 2023-12-11 PROCEDURE — 63710000001 PANTOPRAZOLE 40 MG TABLET DELAYED-RELEASE: Performed by: STUDENT IN AN ORGANIZED HEALTH CARE EDUCATION/TRAINING PROGRAM

## 2023-12-11 PROCEDURE — 94664 DEMO&/EVAL PT USE INHALER: CPT

## 2023-12-11 PROCEDURE — 63710000001 ASPIRIN 325 MG TABLET: Performed by: ORTHOPAEDIC SURGERY

## 2023-12-11 PROCEDURE — 63710000001 EMPAGLIFLOZIN 25 MG TABLET: Performed by: STUDENT IN AN ORGANIZED HEALTH CARE EDUCATION/TRAINING PROGRAM

## 2023-12-11 PROCEDURE — 63710000001 INSULIN LISPRO (HUMAN) PER 5 UNITS: Performed by: STUDENT IN AN ORGANIZED HEALTH CARE EDUCATION/TRAINING PROGRAM

## 2023-12-11 PROCEDURE — 85018 HEMOGLOBIN: CPT | Performed by: ORTHOPAEDIC SURGERY

## 2023-12-11 PROCEDURE — 63710000001 POLYETHYLENE GLYCOL 17 G PACK: Performed by: FAMILY MEDICINE

## 2023-12-11 PROCEDURE — 97110 THERAPEUTIC EXERCISES: CPT

## 2023-12-11 PROCEDURE — 63710000001 CALCIUM CARB-CHOLECALCIFEROL 600-20 MG-MCG TABLET: Performed by: STUDENT IN AN ORGANIZED HEALTH CARE EDUCATION/TRAINING PROGRAM

## 2023-12-11 RX ORDER — ASPIRIN 81 MG/1
81 TABLET ORAL 2 TIMES DAILY
Qty: 60 TABLET | Refills: 0 | Status: SHIPPED | OUTPATIENT
Start: 2023-12-11 | End: 2024-01-10

## 2023-12-11 RX ORDER — NALOXONE HYDROCHLORIDE 4 MG/.1ML
SPRAY NASAL
Qty: 2 EACH | Refills: 0 | Status: SHIPPED | OUTPATIENT
Start: 2023-12-11

## 2023-12-11 RX ORDER — LOSARTAN POTASSIUM 50 MG/1
100 TABLET ORAL DAILY
Status: DISCONTINUED | OUTPATIENT
Start: 2023-12-11 | End: 2023-12-11 | Stop reason: HOSPADM

## 2023-12-11 RX ORDER — CEPHALEXIN 500 MG/1
500 CAPSULE ORAL 4 TIMES DAILY
Qty: 28 CAPSULE | Refills: 0 | Status: SHIPPED | OUTPATIENT
Start: 2023-12-11 | End: 2023-12-18

## 2023-12-11 RX ORDER — OXYCODONE HYDROCHLORIDE 5 MG/1
5 TABLET ORAL EVERY 4 HOURS PRN
Qty: 50 TABLET | Refills: 0 | Status: SHIPPED | OUTPATIENT
Start: 2023-12-11

## 2023-12-11 RX ADMIN — PANTOPRAZOLE SODIUM 40 MG: 40 TABLET, DELAYED RELEASE ORAL at 09:10

## 2023-12-11 RX ADMIN — EMPAGLIFLOZIN 25 MG: 25 TABLET, FILM COATED ORAL at 09:01

## 2023-12-11 RX ADMIN — Medication 1 TABLET: at 09:01

## 2023-12-11 RX ADMIN — BUDESONIDE AND FORMOTEROL FUMARATE DIHYDRATE 2 PUFF: 160; 4.5 AEROSOL RESPIRATORY (INHALATION) at 06:42

## 2023-12-11 RX ADMIN — DULOXETINE HYDROCHLORIDE 60 MG: 30 CAPSULE, DELAYED RELEASE ORAL at 09:01

## 2023-12-11 RX ADMIN — POLYETHYLENE GLYCOL 3350 17 G: 17 POWDER, FOR SOLUTION ORAL at 09:02

## 2023-12-11 RX ADMIN — LOSARTAN POTASSIUM 100 MG: 50 TABLET, FILM COATED ORAL at 09:03

## 2023-12-11 RX ADMIN — INSULIN LISPRO 3 UNITS: 100 INJECTION, SOLUTION INTRAVENOUS; SUBCUTANEOUS at 11:35

## 2023-12-11 RX ADMIN — Medication 5000 UNITS: at 09:01

## 2023-12-11 RX ADMIN — ASPIRIN 325 MG: 325 TABLET, FILM COATED ORAL at 09:07

## 2023-12-11 RX ADMIN — MELOXICAM 15 MG: 7.5 TABLET ORAL at 09:01

## 2023-12-11 RX ADMIN — INSULIN DETEMIR 20 UNITS: 100 INJECTION, SOLUTION SUBCUTANEOUS at 09:01

## 2023-12-11 NOTE — PLAN OF CARE
Goal Outcome Evaluation:  Pt. A&Ox4, VSS, mild pain noted, ambulates well x1 assist with walker to bathroom and works well with PT, ambulated in [place], steristrips to L knee intact, no drainage, d/c orders placed for home, safety maintained.   1715- Pt. Taken out via wheelchair to home with spouse and belongings.

## 2023-12-11 NOTE — PROGRESS NOTES
"    Daily Progress Note  Kezia Otoole  MRN: [id number] LOS: 0    Admit Date: 2023 07:38 CST    Subjective:         Interval History:    Reviewed overnight events and nursing notes.     Doing well this morning.  Has not yet had bowel movement and feels like she is close.    ROS:  Review of Systems   Constitutional:  Negative for chills and fever.   Respiratory:  Negative for cough, chest tightness and shortness of breath.    Cardiovascular:  Negative for chest pain.   Gastrointestinal:  Negative for abdominal pain, diarrhea, nausea and vomiting.       DIET:  Diet: Regular/House Diet, Diabetic Diets; Consistent Carbohydrate; Texture: Regular Texture (IDDSI 7); Fluid Consistency: Thin (IDDSI 0)    Medications:      aspirin, 325 mg, Oral, Q12H  atorvastatin, 20 mg, Oral, Nightly  budesonide-formoterol, 2 puff, Inhalation, BID - RT  Calcium Carb-Cholecalciferol, 1 tablet, Oral, Daily  DULoxetine, 60 mg, Oral, Daily  empagliflozin, 25 mg, Oral, Daily  insulin detemir, 20 Units, Subcutaneous, BID  insulin lispro, 3-14 Units, Subcutaneous, 4x Daily AC & at Bedtime  meloxicam, 15 mg, Oral, Daily  montelukast, 10 mg, Oral, Nightly  pantoprazole, 40 mg, Oral, Daily  polyethylene glycol, 17 g, Oral, BID  vitamin D3, 5,000 Units, Oral, Daily          Objective:     Vitals: /64 (BP Location: Left arm, Patient Position: Lying)   Pulse 70   Temp 98.1 °F (36.7 °C) (Oral)   Resp 16   Ht 157 cm (61.81\")   Wt 105 kg (231 lb 7.7 oz)   SpO2 99%   BMI 42.60 kg/m²    Intake/Output Summary (Last 24 hours) at 2023 0738  Last data filed at 12/10/2023 0928  Gross per 24 hour   Intake 360 ml   Output --   Net 360 ml    Temp (24hrs), Av.1 °F (36.7 °C), Min:97.9 °F (36.6 °C), Max:98.1 °F (36.7 °C)    Glucose:  Lab Results   Component Value Date    POCGLU 195 (H) 12/10/2023    POCGLU 179 (H) 12/10/2023    POCGLU 163 (H) 12/10/2023    POCGLU 120 12/10/2023     Physical Examination:   Physical " Exam  Constitutional:       General: She is not in acute distress.     Appearance: She is obese. She is not ill-appearing or toxic-appearing.   Cardiovascular:      Rate and Rhythm: Normal rate and regular rhythm.      Pulses: Normal pulses.      Heart sounds: Normal heart sounds. No murmur heard.  Pulmonary:      Effort: Pulmonary effort is normal. No respiratory distress.      Breath sounds: Normal breath sounds. No stridor. No wheezing or rales.   Abdominal:      General: Abdomen is flat. Bowel sounds are normal. There is no distension.      Palpations: Abdomen is soft.      Tenderness: There is no abdominal tenderness.   Neurological:      Mental Status: She is alert.         Labs:  Lab Results (last 24 hours)       Procedure Component Value Units Date/Time    Basic Metabolic Panel [[id number]]  (Abnormal) Collected: 12/11/23 0500    Specimen: Blood Updated: 12/11/23 0618     Glucose 107 mg/dL      BUN 16 mg/dL      Creatinine 0.64 mg/dL      Sodium 138 mmol/L      Potassium 3.8 mmol/L      Chloride 101 mmol/L      CO2 28.0 mmol/L      Calcium 8.8 mg/dL      BUN/Creatinine Ratio 25.0     Anion Gap 9.0 mmol/L      eGFR 92.3 mL/min/1.73     Narrative:      GFR Normal >60  Chronic Kidney Disease <60  Kidney Failure <15    The GFR formula is only valid for adults with stable renal function between ages 18 and 70.    Hemoglobin & Hematocrit, Blood [[id number]]  (Abnormal) Collected: 12/11/23 0500    Specimen: Blood Updated: 12/11/23 0556     Hemoglobin 11.1 g/dL      Hematocrit 36.0 %     POC Glucose Once [[id number]]  (Abnormal) Collected: 12/10/23 2107    Specimen: Blood Updated: 12/10/23 2118     Glucose 195 mg/dL      Comment: : [id number] Artemio RuelasylynnMeter ID: [id number]       POC Glucose Once [[id number]]  (Abnormal) Collected: 12/10/23 1634    Specimen: Blood Updated: 12/10/23 1645     Glucose 179 mg/dL      Comment: : [id number] Luis Fernando CastanedabyMeter ID: [id number]       POC Glucose Once [[id number]]   (Abnormal) Collected: 12/10/23 1220    Specimen: Blood Updated: 12/10/23 1231     Glucose 163 mg/dL      Comment: : [id number] Cleveland ShelbyMeter ID: [id number]       POC Glucose Once [[id number]]  (Normal) Collected: 12/10/23 0738    Specimen: Blood Updated: 12/10/23 0749     Glucose 120 mg/dL      Comment: : [id number] Luis Fernando ShelbyMeter ID: [id number]                Imaging:  No radiology results from the last 24 hrs       Assessment and Plan:     Primary Problem:  Primary osteoarthritis of left knee    Hospital Problem list:    Primary osteoarthritis of left knee    Class 3 severe obesity due to excess calories with serious comorbidity and body mass index (BMI) of 40.0 to 44.9 in adult    Depression    HTN (hypertension), benign    Hyperlipidemia-statin    Type 2 diabetes mellitus with hyperglycemia, with long-term current use of insulin        Plan:    Perioperative care  Doing well.  Continue bowel regimen.  On aspirin 325 twice daily for DVT prophylaxis per orthopedic surgery.    Type 2 diabetes, uncontrolled  Blood sugars inpatient.  A1c elevated outpatient.  Continue current inpatient regimen, no changes.    Hyperlipidemia  Continue home statin note just    Hypertension  Restarting olmesartan this morning.    Medically stable for discharge from my standpoint but probably needs some rehab placement for therapy.  [facility] and feels like she probably wants to go back again.    Discharge planning:   Rehab    Reviewed treatment plans with the patient and/or family.     Code Status:   Code Status and Medical Interventions:   Ordered at: 12/08/23 1211     Code Status (Patient has no pulse and is not breathing):    CPR (Attempt to Resuscitate)     Medical Interventions (Patient has pulse or is breathing):    Full       Electronically signed by Ray Subramanian MD on 12/11/2023 at 07:38 CST

## 2023-12-11 NOTE — DISCHARGE SUMMARY
[facility] [place]  Dr. Shorty Gould  Discharge Summary    Kezia Otoole is a 73 y.o. female who underwent total knee replacement procedure without complication.  Kezia Otoole was admitted to the floor following her   recovery in the PACU.     Discharge Diagnosis    left   Knee Replacement    Current Inpatient Medications    Current Facility-Administered Medications   Medication Dose Route Frequency Provider Last Rate Last Admin    albuterol (PROVENTIL) nebulizer solution 0.083% 2.5 mg/3mL  2.5 mg Nebulization Q6H PRN Ray Subramanian MD        aspirin tablet 325 mg  325 mg Oral Q12H PATRICIA Gould MD   325 mg at 12/11/23 0907    atorvastatin (LIPITOR) tablet 20 mg  20 mg Oral Nightly Ray Subramanian MD   20 mg at 12/10/23 2108    budesonide-formoterol (SYMBICORT) 160-4.5 MCG/ACT inhaler 2 puff  2 puff Inhalation BID - RT Ray Subramanian MD   2 puff at 12/11/23 0642    Calcium Carb-Cholecalciferol 600-20 MG-MCG tablet 1 tablet  1 tablet Oral Daily Ray Subramanian MD   1 tablet at 12/11/23 0901    dextrose (D50W) (25 g/50 mL) IV injection 25 g  25 g Intravenous Q15 Min PRN Ray Subramanian MD        dextrose (GLUTOSE) oral gel 15 g  15 g Oral Q15 Min PRN Ray Subramanian MD        DULoxetine (CYMBALTA) DR capsule 60 mg  60 mg Oral Daily Ray Subramanian MD   60 mg at 12/11/23 0901    empagliflozin (JARDIANCE) tablet 25 mg  25 mg Oral Daily Ray Subramanian MD   25 mg at 12/11/23 0901    glucagon (GLUCAGEN) injection 1 mg  1 mg Intramuscular Q15 Min PRN Ray Subramanian MD        insulin detemir (LEVEMIR) injection 20 Units  20 Units Subcutaneous BID Ray Subramanian MD   20 Units at 12/11/23 0901    Insulin Lispro (humaLOG) injection 3-14 Units  3-14 Units Subcutaneous 4x Daily AC & at Bedtime Ray Subramanian MD   3 Units at 12/11/23 1135    losartan (COZAAR) tablet 100 mg  100 mg Oral Daily Ray Subramanian MD   100 mg at 12/11/23 0903     meloxicam (MOBIC) tablet 15 mg  15 mg Oral Daily PATRICIA Gould MD   15 mg at 12/11/23 0901    montelukast (SINGULAIR) tablet 10 mg  10 mg Oral Nightly Ray Subramanian MD   10 mg at 12/10/23 2108    naloxone (NARCAN) injection 0.4 mg  0.4 mg Intravenous Q5 Min PRN PATRICIA Gould MD        ondansetron (ZOFRAN) tablet 4 mg  4 mg Oral Q6H PRN PATRICIA Gould MD        Or    ondansetron (ZOFRAN) injection 4 mg  4 mg Intravenous Q6H PRN PATRICIA Gould MD        oxyCODONE (ROXICODONE) immediate release tablet 5 mg  5 mg Oral Q4H PRN PATRICIA Gould MD   5 mg at 12/10/23 2116    pantoprazole (PROTONIX) EC tablet 40 mg  40 mg Oral Daily Ray Subramanian MD   40 mg at 12/11/23 0910    polyethylene glycol (MIRALAX) packet 17 g  17 g Oral BID Kenyon Raygoza MD   17 g at 12/11/23 0902    promethazine (PHENERGAN) tablet 12.5 mg  12.5 mg Oral Q6H PRN PATRICIA Gould MD        vitamin D3 capsule 5,000 Units  5,000 Units Oral Daily Ray Subramanian MD   5,000 Units at 12/11/23 0901       Post-operatively the patient’s diet was advanced as tolerated and their incision was checked on POD #1.  The incision was clean, dry and intact with no signs of infection.  The patient remained neurovascularly intact in the lower extremity and had intact pulses distally.  Patient’s calf remained soft and showed no evidence of DVT.  The patient was able to move their leg and ankle/foot without any problems post-operatively.  Physical therapy and occupational therapy were consulted and began working with the patient post-operatively.  The patient progressed with PT/OT as would be expected and continued to improve through their stay.  The patient's pain was initially controlled with IV medications but we were able to transition to oral pain medications soon after arrival to the floor and their pain remained under good control through their hospital stay.  From a medical standpoint the patient remained stable and  continued to have the medicine team follow throughout their stay. Acute postoperative blood loss anemia after joint replacement being monitored with daily hemoglobin/hematocrit. The patient's dressing was changed/incision was checked on day of discharge. The patient will be discharged at this time to home health with their current diet restrictions and will continue to follow the total knee precautions outlined to them by us and PT/OT.     Condition on Discharge: Stable    Plan  Followup in one month.  Patient was instructed on the use of pain medications, the signs and symptoms of infection, and was given our number to call should they have any questions or concerns following discharge.

## 2023-12-11 NOTE — PLAN OF CARE
Goal Outcome Evaluation:  Plan of Care Reviewed With: patient           Outcome Evaluation: Patient oriented x4 with VSS. PRN haleigh given once for knee pain with positive response. Patient ambulated to bathroom with assistance, then back to bed and tolerated well. Patient has been resting off and on during the shift.

## 2023-12-11 NOTE — DISCHARGE INSTRUCTIONS
[facility] [place] [facility]  Dr. Shorty Bey     Total Knee and Uni Knee Replacement  Discharge Instructions    To prevent blood clots, you have been placed on the following medication:  Aspirin 81 mg twice a day for four weeks  Surgical Site Care:  No dressing is needed  May get wet in shower  Physical Therapy:  You were given a prescription for outpatient therapy.  Please schedule with [facility]  if convenient by calling 1 [phone]. Otherwise, schedule with a therapist closer to your home  Work on range of motion.    Goal for your first office visit is for you to fully straighten and bend your knee to at least a right angle   Don't walk for exercise (it will make you more sore)  Weight Bearing Status:  Full weight bearing with a walker or crutches  Pain Medications  A prescription was sent electronically to your pharmacy and should be ready for pickup  Wean off pain medications as you deem appropriate as long as pain is under control  Take tylenol instead of the prescribed pain medicine as your pain improves  Cold packs  May be used 3 times daily for 15-30 minutes as necessary  Be sure to have a barrier (cloth, clothing, towel) between the site and the ice pack to prevent frostbite  Contact office if  Increased redness, swelling, drainage of any kind, and/or severe pain at surgery site.  As well as new onset fevers and or chills.  These could signify an infection.  Calf or thigh tenderness to touch as well as increased swelling or redness.  This could signify a clot.  Any rash appears, increased  or new onset nausea/vomiting occur.  This may indicate a reaction to a medication.   Please take a stool softener such as dulcolax or colace daily  Do not drive for two weeks  Homework- be able to fully straighten knee and bend to at least 90 degrees by your first appointment.  Phone # [phone]  ext [phone].  Leave message for my assistant Bonita.  She will promptly return your call.  If you have an  absolute emergency, text me with your name and problem at .  Follow up with Surgeon at scheduled appointment time.  Thanks for the opportunity to care for you!

## 2023-12-11 NOTE — CASE MANAGEMENT/SOCIAL WORK
Continued Stay Note   [place]     Patient Name: Kezia Otoole  MRN: [id number]  Today's Date: 12/11/2023    Admit Date: 12/8/2023        Discharge Plan       Row Name 12/11/23 1553       Plan    Final Discharge Disposition Code 01 - home or self-care    Final Note Pt is being dcd home today with orders for outpt phy therapy. No dc needs identified.                   Discharge Codes    No documentation.                 Expected Discharge Date and Time       Expected Discharge Date Expected Discharge Time    Dec 11, 2023               SERENA Staples

## 2023-12-11 NOTE — THERAPY TREATMENT NOTE
Acute Care - Physical Therapy Treatment Note  [facility]     Patient Name: Kezia Otoole  : 1950  MRN: [id number]  Today's Date: 2023      Visit Dx:     ICD-10-CM ICD-9-CM   1. Impaired mobility [Z74.09]  Z74.09 799.89     Patient Active Problem List   Diagnosis    Class 3 severe obesity due to excess calories with serious comorbidity and body mass index (BMI) of 40.0 to 44.9 in adult    Depression    Elevated fasting glucose-see DM    Allergic rhinitis: shots    Osteoporosis: OI    Primary generalized (osteo)arthritis    HTN (hypertension), benign    Obstructive sleep apnea-txing    Menopause    Diabetes type 2, controlled    Asthma    Hoarseness-sees Ressor    Gastroesophageal reflux disease-ent    Wellness examination-done    Purpura    Glaucoma    Laboratory test`    Anticoagulated DM2/ASA 81 (11..17 pt chose qod)    Glaucoma suspect    Hx of colonic polyps    *Hx of anemia: ASA81,iron,gi(cp,div    *Hx-iron deficiency    Nuclear senile cataract    Excessive cerumen in ear canal    Status post joint replacement    Primary osteoarthritis of right knee    Hyperlipidemia-statin    Hypercalcemia    History of COVID-19    DKA (diabetic ketoacidosis)    General weakness    Dementia    Gastrointestinal hemorrhage with melena    Class 2 obesity in adult    Type 2 diabetes mellitus with hyperglycemia, with long-term current use of insulin    Anemia    Duodenal ulcer    Gait difficulty    Uncontrolled type 2 diabetes mellitus with hyperglycemia    Multiple chronic diseases    Primary osteoarthritis of left knee     Past Medical History:   Diagnosis Date    Allergic rhinitis     Arthritis     Asthma     Chronic laryngitis     Diabetes mellitus     GERD (gastroesophageal reflux disease)     Hypertension     Hypertrophy of both inferior nasal turbinates     Laryngopharyngeal reflux     Nontoxic multinodular goiter     Sicca laryngitis      Past Surgical History:   Procedure Laterality Date     CARPAL TUNNEL RELEASE      CHOLECYSTECTOMY      COLONOSCOPY  10/01/2008    Multiple polyps removed    COLONOSCOPY  09/18/2013    Diverticulosis in the sigmoid colon. Dr. Dave Mcnally Recall 5 years    ENDOSCOPY N/A 12/08/2022    Non-bleeding duodenal ulcer, small HH    HAND SURGERY      HYSTERECTOMY      REPLACEMENT TOTAL KNEE Right 02/02/2021    THYROIDECTOMY, PARTIAL Right 2010    TONSILLECTOMY      TUBAL ABDOMINAL LIGATION       PT Assessment (last 12 hours)       PT Evaluation and Treatment       Row Name 12/11/23 0855          Physical Therapy Time and Intention    Subjective Information complains of;pain  -CHELLE     Document Type therapy note (daily note)  -     Mode of Treatment physical therapy  -       Row Name 12/11/23 0855          General Information    Existing Precautions/Restrictions fall  L TKR, WBAT  -       Row Name 12/11/23 0855          Pain    Pretreatment Pain Rating 4/10  -CHELLE     Posttreatment Pain Rating 4/10  -CHELLE     Pain Location - Side/Orientation Left  -     Pain Location lower  -     Pain Location - extremity;knee  -     Pain Intervention(s) Repositioned;Ambulation/increased activity  -       Row Name 12/11/23 0855          Bed Mobility    Supine-Sit [place] (Bed Mobility) standby assist  -       Row Name 12/11/23 0855          Sit-Stand Transfer    Sit-Stand [place] (Transfers) standby assist  -     Assistive Device (Sit-Stand Transfers) walker, front-wheeled  -       Row Name 12/11/23 0855          Stand-Sit Transfer    Stand-Sit [place] (Transfers) standby assist  -       Row Name 12/11/23 0855          Toilet Transfer    Type (Toilet Transfer) sit-stand;stand-sit  -     [place] Level (Toilet Transfer) verbal cues;contact guard  -     Assistive Device (Toilet Transfer) commode;grab bars/safety frame;walker, front-wheeled  -       Row Name 12/11/23 0855          Gait/Stairs (Locomotion)    [place] Level (Gait) standby assist  -      Assistive Device (Gait) walker, front-wheeled  -CHELLE     Distance in Feet (Gait) 100  -CHELLE     Pattern (Gait) step-through  -CHELLE     Deviations/Abnormal Patterns (Gait) antalgic;gait speed decreased;stride length decreased;weight shifting decreased  -CHELLE       Row Name 12/11/23 0855          Motor Skills    Comments, Therapeutic Exercise supine TKR HEP AAROM  -CHELLE       Row Name             Wound 12/08/23 0858 Left anterior knee Incision    Wound - Properties Group Placement Date: 12/08/23  -CB Placement Time: 0858  -CB Present on Original Admission: N  -CB Side: Left  -CB Orientation: anterior  -CB Location: knee  -CB Primary Wound Type: Incision  -CB    Retired Wound - Properties Group Placement Date: 12/08/23  -CB Placement Time: 0858  -CB Present on Original Admission: N  -CB Side: Left  -CB Orientation: anterior  -CB Location: knee  -CB Primary Wound Type: Incision  -CB    Retired Wound - Properties Group Date first assessed: 12/08/23  -CB Time first assessed: 0858  -CB Present on Original Admission: N  -CB Side: Left  -CB Location: knee  -CB Primary Wound Type: Incision  -CB      Row Name 12/11/23 0855          Positioning and Restraints    Pre-Treatment Position in bed  -CHELLE     Post Treatment Position chair  -CHELLE     In Chair reclined;call light within reach;encouraged to call for assist  -CHELLE               User Key  (r) = Recorded By, (t) = Taken By, (c) = Cosigned By      Initials Name Provider Type    CHELLE Jose Smith PTA Physical Therapist Assistant    Sebastián Kennedy RN Registered Nurse                    Physical Therapy Education       Title: PT OT SLP Therapies (In Progress)       Topic: Physical Therapy (In Progress)       Point: Mobility training (Done)       Learning Progress Summary             Patient RAFAEL Jeong VU by MD at 12/9/2023 1518    RAFAEL Barakat VU, DU by KALIN at 12/8/2023 1253    Comment: benefits of activity, progression of PT POC                         Point: Home exercise program (Not  Started)       Learner Progress:  Not documented in this visit.              Point: Body mechanics (Not Started)       Learner Progress:  Not documented in this visit.              Point: Precautions (Not Started)       Learner Progress:  Not documented in this visit.                              User Key       Initials Effective Dates Name Provider Type Discipline    KALIN 02/03/23 -  Atif Esquivel PT DPT Physical Therapist PT    MD 08/14/23 -  Aydee Lopez, RN Registered Nurse Nurse                  PT Recommendation and Plan     Plan of Care Reviewed With: patient  Progress: improving  Outcome Evaluation: Pt is able to perform TKR HEP while in supine.  Transfered supine to sitting with SBA.  Sit to stand with SBA.  Amb 100' with RWX and SBA with cues to improve gait pattern.   Outcome Measures       Row Name 12/11/23 0855 12/10/23 0847 12/09/23 0935       How much help from another person do you currently need...    Turning from your back to your side while in flat bed without using bedrails? 3  -CHELLE 3  -CHELLE 3  -CHELLE    Moving from lying on back to sitting on the side of a flat bed without bedrails? 3  -CHELLE 3  -CHELLE 3  -CHELLE    Moving to and from a bed to a chair (including a wheelchair)? 3  -CHELLE 3  -CHELLE 3  -CHELLE    Standing up from a chair using your arms (e.g., wheelchair, bedside chair)? 3  -CHELLE 3  -CHELLE 3  -CHELLE    Climbing 3-5 steps with a railing? 3  -CHELLE 3  -CHELLE 3  -CHELLE    To walk in hospital room? 3  -CHELLE 3  -CHELLE 3  -CHELLE    AM-PAC 6 Clicks Score (PT) 18  -CHELLE 18  -CHELLE 18  -CHELLE    Highest Level of Mobility Goal 6 --> Walk 10 steps or more  -CHELLE 6 --> Walk 10 steps or more  -CHELLE 6 --> Walk 10 steps or more  -CHELLE       Functional Assessment    Outcome Measure Options AM-PAC 6 Clicks Basic Mobility (PT)  -CHELLE AM-PAC 6 Clicks Basic Mobility (PT)  -CHELLE AM-PAC 6 Clicks Basic Mobility (PT)  -CHELLE              User Key  (r) = Recorded By, (t) = Taken By, (c) = Cosigned By      Initials Name Provider Type    CHELLE Jose Smith, PTA Physical  Therapist Assistant                     Time Calculation:    PT Charges       Row Name 12/11/23 0855             Time Calculation    Start Time 0855  -CHELLE      Stop Time 0920  -CHELLE      Time Calculation (min) 25 min  -CHELLE      PT Received On 12/11/23  -CHELLE         Time Calculation- PT    Total Timed Code Minutes- PT 25 minute(s)  -CHELLE         Timed Charges    26820 - PT Therapeutic Exercise Minutes 10  -CHELLE      20920 - Gait Training Minutes  15  -CHELLE         Total Minutes    Timed Charges Total Minutes 25  -CHELLE       Total Minutes 25  -CHELLE                User Key  (r) = Recorded By, (t) = Taken By, (c) = Cosigned By      Initials Name Provider Type    Jose Will PTA Physical Therapist Assistant                  Therapy Charges for Today       Code Description Service Date Service Provider Modifiers Qty    87602698713 HC GAIT TRAINING EA 15 MIN 12/10/2023 Jose Smith, ENMA GP 2    12678308564 HC GAIT TRAINING EA 15 MIN 12/11/2023 Jose Smith, PTA GP 1    95572042150 HC PT THER PROC EA 15 MIN 12/11/2023 Jose Smith, PTA GP 1            PT G-Codes  Outcome Measure Options: AM-PAC 6 Clicks Basic Mobility (PT)  AM-PAC 6 Clicks Score (PT): 18  AM-PAC 6 Clicks Score (OT): 22    Jose Smith PTA  12/11/2023

## 2023-12-11 NOTE — PROGRESS NOTES
Subjective:     Post-Operative Day: 3 No complaints.   Objective:     Patient Vitals for the past 24 hrs:   BP Temp Temp src Pulse Resp SpO2   12/11/23 0731 141/64 98.1 °F (36.7 °C) Oral 70 16 99 %   12/11/23 0642 -- -- -- 75 16 98 %   12/11/23 0154 140/68 97.9 °F (36.6 °C) Oral 72 18 95 %   12/10/23 1942 -- -- -- 76 18 98 %   12/10/23 1928 158/64 98.1 °F (36.7 °C) Oral 78 16 96 %   12/10/23 1530 149/66 98.1 °F (36.7 °C) Oral 76 18 97 %       General: alert, appears stated age, and cooperative   Wound: Dressing clean,dry and intact and mod swelling    Neurovascular: Exam normal   DVT Exam: No evidence of DVT seen on physical exam.         Data Review:    LABS:  Results from last 7 days   Lab Units 12/11/23  0500 12/10/23  0351   HEMOGLOBIN g/dL 11.1* 11.1*       Results from last 7 days   Lab Units 12/11/23  0500 12/10/23  0351   SODIUM mmol/L 138 137   POTASSIUM mmol/L 3.8 4.2   CREATININE mg/dL 0.64 0.70           Assessment:     Status Post left  Total Knee Arthroplasty. Doing well without complication.  Plan:     Pain control  PT/OT  DVT prophylaxis (Aspirin)  Ice and elevate  Discharge home with home health today

## 2023-12-11 NOTE — PLAN OF CARE
Goal Outcome Evaluation:  Plan of Care Reviewed With: patient        Progress: improving  Outcome Evaluation: Pt is able to perform TKR HEP while in supine.  Transfered supine to sitting with SBA.  Sit to stand with SBA.  Amb 100' with RWX and SBA with cues to improve gait pattern.

## 2023-12-12 NOTE — THERAPY DISCHARGE NOTE
Acute Care - Physical Therapy Discharge Summary  [facility]       Patient Name: Kezia Otoole  : 1950  MRN: [id number]    Today's Date: 2023                 Admit Date: 2023      PT Recommendation and Plan    Visit Dx:    ICD-10-CM ICD-9-CM   1. Impaired mobility [Z74.09]  Z74.09 799.89   2. Primary osteoarthritis of left knee  M17.12 715.16        Outcome Measures       Row Name 23 0855 12/10/23 0847 23 0935       How much help from another person do you currently need...    Turning from your back to your side while in flat bed without using bedrails? 3  -CHELLE 3  -CHELLE 3  -CHELLE    Moving from lying on back to sitting on the side of a flat bed without bedrails? 3  -CHELLE 3  -CHELLE 3  -CHELLE    Moving to and from a bed to a chair (including a wheelchair)? 3  -CHELLE 3  -CHELLE 3  -CHELLE    Standing up from a chair using your arms (e.g., wheelchair, bedside chair)? 3  -CHELLE 3  -CHELLE 3  -CHELLE    Climbing 3-5 steps with a railing? 3  -CHELLE 3  -CHELLE 3  -CHELLE    To walk in hospital room? 3  -CHELLE 3  -CHELLE 3  -CHELLE    AM-PAC 6 Clicks Score (PT) 18  -CHELLE 18  -CHELLE 18  -CHELLE    Highest Level of Mobility Goal 6 --> Walk 10 steps or more  -CHELLE 6 --> Walk 10 steps or more  -CHELLE 6 --> Walk 10 steps or more  -CHELLE       Functional Assessment    Outcome Measure Options AM-PAC 6 Clicks Basic Mobility (PT)  -CHELLE AM-PAC 6 Clicks Basic Mobility (PT)  -CHELLE AM-PAC 6 Clicks Basic Mobility (PT)  -CHELLE              User Key  (r) = Recorded By, (t) = Taken By, (c) = Cosigned By      Initials Name Provider Type    Jose Will PTA Physical Therapist Assistant                         PT Rehab Goals       Row Name 23 0800             Transfer Goal 1 (PT)    Activity/Assistive Device (Transfer Goal 1, PT) sit-to-stand/stand-to-sit;bed-to-chair/chair-to-bed;walker, rolling  -AB      [place] Level/Cues Needed (Transfer Goal 1, PT) supervision required  -AB      Time Frame (Transfer Goal 1, PT) long term goal (LTG);10 days  -AB       Progress/Outcome (Transfer Goal 1, PT) goal not met  -AB         Gait Training Goal 1 (PT)    Activity/Assistive Device (Gait Training Goal 1, PT) gait (walking locomotion);assistive device use;decrease fall risk;improve balance and speed;increase endurance/gait distance  -AB      [place] Level (Gait Training Goal 1, PT) supervision required  -AB      Distance (Gait Training Goal 1, PT) 200 ft  -AB      Time Frame (Gait Training Goal 1, PT) long term goal (LTG);10 days  -AB      Progress/Outcome (Gait Training Goal 1, PT) goal not met  -AB         ROM Goal 1 (PT)    ROM Goal 1 (PT) L knee 0-90 degrees AROM while sitting EOB.  -AB      Time Frame (ROM Goal 1, PT) long-term goal (LTG);10 days  -AB      Progress/Outcome (ROM Goal 1, PT) goal not met  -AB                User Key  (r) = Recorded By, (t) = Taken By, (c) = Cosigned By      Initials Name Provider Type Discipline    Rima Qureshi PTA Physical Therapist Assistant PT                        PT Discharge Summary  Anticipated Discharge Disposition (PT): home with assist, home with outpatient therapy services  Reason for Discharge: Discharge from facility  Outcomes Achieved: Refer to plan of care for updates on goals achieved  Discharge Destination: Home with home health      Rima Ha PTA   12/12/2023

## 2023-12-14 ENCOUNTER — TELEPHONE (OUTPATIENT)
Dept: FAMILY MEDICINE CLINIC | Facility: CLINIC | Age: 73
End: 2023-12-14

## 2023-12-14 NOTE — TELEPHONE ENCOUNTER
Caller: Laurie Otoole    Relationship: Emergency Contact    Best call back number: [phone]     What is the best time to reach you: ANYTIME    Who are you requesting to speak with (clinical staff, provider,  specific staff member): CLINICAL    What was the call regarding:  STATES PATIENT HAS NO CONTROL OVER HER BLADDER. HE STATES THIS HAS BEEN GOING ON FOR MONTHS. THEY ARE WONDERING IF THERE IS ANYTHING THAT CAN BE DONE TO HELP THIS. HE STATES SHE HAS COMPLETELY NO CONTROL AT ALL. PLEASE ADVISE.     Is it okay if the provider responds through MyChart: NO

## 2023-12-15 DIAGNOSIS — R30.0 DYSURIA: Primary | ICD-10-CM

## 2023-12-15 NOTE — TELEPHONE ENCOUNTER
Will need an appointment.  I understand she is freshly post-op TKR so an in-office vist may be difficult on the front-end.  I am going to put a UA order in because ruling out acute infection would be necessary.  I am ok with a home clean-catch if collected and brought in a lab specimen cup.  Can plan appointment next week or so when feeling better.  The appointment we can talk about all the options to explore which include meds, exercises, and/or referral at times depending on our conversation and consultation.  By waiting a week to follow-up also will allow some time for anesthesia to metabolize out to make sure it isn't making things worse.    Electronically signed by FABBY Sanchez, 12/15/23, 8:11 AM CST.

## 2023-12-15 NOTE — TELEPHONE ENCOUNTER
Patient  notified and stated understanding. Will  urine specimen cup today and appointment scheduled with dr herbert next week

## 2023-12-18 ENCOUNTER — TRANSCRIBE ORDERS (OUTPATIENT)
Dept: HOME HEALTH SERVICES | Facility: HOME HEALTHCARE | Age: 73
End: 2023-12-18
Payer: MEDICARE

## 2023-12-18 ENCOUNTER — HOME HEALTH ADMISSION (OUTPATIENT)
Dept: HOME HEALTH SERVICES | Facility: HOME HEALTHCARE | Age: 73
End: 2023-12-18
Payer: MEDICARE

## 2023-12-18 ENCOUNTER — TELEPHONE (OUTPATIENT)
Dept: FAMILY MEDICINE CLINIC | Facility: CLINIC | Age: 73
End: 2023-12-18
Payer: MEDICARE

## 2023-12-18 DIAGNOSIS — Z47.1 AFTERCARE FOLLOWING LEFT KNEE JOINT REPLACEMENT SURGERY: Primary | ICD-10-CM

## 2023-12-18 DIAGNOSIS — Z96.652 AFTERCARE FOLLOWING LEFT KNEE JOINT REPLACEMENT SURGERY: Primary | ICD-10-CM

## 2023-12-18 RX ORDER — HYDROCHLOROTHIAZIDE 12.5 MG/1
12.5 CAPSULE, GELATIN COATED ORAL DAILY
Qty: 90 CAPSULE | Refills: 3 | Status: SHIPPED | OUTPATIENT
Start: 2023-12-18

## 2023-12-18 NOTE — TELEPHONE ENCOUNTER
Laurie, the  is wanting  to speak to Dr. Crowe prior to Kezia's appointment on 12/20, She is having some issues that he did not want to discuss infront of her. Please advise, phone  [phone]

## 2023-12-20 ENCOUNTER — OFFICE VISIT (OUTPATIENT)
Dept: FAMILY MEDICINE CLINIC | Facility: CLINIC | Age: 73
End: 2023-12-20
Payer: MEDICARE

## 2023-12-20 ENCOUNTER — HOME CARE VISIT (OUTPATIENT)
Dept: HOME HEALTH SERVICES | Facility: CLINIC | Age: 73
End: 2023-12-20
Payer: MEDICARE

## 2023-12-20 VITALS
OXYGEN SATURATION: 95 % | WEIGHT: 231 LBS | TEMPERATURE: 96.9 F | SYSTOLIC BLOOD PRESSURE: 128 MMHG | RESPIRATION RATE: 18 BRPM | DIASTOLIC BLOOD PRESSURE: 78 MMHG | HEIGHT: 62 IN | HEART RATE: 92 BPM | BODY MASS INDEX: 42.51 KG/M2

## 2023-12-20 DIAGNOSIS — I10 HTN (HYPERTENSION), BENIGN: Chronic | ICD-10-CM

## 2023-12-20 DIAGNOSIS — Z96.60 STATUS POST JOINT REPLACEMENT: ICD-10-CM

## 2023-12-20 DIAGNOSIS — R35.0 URINARY FREQUENCY: ICD-10-CM

## 2023-12-20 DIAGNOSIS — E78.2 MIXED HYPERLIPIDEMIA: ICD-10-CM

## 2023-12-20 DIAGNOSIS — Z79.4 CONTROLLED TYPE 2 DIABETES MELLITUS WITH HYPERGLYCEMIA, WITH LONG-TERM CURRENT USE OF INSULIN: ICD-10-CM

## 2023-12-20 DIAGNOSIS — E11.65 CONTROLLED TYPE 2 DIABETES MELLITUS WITH HYPERGLYCEMIA, WITH LONG-TERM CURRENT USE OF INSULIN: ICD-10-CM

## 2023-12-20 DIAGNOSIS — R32 URINARY INCONTINENCE, UNSPECIFIED TYPE: ICD-10-CM

## 2023-12-20 PROCEDURE — G0151 HHCP-SERV OF PT,EA 15 MIN: HCPCS

## 2023-12-20 NOTE — Clinical Note
"SOC Note: 72yo female failing conservative treatment for L knee OA elected TKA w/ referral from Dr Gould for Z47.1,Z96.652 (ICD-10-CM) - Aftercare following left knee joint replacement surgery.  Home Health ordered for: PT    Reason for Hosp/Primary Dx/Co-morbidities: L knee OA, L TKA, DM II    Focus of Care: Z47.1,Z96.652 (ICD-10-CM) - Aftercare following left knee joint replacement surgery    Patient's goal(s): \"bend and straighten my knee w/o pain and walk normal again\"    Current Functional status/mobility/DME: see DME    HB status/Living Arrangements: Lives in private residence w/ spouse\"    Skin Integrity/wound status: L anterior knee incision glued and Prineo    Code Status: FULL CODE    Fall Risk/Safety concerns: mod risk    Educated on Emergency Plan, steps to take prior to going to the ER and when to Call Home Health [facility]:  Instructed to call [facility] for any changes good or bad 24/7.    Medication issues/Concerns: none    Additional Problems/Concerns: none    SDOH Barriers (i.e. caregiver concerns, social isolation, transportation, food insecurity, environment, income etc.)/Need for MSW: none    Plan for next visit: Z47.1,Z96.652 (ICD-10-CM) - Aftercare following left knee joint replacement surgery"

## 2023-12-20 NOTE — PROGRESS NOTES
DAVID”.   Subjective   Kezia Otoole is a 73 y.o. female presenting with chief complaint of:   Chief Complaint   Patient presents with    Urinary Incontinence     AWV 10.18.22  Last Completed Annual Wellness Visit       This patient has no relevant Health Maintenance data.             History of Present Illness :  With .  Agrees to have him present and to hear everything said today and be involved in the visit.  Ro met with him before patient was in room; he needs her urinary incontience better or may have to put in NH    Here for review of chronic problems that includes dementia and others.      R knee arthroscopy/[place]/LH/1.28.1  R total knee/LH/[place]/2.2.21  L knee/LH/[place]/12.8.23    Has multiple chronic problems to consider that might have a bearing on today's issues;  an interval appointment.       Chronic/acute problems reviewed today:   1. Status post joint replacement   R knee arthroscopy/[place]/LH/1.28.1  R total knee/LH/[place]/2.2.21  L knee/LH/[place]/12.8.23    PT still has not started   2. Urinary frequency see incontinence   3. Urinary incontinence, unspecified type chronic urinary frequency urgency and incontinence    4. HTN (hypertension), benign Chronic/stable. Stable here past/no recent home blood pressures.  No significant chest pain, SOB, LE edema, orthopnea, near syncope, dizziness/light headness.   Recent Vitals         12/11/2023 12/11/2023 12/20/2023       BP: 115/46 150/67 128/78     Pulse: 79 70 92     Temp: 98.2 °F (36.8 °C) 97.9 °F (36.6 °C) 96.9 °F (36.1 °C)     Weight: -- -- 105 kg (231 lb)     BMI (Calculated): -- -- 42.5              5. Mixed hyperlipidemia Chronic/stable.  Tolerated use of Rx with labs showing improved lipid values and tolerant liver labs. No muscle aches unexpected.      6. Controlled type 2 diabetes mellitus with hyperglycemia, with long-term current use of insulin Chronic/stable. No problem/pattern hypoglycemia/hyperglycemia manifest by poly-  dypsia, phagia, uria, or sweats, diaphoretic episodes, syncope/near.       Has an/another acute issue today: none.    The following portions of the patient's history were reviewed and updated as appropriate: allergies, current medications, past family history, past medical history, past social history, past surgical history, and problem list.      Current Outpatient Medications:     albuterol sulfate  (90 Base) MCG/ACT inhaler, Inhale 2 puffs Every 4 (Four) Hours As Needed for Wheezing or Shortness of Air., Disp: , Rfl:     amLODIPine (NORVASC) 5 MG tablet, Take 1 tablet by mouth Daily., Disp: , Rfl:     aspirin 81 MG EC tablet, Take 1 tablet by mouth 2 (Two) Times a Day for 30 days., Disp: 60 tablet, Rfl: 0    atorvastatin (LIPITOR) 20 MG tablet, Take 1 tablet by mouth Daily., Disp: , Rfl:     Blood Glucose Monitoring Suppl (OneTouch Verio Flex System) w/Device kit, 1 each As Needed (emergency use if no CGM in place)., Disp: 1 kit, Rfl: 0    budesonide-formoterol (SYMBICORT) 160-4.5 MCG/ACT inhaler, Inhale 2 puffs 2 (Two) Times a Day., Disp: , Rfl:     Calcium Carb-Cholecalciferol (CALCIUM-VITAMIN D) 600-400 MG-UNIT tablet, Take 1 tablet by mouth 2 (Two) Times a Day., Disp: , Rfl:     Cholecalciferol 25 MCG (1000 UT) capsule, Take 1 capsule by mouth Daily., Disp: , Rfl:     Continuous Blood Gluc Sensor (FreeStyle Sue 2 Sensor) misc, USE TO TEST FOUR TIMES DAILY WITH SLIDING SCALE THREE TIMES DAILY AND LANTUS AT BEDTIME., Disp: 2 each, Rfl: 3    denosumab (PROLIA) 60 MG/ML solution syringe, Inject 1 mL under the skin into the appropriate area as directed Every 6 (Six) Months., Disp: , Rfl:     DULoxetine (CYMBALTA) 60 MG capsule, Take 1 capsule by mouth Daily., Disp: 30 capsule, Rfl: 5    empagliflozin (JARDIANCE) 25 MG tablet tablet, Take 1 tablet by mouth Daily., Disp: 30 tablet, Rfl: 5    guaiFENesin (MUCINEX) 600 MG 12 hr tablet, Take 1 tablet by mouth 2 (Two) Times a Day As Needed for Cough or  "Congestion., Disp: , Rfl:     hydroCHLOROthiazide (MICROZIDE) 12.5 MG capsule, TAKE ONE CAPSULE DAILY, Disp: 90 capsule, Rfl: 3    insulin detemir (LEVEMIR) 100 UNIT/ML injection, Inject 110 Units under the skin into the appropriate area as directed 2 (Two) Times a Day., Disp: , Rfl:     Insulin Lispro, 1 Unit Dial, (HumaLOG KwikPen) 100 UNIT/ML solution pen-injector, Inject 7 Units under the skin into the appropriate area as directed 3 (Three) Times a Day. Max units 21 per 24 hours, Disp: 20 mL, Rfl: 1    irbesartan (AVAPRO) 300 MG tablet, Take 1 tablet by mouth Daily., Disp: , Rfl:     Misc Natural Products (GLUCOSAMINE CHONDROITIN TRIPLE PO), Take 1 tablet by mouth 2 (Two) Times a Day., Disp: , Rfl:     montelukast (SINGULAIR) 10 MG tablet, Take 1 tablet by mouth Daily., Disp: , Rfl:     Multiple Vitamins-Minerals (VITRUM 50+ SENIOR MULTI PO), Take 1 tablet by mouth Daily., Disp: , Rfl:     mupirocin (BACTROBAN) 2 % ointment, Apply 1 application  topically to the appropriate area as directed 3 (Three) Times a Day., Disp: , Rfl:     naloxone (NARCAN) 4 MG/0.1ML nasal spray, Call 911. Don't prime. [place] in 1 nostril for overdose. Repeat in 2-3 minutes in other nostril if no or minimal breathing/responsiveness., Disp: 2 each, Rfl: 0    oxyCODONE (ROXICODONE) 5 MG immediate release tablet, Take 1 tablet by mouth Every 4 (Four) Hours As Needed for Moderate Pain., Disp: 50 tablet, Rfl: 0    pantoprazole (PROTONIX) 40 MG EC tablet, Take 1 tablet by mouth Daily., Disp: , Rfl:     spironolactone (ALDACTONE) 25 MG tablet, Take 0.5 tablets by mouth Daily., Disp: , Rfl:     TRUEplus 5-Bevel Pen Needles 31G X 6 MM misc, ONE EACH FIVE TIMES A DAY. TWICE DAILY WITH LEVEMIR, THREE TIMES DAILY WITH HUMALOG AS NEEDED, Disp: 200 each, Rfl: 5    TRUEplus Insulin Syringe 31G X 5/16\" 0.3 ML misc, USE AS DIRECTED THREE TIMES DAILY, Disp: 90 each, Rfl: 5    TRUEplus Insulin Syringe 31G X 5/16\" 0.5 ML misc, USE AS DIRECTED TWICE DAILY " "WITH LEVEMIR, Disp: 60 each, Rfl: 5    No problems with medications.    Allergies   Allergen Reactions    Adhesive Tape Other (See Comments)     Tears and bruises skin    Other Other (See Comments)     Bandaids make arm Red/slightly swollen       Review of Systems  GENERAL:  Inactive/slower with limits, speed, stamina, various joint issues and weakness. Sleep is poor; apnea likely.  No fever now/recent.  ENDO:  No syncope, near or diaphoretic sweaty spells.  BS better. No hypo..   HEENT: No head injury or headache.   No vision change.   No significant hearing loss.  Ears without pain/drainage.  No sore throat.   Same/occ nasal/sinus congestion/drainage. No epistaxis.  CHEST: No chest wall tenderness or mass.  Occ cough, wheeze.   No SOB; no hemoptysis.  CV: No chest pain, palpitations, usual on/off ankle edema.  GI: No heartburn, dysphagia.  No abdominal pain, diarrhea, constipation; occ fecal incontience.  No rectal bleeding; dark stools or melena.    :  Voids without dysuria; frequent incontinence to completion.  ORTHO: No painful/swollen joints but various on /off sore.  No change occ sore neck or back.  No acute neck or back pain without recent injury.  NEURO: No dizziness; diffuse weakness of extremities.  No numbness/paresthesias.   PSYCH: Admitted memory loss.  Mood winter variable; occ mild anxious, depressed but/and not suicidal.  Tries to tolerate stress .   Screening:  Mammogram: [id number]-[id number]/delayed for illness  Bone density: \"2021-22\" [facility]; continuing the prolia  12.11.18 [facility] hip -2.4; start prolia-this year-per [facility]  Low dose CT chest: Tobacco-smoker/never: NA  GI:   Colon-p/[facility]/3.13.23/1y  Prostate: NA  Usual lab order  6m CBC, CMP, A1c  12m CBC, CMP, A1c, LIPID, TSH, Vit D, uric acid    Copy/paste function used for ROS/exam AND each area of these were reviewed, updated, confirmed and supplemented as needed.  Data reviewed:   Recent admit/ER/MD visits: 10.25.23    1. Uncontrolled type 2 " diabetes mellitus with hyperglycemia    2. Hypercalcemia    3. Anemia, unspecified type    4. Depression, unspecified depression type    5. Osteoporosis, unspecified osteoporosis type, unspecified pathological fracture presence    6. Primary generalized (osteo)arthritis    7. Dementia, unspecified dementia severity, unspecified dementia type, unspecified whether behavioral, psychotic, or mood disturbance or anxiety    8. Gait difficulty    9. Multiple chronic diseases    10. Mixed hyperlipidemia    11. HTN (hypertension), benign    12. Encounter for screening mammogram for breast cancer    13. Menopause          Data review above:   Discussions/medical decisions/reviews:  BP ok  Other vitals ok  Recent Vitals           6/29/2023 9/22/2023 10/25/2023          BP: 138/82 128/76 126/84       Pulse: -- 77 70       Temp: -- 96.8 °F (36 °C) 96.9 °F (36.1 °C)       Weight: -- 104 kg (229 lb) 105 kg (232 lb)       BMI (Calculated): -- 40.6 41.1               DM/A1c 9.0 9.22.23  DM/ 6.22.23  Lipid LDL 50 4.18.23;   PSA NA  CBC ok 6.22.23  Iron 57N 6.22.23-none  B12 448 6.22.23-none  Folate > 20 6.22.23-none  Renal ok 6.22.23  Liver ok 6.22.23; better  Vit D 46 4.18.23  Thyroid TSH ok 4.18.23; none     Screening reviewed/updated decision  decision on mammogram/bone d-move forward [facility]  Vaccines discussed (see below) (suggest 30 days covid and 2-3w later RSV); then others  Eye exam due-advised  BS load librie on phone  More attention diet  Resume sliding scale  Start ozempic; start 0.25 weekly for 4 weeks (son will give)  Keep up with [place]/neuro         Follow up: Return for lab/Dr Crowe .         Future Appointments   Date Time Provider Department Center   11/20/2023  8:00 AM NURSE/[facility]   1/24/2024 10:30 AM Len Crowe MD [facility]         Data review above:   Rx: reviewed and decisions:   Rx new/changes: start ozempic; resume sliding scale       New Medications Ordered  This Visit   Medications    Semaglutide,0.25 or 0.5MG/DOS, (Ozempic, 0.25 or 0.5 MG/DOSE,) 2 MG/1.5ML solution pen-injector       Sig: Inject 0.5 mg under the skin into the appropriate area as directed 1 (One) Time Per Week.       Dispense:  1.5 mL       Refill:  1         Orders placed:   LAB/Testing/Referrals: reviewed/orders:   Today:       Orders Placed This Encounter   Procedures    Mammo Screening Digital Tomosynthesis Bilateral With CAD    DEXA Bone Density Axial    Comprehensive Metabolic Panel    Hemoglobin A1c    T4, Free    TSH    CBC & Differential       Last cardiac testing:   Echo:     Radiology considered:   No radiology results for the last 90 days.    Lab Results:  Results for orders placed or performed during the hospital encounter of 12/08/23   Basic Metabolic Panel    Specimen: Blood   Result Value Ref Range    Glucose 202 (H) 65 - 99 mg/dL    BUN 20 8 - 23 mg/dL    Creatinine 0.77 0.57 - 1.00 mg/dL    Sodium 135 (L) 136 - 145 mmol/L    Potassium 4.4 3.5 - 5.2 mmol/L    Chloride 100 98 - 107 mmol/L    CO2 26.0 22.0 - 29.0 mmol/L    Calcium 9.2 8.6 - 10.5 mg/dL    BUN/Creatinine Ratio 26.0 (H) 7.0 - 25.0    Anion Gap 9.0 5.0 - 15.0 mmol/L    eGFR 80.6 >60.0 mL/min/1.73   Hemoglobin & Hematocrit, Blood    Specimen: Blood   Result Value Ref Range    Hemoglobin 12.3 12.0 - 15.9 g/dL    Hematocrit 38.0 34.0 - 46.6 %   Hemoglobin A1c    Specimen: Blood   Result Value Ref Range    Hemoglobin A1C 8.10 (H) 4.80 - 5.60 %   Basic Metabolic Panel    Specimen: Blood   Result Value Ref Range    Glucose 146 (H) 65 - 99 mg/dL    BUN 20 8 - 23 mg/dL    Creatinine 0.70 0.57 - 1.00 mg/dL    Sodium 137 136 - 145 mmol/L    Potassium 4.2 3.5 - 5.2 mmol/L    Chloride 101 98 - 107 mmol/L    CO2 27.0 22.0 - 29.0 mmol/L    Calcium 8.6 8.6 - 10.5 mg/dL    BUN/Creatinine Ratio 28.6 (H) 7.0 - 25.0    Anion Gap 9.0 5.0 - 15.0 mmol/L    eGFR 90.3 >60.0 mL/min/1.73   Hemoglobin & Hematocrit, Blood    Specimen: Blood   Result  Value Ref Range    Hemoglobin 11.1 (L) 12.0 - 15.9 g/dL    Hematocrit 35.8 34.0 - 46.6 %   Basic Metabolic Panel    Specimen: Blood   Result Value Ref Range    Glucose 107 (H) 65 - 99 mg/dL    BUN 16 8 - 23 mg/dL    Creatinine 0.64 0.57 - 1.00 mg/dL    Sodium 138 136 - 145 mmol/L    Potassium 3.8 3.5 - 5.2 mmol/L    Chloride 101 98 - 107 mmol/L    CO2 28.0 22.0 - 29.0 mmol/L    Calcium 8.8 8.6 - 10.5 mg/dL    BUN/Creatinine Ratio 25.0 7.0 - 25.0    Anion Gap 9.0 5.0 - 15.0 mmol/L    eGFR 92.3 >60.0 mL/min/1.73   Hemoglobin & Hematocrit, Blood    Specimen: Blood   Result Value Ref Range    Hemoglobin 11.1 (L) 12.0 - 15.9 g/dL    Hematocrit 36.0 34.0 - 46.6 %   POC Glucose Once    Specimen: Blood   Result Value Ref Range    Glucose 177 (H) 70 - 130 mg/dL   POC Glucose Once    Specimen: Blood   Result Value Ref Range    Glucose 203 (H) 70 - 130 mg/dL   POC Glucose Once    Specimen: Blood   Result Value Ref Range    Glucose 260 (H) 70 - 130 mg/dL   POC Glucose Once    Specimen: Blood   Result Value Ref Range    Glucose 147 (H) 70 - 130 mg/dL   POC Glucose Once    Specimen: Blood   Result Value Ref Range    Glucose 250 (H) 70 - 130 mg/dL   POC Glucose Once    Specimen: Blood   Result Value Ref Range    Glucose 181 (H) 70 - 130 mg/dL   POC Glucose Once    Specimen: Blood   Result Value Ref Range    Glucose 167 (H) 70 - 130 mg/dL   POC Glucose Once    Specimen: Blood   Result Value Ref Range    Glucose 120 70 - 130 mg/dL   POC Glucose Once    Specimen: Blood   Result Value Ref Range    Glucose 163 (H) 70 - 130 mg/dL   POC Glucose Once    Specimen: Blood   Result Value Ref Range    Glucose 179 (H) 70 - 130 mg/dL   POC Glucose Once    Specimen: Blood   Result Value Ref Range    Glucose 195 (H) 70 - 130 mg/dL   POC Glucose Once    Specimen: Blood   Result Value Ref Range    Glucose 113 70 - 130 mg/dL   POC Glucose Once    Specimen: Blood   Result Value Ref Range    Glucose 187 (H) 70 - 130 mg/dL       A1C:  Lab Results - Last  "18 Months   Lab Units 12/09/23  0426 12/01/23  1111 11/20/23  1335 09/22/23  0833 06/22/23  0922 04/18/23  0843 03/21/23  1322   HEMOGLOBIN A1C % 8.10* 8.30* 8.1* 9.0 8.10* 9.80* 10.50*     GLUCOSE:  Lab Results - Last 18 Months   Lab Units 12/11/23  0500 12/10/23  0351 12/09/23  0426 12/01/23  1111 06/22/23  0922 04/18/23  0843 03/21/23  1322   GLUCOSE mg/dL 107* 146* 202* 121* 221* 244* 246*     LIPID:  Lab Results - Last 18 Months   Lab Units 04/18/23  0843 10/13/22  1123   CHOLESTEROL mg/dL 126  --    LDL CHOL mg/dL 50 35   HDL CHOL mg/dL 37* 48*   TRIGLYCERIDES mg/dL 244* 276*     PSA:No results found for: \"PSA\"    CBC:  Lab Results - Last 18 Months   Lab Units 12/11/23  0500 12/10/23  0351 12/09/23  0426 12/01/23  1111 11/20/23  0817 06/22/23  0922 05/31/23  1432 04/18/23  0843 03/21/23  1322 02/10/23  0822 01/24/23  1019 01/04/23  1100 12/19/22  1230 12/07/22  2357 12/07/22  1843 12/07/22  1406 12/07/22  0840 12/06/22  1108 11/18/22  0955 10/13/22  1123   WBC 10*3/mm3  --   --   --  6.78  --  8.32  --  8.20 11.09* 6.21 6.66 5.44 4.42   < > 8.91  --   --  7.44   < > 9.40   HEMOGLOBIN g/dL 11.1* 11.1* 12.3 14.6  --  14.0  --  15.3 16.2* 13.1 12.7 11.6* 9.9*   < > 8.2* 8.1*  --  8.8*   < > 15.2   HEMOGLOBIN, POC g/dL  --   --   --   --  8.1*  --   --   --   --   --   --   --   --   --   --   --   --   --   --   --    HEMATOCRIT % 36.0 35.8 38.0 47.1*  --  42.6  --  44.9 49.3* 42.4 42.9 37.2 32.2*   < > 25.7* 24.2*  --  26.6*   < > 43.7   PLATELETS 10*3/mm3  --   --   --  266  --  285  --  290 380 281 336 358 270   < > 256  --   --  266   < > 301   IRON mcg/dL  --   --   --   --   --  57  --  100 150* 56 344* 37 75  --  39 88 67 180*  --  90   VITAMIN B 12 pg/mL  --   --   --   --   --  448 549 474  --   --   --   --   --   --  328  --   --   --   --   --    FOLATE ng/mL  --   --   --   --   --  >20.00  --  >20.00  --   --   --   --   --   --  18.00  --   --   --   --   --     < > = values in this interval not " "displayed.     BMP/CMP:  Lab Results - Last 18 Months   Lab Units 12/11/23  0500 12/10/23  0351 12/09/23  0426 12/01/23  1111 06/22/23  0922 04/18/23  0843 03/21/23  1322 02/08/23  1159 01/24/23  1019 12/07/22  1843 12/06/22  1108   SODIUM mmol/L 138 137 135* 137 141 140 134*  --  135*   < > 136   POTASSIUM mmol/L 3.8 4.2 4.4 4.2 4.5 4.5 4.3  --  5.0   < > 3.9   CHLORIDE mmol/L 101 101 100 99 101 97* 94*  --  93*   < > 96*   CO2 mmol/L 28.0 27.0 26.0 30.0* 26.8 31.4* 27.0  --  28.1   < > 25.7   GLUCOSE mg/dL 107* 146* 202* 121* 221* 244* 246*  --  497*   < > 235*   BUN mg/dL 16 20 20 17 18 17 22  --  11   < > 32*   CREATININE mg/dL 0.64 0.70 0.77 0.72 0.73 0.82 0.87   < > 0.74   < > 0.61   EGFR RESULT mL/min/1.73  --   --   --   --  86.4 75.2 70.0  --  85.0  --  93.9   CALCIUM mg/dL 8.8 8.6 9.2 9.8 9.9 11.3* 9.9  --  9.4   < > 8.5*   URIC ACID mg/dL  --   --   --   --   --  5.6  --   --   --   --   --     < > = values in this interval not displayed.       HEPATIC:  Lab Results - Last 18 Months   Lab Units 06/22/23  0922 04/18/23  0843 03/21/23  1322 12/19/22  1230 12/11/22  1045 12/07/22  1843 12/06/22  1108   ALT (SGPT) U/L 22 46* 37* 23 30 28 24   AST (SGOT) U/L 17 39* 35* 18 19 15 13   ALK PHOS U/L 113 87 110 98 83 88 68     HEPATITIS C ANTIBODY:   Lab Results   Component Value Date/Time    HEPCVIRUSABY <0.1 05/15/2017 08:52 AM     Vit D:  Lab Results - Last 18 Months   Lab Units 04/18/23  0843 10/13/22  1123 10/13/22  1122   VIT D 25 HYDROXY ng/ml 46.4 52.8 54.4     THYROID:  Lab Results - Last 18 Months   Lab Units 04/18/23  0843 10/13/22  1123   TSH uIU/mL 1.400 0.787       Objective   /78   Pulse 92   Temp 96.9 °F (36.1 °C) (Temporal)   Resp 18   Ht 157 cm (61.81\")   Wt 105 kg (231 lb)   SpO2 95%   BMI 42.51 kg/m²   Body mass index is 42.51 kg/m².    Recent Vitals         12/11/2023 12/11/2023 12/20/2023       BP: 115/46 150/67 128/78     Pulse: 79 70 92     Temp: 98.2 °F (36.8 °C) 97.9 °F (36.6 " °C) 96.9 °F (36.1 °C)     Weight: -- -- 105 kg (231 lb)     BMI (Calculated): -- -- 42.5           Wt Readings from Last 15 Encounters:   12/20/23 1418 105 kg (231 lb)   12/08/23 1208 105 kg (231 lb 7.7 oz)   12/01/23 1029 105 kg (232 lb 2.3 oz)   10/25/23 0946 105 kg (232 lb)   09/22/23 0813 104 kg (229 lb)   06/29/23 1445 106 kg (233 lb)   04/25/23 1432 103 kg (228 lb)   03/21/23 1306 101 kg (222 lb 3.2 oz)   02/16/23 1515 103 kg (227 lb)   01/26/23 0845 101 kg (223 lb)   02/08/23 1218 101 kg (222 lb 10.6 oz)   01/24/23 1027 101 kg (223 lb)   01/10/23 1305 101 kg (223 lb)   12/20/22 1202 95.7 kg (211 lb)   12/07/22 1505 95.3 kg (210 lb)       Physical Exam  GENERAL:  Well nourished/developed in no acute distress. Obese.   SKIN: Turgor excellent, without wound, rash, lesion.   HEENT: Normal cephalic without trauma.  Pupils equal round reactive to light. Extraocular motions full without nystagmus.     Oral cavity without growths, exudates, and moist.  Posterior pharynx without mass, obstruction, redness.  No thyromegaly, mass, tenderness, lymphadenopathy and supple.   CV: Regular rhythm.  No murmur, gallop, trace equal LE edema. Posterior pulses intact.  No carotid bruits.  CHEST: No chest wall tenderness or mass.   LUNGS: Symmetric motion with clear to auscultation.    ABD: Soft, nontender without mass.   ORTHO: Symmetric extremities without swelling/point tenderness compression soreness R foot between 2nd/3rd. Full gross range of motion diffuse.   Walking with rolling walker  NEURO: CN 2-12 grossly intact.  Symmetric facies and UE/LE. 3/5 strength throughout. 1/4 x bicep equal reflexes.  Nonfocal use extremities. Speech clear.    PSYCH: Oriented x 2-did not know date.  Pleasant calm, well kept.  Shallow but purposeful/directed conservation with intact short/long gross memory.    Assessment & Plan     1. Status post joint replacement    2. Urinary frequency    3. Urinary incontinence, unspecified type    4. HTN  (hypertension), benign    5. Mixed hyperlipidemia    6. Controlled type 2 diabetes mellitus with hyperglycemia, with long-term current use of insulin        Data review above:   Discussions/medical decisions/reviews:  BP ok  Other vitals ok  Recent Vitals         12/11/2023 12/11/2023 12/20/2023       BP: 115/46 150/67 128/78     Pulse: 79 70 92     Temp: 98.2 °F (36.8 °C) 97.9 °F (36.6 °C) 96.9 °F (36.1 °C)     Weight: -- -- 105 kg (231 lb)     BMI (Calculated): -- -- 42.5           DM/A1c 8.1 12.9.23; down from near 10  DM/ 12.11.23  Lipid LDL 50 4.18.23  PSA NA  CBC Hb 11.1 12.11.23  Iron 57 6.22.23  B12 448 6.22.23  Folate > 20 6.22.23  Renal ok 12.11.23  Uric acid 5.6 4.18.23  Liver ok 6.22.23 down from ALT, aST minimal elevation  Vit D 46 4.18.23  Thyroid TSH ok 4.18.23    Screening reviewed/updated   Vaccines discussed (see below)   Reschedule apt with [facility] (will try to get records from last apt)  UA/culture today  Refer to urology-urination has been a problem sometime and [facility] did not think [facility]  Has to understand some decline from knee surgery    Follow up: Return for lab today and then lab/Dr Crowe 2m.  Future Appointments   Date Time Provider Department Center   1/24/2024 10:30 AM Len Crowe MD [facility]   2/21/2024  1:00 PM Len Crowe MD [facility]       Data review above:   Rx: reviewed and decisions:   Rx new/changes: none  No orders of the defined types were placed in this encounter.      Orders placed:   LAB/Testing/Referrals: reviewed/orders:   Today:   Orders Placed This Encounter   Procedures    UA / M With / Rflx Culture([facility] ONLY) - Urine, Clean Catch    Ambulatory Referral to Urology     Chronic/recurrent labs above or change to:   Same     Immunization History   Administered Date(s) Administered    COVID-19 (MODERNA) 1st,2nd,3rd Dose Monovalent 02/26/2021, 03/26/2021, 10/28/2021    COVID-19 (MODERNA) BIVALENT 12+YRS 02/25/2023    FLUAD TRI 65YR+  "10/17/2019    Fluad Quad 65+ 10/14/2020    Fluzone High Dose =>65 Years ([employer] ONLY) 10/01/2018    Fluzone High-Dose 65+yrs 10/13/2022    Fluzone Quad >6mos (Multi-dose) 10/28/2016, 10/30/2017    Influenza Quad Vaccine (Inpatient) 10/30/2017    Influenza, Unspecified 10/17/2019, 10/06/2023    Pneumococcal Conjugate 13-Valent (PCV13) 10/21/2015    Pneumococcal Polysaccharide (PPSV23) 10/30/2014    Shingrix 09/06/2019, 12/06/2019     We advised/reaffirmed our support/suggestion for staying complete with covid- covid boosters, seasonal flu/yearly and any missing vaccine from list we supplied; when cannot be given here we suggest contact with local health department office or pharmacy to review missing/needed vaccines and then bring nursing documentation for these vaccines to this office or call this information in. Shingles became \"free\" 1.1.23 for medicare insurance.    Health maintenance:   Body mass index is 42.51 kg/m².         Tobacco use reviewed:   Kezia Otoole  reports that she has never smoked. She has never used smokeless tobacco..    There are no Patient Instructions on file for this visit.          "

## 2023-12-21 VITALS
HEART RATE: 79 BPM | BODY MASS INDEX: 43.43 KG/M2 | OXYGEN SATURATION: 94 % | DIASTOLIC BLOOD PRESSURE: 60 MMHG | TEMPERATURE: 97.5 F | WEIGHT: 230 LBS | SYSTOLIC BLOOD PRESSURE: 128 MMHG | RESPIRATION RATE: 18 BRPM | HEIGHT: 61 IN

## 2023-12-21 LAB
GLUCOSE UR QL STRIP: NORMAL
KETONES UR QL STRIP: NORMAL
PH UR STRIP: NORMAL [PH]
PROT UR QL STRIP: NORMAL
SP GR UR STRIP: NORMAL
SPECIMEN STATUS: NORMAL

## 2023-12-21 NOTE — HOME HEALTH
"SOC Note: 74yo female failing conservative treatment for L knee OA elected TKA w/ referral from Dr Gould for Z47.1,Z96.652 (ICD-10-CM) - Aftercare following left knee joint replacement surgery.  Home Health ordered for: PT    Reason for Hosp/Primary Dx/Co-morbidities: L knee OA, L TKA, DM II    Focus of Care: Z47.1,Z96.652 (ICD-10-CM) - Aftercare following left knee joint replacement surgery    Patient's goal(s):\"bend and straighten my knee and walk normally\"    Current Functional status/mobility/DME: see DME    HB status/Living Arrangements: Lives in private residence w/ spouse\"    Skin Integrity/wound status: L anterior knee incision glued and Prineo    Code Status: FULL CODE    Fall Risk/Safety concerns: mod risk    Educated on Emergency Plan, steps to take prior to going to the ER and when to Call Home Health First:  Instructed to call [facility] for any changes good or bad 24/7.    Medication issues/Concerns: none    Additional Problems/Concerns: none    SDOH Barriers (i.e. caregiver concerns, social isolation, transportation, food insecurity, environment, income etc.)/Need for MSW: none    Plan for next visit: Z47.1,Z96.652 (ICD-10-CM) - Aftercare following left knee joint replacement surgery"

## 2023-12-22 ENCOUNTER — HOME CARE VISIT (OUTPATIENT)
Dept: HOME HEALTH SERVICES | Facility: CLINIC | Age: 73
End: 2023-12-22
Payer: MEDICARE

## 2023-12-22 PROCEDURE — G0151 HHCP-SERV OF PT,EA 15 MIN: HCPCS

## 2023-12-26 VITALS
DIASTOLIC BLOOD PRESSURE: 56 MMHG | OXYGEN SATURATION: 95 % | RESPIRATION RATE: 18 BRPM | TEMPERATURE: 97.2 F | HEART RATE: 92 BPM | SYSTOLIC BLOOD PRESSURE: 120 MMHG

## 2023-12-27 ENCOUNTER — HOME CARE VISIT (OUTPATIENT)
Dept: HOME HEALTH SERVICES | Facility: CLINIC | Age: 73
End: 2023-12-27
Payer: MEDICARE

## 2023-12-27 VITALS
RESPIRATION RATE: 18 BRPM | OXYGEN SATURATION: 95 % | SYSTOLIC BLOOD PRESSURE: 118 MMHG | TEMPERATURE: 96.8 F | HEART RATE: 90 BPM | DIASTOLIC BLOOD PRESSURE: 68 MMHG

## 2023-12-27 PROCEDURE — G0151 HHCP-SERV OF PT,EA 15 MIN: HCPCS

## 2023-12-27 NOTE — HOME HEALTH
"  pt reports hurting but ok         SOC Note: 72yo female failing conservative treatment for L knee OA elected TKA 12-8-23   Home Health ordered for: PT   Reason for Hosp/Primary Dx/Co-morbidities: L knee OA, L TKA, DM II   Focus of Care: Z47.1,Z96.652 (ICD-10-CM) - Aftercare following left knee joint replacement surgery   Patient's goal(s):\"bend and straighten my knee and walk normally\"   Current Functional status/mobility/DME: see DME   HB status/Living Arrangements: Lives in private residence w/ spouse\"   Skin Integrity/wound status: L anterior knee incision glued and Prineo   Code Status: FULL CODE   Pmhx : R TKA 2021 - DM   Fall Risk/Safety concerns: mod risk   Educated on Emergency Plan, steps to take prior to going to the ER and when to Call Home Health First: Instructed to call [facility] for any changes good or bad 24/7.   Medication issues/Concerns: none   Additional Problems/Concerns: none   SDOH Barriers (i.e. caregiver concerns, social isolation, transportation, food insecurity, environment, income etc.)/Need for MSW: none"

## 2023-12-29 ENCOUNTER — TELEPHONE (OUTPATIENT)
Dept: FAMILY MEDICINE CLINIC | Facility: CLINIC | Age: 73
End: 2023-12-29
Payer: MEDICARE

## 2023-12-29 RX ORDER — FLUCONAZOLE 100 MG/1
100 TABLET ORAL DAILY
Qty: 3 TABLET | Refills: 0 | Status: SHIPPED | OUTPATIENT
Start: 2023-12-29

## 2023-12-29 NOTE — TELEPHONE ENCOUNTER
If not on diflcan   Can have diflucan 100 mg one a day #3    Needs to work to get BS good because high BS can cause yeast infections

## 2023-12-29 NOTE — TELEPHONE ENCOUNTER
Patient called back, she is not on diflucan, so I sent to pharmacy and she verbalized understanding

## 2023-12-29 NOTE — TELEPHONE ENCOUNTER
Caller: Kezia Otoole    Relationship: Self    Best call back number: [phone]     What medication are you requesting: SOMETHING FOR YEAST INFECTION    What are your current symptoms: VAGINAL YEAST INFECTION     How long have you been experiencing symptoms: 2 DAYS     Have you had these symptoms before:    [x] Yes  [] No    Have you been treated for these symptoms before:   [x] Yes  [] No    If a prescription is needed, what is your preferred pharmacy and phone number: [facility] - [address] [phone] [facility] [phone]      Additional notes: PATIENT HAD KNEE SURGERY AND HAS BEEN ON AN ANTIBIOTIC

## 2024-01-03 ENCOUNTER — HOME CARE VISIT (OUTPATIENT)
Dept: HOME HEALTH SERVICES | Facility: CLINIC | Age: 74
End: 2024-01-03
Payer: MEDICARE

## 2024-01-03 VITALS
HEART RATE: 90 BPM | TEMPERATURE: 97.7 F | RESPIRATION RATE: 18 BRPM | DIASTOLIC BLOOD PRESSURE: 80 MMHG | OXYGEN SATURATION: 94 % | SYSTOLIC BLOOD PRESSURE: 152 MMHG

## 2024-01-03 PROCEDURE — G0157 HHC PT ASSISTANT EA 15: HCPCS

## 2024-01-03 NOTE — HOME HEALTH
SUBJECTIVE: Patient reports she will see Dr. Gould on Monday for a follow up. She feels she is doing well but is unsure if she will go to outpatient or continue home health. She states she would prefer to continue PT at home if Dr. Gould wants to continue therapy since she does not drive.    OBJECTIVE: Patient would benefit from continuation of home health PT or outpatient PT to address strength and ROM deficits. She is also wanting to DC from using walker. She has a cane and would benefit from continuation of therapy in either outpatient or home health to address remaining deficits.    SIGNS AND SYMPTOMS OF COVID: no    FALLS: no    INSURANCE CHANGES: no    MEDICATION CHANGES: no    EDEMA: post surgical swelling left knee and ankle.    PLAN: Reassess next visit.
01-Nov-2023 19:04

## 2024-01-04 ENCOUNTER — HOME CARE VISIT (OUTPATIENT)
Dept: HOME HEALTH SERVICES | Facility: HOME HEALTHCARE | Age: 74
End: 2024-01-04
Payer: MEDICARE

## 2024-01-05 ENCOUNTER — HOME CARE VISIT (OUTPATIENT)
Dept: HOME HEALTH SERVICES | Facility: CLINIC | Age: 74
End: 2024-01-05
Payer: MEDICARE

## 2024-01-05 VITALS
HEART RATE: 68 BPM | TEMPERATURE: 96.8 F | SYSTOLIC BLOOD PRESSURE: 140 MMHG | OXYGEN SATURATION: 99 % | DIASTOLIC BLOOD PRESSURE: 80 MMHG | RESPIRATION RATE: 16 BRPM

## 2024-01-05 PROCEDURE — G0151 HHCP-SERV OF PT,EA 15 MIN: HCPCS

## 2024-01-05 NOTE — HOME HEALTH
pre-screened covid 19    Patient has follow up with Dr. Gould on Monday 1/8/2024.  Patient is agreeable to discharge to [facility].

## 2024-01-10 ENCOUNTER — TELEPHONE (OUTPATIENT)
Dept: UROLOGY | Facility: CLINIC | Age: 74
End: 2024-01-10
Payer: MEDICARE

## 2024-01-10 RX ORDER — SPIRONOLACTONE 25 MG/1
TABLET ORAL
Qty: 45 TABLET | Refills: 1 | Status: SHIPPED | OUTPATIENT
Start: 2024-01-10

## 2024-01-10 NOTE — TELEPHONE ENCOUNTER
Rescheduled the patient with geo per dr adler request    ----- Message from Mary Burroughs CMA sent at 1/10/2024  1:45 PM CST -----  Regarding: FW: Must see Geo Selby    ----- Message -----  From: Zeyad Adler MD  Sent: 1/10/2024  12:51 PM CST  To: Mary Burroughs CMA  Subject: Must see Geo Selby                           Please take off my schedule,

## 2024-01-15 ENCOUNTER — TELEPHONE (OUTPATIENT)
Dept: FAMILY MEDICINE CLINIC | Facility: CLINIC | Age: 74
End: 2024-01-15
Payer: MEDICARE

## 2024-01-15 RX ORDER — CIPROFLOXACIN HYDROCHLORIDE 3.5 MG/ML
1 SOLUTION/ DROPS TOPICAL 4 TIMES DAILY
Qty: 2.5 ML | Refills: 0 | Status: SHIPPED | OUTPATIENT
Start: 2024-01-15

## 2024-01-15 NOTE — TELEPHONE ENCOUNTER
Medication is sent.  Follow-up later this week if not getting any better.    Electronically signed by FABBY Sanchez, 01/15/24, 9:13 AM CST.

## 2024-01-15 NOTE — TELEPHONE ENCOUNTER
Caller: Kezia Otoole    Relationship: Self    Best call back number: [phone]     What medication are you requesting: EYE DROPS. POSSIBLE PINK EYE    What are your current symptoms: REDNESS AND ITCHY.  HAD PINK EYE    How long have you been experiencing symptoms: STARTED LAST NIGHT    Have you had these symptoms before:    [] Yes  [x] No    Have you been treated for these symptoms before:   [] Yes  [x] No    If a prescription is needed, what is your preferred pharmacy and phone number:      [facility] - [address] [phone] [facility] [phone] [facility] - [address] - [phone] [facility] [phone] [facility]     Additional notes: PLEASE LET KEZIA KNOW ONCE SENT IN

## 2024-01-25 ENCOUNTER — TELEPHONE (OUTPATIENT)
Dept: FAMILY MEDICINE CLINIC | Facility: CLINIC | Age: 74
End: 2024-01-25
Payer: MEDICARE

## 2024-01-25 DIAGNOSIS — E11.9 INSULIN DEPENDENT TYPE 2 DIABETES MELLITUS: ICD-10-CM

## 2024-01-25 DIAGNOSIS — J45.20 MILD INTERMITTENT ASTHMA, UNSPECIFIED WHETHER COMPLICATED: ICD-10-CM

## 2024-01-25 DIAGNOSIS — Z79.4 INSULIN DEPENDENT TYPE 2 DIABETES MELLITUS: ICD-10-CM

## 2024-01-25 DIAGNOSIS — E11.65 UNCONTROLLED TYPE 2 DIABETES MELLITUS WITH HYPERGLYCEMIA: ICD-10-CM

## 2024-01-25 DIAGNOSIS — J45.901 EXACERBATION OF ASTHMA, UNSPECIFIED ASTHMA SEVERITY, UNSPECIFIED WHETHER PERSISTENT: ICD-10-CM

## 2024-01-25 RX ORDER — BUDESONIDE AND FORMOTEROL FUMARATE DIHYDRATE 160; 4.5 UG/1; UG/1
2 AEROSOL RESPIRATORY (INHALATION) 2 TIMES DAILY
Qty: 30.6 EACH | Refills: 3 | Status: SHIPPED | OUTPATIENT
Start: 2024-01-25

## 2024-01-25 RX ORDER — INSULIN DETEMIR 100 [IU]/ML
INJECTION, SOLUTION SUBCUTANEOUS
Qty: 60 ML | Refills: 5 | Status: SHIPPED | OUTPATIENT
Start: 2024-01-25

## 2024-01-25 NOTE — TELEPHONE ENCOUNTER
Caller: Herminio Toro     Relationship: [unfilled]     Best call back number: [phone]     What is your medical concern? DEEP DRY NON PRODUCTIVE COUGH     How long has this issue been going on? FEW DAYS     Is your provider already aware of this issue? NO

## 2024-01-25 NOTE — TELEPHONE ENCOUNTER
Patient's son states patient has a productive cough that has became worse in the last couple of days, he would like to know if there is anything over the counter they may try before having to be seen?

## 2024-01-25 NOTE — TELEPHONE ENCOUNTER
Coricidin HBP for the symptom relief and should probably do a home COVID test.  If gets worse, needs to be seen.    Electronically signed by FABBY Sanchez, 01/25/24, 1:08 PM CST.

## 2024-02-14 ENCOUNTER — OFFICE VISIT (OUTPATIENT)
Dept: FAMILY MEDICINE CLINIC | Facility: CLINIC | Age: 74
End: 2024-02-14
Payer: MEDICARE

## 2024-02-14 VITALS
TEMPERATURE: 97.5 F | DIASTOLIC BLOOD PRESSURE: 84 MMHG | OXYGEN SATURATION: 99 % | HEART RATE: 79 BPM | HEIGHT: 62 IN | RESPIRATION RATE: 18 BRPM | WEIGHT: 233 LBS | BODY MASS INDEX: 42.88 KG/M2 | SYSTOLIC BLOOD PRESSURE: 130 MMHG

## 2024-02-14 DIAGNOSIS — K21.9 GASTROESOPHAGEAL REFLUX DISEASE, UNSPECIFIED WHETHER ESOPHAGITIS PRESENT: ICD-10-CM

## 2024-02-14 DIAGNOSIS — E83.52 HYPERCALCEMIA: ICD-10-CM

## 2024-02-14 DIAGNOSIS — M17.12 PRIMARY OSTEOARTHRITIS OF LEFT KNEE: ICD-10-CM

## 2024-02-14 DIAGNOSIS — D64.9 ANEMIA, UNSPECIFIED TYPE: ICD-10-CM

## 2024-02-14 DIAGNOSIS — E78.2 MIXED HYPERLIPIDEMIA: ICD-10-CM

## 2024-02-14 DIAGNOSIS — L02.91 ABSCESS: ICD-10-CM

## 2024-02-14 DIAGNOSIS — I10 HTN (HYPERTENSION), BENIGN: Chronic | ICD-10-CM

## 2024-02-14 DIAGNOSIS — Z00.00 WELLNESS EXAMINATION: Primary | ICD-10-CM

## 2024-02-14 DIAGNOSIS — F03.90 DEMENTIA, UNSPECIFIED DEMENTIA SEVERITY, UNSPECIFIED DEMENTIA TYPE, UNSPECIFIED WHETHER BEHAVIORAL, PSYCHOTIC, OR MOOD DISTURBANCE OR ANXIETY: ICD-10-CM

## 2024-02-14 DIAGNOSIS — M15.0 PRIMARY GENERALIZED (OSTEO)ARTHRITIS: ICD-10-CM

## 2024-02-14 RX ORDER — CEFDINIR 300 MG/1
300 CAPSULE ORAL 2 TIMES DAILY
Qty: 20 CAPSULE | Refills: 0 | Status: SHIPPED | OUTPATIENT
Start: 2024-02-14

## 2024-02-14 RX ORDER — OXYCODONE HYDROCHLORIDE 5 MG/1
5 TABLET ORAL EVERY 6 HOURS PRN
Start: 2024-02-14

## 2024-02-15 ENCOUNTER — PROCEDURE VISIT (OUTPATIENT)
Dept: FAMILY MEDICINE CLINIC | Facility: CLINIC | Age: 74
End: 2024-02-15
Payer: MEDICARE

## 2024-02-15 ENCOUNTER — OFFICE VISIT (OUTPATIENT)
Dept: UROLOGY | Facility: CLINIC | Age: 74
End: 2024-02-15
Payer: MEDICARE

## 2024-02-15 VITALS — WEIGHT: 233.6 LBS | BODY MASS INDEX: 42.99 KG/M2 | HEIGHT: 62 IN | TEMPERATURE: 97.1 F

## 2024-02-15 VITALS
SYSTOLIC BLOOD PRESSURE: 130 MMHG | HEART RATE: 89 BPM | OXYGEN SATURATION: 99 % | TEMPERATURE: 97.1 F | DIASTOLIC BLOOD PRESSURE: 84 MMHG | BODY MASS INDEX: 42.88 KG/M2 | RESPIRATION RATE: 18 BRPM | WEIGHT: 233 LBS | HEIGHT: 62 IN

## 2024-02-15 DIAGNOSIS — N39.41 URGE INCONTINENCE: Primary | ICD-10-CM

## 2024-02-15 DIAGNOSIS — L02.213 CHEST WALL ABSCESS: Primary | ICD-10-CM

## 2024-02-15 PROBLEM — L98.9 SKIN LESION: Status: ACTIVE | Noted: 2024-02-15

## 2024-02-20 ENCOUNTER — OFFICE VISIT (OUTPATIENT)
Dept: FAMILY MEDICINE CLINIC | Facility: CLINIC | Age: 74
End: 2024-02-20
Payer: MEDICARE

## 2024-02-20 VITALS
HEART RATE: 95 BPM | BODY MASS INDEX: 43.06 KG/M2 | HEIGHT: 62 IN | TEMPERATURE: 97.3 F | WEIGHT: 234 LBS | OXYGEN SATURATION: 98 % | SYSTOLIC BLOOD PRESSURE: 130 MMHG | DIASTOLIC BLOOD PRESSURE: 84 MMHG | RESPIRATION RATE: 18 BRPM

## 2024-02-20 DIAGNOSIS — L02.91 ABSCESS: Primary | ICD-10-CM

## 2024-02-20 LAB
BACTERIA SPEC CULT: NORMAL
BACTERIA SPEC CULT: NORMAL
MICROORGANISM/AGENT SPEC: NORMAL

## 2024-02-20 NOTE — PROGRESS NOTES
DAVID”.   Subjective   Kezia Otoole is a 73 y.o. female presenting with chief complaint of:   Chief Complaint   Patient presents with    Follow-up       Last Completed Annual Wellness Visit            ANNUAL WELLNESS VISIT (Yearly) Next due on 2/14/2025 02/14/2024  Level of Service: PPPS, SUBSEQ VISIT    10/18/2022  Level of Service: DC PPPS, SUBSEQ VISIT    09/16/2021  Level of Service: DC PPPS, SUBSEQ VISIT    07/09/2020  Level of Service: DC PPPS, SUBSEQ VISIT    07/09/2020  Done    Only the first 5 history entries have been loaded, but more history exists.                     History of Present Illness :  Alone.  Here for primarily wound inspection.  Last week I&D of upper chest abscess.  Packed.  Son is providing wound care.  No fever and wound is smaller, less sore.     Has multiple chronic problems to consider that might have a bearing on today's issues; not an interval appointment.       Chronic/acute problems reviewed today:   1. Abscess      Has an/another acute issue today: none.      Current Outpatient Medications:     albuterol sulfate  (90 Base) MCG/ACT inhaler, Inhale 2 puffs Every 4 (Four) Hours As Needed for Wheezing or Shortness of Air. Indications: Asthma, Disp: , Rfl:     ascorbic acid (VITAMIN C) 500 MG tablet, Take 1 tablet by mouth Daily. Indications: Inadequate Vitamin C, Disp: , Rfl:     atorvastatin (LIPITOR) 20 MG tablet, Take 1 tablet by mouth Daily. Indications: High Amount of Fats in the Blood, Disp: , Rfl:     B Complex-C-Folic Acid (HM VITAMIN B COMPLEX/VITAMIN C PO), Take 1 capsule by mouth Daily. Indications: supplement, Disp: , Rfl:     Blood Glucose Monitoring Suppl (OneTouch Verio Flex System) w/Device kit, 1 each As Needed (emergency use if no CGM in place)., Disp: 1 kit, Rfl: 0    Calcium Carb-Cholecalciferol (Calcium 600/Vitamin D3) 600-20 MG-MCG tablet, Take 1 tablet by mouth Daily. Indications: Low Amount of Calcium in the Blood, Disp: , Rfl:      carbamide peroxide (Debrox) 6.5 % otic solution, Administer 5 drops into both ears As Needed (cerumen ). Indications: Removal of Wax From the Ear, Disp: , Rfl:     cefdinir (OMNICEF) 300 MG capsule, Take 1 capsule by mouth 2 (Two) Times a Day., Disp: 20 capsule, Rfl: 0    Cholecalciferol 25 MCG (1000 UT) capsule, Take 1 capsule by mouth Daily. Indications: Osteoporosis, Disp: , Rfl:     ciprofloxacin (Ciloxan) 0.3 % ophthalmic solution, Administer 1 drop to both eyes 4 (Four) Times a Day., Disp: 2.5 mL, Rfl: 0    Continuous Blood Gluc Sensor (FreeStyle Sue 2 Sensor) misc, USE TO TEST FOUR TIMES DAILY WITH SLIDING SCALE THREE TIMES DAILY AND LANTUS AT BEDTIME., Disp: 2 each, Rfl: 3    denosumab (PROLIA) 60 MG/ML solution syringe, Inject 1 mL under the skin into the appropriate area as directed Every 6 (Six) Months. Indications: Osteoporosis, Disp: , Rfl:     DULoxetine (CYMBALTA) 60 MG capsule, Take 1 capsule by mouth Daily., Disp: 30 capsule, Rfl: 5    empagliflozin (JARDIANCE) 25 MG tablet tablet, Take 1 tablet by mouth Daily., Disp: 30 tablet, Rfl: 5    fluconazole (Diflucan) 100 MG tablet, Take 1 tablet by mouth Daily., Disp: 3 tablet, Rfl: 0    guaiFENesin (MUCINEX) 600 MG 12 hr tablet, Take 1 tablet by mouth 2 (Two) Times a Day As Needed for Cough or Congestion. Indications: Cough, congestion, Disp: , Rfl:     hydroCHLOROthiazide (MICROZIDE) 12.5 MG capsule, TAKE ONE CAPSULE DAILY, Disp: 90 capsule, Rfl: 3    insulin detemir (LEVEMIR) 100 UNIT/ML injection, Inject 110 Units under the skin into the appropriate area as directed 2 (Two) Times a Day. Indications: Type 2 Diabetes, Disp: , Rfl:     Insulin Lispro, 1 Unit Dial, (HumaLOG KwikPen) 100 UNIT/ML solution pen-injector, Inject 7 Units under the skin into the appropriate area as directed 3 (Three) Times a Day. Max units 21 per 24 hours, Disp: 20 mL, Rfl: 1    irbesartan (AVAPRO) 300 MG tablet, Take 1 tablet by mouth Daily. Indications: High Blood Pressure  "Disorder, Disp: , Rfl:     Levemir FlexPen 100 UNIT/ML injection, INJECT 110 UNITS EVERY TWELVE HOURS, Disp: 60 mL, Rfl: 5    Misc Natural Products (GLUCOSAMINE CHONDROITIN TRIPLE PO), Take 2 tablets by mouth Daily. Indications: supplement, Disp: , Rfl:     montelukast (SINGULAIR) 10 MG tablet, Take 1 tablet by mouth Daily. Indications: Asthma, Hayfever, Disp: , Rfl:     naloxone (NARCAN) 4 MG/0.1ML nasal spray, Call 911. Don't prime. [place] in 1 nostril for overdose. Repeat in 2-3 minutes in other nostril if no or minimal breathing/responsiveness., Disp: 2 each, Rfl: 0    oxyCODONE (ROXICODONE) 5 MG immediate release tablet, Take 1 tablet by mouth Every 6 (Six) Hours As Needed for Moderate Pain. Indications: Acute Pain, Disp: , Rfl:     pantoprazole (PROTONIX) 40 MG EC tablet, Take 1 tablet by mouth Daily. Indications: Gastroesophageal Reflux Disease, Heartburn, Disp: , Rfl:     spironolactone (ALDACTONE) 25 MG tablet, TAKE ONE HALF (1/2) TABLET DAILY, Disp: 45 tablet, Rfl: 1    Symbicort 160-4.5 MCG/ACT inhaler, INHALE TWO PUFFS TWO TIMES A DAY, Disp: 30.6 each, Rfl: 3    TRUEplus 5-Bevel Pen Needles 31G X 6 MM misc, ONE EACH FIVE TIMES A DAY. TWICE DAILY WITH LEVEMIR, THREE TIMES DAILY WITH HUMALOG AS NEEDED, Disp: 200 each, Rfl: 5    TRUEplus Insulin Syringe 31G X 5/16\" 0.3 ML misc, USE AS DIRECTED THREE TIMES DAILY, Disp: 90 each, Rfl: 5    TRUEplus Insulin Syringe 31G X 5/16\" 0.5 ML misc, USE AS DIRECTED TWICE DAILY WITH LEVEMIR, Disp: 60 each, Rfl: 5    No problems with medications.    Allergies   Allergen Reactions    Adhesive Tape Other (See Comments)     Tears and bruises skin    Other Other (See Comments)     Bandaids make arm Red/slightly swollen       Review of Systems   Constitutional:  Negative for activity change, chills and fever.   Skin:  Positive for wound.     Copy/paste function used for ROS/exam AND each area of these were reviewed, updated, confirmed and supplemented as needed.  Data reviewed: " "  Recent admit/ER/MD visits: last visit  Last cardiac testing:   Echo: none  Radiology considered: none  Lab Results:  Results for orders placed or performed in visit on 02/07/24   POC Urinalysis Dipstick, Multipro    Specimen: Urine   Result Value Ref Range    Color Yellow Yellow, Straw, Dark Yellow, Mercedes    Clarity, UA Clear Clear    Glucose, UA >=1000 mg/dL (3+) (A) Negative mg/dL    Bilirubin Negative Negative    Ketones, UA Negative Negative    Specific Gravity  1.010 1.005 - 1.030    Blood, UA Trace (A) Negative    pH, Urine 5.5 5.0 - 8.0    Protein, POC Negative Negative mg/dL    Urobilinogen, UA 0.2 E.U./dL Normal, 0.2 E.U./dL    Nitrite, UA Negative Negative    Leukocytes Trace (A) Negative       A1C:  Lab Results - Last 18 Months   Lab Units 12/09/23  0426 12/01/23  1111 11/20/23  1335 09/22/23  0833 06/22/23  0922 04/18/23  0843 03/21/23  1322   HEMOGLOBIN A1C % 8.10* 8.30* 8.1* 9.0 8.10* 9.80* 10.50*     GLUCOSE:  Lab Results - Last 18 Months   Lab Units 12/11/23  0500 12/10/23  0351 12/09/23  0426 12/01/23  1111 06/22/23  0922 04/18/23  0843 03/21/23  1322   GLUCOSE mg/dL 107* 146* 202* 121* 221* 244* 246*     LIPID:  Lab Results - Last 18 Months   Lab Units 04/18/23  0843 10/13/22  1123   CHOLESTEROL mg/dL 126  --    LDL CHOL mg/dL 50 35   HDL CHOL mg/dL 37* 48*   TRIGLYCERIDES mg/dL 244* 276*     PSA:No results found for: \"PSA\"    CBC:  Lab Results - Last 18 Months   Lab Units 12/11/23  0500 12/10/23  0351 12/09/23  0426 12/01/23  1111 11/20/23  0817 06/22/23  0922 05/31/23  1432 04/18/23  0843 03/21/23  1322 02/10/23  0822 01/24/23  1019 01/04/23  1100 12/19/22  1230 12/07/22  2357 12/07/22  1843 12/07/22  1406 12/07/22  0840 12/06/22  1108 11/18/22  0955 10/13/22  1123   WBC 10*3/mm3  --   --   --  6.78  --  8.32  --  8.20 11.09* 6.21 6.66 5.44 4.42   < > 8.91  --   --  7.44   < > 9.40   HEMOGLOBIN g/dL 11.1* 11.1* 12.3 14.6  --  14.0  --  15.3 16.2* 13.1 12.7 11.6* 9.9*   < > 8.2* 8.1*  --  8.8*   " < > 15.2   HEMOGLOBIN, POC g/dL  --   --   --   --  8.1*  --   --   --   --   --   --   --   --   --   --   --   --   --   --   --    HEMATOCRIT % 36.0 35.8 38.0 47.1*  --  42.6  --  44.9 49.3* 42.4 42.9 37.2 32.2*   < > 25.7* 24.2*  --  26.6*   < > 43.7   PLATELETS 10*3/mm3  --   --   --  266  --  285  --  290 380 281 336 358 270   < > 256  --   --  266   < > 301   IRON mcg/dL  --   --   --   --   --  57  --  100 150* 56 344* 37 75  --  39 88 67 180*  --  90   VITAMIN B 12 pg/mL  --   --   --   --   --  448 549 474  --   --   --   --   --   --  328  --   --   --   --   --    FOLATE ng/mL  --   --   --   --   --  >20.00  --  >20.00  --   --   --   --   --   --  18.00  --   --   --   --   --     < > = values in this interval not displayed.     BMP/CMP:  Lab Results - Last 18 Months   Lab Units 12/11/23  0500 12/10/23  0351 12/09/23  0426 12/01/23  1111 06/22/23  0922 04/18/23  0843 03/21/23  1322 02/08/23  1159 01/24/23  1019 12/07/22  1843 12/06/22  1108   SODIUM mmol/L 138 137 135* 137 141 140 134*  --  135*   < > 136   POTASSIUM mmol/L 3.8 4.2 4.4 4.2 4.5 4.5 4.3  --  5.0   < > 3.9   CHLORIDE mmol/L 101 101 100 99 101 97* 94*  --  93*   < > 96*   CO2 mmol/L 28.0 27.0 26.0 30.0* 26.8 31.4* 27.0  --  28.1   < > 25.7   GLUCOSE mg/dL 107* 146* 202* 121* 221* 244* 246*  --  497*   < > 235*   BUN mg/dL 16 20 20 17 18 17 22  --  11   < > 32*   CREATININE mg/dL 0.64 0.70 0.77 0.72 0.73 0.82 0.87   < > 0.74   < > 0.61   EGFR RESULT mL/min/1.73  --   --   --   --  86.4 75.2 70.0  --  85.0  --  93.9   CALCIUM mg/dL 8.8 8.6 9.2 9.8 9.9 11.3* 9.9  --  9.4   < > 8.5*   URIC ACID mg/dL  --   --   --   --   --  5.6  --   --   --   --   --     < > = values in this interval not displayed.     HEPATIC:  Lab Results - Last 18 Months   Lab Units 06/22/23  0922 04/18/23  0843 03/21/23  1322 12/19/22  1230 12/11/22  1045 12/07/22  1843 12/06/22  1108   ALT (SGPT) U/L 22 46* 37* 23 30 28 24   AST (SGOT) U/L 17 39* 35* 18 19 15 13   ALK  "PHOS U/L 113 87 110 98 83 88 68     HEPATITIS C ANTIBODY:   Lab Results   Component Value Date/Time    HEPCVIRUSABY <0.1 05/15/2017 08:52 AM     Vit D:  Lab Results - Last 18 Months   Lab Units 04/18/23  0843 10/13/22  1123 10/13/22  1122   VIT D 25 HYDROXY ng/ml 46.4 52.8 54.4     THYROID:  Lab Results - Last 18 Months   Lab Units 04/18/23  0843 10/13/22  1123   TSH uIU/mL 1.400 0.787       Objective   /84   Pulse 95   Temp 97.3 °F (36.3 °C) (Temporal)   Resp 18   Ht 157 cm (61.81\")   Wt 106 kg (234 lb)   SpO2 98%   BMI 43.06 kg/m²   Body mass index is 43.06 kg/m².    Recent Vitals         2/15/2024 2/15/2024 2/20/2024       BP: -- 130/84 130/84     Pulse: -- 89 95     Temp: 97.1 °F (36.2 °C) 97.1 °F (36.2 °C) 97.3 °F (36.3 °C)     Weight: 106 kg (233 lb 9.6 oz) 106 kg (233 lb) 106 kg (234 lb)     BMI (Calculated): 43 42.9 43.1           Physical Exam  Wound 1/2 last size; red but softer.  1 cm packing cut off    Assessment & Plan     1. Abscess        Data review above:   Discussions/medical decisions/reviews:  Doing well  Remove packing 3 days  Finish abx  Culture still pending  Local wound care  May need cyst removal after complete recovery    Recent Vitals         2/15/2024 2/15/2024 2/20/2024       BP: -- 130/84 130/84     Pulse: -- 89 95     Temp: 97.1 °F (36.2 °C) 97.1 °F (36.2 °C) 97.3 °F (36.3 °C)     Weight: 106 kg (233 lb 9.6 oz) 106 kg (233 lb) 106 kg (234 lb)     BMI (Calculated): 43 42.9 43.1             Follow up: Return for return May/June.  Future Appointments   Date Time Provider Department Center   4/16/2024  8:15 AM Rianna Alonzo APRN [facility]   5/23/2024 11:00 AM Len Crowe MD [facility]     Data review above:   Rx: reviewed and decisions:   Rx new/changes: none  No orders of the defined types were placed in this encounter.    Orders placed:   LAB/Testing/Referrals: reviewed/orders:   Today:   No orders of the defined types were placed in this " "encounter.    Chronic/recurrent labs above or change to:   Same     Immunization History   Administered Date(s) Administered    ABRYSVO (RSV, 60+ or pregnant women 32-36 wks) 01/31/2024    COVID-19 (MODERNA) 1st,2nd,3rd Dose Monovalent 02/26/2021, 03/26/2021, 10/28/2021    COVID-19 (MODERNA) BIVALENT 12+YRS 02/25/2023    FLUAD TRI 65YR+ 10/17/2019    Fluad Quad 65+ 10/14/2020    Fluzone High Dose =>65 Years ([employer] ONLY) 10/01/2018    Fluzone High-Dose 65+yrs 10/13/2022    Fluzone Quad >6mos (Multi-dose) 10/28/2016, 10/30/2017    Influenza Quad Vaccine (Inpatient) 10/30/2017    Influenza, Unspecified 10/17/2019, 10/06/2023    Pneumococcal Conjugate 13-Valent (PCV13) 10/21/2015    Pneumococcal Polysaccharide (PPSV23) 10/30/2014    Shingrix 09/06/2019, 12/06/2019     We advised/reaffirmed our support/suggestion for staying complete with covid- covid boosters, seasonal flu/yearly and any missing vaccine from list we supplied; when cannot be given here we suggest contact with local health department office or pharmacy to review missing/needed vaccines and then bring nursing documentation for these vaccines to this office or call this information in. Shingles became \"free\" 1.1.23 for medicare insurance.    Health maintenance:   Body mass index is 43.06 kg/m².             There are no Patient Instructions on file for this visit.          "

## 2024-02-22 ENCOUNTER — TELEPHONE (OUTPATIENT)
Dept: FAMILY MEDICINE CLINIC | Facility: CLINIC | Age: 74
End: 2024-02-22
Payer: MEDICARE

## 2024-02-22 NOTE — TELEPHONE ENCOUNTER
Pharmacy stated pt's insurance won't cover levemir but they will cover lantus or toujer.Please advise

## 2024-02-23 NOTE — TELEPHONE ENCOUNTER
Rima at [facility] notified and confirmed they have received, lvm for patient to call office back to notify her of this change

## 2024-02-23 NOTE — TELEPHONE ENCOUNTER
Changed to Lantus - same dosing.    Electronically signed by FABBY Sanchez, 02/23/24, 9:07 AM CST.

## 2024-02-27 DIAGNOSIS — Z79.4 INSULIN DEPENDENT TYPE 2 DIABETES MELLITUS: ICD-10-CM

## 2024-02-27 DIAGNOSIS — E11.9 INSULIN DEPENDENT TYPE 2 DIABETES MELLITUS: ICD-10-CM

## 2024-02-27 DIAGNOSIS — E11.65 UNCONTROLLED TYPE 2 DIABETES MELLITUS WITH HYPERGLYCEMIA: ICD-10-CM

## 2024-02-27 RX ORDER — INSULIN LISPRO 100 [IU]/ML
INJECTION, SOLUTION INTRAVENOUS; SUBCUTANEOUS
Qty: 20 ML | Refills: 1 | Status: SHIPPED | OUTPATIENT
Start: 2024-02-27

## 2024-02-29 RX ORDER — PANTOPRAZOLE SODIUM 40 MG/1
40 TABLET, DELAYED RELEASE ORAL DAILY
Qty: 30 TABLET | Refills: 2 | Status: SHIPPED | OUTPATIENT
Start: 2024-02-29

## 2024-03-05 ENCOUNTER — TELEPHONE (OUTPATIENT)
Dept: FAMILY MEDICINE CLINIC | Facility: CLINIC | Age: 74
End: 2024-03-05

## 2024-03-05 NOTE — TELEPHONE ENCOUNTER
Caller: Kezia Otoole    Relationship: Self    Best call back number: [phone]     Who are you requesting to speak with (clinical staff, provider,  specific staff member): CLINICAL STAFF    What was the call regarding: PATIENT REQUESTING CALLBACK REGARDING IF SHE IS OK TO START DRIVING AGAIN DUE TO PAST BLOOD SUGAR ISSUES.

## 2024-03-29 RX ORDER — EMPAGLIFLOZIN 25 MG/1
25 TABLET, FILM COATED ORAL DAILY
Qty: 30 TABLET | Refills: 5 | Status: SHIPPED | OUTPATIENT
Start: 2024-03-29

## 2024-04-02 NOTE — PROGRESS NOTES
Subjective    Ms. Otoole is 73 y.o. female    Chief Complaint: urge incontinence    History of Present Illness      73-year-old female established patient in for follow-up urge urinary incontinence.  Onset over the past couple years.  With inability to make it to the bathroom in time when urge hits.  Since starting the Myrbetriq 25 mg daily reports 0 leakage.  Still experiencing episodes of urgency however is able to hold the urge off until making it to the bathroom. Denies any external bulge.  Reports minimal stress incontinence.     The following portions of the patient's history were reviewed and updated as appropriate: allergies, current medications, past family history, past medical history, past social history, past surgical history and problem list.    Review of Systems   Constitutional:  Negative for chills and fever.   Gastrointestinal:  Negative for abdominal pain, anal bleeding, blood in stool, nausea and vomiting.   Genitourinary:  Positive for urgency. Negative for dysuria, frequency and hematuria.         Current Outpatient Medications:     albuterol sulfate  (90 Base) MCG/ACT inhaler, Inhale 2 puffs Every 4 (Four) Hours As Needed for Wheezing or Shortness of Air. Indications: Asthma, Disp: , Rfl:     ascorbic acid (VITAMIN C) 500 MG tablet, Take 1 tablet by mouth Daily. Indications: Inadequate Vitamin C, Disp: , Rfl:     atorvastatin (LIPITOR) 20 MG tablet, Take 1 tablet by mouth Daily. Indications: High Amount of Fats in the Blood, Disp: , Rfl:     B Complex-C-Folic Acid (HM VITAMIN B COMPLEX/VITAMIN C PO), Take 1 capsule by mouth Daily. Indications: supplement, Disp: , Rfl:     Blood Glucose Monitoring Suppl (OneTouch Verio Flex System) w/Device kit, 1 each As Needed (emergency use if no CGM in place)., Disp: 1 kit, Rfl: 0    Calcium Carb-Cholecalciferol (Calcium 600/Vitamin D3) 600-20 MG-MCG tablet, Take 1 tablet by mouth Daily. Indications: Low Amount of Calcium in the Blood, Disp: , Rfl:      carbamide peroxide (Debrox) 6.5 % otic solution, Administer 5 drops into both ears As Needed (cerumen ). Indications: Removal of Wax From the Ear, Disp: , Rfl:     cefdinir (OMNICEF) 300 MG capsule, Take 1 capsule by mouth 2 (Two) Times a Day., Disp: 20 capsule, Rfl: 0    Cholecalciferol 25 MCG (1000 UT) capsule, Take 1 capsule by mouth Daily. Indications: Osteoporosis, Disp: , Rfl:     ciprofloxacin (Ciloxan) 0.3 % ophthalmic solution, Administer 1 drop to both eyes 4 (Four) Times a Day., Disp: 2.5 mL, Rfl: 0    Continuous Blood Gluc Sensor (FreeStyle Sue 2 Sensor) misc, USE TO TEST FOUR TIMES DAILY WITH SLIDING SCALE THREE TIMES DAILY AND LANTUS AT BEDTIME., Disp: 2 each, Rfl: 3    denosumab (PROLIA) 60 MG/ML solution syringe, Inject 1 mL under the skin into the appropriate area as directed Every 6 (Six) Months. Indications: Osteoporosis, Disp: , Rfl:     DULoxetine (CYMBALTA) 60 MG capsule, Take 1 capsule by mouth Daily., Disp: 30 capsule, Rfl: 5    fluconazole (Diflucan) 100 MG tablet, Take 1 tablet by mouth Daily., Disp: 3 tablet, Rfl: 0    guaiFENesin (MUCINEX) 600 MG 12 hr tablet, Take 1 tablet by mouth 2 (Two) Times a Day As Needed for Cough or Congestion. Indications: Cough, congestion, Disp: , Rfl:     HumaLOG KwikPen 100 UNIT/ML solution pen-injector, INJECT SEVEN UNITS UNDER THE SKIN INTO THE APPROPRIATE AREA AS DIRECTED THREE TIMES A DAY. MAX UNITS 21 PER 24 HOURS, Disp: 20 mL, Rfl: 1    hydroCHLOROthiazide (MICROZIDE) 12.5 MG capsule, TAKE ONE CAPSULE DAILY, Disp: 90 capsule, Rfl: 3    Insulin Glargine (LANTUS SOLOSTAR) 100 UNIT/ML injection pen, Inject 110 Units under the skin into the appropriate area as directed 2 (Two) Times a Day., Disp: 15 mL, Rfl: 5    irbesartan (AVAPRO) 300 MG tablet, Take 1 tablet by mouth Daily. Indications: High Blood Pressure Disorder, Disp: , Rfl:     Jardiance 25 MG tablet tablet, TAKE ONE TABLET DAILY, Disp: 30 tablet, Rfl: 5    Misc Natural Products (GLUCOSAMINE  "CHONDROITIN TRIPLE PO), Take 2 tablets by mouth Daily. Indications: supplement, Disp: , Rfl:     montelukast (SINGULAIR) 10 MG tablet, Take 1 tablet by mouth Daily. Indications: Asthma, Hayfever, Disp: , Rfl:     naloxone (NARCAN) 4 MG/0.1ML nasal spray, Call 911. Don't prime. [place] in 1 nostril for overdose. Repeat in 2-3 minutes in other nostril if no or minimal breathing/responsiveness., Disp: 2 each, Rfl: 0    oxyCODONE (ROXICODONE) 5 MG immediate release tablet, Take 1 tablet by mouth Every 6 (Six) Hours As Needed for Moderate Pain. Indications: Acute Pain, Disp: , Rfl:     pantoprazole (PROTONIX) 40 MG EC tablet, TAKE ONE TABLET DAILY, Disp: 30 tablet, Rfl: 2    spironolactone (ALDACTONE) 25 MG tablet, TAKE ONE HALF (1/2) TABLET DAILY, Disp: 45 tablet, Rfl: 1    Symbicort 160-4.5 MCG/ACT inhaler, INHALE TWO PUFFS TWO TIMES A DAY, Disp: 30.6 each, Rfl: 3    TRUEplus 5-Bevel Pen Needles 31G X 6 MM misc, ONE EACH FIVE TIMES A DAY. TWICE DAILY WITH LEVEMIR, THREE TIMES DAILY WITH HUMALOG AS NEEDED, Disp: 200 each, Rfl: 5    TRUEplus Insulin Syringe 31G X 5/16\" 0.3 ML misc, USE AS DIRECTED THREE TIMES DAILY, Disp: 90 each, Rfl: 5    TRUEplus Insulin Syringe 31G X 5/16\" 0.5 ML misc, USE AS DIRECTED TWICE DAILY WITH LEVEMIR, Disp: 60 each, Rfl: 5    Mirabegron ER (Myrbetriq) 25 MG tablet sustained-release 24 hour 24 hr tablet, Take 1 tablet by mouth Daily., Disp: 30 tablet, Rfl: 11    Past Medical History:   Diagnosis Date    Allergic rhinitis     Arthritis     Asthma     Chronic laryngitis     Diabetes mellitus     GERD (gastroesophageal reflux disease)     Hypertension     Hypertrophy of both inferior nasal turbinates     Laryngopharyngeal reflux     Nontoxic multinodular goiter     Sicca laryngitis        Past Surgical History:   Procedure Laterality Date    CARPAL TUNNEL RELEASE      CHOLECYSTECTOMY      COLONOSCOPY  10/01/2008    Multiple polyps removed    COLONOSCOPY  09/18/2013    Diverticulosis in the sigmoid " "colon. Dr. Dave Mcnally Recall 5 years    ENDOSCOPY N/A 12/08/2022    Non-bleeding duodenal ulcer, small HH    HAND SURGERY      HYSTERECTOMY      REPLACEMENT TOTAL KNEE Right 02/02/2021    THYROIDECTOMY, PARTIAL Right 2010    TONSILLECTOMY      TOTAL KNEE ARTHROPLASTY Left 12/8/2023    Procedure: LEFT COMPLEX TOTAL KNEE REPLACEMENT;  Surgeon: PATRICIA Gould MD;  Location: [facility];  Service: Orthopedics;  Laterality: Left;    TUBAL ABDOMINAL LIGATION         Social History     Socioeconomic History    Marital status:      Spouse name: Laurie    Number of children: 2    Years of education: 16   Tobacco Use    Smoking status: Never    Smokeless tobacco: Never    Tobacco comments:     \"lived with smokers most of my life\"   Vaping Use    Vaping status: Never Used   Substance and Sexual Activity    Alcohol use: No     Comment: Rare    Drug use: No    Sexual activity: Defer     Partners: Male     Birth control/protection: None       Family History   Problem Relation Age of Onset    Diabetes Mother     Stroke Mother     Heart disease Mother     Heart disease Father     Colon cancer Neg Hx     Colon polyps Neg Hx        Objective    Temp 97.5 °F (36.4 °C)   Ht 157 cm (61.81\")   Wt 106 kg (234 lb)   BMI 43.06 kg/m²     Physical Exam  Nursing note reviewed.   Constitutional:       General: She is not in acute distress.     Appearance: Normal appearance. She is not ill-appearing.   HENT:      Nose: No congestion.   Abdominal:      Tenderness: There is no right CVA tenderness or left CVA tenderness.      Hernia: No hernia is present.   Skin:     General: Skin is warm and dry.   Neurological:      Mental Status: She is alert and oriented to person, place, and time.   Psychiatric:         Mood and Affect: Mood normal.         Behavior: Behavior normal.             Results for orders placed or performed in visit on 04/16/24   POC Urinalysis Dipstick, Multipro    Specimen: Urine   Result Value Ref Range    Color " Yellow Yellow, Straw, Dark Yellow, Mercedes    Clarity, UA Clear Clear    Glucose, UA >=1000 mg/dL (3+) (A) Negative mg/dL    Bilirubin Negative Negative    Ketones, UA Negative Negative    Specific Gravity  1.015 1.005 - 1.030    Blood, UA Trace (A) Negative    pH, Urine 5.5 5.0 - 8.0    Protein, POC Negative Negative mg/dL    Urobilinogen, UA 0.2 E.U./dL Normal, 0.2 E.U./dL    Nitrite, UA Negative Negative    Leukocytes Negative Negative     Assessment and Plan    Diagnoses and all orders for this visit:    1. Urge incontinence (Primary)  -     POC Urinalysis Dipstick, Multipro  -     Mirabegron ER (Myrbetriq) 25 MG tablet sustained-release 24 hour 24 hr tablet; Take 1 tablet by mouth Daily.  Dispense: 30 tablet; Refill: 11      Myrbetriq 25 mg daily working great.  0 leakage reported    Will send in a prescription for Myrbetriq 25 mg daily      Would like to avoid anticholinergics due to cognitive decline.     Follow-up 6 months

## 2024-04-16 ENCOUNTER — OFFICE VISIT (OUTPATIENT)
Dept: UROLOGY | Facility: CLINIC | Age: 74
End: 2024-04-16
Payer: MEDICARE

## 2024-04-16 VITALS — BODY MASS INDEX: 43.06 KG/M2 | WEIGHT: 234 LBS | HEIGHT: 62 IN | TEMPERATURE: 97.5 F

## 2024-04-16 DIAGNOSIS — N39.41 URGE INCONTINENCE: Primary | ICD-10-CM

## 2024-04-16 LAB
BILIRUB BLD-MCNC: NEGATIVE MG/DL
CLARITY, POC: CLEAR
COLOR UR: YELLOW
GLUCOSE UR STRIP-MCNC: ABNORMAL MG/DL
KETONES UR QL: NEGATIVE
LEUKOCYTE EST, POC: NEGATIVE
NITRITE UR-MCNC: NEGATIVE MG/ML
PH UR: 5.5 [PH] (ref 5–8)
PROT UR STRIP-MCNC: NEGATIVE MG/DL
RBC # UR STRIP: ABNORMAL /UL
SP GR UR: 1.01 (ref 1–1.03)
UROBILINOGEN UR QL: ABNORMAL

## 2024-04-29 ENCOUNTER — OFFICE VISIT (OUTPATIENT)
Dept: GASTROENTEROLOGY | Facility: CLINIC | Age: 74
End: 2024-04-29
Payer: MEDICARE

## 2024-04-29 VITALS
TEMPERATURE: 97.5 F | DIASTOLIC BLOOD PRESSURE: 78 MMHG | OXYGEN SATURATION: 98 % | SYSTOLIC BLOOD PRESSURE: 140 MMHG | HEIGHT: 62 IN | BODY MASS INDEX: 43.98 KG/M2 | WEIGHT: 239 LBS | HEART RATE: 75 BPM

## 2024-04-29 DIAGNOSIS — Z79.4 CONTROLLED TYPE 2 DIABETES MELLITUS WITH HYPERGLYCEMIA, WITH LONG-TERM CURRENT USE OF INSULIN: ICD-10-CM

## 2024-04-29 DIAGNOSIS — I10 HTN (HYPERTENSION), BENIGN: Chronic | ICD-10-CM

## 2024-04-29 DIAGNOSIS — E11.65 CONTROLLED TYPE 2 DIABETES MELLITUS WITH HYPERGLYCEMIA, WITH LONG-TERM CURRENT USE OF INSULIN: ICD-10-CM

## 2024-04-29 DIAGNOSIS — Z86.010 HX OF ADENOMATOUS COLONIC POLYPS: Primary | ICD-10-CM

## 2024-04-29 PROCEDURE — 3078F DIAST BP <80 MM HG: CPT | Performed by: CLINICAL NURSE SPECIALIST

## 2024-04-29 PROCEDURE — 99214 OFFICE O/P EST MOD 30 MIN: CPT | Performed by: CLINICAL NURSE SPECIALIST

## 2024-04-29 PROCEDURE — 3077F SYST BP >= 140 MM HG: CPT | Performed by: CLINICAL NURSE SPECIALIST

## 2024-04-29 NOTE — PROGRESS NOTES
Kezia Otoole  1950      4/29/2024  Chief Complaint   Patient presents with    Colonoscopy     1 year colon recall-hx of polyps     Subjective   HPI  Kezia Otoole is a 73 y.o. female who presents as a referral for preventative maintenance. She has no complaints of nausea or vomiting. No change in bowels. No wt loss. No BRBPR. No melena. There is no family hx for colon cancer. No abdominal pain.  POOR PREP IN 2023 WITH RESIDUAL STOOL REPEAT 1 YEAR EXTRA PREP 3/13/23  SCANNED - COLONOSCOPY (03/13/2023 00:00)   Past Medical History:   Diagnosis Date    Allergic rhinitis     Arthritis     Asthma     Chronic laryngitis     Diabetes mellitus     GERD (gastroesophageal reflux disease)     Hypertension     Hypertrophy of both inferior nasal turbinates     Laryngopharyngeal reflux     Nontoxic multinodular goiter     Sicca laryngitis      Past Surgical History:   Procedure Laterality Date    CARPAL TUNNEL RELEASE      CHOLECYSTECTOMY      COLONOSCOPY  10/01/2008    Multiple polyps removed    COLONOSCOPY  09/18/2013    Diverticulosis in the sigmoid colon. Dr. Dave Mcnally Recall 5 years    COLONOSCOPY  03/13/2023    5 mm adenoma polyp at 70 cm,15 mm polyp at 20 cm, polypoid fragments,hemorrhoids    ENDOSCOPY N/A 12/08/2022    Non-bleeding duodenal ulcer, small HH    HAND SURGERY      HYSTERECTOMY      REPLACEMENT TOTAL KNEE Right 02/02/2021    THYROIDECTOMY, PARTIAL Right 2010    TONSILLECTOMY      TOTAL KNEE ARTHROPLASTY Left 12/08/2023    Procedure: LEFT COMPLEX TOTAL KNEE REPLACEMENT;  Surgeon: PATRICIA Gould MD;  Location: [facility];  Service: Orthopedics;  Laterality: Left;    TUBAL ABDOMINAL LIGATION       Outpatient Medications Marked as Taking for the 4/29/24 encounter (Office Visit) with Serena Li APRN   Medication Sig Dispense Refill    albuterol sulfate  (90 Base) MCG/ACT inhaler Inhale 2 puffs Every 4 (Four) Hours As Needed for Wheezing or Shortness of Air. Indications:  Asthma      ascorbic acid (VITAMIN C) 500 MG tablet Take 1 tablet by mouth Daily. Indications: Inadequate Vitamin C      atorvastatin (LIPITOR) 20 MG tablet Take 1 tablet by mouth Daily. Indications: High Amount of Fats in the Blood      B Complex-C-Folic Acid (HM VITAMIN B COMPLEX/VITAMIN C PO) Take 1 capsule by mouth Daily. Indications: supplement      Blood Glucose Monitoring Suppl (OneTouch Verio Flex System) w/Device kit 1 each As Needed (emergency use if no CGM in place). 1 kit 0    Calcium Carb-Cholecalciferol (Calcium 600/Vitamin D3) 600-20 MG-MCG tablet Take 1 tablet by mouth Daily. Indications: Low Amount of Calcium in the Blood      carbamide peroxide (Debrox) 6.5 % otic solution Administer 5 drops into both ears As Needed (cerumen ). Indications: Removal of Wax From the Ear      Cholecalciferol 25 MCG (1000 UT) capsule Take 1 capsule by mouth Daily. Indications: Osteoporosis      Continuous Blood Gluc Sensor (FreeStyle Sue 2 Sensor) misc USE TO TEST FOUR TIMES DAILY WITH SLIDING SCALE THREE TIMES DAILY AND LANTUS AT BEDTIME. 2 each 3    denosumab (PROLIA) 60 MG/ML solution syringe Inject 1 mL under the skin into the appropriate area as directed Every 6 (Six) Months. Indications: Osteoporosis      DULoxetine (CYMBALTA) 60 MG capsule Take 1 capsule by mouth Daily. 30 capsule 5    guaiFENesin (MUCINEX) 600 MG 12 hr tablet Take 1 tablet by mouth 2 (Two) Times a Day As Needed for Cough or Congestion. Indications: Cough, congestion      HumaLOG KwikPen 100 UNIT/ML solution pen-injector INJECT SEVEN UNITS UNDER THE SKIN INTO THE APPROPRIATE AREA AS DIRECTED THREE TIMES A DAY. MAX UNITS 21 PER 24 HOURS 20 mL 1    hydroCHLOROthiazide (MICROZIDE) 12.5 MG capsule TAKE ONE CAPSULE DAILY 90 capsule 3    Insulin Glargine (LANTUS SOLOSTAR) 100 UNIT/ML injection pen Inject 110 Units under the skin into the appropriate area as directed 2 (Two) Times a Day. 15 mL 5    irbesartan (AVAPRO) 300 MG tablet Take 1 tablet by  "mouth Daily. Indications: High Blood Pressure Disorder      Jardiance 25 MG tablet tablet TAKE ONE TABLET DAILY 30 tablet 5    Mirabegron ER (Myrbetriq) 25 MG tablet sustained-release 24 hour 24 hr tablet Take 1 tablet by mouth Daily. 30 tablet 11    Misc Natural Products (GLUCOSAMINE CHONDROITIN TRIPLE PO) Take 2 tablets by mouth Daily. Indications: supplement      montelukast (SINGULAIR) 10 MG tablet Take 1 tablet by mouth Daily. Indications: Asthma, Hayfever      naloxone (NARCAN) 4 MG/0.1ML nasal spray Call 911. Don't prime. [place] in 1 nostril for overdose. Repeat in 2-3 minutes in other nostril if no or minimal breathing/responsiveness. 2 each 0    pantoprazole (PROTONIX) 40 MG EC tablet TAKE ONE TABLET DAILY 30 tablet 2    spironolactone (ALDACTONE) 25 MG tablet TAKE ONE HALF (1/2) TABLET DAILY 45 tablet 1    Symbicort 160-4.5 MCG/ACT inhaler INHALE TWO PUFFS TWO TIMES A DAY 30.6 each 3    TRUEplus 5-Bevel Pen Needles 31G X 6 MM misc ONE EACH FIVE TIMES A DAY. TWICE DAILY WITH LEVEMIR, THREE TIMES DAILY WITH HUMALOG AS NEEDED 200 each 5    TRUEplus Insulin Syringe 31G X 5/16\" 0.3 ML misc USE AS DIRECTED THREE TIMES DAILY 90 each 5    TRUEplus Insulin Syringe 31G X 5/16\" 0.5 ML misc USE AS DIRECTED TWICE DAILY WITH LEVEMIR 60 each 5    [DISCONTINUED] cefdinir (OMNICEF) 300 MG capsule Take 1 capsule by mouth 2 (Two) Times a Day. 20 capsule 0    [DISCONTINUED] ciprofloxacin (Ciloxan) 0.3 % ophthalmic solution Administer 1 drop to both eyes 4 (Four) Times a Day. 2.5 mL 0     Allergies   Allergen Reactions    Adhesive Tape Other (See Comments)     Tears and bruises skin    Other Other (See Comments)     Bandaids make arm Red/slightly swollen     Social History     Socioeconomic History    Marital status:      Spouse name: Laurie    Number of children: 2    Years of education: 16   Tobacco Use    Smoking status: Never    Smokeless tobacco: Never    Tobacco comments:     \"lived with smokers most of my life\" " "  Vaping Use    Vaping status: Never Used   Substance and Sexual Activity    Alcohol use: No     Comment: Rare    Drug use: No    Sexual activity: Defer     Partners: Male     Birth control/protection: None     Family History   Problem Relation Age of Onset    Diabetes Mother     Stroke Mother     Heart disease Mother     Heart disease Father     Colon cancer Neg Hx     Colon polyps Neg Hx      Health Maintenance   Topic Date Due    TDAP/TD VACCINES (1 - Tdap) Never done    DIABETIC EYE EXAM  02/09/2023    COVID-19 Vaccine (5 - 2023-24 season) 09/01/2023    COLORECTAL CANCER SCREENING  03/13/2024    DXA SCAN  03/30/2024    LIPID PANEL  04/18/2024    URINE MICROALBUMIN  04/19/2024    DIABETIC FOOT EXAM  04/25/2024    HEMOGLOBIN A1C  06/09/2024    INFLUENZA VACCINE  08/01/2024    BMI FOLLOWUP  12/11/2024    ANNUAL WELLNESS VISIT  02/14/2025    HEPATITIS C SCREENING  Completed    RSV Vaccine - Adults  Completed    Pneumococcal Vaccine 65+  Completed    ZOSTER VACCINE  Completed       REVIEW OF SYSTEMS  General: well appearing, no fever chills or sweats, no unexplained wt loss  HEENT: no acute visual or hearing disturbances  Cardiovascular: No chest pain or palpitations  Pulmonary: No shortness of breath, coughing, wheezing or hemoptysis  : No burning, urgency, hematuria, or dysuria  Musculoskeletal: No joint pain or stiffness  Peripheral: no edema  Skin: No lesions or rashes  Neuro: No dizziness, headaches, stroke, syncope  Endocrine: No hot or cold intolerances  Hematological: No blood dyscrasias    Objective   Vitals:    04/29/24 0908   BP: 140/78   BP Location: Left arm   Pulse: 75   Temp: 97.5 °F (36.4 °C)   TempSrc: Temporal   SpO2: 98%   Weight: 108 kg (239 lb)   Height: 157 cm (61.81\")     Body mass index is 43.98 kg/m².         PHYSICAL EXAM  General: age appropriate well nourished well appearing, no acute distress  Head: normocephalic and atraumatic  Global assessment-supple  Neck-No JVD noted, no " lymphadenopathy  Pulmonary-clear to auscultation bilaterally, normal respiratory effort  Cardiovascular-normal rate and rhythm, normal heart sounds, S1 and S2 noted  Abdomen-soft, non tender, non distended, normal bowel sounds all 4 quadrants, no hepatosplenomegaly noted  Extremities-No clubbing cyanosis or edema  Neuro-Non focal, converses appropriately, awake, alert, oriented    Assessment & Plan     Diagnoses and all orders for this visit:    1. Hx of adenomatous colonic polyps (Primary)  -     Case Request; Standing  -     Implement Anesthesia Orders Day of Procedure; Standing  -     Verify bowel prep was successful; Standing  -     Obtain Informed Consent; Standing  -     Case Request  -     polyethylene glycol (GoLYTELY) 236 g solution; Take as directed by office instructions.  Dispense: 4000 mL; Refill: 0    2. Controlled type 2 diabetes mellitus with hyperglycemia, with long-term current use of insulin  Comments:  Hold medication the day of procedure    3. HTN (hypertension), benign  Comments:  cont BP medication the day of procedure      1 bottle of magnesium citrate prior as well as Miralax or stool softener a few days prior as well.   Increast water intake.     COLONOSCOPY WITH ANESTHESIA (N/A)  Body mass index is 43.98 kg/m².    Patient instructions on prep prior to procedure provided to the patient.    All risks, benefits, alternatives, and indications of colonoscopy procedure have been discussed with the patient. Risks to include perforation of the colon requiring possible surgery or colostomy, risk of bleeding from biopsies or removal of colon tissue, possibility of missing a colon polyp or cancer, or adverse drug reaction.  Benefits to include the diagnosis and management of disease of the colon and rectum. Alternatives to include barium enema, radiographic evaluation, lab testing or no intervention. Pt verbalizes understanding and agrees.     Serena Li, APRN  4/29/2024  09:26 CDT      IF  YOU SMOKE OR USE TOBACCO PLEASE READ THE FOLLOWIN minutes reading provided    Why is smoking bad for me?  Smoking increases the risk of heart disease, lung disease, vascular disease, stroke, and cancer.     If you smoke, STOP!    If you would like more information on quitting smoking, please visit the [employer] website: [email or web address]   This link will provide additional resources including the QUIT line and the Beat the Pack support groups.     For more information:    Quit Now [place]  -QUIT-NOW  [email or web address]

## 2024-05-02 NOTE — DISCHARGE INSTR - DIET
? Good nutrition is important when healing from an illness, injury, or surgery. Follow any nutrition recommendations given to you during your hospital stay. ? If you were given an oral nutrition supplement while in the hospital, continue to take this supplement at home. You can take it with meals, in-between meals, and/or before bedtime. These supplements can be purchased at most local grocery stores, pharmacies, and chain [employer]-stores. ? If you have any questions about your diet or nutrition, call the hospital and ask for the dietitian.             Low carb / high protein Blood pressure log reviewed.  Blood pressure is adequately well-controlled.  Please continue to monitor blood pressure at home, and contact the office if her blood pressure is consistently elevated, greater than 130/80.  Please continue your current medication regimen and follow up as scheduled.  Call the office with any questions or concerns.

## 2024-05-13 ENCOUNTER — OUTSIDE FACILITY SERVICE (OUTPATIENT)
Dept: GASTROENTEROLOGY | Facility: CLINIC | Age: 74
End: 2024-05-13
Payer: MEDICARE

## 2024-05-14 ENCOUNTER — LAB REQUISITION (OUTPATIENT)
Dept: LAB | Facility: HOSPITAL | Age: 74
End: 2024-05-14
Payer: MEDICARE

## 2024-05-14 DIAGNOSIS — K63.5 POLYP OF COLON: ICD-10-CM

## 2024-05-14 PROCEDURE — 88305 TISSUE EXAM BY PATHOLOGIST: CPT | Performed by: INTERNAL MEDICINE

## 2024-05-17 ENCOUNTER — TELEPHONE (OUTPATIENT)
Dept: FAMILY MEDICINE CLINIC | Facility: CLINIC | Age: 74
End: 2024-05-17

## 2024-05-23 ENCOUNTER — OFFICE VISIT (OUTPATIENT)
Dept: FAMILY MEDICINE CLINIC | Facility: CLINIC | Age: 74
End: 2024-05-23
Payer: MEDICARE

## 2024-05-23 VITALS
OXYGEN SATURATION: 99 % | BODY MASS INDEX: 44.72 KG/M2 | HEIGHT: 62 IN | SYSTOLIC BLOOD PRESSURE: 124 MMHG | HEART RATE: 87 BPM | DIASTOLIC BLOOD PRESSURE: 74 MMHG | TEMPERATURE: 97.3 F | WEIGHT: 243 LBS

## 2024-05-23 DIAGNOSIS — K21.9 GASTROESOPHAGEAL REFLUX DISEASE, UNSPECIFIED WHETHER ESOPHAGITIS PRESENT: ICD-10-CM

## 2024-05-23 DIAGNOSIS — E55.9 VITAMIN D DEFICIENCY: ICD-10-CM

## 2024-05-23 DIAGNOSIS — E78.2 MIXED HYPERLIPIDEMIA: ICD-10-CM

## 2024-05-23 DIAGNOSIS — F03.90 DEMENTIA, UNSPECIFIED DEMENTIA SEVERITY, UNSPECIFIED DEMENTIA TYPE, UNSPECIFIED WHETHER BEHAVIORAL, PSYCHOTIC, OR MOOD DISTURBANCE OR ANXIETY: ICD-10-CM

## 2024-05-23 DIAGNOSIS — Z78.0 MENOPAUSE: Chronic | ICD-10-CM

## 2024-05-23 DIAGNOSIS — I10 HTN (HYPERTENSION), BENIGN: Chronic | ICD-10-CM

## 2024-05-23 DIAGNOSIS — E11.65 UNCONTROLLED TYPE 2 DIABETES MELLITUS WITH HYPERGLYCEMIA: ICD-10-CM

## 2024-05-23 PROCEDURE — 3078F DIAST BP <80 MM HG: CPT | Performed by: FAMILY MEDICINE

## 2024-05-23 PROCEDURE — 99214 OFFICE O/P EST MOD 30 MIN: CPT | Performed by: FAMILY MEDICINE

## 2024-05-23 PROCEDURE — 3074F SYST BP LT 130 MM HG: CPT | Performed by: FAMILY MEDICINE

## 2024-05-23 PROCEDURE — 1126F AMNT PAIN NOTED NONE PRSNT: CPT | Performed by: FAMILY MEDICINE

## 2024-05-23 NOTE — PROGRESS NOTES
DAVID”.   Subjective   Kezia Otoole is a 73 y.o. female presenting with chief complaint of:   Chief Complaint   Patient presents with    Follow-up     AWV   Last Completed Annual Wellness Visit            ANNUAL WELLNESS VISIT (Yearly) Next due on 2/14/2025 02/14/2024  Level of Service: IA PPPS, SUBSEQ VISIT    10/18/2022  Level of Service: IA PPPS, SUBSEQ VISIT    09/16/2021  Level of Service: IA PPPS, SUBSEQ VISIT    07/09/2020  Level of Service: IA PPPS, SUBSEQ VISIT    07/09/2020  Done    Only the first 5 history entries have been loaded, but more history exists.                     History of Present Illness :  With  who provided a lot of answers/history.   Here for review of chronic problems that includes dementia and others.     Has multiple chronic problems to consider that might have a bearing on today's issues; an interval appointment.       Chronic/acute problems reviewed today:   1. Uncontrolled type 2 diabetes mellitus with hyperglycemia Chronic/variable-still difficulties with her remember to use her insulin all the time.  No problem/pattern hypoglycemia/hyperglycemia manifest by poly- dypsia, phagia, uria, or sweats, diaphoretic episodes, syncope/near.     2. Vitamin D deficiency chronic/variable up/down with past labs and/or risk to run low especially in winter. Lab monitored.      3. Menopause Chronic/stable.  Last bone density/if below.   Has had/considered Rx.  Ok further bone density screening when due.      4. Dementia, unspecified dementia severity, unspecified dementia type, unspecified whether behavioral, psychotic, or mood disturbance or anxiety  admits to gradual continued difficulty with memory; still primarily short-term but enough to affect ADLs minimally.  Can be left alone but cannot be relied on to make financial significant decisions, to drive and like.   5. HTN (hypertension), benign Chronic/stable. Stable here past/no recent home blood pressures.   No significant chest pain, SOB, LE edema, orthopnea, near syncope, dizziness/light headness.   Recent Vitals         4/16/2024 4/29/2024 5/23/2024       BP: -- 140/78 124/74     Pulse: -- 75 87     Temp: 97.5 °F (36.4 °C) 97.5 °F (36.4 °C) 97.3 °F (36.3 °C)     Weight: 106 kg (234 lb) 108 kg (239 lb) 110 kg (243 lb)     BMI (Calculated): 43.1 44 44.7              6. Gastroesophageal reflux disease, unspecified whether esophagitis present Chronic/stable.  Controlled heartburn, reflux without dysphagia, melena.  Rx used, periods not used proven currently needed with symptoms -currently doing ok.      7. Mixed hyperlipidemia Chronic/stable.  Tolerated use of Rx with labs showing improved lipid values and tolerant liver labs. No muscle aches unexpected.        Has an/another acute issue today: none.    The following portions of the patient's history were reviewed and updated as appropriate: allergies, current medications, past family history, past medical history, past social history, past surgical history, and problem list.      Current Outpatient Medications:     albuterol sulfate  (90 Base) MCG/ACT inhaler, Inhale 2 puffs Every 4 (Four) Hours As Needed for Wheezing or Shortness of Air. Indications: Asthma, Disp: , Rfl:     ascorbic acid (VITAMIN C) 500 MG tablet, Take 1 tablet by mouth Daily. Indications: Inadequate Vitamin C, Disp: , Rfl:     atorvastatin (LIPITOR) 20 MG tablet, Take 1 tablet by mouth Daily. Indications: High Amount of Fats in the Blood, Disp: , Rfl:     B Complex-C-Folic Acid (HM VITAMIN B COMPLEX/VITAMIN C PO), Take 1 capsule by mouth Daily. Indications: supplement, Disp: , Rfl:     Blood Glucose Monitoring Suppl (OneTouch Verio Flex System) w/Device kit, 1 each As Needed (emergency use if no CGM in place)., Disp: 1 kit, Rfl: 0    Calcium Carb-Cholecalciferol (Calcium 600/Vitamin D3) 600-20 MG-MCG tablet, Take 1 tablet by mouth Daily. Indications: Low Amount of Calcium in the Blood, Disp: ,  Rfl:     carbamide peroxide (Debrox) 6.5 % otic solution, Administer 5 drops into both ears As Needed (cerumen ). Indications: Removal of Wax From the Ear, Disp: , Rfl:     Cholecalciferol 25 MCG (1000 UT) capsule, Take 1 capsule by mouth Daily. Indications: Osteoporosis, Disp: , Rfl:     Continuous Blood Gluc Sensor (FreeStyle Sue 2 Sensor) misc, USE TO TEST FOUR TIMES DAILY WITH SLIDING SCALE THREE TIMES DAILY AND LANTUS AT BEDTIME., Disp: 2 each, Rfl: 3    denosumab (PROLIA) 60 MG/ML solution syringe, Inject 1 mL under the skin into the appropriate area as directed Every 6 (Six) Months. Indications: Osteoporosis, Disp: , Rfl:     DULoxetine (CYMBALTA) 60 MG capsule, Take 1 capsule by mouth Daily., Disp: 30 capsule, Rfl: 5    guaiFENesin (MUCINEX) 600 MG 12 hr tablet, Take 1 tablet by mouth 2 (Two) Times a Day As Needed for Cough or Congestion. Indications: Cough, congestion, Disp: , Rfl:     HumaLOG KwikPen 100 UNIT/ML solution pen-injector, INJECT SEVEN UNITS UNDER THE SKIN INTO THE APPROPRIATE AREA AS DIRECTED THREE TIMES A DAY. MAX UNITS 21 PER 24 HOURS, Disp: 20 mL, Rfl: 1    hydroCHLOROthiazide (MICROZIDE) 12.5 MG capsule, TAKE ONE CAPSULE DAILY, Disp: 90 capsule, Rfl: 3    Insulin Glargine (LANTUS SOLOSTAR) 100 UNIT/ML injection pen, Inject 110 Units under the skin into the appropriate area as directed 2 (Two) Times a Day., Disp: 15 mL, Rfl: 5    irbesartan (AVAPRO) 300 MG tablet, Take 1 tablet by mouth Daily. Indications: High Blood Pressure Disorder, Disp: , Rfl:     Jardiance 25 MG tablet tablet, TAKE ONE TABLET DAILY, Disp: 30 tablet, Rfl: 5    Mirabegron ER (Myrbetriq) 25 MG tablet sustained-release 24 hour 24 hr tablet, Take 1 tablet by mouth Daily., Disp: 30 tablet, Rfl: 11    Misc Natural Products (GLUCOSAMINE CHONDROITIN TRIPLE PO), Take 2 tablets by mouth Daily. Indications: supplement, Disp: , Rfl:     montelukast (SINGULAIR) 10 MG tablet, Take 1 tablet by mouth Daily. Indications: Asthma,  "Hayfever, Disp: , Rfl:     naloxone (NARCAN) 4 MG/0.1ML nasal spray, Call 911. Don't prime. Ralph in 1 nostril for overdose. Repeat in 2-3 minutes in other nostril if no or minimal breathing/responsiveness., Disp: 2 each, Rfl: 0    pantoprazole (PROTONIX) 40 MG EC tablet, TAKE ONE TABLET DAILY, Disp: 30 tablet, Rfl: 2    polyethylene glycol (GoLYTELY) 236 g solution, Take as directed by office instructions., Disp: 4000 mL, Rfl: 0    spironolactone (ALDACTONE) 25 MG tablet, TAKE ONE HALF (1/2) TABLET DAILY, Disp: 45 tablet, Rfl: 1    Symbicort 160-4.5 MCG/ACT inhaler, INHALE TWO PUFFS TWO TIMES A DAY, Disp: 30.6 each, Rfl: 3    TRUEplus 5-Bevel Pen Needles 31G X 6 MM misc, ONE EACH FIVE TIMES A DAY. TWICE DAILY WITH LEVEMIR, THREE TIMES DAILY WITH HUMALOG AS NEEDED, Disp: 200 each, Rfl: 5    TRUEplus Insulin Syringe 31G X 5/16\" 0.3 ML misc, USE AS DIRECTED THREE TIMES DAILY, Disp: 90 each, Rfl: 5    TRUEplus Insulin Syringe 31G X 5/16\" 0.5 ML misc, USE AS DIRECTED TWICE DAILY WITH LEVEMIR, Disp: 60 each, Rfl: 5    No problems with medications.    Allergies   Allergen Reactions    Adhesive Tape Other (See Comments)     Tears and bruises skin    Other Other (See Comments)     Bandaids make arm Red/slightly swollen       Review of Systems  GENERAL:  Inactive/slower with limits, speed, stamina, various joint issues and weakness. Sleep is poor; apnea likely.  No fever now/recent.  ENDO:  No syncope, near or diaphoretic sweaty spells.  BS better. No hypo..   HEENT: No head injury or headache.   No vision change.   No significant hearing loss.  Ears without pain/drainage.  No sore throat.   Same/occ nasal/sinus congestion/drainage. No epistaxis.  CHEST: No chest wall tenderness or mass.  Occ cough, wheeze.   No SOB; no hemoptysis.  CV: No chest pain, palpitations, usual on/off ankle edema.  GI: No heartburn, dysphagia.  No abdominal pain, diarrhea, constipation; occ fecal incontience.  No rectal bleeding; dark stools or " "melena.    :  Voids without dysuria; frequent incontinence to completion.  ORTHO: No painful/swollen joints but various on /off sore.  No change occ sore neck or back.  No acute neck or back pain without recent injury.  NEURO: No dizziness; diffuse weakness of extremities.  No numbness/paresthesias.   PSYCH: Admitted memory loss.  Mood winter variable; occ mild anxious, depressed but/and not suicidal.  Tries to tolerate stress .   Screening:  Mammogram: [id number]/delayed for illness  Bone density: \"2021-22\" [facility]; continuing the prolia  12.11.18 [facility] hip -2.4; start prolia-this year-per [facility]  Low dose CT chest: Tobacco-smoker/never: NA  GI:   Colon-p-div//[facility]/5.13.24/5y  Prostate: NA  Usual lab order  6m CBC, CMP, A1c  12m CBC, CMP, A1c, LIPID, TSH, Vit D, uric acid    Copy/paste function used for ROS/exam AND each area of these were reviewed, updated, confirmed and supplemented as needed.  Data reviewed:   Recent admit/ER/MD visits: 2.14.24    1. Wellness examination-done    2. HTN (hypertension), benign    3. Mixed hyperlipidemia    4. Anemia, unspecified type    5. Gastroesophageal reflux disease, unspecified whether esophagitis present    6. Hypercalcemia    7. Primary generalized (osteo)arthritis    8. Dementia, unspecified dementia severity, unspecified dementia type, unspecified whether behavioral, psychotic, or mood disturbance or anxiety    9. Primary osteoarthritis of left knee    10. Abscess          Issues that are new, uncontrolled, or required review HPI/ROS/exam and decisions beyond wellness today: (ie: requiring the service of a physician and/or not likely to resolve independently without clinical intervention.)   Abscess chest wall     Discussions/medical decisions/reviews:  BP ok  Other vitals ok  Recent Vitals           1/3/2024 1/5/2024 2/14/2024          BP: 152/80 140/80 130/84       Pulse: 90 68 79       Temp: 97.7 °F (36.5 °C) 96.8 °F (36 °C) 97.5 °F (36.4 °C)       Weight: -- " -- 106 kg (233 lb)       BMI (Calculated): -- -- 42.9               DM/A1c 8.1 12.9.23  DM/ 12.11.23  Lipid LDL 50 4.18.23; lipitor 20  PSA NA  CBC 11.1 12.11.23  Iron 57 6.22.23; none  B12 448 6.22.23  Folate > 20 6.22.23  Renal ok 12.11.23  Liver ok 6.22.23  Vit D 46 4.18.23  Thyroid TSH ok 4.18.23     Wellness/or annual done   Screening reviewed/updated   Vaccines discussed (see below)   One of the amyloid meds has been reviewed drawn for the market; but keep appointment with Dr. Lorene bedoya's  Start antibiotic  I&D tomorrow  Follow up: Return for return tomorrow 3 pm BLOCK my schedule from 3 to closing.    Last cardiac testing:   Echo:     Radiology considered:   No radiology results for the last 90 days.    Lab Results:  Results for orders placed or performed in visit on 05/14/24   Tissue Pathology Exam    Specimen: Colon; Tissue   Result Value Ref Range    Note to Patients       This report may contain a detailed description of human tissue sent by a health care provider to the laboratory for pathologic evaluation. The content of this report is essential for diagnosis and may provide important critical findings. This information may be unfamiliar to patients to review without a medical professional present. It is advised that the patient review this report in the presence of a health care provider who can answer questions and explain the results.      Case Report       Surgical Pathology Report                         Case: [id number]                                  Authorizing Provider:  Dave Mcnally MD        Collected:           05/14/2024 06:00 AM          Ordering Location:     [facility]     Received:            05/14/2024 09:40 AM                                 LABORATORY                                                                   Pathologist:           Grayson Shanks MD                                                        Specimen:    Colon, polyp @ 70cm          "                                                               Clinical Information       Hx polyps   Same       Final Diagnosis       Polyp, colon at 70 cm, polypectomy:  Hyperplastic polyp.      Gross Description       1. Colon.  Is received in formalin, labeled with the patient's name, date of birth and \"polyp at 70 cm\".  Specimen consists of 1 tan-pink tissue fragment measuring 0.3 x 0.1 x 0.1 cm.  Specimen is submitted in block 1A.        Microscopic Description       Microscopic examination performed.         A1C:  Lab Results - Last 18 Months   Lab Units 12/09/23  0426 12/01/23  1111 11/20/23  1335 09/22/23  0833 06/22/23  0922 04/18/23  0843 03/21/23  1322   HEMOGLOBIN A1C % 8.10* 8.30* 8.1* 9.0 8.10* 9.80* 10.50*     GLUCOSE:  Lab Results - Last 18 Months   Lab Units 12/11/23  0500 12/10/23  0351 12/09/23  0426 12/01/23  1111 06/22/23  0922 04/18/23  0843 03/21/23  1322   GLUCOSE mg/dL 107* 146* 202* 121* 221* 244* 246*     LIPID:  Lab Results - Last 18 Months   Lab Units 04/18/23  0843   CHOLESTEROL mg/dL 126   LDL CHOL mg/dL 50   HDL CHOL mg/dL 37*   TRIGLYCERIDES mg/dL 244*     PSA:No results found for: \"PSA\"    CBC:  Lab Results - Last 18 Months   Lab Units 12/11/23  0500 12/10/23  0351 12/09/23  0426 12/01/23  1111 11/20/23  0817 06/22/23  0922 05/31/23  1432 04/18/23  0843 03/21/23  1322 02/10/23  0822 01/24/23  1019 01/04/23  1100 12/19/22  1230 12/07/22  2357 12/07/22  1843 12/07/22  1406 12/07/22  0840 12/06/22  1108   WBC 10*3/mm3  --   --   --  6.78  --  8.32  --  8.20 11.09* 6.21 6.66 5.44 4.42   < > 8.91  --   --  7.44   HEMOGLOBIN g/dL 11.1* 11.1* 12.3 14.6  --  14.0  --  15.3 16.2* 13.1 12.7 11.6* 9.9*   < > 8.2* 8.1*  --  8.8*   HEMOGLOBIN, POC g/dL  --   --   --   --  8.1*  --   --   --   --   --   --   --   --   --   --   --   --   --    HEMATOCRIT % 36.0 35.8 38.0 47.1*  --  42.6  --  44.9 49.3* 42.4 42.9 37.2 32.2*   < > 25.7* 24.2*  --  26.6*   PLATELETS 10*3/mm3  --   --   --  266  --  " 285  --  290 380 281 336 358 270   < > 256  --   --  266   IRON mcg/dL  --   --   --   --   --  57  --  100 150* 56 344* 37 75  --  39 88 67 180*   VITAMIN B 12 pg/mL  --   --   --   --   --  448 549 474  --   --   --   --   --   --  328  --   --   --    FOLATE ng/mL  --   --   --   --   --  >20.00  --  >20.00  --   --   --   --   --   --  18.00  --   --   --     < > = values in this interval not displayed.     BMP/CMP:  Lab Results - Last 18 Months   Lab Units 12/11/23  0500 12/10/23  0351 12/09/23  0426 12/01/23  1111 06/22/23  0922 04/18/23  0843 03/21/23  1322 02/08/23  1159 01/24/23  1019 12/07/22  1843 12/06/22  1108   SODIUM mmol/L 138 137 135* 137 141 140 134*  --  135*   < > 136   POTASSIUM mmol/L 3.8 4.2 4.4 4.2 4.5 4.5 4.3  --  5.0   < > 3.9   CHLORIDE mmol/L 101 101 100 99 101 97* 94*  --  93*   < > 96*   CO2 mmol/L 28.0 27.0 26.0 30.0* 26.8 31.4* 27.0  --  28.1   < > 25.7   GLUCOSE mg/dL 107* 146* 202* 121* 221* 244* 246*  --  497*   < > 235*   BUN mg/dL 16 20 20 17 18 17 22  --  11   < > 32*   CREATININE mg/dL 0.64 0.70 0.77 0.72 0.73 0.82 0.87   < > 0.74   < > 0.61   EGFR RESULT mL/min/1.73  --   --   --   --  86.4 75.2 70.0  --  85.0  --  93.9   CALCIUM mg/dL 8.8 8.6 9.2 9.8 9.9 11.3* 9.9  --  9.4   < > 8.5*   URIC ACID mg/dL  --   --   --   --   --  5.6  --   --   --   --   --     < > = values in this interval not displayed.       HEPATIC:  Lab Results - Last 18 Months   Lab Units 06/22/23  0922 04/18/23  0843 03/21/23  1322 12/19/22  1230 12/11/22  1045 12/07/22  1843 12/06/22  1108   ALT (SGPT) U/L 22 46* 37* 23 30 28 24   AST (SGOT) U/L 17 39* 35* 18 19 15 13   ALK PHOS U/L 113 87 110 98 83 88 68     HEPATITIS C ANTIBODY:   Lab Results   Component Value Date/Time    HEPCVIRUSABY <0.1 05/15/2017 08:52 AM     Vit D:  Lab Results - Last 18 Months   Lab Units 04/18/23  0843   VIT D 25 HYDROXY ng/ml 46.4     THYROID:  Lab Results - Last 18 Months   Lab Units 04/18/23  0843   TSH uIU/mL 1.400  "      Objective   /74   Pulse 87   Temp 97.3 °F (36.3 °C) (Temporal)   Ht 157 cm (61.81\")   Wt 110 kg (243 lb)   SpO2 99%   BMI 44.72 kg/m²   Body mass index is 44.72 kg/m².    Recent Vitals         4/16/2024 4/29/2024 5/23/2024       BP: -- 140/78 124/74     Pulse: -- 75 87     Temp: 97.5 °F (36.4 °C) 97.5 °F (36.4 °C) 97.3 °F (36.3 °C)     Weight: 106 kg (234 lb) 108 kg (239 lb) 110 kg (243 lb)     BMI (Calculated): 43.1 44 44.7           Wt Readings from Last 15 Encounters:   05/23/24 1059 110 kg (243 lb)   04/29/24 0908 108 kg (239 lb)   04/16/24 0831 106 kg (234 lb)   02/20/24 0850 106 kg (234 lb)   02/15/24 1606 106 kg (233 lb)   02/15/24 0814 106 kg (233 lb 9.6 oz)   02/14/24 1139 106 kg (233 lb)   12/20/23 1418 105 kg (231 lb)   12/20/23 0838 104 kg (230 lb)   12/08/23 1208 105 kg (231 lb 7.7 oz)   12/01/23 1029 105 kg (232 lb 2.3 oz)   10/25/23 0946 105 kg (232 lb)   09/22/23 0813 104 kg (229 lb)   06/29/23 1445 106 kg (233 lb)   04/25/23 1432 103 kg (228 lb)       Physical Exam  GENERAL:  Well nourished/developed in no acute distress. Obese.   SKIN: Turgor excellent, without wound, rash, lesion.   HEENT: Normal cephalic without trauma.  Pupils equal round reactive to light. Extraocular motions full without nystagmus.     Oral cavity without growths, exudates, and moist.  Posterior pharynx without mass, obstruction, redness.  No thyromegaly, mass, tenderness, lymphadenopathy and supple.   CV: Regular rhythm.  No murmur, gallop, trace equal LE edema. Posterior pulses intact.  No carotid bruits.  CHEST: No chest wall tenderness or mass.   LUNGS: Symmetric motion with clear to auscultation.    ABD: Soft, nontender without mass.   ORTHO: Symmetric extremities without swelling/point tenderness Full gross range of motion diffuse.   Walking without assistance.   NEURO: CN 2-12 grossly intact.  Symmetric facies and UE/LE. 3/5 strength throughout. 1/4 x bicep equal reflexes.  Nonfocal use extremities. " Speech clear.    PSYCH: Oriented x 2-did not know date.  Pleasant calm, well kept.  Shallow but purposeful/directed conservation with intact short/long gross memory.    Assessment & Plan     1. Uncontrolled type 2 diabetes mellitus with hyperglycemia    2. Vitamin D deficiency    3. Menopause    4. Dementia, unspecified dementia severity, unspecified dementia type, unspecified whether behavioral, psychotic, or mood disturbance or anxiety    5. HTN (hypertension), benign    6. Gastroesophageal reflux disease, unspecified whether esophagitis present    7. Mixed hyperlipidemia        Data review above:   Discussions/medical decisions/reviews:  BP ok  Other vitals ok  Recent Vitals         4/16/2024 4/29/2024 5/23/2024       BP: -- 140/78 124/74     Pulse: -- 75 87     Temp: 97.5 °F (36.4 °C) 97.5 °F (36.4 °C) 97.3 °F (36.3 °C)     Weight: 106 kg (234 lb) 108 kg (239 lb) 110 kg (243 lb)     BMI (Calculated): 43.1 44 44.7           DM/A1c 8.1 12.9.23  DM/ 12.11.23  Lipid LDL 50 4.18.23; lipitor 20  PSA NA  CBC 11.1 12.11.23  Iron 57N 6.22.23-none  B12 448 6.22.23  Folate > 20 6.22.23  Renal ok 12.11.23  Liver ok 6.22.23  Vit D 46 4.18.23  Thyroid TSH 1.4N 4.18.23; none    Screening reviewed/updated -missed mammogram/bone density (isaac to work with )  Vaccines discussed (see below)   They need to get back to Dr Paulson- to arrnage  Lab today; CMP, A1c  Discussed upcoming senior care of me/Dr Crowe.  Discussed new physician, Dr Aditya Banerjee coming as replacement. Decision to: stay at /[place].    Follow up: Return for lab today; then lab/Dr Banerjee 6m .  Future Appointments   Date Time Provider Department Center   10/16/2024  8:30 AM Rianna Alonzo APRN [facility]   11/27/2024  9:30 AM Khris Shanks APRN [facility]       Data review above:   Rx: reviewed and decisions:   Rx new/changes: none  No orders of the defined types were placed in this encounter.      Orders placed:  "  LAB/Testing/Referrals: reviewed/orders:   Today:   Orders Placed This Encounter   Procedures    Comprehensive Metabolic Panel    Hemoglobin A1c    TSH Rfx On Abnormal To Free T4    Microalbumin / Creatinine Urine Ratio - Urine, Clean Catch    Vitamin D,25-Hydroxy    Magnesium    CBC & Differential     Chronic/recurrent labs above or change to:   Same     Immunization History   Administered Date(s) Administered    ABRYSVO (RSV, 60+ or pregnant women 32-36 wks) 01/31/2024    COVID-19 (MODERNA) 1st,2nd,3rd Dose Monovalent 02/26/2021, 03/26/2021, 10/28/2021    COVID-19 (MODERNA) BIVALENT 12+YRS 02/25/2023    FLUAD TRI 65YR+ 10/17/2019    Fluad Quad 65+ 10/14/2020    Fluzone High Dose =>65 Years ([employer] ONLY) 10/01/2018    Fluzone High-Dose 65+yrs 10/13/2022    Fluzone Quad >6mos (Multi-dose) 10/28/2016, 10/30/2017    Influenza Quad Vaccine (Inpatient) 10/30/2017    Influenza, Unspecified 10/17/2019, 10/06/2023    Pneumococcal Conjugate 13-Valent (PCV13) 10/21/2015    Pneumococcal Polysaccharide (PPSV23) 10/30/2014    Shingrix 09/06/2019, 12/06/2019     We advised/reaffirmed our support/suggestion for staying complete with covid- covid boosters, seasonal flu/yearly and any missing vaccine from list we supplied; when cannot be given here we suggest contact with local health department office or pharmacy to review missing/needed vaccines and then bring nursing documentation for these vaccines to this office or call this information in. Shingles became \"free\" 1.1.23 for medicare insurance.    Health maintenance:   Body mass index is 44.72 kg/m².         Tobacco use reviewed:   Kezia Otoole  reports that she has never smoked. She has never used smokeless tobacco.       There are no Patient Instructions on file for this visit.          "

## 2024-05-24 ENCOUNTER — TELEPHONE (OUTPATIENT)
Dept: FAMILY MEDICINE CLINIC | Facility: CLINIC | Age: 74
End: 2024-05-24

## 2024-05-24 DIAGNOSIS — M81.0 OSTEOPOROSIS, UNSPECIFIED OSTEOPOROSIS TYPE, UNSPECIFIED PATHOLOGICAL FRACTURE PRESENCE: Primary | ICD-10-CM

## 2024-05-24 LAB
25(OH)D3+25(OH)D2 SERPL-MCNC: 41.1 NG/ML (ref 30–100)
ALBUMIN SERPL-MCNC: 4.5 G/DL (ref 3.5–5.2)
ALBUMIN/CREAT UR: <11 MG/G CREAT (ref 0–29)
ALBUMIN/GLOB SERPL: 2 G/DL
ALP SERPL-CCNC: 152 U/L (ref 39–117)
ALT SERPL-CCNC: 19 U/L (ref 1–33)
AST SERPL-CCNC: 18 U/L (ref 1–32)
BASOPHILS # BLD AUTO: 0.05 10*3/MM3 (ref 0–0.2)
BASOPHILS NFR BLD AUTO: 0.6 % (ref 0–1.5)
BILIRUB SERPL-MCNC: 0.4 MG/DL (ref 0–1.2)
BUN SERPL-MCNC: 18 MG/DL (ref 8–23)
BUN/CREAT SERPL: 24 (ref 7–25)
CALCIUM SERPL-MCNC: 9.4 MG/DL (ref 8.6–10.5)
CHLORIDE SERPL-SCNC: 99 MMOL/L (ref 98–107)
CO2 SERPL-SCNC: 27.8 MMOL/L (ref 22–29)
CREAT SERPL-MCNC: 0.75 MG/DL (ref 0.57–1)
CREAT UR-MCNC: 27.1 MG/DL
EGFRCR SERPLBLD CKD-EPI 2021: 83.1 ML/MIN/1.73
EOSINOPHIL # BLD AUTO: 0.1 10*3/MM3 (ref 0–0.4)
EOSINOPHIL NFR BLD AUTO: 1.3 % (ref 0.3–6.2)
ERYTHROCYTE [DISTWIDTH] IN BLOOD BY AUTOMATED COUNT: 15.6 % (ref 12.3–15.4)
GLOBULIN SER CALC-MCNC: 2.2 GM/DL
GLUCOSE SERPL-MCNC: 204 MG/DL (ref 65–99)
HBA1C MFR BLD: 7.7 % (ref 4.8–5.6)
HCT VFR BLD AUTO: 46.8 % (ref 34–46.6)
HGB BLD-MCNC: 14.9 G/DL (ref 12–15.9)
IMM GRANULOCYTES # BLD AUTO: 0.06 10*3/MM3 (ref 0–0.05)
IMM GRANULOCYTES NFR BLD AUTO: 0.8 % (ref 0–0.5)
LYMPHOCYTES # BLD AUTO: 1.17 10*3/MM3 (ref 0.7–3.1)
LYMPHOCYTES NFR BLD AUTO: 15.2 % (ref 19.6–45.3)
MAGNESIUM SERPL-MCNC: 2.1 MG/DL (ref 1.6–2.4)
MCH RBC QN AUTO: 27.5 PG (ref 26.6–33)
MCHC RBC AUTO-ENTMCNC: 31.8 G/DL (ref 31.5–35.7)
MCV RBC AUTO: 86.5 FL (ref 79–97)
MICROALBUMIN UR-MCNC: <3 UG/ML
MONOCYTES # BLD AUTO: 0.57 10*3/MM3 (ref 0.1–0.9)
MONOCYTES NFR BLD AUTO: 7.4 % (ref 5–12)
NEUTROPHILS # BLD AUTO: 5.75 10*3/MM3 (ref 1.7–7)
NEUTROPHILS NFR BLD AUTO: 74.7 % (ref 42.7–76)
NRBC BLD AUTO-RTO: 0 /100 WBC (ref 0–0.2)
PLATELET # BLD AUTO: 290 10*3/MM3 (ref 140–450)
POTASSIUM SERPL-SCNC: 4.4 MMOL/L (ref 3.5–5.2)
PROT SERPL-MCNC: 6.7 G/DL (ref 6–8.5)
RBC # BLD AUTO: 5.41 10*6/MM3 (ref 3.77–5.28)
SODIUM SERPL-SCNC: 139 MMOL/L (ref 136–145)
TSH SERPL DL<=0.005 MIU/L-ACNC: 0.89 UIU/ML (ref 0.27–4.2)
WBC # BLD AUTO: 7.7 10*3/MM3 (ref 3.4–10.8)

## 2024-05-24 NOTE — TELEPHONE ENCOUNTER
Hub staff attempted to follow warm transfer process and was unsuccessful     Caller: Laurie Otoole    Relationship to patient: Emergency Contact    Best call back number: [phone]     Patient is needing:  WAS RETURNING A CALL TO PAUL ABOUT PATIENTS RESULTS.

## 2024-06-04 RX ORDER — PANTOPRAZOLE SODIUM 40 MG/1
40 TABLET, DELAYED RELEASE ORAL DAILY
Qty: 30 TABLET | Refills: 2 | Status: SHIPPED | OUTPATIENT
Start: 2024-06-04

## 2024-07-01 RX ORDER — MONTELUKAST SODIUM 10 MG/1
10 TABLET ORAL DAILY
Qty: 90 TABLET | Refills: 3 | Status: SHIPPED | OUTPATIENT
Start: 2024-07-01

## 2024-07-01 NOTE — TELEPHONE ENCOUNTER
Rx Refill Note  Requested Prescriptions     Pending Prescriptions Disp Refills    montelukast (SINGULAIR) 10 MG tablet [Pharmacy Med Name: MONTELUKAST SODIUM 10MG TABLET] 90 tablet 3     Sig: TAKE ONE TABLET DAILY      Last office visit with prescribing clinician: 5/23/2024   Last telemedicine visit with prescribing clinician: Visit date not found   Next office visit with prescribing clinician: Visit date not found                         Would you like a call back once the refill request has been completed: [] Yes [] No    If the office needs to give you a call back, can they leave a voicemail: [] Yes [] No    Sarah Arriaga, PCT  07/01/24, 16:04 CDT

## 2024-07-30 RX ORDER — ATORVASTATIN CALCIUM 20 MG/1
20 TABLET, FILM COATED ORAL DAILY
Qty: 90 TABLET | Refills: 3 | Status: SHIPPED | OUTPATIENT
Start: 2024-07-30

## 2024-07-30 NOTE — TELEPHONE ENCOUNTER
Rx Refill Note  Requested Prescriptions     Pending Prescriptions Disp Refills    atorvastatin (LIPITOR) 20 MG tablet [Pharmacy Med Name: ATORVASTATIN CALCIUM 20MG TABLET] 90 tablet 3     Sig: TAKE ONE TABLET DAILY      Last office visit with office: 05/23/24  Next office visit with office: 11/27/24    UDS:     DATE OF LAST REFILL: 11-30-23    Controlled Substance Agreement:     PALOMO OR LUIZ:          {TIP  Is Refill Pharmacy correct?:  Megan Pastrana MA  07/30/24, 11:36 CDT

## 2024-07-31 RX ORDER — IRBESARTAN 300 MG/1
300 TABLET ORAL DAILY
Qty: 90 TABLET | Refills: 3 | Status: SHIPPED | OUTPATIENT
Start: 2024-07-31

## 2024-08-15 RX ORDER — DULOXETIN HYDROCHLORIDE 60 MG/1
60 CAPSULE, DELAYED RELEASE ORAL DAILY
Qty: 30 CAPSULE | Refills: 5 | Status: SHIPPED | OUTPATIENT
Start: 2024-08-15

## 2024-09-05 ENCOUNTER — OFFICE VISIT (OUTPATIENT)
Dept: FAMILY MEDICINE CLINIC | Facility: CLINIC | Age: 74
End: 2024-09-05
Payer: MEDICARE

## 2024-09-05 VITALS
SYSTOLIC BLOOD PRESSURE: 132 MMHG | HEIGHT: 62 IN | BODY MASS INDEX: 47.07 KG/M2 | WEIGHT: 255.8 LBS | HEART RATE: 75 BPM | DIASTOLIC BLOOD PRESSURE: 80 MMHG | OXYGEN SATURATION: 95 %

## 2024-09-05 DIAGNOSIS — R32 URINARY INCONTINENCE, UNSPECIFIED TYPE: Primary | ICD-10-CM

## 2024-09-05 DIAGNOSIS — R41.3 MEMORY LOSS: ICD-10-CM

## 2024-09-05 PROCEDURE — 3075F SYST BP GE 130 - 139MM HG: CPT | Performed by: NURSE PRACTITIONER

## 2024-09-05 PROCEDURE — 3051F HG A1C>EQUAL 7.0%<8.0%: CPT | Performed by: NURSE PRACTITIONER

## 2024-09-05 PROCEDURE — 99214 OFFICE O/P EST MOD 30 MIN: CPT | Performed by: NURSE PRACTITIONER

## 2024-09-05 PROCEDURE — 3079F DIAST BP 80-89 MM HG: CPT | Performed by: NURSE PRACTITIONER

## 2024-09-05 PROCEDURE — 1126F AMNT PAIN NOTED NONE PRSNT: CPT | Performed by: NURSE PRACTITIONER

## 2024-09-05 NOTE — PROGRESS NOTES
Subjective   Chief Complaint:  Memory and urinary issue    History of Present Illness:  This 73 y.o. female presents today for evaluation of urine and memory issues.  Has history of small vessel disease.  Has had a progressing dementia.  She has seen a specialist-elected no meds.   reports incontinence has been gradually coming on over the last year and gotten worse.    Past Medical, Surgical, Social, and Family History:  Allergies   Allergen Reactions    Adhesive Tape Other (See Comments)     Tears and bruises skin    Other Other (See Comments)     Bandaids make arm Red/slightly swollen      Past Medical History:   Diagnosis Date    Allergic rhinitis     Arthritis     Asthma     Chronic laryngitis     Dementia     Diabetes mellitus     GERD (gastroesophageal reflux disease)     Hypertension     Hypertrophy of both inferior nasal turbinates     Laryngopharyngeal reflux     Nontoxic multinodular goiter     Sicca laryngitis       Past Surgical History:   Procedure Laterality Date    CARPAL TUNNEL RELEASE      CHOLECYSTECTOMY      COLONOSCOPY  10/01/2008    Multiple polyps removed    COLONOSCOPY  09/18/2013    Diverticulosis in the sigmoid colon. Dr. Dave Mcnally Recall 5 years    COLONOSCOPY  03/13/2023    5 mm adenoma polyp at 70 cm,15 mm polyp at 20 cm, polypoid fragments,hemorrhoids    ENDOSCOPY N/A 12/08/2022    Non-bleeding duodenal ulcer, small HH    HAND SURGERY      HYSTERECTOMY      REPLACEMENT TOTAL KNEE Right 02/02/2021    THYROIDECTOMY, PARTIAL Right 2010    TONSILLECTOMY      TOTAL KNEE ARTHROPLASTY Left 12/08/2023    Procedure: LEFT COMPLEX TOTAL KNEE REPLACEMENT;  Surgeon: PATRICIA Gould MD;  Location: [facility] OR;  Service: Orthopedics;  Laterality: Left;    TUBAL ABDOMINAL LIGATION        Social History     Socioeconomic History    Marital status:      Spouse name: Laurie    Number of children: 2    Years of education: 16   Tobacco Use    Smoking status: Never    Smokeless tobacco: Never  "   Tobacco comments:     \"lived with smokers most of my life\"   Vaping Use    Vaping status: Never Used   Substance and Sexual Activity    Alcohol use: No     Comment: Rare    Drug use: No    Sexual activity: Defer     Partners: Male     Birth control/protection: None      Family History   Problem Relation Age of Onset    Diabetes Mother     Stroke Mother     Heart disease Mother     Heart disease Father     Colon cancer Neg Hx     Colon polyps Neg Hx        Objective   Vital Signs  /80   Pulse 75   Ht 157 cm (61.81\")   Wt 116 kg (255 lb 12.8 oz)   SpO2 95%   BMI 47.07 kg/m²    Physical Exam  Vitals reviewed.   Constitutional:       General: She is not in acute distress.     Appearance: Normal appearance. She is obese.   Cardiovascular:      Rate and Rhythm: Normal rate and regular rhythm.   Pulmonary:      Effort: Pulmonary effort is normal.      Breath sounds: Normal breath sounds.       Assessment & Plan   Diagnoses and all orders for this visit:    1. Urinary incontinence, unspecified type (Primary)  Comments:  Chronic-worsening  Orders:  -     UA / M With / Rflx Culture([facility] ONLY) - Urine, Clean Catch    2. Memory loss  Comments:  Chronic-worsening    Plan:  Advised and educated plan of care.  Advised need for pads and briefs.  Will check for underlying infection.  Advise safety in the home with the memory loss issues.  Briefly discussed the medications offered prior-they want to keep the medications the same at this point.    Follow-up:  The patient will Return for follow-up as needed pending results - we will call.    Records and Results Reviewed:  I reviewed current medications as given by patient and allergy list    Class 3 Severe Obesity (BMI >=40). Obesity-related health conditions include the following: hypertension and dyslipidemias. Obesity is unchanged. BMI is is above average; no BMI management plan is appropriate. We discussed portion control and increasing exercise.    Electronically " signed by FABBY Sanchez, 09/05/24, 12:12 PM CDT.

## 2024-09-10 LAB
APPEARANCE UR: CLEAR
BACTERIA #/AREA URNS HPF: ABNORMAL /[HPF]
BACTERIA UR CULT: ABNORMAL
BACTERIA UR CULT: ABNORMAL
BILIRUB UR QL STRIP: NEGATIVE
CASTS URNS QL MICRO: ABNORMAL /LPF
COLOR UR: YELLOW
EPI CELLS #/AREA URNS HPF: ABNORMAL /HPF (ref 0–10)
GLUCOSE UR QL STRIP: ABNORMAL
HGB UR QL STRIP: NEGATIVE
KETONES UR QL STRIP: NEGATIVE
LEUKOCYTE ESTERASE UR QL STRIP: ABNORMAL
MICRO URNS: ABNORMAL
NITRITE UR QL STRIP: NEGATIVE
OTHER ANTIBIOTIC SUSC ISLT: ABNORMAL
PH UR STRIP: 6 [PH] (ref 5–7.5)
PROT UR QL STRIP: NEGATIVE
RBC #/AREA URNS HPF: ABNORMAL /HPF (ref 0–2)
SP GR UR STRIP: >=1.03 (ref 1–1.03)
URINALYSIS REFLEX: ABNORMAL
UROBILINOGEN UR STRIP-MCNC: 0.2 MG/DL (ref 0.2–1)
WBC #/AREA URNS HPF: >30 /HPF (ref 0–5)

## 2024-09-12 RX ORDER — AMOXICILLIN AND CLAVULANATE POTASSIUM 500; 125 MG/1; MG/1
1 TABLET, FILM COATED ORAL 3 TIMES DAILY
Qty: 21 TABLET | Refills: 0 | Status: SHIPPED | OUTPATIENT
Start: 2024-09-12

## 2024-09-13 ENCOUNTER — PATIENT MESSAGE (OUTPATIENT)
Dept: FAMILY MEDICINE CLINIC | Facility: CLINIC | Age: 74
End: 2024-09-13
Payer: MEDICARE

## 2024-09-13 ENCOUNTER — TELEPHONE (OUTPATIENT)
Dept: FAMILY MEDICINE CLINIC | Facility: CLINIC | Age: 74
End: 2024-09-13

## 2024-09-13 DIAGNOSIS — E11.9 INSULIN DEPENDENT TYPE 2 DIABETES MELLITUS: Primary | ICD-10-CM

## 2024-09-13 DIAGNOSIS — Z79.4 INSULIN DEPENDENT TYPE 2 DIABETES MELLITUS: Primary | ICD-10-CM

## 2024-09-13 NOTE — TELEPHONE ENCOUNTER
Caller: Herminio Toro    Relationship: Emergency Contact    Best call back number:  [phone]    What medication are you requesting:     PATIENT DOES NOT WANT THE CONTINUOUS GLUCOSE MONITOR.    PATIENT WANTS TO BACK TO FINGER STRIPS, LANCETS AND A NEW MONITOR      If a prescription is needed, what is your preferred pharmacy and phone number:      [facility] - [address] [phone] [facility] [phone]

## 2024-09-13 NOTE — TELEPHONE ENCOUNTER
Should be fine if that's her choice, does she need a new glucometer, if so ok to route to pharmacy, thanks.

## 2024-10-17 ENCOUNTER — TELEPHONE (OUTPATIENT)
Dept: FAMILY MEDICINE CLINIC | Facility: CLINIC | Age: 74
End: 2024-10-17

## 2024-10-17 NOTE — TELEPHONE ENCOUNTER
Pt just had cough no fever no runny nose she has tried otc mucinex it did not work what else do you suggest>

## 2024-10-17 NOTE — TELEPHONE ENCOUNTER
Caller: Kezia Otoole    Relationship: Self    Best call back number: [phone]     What medication are you requesting: WHATEVER IS RECOMMENDED    What are your current symptoms: COUGH THAT IS GETTING WORSE    How long have you been experiencing symptoms: 2 - 3 DAYS    Have you had these symptoms before:    [x] Yes  [] No    Have you been treated for these symptoms before:   [x] Yes  [] No    If a prescription is needed, what is your preferred pharmacy and phone number: [facility] - [address] [phone] [facility] [phone]      Additional notes:

## 2024-10-17 NOTE — TELEPHONE ENCOUNTER
Try over-the-counter Delsym, home COVID test is recommended, home supportive care, follow-up as needed if worse or not improving    Electronically signed by FABBY Sanchez, 10/17/24, 12:03 PM CDT.

## 2024-10-30 ENCOUNTER — OFFICE VISIT (OUTPATIENT)
Dept: FAMILY MEDICINE CLINIC | Facility: CLINIC | Age: 74
End: 2024-10-30
Payer: MEDICARE

## 2024-10-30 VITALS
OXYGEN SATURATION: 95 % | RESPIRATION RATE: 18 BRPM | TEMPERATURE: 98.6 F | HEART RATE: 92 BPM | SYSTOLIC BLOOD PRESSURE: 122 MMHG | WEIGHT: 251 LBS | BODY MASS INDEX: 46.19 KG/M2 | HEIGHT: 62 IN | DIASTOLIC BLOOD PRESSURE: 68 MMHG

## 2024-10-30 DIAGNOSIS — J01.00 ACUTE MAXILLARY SINUSITIS, RECURRENCE NOT SPECIFIED: Primary | ICD-10-CM

## 2024-10-30 DIAGNOSIS — R05.3 CHRONIC COUGH: ICD-10-CM

## 2024-10-30 DIAGNOSIS — R09.82 POST-NASAL DRIP: ICD-10-CM

## 2024-10-30 PROCEDURE — 99214 OFFICE O/P EST MOD 30 MIN: CPT | Performed by: NURSE PRACTITIONER

## 2024-10-30 PROCEDURE — 1126F AMNT PAIN NOTED NONE PRSNT: CPT | Performed by: NURSE PRACTITIONER

## 2024-10-30 PROCEDURE — 1160F RVW MEDS BY RX/DR IN RCRD: CPT | Performed by: NURSE PRACTITIONER

## 2024-10-30 PROCEDURE — 3074F SYST BP LT 130 MM HG: CPT | Performed by: NURSE PRACTITIONER

## 2024-10-30 PROCEDURE — 1159F MED LIST DOCD IN RCRD: CPT | Performed by: NURSE PRACTITIONER

## 2024-10-30 PROCEDURE — 3078F DIAST BP <80 MM HG: CPT | Performed by: NURSE PRACTITIONER

## 2024-10-30 RX ORDER — BENZONATATE 100 MG/1
100 CAPSULE ORAL 3 TIMES DAILY PRN
Qty: 30 CAPSULE | Refills: 2 | Status: SHIPPED | OUTPATIENT
Start: 2024-10-30

## 2024-10-30 RX ORDER — FLUTICASONE PROPIONATE 50 MCG
2 SPRAY, SUSPENSION (ML) NASAL DAILY
Qty: 18.2 ML | Refills: 2 | Status: SHIPPED | OUTPATIENT
Start: 2024-10-30

## 2024-10-30 NOTE — PROGRESS NOTES
"Chief Complaint  Cough    Subjective      Kezia Otoole presents to [facility]  HPI: Patient is a 74 y.o. female who is here today with complaint of cough/tickle, she thinks it is post nasal drip. Has been ongoing for years. Also reports some sinus tenderness.     Objective   Vital Signs:  /68 Comment: manual, left  Pulse 92   Temp 98.6 °F (37 °C)   Resp 18   Ht 157 cm (61.81\")   Wt 114 kg (251 lb)   SpO2 95%   BMI 46.19 kg/m²   Estimated body mass index is 46.19 kg/m² as calculated from the following:    Height as of this encounter: 157 cm (61.81\").    Weight as of this encounter: 114 kg (251 lb).            Physical Exam  Constitutional:       Appearance: She is obese.   HENT:      Nose:      Left Sinus: Maxillary sinus tenderness present.      Mouth/Throat:      Pharynx: Oropharyngeal exudate (purulent), posterior oropharyngeal erythema and postnasal drip present.   Cardiovascular:      Rate and Rhythm: Normal rate and regular rhythm.   Pulmonary:      Effort: Pulmonary effort is normal.      Breath sounds: Normal breath sounds.   Neurological:      Mental Status: She is alert.   Psychiatric:         Mood and Affect: Mood normal.        Result Review :  The following labs/imaging/notes were reviewed and discussed with the patient by FABBY Trevino on 10/30/2024:             Assessment and Plan   Diagnoses and all orders for this visit:    1. Acute maxillary sinusitis, recurrence not specified (Primary)  -     amoxicillin-clavulanate (AUGMENTIN) 875-125 MG per tablet; Take 1 tablet by mouth 2 (Two) Times a Day for 7 days.  Dispense: 14 tablet; Refill: 0    2. Post-nasal drip  -     fluticasone (FLONASE) 50 MCG/ACT nasal spray; Administer 2 sprays into the nostril(s) as directed by provider Daily.  Dispense: 18.2 mL; Refill: 2    3. Chronic cough  -     benzonatate (Tessalon Perles) 100 MG capsule; Take 1 capsule by mouth 3 (Three) Times a Day As Needed " for Cough.  Dispense: 30 capsule; Refill: 2      Advised OTC Allegra-D for 7-10 days, monitor BP and discontinue if elevated. Also sent Flonase (may be cheaper to get OTC), use as directed to hopefully help with post nasal drip. Increase water intake. I have sent Augmentin for sinusitis, take as directed until completed. Tessalon sent for cough. I do think cough is from chronic post-nasal drip. Advised to switch to plain Allegra after 7-10 days of Allegra-D.          Follow Up  Return keep current follow up appt.  Patient was given instructions and counseling regarding her condition or for health maintenance advice. Please see specific information pulled into the AVS if appropriate.

## 2024-11-06 RX ORDER — EMPAGLIFLOZIN 25 MG/1
25 TABLET, FILM COATED ORAL DAILY
Qty: 30 TABLET | Refills: 5 | Status: SHIPPED | OUTPATIENT
Start: 2024-11-06

## 2024-11-06 NOTE — TELEPHONE ENCOUNTER
Rx Refill Note  Requested Prescriptions     Pending Prescriptions Disp Refills    Jardiance 25 MG tablet tablet [Pharmacy Med Name: JARDIANCE 25MG TABLET] 30 tablet 5     Sig: TAKE ONE TABLET DAILY      Last office visit with office: 10/30/24  Next office visit with office: 11/27/24    UDS:     DATE OF LAST REFILL: 03-29-24    Controlled Substance Agreement:     PALOMO OR LUIZ:          {TIP  Is Refill Pharmacy correct?:  Megan Pastrana MA  11/06/24, 16:00 CST

## 2024-11-12 ENCOUNTER — TELEPHONE (OUTPATIENT)
Dept: FAMILY MEDICINE CLINIC | Facility: CLINIC | Age: 74
End: 2024-11-12

## 2024-11-12 RX ORDER — FLUCONAZOLE 150 MG/1
150 TABLET ORAL
Qty: 3 TABLET | Refills: 0 | Status: SHIPPED | OUTPATIENT
Start: 2024-11-12

## 2024-11-12 NOTE — TELEPHONE ENCOUNTER
Caller: Kezia Otoole    Relationship: Self    Best call back number:  [phone]    What medication are you requesting:     PCP RECOMMENDATION    What are your current symptoms:     ITCHING IN VAGINAL AREA    HAD BEEN ON ANTIBIOTIC    How long have you been experiencing symptoms:     3-4 DAYS CONTINUALLY GETTING WORSE    Have you had these symptoms before:    [x] Yes  [] No    Have you been treated for these symptoms before:   [x] Yes  [] No    If a prescription is needed, what is your preferred pharmacy and phone number:      [facility] - [place], [place] - [address] [place] [address] [phone] [facility] [phone]

## 2024-12-05 RX ORDER — SPIRONOLACTONE 25 MG/1
TABLET ORAL
Qty: 45 TABLET | Refills: 1 | Status: SHIPPED | OUTPATIENT
Start: 2024-12-05

## 2024-12-10 DIAGNOSIS — R05.3 CHRONIC COUGH: ICD-10-CM

## 2024-12-10 RX ORDER — BENZONATATE 100 MG/1
CAPSULE ORAL
Qty: 30 CAPSULE | Refills: 2 | Status: SHIPPED | OUTPATIENT
Start: 2024-12-10

## 2025-01-22 RX ORDER — INSULIN GLARGINE 100 [IU]/ML
110 INJECTION, SOLUTION SUBCUTANEOUS 2 TIMES DAILY
COMMUNITY

## 2025-01-27 DIAGNOSIS — Z01.818 PRE-OP TESTING: ICD-10-CM

## 2025-01-27 LAB
ANION GAP SERPL CALCULATED.3IONS-SCNC: 11 MMOL/L (ref 8–16)
BASOPHILS # BLD: 0.1 K/UL (ref 0–0.2)
BASOPHILS NFR BLD: 0.8 % (ref 0–1)
BUN SERPL-MCNC: 15 MG/DL (ref 8–23)
CALCIUM SERPL-MCNC: 9.9 MG/DL (ref 8.8–10.2)
CHLORIDE SERPL-SCNC: 96 MMOL/L (ref 98–107)
CO2 SERPL-SCNC: 25 MMOL/L (ref 22–29)
CREAT SERPL-MCNC: 0.7 MG/DL (ref 0.5–0.9)
EOSINOPHIL # BLD: 0.2 K/UL (ref 0–0.6)
EOSINOPHIL NFR BLD: 2.6 % (ref 0–5)
ERYTHROCYTE [DISTWIDTH] IN BLOOD BY AUTOMATED COUNT: 13.6 % (ref 11.5–14.5)
GLUCOSE SERPL-MCNC: 416 MG/DL (ref 70–99)
HCT VFR BLD AUTO: 47.1 % (ref 37–47)
HGB BLD-MCNC: 16.1 G/DL (ref 12–16)
IMM GRANULOCYTES # BLD: 0 K/UL
LYMPHOCYTES # BLD: 1.2 K/UL (ref 1.1–4.5)
LYMPHOCYTES NFR BLD: 19.6 % (ref 20–40)
MCH RBC QN AUTO: 30.3 PG (ref 27–31)
MCHC RBC AUTO-ENTMCNC: 34.2 G/DL (ref 33–37)
MCV RBC AUTO: 88.5 FL (ref 81–99)
MONOCYTES # BLD: 0.7 K/UL (ref 0–0.9)
MONOCYTES NFR BLD: 10.6 % (ref 0–10)
NEUTROPHILS # BLD: 4.1 K/UL (ref 1.5–7.5)
NEUTS SEG NFR BLD: 66.1 % (ref 50–65)
PLATELET # BLD AUTO: 230 K/UL (ref 130–400)
PMV BLD AUTO: 9.8 FL (ref 9.4–12.3)
POTASSIUM SERPL-SCNC: 4.6 MMOL/L (ref 3.5–5.1)
RBC # BLD AUTO: 5.32 M/UL (ref 4.2–5.4)
SODIUM SERPL-SCNC: 132 MMOL/L (ref 136–145)
WBC # BLD AUTO: 6.2 K/UL (ref 4.8–10.8)

## 2025-01-27 PROCEDURE — 85025 COMPLETE CBC W/AUTO DIFF WBC: CPT | Performed by: ANESTHESIOLOGY

## 2025-01-27 PROCEDURE — 36415 COLL VENOUS BLD VENIPUNCTURE: CPT | Performed by: ANESTHESIOLOGY

## 2025-01-29 ENCOUNTER — ANESTHESIA (OUTPATIENT)
Dept: OPERATING ROOM | Age: 75
End: 2025-01-29

## 2025-01-29 ENCOUNTER — ANESTHESIA EVENT (OUTPATIENT)
Dept: OPERATING ROOM | Age: 75
End: 2025-01-29

## 2025-01-29 ENCOUNTER — HOSPITAL ENCOUNTER (OUTPATIENT)
Age: 75
Setting detail: OUTPATIENT SURGERY
Discharge: HOME | End: 2025-01-29
Attending: SURGERY | Admitting: SURGERY

## 2025-01-29 ENCOUNTER — HOSPITAL ENCOUNTER (OUTPATIENT)
Age: 75
Setting detail: SPECIMEN
Discharge: HOME | End: 2025-01-29
Payer: MEDICARE

## 2025-01-29 VITALS
DIASTOLIC BLOOD PRESSURE: 64 MMHG | TEMPERATURE: 99 F | HEIGHT: 63 IN | HEART RATE: 84 BPM | RESPIRATION RATE: 18 BRPM | BODY MASS INDEX: 43.59 KG/M2 | WEIGHT: 246 LBS | SYSTOLIC BLOOD PRESSURE: 115 MMHG | OXYGEN SATURATION: 92 %

## 2025-01-29 DIAGNOSIS — Z01.818 PRE-OP TESTING: Primary | ICD-10-CM

## 2025-01-29 PROCEDURE — 11621 EXC S/N/H/F/G MAL+MRG 0.6-1: CPT

## 2025-01-29 PROCEDURE — 12031 INTMD RPR S/A/T/EXT 2.5 CM/<: CPT

## 2025-01-29 PROCEDURE — 88305 TISSUE EXAM BY PATHOLOGIST: CPT

## 2025-01-29 PROCEDURE — G8916 PT W IV AB GIVEN ON TIME: HCPCS

## 2025-01-29 PROCEDURE — G8907 PT DOC NO EVENTS ON DISCHARG: HCPCS

## 2025-01-29 PROCEDURE — 11642 EXC F/E/E/N/L MAL+MRG 1.1-2: CPT

## 2025-01-29 PROCEDURE — G8918 PT W/O PREOP ORDER IV AB PRO: HCPCS

## 2025-01-29 PROCEDURE — 12052 INTMD RPR FACE/MM 2.6-5.0 CM: CPT

## 2025-01-29 RX ORDER — SODIUM CHLORIDE 9 MG/ML
INJECTION, SOLUTION INTRAVENOUS CONTINUOUS
Status: DISCONTINUED | OUTPATIENT
Start: 2025-01-29 | End: 2025-01-29 | Stop reason: HOSPADM

## 2025-01-29 RX ORDER — CEFAZOLIN SODIUM IN 0.9 % NACL 2 G/100 ML
2000 PLASTIC BAG, INJECTION (ML) INTRAVENOUS ONCE
Status: COMPLETED | OUTPATIENT
Start: 2025-01-29 | End: 2025-01-29

## 2025-01-29 RX ORDER — DEXAMETHASONE SODIUM PHOSPHATE 10 MG/ML
INJECTION, SOLUTION INTRAMUSCULAR; INTRAVENOUS
Status: DISCONTINUED | OUTPATIENT
Start: 2025-01-29 | End: 2025-01-29 | Stop reason: SDUPTHER

## 2025-01-29 RX ORDER — SODIUM CHLORIDE 9 MG/ML
INJECTION, SOLUTION INTRAVENOUS
Status: DISCONTINUED | OUTPATIENT
Start: 2025-01-29 | End: 2025-01-29 | Stop reason: SDUPTHER

## 2025-01-29 RX ORDER — LIDOCAINE HYDROCHLORIDE AND EPINEPHRINE 10; 10 MG/ML; UG/ML
INJECTION, SOLUTION INFILTRATION; PERINEURAL PRN
Status: DISCONTINUED | OUTPATIENT
Start: 2025-01-29 | End: 2025-01-29 | Stop reason: ALTCHOICE

## 2025-01-29 RX ORDER — CEFAZOLIN SODIUM 1 G/3ML
INJECTION, POWDER, FOR SOLUTION INTRAMUSCULAR; INTRAVENOUS
Status: DISCONTINUED | OUTPATIENT
Start: 2025-01-29 | End: 2025-01-29 | Stop reason: SDUPTHER

## 2025-01-29 RX ORDER — LIDOCAINE HYDROCHLORIDE 10 MG/ML
INJECTION, SOLUTION INFILTRATION; PERINEURAL
Status: DISCONTINUED | OUTPATIENT
Start: 2025-01-29 | End: 2025-01-29 | Stop reason: SDUPTHER

## 2025-01-29 RX ORDER — ONDANSETRON 2 MG/ML
INJECTION INTRAMUSCULAR; INTRAVENOUS
Status: DISCONTINUED | OUTPATIENT
Start: 2025-01-29 | End: 2025-01-29 | Stop reason: SDUPTHER

## 2025-01-29 RX ORDER — GINSENG 100 MG
CAPSULE ORAL PRN
Status: DISCONTINUED | OUTPATIENT
Start: 2025-01-29 | End: 2025-01-29 | Stop reason: ALTCHOICE

## 2025-01-29 RX ORDER — PROPOFOL 10 MG/ML
INJECTION, EMULSION INTRAVENOUS
Status: DISCONTINUED | OUTPATIENT
Start: 2025-01-29 | End: 2025-01-29 | Stop reason: SDUPTHER

## 2025-01-29 RX ADMIN — CEFAZOLIN SODIUM 2 G: 1 INJECTION, POWDER, FOR SOLUTION INTRAMUSCULAR; INTRAVENOUS at 10:11

## 2025-01-29 RX ADMIN — SODIUM CHLORIDE: 9 INJECTION, SOLUTION INTRAVENOUS at 09:52

## 2025-01-29 RX ADMIN — PROPOFOL 140 MCG/KG/MIN: 10 INJECTION, EMULSION INTRAVENOUS at 09:57

## 2025-01-29 RX ADMIN — PROPOFOL 60 MG: 10 INJECTION, EMULSION INTRAVENOUS at 09:56

## 2025-01-29 RX ADMIN — ONDANSETRON 4 MG: 2 INJECTION INTRAMUSCULAR; INTRAVENOUS at 10:56

## 2025-01-29 RX ADMIN — LIDOCAINE HYDROCHLORIDE 50 MG: 10 INJECTION, SOLUTION INFILTRATION; PERINEURAL at 10:38

## 2025-01-29 RX ADMIN — DEXAMETHASONE SODIUM PHOSPHATE 5 MG: 10 INJECTION, SOLUTION INTRAMUSCULAR; INTRAVENOUS at 09:56

## 2025-01-29 RX ADMIN — SODIUM CHLORIDE: 9 INJECTION, SOLUTION INTRAVENOUS at 09:03

## 2025-01-29 RX ADMIN — Medication 2000 MG: at 10:12

## 2025-01-29 RX ADMIN — LIDOCAINE HYDROCHLORIDE 50 MG: 10 INJECTION, SOLUTION INFILTRATION; PERINEURAL at 09:56

## 2025-01-29 ASSESSMENT — PAIN - FUNCTIONAL ASSESSMENT
PAIN_FUNCTIONAL_ASSESSMENT: NONE - DENIES PAIN

## 2025-01-29 NOTE — DISCHARGE INSTRUCTIONS
General Postoperative Instructions  Ruperto Guy MD      ENCOURAGED ACTIVITY  You should make an effort to spend time on your feet as soon as you safely can.  This may require assistance at first.    Standing and walking will dramatically decrease the risk of blood clots.    You should try to walk for a few minutes every hour during waking hours.  You do not need to wake up through the night to do this.    Please do not lift greater than 10 pounds for the next 7 days.  Please keep your head elevated above heart level for the next 72 hours.    DRESSINGS AND FOLLOW-UP  Please keep your dressings in place for 24 hours.  Please keep your dressings clean and dry at all times.  After removing your dressings, you do not need to keep your incision covered.  It is ok to resume normal showering in 1 day.  Do not submerge your incisions below water for 2 weeks.  You will have scheduled follow-up in 8 days.    MEDICATIONS  Please take over-the-counter pain medication as needed for pain.    Questions or Concerns    If you have additional questions or concerns, please contact [facility] at [phone] during or after office hours. After hours, you will have the option to press #1 to speak to the answering service.  They will be able to put you in touch with Dr. Guy if necessary.     In the case of an emergency, please call 911.    Signs and Symptoms of Infection and/or Complication:    Rapid or asymmetric swelling  Swelling that worsens after the first 48 hours  Redness and warmth developing on previously normal-appearing skin  Drainage other than scant blood or blood-tinged clear fluid  Fever or chills  Nausea and vomiting or diarrhea   Separation of the incision  Bleeding that does not stop with pressure    Signs and Symptoms of a Potential Emergency:    If you experience any of the following during your postoperative recovery, this may represent an emergency.  Please call 911 and

## 2025-01-29 NOTE — ANESTHESIA POSTPROCEDURE EVALUATION
Department of Anesthesiology  Postprocedure Note    Patient: Jen Aranda  MRN: [id number]  YOB: 1950  Date of evaluation: 1/29/2025    Procedure Summary       Date: 01/29/25 Room / Location: [address]    Anesthesia Start: 0952 Anesthesia Stop: 1116    Procedures:       EXCISION OF LEFT FOREHEAD BASAL CELL CARCINOMA (Left)      EXCISIONAL BIOPSY OF SCALP VERTEX (Left) Diagnosis:       Basal cell carcinoma (BCC), unspecified site      (Basal cell carcinoma (BCC), unspecified site [C44.91])    Surgeons: Ruperto Guy MD Responsible Provider: Carol He APRN - CRNA    Anesthesia Type: general, TIVA ASA Status: 3            Anesthesia Type: No value filed.    Fer Phase I:      Fer Phase II:      Anesthesia Post Evaluation    Patient location during evaluation: bedside  Patient participation: complete - patient participated  Level of consciousness: sleepy but conscious  Pain score: 0  Airway patency: patent  Nausea & Vomiting: no nausea and no vomiting  Cardiovascular status: hemodynamically stable  Respiratory status: acceptable  Hydration status: stable  Pain management: adequate      No notable events documented.

## 2025-01-29 NOTE — CARE COORDINATION
Pt was in phase 2 for extended period of time because the patient did not feel ready to dress yet.

## 2025-01-29 NOTE — ANESTHESIA PRE PROCEDURE
Department of Anesthesiology  Preprocedure Note       Name:  Jen Aranda   Age:  74 y.o.  :  1950                                          MRN:  [id number]         Date:  2025      Surgeon: Surgeon(s):  Ruperto Guy MD    Procedure: Procedure(s):  EXCISION OF LEFT FOREHEAD BASAL CELL CARCINOMA  EXCISIONAL BIOPSY OF SCALP VERTEX    Medications prior to admission:   Prior to Admission medications    Medication Sig Start Date End Date Taking? Authorizing Provider   insulin lispro protamine & lispro (HUMALOG MIX) (75-25) 100 UNIT per ML SUSP injection vial Inject 7 Units into the skin 2 times daily (with meals)   Yes Consuelo Stapleton MD   insulin glargine (LANTUS) 100 UNIT/ML injection vial Inject 110 Units into the skin 2 times daily   Yes Consuelo Stapleton MD   aspirin EC 81 MG EC tablet Take 1 tablet by mouth 2 times daily 2/3/21  Yes Darwin Willis MD   celecoxib (CELEBREX) 200 MG capsule Take 1 capsule by mouth daily Indications: Arthritis   Yes Consuelo Stapleton MD   amLODIPine (NORVASC) 2.5 MG tablet Take 1 tablet by mouth daily Indications: High Blood Pressure   Yes Consuelo Stapleton MD   irbesartan (AVAPRO) 300 MG tablet Take 1 tablet by mouth daily Indications: High Blood Pressure   Yes Consuelo Stapleton MD   hydroCHLOROthiazide (MICROZIDE) 12.5 MG capsule Take 1 capsule by mouth daily Indications: High Blood Pressure   Yes Consuelo Stapleton MD   potassium chloride (KLOR-CON M) 10 MEQ extended release tablet Take 1 tablet by mouth daily   Yes Consuelo Stapleton MD   montelukast (SINGULAIR) 10 MG tablet Take 1 tablet by mouth nightly Indications: Asthma   Yes Consuelo Stapleton MD   budesonide-formoterol (SYMBICORT) 160-4.5 MCG/ACT AERO Inhale 2 puffs into the lungs 2 times daily Indications: Asthma   Yes Consuelo Stapleton MD   guaiFENesin (MUCINEX) 600 MG extended release tablet Take 1 tablet by mouth 2 times daily   Yes Consuelo Stapleton MD  Pt to ultrasound at this time.

## 2025-01-29 NOTE — OP NOTE
Operative Note      Patient: Jen Aranda  YOB: 1950  MRN: [id number]    Date of Procedure: 1/29/2025    Pre-Op Diagnosis Codes:      * Basal cell carcinoma (BCC), unspecified site [C44.91]    Post-Op Diagnosis: Same       Procedure(s):  EXCISION OF LEFT FOREHEAD BASAL CELL CARCINOMA  EXCISIONAL BIOPSY OF SCALP VERTEX    1.  Excision of left forehead basal cell carcinoma, 1.8 cm  2.  Intermediate repair, left forehead, 5 cm  3.  Excisional biopsy of scalp vertex lesion, 0.8 cm  4.  Intermediate repair, scalp vertex, 1.5 cm    Surgeon(s):  Ruperto Guy MD Verbist, Daniel E, MD    Assistant:   * No surgical staff found *    Anesthesia: Monitor Anesthesia Care    Estimated Blood Loss (mL): Minimal    Complications: None    Specimens:   ID Type Source Tests Collected by Time Destination   A : Left forehead lesion; stitch 12o'clock Tissue Forehead SURGICAL PATHOLOGY Ruperto Guy MD 1/29/2025 1012    B : scalp vertex lesion; Short stitch anterior; Long stitch LEFT Tissue Scalp SURGICAL PATHOLOGY Ruperto Guy MD 1/29/2025 1015        Implants:  * No implants in log *      Drains: * No LDAs found *    Findings:  Infection Present At Time Of Surgery (PATOS) (choose all levels that have infection present):  No infection present  Other Findings:     This procedure was not performed to treat primary cutaneous melanoma through wide local excision    Detailed Description of Procedure:   Beginning on the left lateral forehead, I incised circumferentially around the lesion including adjacent margins of grossly normal dermis.  I incised with a 6700 [place] blade and carried the dissection full-thickness through the dermis.  The specimen was elevated in the subcutaneous fat plane.  The specimen was oriented with a suture at 12:00 and was sent to pathology for frozen sections.  Confirmation of diagnosis and clear margins was obtained.  The defect was converted to a lenticular shape oriented transversely

## 2025-02-27 DIAGNOSIS — Z79.4 INSULIN DEPENDENT TYPE 2 DIABETES MELLITUS: ICD-10-CM

## 2025-02-27 DIAGNOSIS — E11.9 INSULIN DEPENDENT TYPE 2 DIABETES MELLITUS: ICD-10-CM

## 2025-02-27 DIAGNOSIS — E11.65 UNCONTROLLED TYPE 2 DIABETES MELLITUS WITH HYPERGLYCEMIA: ICD-10-CM

## 2025-02-27 NOTE — TELEPHONE ENCOUNTER
Rx Refill Note  Requested Prescriptions     Pending Prescriptions Disp Refills    TRUEplus 5-Bevel Pen Needles 31G X 6 MM misc [Pharmacy Med Name: TRUEPLUS 5-BEVEL PEN NEEDLES [id number] MISC] 200 each 5     Sig: USE 5X PER DAY AS DIRECTED      Last office visit with office: 10/30/24  Next office visit with office: none    UDS:     DATE OF LAST REFILL: 10/23    Controlled Substance Agreement:     PALOMO OR LUIZ:          {TIP  Is Refill Pharmacy correct?:  Megan Pastrana MA  02/27/25, 16:07 CST

## 2025-02-28 RX ORDER — PEN NEEDLE, DIABETIC 31 G X1/4"
NEEDLE, DISPOSABLE MISCELLANEOUS
Qty: 200 EACH | Refills: 5 | Status: SHIPPED | OUTPATIENT
Start: 2025-02-28

## 2025-03-21 ENCOUNTER — OFFICE VISIT (OUTPATIENT)
Dept: FAMILY MEDICINE CLINIC | Facility: CLINIC | Age: 75
End: 2025-03-21
Payer: MEDICARE

## 2025-03-21 VITALS
SYSTOLIC BLOOD PRESSURE: 120 MMHG | HEART RATE: 103 BPM | OXYGEN SATURATION: 95 % | WEIGHT: 247.4 LBS | BODY MASS INDEX: 45.53 KG/M2 | DIASTOLIC BLOOD PRESSURE: 80 MMHG | TEMPERATURE: 96.9 F | HEIGHT: 62 IN

## 2025-03-21 DIAGNOSIS — R32 URINARY INCONTINENCE, UNSPECIFIED TYPE: Primary | ICD-10-CM

## 2025-03-21 DIAGNOSIS — E11.65 UNCONTROLLED TYPE 2 DIABETES MELLITUS WITH HYPERGLYCEMIA: ICD-10-CM

## 2025-03-21 DIAGNOSIS — R41.3 MEMORY LOSS: ICD-10-CM

## 2025-03-21 DIAGNOSIS — R29.898 MUSCULAR DECONDITIONING: ICD-10-CM

## 2025-03-21 DIAGNOSIS — J40 BRONCHITIS: ICD-10-CM

## 2025-03-21 RX ORDER — POTASSIUM CHLORIDE 750 MG/1
1 TABLET, EXTENDED RELEASE ORAL DAILY
COMMUNITY

## 2025-03-21 RX ORDER — AZITHROMYCIN 250 MG/1
TABLET, FILM COATED ORAL
Qty: 6 TABLET | Refills: 0 | Status: SHIPPED | OUTPATIENT
Start: 2025-03-21 | End: 2025-03-26

## 2025-03-21 RX ORDER — AMLODIPINE BESYLATE 2.5 MG/1
2.5 TABLET ORAL
COMMUNITY

## 2025-03-21 RX ORDER — CELECOXIB 200 MG/1
200 CAPSULE ORAL
COMMUNITY

## 2025-03-25 LAB
ALBUMIN SERPL-MCNC: 4.3 G/DL (ref 3.8–4.8)
ALBUMIN/CREAT UR: 36 MG/G CREAT (ref 0–29)
ALP SERPL-CCNC: 104 IU/L (ref 44–121)
ALT SERPL-CCNC: 29 IU/L (ref 0–32)
APPEARANCE UR: CLEAR
AST SERPL-CCNC: 23 IU/L (ref 0–40)
BACTERIA #/AREA URNS HPF: ABNORMAL /[HPF]
BACTERIA UR CULT: ABNORMAL
BACTERIA UR CULT: ABNORMAL
BASOPHILS # BLD AUTO: 0.1 X10E3/UL (ref 0–0.2)
BASOPHILS NFR BLD AUTO: 1 %
BILIRUB SERPL-MCNC: 0.7 MG/DL (ref 0–1.2)
BILIRUB UR QL STRIP: NEGATIVE
BUN SERPL-MCNC: 13 MG/DL (ref 8–27)
BUN/CREAT SERPL: 15 (ref 12–28)
CALCIUM SERPL-MCNC: 9.2 MG/DL (ref 8.7–10.3)
CASTS URNS QL MICRO: ABNORMAL /LPF
CHLORIDE SERPL-SCNC: 89 MMOL/L (ref 96–106)
CHOLEST SERPL-MCNC: 94 MG/DL (ref 100–199)
CO2 SERPL-SCNC: 23 MMOL/L (ref 20–29)
COLOR UR: YELLOW
CREAT SERPL-MCNC: 0.88 MG/DL (ref 0.57–1)
CREAT UR-MCNC: 29.6 MG/DL
EGFRCR SERPLBLD CKD-EPI 2021: 68 ML/MIN/1.73
EOSINOPHIL # BLD AUTO: 0.1 X10E3/UL (ref 0–0.4)
EOSINOPHIL NFR BLD AUTO: 1 %
EPI CELLS #/AREA URNS HPF: ABNORMAL /HPF (ref 0–10)
ERYTHROCYTE [DISTWIDTH] IN BLOOD BY AUTOMATED COUNT: 13.4 % (ref 11.7–15.4)
GLOBULIN SER CALC-MCNC: 2.4 G/DL (ref 1.5–4.5)
GLUCOSE SERPL-MCNC: 338 MG/DL (ref 70–99)
GLUCOSE UR QL STRIP: ABNORMAL
HBA1C MFR BLD: 10.7 % (ref 4.8–5.6)
HCT VFR BLD AUTO: 48.3 % (ref 34–46.6)
HDLC SERPL-MCNC: 29 MG/DL
HGB BLD-MCNC: 15.8 G/DL (ref 11.1–15.9)
HGB UR QL STRIP: NEGATIVE
IMM GRANULOCYTES # BLD AUTO: 0 X10E3/UL (ref 0–0.1)
IMM GRANULOCYTES NFR BLD AUTO: 0 %
KETONES UR QL STRIP: ABNORMAL
LDLC SERPL CALC-MCNC: 23 MG/DL (ref 0–99)
LEUKOCYTE ESTERASE UR QL STRIP: ABNORMAL
LYMPHOCYTES # BLD AUTO: 0.9 X10E3/UL (ref 0.7–3.1)
LYMPHOCYTES NFR BLD AUTO: 12 %
MCH RBC QN AUTO: 29.7 PG (ref 26.6–33)
MCHC RBC AUTO-ENTMCNC: 32.7 G/DL (ref 31.5–35.7)
MCV RBC AUTO: 91 FL (ref 79–97)
MICRO URNS: ABNORMAL
MICROALBUMIN UR-MCNC: 10.7 UG/ML
MONOCYTES # BLD AUTO: 0.8 X10E3/UL (ref 0.1–0.9)
MONOCYTES NFR BLD AUTO: 11 %
NEUTROPHILS # BLD AUTO: 5.6 X10E3/UL (ref 1.4–7)
NEUTROPHILS NFR BLD AUTO: 75 %
NITRITE UR QL STRIP: NEGATIVE
OTHER ANTIBIOTIC SUSC ISLT: ABNORMAL
PH UR STRIP: 6 [PH] (ref 5–7.5)
PLATELET # BLD AUTO: 237 X10E3/UL (ref 150–450)
POTASSIUM SERPL-SCNC: 4.2 MMOL/L (ref 3.5–5.2)
PROT SERPL-MCNC: 6.7 G/DL (ref 6–8.5)
PROT UR QL STRIP: NEGATIVE
RBC # BLD AUTO: 5.32 X10E6/UL (ref 3.77–5.28)
RBC #/AREA URNS HPF: ABNORMAL /HPF (ref 0–2)
SODIUM SERPL-SCNC: 130 MMOL/L (ref 134–144)
SP GR UR STRIP: >=1.03 (ref 1–1.03)
TRIGL SERPL-MCNC: 286 MG/DL (ref 0–149)
TSH SERPL DL<=0.005 MIU/L-ACNC: 0.75 UIU/ML (ref 0.45–4.5)
URINALYSIS REFLEX: ABNORMAL
UROBILINOGEN UR STRIP-MCNC: 0.2 MG/DL (ref 0.2–1)
VLDLC SERPL CALC-MCNC: 42 MG/DL (ref 5–40)
WBC # BLD AUTO: 7.5 X10E3/UL (ref 3.4–10.8)
WBC #/AREA URNS HPF: >30 /HPF (ref 0–5)

## 2025-03-26 NOTE — PROGRESS NOTES
Subjective   Chief Complaint:  Memory loss, possible home care ordered    History of Present Illness  The patient is a 74-year-old female with a history of memory loss, diabetes, and incontinence. Her family is requesting the possibility of home health care.    She has been experiencing a cough for over 3 days, which initially presented with sinus symptoms before progressing to the chest.    She reports not taking her diabetes medications correctly, resulting in blood sugar levels exceeding 300 recently.    Past Medical, Surgical, Social, and Family History:  Allergies   Allergen Reactions    Adhesive Tape Other (See Comments)     Tears and bruises skin    Other Other (See Comments)     Bandaids make arm Red/slightly swollen      Past Medical History:   Diagnosis Date    Allergic rhinitis     Arthritis     Asthma     Chronic laryngitis     Dementia     Diabetes mellitus     GERD (gastroesophageal reflux disease)     Hypertension     Hypertrophy of both inferior nasal turbinates     Laryngopharyngeal reflux     Nontoxic multinodular goiter     Sicca laryngitis       Past Surgical History:   Procedure Laterality Date    CARPAL TUNNEL RELEASE      CHOLECYSTECTOMY      COLONOSCOPY  10/01/2008    Multiple polyps removed    COLONOSCOPY  09/18/2013    Diverticulosis in the sigmoid colon. Dr. Dave Mcnally Recall 5 years    COLONOSCOPY  03/13/2023    5 mm adenoma polyp at 70 cm,15 mm polyp at 20 cm, polypoid fragments,hemorrhoids    ENDOSCOPY N/A 12/08/2022    Non-bleeding duodenal ulcer, small HH    HAND SURGERY      HYSTERECTOMY      REPLACEMENT TOTAL KNEE Right 02/02/2021    THYROIDECTOMY, PARTIAL Right 2010    TONSILLECTOMY      TOTAL KNEE ARTHROPLASTY Left 12/08/2023    Procedure: LEFT COMPLEX TOTAL KNEE REPLACEMENT;  Surgeon: PATRICIA Gould MD;  Location: [facility] OR;  Service: Orthopedics;  Laterality: Left;    TUBAL ABDOMINAL LIGATION        Social History     Socioeconomic History    Marital status:       "Spouse name: Laurie    Number of children: 2    Years of education: 16   Tobacco Use    Smoking status: Never     Passive exposure: Never    Smokeless tobacco: Never    Tobacco comments:     \"lived with smokers most of my life\"   Vaping Use    Vaping status: Never Used   Substance and Sexual Activity    Alcohol use: No     Comment: Rare    Drug use: No    Sexual activity: Defer     Partners: Male     Birth control/protection: None      Family History   Problem Relation Age of Onset    Diabetes Mother     Stroke Mother     Heart disease Mother     Heart disease Father     Colon cancer Neg Hx     Colon polyps Neg Hx        Objective   Vital Signs  /80   Pulse 103   Temp 96.9 °F (36.1 °C) (Infrared)   Ht 157 cm (61.81\")   Wt 112 kg (247 lb 6.4 oz)   SpO2 95%   BMI 45.53 kg/m²    Physical Exam  Vitals reviewed.   Constitutional:       General: She is not in acute distress.     Appearance: Normal appearance. She is obese.   Cardiovascular:      Rate and Rhythm: Normal rate and regular rhythm.   Pulmonary:      Effort: Pulmonary effort is normal.      Breath sounds: Normal breath sounds.   Musculoskeletal:      Comments: Decreased push and pull strength bilateral upper and lower extremity.   Neurological:      Comments: Forgetful but is able to be redirected       Assessment & Plan   Assessment & Plan  1. Uncontrolled type 2 diabetes with hyperglycemia.  A comprehensive set of laboratory tests will be conducted today, including CBC, CMP, A1c, lipids, urine, microalbumin, and TSH. A referral for home health care has been initiated.    2. Memory loss.  A referral for home health care has been initiated to assist with disease management, physical therapy (PT), and occupational therapy (OT). The team will evaluate her condition and provide treatment as necessary.    3. Muscle deconditioning.  A referral for home health care has been initiated to address muscle deconditioning through PT and OT.    4. Urinary " incontinence.  A urinalysis with culture and sensitivities has been ordered to further investigate the cause of her urinary incontinence.    5. Acute bronchitis.  A Z-Zacahry will be administered to manage her acute bronchitis.    Follow-up:  The patient will Return for 6 month medication management.    Records and Results Reviewed:  I reviewed current medications as given by patient and allergy list.    : Hybrid LEILA Co- and Dragon Speech Recognition - No recording technology was used in the exam room during encounter.    Electronically signed by FABBY Sanchez, 03/26/25, 2:51 PM CDT.

## 2025-03-28 ENCOUNTER — TELEPHONE (OUTPATIENT)
Dept: FAMILY MEDICINE CLINIC | Facility: CLINIC | Age: 75
End: 2025-03-28

## 2025-03-28 NOTE — TELEPHONE ENCOUNTER
Caller: [facility]    Relationship to patient:     Best call back number:     [phone]     Patient is needing: WAS REFERRED TO HOME HEALTH, BUT THEY DO NOT CURRENTLY HAVE THE STAFF TO TAKE THIS PATIENT ON

## 2025-04-07 NOTE — HOME HEALTH
FOCUS OF VISIT: CPA/EDUCATION: LAB DRAWN PREDC VISIT    TEACHING/INTERVENTIONS: AAO X 3. PT DENIES CHEST PAIN, SOB OR FALLS.      PATIENT EDUCATION: LABS DRAWN WITH BUTTERFLY X 1 ATTEMPT PT LISHA WELL SPECIMAN TUBES LABLES AND TAKEN TO [facility] OFFICE     SHORT TERM GOAL: NO FALLS/NO HOSPITAL VISITS    LONG TERM GOAL:  DC TO FAMILY/SELF CARE WHEN GOALS MET.     MD CONTACT: MD NOTIFIED OF LABS    INSURANCE CHANGES: PT DENIES    PT DENIES ANY S/S OF COVID 19. PT/CG STATE THEY HAVE BEEN ADHEARING CDC GUIDLINES AND  SOCIAL DISTANCING AND WEARING FACE COVERINGS/MASK       Medication Knowledge:  \"             Have there been any changes to the pt's medication?NO  \"             Is pt aware of purpose of each medication? YES  \"             Is pt aware of side effects of each medication? YES  \"             Has the pt had any adverse reactions to any medication? NO
show

## 2025-04-08 ENCOUNTER — TELEPHONE (OUTPATIENT)
Dept: FAMILY MEDICINE CLINIC | Facility: CLINIC | Age: 75
End: 2025-04-08
Payer: MEDICARE

## 2025-04-08 DIAGNOSIS — E11.65 UNCONTROLLED TYPE 2 DIABETES MELLITUS WITH HYPERGLYCEMIA: Primary | ICD-10-CM

## 2025-04-08 NOTE — TELEPHONE ENCOUNTER
Kezia is needing a refill on her sensor strips. Home health stated hers are two years old. Please advise

## 2025-04-11 ENCOUNTER — TELEPHONE (OUTPATIENT)
Dept: FAMILY MEDICINE CLINIC | Facility: CLINIC | Age: 75
End: 2025-04-11
Payer: MEDICARE

## 2025-04-11 NOTE — TELEPHONE ENCOUNTER
Caller: Zachary Pemberton (Physical Therapy)    Relationship: Other -  [facility]     Best call back number: [phone]    Who are you requesting to speak with (clinical staff, provider, specific staff member): CLINICAL     What was the call regarding: Requesting a callback with some questions about the referral that was sent in.

## 2025-04-14 RX ORDER — HYDROCHLOROTHIAZIDE 12.5 MG/1
12.5 CAPSULE ORAL DAILY
Qty: 90 CAPSULE | Refills: 3 | Status: SHIPPED | OUTPATIENT
Start: 2025-04-14

## 2025-04-14 NOTE — TELEPHONE ENCOUNTER
Rx Refill Note  Requested Prescriptions     Pending Prescriptions Disp Refills    hydroCHLOROthiazide (MICROZIDE) 12.5 MG capsule [Pharmacy Med Name: HYDROCHLOROTHIAZIDE 12.5MG CAPSULE] 90 capsule 3     Sig: TAKE ONE CAPSULE DAILY      Last office visit with office: [phone]  Next office visit with office: none    UDS:     DATE OF LAST REFILL: [id number]    Controlled Substance Agreement:     PALOMO OR LUIZ:          {TIP  Is Refill Pharmacy correct?:  Emmanuelle Burgess MA  04/14/25, 10:37 CDT

## 2025-04-16 ENCOUNTER — TELEPHONE (OUTPATIENT)
Dept: FAMILY MEDICINE CLINIC | Facility: CLINIC | Age: 75
End: 2025-04-16

## 2025-04-16 RX ORDER — FLUCONAZOLE 150 MG/1
150 TABLET ORAL
Qty: 3 TABLET | Refills: 0 | Status: SHIPPED | OUTPATIENT
Start: 2025-04-16

## 2025-04-16 NOTE — TELEPHONE ENCOUNTER
Caller: Kezia Otoole    Relationship: Self    Best call back number: [phone]     What medication are you requesting: SOMETHING FOR YEAST INFECTION    What are your current symptoms: VAGINAL ITCH    How long have you been experiencing symptoms: 4-5 DAYS    Have you had these symptoms before:    [] Yes  [x] No    Have you been treated for these symptoms before:   [] Yes  [x] No    If a prescription is needed, what is your preferred pharmacy and phone number: [facility] - [address] [phone] [facility] [phone]      Additional notes:

## 2025-04-18 ENCOUNTER — TELEPHONE (OUTPATIENT)
Dept: FAMILY MEDICINE CLINIC | Facility: CLINIC | Age: 75
End: 2025-04-18

## 2025-04-18 NOTE — TELEPHONE ENCOUNTER
Caller:IRIS      Relationship: OCCUPATIONAL THERAPIST     Best call back number: [phone]     What is the best time to reach you: ANY    Who are you requesting to speak with (clinical staff, provider,  specific staff member): CLINICAL OR PROVIDER    What was the call regarding: UNABLE TO GET HER SCHEDULE HER HER O.T EVALUATION    WILL BE MOVING HER EVALUATION TO NEXT WEEK    SHE WAS JUST INFORMING THE OFFICE    NO CALL BACK NEEDED

## 2025-04-21 ENCOUNTER — TELEPHONE (OUTPATIENT)
Dept: FAMILY MEDICINE CLINIC | Facility: CLINIC | Age: 75
End: 2025-04-21
Payer: MEDICARE

## 2025-04-23 ENCOUNTER — TELEPHONE (OUTPATIENT)
Dept: FAMILY MEDICINE CLINIC | Facility: CLINIC | Age: 75
End: 2025-04-23
Payer: MEDICARE

## 2025-04-23 NOTE — TELEPHONE ENCOUNTER
Yes while they were there she did take her medication,they are not picking her up for OT she does not qualify but her nurse will be back to see her next week and will call us with bp readings shar his her nurse [phone]

## 2025-04-28 DIAGNOSIS — R05.3 CHRONIC COUGH: ICD-10-CM

## 2025-04-28 RX ORDER — BENZONATATE 100 MG/1
CAPSULE ORAL
Qty: 30 CAPSULE | Refills: 2 | Status: SHIPPED | OUTPATIENT
Start: 2025-04-28

## 2025-05-02 ENCOUNTER — TELEPHONE (OUTPATIENT)
Dept: FAMILY MEDICINE CLINIC | Facility: CLINIC | Age: 75
End: 2025-05-02
Payer: MEDICARE

## 2025-05-02 DIAGNOSIS — E11.9 INSULIN DEPENDENT TYPE 2 DIABETES MELLITUS: ICD-10-CM

## 2025-05-02 DIAGNOSIS — E11.65 UNCONTROLLED TYPE 2 DIABETES MELLITUS WITH HYPERGLYCEMIA: ICD-10-CM

## 2025-05-02 DIAGNOSIS — Z79.4 INSULIN DEPENDENT TYPE 2 DIABETES MELLITUS: ICD-10-CM

## 2025-05-02 RX ORDER — BLOOD SUGAR DIAGNOSTIC
1 STRIP MISCELLANEOUS 3 TIMES DAILY
Qty: 90 EACH | Refills: 5 | Status: SHIPPED | OUTPATIENT
Start: 2025-05-02

## 2025-05-02 RX ORDER — PEN NEEDLE, DIABETIC 31 G X1/4"
1 NEEDLE, DISPOSABLE MISCELLANEOUS
Qty: 200 EACH | Refills: 5 | Status: SHIPPED | OUTPATIENT
Start: 2025-05-02

## 2025-05-02 NOTE — TELEPHONE ENCOUNTER
"  Caller: Herminio Toro    Relationship: Emergency Contact    Best call back number: [phone]     Requested Prescriptions:   Requested Prescriptions     Pending Prescriptions Disp Refills    Blood Glucose Monitoring Suppl device 1 each 0     Sig: Use 1 each As Needed (To check glucose). Device and testing supplies    glucose blood test strip 100 each 12     Sig: Test 1-2 times daily    Insulin Pen Needle (TRUEplus 5-Bevel Pen Needles) 31G X 6 MM misc 200 each 5    Insulin Syringe-Needle U-100 (TRUEplus Insulin Syringe) 31G X 5/16\" 0.3 ML misc 90 each 5     Sig: 3 (Three) Times a Day. as directed        Pharmacy where request should be sent: [facility] [address] [place] [address] - [address] - [phone] [facility] [phone]      Last office visit with prescribing clinician: 3/21/2025   Last telemedicine visit with prescribing clinician: Visit date not found   Next office visit with prescribing clinician: Visit date not found     Additional details provided by patient:     Does the patient have less than a 3 day supply:  [x] Yes  [] No    Would you like a call back once the refill request has been completed: [x] Yes [] No    If the office needs to give you a call back, can they leave a voicemail: [x] Yes [] No    Ryan Loza Rep   05/02/25 15:33 CDT         "

## 2025-05-02 NOTE — TELEPHONE ENCOUNTER
For blood pressure issues, all we can offer is a follow-up appointment.  I am happy to do this.  As far as the blood glucose monitoring, do they simply need a blood glucose monitoring device kit sent in?    Electronically signed by FABBY Sanchez, 05/02/25, 2:44 PM CDT.

## 2025-05-02 NOTE — TELEPHONE ENCOUNTER
[facility] called stating they are unable to monitor pt blood sugars due to not having items, unable to get ahold of son,- pt has not taken meds since Wednesday and bp was today 166/90, and then 158/82 after it. [facility] is very concerned of pt health due to pt not wanting her to call the son to discuss these issues.

## 2025-05-07 ENCOUNTER — TELEPHONE (OUTPATIENT)
Dept: FAMILY MEDICINE CLINIC | Facility: CLINIC | Age: 75
End: 2025-05-07

## 2025-05-07 NOTE — TELEPHONE ENCOUNTER
Reena stated that bp was 190/90, no chest pains or shortness of breath. Right lower arm 140/76 -pt rescheduled appt for 5/21- reena discussed taking meds everyday

## 2025-05-09 ENCOUNTER — TELEPHONE (OUTPATIENT)
Dept: FAMILY MEDICINE CLINIC | Facility: CLINIC | Age: 75
End: 2025-05-09
Payer: MEDICARE

## 2025-05-09 NOTE — TELEPHONE ENCOUNTER
Spoke with pharmacy - they need part B of patients insurance plan as part D is not going to cover. Patient or son will need to contact pharmacy with insurance information

## 2025-05-09 NOTE — TELEPHONE ENCOUNTER
Caller: Herminio Toro    Relationship: Emergency Contact    Best call back number: [phone]     What is the best time to reach you: ANYTIME     Who are you requesting to speak with (clinical staff, provider,  specific staff member): CLINICAL     Do you know the name of the person who called:     What was the call regarding: PATIENT'S SON STATED THAT HE CALLED IN LAST WEEK WANTING TO SET HIS MOTHER UP WITH A CONTINUOUS GLUCOSE MONITOR, HE SAID HE WENT TO PICK IT UP AND ALL THAT WAS THERE WAS NEEDLES AND STRIPS.     Is it okay if the provider responds through MyChart: PHONE CALL

## 2025-05-14 ENCOUNTER — TELEPHONE (OUTPATIENT)
Dept: FAMILY MEDICINE CLINIC | Facility: CLINIC | Age: 75
End: 2025-05-14
Payer: MEDICARE

## 2025-05-14 DIAGNOSIS — E11.65 UNCONTROLLED TYPE 2 DIABETES MELLITUS WITH HYPERGLYCEMIA: Primary | ICD-10-CM

## 2025-05-14 RX ORDER — KETOROLAC TROMETHAMINE 30 MG/ML
1 INJECTION, SOLUTION INTRAMUSCULAR; INTRAVENOUS ONCE
Qty: 1 EACH | Refills: 0 | Status: SHIPPED | OUTPATIENT
Start: 2025-05-14 | End: 2025-05-14

## 2025-05-14 RX ORDER — ACYCLOVIR 800 MG/1
1 TABLET ORAL
Qty: 3 EACH | Refills: 5 | Status: SHIPPED | OUTPATIENT
Start: 2025-05-14

## 2025-05-14 NOTE — TELEPHONE ENCOUNTER
Message from patients son Huang Toro:   I'm sorry to send a message this way but I've called the office a couple of times and I don't think they understood.  My mother, Kezia Otoole [birth year]48, wants to go back to the continuous glucose monitoring bc she doesn't like the finger sticks, previously she had the Freestyle Sue 2 that we got from [facility] in [place].  Can you have someone send a Rx for the monitor and sensors to [facility]?  Also, is the Sue 3 any better than the 2?  It looks like they're about the same price.  Thanks in advance

## 2025-05-21 ENCOUNTER — OFFICE VISIT (OUTPATIENT)
Dept: FAMILY MEDICINE CLINIC | Facility: CLINIC | Age: 75
End: 2025-05-21
Payer: MEDICARE

## 2025-05-21 VITALS
HEIGHT: 62 IN | OXYGEN SATURATION: 91 % | SYSTOLIC BLOOD PRESSURE: 130 MMHG | HEART RATE: 78 BPM | TEMPERATURE: 94.4 F | WEIGHT: 247.4 LBS | DIASTOLIC BLOOD PRESSURE: 72 MMHG | BODY MASS INDEX: 45.53 KG/M2

## 2025-05-21 DIAGNOSIS — R32 URINARY INCONTINENCE, UNSPECIFIED TYPE: Primary | ICD-10-CM

## 2025-05-21 DIAGNOSIS — J40 BRONCHITIS: ICD-10-CM

## 2025-05-21 RX ORDER — INSULIN GLARGINE 100 [IU]/ML
INJECTION, SOLUTION SUBCUTANEOUS
COMMUNITY
Start: 2025-05-19

## 2025-05-21 RX ORDER — LEVOFLOXACIN 500 MG/1
500 TABLET, FILM COATED ORAL DAILY
Qty: 10 TABLET | Refills: 0 | Status: SHIPPED | OUTPATIENT
Start: 2025-05-21 | End: 2025-05-31

## 2025-05-22 DIAGNOSIS — J40 BRONCHITIS: ICD-10-CM

## 2025-05-23 NOTE — PROGRESS NOTES
Subjective   Chief Complaint:  Dementia care, diabetes    History of Present Illness  The patient is a 74-year-old female who presents for evaluation of diabetes, dementia, and respiratory symptoms.    Her blood glucose levels have been elevated, with a reading of 400 approximately 3 weeks ago and a subsequent reading of 267 taken by a home health nurse 2 days ago. She has not been adhering to her prescribed medication regimen. She reports no febrile episodes but experiences excessive sleepiness, often retiring to bed. She is planning to use a sensor monitor to help manage her blood sugar levels.    She has been experiencing urinary incontinence, with minimal control over her bladder function. Additionally, she has recently developed bowel incontinence, particularly during the night. Despite these issues, she maintains a good appetite. She does not use briefs at night and instead gets up to use the bathroom.    She has been coughing for almost 2 weeks, which is now accompanied by fluid accumulation. She also reports feeling unwell and fatigued.    All historical information above, including references to third-party opinions or diagnoses, is based solely on the patient’s account unless explicitly corroborated using ambient listening.    Past Medical, Surgical, Social, and Family History:  Allergies   Allergen Reactions    Adhesive Tape Other (See Comments)     Tears and bruises skin    Other Other (See Comments)     Bandaids make arm Red/slightly swollen      Past Medical History:   Diagnosis Date    Allergic rhinitis     Arthritis     Asthma     Chronic laryngitis     Dementia     Diabetes mellitus     GERD (gastroesophageal reflux disease)     Hypertension     Hypertrophy of both inferior nasal turbinates     Laryngopharyngeal reflux     Nontoxic multinodular goiter     Sicca laryngitis       Past Surgical History:   Procedure Laterality Date    CARPAL TUNNEL RELEASE      CHOLECYSTECTOMY      COLONOSCOPY   "10/01/2008    Multiple polyps removed    COLONOSCOPY  09/18/2013    Diverticulosis in the sigmoid colon. Dr. Dave Mcnally Recall 5 years    COLONOSCOPY  03/13/2023    5 mm adenoma polyp at 70 cm,15 mm polyp at 20 cm, polypoid fragments,hemorrhoids    ENDOSCOPY N/A 12/08/2022    Non-bleeding duodenal ulcer, small HH    HAND SURGERY      HYSTERECTOMY      REPLACEMENT TOTAL KNEE Right 02/02/2021    THYROIDECTOMY, PARTIAL Right 2010    TONSILLECTOMY      TOTAL KNEE ARTHROPLASTY Left 12/08/2023    Procedure: LEFT COMPLEX TOTAL KNEE REPLACEMENT;  Surgeon: PATRICIA Gould MD;  Location: [facility] OR;  Service: Orthopedics;  Laterality: Left;    TUBAL ABDOMINAL LIGATION        Social History     Socioeconomic History    Marital status:      Spouse name: Laurie    Number of children: 2    Years of education: 16   Tobacco Use    Smoking status: Never     Passive exposure: Never    Smokeless tobacco: Never    Tobacco comments:     \"lived with smokers most of my life\"   Vaping Use    Vaping status: Never Used   Substance and Sexual Activity    Alcohol use: No     Comment: Rare    Drug use: No    Sexual activity: Defer     Partners: Male     Birth control/protection: None      Family History   Problem Relation Age of Onset    Diabetes Mother     Stroke Mother     Heart disease Mother     Heart disease Father     Colon cancer Neg Hx     Colon polyps Neg Hx        Objective   Vital Signs  /72   Pulse 78   Temp 94.4 °F (34.7 °C) (Infrared)   Ht 157 cm (61.81\")   Wt 112 kg (247 lb 6.4 oz)   SpO2 91%   BMI 45.53 kg/m²    Physical Exam  Vitals reviewed.   Constitutional:       General: She is not in acute distress.     Appearance: Normal appearance.   Cardiovascular:      Rate and Rhythm: Normal rate and regular rhythm.   Pulmonary:      Effort: Pulmonary effort is normal. No respiratory distress.      Breath sounds: Rhonchi present.   Neurological:      Comments: Oriented to person and place, a bit forgetful with " conversation but is able to be redirected.       Assessment & Plan   Assessment & Plan  1. Diabetes Mellitus.  - Blood sugar levels have been poorly controlled, with a recent reading of 400 mg/dL three weeks ago and 267 mg/dL two days ago.  - She has not been taking her medications regularly.  - Efforts are being made to improve medication adherence with the assistance of a home health nurse.    2. Dementia.  With urinary incontinence  - She is experiencing memory issues consistent with dementia.  - She has been advised to follow a toileting schedule every 2 hours to manage urinary and bowel incontinence.  - The use of moisture-wicking briefs and barrier cream has been recommended to maintain skin health.  - An order for incontinence pads and briefs will be placed to provide additional support.  -Discussed if home is still the appropriate place-they believe that they are doing okay now but are willing to talk about long-term care later.    3.  Bronchitis acute  - She has been experiencing congestion, rattling cough, and fatigue, raising concerns for pneumonia or walking pneumonia.  - Physical exam findings include wheezes and rales.  - A chest x-ray will be ordered to further investigate these symptoms.  - If the chest x-ray shows significant findings, an antibiotic will be prescribed.    Follow-up:  The patient will Return for next scheduled follow up as already scheduled.    Records and Results Reviewed:  I reviewed current medications as given by patient and allergy list    Patient or patient representative verbalized consent for the use of Ambient Listening during the visit with  FABBY Sanchez for chart documentation. 5/23/2025  08:42 CDT    Electronically signed by FABBY Sanchez, 05/23/25, 8:42 AM CDT.

## 2025-06-03 ENCOUNTER — TELEPHONE (OUTPATIENT)
Dept: FAMILY MEDICINE CLINIC | Facility: CLINIC | Age: 75
End: 2025-06-03
Payer: MEDICARE

## 2025-06-03 NOTE — TELEPHONE ENCOUNTER
Caller: KWESI    Relationship: Home Health  WITH [facility]    Best call back number: [phone]     What is the best time to reach you: AS SOON AS POSSIBLE    Who are you requesting to speak with (clinical staff, provider,  specific staff member): CLINICAL    What was the call regarding: WILL BE DOING PATIENTS HOME HEALTH RENEWAL THIS WEEK AND WANTS TO MAKE SURE THAT IT IS OK TO COMPLETE THAT

## 2025-06-03 NOTE — TELEPHONE ENCOUNTER
Yes, the first step would be for him to call over and talk to an admissions coordinator/.  If they actually need a referral, I am happy to generate 1 but typically patients can simply self refer.  Based on our visit the other day, this seems like a very good idea.  I had mentioned it as a possibility.    Electronically signed by FABBY Sanchez, 06/03/25, 3:50 PM CDT.

## 2025-06-03 NOTE — TELEPHONE ENCOUNTER
Khris does the  just need to call that facility and give them all the info and then a provider at that facility will take over her care if they accept her

## 2025-06-03 NOTE — TELEPHONE ENCOUNTER
Pt  came in. Pt is urinating on herself and no one is changing her and he is not able to do it. The house is starting to smell. Last time they were here it was mentioned about putting pt in nursing home. He would like for her to go to [facility] in [place].

## 2025-06-06 ENCOUNTER — REFERRAL TRIAGE (OUTPATIENT)
Age: 75
End: 2025-06-06
Payer: MEDICARE

## 2025-06-06 ENCOUNTER — OFFICE VISIT (OUTPATIENT)
Dept: FAMILY MEDICINE CLINIC | Facility: CLINIC | Age: 75
End: 2025-06-06
Payer: MEDICARE

## 2025-06-06 ENCOUNTER — PATIENT OUTREACH (OUTPATIENT)
Dept: CASE MANAGEMENT | Facility: OTHER | Age: 75
End: 2025-06-06
Payer: MEDICARE

## 2025-06-06 VITALS
OXYGEN SATURATION: 95 % | BODY MASS INDEX: 46.04 KG/M2 | TEMPERATURE: 95.7 F | HEIGHT: 62 IN | HEART RATE: 80 BPM | DIASTOLIC BLOOD PRESSURE: 78 MMHG | SYSTOLIC BLOOD PRESSURE: 130 MMHG | WEIGHT: 250.2 LBS

## 2025-06-06 DIAGNOSIS — R53.81 DEBILITY: Primary | ICD-10-CM

## 2025-06-06 DIAGNOSIS — R29.898 MUSCULAR DECONDITIONING: ICD-10-CM

## 2025-06-06 DIAGNOSIS — Z63.6 CAREGIVER BURDEN: ICD-10-CM

## 2025-06-06 DIAGNOSIS — R30.0 DYSURIA: ICD-10-CM

## 2025-06-06 DIAGNOSIS — R41.3 MEMORY LOSS: ICD-10-CM

## 2025-06-06 DIAGNOSIS — R09.82 POST-NASAL DRIP: ICD-10-CM

## 2025-06-06 RX ORDER — FLUTICASONE PROPIONATE 50 MCG
2 SPRAY, SUSPENSION (ML) NASAL DAILY
Qty: 16 G | Refills: 5 | Status: SHIPPED | OUTPATIENT
Start: 2025-06-06

## 2025-06-06 RX ORDER — MIRABEGRON 25 MG/1
TABLET, FILM COATED, EXTENDED RELEASE ORAL
COMMUNITY
Start: 2025-06-04

## 2025-06-06 SDOH — SOCIAL STABILITY - SOCIAL INSECURITY: DEPENDENT RELATIVE NEEDING CARE AT HOME: Z63.6

## 2025-06-06 NOTE — OUTREACH NOTE
AMBULATORY CASE MANAGEMENT NOTE    Names and Relationships of Patient/Support Persons: Contact: Herminio Toro; Relationship: Emergency Contact -     Patient Outreach    Received message from patient's son,Jadiel, that patient was seen by PCP today and a referral had been placed for SNF vs in home care assistance. Jadiel inquired of in home personal care agencies that were dependable. ACM suggested [facility]. Discussed [facility] and [facility] but unsure if they service [place] .     Care Coordination    Spoke with [facility] and they do accept private pay. It is $30/hr with a 6 hr/week minium. An appointment will need to be setup  with a  for an evaluation. They require 30 days of service up front as a deposit.     Patient Outreach    Updated Jadiel on the above information. ACM suggested [facility] but he did not feel [facility] was what they needed at this time. Jadiel will inform his mother.         Maryse OLIVO  Ambulatory Case Management    6/6/2025, 13:54 CDT

## 2025-06-06 NOTE — PROGRESS NOTES
Subjective   Chief Complaint:  Questions at possible long-term care, worsening issues.    History of Present Illness  The patient is a 74-year-old female presenting today for evaluation of cough and dysuria. She has been making decisions about long-term care versus increased home resources. She does have a history of memory loss, physical debility, and an overall decline in health.    She has been experiencing a persistent cough, which she attributes to postnasal drip. She has been managing this symptom with the use of Flonase.    Additionally, she reports experiencing dysuria.    Past Medical, Surgical, Social, and Family History:  Allergies   Allergen Reactions    Adhesive Tape Other (See Comments)     Tears and bruises skin    Other Other (See Comments)     Bandaids make arm Red/slightly swollen      Past Medical History:   Diagnosis Date    Allergic     Allergic rhinitis     Arthritis     Asthma     Cataract     Removed 2020    Cholelithiasis 1980’s    Gallbladder surgically removed    Chronic laryngitis     Colon polyp     Removed as needed with colonoscopy    Dementia     Diabetes mellitus     GERD (gastroesophageal reflux disease)     Glaucoma     Hypertension     Hypertrophy of both inferior nasal turbinates     Laryngopharyngeal reflux     Nontoxic multinodular goiter     Obesity     Sicca laryngitis     Urinary incontinence 2023    On occasion    Visual impairment     Wear glasses for close up/reading      Past Surgical History:   Procedure Laterality Date    CARPAL TUNNEL RELEASE      CHOLECYSTECTOMY      COLONOSCOPY  10/01/2008    Multiple polyps removed    COLONOSCOPY  09/18/2013    Diverticulosis in the sigmoid colon. Dr. Dave Mcnally Recall 5 years    COLONOSCOPY  03/13/2023    5 mm adenoma polyp at 70 cm,15 mm polyp at 20 cm, polypoid fragments,hemorrhoids    ENDOSCOPY N/A 12/08/2022    Non-bleeding duodenal ulcer, small HH    HAND SURGERY      HYSTERECTOMY      JOINT REPLACEMENT  Feb 2021     "OOPHORECTOMY      REPLACEMENT TOTAL KNEE Right 02/02/2021    THYROIDECTOMY, PARTIAL Right 2010    TONSILLECTOMY      TOTAL KNEE ARTHROPLASTY Left 12/08/2023    Procedure: LEFT COMPLEX TOTAL KNEE REPLACEMENT;  Surgeon: PATRICIA Gould MD;  Location: [facility] OR;  Service: Orthopedics;  Laterality: Left;    TUBAL ABDOMINAL LIGATION        Social History     Socioeconomic History    Marital status:      Spouse name: Laurie    Number of children: 2    Years of education: 16   Tobacco Use    Smoking status: Never     Passive exposure: Never    Smokeless tobacco: Never    Tobacco comments:     \"lived with smokers most of my life\"   Vaping Use    Vaping status: Never Used   Substance and Sexual Activity    Alcohol use: No     Comment: Rare    Drug use: No    Sexual activity: Defer     Partners: Male     Birth control/protection: None      Family History   Problem Relation Age of Onset    Diabetes Mother     Stroke Mother     Heart disease Mother         Heart attacks & stroke at age 61    Early death Mother     Heart disease Father         Death by heart attack at age 65    Hypertension Father     Early death Father     Diabetes Sister     Early death Sister     Heart disease Sister     Colon cancer Neg Hx     Colon polyps Neg Hx        Objective   Vital Signs  /78   Pulse 80   Temp 95.7 °F (35.4 °C) (Infrared)   Ht 157 cm (61.81\")   Wt 113 kg (250 lb 3.2 oz)   SpO2 95%   BMI 46.04 kg/m²    Physical Exam  Vitals reviewed.   Constitutional:       General: She is not in acute distress.     Appearance: Normal appearance. She is obese.   Cardiovascular:      Rate and Rhythm: Normal rate and regular rhythm.   Pulmonary:      Effort: Pulmonary effort is normal.      Breath sounds: Normal breath sounds.   Musculoskeletal:      Comments: Poor posture, poor core strength, poor extremity strength-gait difficulty   Neurological:      Comments: Pleasant, forgetful       Assessment & Plan   Assessment & Plan  1. " Caregiver role strain.  A referral to case management has been initiated to provide guidance on home resources. The family has been informed that support for long-term care is available if they choose this path. They have already begun discussions with several facilities.    2. Dysuria.  A urinalysis with culture and sensitivities has been ordered.    3. Postnasal drip.  The patient is advised to administer Flonase, one spray in each nostril daily.    4.  Memory loss  - They called back and they are wanting to go ahead and proceed with the [facility] referral.    5.  Muscular deconditioning  - Called back and they are wanting to admit to [facility] nursing rehab-referral order done.    Follow-up:  The patient will Return for follow-up as needed pending results - we will call.    Records and Results Reviewed:  I reviewed current medications as given by patient and allergy list.    Time = 31 minutes    : Hybrid LEILA Co- and Dragon Speech Recognition - No recording technology was used in the exam room during encounter.    Electronically signed by FABBY Sanchez, 06/06/25, 1:52 PM CDT.

## 2025-06-07 LAB
GLUCOSE UR QL STRIP: NORMAL
KETONES UR QL STRIP: NORMAL
PH UR STRIP: NORMAL [PH]
PROT UR QL STRIP: NORMAL
SP GR UR STRIP: NORMAL

## 2025-06-13 ENCOUNTER — PATIENT OUTREACH (OUTPATIENT)
Dept: CASE MANAGEMENT | Facility: OTHER | Age: 75
End: 2025-06-13
Payer: MEDICARE

## 2025-06-13 NOTE — OUTREACH NOTE
AMBULATORY CASE MANAGEMENT NOTE    Names and Relationships of Patient/Support Persons: Contact: Herminio Toro; Relationship: Emergency Contact -     Patient Outreach    Message sent to patient's son, Jadiel, to inquire if care giver assistance has been obtained for his mom.     Patient Outreach    Received message from patient's son, Jadiel, and she was admitted to [facility] for 30 days for rehab.     Maryse OLIVO  Ambulatory Case Management    6/13/2025, 09:19 CDT

## 2025-07-30 ENCOUNTER — TELEPHONE (OUTPATIENT)
Dept: FAMILY MEDICINE CLINIC | Facility: CLINIC | Age: 75
End: 2025-07-30
Payer: MEDICARE

## 2025-07-30 NOTE — TELEPHONE ENCOUNTER
Pt  came in asking what he needed to do to make pt a permanent resident at the nursing home. He did mention that she may have a uti and I did tell him that [facility] did not do nursing home visits and that the nursing home should have their own dr that takes care of those type of things.

## 2025-07-30 NOTE — TELEPHONE ENCOUNTER
I spoke to  and told him to ask to speak to DON at [facility] and they will get her treated for uti with the in house provider and he can also speak to them regarding perm placement and to call us if he has any further questions

## (undated) DEVICE — SPONGE LAP W18XL18IN WHT COT 4 PLY FLD STRUNG RADPQ DISP ST

## (undated) DEVICE — SYS CLS SKIN PREMIERPRO EXOFINFUSION 22CM

## (undated) DEVICE — THE CHANNEL CLEANING BRUSH IS A NYLON FLEXI BRUSH ATTACHED TO A FLEXIBLE PLASTIC SHEATH DESIGNED TO SAFELY REMOVE DEBRIS FROM FLEXIBLE ENDOSCOPES.

## (undated) DEVICE — SOLUTION IRRIG 1000ML 09% SOD CHL USP PIC PLAS CONTAINER

## (undated) DEVICE — BLADE OPHTH ORNG GRINDLESS SMALLER ALTERNATIVE TO NO15 GEN

## (undated) DEVICE — KIT: IV 100/CS: Brand: MEDICAL ACTION INDUSTRIES

## (undated) DEVICE — 3M™ IOBAN™ 2 ANTIMICROBIAL INCISE DRAPE 6651EZ: Brand: IOBAN™ 2

## (undated) DEVICE — RECIPROCATING BLADE, DOUBLE SIDED, OFFSET  (70.0 X 1.0 X 12.5MM)

## (undated) DEVICE — POOLE SUCTION INSTRUMENT WITH REMOVABLE SHEATH: Brand: POOLE

## (undated) DEVICE — DRAPE SURG UTIL 26X18 IN 3 IN ADH PTCH FEN TISS FLM WHT STRL

## (undated) DEVICE — SHORT LENS-STERILE

## (undated) DEVICE — SPNG LAP PREWSH SFTPK 18X18IN STRL PK/5

## (undated) DEVICE — SHEET,DRAPE,53X77,STERILE: Brand: MEDLINE

## (undated) DEVICE — ANTIBACTERIAL UNDYED BRAIDED (POLYGLACTIN 910), SYNTHETIC ABSORBABLE SUTURE: Brand: COATED VICRYL

## (undated) DEVICE — LARYNGOSCOPE BLDE MAC HNDL M SZ 35 ST CURAPLEX CURAVIEW LED

## (undated) DEVICE — DRESSING FOAM W4XL12IN SIL RECT ADH WTRPRF FLM BK W/ BORD

## (undated) DEVICE — GAUZE,SPONGE,FLUFF,6"X6.75",STRL,10/TRAY: Brand: MEDLINE

## (undated) DEVICE — TBG PENCL TELESCP MEGADYNE SMOKE EVAC 10FT

## (undated) DEVICE — GLOVE SURG SZ 6 CRM LTX FREE POLYISOPRENE POLYMER BEAD ANTI

## (undated) DEVICE — NDL HYPO PRECISIONGLIDE REG 22G 1 1/2

## (undated) DEVICE — DUAL CUT SAGITTAL BLADE

## (undated) DEVICE — OPTIFOAM GENTLE SA, POSTOP, 4X12: Brand: MEDLINE

## (undated) DEVICE — FOAM BUMP ROUND LARGE: Brand: MEDLINE INDUSTRIES, INC.

## (undated) DEVICE — SOLUTION IV IRRIG POUR BRL 0.9% SODIUM CHL 2F7124

## (undated) DEVICE — APPL CHLORAPREP HI/LITE 26ML ORNG

## (undated) DEVICE — CEMENT MIXING SYSTEM WITH FEMORAL BREAKWAY NOZZLE: Brand: REVOLUTION

## (undated) DEVICE — CUFF,BP,DISP,1 TUBE,ADULT,HP: Brand: MEDLINE

## (undated) DEVICE — GLOVE SURG SZ 8 L11.77IN FNGR THK9.8MIL STRW LTX POLYMER

## (undated) DEVICE — CVR BRD ARM 13X30

## (undated) DEVICE — CORD,CAUTERY,BIPOLAR,STERILE: Brand: MEDLINE

## (undated) DEVICE — CONMED SCOPE SAVER BITE BLOCK, 20X27 MM: Brand: SCOPE SAVER

## (undated) DEVICE — SUTURE ETHILON SZ 5-0 L18IN NONABSORBABLE BLK P-3 L13MM 3/8 698G

## (undated) DEVICE — SUTURE VICRYL + SZ 5-0 L18IN ABSRB UD P-3 3/8 CIR REV CUT NDL VCP493G

## (undated) DEVICE — PK KN TOTL 30

## (undated) DEVICE — BANDAGE COMPR W3INXL15FT BGE E SGL LAYERED CLP CLSR

## (undated) DEVICE — Device: Brand: DEFENDO AIR/WATER/SUCTION AND BIOPSY VALVE

## (undated) DEVICE — SYSTEM SKIN CLSR 22CM DERMBND PRINEO

## (undated) DEVICE — GOWN,PREVENTION PLUS,XLNG/XXLARGE,STRL: Brand: MEDLINE

## (undated) DEVICE — GLOVE SURG SZ 85 CRM LTX FREE POLYISOPRENE POLYMER BEAD ANTI

## (undated) DEVICE — SUTURE VCRL SZ 3-0 L27IN ABSRB UD L19MM PS-2 3/8 CIR PRIM J427H

## (undated) DEVICE — SUTURE VCRL SZ 2-0 L36IN ABSRB UD L36MM CT-1 1/2 CIR J945H

## (undated) DEVICE — Device: Brand: POWER-FLO®

## (undated) DEVICE — Z INACTIVE USE 2660664 SOLUTION IRRIG 3000ML 0.9% SOD CHL USP UROMATIC PLAS CONT

## (undated) DEVICE — SENSR O2 OXIMAX FNGR A/ 18IN NONSTR

## (undated) DEVICE — 4-PORT MANIFOLD: Brand: NEPTUNE 2

## (undated) DEVICE — Device

## (undated) DEVICE — TUBE ET 7.5MM NSL ORAL BASIC CUF INTMED MURPHY EYE RADPQ

## (undated) DEVICE — SUTURE ABSRB BRAID COAT UD CP NO 2 27IN VCRL J195H

## (undated) DEVICE — FRCP BX RADJAW4 NDL 2.8 240 STD OG

## (undated) DEVICE — PAD,ABDOMINAL,8"X10",ST,LF: Brand: MEDLINE

## (undated) DEVICE — SURGICAL PROCEDURE PACK KNEE TOT DBD CDS LOURDES HOSP LF

## (undated) DEVICE — SYR LL TP 10ML STRL

## (undated) DEVICE — YANKAUER,BULB TIP WITH VENT: Brand: ARGYLE

## (undated) DEVICE — RECIPROCATING BLADE DOUBLE SIDE (74.0 X 0.77MM)

## (undated) DEVICE — PREP SOL POVIDONE/IODINE BT 4OZ

## (undated) DEVICE — SUTURE VICRYL SZ 5-0 L18IN ABSRB UD L13MM P-3 3/8 CIR PRIM J493G

## (undated) DEVICE — CHLORAPREP 26ML ORANGE

## (undated) DEVICE — GLOVE SURG SZ 85 L12IN FNGR THK79MIL GRN LTX FREE

## (undated) DEVICE — GLOVE SURG SZ 85 L12IN FNGR ORTHO 126MIL CRM LTX FREE

## (undated) DEVICE — SUT VIC 2 CP 27IN UD VCP195H

## (undated) DEVICE — SUTURE ETHLN SZ 2-0 L30IN NONABSORBABLE BLK L36MM FSLX 3/8 1674H

## (undated) DEVICE — Z DISCONTINUED USE 2272117 DRAPE SURG 3 QTR N INVASIVE 2 LAYR DISP